# Patient Record
Sex: FEMALE | Race: WHITE | HISPANIC OR LATINO | Employment: OTHER | URBAN - METROPOLITAN AREA
[De-identification: names, ages, dates, MRNs, and addresses within clinical notes are randomized per-mention and may not be internally consistent; named-entity substitution may affect disease eponyms.]

---

## 2017-02-10 ENCOUNTER — GENERIC CONVERSION - ENCOUNTER (OUTPATIENT)
Dept: OTHER | Facility: OTHER | Age: 72
End: 2017-02-10

## 2017-02-14 ENCOUNTER — GENERIC CONVERSION - ENCOUNTER (OUTPATIENT)
Dept: OTHER | Facility: OTHER | Age: 72
End: 2017-02-14

## 2017-02-16 ENCOUNTER — ALLSCRIPTS OFFICE VISIT (OUTPATIENT)
Dept: OTHER | Facility: OTHER | Age: 72
End: 2017-02-16

## 2017-04-11 ENCOUNTER — ALLSCRIPTS OFFICE VISIT (OUTPATIENT)
Dept: OTHER | Facility: OTHER | Age: 72
End: 2017-04-11

## 2017-04-11 ENCOUNTER — GENERIC CONVERSION - ENCOUNTER (OUTPATIENT)
Dept: OTHER | Facility: OTHER | Age: 72
End: 2017-04-11

## 2017-04-12 ENCOUNTER — ALLSCRIPTS OFFICE VISIT (OUTPATIENT)
Dept: OTHER | Facility: OTHER | Age: 72
End: 2017-04-12

## 2017-06-10 ENCOUNTER — GENERIC CONVERSION - ENCOUNTER (OUTPATIENT)
Dept: OTHER | Facility: OTHER | Age: 72
End: 2017-06-10

## 2017-06-16 DIAGNOSIS — E78.5 HYPERLIPIDEMIA: ICD-10-CM

## 2017-06-16 DIAGNOSIS — E11.9 TYPE 2 DIABETES MELLITUS WITHOUT COMPLICATIONS (HCC): ICD-10-CM

## 2017-06-20 ENCOUNTER — ALLSCRIPTS OFFICE VISIT (OUTPATIENT)
Dept: OTHER | Facility: OTHER | Age: 72
End: 2017-06-20

## 2017-06-30 ENCOUNTER — ALLSCRIPTS OFFICE VISIT (OUTPATIENT)
Dept: OTHER | Facility: OTHER | Age: 72
End: 2017-06-30

## 2017-07-05 ENCOUNTER — ALLSCRIPTS OFFICE VISIT (OUTPATIENT)
Dept: OTHER | Facility: OTHER | Age: 72
End: 2017-07-05

## 2017-07-11 ENCOUNTER — GENERIC CONVERSION - ENCOUNTER (OUTPATIENT)
Dept: OTHER | Facility: OTHER | Age: 72
End: 2017-07-11

## 2017-09-25 ENCOUNTER — GENERIC CONVERSION - ENCOUNTER (OUTPATIENT)
Dept: OTHER | Facility: OTHER | Age: 72
End: 2017-09-25

## 2017-10-30 DIAGNOSIS — I10 ESSENTIAL (PRIMARY) HYPERTENSION: ICD-10-CM

## 2017-10-30 DIAGNOSIS — E11.9 TYPE 2 DIABETES MELLITUS WITHOUT COMPLICATIONS (HCC): ICD-10-CM

## 2017-10-30 DIAGNOSIS — E78.5 HYPERLIPIDEMIA: ICD-10-CM

## 2017-11-10 ENCOUNTER — ALLSCRIPTS OFFICE VISIT (OUTPATIENT)
Dept: OTHER | Facility: OTHER | Age: 72
End: 2017-11-10

## 2017-11-11 NOTE — PROGRESS NOTES
Assessment    1  Diabetes mellitus, type 2 (250 00) (E11 9)   2  Hypertension (401 9) (I10)   3  Hyperlipidemia (272 4) (E78 5)   4  Urinary incontinence (788 30) (R32)   5  Moderate persistent asthma (493 90) (J45 40)   6  Rheumatoid arthritis (714 0) (M06 9)   7  Vitamin D deficiency (268 9) (E55 9)   8  Gastroesophageal reflux disease (530 81) (K21 9)   9  Depression (311) (F32 9)   10  Obesity (278 00) (E66 9)    Plan  Diabetes mellitus, type 2    · Basaglar KwikPen 100 UNIT/ML Subcutaneous Solution Pen-injector; INJECT 30 UNIT Daily   · (1) CBC/PLT/DIFF; Status:Active; Requested for:10Nov2017;    · (1) COMPREHENSIVE METABOLIC PANEL; Status:Active; Requested for:10Nov2017;    · (1) HEMOGLOBIN A1C; Status:Active; Requested for:10Nov2017;   Flu vaccine need    · Fluzone High-Dose 0 5 ML Intramuscular Suspension Prefilled Syringe  Hyperlipidemia    · (1) LIPID PANEL, FASTING; Status:Active; Requested for:10Nov2017;    · (1) TSH; Status:Active; Requested for:10Nov2017;   Urinary incontinence    · Oxybutynin Chloride ER 10 MG Oral Tablet Extended Release 24 Hour; Take 1 tablet daily    Discussion/Summary  Discussion Summary:   Type 2 diabetesâ   She will continue to take metformin and Lantus or Basaglar 30 units daily, whichever the insurance will cover  I will recheck the labs in 4 months  arthritis- she follows up with rheumatology   She still complains of a lot of pains in all her joints and severe ambulatory dysfunction  blood pressure is stable, continue same medication  she is stable on pantoprazole  she is on Lexapro  She is still very depressed as her  is sick and does not help himself and she has to do all the work for him  I reassured her and told her to take her medication regularly  incontinenceâ she was started on oxybutynin, better  I told her to cut down on the portion size and try to increase her activity as much as she can  dysfunctionâ due to the severe rheumatoid arthritis she is not able to walk properly  Counseling Documentation With Imm: The patient was counseled regarding diagnostic results,-- instructions for management,-- risk factor reductions,-- risks and benefits of treatment options,-- importance of compliance with treatment  Medication SE Review and Pt Understands Tx: Possible side effects of new medications were reviewed with the patient/guardian today  The treatment plan was reviewed with the patient/guardian  The patient/guardian understands and agrees with the treatment plan      Chief Complaint  Chief Complaint Chronic Condition [facility]: Patient is here today for follow up of chronic conditions described in HPI  History of Present Illness  Obesity (Follow-Up): The patient is being seen for follow-up of obesity  The patient reports no change in the condition  She has had no significant interval events  The patient is not currently on medication for this problem  Depression (Follow-Up): The patient states her depression has worsened since the last visit  She describes this as moderate in severity  She has had no significant interval events  Interval Symptoms: worsened depression-- and-- worsened depressed mood  Medications: The patient is not currently on any medications for her depression  Hyperlipidemia (Follow-Up): The patient states her hyperlipidemia has been stable since the last visit  Comorbid Illnesses: diabetes mellitus-- and-- hypertension  She has no significant interval events  Symptoms: The patient is currently asymptomatic  Medications: the patient is adherent with her medication regimen  -- She denies medication side effects  Hypertension (Follow-Up): The patient presents for follow-up of essential hypertension  The patient states she has been stable with her blood pressure control since the last visit  She has no comorbid illnesses  She has no significant interval events  Symptoms: The patient is currently asymptomatic    Medications: the patient is adherent with her medication regimen  -- She denies medication side effects  Diabetes Type II (Follow-Up): The patient states she has been stable with her Type II Diabetes control since the last visit  Comorbid Illnesses: hypertension,-- hyperlipidemia-- and-- obesity  Complications: peripheral neuropathy-- and-- coronary artery disease  She has no significant interval events  Symptoms: The patient is currently asymptomatic  Medications: the patient is adherent with her medication regimen  -- She denies medication side effects  Review of Systems  Complete-Female:  Constitutional: No fever, no chills, feels well, no tiredness, no recent weight gain or weight loss  Eyes: No complaints of eye pain, no red eyes, no eyesight problems, no discharge, no dry eyes, no itching of eyes  ENT: no complaints of earache, no loss of hearing, no nose bleeds, no nasal discharge, no sore throat, no hoarseness  Cardiovascular: No complaints of slow heart rate, no fast heart rate, no chest pain, no palpitations, no leg claudication, no lower extremity edema  Respiratory: No complaints of shortness of breath, no wheezing, no cough, no SOB on exertion, no orthopnea, no PND  Gastrointestinal: No complaints of abdominal pain, no constipation, no nausea or vomiting, no diarrhea, no bloody stools  Genitourinary: No complaints of dysuria, no incontinence, no pelvic pain, no dysmenorrhea, no vaginal discharge or bleeding  Musculoskeletal: No complaints of arthralgias, no myalgias, no joint swelling or stiffness, no limb pain or swelling  Integumentary: No complaints of skin rash or lesions, no itching, no skin wounds, no breast pain or lump  Neurological: No complaints of headache, no confusion, no convulsions, no numbness, no dizziness or fainting, no tingling, no limb weakness, no difficulty walking  Psychiatric: as noted in HPI    Endocrine: No complaints of proptosis, no hot flashes, no muscle weakness, no deepening of the voice, no feelings of weakness  Hematologic/Lymphatic: No complaints of swollen glands, no swollen glands in the neck, does not bleed easily, does not bruise easily  Active Problems  1  Ambulatory dysfunction (719 7) (R26 2)   2  Asthma (493 90) (J45 909)   3  Bilateral edema of lower extremity (782 3) (R60 0)   4  Chronic diastolic congestive heart failure (428 32,428 0) (I50 32)   5  Depression (311) (F32 9)   6  Diabetes mellitus type II, controlled (250 00) (E11 9)   7  Diabetes mellitus, type 2 (250 00) (E11 9)   8  Gastroesophageal reflux disease (530 81) (K21 9)   9  Hyperlipidemia (272 4) (E78 5)   10  Hypertension (401 9) (I10)   11  Insomnia (780 52) (G47 00)   12  No advance directive on file (V49 89) (Z78 9)   13  Obesity (278 00) (E66 9)   14  Psoriasis vulgaris (696 1) (L40 0)   15  Rheumatoid arthritis (714 0) (M06 9)   16  Screening for skin condition (V82 0) (Z13 89)   17  Urinary incontinence (788 30) (R32)   18  Vitamin D deficiency (268 9) (E55 9)    Past Medical History    1  History of Acute asthmatic bronchitis (493 90) (J45 909)   2  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,E947 9) (L27 0,T50 905A)   3  History of Carpal tunnel syndrome, left (354 0) (G56 02)   4  History of Cellulitis (682 9) (L03 90)   5  History of Cellulitis of leg, left (682 6) (L03 116)   6  History of Chest pain on breathing (786 52) (R07 1)   7  History of Encounter for screening for other nervous system disorder (V80 09) (Z13 858)   8  History of Encounter for special screening examination for genitourinary disorder (V81 6) (Z13 89)   9  History of Flu vaccine need (V04 81) (Z23)   10  History of Fungal infection (117 9) (B49)   11  History of dizziness (V13 89) (Z87 898)   12  History of shortness of breath (V13 89) (Z87 898)   13  History of Joint pain (719 40) (M25 50)   14  History of Need for pneumococcal vaccination (V03 82) (Z23)   15   History of Preoperative clearance (V72 84) (Z01 818)   16  History of Skin rash (782 1) (R21)  Active Problems And Past Medical History Reviewed: The active problems and past medical history were reviewed and updated today  Surgical History  1  History of Hand Surgery  Surgical History Reviewed: The surgical history was reviewed and updated today  Family History  Mother    1  Family history of rheumatoid arthritis (V17 7) (Z82 61)  Family History Reviewed: The family history was reviewed and updated today  Social History     · Former cigarette smoker (V15 82) (B93 283)   · Never A Smoker   · No advance directive on file (M64 05) (Z78 9)  Social History Reviewed: The social history was reviewed and updated today  The social history was reviewed and is unchanged  Current Meds   1  Actemra 162 MG/0 9ML Subcutaneous Solution Prefilled Syringe; Therapy: 77HEI2556 to Recorded   2  Albuterol Sulfate (2 5 MG/3ML) 0 083% Inhalation Nebulization Solution; USE 1 UNIT DOSE IN NEBULIZER 4 TIMES DAILY; Therapy: 38RIB1172 to (Renew: 93AUA5785)  Requested for: 17Ycg9998; Last Rx:99Vec2891 Ordered   3  BD Pen Needle Mini U/F 31G X 5 MM Miscellaneous; USE AS DIRECTED; Therapy: 04OTW1611 to (Evaluate:12Mar2016)  Requested for: 91CDD9516; Last Rx:18Mar2015 Ordered   4  Breo Ellipta 100-25 MCG/INH Inhalation Aerosol Powder Breath Activated; 1 puff once daily; Therapy: 61Gwh3757 to (Last Rx:55Yuk9596)  Requested for: 38Sqc4109 Ordered   5  Escitalopram Oxalate 20 MG Oral Tablet; TAKE 1 TABLET DAILY; Therapy: 04OSG2028 to (Renew:92Gmf8901)  Requested for: 77JUI3357; Last Rx:94Pgi2197 Ordered   6  Folic Acid 1 MG Oral Tablet; TAKE 1 TABLET DAILY AS DIRECTED; Therapy: 07IZD3255 to (Evaluate:10Mar2016)  Requested for: 76FSH8779; Last Rx:16Mar2015 Ordered   7  Fosinopril Sodium 10 MG Oral Tablet; take 1 tablet by mouth every day; Therapy: 57Slu6070 to (Emily Bang)  Requested for: 96Eid7591; Last Rx:49Oyj9086 Ordered   8   Klor-Con 10 10 MEQ Oral Tablet Extended Release; TAKE 1 TABLET TWICE DAILY; Therapy: 90QLO7981 to (Evaluate:58Sdl3496)  Requested for: 28COO7902; Last BH:05OYY4070 Ordered   9  MetFORMIN HCl - 1000 MG Oral Tablet; TAKE 1 TABLET TWICE DAILY  Requested for: 19CUM2745; Last Rx:88Tsw7633 Ordered   10  Metoprolol Succinate ER 25 MG Oral Tablet Extended Release 24 Hour; TAKE 1 TABLET DAILY; Therapy: 93Uhs6040 to (Evaluate:97Qqj5444)  Requested for: 56OZN5793; Last Rx:05Yuh9117  Ordered   11  Nystatin 363760 UNIT/GM External Powder; APPLY 2-3 TIMES DAILY TO AFFECTED AREA(S); Therapy: 76ECM1305 to (Last Rx:30Jun2017)  Requested for: 30Jun2017 Ordered   12  OneTouch Ultra Blue In [employer]; TEST 3 TIMES A DAY  Requested for: 10CWP1789; Last  Rx:39Csf2859 Ordered   13  OneTouch Verio w/Device Kit; TEST 3 TIMES A DAY; Therapy: 31BDK1495 to (Last Rx:92Gwh7882)  Requested for: 82Yll9043 Ordered   14  Oxybutynin Chloride ER 10 MG Oral Tablet Extended Release 24 Hour; Take 1 tablet daily; Therapy: 25MKJ9309 to (Last Rx:79Dck3630)  Requested for: 38Nex9418 Ordered   15  Oxycodone-Acetaminophen  MG Oral Tablet; Therapy: 40Mtj7344 to (Evaluate:13Jan2015) Recorded   16  Pantoprazole Sodium 40 MG Oral Tablet Delayed Release; Take one tablet once daily; Therapy: 17NIC8651 to (Renew:52Lan3081)  Requested for: 97ZEM9730; Last Rx:66Zhz8715  Ordered   17  Potassium Chloride ER 10 MEQ Oral Tablet Extended Release; TAKE 1 TABLET TWICE DAILY; Therapy: 97WEJ0337 to (Evaluate:42Zpk5599)  Requested for: 25Wxp3518; Last Rx:05Lzi5421  Ordered   18  ProAir  (90 Base) MCG/ACT Inhalation Aerosol Solution; INHALE 1 TO 2 PUFFS EVERY 4 TO  6 HOURS AS NEEDED; Therapy: 57DRK3726 to (Last Rx:21Azs2248)  Requested for: 96ZDD5565 Ordered   19  Simvastatin 40 MG Oral Tablet; TAKE 1 TABLET DAILY; Therapy: 40GFR9803 to ([phone])  Requested for: 61SUP6659; Last Rx:30Oct2017  Ordered   20   Torsemide 20 MG Oral Tablet; take 2 tablet twice daily; Therapy: 57OHH7786 to [phone])  Requested for: 88ANZ7435; Last Rx:03Nov2017  Ordered   21  Vitamin D3 76275 UNIT Oral Capsule; TAKE 1 CAPSULE Weekly; Therapy: 39KRX5569 to Recorded  Medication List Reviewed: The medication list was reviewed and updated today  Allergies  1  Gabapentin TABS   2  Vancomycin HCl SOLR    Vitals  Vital Signs    Recorded: 93VDP4912 02:56PM   Heart Rate 82   Systolic 612   Diastolic 74   Height 5 ft    Weight 188 lb 4 oz   BMI Calculated 36 77   BSA Calculated 1 82   O2 Saturation 93       Physical Exam   Constitutional  General appearance: Abnormal   appears tired  Head and Face  Head and face: Normal    Palpation of the face and sinuses: No sinus tenderness  Eyes  Conjunctiva and lids: No swelling, erythema or discharge  Pupils and irises: Equal, round, reactive to light  Ears, Nose, Mouth, and Throat  External inspection of ears and nose: Normal    Otoscopic examination: Tympanic membranes translucent with normal light reflex  Canals patent without erythema  Oropharynx: Normal with no erythema, edema, exudate or lesions  Neck  Neck: Supple, symmetric, trachea midline, no masses  Thyroid: Normal, no thyromegaly  Pulmonary  Respiratory effort: No increased work of breathing or signs of respiratory distress  Auscultation of lungs: Clear to auscultation  Cardiovascular  Palpation of heart: Normal PMI, no thrills  Auscultation of heart: Normal rate and rhythm, normal S1 and S2, no murmurs  Examination of extremities for edema and/or varicosities: Abnormal   bilateral ankle 2+ pitting edema  Abdomen  Abdomen: Non-tender, no masses  Lymphatic  Palpation of lymph nodes in neck: No lymphadenopathy  Musculoskeletal  Gait and station: Abnormal    Skin  Skin and subcutaneous tissue: Normal without rashes or lesions  Neurologic  Cranial nerves: Cranial nerves II-XII intact  Cortical function: Normal mental status  Reflexes: 2+ and symmetric  Sensation: No sensory loss  Psychiatric  Judgment and insight: Normal    Mood and affect: Abnormal   Mood and Affect: depressed,-- sad-- and-- tearful  Results/Data  PHQ-9 Adult Depression Screening 71GDT8072 02:55PM User, Susy     Test Name Result Flag Reference   PHQ-9 Adult Depression Score 16       Over the last two weeks, how often have you been bothered by any of the following problems? Little interest or pleasure in doing things: More than half the days - 2 Feeling down, depressed, or hopeless: More than half the days - 2 Trouble falling or staying asleep, or sleeping too much: Nearly every day - 3 Feeling tired or having little energy: Nearly every day - 3 Poor appetite or over eating: More than half the days - 2 Feeling bad about yourself - or that you are a failure or have let yourself or your family down: Nearly every day - 3 Trouble concentrating on things, such as reading the newspaper or watching television: Not at all - 0 Moving or speaking so slowly that other people could have noticed  Or the opposite -  being so fidgety or restless that you have been moving around a lot more than usual: Not at all - 0 Thoughts that you would be better off dead, or of hurting yourself in some way: Several days - 1   PHQ-9 Adult Depression Screening Positive     PHQ-9 Difficulty Level Not difficult at all     PHQ-9 Severity      Moderately Severe Depression     Health Maintenance Flow Sheet 46AUW2288 02:53PM      Test Name Result Flag Reference   Mammography Many years ago         Health Management  Diabetes mellitus type II, controlled   *VB - Foot Exam; every 1 year; Last 68IRX3845; Next Due: 10LHD5324; Active    Future Appointments    Date/Time Provider Specialty Site   11/14/2017 12:40 PM MACK Avalos  Cardiology [facility]   02/12/2018 03:00 PM MACK Camacho   Internal Medicine [facility]       Signatures   Electronically signed by : Bari Pereira M D ; Nov 10 2017  4:48PM EST                       (Author)

## 2017-11-14 ENCOUNTER — ALLSCRIPTS OFFICE VISIT (OUTPATIENT)
Dept: OTHER | Facility: OTHER | Age: 72
End: 2017-11-14

## 2017-11-15 NOTE — PROGRESS NOTES
Assessment  Assessed    1  Chronic diastolic congestive heart failure (428 32,428 0) (I50 32)   2  Hypertension (401 9) (I10)   3  Hyperlipidemia (272 4) (E78 5)   4  Bilateral edema of lower extremity (782 3) (R60 0)   5  Obesity (278 00) (E66 9)    Plan  Bilateral edema of lower extremity    · From  Potassium Chloride ER 10 MEQ Oral Tablet Extended Release TAKE 1TABLET TWICE DAILY To Potassium Chloride ER 10 MEQ Oral Tablet Extended ReleaseTAKE ONE TABLET BY MOUTH ONCE DAILY WITH FOOD   Rx By: Santiago Ruiz; Dispense: 30 Days ; #:30 Tablet Extended Release; Refill: 0;Bilateral edema of lower extremity; ALLY = N; Record  Hypertension    · From  Klor-Con 10 10 MEQ Oral Tablet Extended Release TAKE 1 TABLETTWICE DAILY To Klor-Con 10 10 MEQ Oral Tablet Extended Release TAKE 1 TABLETDAILY WITH FOOD   Rx By: Santiago Ruiz; Dispense: 30 Days ; #:30 Tablet Extended Release; Refill: 3;Hypertension; ALLY = N; Record    Discussion/Summary  Goals and Barriers: The patient has the current Goals: Symptom free  weight loss  The patent has the current Barriers: None  Patient's Capacity to Self-Care: Patient is able to Self-Care  Medication SE Review and Pt Understands Tx: Possible side effects of new medications were reviewed with the patient/guardian today  The treatment plan was reviewed with the patient/guardian  The patient/guardian understands and agrees with the treatment plan    Counseling Documentation With Imm: The patient was counseled regarding instructions for management,-- risk factor reductions,-- patient and family education,-- importance of compliance with treatment  Discussion Summary:   Pharmacological nuclear stress test July 2016 [facility] - negative for evidence of ischemiaechocardiogram July 2016 [facility] - normal LVEF with no significant valvular pathology  diastolic heart failure - almost euvolemic -> decrease torsemide to 20 mg OD  Cont potassium, fosinopril, BB  Low salt diet  Daily weights   Take an extra dose of diuretic if weight increases by 3 pounds in one day or 5 pounds in a week  Keep legs elevated  - BP stable  Continue current medications  - continue statin  Her PCP closely monitors her blood work  - counseled to lose weight  aggressive risk factor modification and therapeutic lifestyle changes  Low salt, low calorie, low fat, low cholesterol diet with regular exercise and to optimize weight  I will defer the ordering and monitoring of necessary lab studies to you, but I am available and happy to review and manage any of that data at your request in the future  concepts of atherosclerosis, including signs and symptoms of cardiac disease  studies were reviewed  measures were reviewed  were entertained and answered  was advised to report any problem requiring medical attention  up with appropriate specialists and lab work as discussed  for follow up visit as scheduled or earlier, if needed  you for allowing me to participate in the care and evaluation of your patient  Should you have any questions, please feel free to contact me  Chief Complaint  Chief Complaint Chronic Condition [facility]: Patient is here today for follow up of chronic conditions described in HPI  History of Present Illness  HPI: Patient here for f/u  States her leg swelling is much less  [facility]  Patient had gone to the hospital with c/o shortness of breath and bilateral lower extremity swelling  Chest x-ray showed pulmonary congestion  BNP was 265  Patient was admitted, diuresed and she felt better  lower extremity swelling is better  Denies chest pain/pressure/tightness, palpitations, lightheadedness, syncope, orthopnea, PND, claudication   Currently on torsemide, potassium, fosinopril, metoprolol succinate  nuclear stress test July 2016 [facility] - negative for evidence of ischemiaechocardiogram July 2016 [facility] - normal LVEF with no significant valvular pathology  - taking her fosinopril, BB regularly  Denies lightheadedness, headache, medication side effects   - on diet control  Her PCP closely monitor the blood work  - trying to lose weight      Review of Systems  Cardiology Female ROS:    Cardiac: as noted in HPI  Skin: as noted in HPI  Genitourinary: No complaints of recurrent urinary tract infections, frequent urination at night, difficult urination, blood in urine, kidney stones, loss of bladder control, kidney problems, denies any birth control or hormone replacement, is not post menopausal, not currently pregnant  Psychological: No complaints of feeling depressed, anxiety, panic attacks, or difficulty concentrating  General: No complaints of trouble sleeping, lack of energy, fatigue, appetite changes, weight changes, fever, frequent infections, or night sweats  Respiratory: as noted in HPI  HEENT: No complaints of serious problems, hearing problems, nose problems, throat problems, or snoring  Gastrointestinal: No complaints of liver problems, nausea, vomiting, heartburn, constipation, bloody stools, diarrhea, problems swallowing, adbominal pain, or rectal bleeding  Hematologic: No complaints of bleeding disorders, anemia, blood clots, or excessive brusing  Neurological: No complaints of numbness, tingling, dizziness, weakness, seizures, headaches, syncope or fainting, AM fatigue, daytime sleepiness, no witnessed apnea episodes  Musculoskeletal: No complaints of arthritis, back pain, or painfull swelling  ROS Reviewed:   ROS reviewed  Active Problems  Problems    1  Ambulatory dysfunction (719 7) (R26 2)   2  Asthma (493 90) (J45 909)   3  Bilateral edema of lower extremity (782 3) (R60 0)   4  Chronic diastolic congestive heart failure (428 32,428 0) (I50 32)   5  Depression (311) (F32 9)   6  Diabetes mellitus type II, controlled (250 00) (E11 9)   7  Diabetes mellitus, type 2 (250 00) (E11 9)   8  Flu vaccine need (V04 81) (Z23)   9   Gastroesophageal reflux disease (530 81) (K21 9)   10  Hyperlipidemia (272 4) (E78 5)   11  Hypertension (401 9) (I10)   12  Insomnia (780 52) (G47 00)   13  Moderate persistent asthma (493 90) (J45 40)   14  No advance directive on file (V49 89) (Z78 9)   15  Obesity (278 00) (E66 9)   16  Psoriasis vulgaris (696 1) (L40 0)   17  Rheumatoid arthritis (714 0) (M06 9)   18  Screening for skin condition (V82 0) (Z13 89)   19  Urinary incontinence (788 30) (R32)   20  Vitamin D deficiency (268 9) (E55 9)    Past Medical History  Problems    1  History of Acute asthmatic bronchitis (493 90) (J45 909)   2  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,E947 9) (L27 0,T50 905A)   3  History of Carpal tunnel syndrome, left (354 0) (G56 02)   4  History of Cellulitis (682 9) (L03 90)   5  History of Cellulitis of leg, left (682 6) (L03 116)   6  History of Chest pain on breathing (786 52) (R07 1)   7  History of Encounter for screening for other nervous system disorder (V80 09) (Z13 858)   8  History of Encounter for special screening examination for genitourinary disorder (V81 6) (Z13 89)   9  History of Flu vaccine need (V04 81) (Z23)   10  History of Fungal infection (117 9) (B49)   11  History of dizziness (V13 89) (Z87 898)   12  History of shortness of breath (V13 89) (Z87 898)   13  History of Joint pain (719 40) (M25 50)   14  History of Need for pneumococcal vaccination (V03 82) (Z23)   15  History of Preoperative clearance (V72 84) (Z01 818)   16  History of Skin rash (782 1) (R21)  Active Problems And Past Medical History Reviewed: The active problems and past medical history were reviewed and updated today  Surgical History  Problems    1  History of Hand Surgery  Surgical History Reviewed: The surgical history was reviewed and updated today  Family History  Mother    1  Family history of rheumatoid arthritis (V17 7) (Z82 61)  Family History Reviewed: The family history was reviewed and updated today  Social History  Problems    · Former cigarette smoker (V15 82) (P11 567)   · Never A Smoker   · No advance directive on file (C01 91) (Z78 9)  Social History Reviewed: The social history was reviewed and updated today  The social history was reviewed and is unchanged  Current Meds   1  Actemra 162 MG/0 9ML Subcutaneous Solution Prefilled Syringe; Therapy: 61TPZ6951 to Recorded   2  Albuterol Sulfate (2 5 MG/3ML) 0 083% Inhalation Nebulization Solution; USE 1 UNIT DOSE IN NEBULIZER 4 TIMES DAILY; Therapy: 28FPY4094 to (Renew: 25CJV0194)  Requested for: 96Uoa4338; Last Rx:57Ycx1670 Ordered   3  Basaglar KwikPen 100 UNIT/ML Subcutaneous Solution Pen-injector; INJECT 30 UNIT Daily; Therapy: 51VMD6491 to (Last Rx:10Nov2017)  Requested for: 13BVU4280 Ordered   4  BD Pen Needle Mini U/F 31G X 5 MM Miscellaneous; USE AS DIRECTED; Therapy: 84MVL7090 to (Evaluate:12Mar2016)  Requested for: 83QQY6575; Last Rx:18Mar2015 Ordered   5  Breo Ellipta 100-25 MCG/INH Inhalation Aerosol Powder Breath Activated; 1 puff once daily; Therapy: 78Zlm7433 to (Last Rx:18Apr2017)  Requested for: 94Aze6409 Ordered   6  Escitalopram Oxalate 20 MG Oral Tablet; TAKE 1 TABLET DAILY; Therapy: 08OFK4037 to (Renew:80Rjn8910)  Requested for: 12QIY8176; Last Rx:33Jal5476 Ordered   7  Folic Acid 1 MG Oral Tablet; TAKE 1 TABLET DAILY AS DIRECTED; Therapy: 77JYM0134 to (Evaluate:10Mar2016)  Requested for: 66HEI8220; Last Rx:16Mar2015 Ordered   8  Fosinopril Sodium 10 MG Oral Tablet; take 1 tablet by mouth every day; Therapy: 70Htf2957 to ([facility])  Requested for: 65Cxi9741; Last Rx:37Yad5754 Ordered   9  Klor-Con 10 10 MEQ Oral Tablet Extended Release; TAKE 1 TABLET TWICE DAILY; Therapy: 88LTT8701 to (Evaluate:08Tio5733)  Requested for: 46JOE1116; Last WZ:20RVE8469 Ordered   10  MetFORMIN HCl - 1000 MG Oral Tablet; TAKE 1 TABLET TWICE DAILY  Requested for:  35MCR9474; Last Rx:66Jhe7344 Ordered   11   Metoprolol Succinate ER 25 MG Oral Tablet Extended Release 24 Hour; TAKE 1 TABLET  DAILY; Therapy: 67Xyt3355 to (Evaluate:19Hfn0680)  Requested for: 14XZH5676; Last  Rx:93Wgi7382 Ordered   12  Nystatin 563419 UNIT/GM External Powder; APPLY 2-3 TIMES DAILY TO AFFECTED  AREA(S); Therapy: 49VEU0075 to (Last Rx:30Jun2017)  Requested for: 30Jun2017 Ordered   13  OneTouch Ultra Blue In [employer]; TEST 3 TIMES A DAY  Requested for: 66KEQ3192; Last  Rx:14Stv0002 Ordered   14  OneTouch Verio w/Device Kit; TEST 3 TIMES A DAY; Therapy: 01OBR5272 to (Last Rx:27Hsu1091)  Requested for: 03Whq6393 Ordered   15  Oxybutynin Chloride ER 10 MG Oral Tablet Extended Release 24 Hour; Take 1 tablet  daily; Therapy: 52ZZI1128 to (Last Rx:10Nov2017)  Requested for: 37LWI8115 Ordered   16  Oxycodone-Acetaminophen  MG Oral Tablet; Therapy: 95Krq2130 to (Evaluate:13Jan2015) Recorded   17  Pantoprazole Sodium 40 MG Oral Tablet Delayed Release; Take one tablet once daily; Therapy: 00QUO7427 to (Renew:31Qit1229)  Requested for: 48TQJ3593; Last  Rx:73Xdr1604 Ordered   18  Potassium Chloride ER 10 MEQ Oral Tablet Extended Release; TAKE 1 TABLET TWICE  DAILY; Therapy: 79UKY6669 to (Evaluate:56Bnu9779)  Requested for: 88Pke2652; Last  Rx:32Hxx6164 Ordered   19  ProAir  (90 Base) MCG/ACT Inhalation Aerosol Solution; INHALE 1 TO 2 PUFFS  EVERY 4 TO 6 HOURS AS NEEDED; Therapy: 58BTI8351 to (Last Rx:94Qku4159)  Requested for: 90OMF1039 Ordered   20  Simvastatin 40 MG Oral Tablet; TAKE 1 TABLET DAILY; Therapy: 44QWX5420 to ([facility])  Requested for: 68ROZ2006; Last  Rx:23Fjf1690 Ordered   21  Torsemide 20 MG Oral Tablet; take 2 tablet twice daily; Therapy: 14IAB4396 to [phone])  Requested for: 37GAY2273; Last  Rx:52Ikv6804 Ordered   22  Vitamin D3 67372 UNIT Oral Capsule; TAKE 1 CAPSULE Weekly; Therapy: 20SUD5853 to Recorded  Medication List Reviewed: The medication list was reviewed and updated today  Medication List Reviewed:    The medication list was reviewed and updated today  Allergies  Medication    1  Gabapentin TABS   2  Vancomycin HCl SOLR    Vitals  Vital Signs    Recorded: 72VYC9931 12:34PM   Heart Rate 75   Systolic 231   Diastolic 80   Height 5 ft    Weight 183 lb    BMI Calculated 35 74   BSA Calculated 1 8   O2 Saturation 87       Physical Exam   Constitutional  General appearance: Abnormal  -- Obese  Eyes  Conjunctiva and Sclera examination: Conjunctiva pink, sclera anicteric  Ears, Nose, Mouth, and Throat - External inspection of ears and nose: Normal without deformities or discharge  -- Oropharynx: Clear, nares are clear, mucous membranes are moist   Neck  Neck and thyroid: Normal, supple, trachea midline, no thyromegaly  Pulmonary  Respiratory effort: No increased work of breathing or signs of respiratory distress  Auscultation of lungs: Clear to auscultation, no rales, no rhonchi, no wheezing, good air movement  Cardiovascular  Palpation of heart: Normal PMI, no thrills  Auscultation of heart: Normal rate and rhythm, normal S1 and S2, no murmurs  Carotid pulses: Normal, 2+ bilaterally  Examination of extremities for edema and/or varicosities: Normal    Chest - Chest: Normal   Abdomen  Abdomen: Non-tender and no distention  Liver and spleen: No hepatomegaly or splenomegaly  Musculoskeletal Gait and station: Normal gait  -- Digits and nails: Normal without clubbing or cyanosis  Skin - Skin and subcutaneous tissue: Normal without rashes or lesions  Skin is warm and well perfused, normal turgor  Neurologic - Speech: Normal    Psychiatric - Orientation to person, place, and time: Normal -- Mood and affect: Normal       Health Management  Diabetes mellitus type II, controlled   *VB - Foot Exam; every 1 year; Last 08SHG9858; Next Due: 94VRT0560; Active    Future Appointments    Date/Time Provider Specialty Site   02/12/2018 03:00 PM MACK Parkinson   Internal Medicine [facility] Signatures   Electronically signed by : MACK Felix ; Nov 14 2017  1:00PM EST                       (Author)

## 2018-01-08 ENCOUNTER — GENERIC CONVERSION - ENCOUNTER (OUTPATIENT)
Dept: INTERNAL MEDICINE CLINIC | Facility: CLINIC | Age: 73
End: 2018-01-08

## 2018-01-08 ENCOUNTER — GENERIC CONVERSION - ENCOUNTER (OUTPATIENT)
Dept: OTHER | Facility: OTHER | Age: 73
End: 2018-01-08

## 2018-01-12 VITALS
BODY MASS INDEX: 35.73 KG/M2 | DIASTOLIC BLOOD PRESSURE: 60 MMHG | WEIGHT: 182 LBS | HEIGHT: 60 IN | SYSTOLIC BLOOD PRESSURE: 130 MMHG | HEART RATE: 104 BPM

## 2018-01-12 NOTE — MISCELLANEOUS
Assessment   1  Skin rash (782 1) (R21)  2  Bilateral edema of lower extremity (782 3) (R60 0)  3  Diabetes mellitus, type 2 (250 00) (E11 9)  4  Hypertension (401 9) (I10)  5  Rheumatoid arthritis (714 0) (M06 9)    Plan  PMH: History of shortness of breath    · Renew: ProAir  (90 Base) MCG/ACT Inhalation Aerosol Solution; INHALE 1 TO 2  PUFFS EVERY 4 TO 6 HOURS AS NEEDED  Rx By: Jody Nick; Dispense: 0 Days ; #:1 X 8 5 GM Inhaler; Refill: 3;For: PMH: History   of shortness of breath; ALLY = N; Verified Transmission to "[facility]Y #[id number] Last   Updated By: System, [facility]; 3/3/2016 12:36:08 PM  Skin rash    · Start: DiphenhydrAMINE HCl - 25 MG Oral Capsule; TAKE 1 CAPSULE EVERY 6 HOURS  DAILY  Rx By: Jody Nick; Dispense: 15 Days ; #:60 Capsule; Refill: 0;For: Skin rash; ALLY =   N; Verified Transmission to "[facility] #311 Last Updated By: System,   [employer]; 3/3/2016 12:36:09 PM   · Start: PredniSONE (Flavio) 10 MG Oral Tablet; 4 tablets daily for 3 days, 3 tabls daily for 3  days, 2 tabslets daily for 3 days, 1 tab daily for 3 days  Rx By: Jody Nick; Dispense: 0 Days ; #:30 Tablet; Refill: 0;For: Skin rash; ALLY = N;   Verified Transmission to "[facility] #3274 Last Updated By: System[employer];   3/3/2016 12:36:08 PM   · Start: Triamcinolone Acetonide 0 1 % External Cream; APPLY  AND RUB  IN A THIN FILM  TO AFFECTED AREAS TWICE DAILY  (AM AND PM)  Rx By: Jody Nick; Dispense: 0 Days ; #:1 X 30 GM Tube; Refill: 1;For: Skin rash; ALLY   = N; Verified Transmission to "[facility] #2971 Last Updated By: System,   [employer]; 3/3/2016 12:36:08 PM    Discussion/Summary  Discussion Summary:   Patient was admitted to the hospital initially for bilateral edema of lower extremities about 2 weeks ago  She was started on IV vancomycin and she developed an extensive rash all over her body  She was discharged to home on February ,27   As the rash is not getting better she went back to the hospital on February 28 and came back on March 2  This time she was given triamcinolone cream to be applied locally and diphenhydramine 25 mg every 6 hours    She still has a lot of itching and redness all over the body  She takes 10 mg of prednisone daily for her rheumatoid arthritis treatment  I increased the dose to 40 mg to be tapered over 2 weeks  She will continue the triamcinolone and diphenhydramine  Counseling Documentation With Imm: The patient was counseled regarding diagnostic results, instructions for management, risk factor reductions, risks and benefits of treatment options, importance of compliance with treatment  Medication SE Review and Pt Understands Tx: Possible side effects of new medications were reviewed with the patient/guardian today  The treatment plan was reviewed with the patient/guardian  The patient/guardian understands and agrees with the treatment plan      Chief Complaint  Chief Complaint Free Text Note Form: hospital follow up      History of Present Illness  TCM Communication [facility]: The patient is being contacted for follow-up after hospitalization and First try message 1   She was hospitalized at [facility]  The date of discharge:  3/2/161   She was discharged to home  The patient presents with complaints of constant episodes of bilateral face, bilateral scalp, bilateral neck, bilateral truncal area, bilateral arm, bilateral buttock, bilateral leg and bilateral foot rash:   Episodes started about 1 week ago  She is currently experiencing rash: Bonilla Orestes Her symptoms are caused by new medication use  Symptoms are improved by antihistamines  Symptoms are unchanged  Risk Factors: new medication  Topics counseled included diagnostic results, instructions for management, risk factor reductions, prognosis, patient and family education, activities of daily living and importance of compliance with treatment     Communication performed and completed by   HPI: Patient was admitted to the hospital initially for bilateral edema of lower extremities about 2 weeks ago  She was started on IV vancomycin and she developed an extensive rash all over her body  She was discharged to home on February ,27  As the rash is not getting better she went back to the hospital on February 28 and came back on March 2  This time she was given triamcinolone cream to be applied locally and diphenhydramine 25 mg every 6 hours    She still has a lot of itching and redness all over the body  1 Amended By: Anna Cruz; Mar 04 2016 1:34 PM EST    Review of Systems  Complete-Female:   Constitutional: No fever, no chills, feels well, no tiredness, no recent weight gain or weight loss  Eyes: No complaints of eye pain, no red eyes, no eyesight problems, no discharge, no dry eyes, no itching of eyes  ENT: no complaints of earache, no loss of hearing, no nose bleeds, no nasal discharge, no sore throat, no hoarseness  Cardiovascular: No complaints of slow heart rate, no fast heart rate, no chest pain, no palpitations, no leg claudication, no lower extremity edema  Respiratory: No complaints of shortness of breath, no wheezing, no cough, no SOB on exertion, no orthopnea, no PND  Gastrointestinal: No complaints of abdominal pain, no constipation, no nausea or vomiting, no diarrhea, no bloody stools  Genitourinary: No complaints of dysuria, no incontinence, no pelvic pain, no dysmenorrhea, no vaginal discharge or bleeding  Musculoskeletal: No complaints of arthralgias, no myalgias, no joint swelling or stiffness, no limb pain or swelling  Integumentary: as noted in HPI  Neurological: No complaints of headache, no confusion, no convulsions, no numbness, no dizziness or fainting, no tingling, no limb weakness, no difficulty walking  Psychiatric: Not suicidal, no sleep disturbance, no anxiety or depression, no change in personality, no emotional problems     Endocrine: No complaints of proptosis, no hot flashes, no muscle weakness, no deepening of the voice, no feelings of weakness  Hematologic/Lymphatic: No complaints of swollen glands, no swollen glands in the neck, does not bleed easily, does not bruise easily  Active Problems   1  Ambulatory dysfunction (719 7) (R26 2)  2  Depression (311) (F32 9)  3  Diabetes mellitus type II, controlled (250 00) (E11 9)  4  Diabetes mellitus, type 2 (250 00) (E11 9)  5  GERD (gastroesophageal reflux disease) (530 81) (K21 9)  6  Hypertension (401 9) (I10)  7  Insomnia (780 52) (G47 00)  8  Obesity (278 00) (E66 9)  9  Psoriasis vulgaris (696 1) (L40 0)  10  Rheumatoid arthritis (714 0) (M06 9)  11  Screening for skin condition (V82 0) (Z13 89)  12  Urinary incontinence (788 30) (R32)    History of Carpal tunnel syndrome, left (354 0) (G56 02)     Past Medical History   1  History of Carpal tunnel syndrome, left (354 0) (G56 02)  2  History of Cellulitis of leg, left (682 6) (L03 116)  3  History of Chest pain on breathing (786 52) (R07 1)  4  History of Encounter for screening for other nervous system disorder (V80 09) (Z13 858)  5  History of Encounter for special screening examination for genitourinary disorder (V81 6)   (Z13 89)  6  History of Fungal infection (117 9) (B49)  7  History of dizziness (V13 89) (Z87 898)  8  History of shortness of breath (V13 89) (Z87 898)  9  History of Joint pain (719 40) (M25 50)  10  History of Preoperative clearance (V72 84) (Z01 818)    Surgical History   1  History of Hand Surgery  Surgical History Reviewed: The surgical history was reviewed and updated today  Family History   1  Family history of rheumatoid arthritis (V17 7) (Z82 61)  Family History Reviewed: The family history was reviewed and updated today  Social History    · Never A Smoker  Social History Reviewed: The social history was reviewed and updated today  The social history was reviewed and is unchanged  Current Meds  1   Actemra 162 MG/0 9ML Subcutaneous Solution Prefilled Syringe; Therapy: 34ONJ3766 to Recorded  2  BD Pen Needle Mini U/F 31G X 5 MM Miscellaneous; USE AS DIRECTED; Therapy: 14MNM3906 to (Evaluate:12Mar2016)  Requested for: 28OPK6576; Last   Rx:18Mar2015 Ordered  3  Calcipotriene 0 005 % External Ointment; APPLY AND GENTLY MASSAGE INTO   AFFECTED AREA(S) TWICE DAILY; Therapy: 64MBM8241 to (Last Rx:31Dec2014)  Requested for: 81Bfd9876 Ordered  4  Escitalopram Oxalate 20 MG Oral Tablet; TAKE 1 TABLET DAILY; Therapy: 31AQI9633 to (Corrie Primrose)  Requested for: 97SIA6785; Last   Rx:02Feb2016 Ordered  5  Folic Acid 1 MG Oral Tablet; TAKE 1 TABLET DAILY AS DIRECTED; Therapy: 31SXK6919 to (Evaluate:10Mar2016)  Requested for: 36RTW0181; Last   Rx:16Mar2015 Ordered  6  Fosinopril Sodium 10 MG Oral Tablet; TAKE 1 TABLET DAILY  Requested for: 09Cnz1841; Last Rx:20Apr2015 Ordered  7  Lantus 100 UNIT/ML Subcutaneous Solution; inject 40 unit daily; Therapy: 41LZN2305 to  Requested for: 02Feb2016 Recorded  8  MetFORMIN HCl - 1000 MG Oral Tablet; TAKE 1 TABLET TWICE DAILY  Requested for:   42ITF9734; Last Rx:16Mar2015 Ordered  9  Methotrexate 2 5 MG Oral Tablet; TAKE 6 TABLETS WEEKLY  Requested for: 46HAW6351; Last Rx:82Xso3448 Ordered  10  Nystatin 268778 UNIT/GM External Cream;    Therapy: 97EHC1531 to (Evaluate:08Jan2015) Recorded  11  Nystatin 517073 UNIT/GM External Powder; APPLY 2-3 TIMES DAILY TO AFFECTED    AREA(S); Therapy: 83QAG1092 to (Last Rx:24Dec2014)  Requested for: 25Buh0226 Ordered  12  OneTouch Delica Lancets 58F Miscellaneous; test tid  Requested for: 33ZPS0538; Last    Rx:16Mar2015 Ordered  13  OneTouch Ultra Blue In [employer]; TEST 3 TIMES A DAY; Last Rx:30Apr2014 Ordered  14  Oxybutynin Chloride ER 10 MG Oral Tablet Extended Release 24 Hour; Take 1 tablet    daily; Therapy: 27JDY7665 to (Last Rx:02Feb2016)  Requested for: 02Feb2016 Ordered  15  Oxycodone-Acetaminophen  MG Oral Tablet;     Therapy: 45Pki4229 to ([facility]:51IEE4292) Recorded  16  Pantoprazole Sodium 40 MG Oral Tablet Delayed Release; Take one tablet once daily; Therapy: 80BGD9132 to (Renew:69Oia4445)  Requested for: 39HYK8977; Last    Rx:43Uql8577 Ordered  17  Pioglitazone HCl - 45 MG Oral Tablet; TAKE 1 TABLET ONCE DAILY  Requested for:    06Xnd7557; Last Rx:86Jzp7405 Ordered  18  PredniSONE 5 MG Oral Tablet; take 2 tablet daily; Therapy: 02XIG0281 to (Complete:13Mar2016) Recorded  19  ProAir  (90 Base) MCG/ACT Inhalation Aerosol Solution; INHALE 1 TO 2 PUFFS    EVERY 4 TO 6 HOURS AS NEEDED; Therapy: 64ZFV2989 to (Last Rx:59Wzl2459)  Requested for: 06DBW5810 Ordered  20  Simvastatin 40 MG Oral Tablet; TAKE 1 TABLET DAILY  Requested for: 26TNE3215; Last    CU:71SPO4768 Ordered  21  Symbicort 160-4 5 MCG/ACT Inhalation Aerosol; inhale 1 puff bid; Therapy: 85KWT2346 to (Last Rx:96Eat7826)  Requested for: 57QVP9064 Ordered  Medication List Reviewed: The medication list was reviewed and updated today  Allergies   1  Vancomycin HCl SOLR    Vitals  Signs [Data Includes: Current Encounter]   Recorded: [id number] 11:55AM   Heart Rate: 80  Systolic: 633  Diastolic: 60  Height: 5 ft   Weight: 200 lb 6 08 oz  BMI Calculated: 39 13  BSA Calculated: 1 86    Physical Exam    Constitutional   General appearance: Abnormal   uncomfortable  Eyes   Conjunctiva and lids: No swelling, erythema or discharge  Ears, Nose, Mouth, and Throat   External inspection of ears and nose: Normal     Oropharynx: Normal with no erythema, edema, exudate or lesions  Pulmonary   Respiratory effort: No increased work of breathing or signs of respiratory distress  Auscultation of lungs: Clear to auscultation  Cardiovascular   Palpation of heart: Normal PMI, no thrills  Auscultation of heart: Normal rate and rhythm, normal S1 and S2, without murmurs      Examination of extremities for edema and/or varicosities: Normal     Abdomen   Abdomen: Non-tender, no masses  Liver and spleen: No hepatomegaly or splenomegaly  Lymphatic   Palpation of lymph nodes in neck: No lymphadenopathy  Musculoskeletal   Gait and station: Normal     Skin   Skin and subcutaneous tissue: Abnormal   erythema  Clinical impression: non-specific rash (diffusely in mutiple areas )  Neurologic   Reflexes: 2+ and symmetric  Sensation: No sensory loss  Psychiatric   Orientation to person, place, and time: Normal     Mood and affect: Normal          Future Appointments    Date/Time Provider Specialty Site   03/05/2016 12:00 PM MACK Crystal  [address] [facility]   06/09/2016 01:00 PM MACK Crystal   Internal [address]     Signatures   Electronically signed by : MACK Dugan ; Mar  3 2016  3:53PM EST                       (Author)    Electronically signed by : Cyndi Blankenship, [facility]; Mar  4 2016  1:35PM EST                       (Author)    Electronically signed by : MACK Dugan ; Mar  4 2016  4:33PM EST

## 2018-01-13 VITALS
BODY MASS INDEX: 38.87 KG/M2 | SYSTOLIC BLOOD PRESSURE: 142 MMHG | HEART RATE: 100 BPM | WEIGHT: 198 LBS | DIASTOLIC BLOOD PRESSURE: 70 MMHG | HEIGHT: 60 IN | OXYGEN SATURATION: 84 %

## 2018-01-13 VITALS
SYSTOLIC BLOOD PRESSURE: 126 MMHG | OXYGEN SATURATION: 94 % | HEIGHT: 60 IN | WEIGHT: 206.38 LBS | HEART RATE: 95 BPM | DIASTOLIC BLOOD PRESSURE: 52 MMHG | BODY MASS INDEX: 40.52 KG/M2

## 2018-01-13 VITALS
DIASTOLIC BLOOD PRESSURE: 60 MMHG | SYSTOLIC BLOOD PRESSURE: 120 MMHG | OXYGEN SATURATION: 91 % | BODY MASS INDEX: 39.73 KG/M2 | HEART RATE: 81 BPM | WEIGHT: 202.38 LBS | HEIGHT: 60 IN

## 2018-01-13 VITALS
BODY MASS INDEX: 38.09 KG/M2 | OXYGEN SATURATION: 89 % | HEIGHT: 60 IN | WEIGHT: 194 LBS | SYSTOLIC BLOOD PRESSURE: 124 MMHG | DIASTOLIC BLOOD PRESSURE: 80 MMHG | HEART RATE: 86 BPM

## 2018-01-13 VITALS
SYSTOLIC BLOOD PRESSURE: 142 MMHG | DIASTOLIC BLOOD PRESSURE: 64 MMHG | BODY MASS INDEX: 38.47 KG/M2 | HEART RATE: 92 BPM | WEIGHT: 197 LBS

## 2018-01-14 VITALS
BODY MASS INDEX: 36.96 KG/M2 | HEART RATE: 82 BPM | HEIGHT: 60 IN | SYSTOLIC BLOOD PRESSURE: 134 MMHG | WEIGHT: 188.25 LBS | DIASTOLIC BLOOD PRESSURE: 74 MMHG | OXYGEN SATURATION: 93 %

## 2018-01-14 VITALS
HEIGHT: 60 IN | SYSTOLIC BLOOD PRESSURE: 128 MMHG | BODY MASS INDEX: 35.93 KG/M2 | DIASTOLIC BLOOD PRESSURE: 80 MMHG | WEIGHT: 183 LBS | OXYGEN SATURATION: 87 % | HEART RATE: 75 BPM

## 2018-01-16 NOTE — MISCELLANEOUS
Assessment    1  Rheumatoid arthritis (714 0) (M06 9)   2  Ambulatory dysfunction (719 7) (R26 2)   3  Diabetes mellitus type II, controlled (250 00) (E11 9)   4  Hyperlipidemia (272 4) (E78 5)   5  Hypertension (401 9) (I10)   6  Obesity (278 00) (E66 9)    Plan   Acute asthmatic bronchitis    · Advair Diskus 250-50 MCG/DOSE Inhalation Aerosol Powder Breath Activated;  INHALE 1 PUFF TWICE DAILY   Rx By: Laverne Zaragoza; Dispense: 0 Days ; #:1 X 60 Aerosol Powder Breath Activated Disp Pack; Refill: 3; For: Acute asthmatic bronchitis; ALLY = N; Verified Transmission to [facility]/PHARMACY #5985 Last Updated By: System, SureScripts; 12/20/2016 2:30:35 PM   · Albuterol Sulfate (2 5 MG/3ML) 0 083% Inhalation Nebulization Solution; USE 1  UNIT DOSE IN NEBULIZER 4 TIMES DAILY   Rx By: Winter Emperor; Dispense: 30 Days ; #:3 X 3 ML Plas Cont (30 Plas Conts); Refill: 3; For: Acute asthmatic bronchitis; ALLY = N; Verified Transmission to [facility]/PHARMACY #3074 Bilateral edema of lower extremity    · Torsemide 10 MG Oral Tablet; TAKE 1 TABLET DAILY   Rx By: Laverne Zaragoza; Dispense: 90 Days ; #:90 Tablet; Refill: 1; For: Bilateral edema of lower extremity; ALLY = N; Verified Transmission to [facility]/PHARMACY #2214 Last Updated By: System, SureScripts; 12/20/2016 2:34:48 PM  Diabetes mellitus type II, controlled    · From  Lantus 100 UNIT/ML Subcutaneous Solution INJECT 25 UNIT Bedtime  To Lantus 100 UNIT/ML Subcutaneous Solution INJECT 27 UNIT Bedtime   Rx By: Winter Emperor; Dispense: 0 Days ; #:3 X 10 ML Vial; Refill: 1; For: Diabetes mellitus type II, controlled; ALLY = N; Record   · OneTouch Delica Lancets 38T Miscellaneous; test tid   Rx By: Winter Emperor; Dispense: 0 Days ; #:100 Miscellaneous;  Refill: 5; For: Diabetes mellitus type II, controlled; ALLY = N; Sent To: [facility]/PHARMACY #3767 Msg to Pharmacy: dx:E11 9; Last Updated By: Carolina Cheek; 12/20/2016 2:34:21 PM   · OneTouch Verio w/Device Kit; TEST 3 TIMES A DAY   Rx By: Winter Mueller; Dispense: 0 Days ; #:1 Kit; Refill: 0; For: Diabetes mellitus type II, controlled; ALLY = N; Verified Transmission to [facility]/PHARMACY #5113 Msg to Pharmacy: e11 9  Obesity    · Follow-up visit in 2 months Evaluation and Treatment  Follow-up  Status: Hold For -  Scheduling  Requested for: 60Apr7667   Ordered; For: Obesity; Ordered By: Asha Patel Performed:  Due: 97TCQ1031  PMH: Fungal infection    · Nystatin 969169 UNIT/GM External Powder; APPLY 2-3 TIMES DAILY TO  AFFECTED AREA(S)   Rx By: Asha Patel; Dispense: 0 Days ; #:1 X 60 GM Bottle; Refill: 5; For: PMH: Fungal infection; ALLY = N; Verified Transmission to [facility]/PHARMACY #1832   Potassium Chloride ER 10 MEQ Oral Tablet Extended Release; TAKE 1 TABLET DAILY WITH FOOD; Therapy: 12FPY5159 to (Evaluate:20Mar2017)  Requested for: 41Bzn2298; Last Rx:[id number]; Status: ACTIVE - Retrospective By Protocol Authorization Ordered  Rx By: Kristie Stover; Dispense: 90 Days ; #:90 Tablet Extended Release; Refill: 0;   For: Bilateral edema of lower extremity; ALLY = N; Verified Transmission to [facility]/PHARMACY #1483 Last Updated By: System, SureScripts; 12/20/2016 3:51:58 PM  Obesity (278 00) (E66 9)          Discussion/Summary  Discussion Summary:   Reviewed all of her hospital records she is doing well following closely with cardiology I increased her Lantus by 2 units she will follow-up in 2 months  Chief Complaint  Chief Complaint Free Text Note Form: Hospital follow-up      History of Present Illness  TCM Communication [facility]: The patient is being contacted for follow-up after hospitalization  She was hospitalized at [facility] [place]  The date of discharge: 12/12/16  She was discharged to home  Medications reviewed and updated today  Communication performed and completed by   HPI: she was hospitalized from December 10 until December 12 because of shortness of breath and trouble walking   Trouble walking was due to inflammatory joint disease related to rheumatoid arthritis of her legs she was treated with steroidsand diuretics and her breathing and joint troubles improved blood sugars were elevated in the hospital but otherwise she was stable she is finished with the steroids her blood sugars are in the mid 100s  She denies chest pain palpitations shortness of breath nausea or vomiting   Rheumatoid Arthritis (Follow-Up): The patient states her rheumatoid arthritis has improved since the last visit  She has no comorbid illnesses  She has no significant interval events  Symptoms: The patient is currently asymptomatic  Associated Symptoms: no fever, no dry cough, no fatigue, no problems with sleep and no sexual dysfunction  Activities: no limitations  Medications: the patient is adherent with her medication regimen  She denies medication side effects  Diabetes Type II (Follow-Up): The patient states she has been doing well with her Type II Diabetes control since the last visit  She has no comorbid illnesses  She has no known diabetic complications  She has no significant interval events  Symptoms: The patient is currently asymptomatic  Associated symptoms include no polyuria, no polydipsia and no recent weight loss  Medications: the patient is adherent with her medication regimen  She denies medication side effects  The patient is doing well with her diabetes goals  Review of Systems  Complete-Female:   Constitutional: No fever, no chills, feels well, no tiredness, no recent weight gain or weight loss  Eyes: No complaints of eye pain, no red eyes, no eyesight problems, no discharge, no dry eyes, no itching of eyes  ENT: no complaints of earache, no loss of hearing, no nose bleeds, no nasal discharge, no sore throat, no hoarseness  Cardiovascular: as noted in HPI  Gastrointestinal: as noted in HPI  Genitourinary: No complaints of dysuria, no incontinence, no pelvic pain, no dysmenorrhea, no vaginal discharge or bleeding  Musculoskeletal: as noted in HPI  Integumentary: No complaints of skin rash or lesions, no itching, no skin wounds, no breast pain or lump  Neurological: No complaints of headache, no confusion, no convulsions, no numbness, no dizziness or fainting, no tingling, no limb weakness, no difficulty walking  Psychiatric: Not suicidal, no sleep disturbance, no anxiety or depression, no change in personality, no emotional problems  Endocrine: No complaints of proptosis, no hot flashes, no muscle weakness, no deepening of the voice, no feelings of weakness  Hematologic/Lymphatic: No complaints of swollen glands, no swollen glands in the neck, does not bleed easily, does not bruise easily  ROS Reviewed:   ROS reviewed  Active Problems     1  Acute asthmatic bronchitis (493 90) (J45 909)   2  Ambulatory dysfunction (719 7) (R26 2)   3  Bilateral edema of lower extremity (782 3) (R60 0)   4  Depression (311) (F32 9)   5  Diabetes mellitus type II, controlled (250 00) (E11 9)   6  Diabetes mellitus, type 2 (250 00) (E11 9)   7  Gastroesophageal reflux disease (530 81) (K21 9)   8  Insomnia (780 52) (G47 00)   9  Preoperative clearance (V72 84) (Z01 818)   10  Psoriasis vulgaris (696 1) (L40 0)   11  Rheumatoid arthritis (714 0) (M06 9)   12  Screening for skin condition (V82 0) (Z13 89)   13  Urinary incontinence (788 30) (R32)    Hypertension (401 9) (I10)       Obesity (278 00) (E66 9)       Chronic diastolic congestive heart failure (428 32) (I50 32)       Hyperlipidemia (272 4) (E78 5)          Past Medical History    1  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,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)   (L27 0,T50 905A)   2  History of Carpal tunnel syndrome, left (354 0) (G56 02)   3  History of Cellulitis (682 9) (L03 90)   4  History of Cellulitis of leg, left (682 6) (L03 116)   5  History of Chest pain on breathing (786 52) (R07 1)   6  History of Encounter for screening for other nervous system disorder (V80 09) (Z13 858)   7   History of Encounter for special screening examination for genitourinary disorder (V81 6)   (Z13 89)   8  History of Fungal infection (117 9) (B49)   9  History of dizziness (V13 89) (Z87 898)   10  History of shortness of breath (V13 89) (Z87 898)   11  History of Joint pain (719 40) (M25 50)   12  History of Skin rash (782 1) (R21)    Surgical History    1  History of Hand Surgery  Surgical History Reviewed: The surgical history was reviewed and updated today  Family History  Mother    1  Family history of rheumatoid arthritis (V17 7) (Z82 61)  Family History Reviewed: The family history was reviewed and updated today  Social History    · Never A Smoker  Social History Reviewed: The social history was reviewed and updated today  Current Meds   1  Actemra 162 MG/0 9ML Subcutaneous Solution Prefilled Syringe; Therapy: 31FYB4033 to Recorded   2  Advair Diskus 250-50 MCG/DOSE Inhalation Aerosol Powder Breath Activated; INHALE 1   PUFF TWICE DAILY; Therapy: 11SPJ8680 to (Last Rx:94Pdp7894)  Requested for: 69YWB3540 Ordered   3  Albuterol Sulfate (2 5 MG/3ML) 0 083% Inhalation Nebulization Solution; USE 1 UNIT   DOSE IN NEBULIZER 4 TIMES DAILY; Therapy: 30ZUU3355 to (Renew:11Nov2016)  Requested for: 83XET2453; Last   Rx:06Tpx9153 Ordered   4  BD Pen Needle Mini U/F 31G X 5 MM Miscellaneous; USE AS DIRECTED; Therapy: 56CJA8865 to (Evaluate:12Mar2016)  Requested for: 32HBR6267; Last   Rx:18Mar2015 Ordered   5  Escitalopram Oxalate 20 MG Oral Tablet; TAKE 1 TABLET DAILY; Therapy: 10VMW6476 to ([place])  Requested for: 60TOU8657; Last   Rx:28Wju7160 Ordered   6  Folic Acid 1 MG Oral Tablet; TAKE 1 TABLET DAILY AS DIRECTED; Therapy: 88OKG9248 to (Evaluate:10Mar2016)  Requested for: 78SZG6962; Last   Rx:16Mar2015 Ordered   7  Fosinopril Sodium 10 MG Oral Tablet; TAKE 1 TABLET DAILY  Requested for:   07Zog7324; Last Rx:69Fvt9120 Ordered   8   Klor-Con 10 10 MEQ Oral Tablet Extended Release; TAKE 1 TABLET DAILY WITH FOOD; Therapy: 59AEI1957 to (Evaluate:26Feb2017)  Requested for: 43HKG6510; Last   Rx:28Nov2016 Ordered   9  Lantus 100 UNIT/ML Subcutaneous Solution; INJECT 25 UNIT Bedtime; Therapy: 67BQA8890 to  Requested for: 05DEO2822 Recorded   10  MetFORMIN HCl - 1000 MG Oral Tablet; TAKE 1 TABLET TWICE DAILY; Therapy: (Recorded:09Jun2016) to  Requested for: 96QUA5072 Recorded   11  Metoprolol Succinate ER 25 MG Oral Tablet Extended Release 24 Hour; TAKE 1 TABLET    DAILY; Therapy: 28New3674 to (Evaluate:01Mar2017)  Requested for: 19Qlo7996; Last    Rx:21Mvm6047 Ordered   12  Nystatin 681006 UNIT/GM External Cream;    Therapy: 09KEQ2065 to (Evaluate:08Jan2015) Recorded   13  Nystatin 960958 UNIT/GM External Powder; APPLY 2-3 TIMES DAILY TO AFFECTED    AREA(S); Therapy: 91ROB5135 to (Last Rx:62Hgu6897)  Requested for: 47Tvh2111 Ordered   14  OneTouch Delica Lancets 82K Miscellaneous; test tid  Requested for: 13Jun2016; Last    Rx:13Jun2016 Ordered   15  OneTouch Ultra Blue In [employer]; TEST 3 TIMES A DAY  Requested for: 29BNW0488; Last    Rx:13Jun2016 Ordered   16  OneTouch Verio w/Device Kit; TEST 3 TIMES A DAY; Therapy: 60YMH3217 to (Last ID:[id number])  Requested for: 15HFE2620 Ordered   17  Oxybutynin Chloride ER 10 MG Oral Tablet Extended Release 24 Hour; Take 1 tablet    daily; Therapy: 84GMT9705 to (Last Rx:22Ytx2925)  Requested for: 05Qcl7617 Ordered   18  Oxycodone-Acetaminophen  MG Oral Tablet; Therapy: 54Nam8063 to (Evaluate:13Jan2015) Recorded   19  Pantoprazole Sodium 40 MG Oral Tablet Delayed Release; Take one tablet once daily; Therapy: 85HMT3383 to (Renew:44Ouk9153)  Requested for: 62YTH3377; Last    Rx:82Kud2507 Ordered   20  Pioglitazone HCl - 45 MG Oral Tablet; TAKE 1 TABLET ONCE DAILY  Requested for:    67Eub6328; Last Rx:36Sje7744 Ordered   21  Potassium Chloride ER 10 MEQ Oral Tablet Extended Release; TAKE 1 TABLET DAILY    WITH FOOD;     Therapy: 59UGJ0560 to (Evaluate:01Dec2016)  Requested for: 77Rfc1296; Last    Rx:54Ueo7590 Ordered   22  ProAir  (90 Base) MCG/ACT Inhalation Aerosol Solution; INHALE 1 TO 2 PUFFS    EVERY 4 TO 6 HOURS AS NEEDED; Therapy: 35BTN8376 to (Last Rx:79Vsr3690)  Requested for: 24GQN8569 Ordered   23  Simvastatin 40 MG Oral Tablet; TAKE 1 TABLET DAILY  Requested for: 83Qig1089; Last    Rx:02Sep2016 Ordered   24  Symbicort 160-4 5 MCG/ACT Inhalation Aerosol; inhale 1 puff bid; Therapy: 57INR7120 to (Last Rx:13Ren4238)  Requested for: 56RGS7632 Ordered   25  Torsemide 10 MG Oral Tablet; TAKE 1 TABLET DAILY; Therapy: 78XHP5307 to (Evaluate:01Mar2017)  Requested for: 97Avn0030; Last    Rx:05Ays8230 Ordered   26  Triamcinolone Acetonide 0 1 % External Ointment; APPLY SPARINGLY TO AFFECTED    AREA(S) TWICE DAILY; Therapy: 94JKK2738 to (Last Rx:45Kqe5593)  Requested for: 79Vmb4397 Ordered  Medication List Reviewed: The medication list was reviewed and updated today  Allergies    1  Gabapentin TABS   2  Vancomycin HCl SOLR    Vitals  Signs   Recorded: 20Dec2016 02:17PM   Temperature: 98 7 F  Heart Rate: 80  Systolic: 578  Diastolic: 70  Height: 5 ft   Weight: 166 lb 4 00 oz  BMI Calculated: 32 47  BSA Calculated: 1 72    Physical Exam    Constitutional   General appearance: Abnormal   morbidly obese  Eyes   Conjunctiva and lids: No swelling, erythema or discharge  Pupils and irises: Equal, round and reactive to light  Ears, Nose, Mouth, and Throat   External inspection of ears and nose: Normal     Otoscopic examination: Tympanic membranes translucent with normal light reflex  Canals patent without erythema  Nasal mucosa, septum, and turbinates: Normal without edema or erythema  Oropharynx: Normal with no erythema, edema, exudate or lesions  Pulmonary   Respiratory effort: No increased work of breathing or signs of respiratory distress  Auscultation of lungs: Clear to auscultation  Cardiovascular   Palpation of heart: Normal PMI, no thrills  Auscultation of heart: Normal rate and rhythm, normal S1 and S2, without murmurs  Examination of extremities for edema and/or varicosities: Normal     Carotid pulses: Normal     Abdomen   Abdomen: Non-tender, no masses  Liver and spleen: No hepatomegaly or splenomegaly  Lymphatic   Palpation of lymph nodes in neck: No lymphadenopathy  Musculoskeletal   Gait and station: Abnormal   Gait evaluation demonstrated an apractic gait and walking with a cane  Digits and nails: Normal without clubbing or cyanosis  Inspection/palpation of joints, bones, and muscles: Normal     Skin   Skin and subcutaneous tissue: Normal without rashes or lesions  Neurologic   Cranial nerves: Cranial nerves 2-12 intact  Reflexes: 2+ and symmetric  Sensation: No sensory loss  Psychiatric   Orientation to person, place, and time: Normal     Mood and affect: Normal          Health Management  Diabetes mellitus type II, controlled   *VB - Foot Exam; every 1 year; Last 20Apr2015; Next Due: 20Apr2016; Overdue    Future Appointments    Date/Time Provider Specialty Site   03/21/2017 03:00 PM MACK Gutierrez  Cardiology [facility]   02/15/2017 02:00 PM MACK Lindsay   Internal Medicine [facility] MED [facility]  [address]     Signatures   Electronically signed by : Palomo Modi, [facility]; Dec 20 2016  5:53PM EST                       (Author)    Electronically signed by : MACK Wilson ; Dec 20 2016  7:00PM EST

## 2018-01-16 NOTE — MISCELLANEOUS
Assessment    1  Chronic diastolic congestive heart failure (428 32,428 0) (I50 32)   2  Diabetes mellitus, type 2 (250 00) (E11 9)   3  Hypertension (401 9) (I10)   4  Hyperlipidemia (272 4) (E78 5)   5  Gastroesophageal reflux disease (530 81) (K21 9)   6  Obesity (278 00) (E66 9)    Plan  Diabetes mellitus, type 2    · (1) HEMOGLOBIN A1C; Status:Active; Requested KSP:[id number];    Perform:[facility]; JRV:[id number];UROTTBO; For:Diabetes mellitus, type 2; Ordered By:Toshia Wood; Hyperlipidemia    · (1) LIPID PANEL, FASTING; Status:Active; Requested [id number];    Perform:[facility]; HNE:[id number];ZJYYUWU;  [facility]; Ordered By:Toshia Wood;   · (1) TSH; Status:Active; Requested ZTI:[id number];    Perform:[facility]; FBT:[id number];XNOHBZO;  [facility]; Ordered By:Toshia Wood; Hypertension    · (1) CBC/PLT/DIFF; Status:Active; Requested JACKY:[id number];    Perform:[facility]; YDP:[id number];TZYGCQZ;  For:Hypertension; Ordered By:Toshia Wood;   · (1) COMPREHENSIVE METABOLIC PANEL; Status:Active; Requested VQP:[id number];    Perform:[facility]; QPT:38RBI1712;FYQKZIV;  For:Hypertension; Ordered By:Toshia Wood; Discussion/Summary  Discussion Summary:   She was told to continue the same dose of furosemide along with potassium  We'll repeat labs in 3 months  Counseling Documentation With Imm: The patient was counseled regarding instructions for management, risk factor reductions, risks and benefits of treatment options, importance of compliance with treatment  Medication SE Review and Pt Understands Tx: Possible side effects of new medications were reviewed with the patient/guardian today  The treatment plan was reviewed with the patient/guardian   The patient/guardian understands and agrees with the treatment plan      Chief Complaint  Chief Complaint Free Text Note Form: hospital follow up      History of Present Illness  TCM Communication [facility]: The patient is being contacted for follow-up after hospitalization  Hospital records were reviewed  She was hospitalized at [facility]  The date of discharge: 7/24/16  She was discharged to home  Medications reviewed and updated today  The patient is currently asymptomatic  Counseling was provided to the patient  Topics counseled included diagnostic results, instructions for management, risk factor reductions, activities of daily living and importance of compliance with treatment  Communication performed and completed by   HPI: Patient was admitted to the hospital last week with complaints of acute on chronic congestive heart failure  She was given IV Lasix and the swelling in her feet is a lot better  Review of Systems  Complete-Female:   Constitutional: No fever, no chills, feels well, no tiredness, no recent weight gain or weight loss  Eyes: No complaints of eye pain, no red eyes, no eyesight problems, no discharge, no dry eyes, no itching of eyes  ENT: no complaints of earache, no loss of hearing, no nose bleeds, no nasal discharge, no sore throat, no hoarseness  Cardiovascular: No complaints of slow heart rate, no fast heart rate, no chest pain, no palpitations, no leg claudication, no lower extremity edema  Respiratory: No complaints of shortness of breath, no wheezing, no cough, no SOB on exertion, no orthopnea, no PND  Gastrointestinal: No complaints of abdominal pain, no constipation, no nausea or vomiting, no diarrhea, no bloody stools  Genitourinary: No complaints of dysuria, no incontinence, no pelvic pain, no dysmenorrhea, no vaginal discharge or bleeding  Musculoskeletal: No complaints of arthralgias, no myalgias, no joint swelling or stiffness, no limb pain or swelling  Integumentary: No complaints of skin rash or lesions, no itching, no skin wounds, no breast pain or lump     Neurological: No complaints of headache, no confusion, no convulsions, no numbness, no dizziness or fainting, no tingling, no limb weakness, no difficulty walking  Psychiatric: Not suicidal, no sleep disturbance, no anxiety or depression, no change in personality, no emotional problems  Endocrine: No complaints of proptosis, no hot flashes, no muscle weakness, no deepening of the voice, no feelings of weakness  Hematologic/Lymphatic: No complaints of swollen glands, no swollen glands in the neck, does not bleed easily, does not bruise easily  Active Problems     1  Acute asthmatic bronchitis (493 90) (J45 909)   2  Ambulatory dysfunction (719 7) (R26 2)   3  Depression (311) (F32 9)   4  Diabetes mellitus type II, controlled (250 00) (E11 9)   5  Diabetes mellitus, type 2 (250 00) (E11 9)   6  Gastroesophageal reflux disease (530 81) (K21 9)   7  Insomnia (780 52) (G47 00)   8  Psoriasis vulgaris (696 1) (L40 0)   9  Rheumatoid arthritis (714 0) (M06 9)   10  Screening for skin condition (V82 0) (Z13 89)   11  Urinary incontinence (788 30) (R32)    Hypertension (401 9) (I10)       Obesity (278 00) (E66 9)          Past Medical History     1  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,E947 9)   (L27 0,T50 905A)   2  History of Carpal tunnel syndrome, left (354 0) (G56 02)   3  History of Cellulitis (682 9) (L03 90)   4  History of Cellulitis of leg, left (682 6) (L03 116)   5  History of Chest pain on breathing (786 52) (R07 1)   6  History of Encounter for screening for other nervous system disorder (V80 09) (Z13 858)   7  History of Encounter for special screening examination for genitourinary disorder (V81 6)   (Z13 89)   8  History of Fungal infection (117 9) (B49)   9  History of dizziness (V13 89) (Z87 898)   10  History of shortness of breath (V13 89) (Z87 898)   11  History of Joint pain (719 40) (M25 50)   12  History of Preoperative clearance (V72 84) (Z01 818)   13   History of Skin rash (782 1) (R21)    History of Bilateral edema of lower extremity (782 3) (R60 0)          Surgical History    1  History of Hand Surgery  Surgical History Reviewed: The surgical history was reviewed and updated today  Family History  Mother    1  Family history of rheumatoid arthritis (V17 7) (Z82 61)  Family History Reviewed: The family history was reviewed and updated today  Social History    · Never A Smoker  Social History Reviewed: The social history was reviewed and updated today  The social history was reviewed and is unchanged  Current Meds   1  Actemra 162 MG/0 9ML Subcutaneous Solution Prefilled Syringe; Therapy: 59LEK7214 to Recorded   2  Advair Diskus 250-50 MCG/DOSE Inhalation Aerosol Powder Breath Activated; INHALE 1   PUFF TWICE DAILY; Therapy: 91LQM7869 to (Last Rx:52Coc3180)  Requested for: 68FSM8315 Ordered   3  Albuterol Sulfate (2 5 MG/3ML) 0 083% Inhalation Nebulization Solution; USE 1 UNIT   DOSE IN NEBULIZER 4 TIMES DAILY; Therapy: 15HHR6458 to (Renew:11Nov2016)  Requested for: 52YRN8852; Last   Rx:18Vyc2268 Ordered   4  Azithromycin 250 MG Oral Tablet; TAKE 2 TABLETS ON DAY 1 THEN TAKE 1 TABLET A   DAY FOR 4 DAYS; Therapy: 13PUQ9245 to (Last Rx:95Hww0509)  Requested for: 00NPM2810 Ordered   5  BD Pen Needle Mini U/F 31G X 5 MM Miscellaneous; USE AS DIRECTED; Therapy: 60LAR4989 to (Evaluate:12Mar2016)  Requested for: 25VAP2984; Last   Rx:18Mar2015 Ordered   6  DiphenhydrAMINE HCl - 25 MG Oral Capsule; TAKE 1 CAPSULE BY MOUTH EVERY 6   HOURS DAILY; Therapy: 41BAJ6555 to (Rodrigue Griggs)  Requested for: 12WVN9361; Last   JZ:78HDQ5454 Ordered   7  Escitalopram Oxalate 20 MG Oral Tablet; TAKE 1 TABLET DAILY; Therapy: 25UDA6060 to (Diana Swenson)  Requested for: 89TUV8771; Last   Rx:72Glb2479 Ordered   8  Folic Acid 1 MG Oral Tablet; TAKE 1 TABLET DAILY AS DIRECTED; Therapy: 82TJR2263 to (Evaluate:10Mar2016)  Requested for: 28QQI3790; Last   Rx:16Mar2015 Ordered   9   Fosinopril Sodium 10 MG Oral Tablet; TAKE 1 TABLET DAILY  Requested for: 63LNF6236;   Last Rx:28Mar2016 Ordered   10  Lantus 100 UNIT/ML Subcutaneous Solution; INJECT 25 UNIT Bedtime; Therapy: 95LVS8977 to  Requested for: 31VDL4713 Recorded   11  MetFORMIN HCl - 1000 MG Oral Tablet; TAKE 1 TABLET TWICE DAILY; Therapy: (Recorded:09Jun2016) to  Requested for: 78TVH0964 Recorded   12  Nystatin 110303 UNIT/GM External Cream;    Therapy: 05LWQ7420 to (Evaluate:08Jan2015) Recorded   13  Nystatin 924032 UNIT/GM External Powder; APPLY 2-3 TIMES DAILY TO AFFECTED    AREA(S); Therapy: 68CCS6444 to (Last Rx:24Dec2014)  Requested for: 87Adj5623 Ordered   14  OneTouch Delica Lancets 53C Miscellaneous; test tid  Requested for: 13Jun2016; Last    Rx:13Jun2016 Ordered   15  OneTouch Ultra Blue In [employer]; TEST 3 TIMES A DAY  Requested for: 38BME0286; Last    Rx:13Jun2016 Ordered   16  OneTouch Verio w/Device Kit; TEST 3 TIMES A DAY; Therapy: 33SLK6887 to (Last XY:47ZRE7855)  Requested for: 74TYP8666 Ordered   17  Oxybutynin Chloride ER 10 MG Oral Tablet Extended Release 24 Hour; Take 1 tablet    daily; Therapy: 21KWB6664 to (Last Rx:47Vzf7788)  Requested for: 83Mrq5806 Ordered   18  Oxycodone-Acetaminophen  MG Oral Tablet; Therapy: 78Fvh7626 to (Evaluate:13Jan2015) Recorded   19  Pantoprazole Sodium 40 MG Oral Tablet Delayed Release; Take one tablet once daily; Therapy: 24LTQ1839 to (Renew:90Ixa7708)  Requested for: 59YLM2713; Last    Rx:55Vyb4040 Ordered   20  Pioglitazone HCl - 45 MG Oral Tablet; TAKE 1 TABLET ONCE DAILY  Requested for:    18Snv0423; Last Rx:29Str1726 Ordered   21  PredniSONE 5 MG Oral Tablet; take 2 tablet daily; Therapy: 25VPD0353 to (Complete:06Yup2872)  Requested for: 34TMS8969 Recorded   22  ProAir  (90 Base) MCG/ACT Inhalation Aerosol Solution; INHALE 1 TO 2 PUFFS    EVERY 4 TO 6 HOURS AS NEEDED; Therapy: 56ULL1824 to (Last Rx:80Gxx2375)  Requested for: 83WJR2261 Ordered   23   Simvastatin 40 MG Oral Tablet; TAKE 1 TABLET DAILY  Requested for: 00DUO3506; Last    SS:85NFS1501 Ordered   24  Symbicort 160-4 5 MCG/ACT Inhalation Aerosol; inhale 1 puff bid; Therapy: 78IFF3186 to (Last Rx:60Nvj9168)  Requested for: 02QIY3655 Ordered   25  Triamcinolone Acetonide 0 1 % External Ointment; APPLY SPARINGLY TO AFFECTED    AREA(S) TWICE DAILY; Therapy: 36VWB2965 to (Last Rx:48Fui6315)  Requested for: 69Ndd5409 Ordered  Medication List Reviewed: The medication list was reviewed and updated today  Allergies    1  Vancomycin HCl SOLR    Vitals  Signs   Recorded: 87PUE4621 93:26VT   Systolic: 168  Diastolic: 58  Heart Rate: 78  Height: 5 ft   Weight: 166 lb 6 08 oz  BMI Calculated: 32 49  BSA Calculated: 1 72    Physical Exam    Constitutional   General appearance: Abnormal   acutely ill  Eyes   Conjunctiva and lids: No swelling, erythema or discharge  Ears, Nose, Mouth, and Throat   External inspection of ears and nose: Normal     Otoscopic examination: Tympanic membranes translucent with normal light reflex  Canals patent without erythema  Oropharynx: Abnormal   The posterior pharynx was erythematous  Pulmonary   Respiratory effort: No increased work of breathing or signs of respiratory distress  Auscultation of lungs: Abnormal   Auscultation of the lungs revealed expiratory wheezing  rhonchi over both bases  Cardiovascular   Palpation of heart: Normal PMI, no thrills  Auscultation of heart: Normal rate and rhythm, normal S1 and S2, without murmurs  Examination of extremities for edema and/or varicosities: Normal     Abdomen   Abdomen: Non-tender, no masses  Lymphatic   Palpation of lymph nodes in neck: No lymphadenopathy  Musculoskeletal   Gait and station: Normal     Skin   Skin and subcutaneous tissue: Normal without rashes or lesions      Psychiatric   Orientation to person, place, and time: Normal     Mood and affect: Normal          Health Management  Diabetes mellitus type II, controlled *VB-Foot Exam; every 1 year; Last 20Apr2015; Next Due: 20Apr2016; Overdue    Future Appointments    Date/Time Provider Specialty Site   09/02/2016 03:45 PM MACK Cullen  Cardiology [facility]   10/10/2016 02:15 PM MACK Hicks   Internal Medicine [facility]     Signatures   Electronically signed by : MACK Cano ; Jul 27 2016  6:51PM EST                       (Author)

## 2018-02-01 RX ORDER — FOSINOPRIL SODIUM 10 MG/1
10 TABLET ORAL DAILY
Refills: 1 | COMMUNITY
Start: 2017-11-20 | End: 2018-02-14 | Stop reason: SDUPTHER

## 2018-02-01 RX ORDER — ESCITALOPRAM OXALATE 20 MG/1
20 TABLET ORAL DAILY
Refills: 2 | COMMUNITY
Start: 2017-12-22 | End: 2018-09-16 | Stop reason: SDUPTHER

## 2018-02-01 RX ORDER — METOPROLOL SUCCINATE 25 MG/1
1 TABLET, EXTENDED RELEASE ORAL DAILY
COMMUNITY
Start: 2016-09-02 | End: 2018-08-26 | Stop reason: SDUPTHER

## 2018-02-01 RX ORDER — PANTOPRAZOLE SODIUM 40 MG/1
1 TABLET, DELAYED RELEASE ORAL DAILY
COMMUNITY
Start: 2016-02-02 | End: 2019-07-03 | Stop reason: SDUPTHER

## 2018-02-01 RX ORDER — POTASSIUM CHLORIDE 750 MG/1
1 TABLET, FILM COATED, EXTENDED RELEASE ORAL
COMMUNITY
Start: 2016-07-29 | End: 2018-02-16 | Stop reason: ALTCHOICE

## 2018-02-01 RX ORDER — OXYBUTYNIN CHLORIDE 10 MG/1
1 TABLET, EXTENDED RELEASE ORAL DAILY
COMMUNITY
Start: 2016-02-02 | End: 2018-02-16 | Stop reason: ALTCHOICE

## 2018-02-01 RX ORDER — TORSEMIDE 20 MG/1
20 TABLET ORAL 2 TIMES DAILY
COMMUNITY
Start: 2016-07-29 | End: 2018-11-01 | Stop reason: SDUPTHER

## 2018-02-01 RX ORDER — ALBUTEROL SULFATE 2.5 MG/3ML
1 SOLUTION RESPIRATORY (INHALATION) 4 TIMES DAILY
COMMUNITY
Start: 2016-07-14 | End: 2019-06-27 | Stop reason: SDUPTHER

## 2018-02-01 RX ORDER — OXYCODONE AND ACETAMINOPHEN 10; 325 MG/1; MG/1
TABLET ORAL
COMMUNITY
Start: 2014-08-12 | End: 2018-10-17

## 2018-02-01 RX ORDER — NYSTATIN 100000 [USP'U]/G
POWDER TOPICAL
Refills: 5 | COMMUNITY
Start: 2017-12-26 | End: 2020-11-17

## 2018-02-01 RX ORDER — CHOLECALCIFEROL (VITAMIN D3) 1250 MCG
1 CAPSULE ORAL WEEKLY
COMMUNITY
Start: 2017-06-30 | End: 2018-02-16 | Stop reason: ALTCHOICE

## 2018-02-01 RX ORDER — POTASSIUM CHLORIDE 750 MG/1
1 TABLET, FILM COATED, EXTENDED RELEASE ORAL DAILY
COMMUNITY
Start: 2016-11-28 | End: 2018-02-16 | Stop reason: ALTCHOICE

## 2018-02-01 RX ORDER — FLUTICASONE FUROATE AND VILANTEROL 100; 25 UG/1; UG/1
1 POWDER RESPIRATORY (INHALATION) DAILY
COMMUNITY
Start: 2017-04-18 | End: 2019-01-11 | Stop reason: SDUPTHER

## 2018-02-01 RX ORDER — SIMVASTATIN 40 MG
40 TABLET ORAL DAILY
Refills: 2 | COMMUNITY
Start: 2017-10-30 | End: 2018-07-28 | Stop reason: SDUPTHER

## 2018-02-01 RX ORDER — ALBUTEROL SULFATE 90 UG/1
2 AEROSOL, METERED RESPIRATORY (INHALATION)
COMMUNITY
Start: 2015-02-05 | End: 2019-06-27 | Stop reason: SDUPTHER

## 2018-02-01 RX ORDER — FOLIC ACID 1 MG/1
1 TABLET ORAL DAILY
COMMUNITY
Start: 2014-07-23 | End: 2019-03-26

## 2018-02-02 ENCOUNTER — OFFICE VISIT (OUTPATIENT)
Dept: CARDIOLOGY CLINIC | Facility: CLINIC | Age: 73
End: 2018-02-02
Payer: MEDICARE

## 2018-02-02 VITALS
BODY MASS INDEX: 31.8 KG/M2 | WEIGHT: 162 LBS | DIASTOLIC BLOOD PRESSURE: 74 MMHG | SYSTOLIC BLOOD PRESSURE: 136 MMHG | HEIGHT: 60 IN | HEART RATE: 83 BPM | OXYGEN SATURATION: 95 %

## 2018-02-02 DIAGNOSIS — Z01.810 PREOP CARDIOVASCULAR EXAM: Primary | ICD-10-CM

## 2018-02-02 DIAGNOSIS — I10 ESSENTIAL HYPERTENSION: ICD-10-CM

## 2018-02-02 DIAGNOSIS — E66.09 CLASS 1 OBESITY DUE TO EXCESS CALORIES WITH SERIOUS COMORBIDITY AND BODY MASS INDEX (BMI) OF 31.0 TO 31.9 IN ADULT: ICD-10-CM

## 2018-02-02 DIAGNOSIS — E78.2 MIXED HYPERLIPIDEMIA: ICD-10-CM

## 2018-02-02 DIAGNOSIS — R60.0 BILATERAL EDEMA OF LOWER EXTREMITY: ICD-10-CM

## 2018-02-02 DIAGNOSIS — I50.32 CHRONIC DIASTOLIC CONGESTIVE HEART FAILURE (HCC): ICD-10-CM

## 2018-02-02 PROCEDURE — 99214 OFFICE O/P EST MOD 30 MIN: CPT | Performed by: INTERNAL MEDICINE

## 2018-02-02 PROCEDURE — 93000 ELECTROCARDIOGRAM COMPLETE: CPT | Performed by: INTERNAL MEDICINE

## 2018-02-02 NOTE — PROGRESS NOTES
CARDIOLOGY OFFICE VISIT  [facility]  [address], [facility], [address] [facility], [place], [facility] [place]  Tel: [phone]      NAME: Lincoln Moreno  AGE: 67 y o  SEX: female  : 1945   MRN: [id number]      Chief Complaint:  Chief Complaint   Patient presents with    Pre-op Exam     cardiac clearance having [facility] @ [facility] ret sheduled for 18         History of Present Illness:   Patient comes for follow up  States she needs preop cardiac evaluation for a right eye surgery  States she is doing well from cardiac stand point and denies chest pain / pressure, palpitations, lightheadedness, syncope, orthopnea, PND, claudication  Ankle swelling is minimal   Shortness of breath is at baseline    Chronic diastolic CHF -  On  Torsemide 20 mg once daily, potassium, beta-blocker, ACE-inhibitor    HTN -  Has been hypertensive for many years  Taking medications regularly  Denies lightheadedness, headache, medication side effects  HLP -  Has had hyperlipidemia for many years  Taking statin regularly along with diet control  Denies myalgia  PCP closely monitoring the blood work  Obesity -  Trying to lose weight  Pharmacological nuclear stress test 2016 [facility] - negative for evidence of ischemia  2-D echocardiogram 2016 [facility] - normal LVEF with no significant valvular pathology      Past Medical History:  Past Medical History:   Diagnosis Date    Asthma     Chronic diastolic CHF (congestive heart failure) (HCC)     Diabetes mellitus ([address] )     HTN (hypertension)     Hyperlipidemia     Obesity          Past Surgical History:  Past Surgical History:   Procedure Laterality Date    HAND SURGERY           Family History:  No family history on file        Social History:  Social History     Social History    Marital status: /Civil Union     Spouse name: N/A    Number of children: N/A    Years of education: N/A     Social History Main Topics    Smoking status: Former Smoker    Smokeless tobacco: Not on file    Alcohol use Not on file    Drug use: Unknown    Sexual activity: Not on file     Other Topics Concern    Not on file     Social History Narrative    No narrative on file         Active Problems:  Patient Active Problem List   Diagnosis    Bilateral edema of lower extremity    Chronic diastolic congestive heart failure ([address] )    Hyperlipidemia    Hypertension    Obesity    Preop cardiovascular exam       The following portions of the patient's history were reviewed and updated as appropriate: past medical history, past surgical history, past family history,  past social history, current medications, allergies and problem list       Review of Systems:  Constitutional: Denies fever, chills, fatigue  Eyes: Denies eye redness, eye discharge, double vision  ENT: Denies hearing loss, tinnitus, sneezing, nasal discharge, sore throat   Respiratory: Denies cough, expectoration, hemoptysis  + shortness of breath  Cardiovascular: Denies chest pain, palpitations, orthopnea, PND  + lower extremity swelling  Gastrointestinal: Denies abdominal pain, nausea, vomiting, hematemesis, diarrhea, bloody stools  Genito-Urinary: Denies dysuria, incontinence  Musculoskeletal: Denies back pain, joint pain, muscle pain  Neurologic: Denies confusion, lightheadedness, syncope, headache, focal weakness, sensory changes, seizures  Endocrine: Denies polyuria, polydipsia, temperature intolerance  Allergy and Immunology: Denies hives, insect bite sensitivity  Hematological and Lymphatic: Denies bleeding problems, swollen glands   Psychological: Denies depression, suicidal ideation, anxiety, panic  Dermatological: Denies pruritus, rash, skin lesion changes      Vitals:  Vitals:    02/02/18 1538   BP: 136/74   Pulse: 83   SpO2: 95%       Body mass index is 31 64 kg/m²      Weight (last 2 days)     Date/Time   Weight    02/02/18 1538  73 5 (162) Physical Examination:  General:  Obese  Patient is not in acute distress  Awake, alert, oriented in time, place and person  Responding to commands  Head: Normocephalic  Atraumatic  Eyes: Both pupils normal sized, round and reactive to light  Nonicteric  ENT: Normal external ear canals  Nares normal, no drainage  Lips and oral mucosa normal  Neck: Supple  JVP not raised  Trachea central  No thyromegaly  Lungs: Bilateral bronchovascular breath sounds with no crackles or rhonchi  Chest wall:  No tenderness  Cardiovascular: RRR  S1 and S2 normal  No murmur, rub or gallop  Gastrointestinal: Abdomen soft, nontender  No guarding or rigidity  Liver and spleen not palpable  Bowel sounds present  Neurologic: Patient is awake, alert, oriented in time, place and person  Responding to command  Moving all extremities  Integumentary:  No skin rash  Lymphatic: No cervical lymphadenopathy  Back: Symmetric  No CVA tenderness  Extremities: No clubbing, cyanosis   Mild BL ankle edema      Laboratory Results:  CBC with diff:   Lab Results   Component Value Date    WBC 8 88 06/09/2016    WBC 7 94 01/05/2016    RBC 4 29 06/09/2016    RBC 4 39 01/05/2016    HGB 12 7 06/09/2016    HGB 12 9 01/05/2016    HCT 40 2 06/09/2016    HCT 40 9 01/05/2016    MCV 94 06/09/2016    MCV 93 01/05/2016    MCH 29 6 06/09/2016    MCH 29 4 01/05/2016    RDW 16 5 (H) 06/09/2016    RDW 15 4 (H) 01/05/2016     06/09/2016     01/05/2016       CMP:  Lab Results   Component Value Date    CREATININE 0 77 06/09/2016    CREATININE 0 74 01/05/2016    BUN 14 06/09/2016    BUN 18 01/05/2016     (L) 06/09/2016     (L) 01/05/2016    K 4 8 06/09/2016    K 4 5 01/05/2016     06/09/2016     01/05/2016    CO2 27 06/09/2016    CO2 26 01/05/2016    GLUCOSE 260 (H) 06/09/2016    GLUCOSE 159 (H) 01/05/2016    PROT 8 1 06/09/2016    PROT 7 3 01/05/2016    ALKPHOS 81 06/09/2016    ALKPHOS 151 (H) 01/05/2016    ALT 19 06/09/2016    ALT 25 01/05/2016    AST 22 06/09/2016    AST 14 01/05/2016       Lab Results   Component Value Date    HGBA1C 9 0 (H) 06/09/2016    HGBA1C 9 8 (H) 01/05/2016       No results found for: TROPONINI, CKMB, CKTOTAL    Lipid Profile:   Lab Results   Component Value Date    CHOL 197 06/09/2016    CHOL 230 01/05/2016    CHOL 149 09/15/2015     Lab Results   Component Value Date    HDL 48 06/09/2016    HDL 59 01/05/2016    HDL 49 09/15/2015     Lab Results   Component Value Date    LDLCALC 123 (H) 06/09/2016    LDLCALC 116 (H) 01/05/2016    LDLCALC 66 09/15/2015     Lab Results   Component Value Date    TRIG 130 06/09/2016    TRIG 273 01/05/2016    TRIG 170 09/15/2015       EKG:   Sinus rhythm  LAD  Poor R-wave progression in precordial leads        Medications:    Current Outpatient Prescriptions:     albuterol (2 5 mg/3 mL) 0 083 % nebulizer solution, Inhale 1 each 4 (four) times a day, Disp: , Rfl:     albuterol (PROAIR HFA) 90 mcg/act inhaler, Inhale 2 puffs, Disp: , Rfl:     escitalopram (LEXAPRO) 20 mg tablet, Take 20 mg by mouth daily, Disp: , Rfl: 2    fluticasone furoate-vilanterol (BREO ELLIPTA), Inhale 1 puff daily, Disp: , Rfl:     folic acid (FOLVITE) 1 mg tablet, Take 1 tablet by mouth daily, Disp: , Rfl:     fosinopril (MONOPRIL) 10 mg tablet, Take 10 mg by mouth daily, Disp: , Rfl: 1    metFORMIN (GLUCOPHAGE) 1000 MG tablet, Take 1,000 mg by mouth 2 (two) times a day, Disp: , Rfl: 3    metoprolol succinate (TOPROL-XL) 25 mg 24 hr tablet, Take 1 tablet by mouth daily, Disp: , Rfl:     NYSTATIN powder, APPLY 2-3 TIMES DAILY TO AFFECTED AREA(S) , Disp: , Rfl: 5    oxybutynin (DITROPAN-XL) 10 MG 24 hr tablet, Take 1 tablet by mouth daily, Disp: , Rfl:     oxyCODONE-acetaminophen (PERCOCET)  mg per tablet, Take by mouth, Disp: , Rfl:     pantoprazole (PROTONIX) 40 mg tablet, Take 1 tablet by mouth daily, Disp: , Rfl:     potassium chloride (K-DUR) 10 mEq tablet, Take 1 tablet by mouth, Disp: , Rfl:   simvastatin (ZOCOR) 40 mg tablet, Take 40 mg by mouth daily, Disp: , Rfl: 2    Tocilizumab (ACTEMRA) 162 MG/0 9ML SOSY, Inject under the skin, Disp: , Rfl:     torsemide (DEMADEX) 20 mg tablet, Take 2 tablets by mouth 2 (two) times a day, Disp: , Rfl:     Cholecalciferol (VITAMIN D3) 84515 units CAPS, Take 1 capsule by mouth once a week, Disp: , Rfl:     insulin glargine (BASAGLAR KWIKPEN) injection pen 100 units/mL, Inject 30 Units under the skin daily, Disp: , Rfl:     potassium chloride (KLOR-CON 10) 10 mEq tablet, Take 1 tablet by mouth Daily, Disp: , Rfl:       Allergies: Allergies   Allergen Reactions    Gabapentin     Vancomycin          Assessment and Plan:  1  Preop cardiovascular exam    EKG done in the clinic reviewed with the patient  Prior stress test and echocardiograms reviewed  2  Chronic diastolic congestive heart failure (HCC)  Currently euvolemic  Continue same meds  Low salt diet  Daily weights  Take an extra dose of diuretic if weight increases by 3 pounds in one day or 5 pounds in a week  Keep legs elevated    3  Essential hypertension    BP stable  Continue current medications    4  Mixed hyperlipidemia   continue statin  Her PCP closely monitor the blood work    5  Bilateral edema of lower extremity   torsemide 20 mg daily along with potassium is  Working well for her at this time  Low-salt diet  Keep legs elevated at night    6  Class 1 obesity due to excess calories with serious comorbidity and body mass index (BMI) of 31 0 to 31 9 in adult   counseled to try to lose weight    Recommend aggressive risk factor modification and therapeutic lifestyle changes  Low-salt, low-calorie, low-fat, low-cholesterol diet with regular exercise and to optimize weight  Discussed concepts of atherosclerosis, including signs and symptoms of cardiac disease  Previous studies were reviewed  Safety measures were reviewed  Questions were entertained and answered    Patient was advised to report any problems requiring medical attention  Follow-up with PCP and appropriate specialist and lab work as discussed  Return for follow up visit as scheduled or earlier, if needed  Patient has no active cardiac conditions (such as unstable cardiac syndrome, decompensated heart failure, significant arrhythmias, severe valvular disease) and has DM and a h/o CHF but no other clinical risk factors (such as CAD, diabetes mellitus, renal insufficiency, CVA)  Patient can comfortably go up and down one flight of steps which is 4 METS  Patient is a moderate cardiac risk patient going in for an eye risk surgery  No further cardiac workup is needed at this time  No cardiac contraindications for surgery  Thank you for allowing me to participate in the care and evaluation of your patient  Should you have any questions, please feel free to contact me        Nabeel Morton MD  4/4/8291,0:12 PM

## 2018-02-05 ENCOUNTER — TELEPHONE (OUTPATIENT)
Dept: CARDIOLOGY CLINIC | Facility: CLINIC | Age: 73
End: 2018-02-05

## 2018-02-14 DIAGNOSIS — I10 ESSENTIAL HYPERTENSION: ICD-10-CM

## 2018-02-14 DIAGNOSIS — E08.22 DIABETES MELLITUS DUE TO UNDERLYING CONDITION WITH DIABETIC CHRONIC KIDNEY DISEASE, UNSPECIFIED CKD STAGE, UNSPECIFIED LONG TERM INSULIN USE STATUS: Primary | ICD-10-CM

## 2018-02-14 RX ORDER — FOSINOPRIL SODIUM 10 MG/1
TABLET ORAL
Qty: 90 TABLET | Refills: 1 | Status: SHIPPED | OUTPATIENT
Start: 2018-02-14 | End: 2018-08-14 | Stop reason: SDUPTHER

## 2018-02-16 ENCOUNTER — APPOINTMENT (OUTPATIENT)
Dept: LAB | Facility: CLINIC | Age: 73
End: 2018-02-16
Payer: MEDICARE

## 2018-02-16 ENCOUNTER — OFFICE VISIT (OUTPATIENT)
Dept: INTERNAL MEDICINE CLINIC | Facility: CLINIC | Age: 73
End: 2018-02-16
Payer: MEDICARE

## 2018-02-16 VITALS
WEIGHT: 189.4 LBS | RESPIRATION RATE: 16 BRPM | HEIGHT: 60 IN | DIASTOLIC BLOOD PRESSURE: 80 MMHG | OXYGEN SATURATION: 90 % | SYSTOLIC BLOOD PRESSURE: 110 MMHG | HEART RATE: 83 BPM | BODY MASS INDEX: 37.18 KG/M2

## 2018-02-16 DIAGNOSIS — IMO0001 CLASS 2 OBESITY DUE TO EXCESS CALORIES WITH SERIOUS COMORBIDITY AND BODY MASS INDEX (BMI) OF 36.0 TO 36.9 IN ADULT: ICD-10-CM

## 2018-02-16 DIAGNOSIS — N18.30 CONTROLLED TYPE 2 DIABETES MELLITUS WITH STAGE 3 CHRONIC KIDNEY DISEASE, WITHOUT LONG-TERM CURRENT USE OF INSULIN (HCC): ICD-10-CM

## 2018-02-16 DIAGNOSIS — Z00.00 MEDICARE ANNUAL WELLNESS VISIT, SUBSEQUENT: Primary | ICD-10-CM

## 2018-02-16 DIAGNOSIS — F32.A DEPRESSION, UNSPECIFIED DEPRESSION TYPE: ICD-10-CM

## 2018-02-16 DIAGNOSIS — R60.0 BILATERAL EDEMA OF LOWER EXTREMITY: ICD-10-CM

## 2018-02-16 DIAGNOSIS — M05.9 RHEUMATOID ARTHRITIS WITH POSITIVE RHEUMATOID FACTOR, INVOLVING UNSPECIFIED SITE (HCC): ICD-10-CM

## 2018-02-16 DIAGNOSIS — I10 ESSENTIAL HYPERTENSION: ICD-10-CM

## 2018-02-16 DIAGNOSIS — K21.9 GASTROESOPHAGEAL REFLUX DISEASE WITHOUT ESOPHAGITIS: ICD-10-CM

## 2018-02-16 DIAGNOSIS — E11.22 CONTROLLED TYPE 2 DIABETES MELLITUS WITH STAGE 3 CHRONIC KIDNEY DISEASE, WITHOUT LONG-TERM CURRENT USE OF INSULIN (HCC): ICD-10-CM

## 2018-02-16 DIAGNOSIS — E78.2 MIXED HYPERLIPIDEMIA: ICD-10-CM

## 2018-02-16 DIAGNOSIS — I50.32 CHRONIC DIASTOLIC CONGESTIVE HEART FAILURE (HCC): ICD-10-CM

## 2018-02-16 DIAGNOSIS — J45.40 MODERATE PERSISTENT ASTHMA WITHOUT COMPLICATION: ICD-10-CM

## 2018-02-16 PROBLEM — J45.909 ASTHMA: Status: ACTIVE | Noted: 2017-04-18

## 2018-02-16 PROBLEM — E55.9 VITAMIN D DEFICIENCY: Status: ACTIVE | Noted: 2017-06-30

## 2018-02-16 LAB
ALBUMIN SERPL BCP-MCNC: 3.9 G/DL (ref 3.5–5)
ALP SERPL-CCNC: 106 U/L (ref 46–116)
ALT SERPL W P-5'-P-CCNC: 21 U/L (ref 12–78)
ANION GAP SERPL CALCULATED.3IONS-SCNC: 6 MMOL/L (ref 4–13)
AST SERPL W P-5'-P-CCNC: 16 U/L (ref 5–45)
BASOPHILS # BLD AUTO: 0.05 THOUSANDS/ΜL (ref 0–0.1)
BASOPHILS NFR BLD AUTO: 0 % (ref 0–1)
BILIRUB SERPL-MCNC: 0.33 MG/DL (ref 0.2–1)
BUN SERPL-MCNC: 22 MG/DL (ref 5–25)
CALCIUM SERPL-MCNC: 8.6 MG/DL (ref 8.3–10.1)
CHLORIDE SERPL-SCNC: 103 MMOL/L (ref 100–108)
CHOLEST SERPL-MCNC: 210 MG/DL (ref 50–200)
CO2 SERPL-SCNC: 25 MMOL/L (ref 21–32)
CREAT SERPL-MCNC: 0.92 MG/DL (ref 0.6–1.3)
CREAT UR-MCNC: 24.5 MG/DL
EOSINOPHIL # BLD AUTO: 0.18 THOUSAND/ΜL (ref 0–0.61)
EOSINOPHIL NFR BLD AUTO: 1 % (ref 0–6)
ERYTHROCYTE [DISTWIDTH] IN BLOOD BY AUTOMATED COUNT: 14 % (ref 11.6–15.1)
EST. AVERAGE GLUCOSE BLD GHB EST-MCNC: 177 MG/DL
GFR SERPL CREATININE-BSD FRML MDRD: 62 ML/MIN/1.73SQ M
GLUCOSE SERPL-MCNC: 98 MG/DL (ref 65–140)
HBA1C MFR BLD: 7.8 % (ref 4.2–6.3)
HCT VFR BLD AUTO: 40.7 % (ref 34.8–46.1)
HDLC SERPL-MCNC: 72 MG/DL (ref 40–60)
HGB BLD-MCNC: 13.2 G/DL (ref 11.5–15.4)
LDLC SERPL CALC-MCNC: 108 MG/DL (ref 0–100)
LYMPHOCYTES # BLD AUTO: 3.34 THOUSANDS/ΜL (ref 0.6–4.47)
LYMPHOCYTES NFR BLD AUTO: 24 % (ref 14–44)
MCH RBC QN AUTO: 32 PG (ref 26.8–34.3)
MCHC RBC AUTO-ENTMCNC: 32.4 G/DL (ref 31.4–37.4)
MCV RBC AUTO: 99 FL (ref 82–98)
MICROALBUMIN UR-MCNC: 11.7 MG/L (ref 0–20)
MICROALBUMIN/CREAT 24H UR: 48 MG/G CREATININE (ref 0–30)
MONOCYTES # BLD AUTO: 1.31 THOUSAND/ΜL (ref 0.17–1.22)
MONOCYTES NFR BLD AUTO: 10 % (ref 4–12)
NEUTROPHILS # BLD AUTO: 8.87 THOUSANDS/ΜL (ref 1.85–7.62)
NEUTS SEG NFR BLD AUTO: 65 % (ref 43–75)
NRBC BLD AUTO-RTO: 0 /100 WBCS
PLATELET # BLD AUTO: 243 THOUSANDS/UL (ref 149–390)
PMV BLD AUTO: 11.7 FL (ref 8.9–12.7)
POTASSIUM SERPL-SCNC: 4.3 MMOL/L (ref 3.5–5.3)
PROT SERPL-MCNC: 7.9 G/DL (ref 6.4–8.2)
RBC # BLD AUTO: 4.12 MILLION/UL (ref 3.81–5.12)
SODIUM SERPL-SCNC: 134 MMOL/L (ref 136–145)
TRIGL SERPL-MCNC: 148 MG/DL
TSH SERPL DL<=0.05 MIU/L-ACNC: 1.61 UIU/ML (ref 0.36–3.74)
WBC # BLD AUTO: 13.78 THOUSAND/UL (ref 4.31–10.16)

## 2018-02-16 PROCEDURE — 82043 UR ALBUMIN QUANTITATIVE: CPT | Performed by: INTERNAL MEDICINE

## 2018-02-16 PROCEDURE — 36415 COLL VENOUS BLD VENIPUNCTURE: CPT

## 2018-02-16 PROCEDURE — 99215 OFFICE O/P EST HI 40 MIN: CPT | Performed by: INTERNAL MEDICINE

## 2018-02-16 PROCEDURE — 83036 HEMOGLOBIN GLYCOSYLATED A1C: CPT

## 2018-02-16 PROCEDURE — 82570 ASSAY OF URINE CREATININE: CPT | Performed by: INTERNAL MEDICINE

## 2018-02-16 PROCEDURE — G0439 PPPS, SUBSEQ VISIT: HCPCS | Performed by: INTERNAL MEDICINE

## 2018-02-16 PROCEDURE — 85025 COMPLETE CBC W/AUTO DIFF WBC: CPT

## 2018-02-16 PROCEDURE — 84443 ASSAY THYROID STIM HORMONE: CPT

## 2018-02-16 PROCEDURE — 80053 COMPREHEN METABOLIC PANEL: CPT

## 2018-02-16 PROCEDURE — 80061 LIPID PANEL: CPT

## 2018-02-16 NOTE — PROGRESS NOTES
HPI:  Efraín Hanks is a 67 y o  female here for her Subsequent Wellness Visit      Patient Active Problem List   Diagnosis    Bilateral edema of lower extremity    Chronic diastolic congestive heart failure (HCC)    Hyperlipidemia    Hypertension    Obesity    Preop cardiovascular exam    Ambulatory dysfunction    Asthma    Depression    Diabetes mellitus type II, controlled ([facility] )    Gastroesophageal reflux disease    Insomnia    Moderate persistent asthma    Psoriasis vulgaris    Rheumatoid arthritis (HCC)    Urinary incontinence    Vitamin D deficiency     Past Medical History:   Diagnosis Date    Asthma     Chest pain on breathing     last assessed 2/5/15    Chronic diastolic CHF (congestive heart failure) [facility] )     Diabetes mellitus ([facility] )     HTN (hypertension)     Hyperlipidemia     Obesity      Past Surgical History:   Procedure Laterality Date    HAND SURGERY       Family History   Problem Relation Age of Onset    Rheum arthritis Mother      History   Smoking Status    Former Smoker    Types: Cigarettes   Smokeless Tobacco    Never Used     Comment: Quit 20 years ago     History   Alcohol Use No      History   Drug Use No     /80 (BP Location: Left arm, Patient Position: Sitting, Cuff Size: Adult)   Pulse 83   Resp 16   Ht 5' (1 524 m)   Wt 85 9 kg (189 lb 6 4 oz)   SpO2 90%   BMI 36 99 kg/m²       Current Outpatient Prescriptions   Medication Sig Dispense Refill    albuterol (2 5 mg/3 mL) 0 083 % nebulizer solution Inhale 1 each 4 (four) times a day      albuterol (PROAIR HFA) 90 mcg/act inhaler Inhale 2 puffs      escitalopram (LEXAPRO) 20 mg tablet Take 20 mg by mouth daily  2    fluticasone furoate-vilanterol (BREO ELLIPTA) Inhale 1 puff daily      folic acid (FOLVITE) 1 mg tablet Take 1 tablet by mouth daily      fosinopril (MONOPRIL) 10 mg tablet TAKE 1 TABLET BY MOUTH EVERY DAY 90 tablet 1    insulin glargine (LANTUS SOLOSTAR) injection pen 100 units/mL Inject 30 Units under the skin daily      metFORMIN (GLUCOPHAGE) 1000 MG tablet TAKE 1 TABLET TWICE DAILY  180 tablet 3    metoprolol succinate (TOPROL-XL) 25 mg 24 hr tablet Take 1 tablet by mouth daily      NYSTATIN powder APPLY 2-3 TIMES DAILY TO AFFECTED AREA(S)   5    oxyCODONE-acetaminophen (PERCOCET)  mg per tablet Take by mouth      pantoprazole (PROTONIX) 40 mg tablet Take 1 tablet by mouth daily      simvastatin (ZOCOR) 40 mg tablet Take 40 mg by mouth daily  2    Tocilizumab (ACTEMRA) 162 MG/0 9ML SOSY Inject under the skin      torsemide (DEMADEX) 20 mg tablet Take 20 mg by mouth daily         No current facility-administered medications for this visit        Allergies   Allergen Reactions    Gabapentin     Vancomycin      Immunization History   Administered Date(s) Administered    Influenza Split High Dose Preservative Free IM 12/20/2016, 11/10/2017    Influenza TIV (IM) 10/01/2014, 09/01/2015    Pneumococcal Polysaccharide PPV23 04/21/2008, 12/20/2016    Tdap 1945       Patient Care Team:  Amairani Nevarez MD as PCP - General  Cornelio Brown MD      Medicare Screening Tests and Risk Assessments:  AWV Clinical     ISAR:   Previous hospitalizations?:  No       Once in a Lifetime Medicare Screening:   AAA screening performed? (if performed, please add date to Health Maintenance):  No       Medicare Screening Tests and Risk Assessment:   AAA Risk Assessment     Tobacco use (males only):  No   Osteoporosis Risk Assessment     PMHX of fractures:  Yes   HIV Risk Assessment        Drug and Alcohol Use:   Tobacco use    Cigarettes:  never smoker    Smokeless:  former smokeless tobacco user Smokeless quit date:  1/1/1998   Tobacco use duration    Tobacco Cessation Readiness    Alcohol use    Alcohol use:  never    Alcohol Treatment Readiness   Illicit Drug Use    Drug use:  never        Diet & Exercise:   Diet   What is your diet?:  Limited junk food   How many servings a day of the following:   Fruits and Vegetables:  0 Meat:  1-2   Whole Grains:  0 Simple Carbs:  1   Dairy:  0 Soda:  1   Coffee:  1 Tea:  0   Exercise    Do you currently exercise?:  yes    Frequency:  rarely    Minutes per day:  10    Minutes per week:  70    Type of exercise:  walking       Cognitive Impairment Screening:   Cognitive Impairment Screening        Functional Ability/Level of Safety:   Hearing    Hearing difficulties:  Yes Bilateral:  slightly decreased   Left:  slightly decreased Right:  slightly decreased   Hearing Impairment Assessment    Current Activities    Help needed with the folllowing:     Preparing Meals:  Yes   Doing Housework:  Yes Doing Laundry:  Yes   ADL    Fall Risk   Have you fallen in the last 12 months?:  No    Injury History       Home Safety:   Home Safety Risk Factors       Advanced Directives:   Advanced Directives    Living Will:  No Durable POA for healthcare: Yes   Advanced directive:  No    Patient's End of Life Decisions        Urinary Incontinence:       Glaucoma:            Provider Screening     Preventative Screening/Counseling:   Cardiovascular Screening/Counseling:   (Labs Q5 years, EKG optional one-time)   General:  Risks and Benefits Discussed           Diabetes Screening/Counseling:   (2 tests/year if Pre-Diabetes or 1 test/year if no Diabetes)   General:  Risks and Benefits Discussed           Colorectal Cancer Screening/Counseling:   (FOBT Q1 yr; Flex Sig Q4 yrs or Q10 yrs after Screening Colonoscopy; Screening Colonoscpy Q2 yrs High Risk or Q10 yrs Low Risk; Barium Enema Q2 yrs High Risk or Q4 yrs Low Risk)   General:  Risks and Benefits Discussed           Prostate Cancer Screening/Counseling:   (Annual)          Breast Cancer Screening/Counseling:   (Baseline Age 28 - 43; Annual Age 36+)   General:  Risks and Benefits Discussed          Cervical Cancer Screening/Counseling:   (Annual for High Risk or Childbearing Age with Abnormal Pap in Last 3 yrs;  Every 2 all others)         Osteoporosis Screening/Counseling:   (Every 2 Yrs if at risk or more if medically necessary)         AAA Screening/Counseling:   (Once per Lifetime with risk factors)      Tobacco use (males only):  No         Glaucoma Screening/Counseling:   (Annual)         HIV Screening/Counseling:   (Voluntary; Once annually for high risk OR 3 times for Pregnancy at diagnosis of IUP; 3rd trimester; and at Labor         Hepatitis C Screening:             Immunizations: Other Preventative Couseling (Non-Medicare Wellness Visit Required):       Referrals (Non-Medicare Wellness Visit Required):       Medical Equipment/Suppliers:           No exam data present  Reviewed Updated [facility] Prior Wellness Visits:   Last Medicare wellness visit information was reviewed, patient interviewed , no change since last AWVyes  Last Medicare wellness visit information was reviewed, patient interviewed and updates made to the record today yes    Assessment and Plan:  1  Medicare annual wellness visit, subsequent     2  Controlled type 2 diabetes mellitus with stage 3 chronic kidney disease, without long-term current use of insulin ([facility])  CBC and differential    Comprehensive metabolic panel    Hemoglobin A1c    Microalbumin / creatinine urine ratio    TSH, 3rd generation   3  Essential hypertension     4  Mixed hyperlipidemia  Lipid panel   5  Gastroesophageal reflux disease without esophagitis     6  Moderate persistent asthma without complication     7  Bilateral edema of lower extremity     8  Chronic diastolic congestive heart failure ([facility] )     9  Rheumatoid arthritis with positive rheumatoid factor, involving unspecified site ([facility] )     10  Depression, unspecified depression type     11   Class 2 obesity due to excess calories with serious comorbidity and body mass index (BMI) of 36 0 to 36 9 in adult         Health Maintenance Due   Topic Date Due    Hepatitis C Screening  1945    DTaP,Tdap,and Td Vaccines (1 - Tdap) 04/16/1952    Depression Screening PHQ-9  04/16/1957    Fall Risk  04/16/2010    Urinary Incontinence Screening  04/16/2010    GLAUCOMA SCREENING 67+ YR  04/16/2012    URINE MICROALBUMIN  01/05/2017

## 2018-02-16 NOTE — PROGRESS NOTES
INTERNAL MEDICINE OFFICE VISIT  [facility] [address], [facility], [address] [place] [address]  Tel: [phone]      NAME: Adrianna Rawls  AGE: 67 y o  SEX: female  : 1945   MRN: [id number]    DATE: 2018  TIME: 4:08 PM      Assessment and Plan:  1  Controlled type 2 diabetes mellitus with stage 3 chronic kidney disease, without long-term current use of insulin ([facility] )  Patient is extremely noncompliant and does not take the Lantus regularly at all  She eats a carbohydrate rich diet and does not want to listen to the fact that she has to watch her diet  She has not had blood work for almost 2 years  Today her son was accompanying her and I told him to make her do the blood work so that I have an idea what her hemoglobin A1c level is  I will call her back with the results and with the adjustment of the dosage of insulin  - CBC and differential; Future  - Comprehensive metabolic panel; Future  - Hemoglobin A1c; Future  - Microalbumin / creatinine urine ratio  - TSH, 3rd generation; Future    2  Essential hypertension  Blood pressure is stable on present medications, continue the same  3  Mixed hyperlipidemia  Follow-up lipid profile  - Lipid panel; Future    4  Gastroesophageal reflux disease without esophagitis  She is stable on pantoprazole, continue the same  5  Moderate persistent asthma without complication  She has symptoms of wheezing and shortness of breath at all times but does not want to take the inhaler  6  Chronic diastolic congestive heart failure (HCC)  Has shortness of breath on exertion but takes torsemide inconsistently  7  Rheumatoid arthritis with positive rheumatoid factor, involving unspecified site ([facility] )  Stable, follows up with Rheumatology    8  Bilateral edema of lower extremity  Was told to cut down on the salt intake    9  Depression, unspecified depression type  Stable on medication, continue the same    10   Class 2 obesity due to excess calories with serious comorbidity and body mass index (BMI) of 36 0 to 36 9 in adult  She was told to cut down on the calories and increase her activity  - Counseling Documentation: patient was counseled regarding: instructions for management, risk factor reductions, prognosis, patient and family education, impressions, risks and benefits of treatment options and importance of compliance with treatment  - Medication Side Effects: Adverse side effects of medications were reviewed with the patient/guardian today  Return for follow up visit in 4 months or earlier, if needed  Chief Complaint:  Chief Complaint   Patient presents with    Medicare Wellness Visit         History of Present Illness:   Type 2 diabetes-patient has been told to take Lantus 30 units daily but she says that she only takes it when she eats dinner  Most of the times she skips Lantus   Hypertension-she takes fosinopril regularly  Hyperlipidemia-she takes statin regularly  Does not complain of any symptoms  GERD-stable on medication  Asthma-she refuses to take the inhaler as the insurance does not cover it and she has to pay out of pocket  Edema of lower extremities-she continues to eat a high salt diet  Congestive heart failure-she follows up with Cardiology  Rheumatoid arthritis-she follows up with Rheumatology  Depression-stable on medication  Obesity-she does not want to lose weight          Active Problem List:  Patient Active Problem List   Diagnosis    Bilateral edema of lower extremity    Chronic diastolic congestive heart failure (HCC)    Hyperlipidemia    Hypertension    Obesity    Preop cardiovascular exam    Ambulatory dysfunction    Asthma    Depression    Diabetes mellitus type II, controlled ([facility] )    Gastroesophageal reflux disease    Insomnia    Moderate persistent asthma    Psoriasis vulgaris    Rheumatoid arthritis (HCC)    Urinary incontinence    Vitamin D deficiency Past Medical History:  Past Medical History:   Diagnosis Date    Asthma     Chest pain on breathing     last assessed 2/5/15    Chronic diastolic CHF (congestive heart failure) (HCC)     Diabetes mellitus ([address] )     HTN (hypertension)     Hyperlipidemia     Obesity          Past Surgical History:  Past Surgical History:   Procedure Laterality Date    HAND SURGERY           Family History:  Family History   Problem Relation Age of Onset    Rheum arthritis Mother          Social History:  Social History     Social History    Marital status: /Civil Union     Spouse name: N/A    Number of children: N/A    Years of education: N/A     Social History Main Topics    Smoking status: Former Smoker     Types: Cigarettes    Smokeless tobacco: Never Used      Comment: Quit 20 years ago    Alcohol use No    Drug use: No    Sexual activity: No     Other Topics Concern    None     Social History Narrative    No advance directives on file         Allergies: Allergies   Allergen Reactions    Gabapentin     Vancomycin          Medications:    Current Outpatient Prescriptions:     albuterol (2 5 mg/3 mL) 0 083 % nebulizer solution, Inhale 1 each 4 (four) times a day, Disp: , Rfl:     albuterol (PROAIR HFA) 90 mcg/act inhaler, Inhale 2 puffs, Disp: , Rfl:     escitalopram (LEXAPRO) 20 mg tablet, Take 20 mg by mouth daily, Disp: , Rfl: 2    fluticasone furoate-vilanterol (BREO ELLIPTA), Inhale 1 puff daily, Disp: , Rfl:     folic acid (FOLVITE) 1 mg tablet, Take 1 tablet by mouth daily, Disp: , Rfl:     fosinopril (MONOPRIL) 10 mg tablet, TAKE 1 TABLET BY MOUTH EVERY DAY, Disp: 90 tablet, Rfl: 1    insulin glargine (LANTUS SOLOSTAR) injection pen 100 units/mL, Inject 30 Units under the skin daily, Disp: , Rfl:     metFORMIN (GLUCOPHAGE) 1000 MG tablet, TAKE 1 TABLET TWICE DAILY  , Disp: 180 tablet, Rfl: 3    metoprolol succinate (TOPROL-XL) 25 mg 24 hr tablet, Take 1 tablet by mouth daily, Disp: , Rfl:     NYSTATIN powder, APPLY 2-3 TIMES DAILY TO AFFECTED AREA(S) , Disp: , Rfl: 5    oxyCODONE-acetaminophen (PERCOCET)  mg per tablet, Take by mouth, Disp: , Rfl:     pantoprazole (PROTONIX) 40 mg tablet, Take 1 tablet by mouth daily, Disp: , Rfl:     simvastatin (ZOCOR) 40 mg tablet, Take 40 mg by mouth daily, Disp: , Rfl: 2    Tocilizumab (ACTEMRA) 162 MG/0 9ML SOSY, Inject under the skin, Disp: , Rfl:     torsemide (DEMADEX) 20 mg tablet, Take 20 mg by mouth daily  , Disp: , Rfl:       The following portions of the patient's history were reviewed and updated as appropriate: past medical history, past surgical history, family history, social history, allergies, current medications and active problem list       Review of Systems:  Constitutional: Denies fever, chills, weight gain, weight loss, fatigue  Eyes: Denies eye redness, eye discharge, double vision, change in visual acuity  ENT: Denies hearing loss, tinnitus, sneezing, nasal congestion, nasal discharge, sore throat   Respiratory: Denies cough, expectoration, hemoptysis, shortness of breath, wheezing  Cardiovascular: Denies chest pain, palpitations,has lower extremity swelling, orthopnea,   Gastrointestinal: Denies abdominal pain, heartburn, nausea, vomiting, hematemesis, diarrhea, bloody stools  Genito-Urinary: Denies dysuria, frequency, difficulty in micturition, nocturia, incontinence  Musculoskeletal: Denies back pain, joint pain, muscle pain  Neurologic: Denies confusion, lightheadedness, syncope, headache, focal weakness, sensory changes, seizures  Endocrine: Denies polyuria, polydipsia, temperature intolerance  Allergy and Immunology: Denies hives, insect bite sensitivity  Hematological and Lymphatic: Denies bleeding problems, swollen glands   Psychological: Denies depression, suicidal ideation, anxiety, panic, mood swings  Dermatological: Denies pruritus, rash, skin lesion changes      Vitals:  Vitals:    02/16/18 1530   BP: 110/80 Pulse: 83   Resp: 16   SpO2: 90%       Body mass index is 36 99 kg/m²  Weight (last 2 days)     Date/Time   Weight    02/16/18 1530  85 9 (189 4)                Physical Examination:  General: Patient is not in acute distress  Awake, alert, responding to commands  No weight gain or loss  Head: Normocephalic  Atraumatic  Eyes: Conjunctiva and lids with no swelling, erythema or discharge  Both pupils normal sized, round and reactive to light  Sclera nonicteric  ENT: External examination of nose and ear normal  Otoscopic examination shows translucent tympanic membranes with patent canals without erythema  Oropharynx moist with no erythema, edema, exudate or lesions  Neck: Supple  JVP not raised  Trachea midline  No masses  No thyromegaly  Lungs: No signs of increased work of breathing or respiratory distress  Bilateral bronchovascular breath sounds with no crackles or rhonchi  Chest wall: No tenderness  Cardiovascular: Normal PMI  No thrills  Regular rate and rhythm  S1 and S2 normal  No murmur, rub or gallop  Gastrointestinal: Abdomen soft, nontender  No guarding or rigidity  Liver and spleen not palpable  Bowel sounds present  Neurologic: Cranial nerves II-XII intact  Cortical functions normal  Motor system - Reflexes 2+ and symmetrical  Sensations normal  Musculoskeletal: Gait normal  No joint tenderness  Integumentary: Skin normal with no rash or lesions  Lymphatic: No palpable lymph nodes in neck, axilla or groin  Extremities:  She has 3+ swelling of the lower extremities up to the knees    No clubbing, cyanosis,  or varicosities  Psychological: Judgement and insight normal  Mood and affect normal      Laboratory Results:  CBC with diff:   Lab Results   Component Value Date    WBC 8 88 06/09/2016    WBC 7 94 01/05/2016    RBC 4 29 06/09/2016    RBC 4 39 01/05/2016    HGB 12 7 06/09/2016    HGB 12 9 01/05/2016    HCT 40 2 06/09/2016    HCT 40 9 01/05/2016    MCV 94 06/09/2016    MCV 93 01/05/2016    MCH 29 6 06/09/2016    MCH 29 4 01/05/2016    RDW 16 5 (H) 06/09/2016    RDW 15 4 (H) 01/05/2016     06/09/2016     01/05/2016       CMP:  Lab Results   Component Value Date    CREATININE 0 77 06/09/2016    CREATININE 0 74 01/05/2016    BUN 14 06/09/2016    BUN 18 01/05/2016     (L) 06/09/2016     (L) 01/05/2016    K 4 8 06/09/2016    K 4 5 01/05/2016     06/09/2016     01/05/2016    CO2 27 06/09/2016    CO2 26 01/05/2016    GLUCOSE 260 (H) 06/09/2016    GLUCOSE 159 (H) 01/05/2016    PROT 8 1 06/09/2016    PROT 7 3 01/05/2016    ALKPHOS 81 06/09/2016    ALKPHOS 151 (H) 01/05/2016    ALT 19 06/09/2016    ALT 25 01/05/2016    AST 22 06/09/2016    AST 14 01/05/2016       Lab Results   Component Value Date    HGBA1C 9 0 (H) 06/09/2016    HGBA1C 9 8 (H) 01/05/2016       No results found for: TROPONINI, CKMB, CKTOTAL    Lipid Profile:   Lab Results   Component Value Date    CHOL 197 06/09/2016    CHOL 230 01/05/2016     Lab Results   Component Value Date    HDL 48 06/09/2016    HDL 59 01/05/2016     Lab Results   Component Value Date    LDLCALC 123 (H) 06/09/2016    LDLCALC 116 (H) 01/05/2016     Lab Results   Component Value Date    TRIG 130 06/09/2016    TRIG 273 01/05/2016         Health Maintenance:  Health Maintenance   Topic Date Due    Hepatitis C Screening  1945    DTaP,Tdap,and Td Vaccines (1 - Tdap) 04/16/1952    Depression Screening PHQ-9  04/16/1957    Fall Risk  04/16/2010    Urinary Incontinence Screening  04/16/2010    GLAUCOMA SCREENING 67+ YR  04/16/2012    URINE MICROALBUMIN  01/05/2017    Diabetic Foot Exam  06/30/2018    OPHTHALMOLOGY EXAM  01/08/2019    INFLUENZA VACCINE  Completed    PNEUMOCOCCAL POLYSACCHARIDE VACCINE AGE 72 AND OVER  Completed     Immunization History   Administered Date(s) Administered    Influenza Split High Dose Preservative Free IM 12/20/2016, 11/10/2017    Influenza TIV (IM) 10/01/2014, 09/01/2015    Pneumococcal Polysaccharide PPV23 04/21/2008, 12/20/2016    Tdap 1945         Justin Coyle MD  7/85/3459,1:31 PM

## 2018-05-04 LAB
LEFT EYE DIABETIC RETINOPATHY: NORMAL
RIGHT EYE DIABETIC RETINOPATHY: NORMAL

## 2018-05-09 ENCOUNTER — OFFICE VISIT (OUTPATIENT)
Dept: CARDIOLOGY CLINIC | Facility: CLINIC | Age: 73
End: 2018-05-09
Payer: MEDICARE

## 2018-05-09 VITALS
BODY MASS INDEX: 36.5 KG/M2 | HEART RATE: 83 BPM | SYSTOLIC BLOOD PRESSURE: 132 MMHG | HEIGHT: 60 IN | DIASTOLIC BLOOD PRESSURE: 64 MMHG | OXYGEN SATURATION: 94 % | WEIGHT: 185.9 LBS

## 2018-05-09 DIAGNOSIS — R60.0 BILATERAL EDEMA OF LOWER EXTREMITY: Primary | ICD-10-CM

## 2018-05-09 DIAGNOSIS — E66.09 CLASS 1 OBESITY DUE TO EXCESS CALORIES WITH SERIOUS COMORBIDITY AND BODY MASS INDEX (BMI) OF 31.0 TO 31.9 IN ADULT: ICD-10-CM

## 2018-05-09 DIAGNOSIS — I83.93 VARICOSE VEINS OF BOTH LOWER EXTREMITIES: ICD-10-CM

## 2018-05-09 DIAGNOSIS — I10 ESSENTIAL HYPERTENSION: ICD-10-CM

## 2018-05-09 DIAGNOSIS — I50.32 CHRONIC DIASTOLIC CONGESTIVE HEART FAILURE (HCC): ICD-10-CM

## 2018-05-09 DIAGNOSIS — E78.2 MIXED HYPERLIPIDEMIA: ICD-10-CM

## 2018-05-09 PROCEDURE — 99214 OFFICE O/P EST MOD 30 MIN: CPT | Performed by: INTERNAL MEDICINE

## 2018-05-09 NOTE — PROGRESS NOTES
CARDIOLOGY OFFICE VISIT  [facility]  [address], [address] [place] [address], [facility] Yessi [place]  Tel: [phone]      NAME: Hilario Georgetown  AGE: 68 y o  SEX: female  : 1945   MRN: [id number]      Chief Complaint:  Chief Complaint   Patient presents with    Follow-up         History of Present Illness:   Patient comes for an urgent visit stating that her legs are getting swollen for the last few days  Denies calf pain  Denies missing her furosemide dose  But does mention that she has not been compliant with her salt control  Denies chest pain / pressure, palpitations, lightheadedness, syncope, orthopnea, PND, claudication  SOB is at baseline  Chronic diastolic CHF -  On  Torsemide 20 mg once daily, potassium, beta-blocker, ACE-inhibitor    HTN -  Has been hypertensive for many years  Taking medications regularly  Denies lightheadedness, headache, medication side effects  HLP -  Has had hyperlipidemia for many years  Taking statin regularly along with diet control  Denies myalgia  PCP closely monitoring the blood work  Obesity -  Trying to lose weight      Pharmacological nuclear stress test 2016 [facility] - negative for evidence of ischemia  2-D echocardiogram 2016 [facility] - normal LVEF with no significant valvular pathology    Past Medical History:  Past Medical History:   Diagnosis Date    Asthma     Chest pain on breathing     last assessed 2/5/15    Chronic diastolic CHF (congestive heart failure) ([facility] [address] )     [facility] mellitus ([email or web address]     HTN (hypertension)     Hyperlipidemia     Obesity          Past Surgical History:  Past Surgical History:   Procedure Laterality Date    HAND SURGERY           Family History:  Family History   Problem Relation Age of Onset    Rheum arthritis Mother          Social History:  Social History     Social History    Marital status: /Civil Union     Spouse name: N/A    Number of children: N/A    Years of education: N/A     Social History Main Topics    Smoking status: Former Smoker     Types: Cigarettes    Smokeless tobacco: Never Used      Comment: Quit 20 years ago    Alcohol use No    Drug use: No    Sexual activity: No     Other Topics Concern    None     Social History Narrative    No advance directives on file         Active Problems:  Patient Active Problem List   Diagnosis    Bilateral edema of lower extremity    Chronic diastolic congestive heart failure (HCC)    Hyperlipidemia    Hypertension    Obesity    Preop cardiovascular exam    Ambulatory dysfunction    Asthma    Depression    Diabetes mellitus type II, controlled ([facility] [address] )    Gastroesophageal reflux disease    Insomnia    Moderate persistent asthma    Psoriasis vulgaris    Rheumatoid arthritis (HCC)    Urinary incontinence    Vitamin D deficiency         The following portions of the patient's history were reviewed and updated as appropriate: past medical history, past surgical history, past family history,  past social history, current medications, allergies and problem list       Review of Systems:  Constitutional: Denies fever, chills, fatigue  Eyes: Denies eye redness, eye discharge, double vision  ENT: Denies hearing loss, tinnitus, sneezing, nasal discharge, sore throat   Respiratory: Denies cough, expectoration, hemoptysis   +shortness of breath  Cardiovascular: Denies chest pain, palpitations, orthopnea, PND  +lower extremity swelling  Gastrointestinal: Denies abdominal pain, nausea, vomiting, hematemesis, diarrhea, bloody stools  Genito-Urinary: Denies dysuria, incontinence  Musculoskeletal: Denies back pain, joint pain, muscle pain  Neurologic: Denies confusion, lightheadedness, syncope, headache, focal weakness, sensory changes, seizures  Endocrine: Denies polyuria, polydipsia, temperature intolerance  Allergy and Immunology: Denies hives, insect bite sensitivity  Hematological and Lymphatic: Denies bleeding problems, swollen glands   Psychological: Denies depression, suicidal ideation, anxiety, panic  Dermatological: Denies pruritus, rash, skin lesion changes      Vitals:  Vitals:    05/09/18 1325   BP: 132/64   Pulse: 83   SpO2: 94%       Body mass index is 36 31 kg/m²  Weight (last 2 days)     Date/Time   Weight    05/09/18 1325  84 3 (185 9)                Physical Examination:  General: Obese  Patient is not in acute distress  Awake, alert, oriented in time, place and person  Responding to commands  Head: Normocephalic  Atraumatic  Eyes: Both pupils normal sized, round and reactive to light  Nonicteric  ENT: Normal external ear canals  Nares normal, no drainage  Lips and oral mucosa normal  Neck: Supple  JVP not raised  Trachea central  No thyromegaly  Lungs: Bilateral bronchovascular breath sounds with no crackles or rhonchi  Chest wall: No tenderness  Cardiovascular: RRR  S1 and S2 normal  No murmur, rub or gallop  Gastrointestinal: Abdomen soft, nontender  No guarding or rigidity  Liver and spleen not palpable  Bowel sounds present  Neurologic: Patient is awake, alert, oriented in time, place and person  Responding to command  Moving all extremities  Integumentary:  No skin rash  Lymphatic: No cervical lymphadenopathy  Back: Symmetric  No CVA tenderness  Extremities: No clubbing, cyanosis   B/L LE varicose veins and edema+      Laboratory Results:  CBC with diff:   Lab Results   Component Value Date    WBC 13 78 (H) 02/16/2018    WBC 7 94 01/05/2016    RBC 4 12 02/16/2018    RBC 4 39 01/05/2016    HGB 13 2 02/16/2018    HGB 12 9 01/05/2016    HCT 40 7 02/16/2018    HCT 40 9 01/05/2016    MCV 99 (H) 02/16/2018    MCV 93 01/05/2016    MCH 32 0 02/16/2018    MCH 29 4 01/05/2016    RDW 14 0 02/16/2018    RDW 15 4 (H) 01/05/2016     02/16/2018     01/05/2016       CMP:  Lab Results   Component Value Date    CREATININE 0 92 02/16/2018    CREATININE 0 74 01/05/2016    BUN 22 02/16/2018 BUN 18 01/05/2016     (L) 02/16/2018     (L) 01/05/2016    K 4 3 02/16/2018    K 4 5 01/05/2016     02/16/2018     01/05/2016    CO2 25 02/16/2018    CO2 26 01/05/2016    GLUCOSE 98 02/16/2018    GLUCOSE 159 (H) 01/05/2016    PROT 7 9 02/16/2018    PROT 7 3 01/05/2016    ALKPHOS 106 02/16/2018    ALKPHOS 151 (H) 01/05/2016    ALT 21 02/16/2018    ALT 25 01/05/2016    AST 16 02/16/2018    AST 14 01/05/2016       Lab Results   Component Value Date    HGBA1C 7 8 (H) 02/16/2018    HGBA1C 9 8 (H) 01/05/2016       No results found for: TROPONINI, CKMB, CKTOTAL    Lipid Profile:   Lab Results   Component Value Date    CHOL 210 (H) 02/16/2018    CHOL 197 06/09/2016    CHOL 230 01/05/2016     Lab Results   Component Value Date    HDL 72 (H) 02/16/2018    HDL 48 06/09/2016    HDL 59 01/05/2016     Lab Results   Component Value Date    LDLCALC 108 (H) 02/16/2018    LDLCALC 123 (H) 06/09/2016    LDLCALC 116 (H) 01/05/2016     Lab Results   Component Value Date    TRIG 148 02/16/2018    TRIG 130 06/09/2016    TRIG 273 01/05/2016       Medications:    Current Outpatient Prescriptions:     albuterol (2 5 mg/3 mL) 0 083 % nebulizer solution, Inhale 1 each 4 (four) times a day, Disp: , Rfl:     albuterol (PROAIR HFA) 90 mcg/act inhaler, Inhale 2 puffs, Disp: , Rfl:     escitalopram (LEXAPRO) 20 mg tablet, Take 20 mg by mouth daily, Disp: , Rfl: 2    fluticasone furoate-vilanterol (BREO ELLIPTA), Inhale 1 puff daily, Disp: , Rfl:     folic acid (FOLVITE) 1 mg tablet, Take 1 tablet by mouth daily, Disp: , Rfl:     fosinopril (MONOPRIL) 10 mg tablet, TAKE 1 TABLET BY MOUTH EVERY DAY, Disp: 90 tablet, Rfl: 1    insulin glargine (LANTUS SOLOSTAR) injection pen 100 units/mL, Inject 30 Units under the skin daily, Disp: , Rfl:     metFORMIN (GLUCOPHAGE) 1000 MG tablet, TAKE 1 TABLET TWICE DAILY  , Disp: 180 tablet, Rfl: 3    metoprolol succinate (TOPROL-XL) 25 mg 24 hr tablet, Take 1 tablet by mouth daily, Disp: , Rfl:     NYSTATIN powder, APPLY 2-3 TIMES DAILY TO AFFECTED AREA(S) , Disp: , Rfl: 5    oxyCODONE-acetaminophen (PERCOCET)  mg per tablet, Take by mouth, Disp: , Rfl:     pantoprazole (PROTONIX) 40 mg tablet, Take 1 tablet by mouth daily, Disp: , Rfl:     simvastatin (ZOCOR) 40 mg tablet, Take 40 mg by mouth daily, Disp: , Rfl: 2    Tocilizumab (ACTEMRA) 162 MG/0 9ML SOSY, Inject under the skin, Disp: , Rfl:     torsemide (DEMADEX) 20 mg tablet, Take 20 mg by mouth daily  , Disp: , Rfl:       Allergies: Allergies   Allergen Reactions    Gabapentin     Vancomycin          Assessment and Plan:  1  Bilateral edema of lower extremity  Recommend tight salt control  Keep legs elevated while in bed  Fabrice stockings during daytime  Increase dose of torsemide to twice daily for few days and then go back to her maintenance dose    2  Chronic diastolic congestive heart failure ([facility] [address] )   patient has dependent edema from dietary indiscretion and varicose veins  Not in acute heart failure  Continue Ace inhibitor, beta-blocker  Diuretic dose increased  Daily weights  Low-salt diet    3  Essential hypertension   BP stable  Continue current medications    4  Mixed hyperlipidemia   continue statin and diet control  Her PCP closely monitor the blood work    5  Class 1 obesity due to excess calories with serious comorbidity and body mass index (BMI) of 31 0 to 31 9 in adult   counseled to try to lose weight    Recommend aggressive risk factor modification and therapeutic lifestyle changes  Low-salt, low-calorie, low-fat, low-cholesterol diet with regular exercise and to optimize weight  Discussed concepts of atherosclerosis, including signs and symptoms of cardiac disease  Previous studies were reviewed  Safety measures were reviewed  Questions were entertained and answered  Patient was advised to report any problems requiring medical attention      Follow-up with PCP and appropriate specialist and lab work as discussed  Return for follow up visit as scheduled or earlier, if needed  Thank you for allowing me to participate in the care and evaluation of your patient  Should you have any questions, please feel free to contact me        Edelmira Elizalde MD  5/9/2018,2:01 PM

## 2018-06-07 RX ORDER — CYCLOBENZAPRINE HCL 5 MG
TABLET ORAL
Refills: 5 | COMMUNITY
Start: 2018-03-23 | End: 2019-11-14

## 2018-06-07 RX ORDER — DULOXETIN HYDROCHLORIDE 20 MG/1
20 CAPSULE, DELAYED RELEASE ORAL
COMMUNITY
Start: 2017-11-03 | End: 2019-07-03 | Stop reason: SDUPTHER

## 2018-06-07 RX ORDER — BLOOD-GLUCOSE METER
EACH MISCELLANEOUS 3 TIMES DAILY
COMMUNITY
Start: 2016-06-23 | End: 2020-09-28 | Stop reason: SDUPTHER

## 2018-06-11 ENCOUNTER — OFFICE VISIT (OUTPATIENT)
Dept: INTERNAL MEDICINE CLINIC | Facility: CLINIC | Age: 73
End: 2018-06-11
Payer: MEDICARE

## 2018-06-11 VITALS
HEIGHT: 57 IN | SYSTOLIC BLOOD PRESSURE: 130 MMHG | OXYGEN SATURATION: 98 % | WEIGHT: 186.2 LBS | BODY MASS INDEX: 40.17 KG/M2 | DIASTOLIC BLOOD PRESSURE: 74 MMHG | HEART RATE: 81 BPM

## 2018-06-11 DIAGNOSIS — E11.8 TYPE 2 DIABETES MELLITUS WITH COMPLICATION, UNSPECIFIED WHETHER LONG TERM INSULIN USE: Primary | ICD-10-CM

## 2018-06-11 DIAGNOSIS — J45.40 MODERATE PERSISTENT ASTHMA WITHOUT COMPLICATION: ICD-10-CM

## 2018-06-11 DIAGNOSIS — R60.0 BILATERAL EDEMA OF LOWER EXTREMITY: ICD-10-CM

## 2018-06-11 DIAGNOSIS — I10 ESSENTIAL HYPERTENSION: ICD-10-CM

## 2018-06-11 DIAGNOSIS — E78.2 MIXED HYPERLIPIDEMIA: ICD-10-CM

## 2018-06-11 DIAGNOSIS — F51.01 PRIMARY INSOMNIA: ICD-10-CM

## 2018-06-11 DIAGNOSIS — L03.119 CELLULITIS OF LOWER EXTREMITY, UNSPECIFIED LATERALITY: ICD-10-CM

## 2018-06-11 PROBLEM — Z01.810 PREOP CARDIOVASCULAR EXAM: Status: RESOLVED | Noted: 2018-02-02 | Resolved: 2018-06-11

## 2018-06-11 PROCEDURE — 99214 OFFICE O/P EST MOD 30 MIN: CPT | Performed by: INTERNAL MEDICINE

## 2018-06-11 RX ORDER — MIRTAZAPINE 15 MG/1
15 TABLET, FILM COATED ORAL
Qty: 90 TABLET | Refills: 1 | Status: SHIPPED | OUTPATIENT
Start: 2018-06-11 | End: 2019-01-18 | Stop reason: SDUPTHER

## 2018-06-11 RX ORDER — CEPHALEXIN 500 MG/1
500 CAPSULE ORAL EVERY 8 HOURS SCHEDULED
Qty: 21 CAPSULE | Refills: 0 | Status: SHIPPED | OUTPATIENT
Start: 2018-06-11 | End: 2018-06-18

## 2018-06-11 NOTE — PROGRESS NOTES
INTERNAL MEDICINE OFFICE VISIT  [facility] [address], [facility], [address] [facility]  Tel: [phone]      NAME: Nusrat Arango  AGE: 68 y o  SEX: female  : 1945   MRN: [id number]    DATE: 2018  TIME: 3:57 PM      Assessment and Plan:  1  Type 2 diabetes mellitus with complication, unspecified whether long term insulin use ([facility] )  Patient was told to increase the dose of Lantus to 40 units daily as her blood glucose levels are high and the last hemoglobin A1c level was high as well     - glucose blood (ONE TOUCH ULTRA TEST) test strip; 1 each by Other route 3 (three) times a day  Dispense: 100 each; Refill: 1  - insulin glargine (LANTUS SOLOSTAR) 100 units/mL injection pen; Inject 40 Units under the skin daily  Dispense: 5 pen; Refill: 3  - Insulin Pen Needle (B-D UF III MINI PEN NEEDLES) 31G X 5 MM MISC; by Does not apply route daily  Dispense: 100 each; Refill: 1  - CBC and differential; Future  - Comprehensive metabolic panel; Future  - Hemoglobin A1C; Future  - Microalbumin / creatinine urine ratio  - TSH, 3rd generation; Future    2  Bilateral edema of lower extremity  She has been having increasing swelling of lower lower extremities and was told to take torsemide 20 milligrams twice a day by her cardiologist   She has been taking it inconsistently  She was told to take it regularly and to cut down on the salt intake as much as possible  3  Essential hypertension  Continue medications    4  Mixed hyperlipidemia  Continue simvastatin  - Lipid panel; Future    5  Moderate persistent asthma without complication  Continue inhalers as advised    6  Primary insomnia  She was started on mirtazapine for insomnia  - mirtazapine (REMERON) 15 mg tablet; Take 1 tablet (15 mg total) by mouth daily at bedtime  Dispense: 90 tablet; Refill: 1    7   Cellulitis of lower extremity, unspecified laterality  She was told to take cephalexin for 7 days  - cephalexin (KEFLEX) 500 mg capsule; Take 1 capsule (500 mg total) by mouth every 8 (eight) hours for 7 days  Dispense: 21 capsule; Refill: 0      - Counseling Documentation: patient was counseled regarding: diagnostic results, instructions for management, risk factor reductions, prognosis, patient and family education, impressions, risks and benefits of treatment options and importance of compliance with treatment  - Medication Side Effects: Adverse side effects of medications were reviewed with the patient/guardian today  Return for follow up visit in 4 months or earlier, if needed  Chief Complaint:  Chief Complaint   Patient presents with    Well Check     4 month         History of Present Illness:   Type 2 diabetes-the last hemoglobin A1c level was high  She has been taking the Lantus insulin but needs to increase the dose  Edema of lower extremities-almost for 4-6 weeks, she has been complaining of increasing swelling of the ankles up to the knees  She was recently told to take double the dose of the torsemide to help her with the swelling  She is not taking it consistently  Hypertension-blood pressure stable on present medications  Hyperlipidemia-she takes simvastatin regularly  Asthma-she takes inhalers as advised  Insomnia-she is not able to sleep during the night  Cellulitis of lower extremities-I will give her antibiotics for 7 days    The      Active Problem List:  Patient Active Problem List   Diagnosis    Bilateral edema of lower extremity    Chronic diastolic congestive heart failure (HCC)    Mixed hyperlipidemia    Essential hypertension    Ambulatory dysfunction    Asthma    Recurrent major depressive disorder, in partial remission ([address] )    Diabetes mellitus type II, controlled ([address] )    Gastroesophageal reflux disease without esophagitis    Primary insomnia    Moderate persistent asthma    Psoriasis vulgaris    Rheumatoid arthritis (HCC)    Urinary incontinence    Vitamin D deficiency    Varicose veins of both lower extremities         Past Medical History:  Past Medical History:   Diagnosis Date    Asthma     Chest pain on breathing     last assessed 2/5/15    Chronic diastolic CHF (congestive heart failure) ([facility] [address] )     Diabetes mellitus ([facility] )     HTN (hypertension)     Hyperlipidemia     Obesity     Preop cardiovascular exam 2/2/2018         Past Surgical History:  Past Surgical History:   Procedure Laterality Date    HAND SURGERY           Family History:  Family History   Problem Relation Age of Onset    Rheum arthritis Mother          Social History:  Social History     Social History    Marital status: /Civil Union     Spouse name: N/A    Number of children: N/A    Years of education: N/A     Social History Main Topics    Smoking status: Former Smoker     Types: Cigarettes    Smokeless tobacco: Never Used      Comment: Quit 20 years ago    Alcohol use No    Drug use: No    Sexual activity: No     Other Topics Concern    None     Social History Narrative    No advance directives on file         Allergies:   Allergies   Allergen Reactions    Gabapentin     Vancomycin          Medications:    Current Outpatient Prescriptions:     albuterol (2 5 mg/3 mL) 0 083 % nebulizer solution, Inhale 1 each 4 (four) times a day, Disp: , Rfl:     albuterol (PROAIR HFA) 90 mcg/act inhaler, Inhale 2 puffs, Disp: , Rfl:     Blood Glucose Monitoring Suppl (ONETOUCH VERIO) w/Device KIT, by Does not apply route 3 (three) times a day, Disp: , Rfl:     cyclobenzaprine (FLEXERIL) 5 mg tablet, TAKE 1 TABLETT BY MOUTH EVERY NIGHT AS NEEDED, Disp: , Rfl: 5    DULoxetine (CYMBALTA) 20 mg capsule, Take 20 mg by mouth, Disp: , Rfl:     escitalopram (LEXAPRO) 20 mg tablet, Take 20 mg by mouth daily, Disp: , Rfl: 2    fluticasone furoate-vilanterol (BREO ELLIPTA), Inhale 1 puff daily, Disp: , Rfl:     folic acid (FOLVITE) 1 mg tablet, Take 1 tablet by mouth daily, Disp: , Rfl:   fosinopril (MONOPRIL) 10 mg tablet, TAKE 1 TABLET BY MOUTH EVERY DAY, Disp: 90 tablet, Rfl: 1    glucose blood (ONE TOUCH ULTRA TEST) test strip, 1 each by Other route 3 (three) times a day, Disp: 100 each, Rfl: 1    insulin glargine (LANTUS SOLOSTAR) 100 units/mL injection pen, Inject 40 Units under the skin daily, Disp: 5 pen, Rfl: 3    Insulin Pen Needle (B-D UF III MINI PEN NEEDLES) 31G X 5 MM MISC, by Does not apply route daily, Disp: 100 each, Rfl: 1    metFORMIN (GLUCOPHAGE) 1000 MG tablet, TAKE 1 TABLET TWICE DAILY  , Disp: 180 tablet, Rfl: 3    methotrexate 2 5 mg tablet, Take 10 tablets by mouth once per week, Disp: , Rfl:     metoprolol succinate (TOPROL-XL) 25 mg 24 hr tablet, Take 1 tablet by mouth daily, Disp: , Rfl:     NYSTATIN powder, APPLY 2-3 TIMES DAILY TO AFFECTED AREA(S) , Disp: , Rfl: 5    oxyCODONE-acetaminophen (PERCOCET)  mg per tablet, Take by mouth, Disp: , Rfl:     pantoprazole (PROTONIX) 40 mg tablet, Take 1 tablet by mouth daily, Disp: , Rfl:     simvastatin (ZOCOR) 40 mg tablet, Take 40 mg by mouth daily, Disp: , Rfl: 2    Tocilizumab (ACTEMRA) 162 MG/0 9ML SOSY, Inject under the skin, Disp: , Rfl:     torsemide (DEMADEX) 20 mg tablet, Take 20 mg by mouth 2 (two) times a day  , Disp: , Rfl:     cephalexin (KEFLEX) 500 mg capsule, Take 1 capsule (500 mg total) by mouth every 8 (eight) hours for 7 days, Disp: 21 capsule, Rfl: 0    mirtazapine (REMERON) 15 mg tablet, Take 1 tablet (15 mg total) by mouth daily at bedtime, Disp: 90 tablet, Rfl: 1      The following portions of the patient's history were reviewed and updated as appropriate: past medical history, past surgical history, family history, social history, allergies, current medications and active problem list       Review of Systems:  Constitutional: Denies fever, chills, weight gain, weight loss, fatigue  Eyes: Denies eye redness, eye discharge, double vision, change in visual acuity  ENT: Denies hearing loss, tinnitus, sneezing, nasal congestion, nasal discharge, sore throat   Respiratory: Denies cough, expectoration, hemoptysis, shortness of breath, wheezing  Cardiovascular: Denies chest pain, palpitations, lower extremity swelling, orthopnea, PND  Gastrointestinal: Denies abdominal pain, heartburn, nausea, vomiting, hematemesis, diarrhea, bloody stools  Genito-Urinary: Denies dysuria, frequency, difficulty in micturition, nocturia, incontinence  Musculoskeletal: Denies back pain, joint pain, muscle pain  Neurologic: Denies confusion, lightheadedness, syncope, headache, focal weakness, sensory changes, seizures  Endocrine: Denies polyuria, polydipsia, temperature intolerance  Allergy and Immunology: Denies hives, insect bite sensitivity  Hematological and Lymphatic: Denies bleeding problems, swollen glands   Psychological: Denies depression, suicidal ideation, anxiety, panic, mood swings  Dermatological: Denies pruritus, rash, skin lesion changes      Vitals:  Vitals:    06/11/18 1524   BP: 130/74   Pulse: 81   SpO2: 98%       Body mass index is 41 01 kg/m²  Weight (last 2 days)     Date/Time   Weight    06/11/18 1524  84 5 (186 2)                Physical Examination:  General: Patient is not in acute distress  Awake, alert, responding to commands  No weight gain or loss  Head: Normocephalic  Atraumatic  Eyes: Conjunctiva and lids with no swelling, erythema or discharge  Both pupils normal sized, round and reactive to light  Sclera nonicteric  ENT: External examination of nose and ear normal  Otoscopic examination shows translucent tympanic membranes with patent canals without erythema  Oropharynx moist with no erythema, edema, exudate or lesions  Neck: Supple  JVP not raised  Trachea midline  No masses  No thyromegaly  Lungs: No signs of increased work of breathing or respiratory distress   Bilateral bronchovascular breath sounds with no crackles or rhonchi  Chest wall: No tenderness  Cardiovascular: Normal PMI  No thrills  Regular rate and rhythm  S1 and S2 normal  No murmur, rub or gallop  Gastrointestinal: Abdomen soft, nontender  No guarding or rigidity  Liver and spleen not palpable  Bowel sounds present  Neurologic: Cranial nerves II-XII intact   Cortical functions normal  Motor system - Reflexes 2+ and symmetrical  Sensations normal  Musculoskeletal: Gait normal  No joint tenderness  Integumentary: Skin normal with no rash or lesions  Lymphatic: No palpable lymph nodes in neck, axilla or groin  Extremities:  Has edema and erythema of the lower extremities up to the knees  Psychological: Judgement and insight normal   Is depressed      Laboratory Results:  CBC with diff:   Lab Results   Component Value Date    WBC 13 78 (H) 02/16/2018    WBC 7 94 01/05/2016    RBC 4 12 02/16/2018    RBC 4 39 01/05/2016    HGB 13 2 02/16/2018    HGB 12 9 01/05/2016    HCT 40 7 02/16/2018    HCT 40 9 01/05/2016    MCV 99 (H) 02/16/2018    MCV 93 01/05/2016    MCH 32 0 02/16/2018    MCH 29 4 01/05/2016    RDW 14 0 02/16/2018    RDW 15 4 (H) 01/05/2016     02/16/2018     01/05/2016       CMP:  Lab Results   Component Value Date    CREATININE 0 92 02/16/2018    CREATININE 0 74 01/05/2016    BUN 22 02/16/2018    BUN 18 01/05/2016     (L) 02/16/2018     (L) 01/05/2016    K 4 3 02/16/2018    K 4 5 01/05/2016     02/16/2018     01/05/2016    CO2 25 02/16/2018    CO2 26 01/05/2016    GLUCOSE 98 02/16/2018    GLUCOSE 159 (H) 01/05/2016    PROT 7 9 02/16/2018    PROT 7 3 01/05/2016    ALKPHOS 106 02/16/2018    ALKPHOS 151 (H) 01/05/2016    ALT 21 02/16/2018    ALT 25 01/05/2016    AST 16 02/16/2018    AST 14 01/05/2016       Lab Results   Component Value Date    HGBA1C 7 8 (H) 02/16/2018    HGBA1C 9 8 (H) 01/05/2016       No results found for: TROPONINI, CKMB, CKTOTAL    Lipid Profile:   Lab Results   Component Value Date    CHOL 210 (H) 02/16/2018    CHOL 197 06/09/2016     Lab Results   Component Value Date    HDL 72 (H) 02/16/2018    HDL 48 06/09/2016     Lab Results   Component Value Date    LDLCALC 108 (H) 02/16/2018    LDLCALC 123 (H) 06/09/2016     Lab Results   Component Value Date    TRIG 148 02/16/2018    TRIG 130 06/09/2016         Health Maintenance:  Health Maintenance   Topic Date Due    Hepatitis C Screening  1945    DTaP,Tdap,and Td Vaccines (1 - Tdap) 04/16/1966    GLAUCOMA SCREENING 67+ YR  04/16/2012    Diabetic Foot Exam  06/30/2018    HEMOGLOBIN A1C  08/16/2018    INFLUENZA VACCINE  09/01/2018    Depression Screening PHQ-9  02/16/2019    Annual Wellnes Visit (AWV)  02/16/2019    URINE MICROALBUMIN  02/16/2019    OPHTHALMOLOGY EXAM  05/04/2019    Fall Risk  06/11/2019    Urinary Incontinence Screening  06/11/2019    CRC Screening: Colonoscopy  11/05/2020    PNEUMOCOCCAL POLYSACCHARIDE VACCINE AGE 65 AND OVER  Completed     Immunization History   Administered Date(s) Administered    Influenza Split High Dose Preservative Free IM 12/20/2016, 11/10/2017    Influenza TIV (IM) 10/01/2014, 09/01/2015    Pneumococcal Polysaccharide PPV23 04/21/2008, 12/20/2016    Tdap 1945         Kassi Chase MD  6/11/2018,3:57 PM

## 2018-07-28 DIAGNOSIS — E78.2 MIXED HYPERLIPIDEMIA: Primary | ICD-10-CM

## 2018-07-28 RX ORDER — SIMVASTATIN 40 MG
TABLET ORAL
Qty: 90 TABLET | Refills: 2 | Status: SHIPPED | OUTPATIENT
Start: 2018-07-28 | End: 2019-04-20 | Stop reason: SDUPTHER

## 2018-08-14 DIAGNOSIS — I10 ESSENTIAL HYPERTENSION: ICD-10-CM

## 2018-08-14 RX ORDER — FOSINOPRIL SODIUM 10 MG/1
TABLET ORAL
Qty: 90 TABLET | Refills: 1 | Status: SHIPPED | OUTPATIENT
Start: 2018-08-14 | End: 2019-02-06 | Stop reason: SDUPTHER

## 2018-08-17 DIAGNOSIS — E11.8 TYPE 2 DIABETES MELLITUS WITH COMPLICATION, UNSPECIFIED WHETHER LONG TERM INSULIN USE: ICD-10-CM

## 2018-08-17 RX ORDER — PEN NEEDLE, DIABETIC 31 GX5/16"
NEEDLE, DISPOSABLE MISCELLANEOUS
Qty: 100 EACH | Refills: 3 | Status: SHIPPED | OUTPATIENT
Start: 2018-08-17 | End: 2019-07-03 | Stop reason: SDUPTHER

## 2018-08-26 DIAGNOSIS — I50.9 CHF (CONGESTIVE HEART FAILURE) (HCC): Primary | ICD-10-CM

## 2018-08-27 RX ORDER — METOPROLOL SUCCINATE 25 MG/1
TABLET, EXTENDED RELEASE ORAL
Qty: 90 TABLET | Refills: 1 | Status: SHIPPED | OUTPATIENT
Start: 2018-08-27 | End: 2019-07-03 | Stop reason: SDUPTHER

## 2018-09-13 ENCOUNTER — OFFICE VISIT (OUTPATIENT)
Dept: CARDIOLOGY CLINIC | Facility: CLINIC | Age: 73
End: 2018-09-13
Payer: MEDICARE

## 2018-09-13 VITALS
OXYGEN SATURATION: 95 % | BODY MASS INDEX: 38.9 KG/M2 | DIASTOLIC BLOOD PRESSURE: 78 MMHG | WEIGHT: 180.3 LBS | HEART RATE: 90 BPM | HEIGHT: 57 IN | SYSTOLIC BLOOD PRESSURE: 142 MMHG

## 2018-09-13 DIAGNOSIS — I50.32 CHRONIC DIASTOLIC CONGESTIVE HEART FAILURE (HCC): ICD-10-CM

## 2018-09-13 DIAGNOSIS — I83.93 VARICOSE VEINS OF BOTH LOWER EXTREMITIES: ICD-10-CM

## 2018-09-13 DIAGNOSIS — I10 ESSENTIAL HYPERTENSION: ICD-10-CM

## 2018-09-13 DIAGNOSIS — R60.0 BILATERAL EDEMA OF LOWER EXTREMITY: Primary | ICD-10-CM

## 2018-09-13 DIAGNOSIS — E66.9 OBESITY (BMI 30-39.9): ICD-10-CM

## 2018-09-13 DIAGNOSIS — E78.2 MIXED HYPERLIPIDEMIA: ICD-10-CM

## 2018-09-13 PROCEDURE — 99214 OFFICE O/P EST MOD 30 MIN: CPT | Performed by: INTERNAL MEDICINE

## 2018-09-13 NOTE — PROGRESS NOTES
CARDIOLOGY OFFICE VISIT  [facility]  [address], [place], [address] [place]  Tel: [phone]      NAME: Jimenez Stokes  AGE: 68 y o  SEX: female  : 1945   MRN: [id number]      Chief Complaint:  Chief Complaint   Patient presents with    Follow-up     4 month f/u         History of Present Illness:   Patient comes for follow up  States she is doing well from cardiac stand point and denies chest pain / pressure, SOB, palpitations, lightheadedness, syncope, swelling feet, orthopnea, PND, claudication  Chronic diastolic CHF -  On  Torsemide 20 mg once daily, potassium, beta-blocker, ACE-inhibitor  SOB at baseline    HTN -  Has been hypertensive for many years  Taking medications regularly  Denies lightheadedness, headache, medication side effects  HLP -  Has had hyperlipidemia for many years  Taking statin regularly along with diet control  Denies myalgia  PCP closely monitoring the blood work  Obesity -  Trying to lose weight      Pharmacological nuclear stress test 2016 [facility] - negative for evidence of ischemia  2-D echocardiogram 2016 [facility] - normal LVEF with no significant valvular pathology    Past Medical History:  Past Medical History:   Diagnosis Date    Asthma     Chest pain on breathing     last assessed 2/5/15    Chronic diastolic [facility]congestive [facility]) [facility] [facility]     [facility]     HTN (hypertension)     Hyperlipidemia     Obesity     Preop cardiovascular exam 2018         Past Surgical History:  Past Surgical History:   Procedure Laterality Date    HAND SURGERY           Family History:  Family History   Problem Relation Age of Onset    Rheum arthritis Mother          Social History:  Social History     Social History    Marital status: /Civil Union     Spouse name: N/A    Number of children: N/A    Years of education: N/A     Social History Main Topics    Smoking status: Former Smoker     Types: Cigarettes    Smokeless tobacco: Never Used      Comment: Quit 20 years ago    Alcohol use No    Drug use: No    Sexual activity: No     Other Topics Concern    None     Social History Narrative    No advance directives on file         Active Problems:  Patient Active Problem List   Diagnosis    Bilateral edema of lower extremity    Chronic diastolic congestive heart failure (HCC)    Mixed hyperlipidemia    Essential hypertension    Ambulatory dysfunction    Asthma    Recurrent major depressive disorder, in partial remission ([facility] [facility] )    Diabetes [facility] II, [facility]    Gastroesophageal reflux disease without esophagitis    Primary insomnia    Moderate persistent asthma    Psoriasis vulgaris    Rheumatoid arthritis (HCC)    Urinary incontinence    Vitamin D deficiency    Varicose veins of both lower extremities    Obesity (BMI 30-39  9)         The following portions of the patient's history were reviewed and updated as appropriate: past medical history, past surgical history, past family history,  past social history, current medications, allergies and problem list       Review of Systems:  Constitutional: Denies fever, chills, fatigue  Eyes: Denies eye redness, eye discharge, double vision  ENT: Denies hearing loss, tinnitus, sneezing, nasal discharge, sore throat   Respiratory: Denies cough, expectoration, hemoptysis, shortness of breath  Cardiovascular: Denies chest pain, palpitations, orthopnea, PND, lower extremity swelling  Gastrointestinal: Denies abdominal pain, nausea, vomiting, hematemesis, diarrhea, bloody stools  Genito-Urinary: Denies dysuria, incontinence  Neurologic: Denies confusion, lightheadedness, syncope, seizures  Endocrine: Denies polyuria, polydipsia, temperature intolerance  Allergy and Immunology: Denies hives, insect bite sensitivity  Hematological and Lymphatic: Denies bleeding problems, swollen glands Psychological: Denies depression, suicidal ideation, anxiety, panic  Dermatological: Denies pruritus, rash, skin lesion changes      Vitals:  Vitals:    09/13/18 1503   BP: 142/78   Pulse: 90   SpO2: 95%       Body mass index is 39 71 kg/m²  Weight (last 2 days)     Date/Time   Weight    09/13/18 1503  81 8 (180 3)                Physical Examination:  General: Patient is not in acute distress  Awake, alert, oriented in time, place and person  Responding to commands  Head: Normocephalic  Atraumatic  Eyes: Both pupils normal sized, round and reactive to light  Nonicteric  ENT: Normal external ear canals  Nares normal, no drainage  Lips and oral mucosa normal  Neck: Supple  JVP not raised  Trachea central  No thyromegaly  Lungs: Bilateral bronchovascular breath sounds with no crackles or rhonchi  Chest wall: No tenderness  Cardiovascular: RRR  S1 and S2 normal  No murmur, rub or gallop  Gastrointestinal: Abdomen soft, nontender  No guarding or rigidity  Liver and spleen not palpable  Bowel sounds present  Neurologic: Patient is awake, alert, oriented in time, place and person  Responding to command  Moving all extremities  Integumentary:  No skin rash  Lymphatic: No cervical lymphadenopathy  Back: Symmetric  No CVA tenderness  Extremities: No clubbing, cyanosis or edema   B/L LE varicose veins+      Laboratory Results:  CBC with diff:   Lab Results   Component Value Date    WBC 13 78 (H) 02/16/2018    WBC 7 94 01/05/2016    RBC 4 12 02/16/2018    RBC 4 39 01/05/2016    HGB 13 2 02/16/2018    HGB 12 9 01/05/2016    HCT 40 7 02/16/2018    HCT 40 9 01/05/2016    MCV 99 (H) 02/16/2018    MCV 93 01/05/2016    MCH 32 0 02/16/2018    MCH 29 4 01/05/2016    RDW 14 0 02/16/2018    RDW 15 4 (H) 01/05/2016     02/16/2018     01/05/2016       CMP:  Lab Results   Component Value Date    CREATININE 0 92 02/16/2018    CREATININE 0 74 01/05/2016    BUN 22 02/16/2018    BUN 18 01/05/2016     (L) 02/16/2018  (L) 01/05/2016    K 4 3 02/16/2018    K 4 5 01/05/2016     02/16/2018     01/05/2016    CO2 25 02/16/2018    CO2 26 01/05/2016    GLUCOSE 159 (H) 01/05/2016    PROT 7 3 01/05/2016    ALKPHOS 106 02/16/2018    ALKPHOS 151 (H) 01/05/2016    ALT 21 02/16/2018    ALT 25 01/05/2016    AST 16 02/16/2018    AST 14 01/05/2016       Lab Results   Component Value Date    HGBA1C 7 8 (H) 02/16/2018    HGBA1C 9 8 (H) 01/05/2016       Lipid Profile:   Lab Results   Component Value Date    CHOL 230 01/05/2016    CHOL 149 09/15/2015     Lab Results   Component Value Date    HDL 72 (H) 02/16/2018    HDL 48 06/09/2016    HDL 59 01/05/2016     Lab Results   Component Value Date    LDLCALC 108 (H) 02/16/2018    LDLCALC 123 (H) 06/09/2016    LDLCALC 116 (H) 01/05/2016     Lab Results   Component Value Date    TRIG 148 02/16/2018    TRIG 130 06/09/2016    TRIG 273 01/05/2016         Medications:    Current Outpatient Prescriptions:     albuterol (2 5 mg/3 mL) 0 083 % nebulizer solution, Inhale 1 each 4 (four) times a day, Disp: , Rfl:     B-D UF III MINI PEN NEEDLES 31G X 5 MM MISC, USE AS DIRECTED, Disp: 100 each, Rfl: 3    Blood Glucose Monitoring Suppl (ONETOUCH VERIO) w/Device KIT, by Does not apply route 3 (three) times a day, Disp: , Rfl:     cyclobenzaprine (FLEXERIL) 5 mg tablet, TAKE 1 TABLETT BY MOUTH EVERY NIGHT AS NEEDED, Disp: , Rfl: 5    DULoxetine (CYMBALTA) 20 mg capsule, Take 20 mg by mouth, Disp: , Rfl:     escitalopram (LEXAPRO) 20 mg tablet, Take 20 mg by mouth daily, Disp: , Rfl: 2    fluticasone furoate-vilanterol (BREO ELLIPTA), Inhale 1 puff daily, Disp: , Rfl:     folic acid (FOLVITE) 1 mg tablet, Take 1 tablet by mouth daily, Disp: , Rfl:     fosinopril (MONOPRIL) 10 mg tablet, TAKE 1 TABLET BY MOUTH EVERY DAY, Disp: 90 tablet, Rfl: 1    insulin glargine (LANTUS SOLOSTAR) 100 units/mL injection pen, Inject 40 Units under the skin daily, Disp: 5 pen, Rfl: 3    metFORMIN (GLUCOPHAGE) 1000 MG tablet, TAKE 1 TABLET TWICE DAILY  , Disp: 180 tablet, Rfl: 3    methotrexate 2 5 mg tablet, Take 10 tablets by mouth once per week, Disp: , Rfl:     metoprolol succinate (TOPROL-XL) 25 mg 24 hr tablet, TAKE 1 TABLET DAILY  , Disp: 90 tablet, Rfl: 1    NYSTATIN powder, APPLY 2-3 TIMES DAILY TO AFFECTED AREA(S) , Disp: , Rfl: 5    ONE TOUCH ULTRA TEST test strip, TEST 3 TIMES A DAY, Disp: 100 each, Rfl: 3    oxyCODONE-acetaminophen (PERCOCET)  mg per tablet, Take by mouth, Disp: , Rfl:     pantoprazole (PROTONIX) 40 mg tablet, Take 1 tablet by mouth daily, Disp: , Rfl:     simvastatin (ZOCOR) 40 mg tablet, TAKE 1 TABLET DAILY  , Disp: 90 tablet, Rfl: 2    Tocilizumab (ACTEMRA) 162 MG/0 9ML SOSY, Inject under the skin, Disp: , Rfl:     torsemide (DEMADEX) 20 mg tablet, Take 20 mg by mouth 2 (two) times a day  , Disp: , Rfl:     albuterol (PROAIR HFA) 90 mcg/act inhaler, Inhale 2 puffs, Disp: , Rfl:     mirtazapine (REMERON) 15 mg tablet, Take 1 tablet (15 mg total) by mouth daily at bedtime (Patient not taking: Reported on 9/13/2018 ), Disp: 90 tablet, Rfl: 1      Allergies: Allergies   Allergen Reactions    Gabapentin     Vancomycin          Assessment and Plan:  1  Bilateral edema of lower extremity  Improved  Recommend tight salt control  Keep legs elevated while in bed  Fabrice stockings during daytime  Cont torsemide OD and when needed increase dose of torsemide to twice daily for few days and then go back to her maintenance dose     2  Chronic diastolic congestive heart failure ([facility] [address] )   patient has dependent edema from dietary indiscretion and varicose veins  Not in acute heart failure  Continue Ace inhibitor, beta-blocker, torsemide  Daily weights  Low-salt diet     3  Essential hypertension   BP stable  Continue current medications  Mild increase from back pains     4  Mixed hyperlipidemia   continue statin and diet control  Her PCP closely monitor the blood work     5   Obesity counseled to try to lose weight    Recommend aggressive risk factor modification and therapeutic lifestyle changes  Low-salt, low-calorie, low-fat, low-cholesterol diet with regular exercise and to optimize weight  I will defer the ordering and monitoring of necessity lab studies to you, but I am available and happy to review and manage any of the data at your request in the future  Discussed concepts of atherosclerosis, including signs and symptoms of cardiac disease  Previous studies were reviewed  Safety measures were reviewed  Questions were entertained and answered  Patient was advised to report any problems requiring medical attention  Follow-up with PCP and appropriate specialist and lab work as discussed  Return for follow up visit as scheduled or earlier, if needed  Thank you for allowing me to participate in the care and evaluation of your patient  Should you have any questions, please feel free to contact me        Edelmira Elizalde MD  7/52/5193,2:80 PM

## 2018-09-16 DIAGNOSIS — F33.41 RECURRENT MAJOR DEPRESSIVE DISORDER, IN PARTIAL REMISSION (HCC): Primary | ICD-10-CM

## 2018-09-17 RX ORDER — ESCITALOPRAM OXALATE 20 MG/1
TABLET ORAL
Qty: 90 TABLET | Refills: 2 | Status: SHIPPED | OUTPATIENT
Start: 2018-09-17 | End: 2019-06-03 | Stop reason: SDUPTHER

## 2018-10-12 RX ORDER — OMEPRAZOLE AND SODIUM BICARBONATE 40; 1100 MG/1; MG/1
CAPSULE ORAL
COMMUNITY
Start: 2018-09-09 | End: 2019-07-03

## 2018-10-12 RX ORDER — AMITRIPTYLINE HYDROCHLORIDE 25 MG/1
25 TABLET, FILM COATED ORAL
COMMUNITY
Start: 2018-09-05 | End: 2019-07-03 | Stop reason: SDUPTHER

## 2018-10-12 RX ORDER — METFORMIN HYDROCHLORIDE 1000 MG/1
TABLET, FILM COATED, EXTENDED RELEASE ORAL
COMMUNITY
Start: 2018-08-24 | End: 2018-10-17

## 2018-10-17 ENCOUNTER — OFFICE VISIT (OUTPATIENT)
Dept: INTERNAL MEDICINE CLINIC | Facility: CLINIC | Age: 73
End: 2018-10-17
Payer: MEDICARE

## 2018-10-17 VITALS
SYSTOLIC BLOOD PRESSURE: 126 MMHG | WEIGHT: 179.8 LBS | OXYGEN SATURATION: 94 % | DIASTOLIC BLOOD PRESSURE: 60 MMHG | HEIGHT: 57 IN | BODY MASS INDEX: 38.79 KG/M2 | HEART RATE: 78 BPM

## 2018-10-17 DIAGNOSIS — J45.40 MODERATE PERSISTENT ASTHMA WITHOUT COMPLICATION: ICD-10-CM

## 2018-10-17 DIAGNOSIS — F33.41 RECURRENT MAJOR DEPRESSIVE DISORDER, IN PARTIAL REMISSION (HCC): ICD-10-CM

## 2018-10-17 DIAGNOSIS — I10 ESSENTIAL HYPERTENSION: ICD-10-CM

## 2018-10-17 DIAGNOSIS — I50.32 CHRONIC DIASTOLIC CONGESTIVE HEART FAILURE (HCC): ICD-10-CM

## 2018-10-17 DIAGNOSIS — E66.9 OBESITY (BMI 30-39.9): ICD-10-CM

## 2018-10-17 DIAGNOSIS — E55.9 VITAMIN D DEFICIENCY: ICD-10-CM

## 2018-10-17 DIAGNOSIS — Z79.4 CONTROLLED TYPE 2 DIABETES MELLITUS WITH DIABETIC POLYNEUROPATHY, WITH LONG-TERM CURRENT USE OF INSULIN (HCC): Primary | ICD-10-CM

## 2018-10-17 DIAGNOSIS — E11.8 TYPE 2 DIABETES MELLITUS WITH COMPLICATION, WITH LONG-TERM CURRENT USE OF INSULIN (HCC): ICD-10-CM

## 2018-10-17 DIAGNOSIS — E78.2 MIXED HYPERLIPIDEMIA: ICD-10-CM

## 2018-10-17 DIAGNOSIS — Z23 NEED FOR INFLUENZA VACCINATION: ICD-10-CM

## 2018-10-17 DIAGNOSIS — Z79.4 TYPE 2 DIABETES MELLITUS WITH COMPLICATION, WITH LONG-TERM CURRENT USE OF INSULIN (HCC): ICD-10-CM

## 2018-10-17 DIAGNOSIS — E11.42 CONTROLLED TYPE 2 DIABETES MELLITUS WITH DIABETIC POLYNEUROPATHY, WITH LONG-TERM CURRENT USE OF INSULIN (HCC): Primary | ICD-10-CM

## 2018-10-17 DIAGNOSIS — K21.9 GASTROESOPHAGEAL REFLUX DISEASE WITHOUT ESOPHAGITIS: ICD-10-CM

## 2018-10-17 DIAGNOSIS — M05.9 RHEUMATOID ARTHRITIS WITH POSITIVE RHEUMATOID FACTOR, INVOLVING UNSPECIFIED SITE (HCC): ICD-10-CM

## 2018-10-17 PROBLEM — M25.529 PAIN IN ELBOW: Status: ACTIVE | Noted: 2017-06-01

## 2018-10-17 PROCEDURE — 99215 OFFICE O/P EST HI 40 MIN: CPT | Performed by: INTERNAL MEDICINE

## 2018-10-17 PROCEDURE — 90662 IIV NO PRSV INCREASED AG IM: CPT | Performed by: INTERNAL MEDICINE

## 2018-10-17 PROCEDURE — G0008 ADMIN INFLUENZA VIRUS VAC: HCPCS | Performed by: INTERNAL MEDICINE

## 2018-10-17 RX ORDER — OXYCODONE HYDROCHLORIDE 15 MG/1
15 TABLET ORAL EVERY 4 HOURS PRN
COMMUNITY
End: 2019-12-10 | Stop reason: HOSPADM

## 2018-10-17 RX ORDER — NYSTATIN 100000 U/G
1 CREAM TOPICAL 2 TIMES DAILY
Refills: 2 | COMMUNITY
Start: 2018-10-04 | End: 2019-07-03 | Stop reason: SDUPTHER

## 2018-11-01 DIAGNOSIS — I10 ESSENTIAL HYPERTENSION: Primary | ICD-10-CM

## 2018-11-01 RX ORDER — TORSEMIDE 20 MG/1
TABLET ORAL
Qty: 360 TABLET | Refills: 3 | Status: SHIPPED | OUTPATIENT
Start: 2018-11-01 | End: 2019-07-03 | Stop reason: SDUPTHER

## 2018-12-17 RX ORDER — NEEDLES, SAFETY 22GX1 1/2"
NEEDLE, DISPOSABLE MISCELLANEOUS
Refills: 3 | COMMUNITY
Start: 2018-12-06 | End: 2019-11-14 | Stop reason: SDUPTHER

## 2018-12-18 ENCOUNTER — OFFICE VISIT (OUTPATIENT)
Dept: INTERNAL MEDICINE CLINIC | Facility: CLINIC | Age: 73
End: 2018-12-18
Payer: MEDICARE

## 2018-12-18 VITALS
OXYGEN SATURATION: 95 % | TEMPERATURE: 98.1 F | BODY MASS INDEX: 40.09 KG/M2 | DIASTOLIC BLOOD PRESSURE: 74 MMHG | WEIGHT: 185.8 LBS | HEIGHT: 57 IN | SYSTOLIC BLOOD PRESSURE: 126 MMHG | HEART RATE: 96 BPM

## 2018-12-18 DIAGNOSIS — J01.21 ACUTE RECURRENT ETHMOIDAL SINUSITIS: Primary | ICD-10-CM

## 2018-12-18 PROCEDURE — 99213 OFFICE O/P EST LOW 20 MIN: CPT | Performed by: INTERNAL MEDICINE

## 2018-12-18 RX ORDER — LORATADINE 10 MG/1
10 TABLET ORAL DAILY
Qty: 15 TABLET | Refills: 0 | Status: SHIPPED | OUTPATIENT
Start: 2018-12-18 | End: 2019-01-03 | Stop reason: SDUPTHER

## 2018-12-18 RX ORDER — FLUTICASONE PROPIONATE 50 MCG
2 SPRAY, SUSPENSION (ML) NASAL DAILY
Qty: 16 G | Refills: 0 | Status: SHIPPED | OUTPATIENT
Start: 2018-12-18 | End: 2019-01-14 | Stop reason: SDUPTHER

## 2018-12-18 RX ORDER — AMOXICILLIN AND CLAVULANATE POTASSIUM 875; 125 MG/1; MG/1
1 TABLET, FILM COATED ORAL EVERY 12 HOURS SCHEDULED
Qty: 20 TABLET | Refills: 0 | Status: SHIPPED | OUTPATIENT
Start: 2018-12-18 | End: 2018-12-28

## 2018-12-18 NOTE — PROGRESS NOTES
INTERNAL MEDICINE FOLLOW-UP OFFICE VISIT  [facility]    NAME: Jammie Lambert  AGE: 68 y o  SEX: female  : 1945   MRN: [id number]    DATE: 2018  TIME: 3:12 PM    Assessment and Plan     Diagnoses and all orders for this visit:    Acute recurrent ethmoidal sinusitis  -     amoxicillin-clavulanate (AUGMENTIN) 875-125 mg per tablet; Take 1 tablet by mouth every 12 (twelve) hours for 10 days  -     fluticasone (FLONASE) 50 mcg/act nasal spray; 2 sprays into each nostril daily  -     loratadine (CLARITIN) 10 mg tablet; Take 1 tablet (10 mg total) by mouth daily    Other orders  -     B-D TB SYRINGE 1CC/27GX1/2" 27G X 1/2" 1 ML MISC; INJECT METHOTREXATE 20 MG (0 8ML) SUBCUTANEOUS ONCE WEEKLY        - Counseling Documentation: patient was counseled regarding: instructions for management, risk factor reductions, prognosis, patient and family education, impressions, risks and benefits of treatment options and importance of compliance with treatment  - Medication Side Effects: Adverse side effects of medications were reviewed with the patient/guardian today  Return to office in: as needed    Chief Complaint     Chief Complaint   Patient presents with    Sinus Problem       History of Present Illness     Sinusitis   This is a new problem  The current episode started in the past 7 days  The problem has been gradually worsening since onset  There has been no fever  Her pain is at a severity of 7/10  The pain is moderate  Associated symptoms include congestion, headaches and sinus pressure  Pertinent negatives include no chills, coughing, diaphoresis, ear pain, neck pain, shortness of breath, sneezing or sore throat  Past treatments include nothing         The following portions of the patient's history were reviewed and updated as appropriate: allergies, current medications, past family history, past medical history, past social history, past surgical history and problem list     Review of Systems     Review of Systems   Constitutional: Positive for fatigue  Negative for chills, diaphoresis and fever  HENT: Positive for congestion and sinus pressure  Negative for ear discharge, ear pain, hearing loss, postnasal drip, rhinorrhea, sinus pain, sneezing, sore throat and voice change  Eyes: Negative for pain, discharge, redness and visual disturbance  Respiratory: Negative for cough, chest tightness, shortness of breath and wheezing  Cardiovascular: Negative for chest pain, palpitations and leg swelling  Gastrointestinal: Negative for abdominal distention, abdominal pain, blood in stool, constipation, diarrhea, nausea and vomiting  Endocrine: Negative for cold intolerance, heat intolerance, polydipsia, polyphagia and polyuria  Genitourinary: Negative for dysuria, flank pain, frequency, hematuria and urgency  Musculoskeletal: Negative for arthralgias, back pain, gait problem, joint swelling, myalgias, neck pain and neck stiffness  Skin: Negative for rash  Neurological: Positive for headaches  Negative for dizziness, tremors, syncope, facial asymmetry, speech difficulty, weakness, light-headedness and numbness  Hematological: Does not bruise/bleed easily  Psychiatric/Behavioral: Negative for behavioral problems, confusion and sleep disturbance  The patient is not nervous/anxious          Active Problem List     Patient Active Problem List   Diagnosis    Bilateral edema of lower extremity    Chronic diastolic congestive heart failure (HCC)    Mixed hyperlipidemia    Essential hypertension    Ambulatory dysfunction    Asthma    [facility]    Type 2 diabetes mellitus with complication, with long-term current use of insulin (HCC)    Gastroesophageal reflux disease without esophagitis    Primary insomnia    Moderate persistent asthma    Psoriasis vulgaris    Rheumatoid arthritis (HCC)    Urinary incontinence    Vitamin D deficiency    Varicose veins of both lower extremities    Obesity (BMI 30-39  9)    Pain in elbow       Objective     /74   Pulse 96   Temp 98 1 °F (36 7 °C)   Ht 4' 8 5" (1 435 m)   Wt 84 3 kg (185 lb 12 8 oz)   SpO2 95%   BMI 40 92 kg/m²     Physical Exam   Constitutional: She is oriented to person, place, and time  She appears well-developed and well-nourished  No distress  HENT:   Head: Normocephalic and atraumatic  Right Ear: External ear normal    Left Ear: External ear normal    Nose: Nose normal    Mouth/Throat: Oropharynx is clear and moist    Sinus pressure and pain in the ethmoidal area bilaterally   Eyes: Conjunctivae and EOM are normal  Right eye exhibits no discharge  Left eye exhibits no discharge  No scleral icterus  Neck: Normal range of motion  Neck supple  No JVD present  No tracheal deviation present  No thyromegaly present  Cardiovascular: Normal rate, regular rhythm, normal heart sounds and intact distal pulses  Exam reveals no gallop and no friction rub  No murmur heard  Pulmonary/Chest: Effort normal and breath sounds normal  No respiratory distress  She has no wheezes  She has no rales  She exhibits no tenderness  Abdominal: Soft  Bowel sounds are normal  She exhibits no distension  There is no tenderness  There is no rebound and no guarding  Musculoskeletal: Normal range of motion  She exhibits no edema or tenderness  Lymphadenopathy:     She has no cervical adenopathy  Neurological: She is alert and oriented to person, place, and time  No cranial nerve deficit  She exhibits normal muscle tone  Coordination normal    Skin: Skin is warm and dry  No rash noted  She is not diaphoretic  No erythema  Psychiatric: She has a normal mood and affect   Judgment normal            Current Medications       Current Outpatient Prescriptions:     albuterol (2 5 mg/3 mL) 0 083 % nebulizer solution, Inhale 1 each 4 (four) times a day, Disp: , Rfl:     albuterol (PROAIR HFA) 90 mcg/act inhaler, Inhale 2 puffs, Disp: , Rfl:     amitriptyline (ELAVIL) 25 mg tablet, Take 25 mg by mouth, Disp: , Rfl:     B-D TB SYRINGE 1CC/27GX1/2" 27G X 1/2" 1 ML MISC, INJECT METHOTREXATE 20 MG (0 8ML) SUBCUTANEOUS ONCE WEEKLY, Disp: , Rfl: 3    B-D UF III MINI PEN NEEDLES 31G X 5 MM MISC, USE AS DIRECTED, Disp: 100 each, Rfl: 3    Blood Glucose Monitoring Suppl (ONETOUCH VERIO) w/Device KIT, by Does not apply route 3 (three) times a day, Disp: , Rfl:     cyclobenzaprine (FLEXERIL) 5 mg tablet, TAKE 1 TABLETT BY MOUTH EVERY NIGHT AS NEEDED, Disp: , Rfl: 5    DULoxetine (CYMBALTA) 20 mg capsule, Take 20 mg by mouth, Disp: , Rfl:     escitalopram (LEXAPRO) 20 mg tablet, TAKE ONE TABLET BY MOUTH ONCE DAILY, Disp: 90 tablet, Rfl: 2    fluticasone furoate-vilanterol (BREO ELLIPTA), Inhale 1 puff daily, Disp: , Rfl:     folic acid (FOLVITE) 1 mg tablet, Take 1 tablet by mouth daily, Disp: , Rfl:     fosinopril (MONOPRIL) 10 mg tablet, TAKE 1 TABLET BY MOUTH EVERY DAY, Disp: 90 tablet, Rfl: 1    insulin glargine (LANTUS SOLOSTAR) 100 units/mL injection pen, Inject 50 Units under the skin daily, Disp: 5 pen, Rfl: 0    metFORMIN (GLUCOPHAGE) 1000 MG tablet, TAKE 1 TABLET TWICE DAILY  , Disp: 180 tablet, Rfl: 3    methotrexate 2 5 mg tablet, Take 10 tablets by mouth once per week, Disp: , Rfl:     metoprolol succinate (TOPROL-XL) 25 mg 24 hr tablet, TAKE 1 TABLET DAILY  , Disp: 90 tablet, Rfl: 1    mirtazapine (REMERON) 15 mg tablet, Take 1 tablet (15 mg total) by mouth daily at bedtime, Disp: 90 tablet, Rfl: 1    nystatin (MYCOSTATIN) cream, Apply 1 application topically 2 (two) times a day To affected area, Disp: , Rfl: 2    NYSTATIN powder, APPLY 2-3 TIMES DAILY TO AFFECTED AREA(S) , Disp: , Rfl: 5    omeprazole-sodium bicarbonate (ZEGERID)  MG per capsule, , Disp: , Rfl:     ONE TOUCH ULTRA TEST test strip, TEST 3 TIMES A DAY, Disp: 100 each, Rfl: 3   oxyCODONE (ROXICODONE) 15 mg immediate release tablet, Take 15 mg by mouth every 4 (four) hours as needed for moderate pain, Disp: , Rfl:     pantoprazole (PROTONIX) 40 mg tablet, Take 1 tablet by mouth daily, Disp: , Rfl:     simvastatin (ZOCOR) 40 mg tablet, TAKE 1 TABLET DAILY  , Disp: 90 tablet, Rfl: 2    Tocilizumab (ACTEMRA) 162 MG/0 9ML SOSY, Inject under the skin, Disp: , Rfl:     torsemide (DEMADEX) 20 mg tablet, TAKE 2 TABLET TWICE DAILY, Disp: 360 tablet, Rfl: 3    amoxicillin-clavulanate (AUGMENTIN) 875-125 mg per tablet, Take 1 tablet by mouth every 12 (twelve) hours for 10 days, Disp: 20 tablet, Rfl: 0    fluticasone (FLONASE) 50 mcg/act nasal spray, 2 sprays into each nostril daily, Disp: 16 g, Rfl: 0    loratadine (CLARITIN) 10 mg tablet, Take 1 tablet (10 mg total) by mouth daily, Disp: 15 tablet, Rfl: 0    Health Maintenance     Health Maintenance   Topic Date Due    Hepatitis C Screening  1945    MAMMOGRAM  1945    DTaP,Tdap,and Td Vaccines (1 - Tdap) 04/16/1966    Pneumococcal PPSV23/PCV13 65+ Years / Low and Medium Risk (2 of 2 - PCV13) 12/20/2017    Diabetic Foot Exam  06/30/2018    HEMOGLOBIN A1C  08/16/2018    Medicare Annual Wellness Visit (AWV)  02/16/2019    URINE MICROALBUMIN  02/16/2019    DM Eye Exam  05/04/2019    Fall Risk  06/11/2019    Urinary Incontinence Screening  06/11/2019    CRC Screening: Colonoscopy  11/05/2020    INFLUENZA VACCINE  Completed     Immunization History   Administered Date(s) Administered    Influenza Split High Dose Preservative Free IM 12/20/2016, 11/10/2017    Influenza TIV (IM) 10/01/2014, 09/01/2015    Influenza, high dose seasonal 0 5 mL 10/17/2018    Pneumococcal Polysaccharide PPV23 04/21/2008, 12/20/2016    Tdap 1945         Tia Gunter MD  [facility]

## 2018-12-26 LAB
LEFT EYE DIABETIC RETINOPATHY: NORMAL
RIGHT EYE DIABETIC RETINOPATHY: NORMAL

## 2019-01-03 DIAGNOSIS — J01.21 ACUTE RECURRENT ETHMOIDAL SINUSITIS: ICD-10-CM

## 2019-01-03 RX ORDER — LORATADINE 10 MG/1
TABLET ORAL
Qty: 15 TABLET | Refills: 0 | Status: SHIPPED | OUTPATIENT
Start: 2019-01-03 | End: 2019-01-21 | Stop reason: SDUPTHER

## 2019-01-11 ENCOUNTER — OFFICE VISIT (OUTPATIENT)
Dept: CARDIOLOGY CLINIC | Facility: CLINIC | Age: 74
End: 2019-01-11
Payer: MEDICARE

## 2019-01-11 VITALS
BODY MASS INDEX: 40.56 KG/M2 | WEIGHT: 188 LBS | DIASTOLIC BLOOD PRESSURE: 84 MMHG | HEIGHT: 57 IN | HEART RATE: 91 BPM | SYSTOLIC BLOOD PRESSURE: 156 MMHG | OXYGEN SATURATION: 96 %

## 2019-01-11 DIAGNOSIS — J06.9 UPPER RESPIRATORY TRACT INFECTION, UNSPECIFIED TYPE: Primary | ICD-10-CM

## 2019-01-11 DIAGNOSIS — E78.2 MIXED HYPERLIPIDEMIA: ICD-10-CM

## 2019-01-11 DIAGNOSIS — I10 ESSENTIAL HYPERTENSION: ICD-10-CM

## 2019-01-11 DIAGNOSIS — I50.32 CHRONIC DIASTOLIC CONGESTIVE HEART FAILURE (HCC): ICD-10-CM

## 2019-01-11 DIAGNOSIS — I83.93 VARICOSE VEINS OF BOTH LOWER EXTREMITIES, UNSPECIFIED WHETHER COMPLICATED: ICD-10-CM

## 2019-01-11 DIAGNOSIS — E66.01 MORBID OBESITY DUE TO EXCESS CALORIES (HCC): ICD-10-CM

## 2019-01-11 DIAGNOSIS — R05.9 COUGH: Primary | ICD-10-CM

## 2019-01-11 DIAGNOSIS — E11.8 TYPE 2 DIABETES MELLITUS WITH COMPLICATION, UNSPECIFIED WHETHER LONG TERM INSULIN USE: ICD-10-CM

## 2019-01-11 PROCEDURE — 99214 OFFICE O/P EST MOD 30 MIN: CPT | Performed by: INTERNAL MEDICINE

## 2019-01-11 RX ORDER — AZITHROMYCIN 250 MG/1
TABLET, FILM COATED ORAL
Qty: 6 TABLET | Refills: 0 | Status: SHIPPED | OUTPATIENT
Start: 2019-01-11 | End: 2019-01-16

## 2019-01-11 RX ORDER — FLUTICASONE FUROATE AND VILANTEROL 100; 25 UG/1; UG/1
1 POWDER RESPIRATORY (INHALATION) DAILY
Qty: 1 INHALER | Refills: 3 | Status: SHIPPED | OUTPATIENT
Start: 2019-01-11 | End: 2019-06-27 | Stop reason: CLARIF

## 2019-01-11 NOTE — PROGRESS NOTES
CARDIOLOGY OFFICE VISIT  [facility]  [facility], [facility], [facility], [facility] [place]  Tel: [phone]      NAME: Rufina Dowell  AGE: 68 y o  SEX: female  : 1945   MRN: [id number]      Chief Complaint:  Chief Complaint   Patient presents with    Follow-up     3 month CHF/HTN         History of Present Illness:   Patient comes for follow up  States she has been coughing a lot for the past couple of week  Yellowish expectoration present  Denies fever or chest pain  Also denies chest pain / pressure, SOB, palpitations, lightheadedness, syncope, swelling feet, orthopnea, PND, claudication  Chronic diastolic CHF -  On  Torsemide 20 mg once daily, potassium, beta-blocker, ACE-inhibitor  SOB at baseline    HTN -  Has been hypertensive for many years  Taking medications regularly  Denies lightheadedness, headache, medication side effects  HLP -  Has had hyperlipidemia for many years  Taking statin regularly along with diet control  Denies myalgia  PCP closely monitoring the blood work  Morbid Obesity - states she is unable to lose weight      Pharmacological nuclear stress test 2016 [facility] - negative for evidence of ischemia  2-D echocardiogram 2016 [facility] - normal LVEF with no significant valvular pathology    Past Medical History:  Past Medical History:   Diagnosis Date    Asthma     Chest pain on breathing     last assessed 2/5/15    [facility] diastolic [facility]congestive [facility]) [facility]     Diabetes mellitus [facility]     HTN (hypertension)     Hyperlipidemia     Obesity     Preop cardiovascular exam 2018         Past Surgical History:  Past Surgical History:   Procedure Laterality Date    HAND SURGERY           Family History:  Family History   Problem Relation Age of Onset    Rheum arthritis Mother          Social History:  Social History     Social History    Marital status: /Civil Union Spouse name: N/A    Number of children: N/A    Years of education: N/A     Social History Main Topics    Smoking status: Former Smoker     Types: Cigarettes    Smokeless tobacco: Never Used      Comment: Quit 20 years ago    Alcohol use No    Drug use: No    Sexual activity: No     Other Topics Concern    Not on file     Social History Narrative    No advance directives on file         Active Problems:  Patient Active Problem List   Diagnosis    Bilateral edema of lower extremity    Chronic diastolic congestive heart failure ([facility]    Mixed hyperlipidemia    Essential hypertension    Ambulatory dysfunction    Asthma    Recurrent major depressive disorder, in partial remission ([facility] )    Type 2 diabetes mellitus with complication, with long-term current use of insulin ([facility] )    Gastroesophageal reflux disease without esophagitis    Primary insomnia    Moderate persistent asthma    Psoriasis vulgaris    Rheumatoid arthritis (HCC)    Urinary incontinence    Vitamin D deficiency    Varicose veins of both lower extremities    Obesity (BMI 30-39  9)    Pain in elbow         The following portions of the patient's history were reviewed and updated as appropriate: past medical history, past surgical history, past family history,  past social history, current medications, allergies and problem list       Review of Systems:  Constitutional: Denies fever, chills, fatigue  Eyes: Denies eye redness, eye discharge, double vision  ENT: Denies hearing loss, tinnitus, sneezing, nasal discharge, sore throat   Respiratory: Denies cough, expectoration, hemoptysis, shortness of breath  Cardiovascular: Denies chest pain, palpitations, orthopnea, PND, lower extremity swelling  Gastrointestinal: Denies abdominal pain, nausea, vomiting, hematemesis, diarrhea, bloody stools  Genito-Urinary: Denies dysuria, incontinence  Neurologic: Denies confusion, lightheadedness, syncope, seizures  Endocrine: Denies polyuria, polydipsia, temperature intolerance  Allergy and Immunology: Denies hives, insect bite sensitivity  Hematological and Lymphatic: Denies bleeding problems, swollen glands   Psychological: Denies depression, suicidal ideation, anxiety, panic  Dermatological: Denies pruritus, rash, skin lesion changes      Vitals:  Vitals:    01/11/19 1342   BP: 156/84   Pulse: 91   SpO2: 96%       Body mass index is 41 41 kg/m²  Weight (last 2 days)     Date/Time   Weight    01/11/19 1342  85 3 (188)                Physical Examination:  General: Patient is not in acute distress  Awake, alert, oriented in time, place and person  Responding to commands  Head: Normocephalic  Atraumatic  Eyes: Both pupils normal sized, round and reactive to light  Nonicteric  ENT: Normal external ear canals  Nares normal, no drainage  Lips and oral mucosa normal  Neck: Supple  JVP not raised  Trachea central  No thyromegaly  Lungs: Bilateral bronchovascular breath sounds with no crackles or rhonchi  Chest wall: No tenderness  Cardiovascular: RRR  S1 and S2 normal  No murmur, rub or gallop  Gastrointestinal: Abdomen soft, nontender  No guarding or rigidity  Liver and spleen not palpable  Bowel sounds present  Neurologic: Patient is awake, alert, oriented in time, place and person  Responding to command  Moving all extremities  Integumentary:  No skin rash  Lymphatic: No cervical lymphadenopathy  Back: Symmetric  No CVA tenderness  Extremities: No clubbing, cyanosis or edema   B/L LE varicose veins+      Laboratory Results:  CBC with diff:   Lab Results   Component Value Date    WBC 13 78 (H) 02/16/2018    WBC 7 94 01/05/2016    RBC 4 12 02/16/2018    RBC 4 39 01/05/2016    HGB 13 2 02/16/2018    HGB 12 9 01/05/2016    HCT 40 7 02/16/2018    HCT 40 9 01/05/2016    MCV 99 (H) 02/16/2018    MCV 93 01/05/2016    MCH 32 0 02/16/2018    MCH 29 4 01/05/2016    RDW 14 0 02/16/2018    RDW 15 4 (H) 01/05/2016     02/16/2018     01/05/2016 CMP:  Lab Results   Component Value Date    CREATININE 0 92 02/16/2018    CREATININE 0 74 01/05/2016    BUN 22 02/16/2018    BUN 18 01/05/2016     (L) 01/05/2016    K 4 3 02/16/2018    K 4 5 01/05/2016     02/16/2018     01/05/2016    CO2 25 02/16/2018    CO2 26 01/05/2016    GLUCOSE 159 (H) 01/05/2016    PROT 7 3 01/05/2016    ALKPHOS 106 02/16/2018    ALKPHOS 151 (H) 01/05/2016    ALT 21 02/16/2018    ALT 25 01/05/2016    AST 16 02/16/2018    AST 14 01/05/2016       Lab Results   Component Value Date    HGBA1C 7 8 (H) 02/16/2018    HGBA1C 9 8 (H) 01/05/2016       Lipid Profile:   Lab Results   Component Value Date    CHOL 230 01/05/2016    CHOL 149 09/15/2015     Lab Results   Component Value Date    HDL 72 (H) 02/16/2018    HDL 48 06/09/2016    HDL 59 01/05/2016     Lab Results   Component Value Date    LDLCALC 108 (H) 02/16/2018    LDLCALC 123 (H) 06/09/2016    LDLCALC 116 (H) 01/05/2016     Lab Results   Component Value Date    TRIG 148 02/16/2018    TRIG 130 06/09/2016    TRIG 273 01/05/2016         Medications:    Current Outpatient Prescriptions:     albuterol (2 5 mg/3 mL) 0 083 % nebulizer solution, Inhale 1 each 4 (four) times a day, Disp: , Rfl:     albuterol (PROAIR HFA) 90 mcg/act inhaler, Inhale 2 puffs, Disp: , Rfl:     amitriptyline (ELAVIL) 25 mg tablet, Take 25 mg by mouth, Disp: , Rfl:     B-D TB SYRINGE 1CC/27GX1/2" 27G X 1/2" 1 ML MISC, INJECT METHOTREXATE 20 MG (0 8ML) SUBCUTANEOUS ONCE WEEKLY, Disp: , Rfl: 3    B-D UF III MINI PEN NEEDLES 31G X 5 MM MISC, USE AS DIRECTED, Disp: 100 each, Rfl: 3    Blood Glucose Monitoring Suppl (ONETOUCH VERIO) w/Device KIT, by Does not apply route 3 (three) times a day, Disp: , Rfl:     cyclobenzaprine (FLEXERIL) 5 mg tablet, TAKE 1 TABLETT BY MOUTH EVERY NIGHT AS NEEDED, Disp: , Rfl: 5    escitalopram (LEXAPRO) 20 mg tablet, TAKE ONE TABLET BY MOUTH ONCE DAILY, Disp: 90 tablet, Rfl: 2    fluticasone (FLONASE) 50 mcg/act nasal spray, 2 sprays into each nostril daily, Disp: 16 g, Rfl: 0    fluticasone furoate-vilanterol (BREO ELLIPTA), Inhale 1 puff daily, Disp: , Rfl:     folic acid (FOLVITE) 1 mg tablet, Take 1 tablet by mouth daily, Disp: , Rfl:     fosinopril (MONOPRIL) 10 mg tablet, TAKE 1 TABLET BY MOUTH EVERY DAY, Disp: 90 tablet, Rfl: 1    insulin glargine (LANTUS SOLOSTAR) 100 units/mL injection pen, Inject 50 Units under the skin daily, Disp: 5 pen, Rfl: 0    loratadine (CLARITIN) 10 mg tablet, TAKE 1 TABLET BY MOUTH EVERY DAY, Disp: 15 tablet, Rfl: 0    metFORMIN (GLUCOPHAGE) 1000 MG tablet, TAKE 1 TABLET TWICE DAILY  , Disp: 180 tablet, Rfl: 3    methotrexate 2 5 mg tablet, Take 10 tablets by mouth once per week, Disp: , Rfl:     metoprolol succinate (TOPROL-XL) 25 mg 24 hr tablet, TAKE 1 TABLET DAILY  , Disp: 90 tablet, Rfl: 1    mirtazapine (REMERON) 15 mg tablet, Take 1 tablet (15 mg total) by mouth daily at bedtime, Disp: 90 tablet, Rfl: 1    nystatin (MYCOSTATIN) cream, Apply 1 application topically 2 (two) times a day To affected area, Disp: , Rfl: 2    NYSTATIN powder, APPLY 2-3 TIMES DAILY TO AFFECTED AREA(S) , Disp: , Rfl: 5    omeprazole-sodium bicarbonate (ZEGERID)  MG per capsule, , Disp: , Rfl:     ONE TOUCH ULTRA TEST test strip, TEST 3 TIMES A DAY, Disp: 100 each, Rfl: 3    oxyCODONE (ROXICODONE) 15 mg immediate release tablet, Take 15 mg by mouth every 4 (four) hours as needed for moderate pain, Disp: , Rfl:     pantoprazole (PROTONIX) 40 mg tablet, Take 1 tablet by mouth daily, Disp: , Rfl:     simvastatin (ZOCOR) 40 mg tablet, TAKE 1 TABLET DAILY  , Disp: 90 tablet, Rfl: 2    Tocilizumab (ACTEMRA) 162 MG/0 9ML SOSY, Inject under the skin, Disp: , Rfl:     torsemide (DEMADEX) 20 mg tablet, TAKE 2 TABLET TWICE DAILY, Disp: 360 tablet, Rfl: 3    DULoxetine (CYMBALTA) 20 mg capsule, Take 20 mg by mouth, Disp: , Rfl:       Allergies:   Allergies   Allergen Reactions    Gabapentin     Vancomycin          Assessment and Plan:  1  Bilateral edema of lower extremity  Improved  Recommend tight salt control  Keep legs elevated while in bed  Fabrice stockings during daytime  Cont torsemide OD and when needed increase dose of torsemide to twice daily for few days and then go back to her maintenance dose     2  Chronic diastolic congestive heart failure ([facility] )   patient has dependent edema from dietary indiscretion and varicose veins  Not in acute heart failure  Continue Ace inhibitor, beta-blocker, torsemide  Daily weights  Low-salt diet     3  Essential hypertension   BP stable  Continue current medications  Mild increase from back pains     4  Mixed hyperlipidemia   continue statin and diet control  Her PCP closely monitor the blood work     5  Obesity   counseled to try to lose weight    6  Cough  ABx and inhalers    Recommend aggressive risk factor modification and therapeutic lifestyle changes  Low-salt, low-calorie, low-fat, low-cholesterol diet with regular exercise and to optimize weight  I will defer the ordering and monitoring of necessity lab studies to you, but I am available and happy to review and manage any of the data at your request in the future  Discussed concepts of atherosclerosis, including signs and symptoms of cardiac disease  Previous studies were reviewed  Safety measures were reviewed  Questions were entertained and answered  Patient was advised to report any problems requiring medical attention  Follow-up with PCP and appropriate specialist and lab work as discussed  Return for follow up visit as scheduled or earlier, if needed  Thank you for allowing me to participate in the care and evaluation of your patient  Should you have any questions, please feel free to contact me        Jess Lea MD  1/11/2019,2:45 PM

## 2019-01-14 DIAGNOSIS — J01.21 ACUTE RECURRENT ETHMOIDAL SINUSITIS: ICD-10-CM

## 2019-01-14 RX ORDER — FLUTICASONE PROPIONATE 50 MCG
SPRAY, SUSPENSION (ML) NASAL
Qty: 16 G | Refills: 3 | Status: SHIPPED | OUTPATIENT
Start: 2019-01-14 | End: 2019-05-15 | Stop reason: SDUPTHER

## 2019-01-18 DIAGNOSIS — F51.01 PRIMARY INSOMNIA: ICD-10-CM

## 2019-01-18 RX ORDER — MIRTAZAPINE 15 MG/1
TABLET, FILM COATED ORAL
Qty: 90 TABLET | Refills: 1 | Status: SHIPPED | OUTPATIENT
Start: 2019-01-18 | End: 2019-07-03 | Stop reason: SDUPTHER

## 2019-01-21 DIAGNOSIS — J01.21 ACUTE RECURRENT ETHMOIDAL SINUSITIS: ICD-10-CM

## 2019-01-22 RX ORDER — LORATADINE 10 MG/1
TABLET ORAL
Qty: 15 TABLET | Refills: 0 | Status: SHIPPED | OUTPATIENT
Start: 2019-01-22 | End: 2019-02-09 | Stop reason: SDUPTHER

## 2019-02-06 DIAGNOSIS — I10 ESSENTIAL HYPERTENSION: ICD-10-CM

## 2019-02-06 RX ORDER — FOSINOPRIL SODIUM 10 MG/1
TABLET ORAL
Qty: 90 TABLET | Refills: 1 | Status: SHIPPED | OUTPATIENT
Start: 2019-02-06 | End: 2019-07-03 | Stop reason: SDUPTHER

## 2019-02-09 DIAGNOSIS — J01.21 ACUTE RECURRENT ETHMOIDAL SINUSITIS: ICD-10-CM

## 2019-02-11 RX ORDER — LORATADINE 10 MG/1
TABLET ORAL
Qty: 15 TABLET | Refills: 0 | Status: SHIPPED | OUTPATIENT
Start: 2019-02-11 | End: 2019-03-03 | Stop reason: SDUPTHER

## 2019-03-03 DIAGNOSIS — J01.21 ACUTE RECURRENT ETHMOIDAL SINUSITIS: ICD-10-CM

## 2019-03-04 RX ORDER — LORATADINE 10 MG/1
TABLET ORAL
Qty: 15 TABLET | Refills: 0 | Status: SHIPPED | OUTPATIENT
Start: 2019-03-04 | End: 2019-03-18 | Stop reason: SDUPTHER

## 2019-03-18 DIAGNOSIS — J01.21 ACUTE RECURRENT ETHMOIDAL SINUSITIS: ICD-10-CM

## 2019-03-19 RX ORDER — LORATADINE 10 MG/1
TABLET ORAL
Qty: 15 TABLET | Refills: 0 | Status: SHIPPED | OUTPATIENT
Start: 2019-03-19 | End: 2019-04-06 | Stop reason: SDUPTHER

## 2019-03-21 LAB — HBA1C MFR BLD HPLC: 13 %

## 2019-03-25 ENCOUNTER — TELEPHONE (OUTPATIENT)
Dept: INTERNAL MEDICINE CLINIC | Facility: CLINIC | Age: 74
End: 2019-03-25

## 2019-03-25 NOTE — TELEPHONE ENCOUNTER
----- Message from Ayan Lewis MD sent at 3/25/2019  8:30 AM EDT -----  Blood sugar is extremely high    Please make an appointment right away

## 2019-03-26 ENCOUNTER — OFFICE VISIT (OUTPATIENT)
Dept: INTERNAL MEDICINE CLINIC | Facility: CLINIC | Age: 74
End: 2019-03-26
Payer: MEDICARE

## 2019-03-26 VITALS
WEIGHT: 186.8 LBS | BODY MASS INDEX: 40.3 KG/M2 | OXYGEN SATURATION: 95 % | DIASTOLIC BLOOD PRESSURE: 76 MMHG | SYSTOLIC BLOOD PRESSURE: 110 MMHG | HEIGHT: 57 IN | HEART RATE: 90 BPM

## 2019-03-26 DIAGNOSIS — Z12.39 SCREENING FOR BREAST CANCER: ICD-10-CM

## 2019-03-26 DIAGNOSIS — M05.9 RHEUMATOID ARTHRITIS WITH POSITIVE RHEUMATOID FACTOR, INVOLVING UNSPECIFIED SITE (HCC): ICD-10-CM

## 2019-03-26 DIAGNOSIS — J84.9 INTERSTITIAL LUNG DISEASE (HCC): ICD-10-CM

## 2019-03-26 DIAGNOSIS — E11.8 TYPE 2 DIABETES MELLITUS WITH COMPLICATION, WITH LONG-TERM CURRENT USE OF INSULIN (HCC): Primary | ICD-10-CM

## 2019-03-26 DIAGNOSIS — Z79.4 TYPE 2 DIABETES MELLITUS WITH COMPLICATION, WITH LONG-TERM CURRENT USE OF INSULIN (HCC): Primary | ICD-10-CM

## 2019-03-26 DIAGNOSIS — I10 ESSENTIAL HYPERTENSION: ICD-10-CM

## 2019-03-26 PROBLEM — D84.9 IMMUNOSUPPRESSED STATUS (HCC): Status: ACTIVE | Noted: 2019-03-26

## 2019-03-26 PROCEDURE — 99214 OFFICE O/P EST MOD 30 MIN: CPT | Performed by: INTERNAL MEDICINE

## 2019-03-26 RX ORDER — PREDNISONE 1 MG/1
5 TABLET ORAL DAILY
Status: ON HOLD | COMMUNITY
End: 2019-09-30 | Stop reason: SDUPTHER

## 2019-03-26 NOTE — PROGRESS NOTES
INTERNAL MEDICINE OFFICE VISIT  [facility], [address] [place] [address]  Tel: [phone]      NAME: Jazmín Sanders  AGE: 68 y o  SEX: female  : 1945   MRN: [id number]    DATE: 3/26/2019  TIME: 12:46 PM      Assessment and Plan:  1  Type 2 diabetes mellitus with complication, with long-term current use of insulin (HCC)  Extremely poorly controlled as she does not watch her diet at all  She is also taking prednisone 5 mg daily for the rheumatoid arthritis flare up as per her rheumatologist   She was told to cut down on the carbohydrates in her diet but she is very resistant and only eats egg sandwich with cheese and quezada twice a day  She was told to increase the dose of the Lantus to 60 units daily  I will recheck labs in 3 months     - CBC and differential; Future  - Comprehensive metabolic panel; Future  - Lipid panel; Future  - TSH, 3rd generation; Future  - Hemoglobin A1C; Future  - Microalbumin / creatinine urine ratio; Future    2  Screening for breast cancer    - Mammo screening bilateral w 3d & cad; Future    3  Interstitial lung disease ([facility] [address] )  She was recently diagnosed with interstitial lung disease and was told to follow up with pulmonology  - Ambulatory referral to Pulmonology; Future    4  Rheumatoid arthritis  She has been following up with Rheumatology and was recently given prednisone 5 mg daily for the flareup  5  Hypertension  Blood pressure stable on present medication       - Counseling Documentation: patient was counseled regarding: diagnostic results, instructions for management, risk factor reductions, prognosis, patient and family education, impressions, risks and benefits of treatment options and importance of compliance with treatment  - Medication Side Effects: Adverse side effects of medications were reviewed with the patient/guardian today        Return for follow up visit in 3 months or earlier, if needed  Chief Complaint:  Chief Complaint   Patient presents with    Follow-up     Review labs         History of Present Illness:   Type 2 diabetes-very poorly controlled with hemoglobin A1c of 13  She is very noncompliant with diet and medications  Interstitial lung disease-she was recently told that she has interstitial lung disease and needs to see the pulmonologist   Rheumatoid arthritis-she has been taking the Actemra and was recently told to take prednisone 5 mg daily for a flare up  She follows up with Rheumatology regularly  Hypertension-blood pressure stable on present medications  Active Problem List:  Patient Active Problem List   Diagnosis    Bilateral edema of lower extremity    Chronic diastolic congestive heart failure (HCC)    Mixed hyperlipidemia    Essential hypertension    Ambulatory dysfunction    Asthma    Recurrent major depressive disorder, in partial remission ([address] )    Type 2 diabetes mellitus with complication, with long-term current use of insulin (HCC)    Gastroesophageal reflux disease without esophagitis    Primary insomnia    Moderate persistent asthma    Psoriasis vulgaris    Rheumatoid arthritis (HCC)    Urinary incontinence    Vitamin D deficiency    Varicose veins of both lower extremities    Obesity (BMI 30-39  9)    Pain in elbow    Immunosuppressed status ([address] )    Interstitial lung disease ([address] )         Past Medical History:  Past Medical History:   Diagnosis Date    Asthma     Chest pain on breathing     last assessed 2/5/15    Chronic diastolic CHF (congestive heart failure) (HCC)     Diabetes mellitus ([address] )     HTN (hypertension)     Hyperlipidemia     Obesity     Preop cardiovascular exam 2/2/2018         Past Surgical History:  Past Surgical History:   Procedure Laterality Date    HAND SURGERY           Family History:  Family History   Problem Relation Age of Onset    Rheum arthritis Mother          Social History:  Social History     Socioeconomic History    Marital status: /Civil Union     Spouse name: None    Number of children: None    Years of education: None    Highest education level: None   Occupational History    None   Social Needs    Financial resource strain: None    Food insecurity:     Worry: None     Inability: None    Transportation needs:     Medical: None     Non-medical: None   Tobacco Use    Smoking status: Former Smoker     Types: Cigarettes    Smokeless tobacco: Never Used    Tobacco comment: Quit 20 years ago   Substance and Sexual Activity    Alcohol use: No    Drug use: No    Sexual activity: Never   Lifestyle    Physical activity:     Days per week: None     Minutes per session: None    Stress: None   Relationships    Social connections:     Talks on phone: None     Gets together: None     Attends Islam service: None     Active member of club or organization: None     Attends meetings of clubs or organizations: None     Relationship status: None    Intimate partner violence:     Fear of current or ex partner: None     Emotionally abused: None     Physically abused: None     Forced sexual activity: None   Other Topics Concern    None   Social History Narrative    No advance directives on file         Allergies:   Allergies   Allergen Reactions    Gabapentin     Vancomycin          Medications:    Current Outpatient Medications:     albuterol (2 5 mg/3 mL) 0 083 % nebulizer solution, Inhale 1 each 4 (four) times a day, Disp: , Rfl:     albuterol (PROAIR HFA) 90 mcg/act inhaler, Inhale 2 puffs, Disp: , Rfl:     amitriptyline (ELAVIL) 25 mg tablet, Take 25 mg by mouth, Disp: , Rfl:     B-D TB SYRINGE 1CC/27GX1/2" 27G X 1/2" 1 ML MISC, INJECT METHOTREXATE 20 MG (0 8ML) SUBCUTANEOUS ONCE WEEKLY, Disp: , Rfl: 3    B-D UF III MINI PEN NEEDLES 31G X 5 MM MISC, USE AS DIRECTED, Disp: 100 each, Rfl: 3    Blood Glucose Monitoring Suppl (ONETOUCH VERIO) w/Device KIT, by Does not apply route 3 (three) times a day, Disp: , Rfl:     cyclobenzaprine (FLEXERIL) 5 mg tablet, TAKE 1 TABLETT BY MOUTH EVERY NIGHT AS NEEDED, Disp: , Rfl: 5    DULoxetine (CYMBALTA) 20 mg capsule, Take 20 mg by mouth, Disp: , Rfl:     escitalopram (LEXAPRO) 20 mg tablet, TAKE ONE TABLET BY MOUTH ONCE DAILY, Disp: 90 tablet, Rfl: 2    fluticasone (FLONASE) 50 mcg/act nasal spray, SPRAY 2 SPRAYS INTO EACH NOSTRIL EVERY DAY, Disp: 16 g, Rfl: 3    fluticasone-vilanterol (BREO ELLIPTA) 100-25 mcg/inh inhaler, Inhale 1 puff daily, Disp: 1 Inhaler, Rfl: 3    fosinopril (MONOPRIL) 10 mg tablet, TAKE 1 TABLET BY MOUTH EVERY DAY, Disp: 90 tablet, Rfl: 1    insulin glargine (LANTUS SOLOSTAR) 100 units/mL injection pen, Inject 50 Units under the skin daily (Patient taking differently: Inject 60 Units under the skin daily ), Disp: 5 pen, Rfl: 0    loratadine (CLARITIN) 10 mg tablet, TAKE 1 TABLET BY MOUTH EVERY DAY, Disp: 15 tablet, Rfl: 0    metFORMIN (GLUCOPHAGE) 1000 MG tablet, TAKE 1 TABLET TWICE DAILY  , Disp: 180 tablet, Rfl: 3    metoprolol succinate (TOPROL-XL) 25 mg 24 hr tablet, TAKE 1 TABLET DAILY  , Disp: 90 tablet, Rfl: 1    mirtazapine (REMERON) 15 mg tablet, TAKE 1 TABLET BY MOUTH EVERYDAY AT BEDTIME, Disp: 90 tablet, Rfl: 1    nystatin (MYCOSTATIN) cream, Apply 1 application topically 2 (two) times a day To affected area, Disp: , Rfl: 2    NYSTATIN powder, APPLY 2-3 TIMES DAILY TO AFFECTED AREA(S) , Disp: , Rfl: 5    omeprazole-sodium bicarbonate (ZEGERID)  MG per capsule, , Disp: , Rfl:     ONE TOUCH ULTRA TEST test strip, TEST 3 TIMES A DAY, Disp: 100 each, Rfl: 3    oxyCODONE (ROXICODONE) 15 mg immediate release tablet, Take 15 mg by mouth every 4 (four) hours as needed for moderate pain, Disp: , Rfl:     pantoprazole (PROTONIX) 40 mg tablet, Take 1 tablet by mouth daily, Disp: , Rfl:     predniSONE 5 mg tablet, Take 5 mg by mouth daily, Disp: , Rfl:     simvastatin (ZOCOR) 40 mg tablet, TAKE 1 TABLET DAILY  , Disp: 90 tablet, Rfl: 2    Tocilizumab (ACTEMRA) 162 MG/0 9ML SOSY, Inject under the skin, Disp: , Rfl:     torsemide (DEMADEX) 20 mg tablet, TAKE 2 TABLET TWICE DAILY, Disp: 360 tablet, Rfl: 3      The following portions of the patient's history were reviewed and updated as appropriate: past medical history, past surgical history, family history, social history, allergies, current medications and active problem list       Review of Systems:  Constitutional: Denies fever, chills, weight gain, weight loss, fatigue  Eyes: Denies eye redness, eye discharge, double vision, change in visual acuity  ENT: Denies hearing loss, tinnitus, sneezing, nasal congestion, nasal discharge, sore throat   Respiratory: Denies cough, expectoration, hemoptysis, shortness of breath, wheezing  Cardiovascular: Denies chest pain, palpitations, lower extremity swelling, orthopnea, PND  Gastrointestinal: Denies abdominal pain, heartburn, nausea, vomiting, hematemesis, diarrhea, bloody stools  Genito-Urinary: Denies dysuria, frequency, difficulty in micturition, nocturia, incontinence  Musculoskeletal: Denies back pain, joint pain, muscle pain  Neurologic: Denies confusion, lightheadedness, syncope, headache, focal weakness, sensory changes, seizures  Endocrine: Denies polyuria, polydipsia, temperature intolerance  Allergy and Immunology: Denies hives, insect bite sensitivity  Hematological and Lymphatic: Denies bleeding problems, swollen glands   Psychological: Denies depression, suicidal ideation, anxiety, panic, mood swings  Dermatological: Denies pruritus, rash, skin lesion changes      Vitals:  Vitals:    03/26/19 1052   BP: 110/76   Pulse: 90   SpO2: 95%       Body mass index is 41 14 kg/m²  Weight (last 2 days)     Date/Time   Weight    03/26/19 1052   84 7 (186 8)                Physical Examination:  General: Patient is not in acute distress  Awake, alert, responding to commands   No weight gain or loss  Head: Normocephalic  Atraumatic  Eyes: Conjunctiva and lids with no swelling, erythema or discharge  Both pupils normal sized, round and reactive to light  Sclera nonicteric  ENT: External examination of nose and ear normal  Otoscopic examination shows translucent tympanic membranes with patent canals without erythema  Oropharynx moist with no erythema, edema, exudate or lesions  Neck: Supple  JVP not raised  Trachea midline  No masses  No thyromegaly  Lungs: No signs of increased work of breathing or respiratory distress  Bilateral bronchovascular breath sounds with no crackles or rhonchi  Chest wall: No tenderness  Cardiovascular: Normal PMI  No thrills  Regular rate and rhythm  S1 and S2 normal  No murmur, rub or gallop  Gastrointestinal: Abdomen soft, nontender  No guarding or rigidity  Liver and spleen not palpable  Bowel sounds present  Neurologic: Cranial nerves II-XII intact  Cortical functions normal  Motor system - Reflexes 2+ and symmetrical  Sensations normal  Musculoskeletal: Gait normal  No joint tenderness  Integumentary: Skin normal with no rash or lesions  Lymphatic: No palpable lymph nodes in neck, axilla or groin  Extremities: No clubbing, cyanosis, edema or varicosities  Psychological: Judgement and insight normal  Mood and affect normal  Patient's shoes and socks removed  Right Foot/Ankle   Right Foot Inspection  Skin Exam: skin normal and skin intact no dry skin, no warmth, no callus, no erythema, no maceration, no abnormal color, no pre-ulcer, no ulcer and no callus                          Toe Exam: ROM and strength within normal limits  Sensory     Proprioception: diminished and intact   Monofilament testing: intact  Vascular  Capillary refills: < 3 seconds  The right DP pulse is 2+  The right PT pulse is 2+       Left Foot/Ankle  Left Foot Inspection  Skin Exam: skin normal and skin intactno dry skin, no warmth, no erythema, no maceration, normal color, no pre-ulcer, no ulcer and no callus Toe Exam: ROM and strength within normal limits                   Sensory     Proprioception: intact  Monofilament: intact  Vascular  Capillary refills: < 3 seconds  The left DP pulse is 2+  The left PT pulse is 2+  Assign Risk Category:  No deformity present; No loss of protective sensation; No weak pulses       Risk: 0        Laboratory Results:  CBC with diff:   Lab Results   Component Value Date    WBC 13 78 (H) 02/16/2018    WBC 7 94 01/05/2016    RBC 4 12 02/16/2018    RBC 4 39 01/05/2016    HGB 13 2 02/16/2018    HGB 12 9 01/05/2016    HCT 40 7 02/16/2018    HCT 40 9 01/05/2016    MCV 99 (H) 02/16/2018    MCV 93 01/05/2016    MCH 32 0 02/16/2018    MCH 29 4 01/05/2016    RDW 14 0 02/16/2018    RDW 15 4 (H) 01/05/2016     02/16/2018     01/05/2016       CMP:  Lab Results   Component Value Date    CREATININE 0 92 02/16/2018    CREATININE 0 74 01/05/2016    BUN 22 02/16/2018    BUN 18 01/05/2016     (L) 01/05/2016    K 4 3 02/16/2018    K 4 5 01/05/2016     02/16/2018     01/05/2016    CO2 25 02/16/2018    CO2 26 01/05/2016    GLUCOSE 159 (H) 01/05/2016    PROT 7 3 01/05/2016    ALKPHOS 106 02/16/2018    ALKPHOS 151 (H) 01/05/2016    ALT 21 02/16/2018    ALT 25 01/05/2016    AST 16 02/16/2018    AST 14 01/05/2016       Lab Results   Component Value Date    HGBA1C 13 0 03/21/2019    HGBA1C 7 8 (H) 02/16/2018    HGBA1C 9 8 (H) 01/05/2016       No results found for: TROPONINI, CKMB, CKTOTAL    Lipid Profile:   Lab Results   Component Value Date    CHOL 230 01/05/2016    CHOL 149 09/15/2015     Lab Results   Component Value Date    HDL 72 (H) 02/16/2018    HDL 48 06/09/2016     Lab Results   Component Value Date    LDLCALC 108 (H) 02/16/2018    LDLCALC 123 (H) 06/09/2016     Lab Results   Component Value Date    TRIG 148 02/16/2018    TRIG 130 06/09/2016       Imaging Results:  No image results found         Health Maintenance:  Health Maintenance   Topic Date Due    Hepatitis C Screening  1945    MAMMOGRAM  1945    BMI: Followup Plan  04/16/1963    HEPATITIS B VACCINES (1 of 3 - Risk 3-dose series) 04/16/1964    DTaP,Tdap,and Td Vaccines (1 - Tdap) 04/16/1966    Pneumococcal PPSV23/PCV13 65+ Years / Low and Medium Risk (2 of 2 - PCV13) 12/20/2017    Diabetic Foot Exam  06/30/2018    Medicare Annual Wellness Visit (AWV)  02/16/2019    URINE MICROALBUMIN  02/16/2019    Fall Risk  06/11/2019    Urinary Incontinence Screening  06/11/2019    HEMOGLOBIN A1C  09/21/2019    DM Eye Exam  12/26/2019    BMI: Adult  01/11/2020    CRC Screening: Colonoscopy  11/05/2020    INFLUENZA VACCINE  Completed     Immunization History   Administered Date(s) Administered    INFLUENZA 09/01/2015, 12/20/2016, 11/10/2017    Influenza Split High Dose Preservative Free IM 12/20/2016, 11/10/2017    Influenza TIV (IM) 10/01/2014, 09/01/2015    Influenza, high dose seasonal 0 5 mL 10/17/2018    Pneumococcal Polysaccharide PPV23 04/21/2008, 12/20/2016    Tdap 1945         Arabella Llanos MD  3/26/2019,12:46 PM

## 2019-04-06 DIAGNOSIS — J01.21 ACUTE RECURRENT ETHMOIDAL SINUSITIS: ICD-10-CM

## 2019-04-09 RX ORDER — LORATADINE 10 MG/1
TABLET ORAL
Qty: 15 TABLET | Refills: 0 | Status: SHIPPED | OUTPATIENT
Start: 2019-04-09 | End: 2019-05-05 | Stop reason: SDUPTHER

## 2019-04-17 ENCOUNTER — CONSULT (OUTPATIENT)
Dept: PULMONOLOGY | Facility: CLINIC | Age: 74
End: 2019-04-17
Payer: MEDICARE

## 2019-04-17 VITALS
SYSTOLIC BLOOD PRESSURE: 116 MMHG | OXYGEN SATURATION: 95 % | DIASTOLIC BLOOD PRESSURE: 60 MMHG | HEIGHT: 56 IN | HEART RATE: 96 BPM | BODY MASS INDEX: 43.41 KG/M2 | WEIGHT: 193 LBS

## 2019-04-17 DIAGNOSIS — J84.10 PULMONARY FIBROSIS (HCC): Primary | ICD-10-CM

## 2019-04-17 DIAGNOSIS — J45.40 MODERATE PERSISTENT ASTHMA, UNSPECIFIED WHETHER COMPLICATED: ICD-10-CM

## 2019-04-17 DIAGNOSIS — J84.9 INTERSTITIAL LUNG DISEASE (HCC): ICD-10-CM

## 2019-04-17 DIAGNOSIS — M05.9 RHEUMATOID ARTHRITIS WITH POSITIVE RHEUMATOID FACTOR, INVOLVING UNSPECIFIED SITE (HCC): ICD-10-CM

## 2019-04-17 PROCEDURE — 99203 OFFICE O/P NEW LOW 30 MIN: CPT | Performed by: PHYSICIAN ASSISTANT

## 2019-04-17 PROCEDURE — 94010 BREATHING CAPACITY TEST: CPT | Performed by: PHYSICIAN ASSISTANT

## 2019-04-20 DIAGNOSIS — E78.2 MIXED HYPERLIPIDEMIA: ICD-10-CM

## 2019-04-22 RX ORDER — SIMVASTATIN 40 MG
TABLET ORAL
Qty: 90 TABLET | Refills: 2 | Status: SHIPPED | OUTPATIENT
Start: 2019-04-22 | End: 2019-07-03 | Stop reason: SDUPTHER

## 2019-04-26 PROBLEM — E66.01 SEVERE OBESITY (HCC): Status: ACTIVE | Noted: 2019-04-26

## 2019-04-27 ENCOUNTER — HOSPITAL ENCOUNTER (OUTPATIENT)
Dept: CT IMAGING | Facility: HOSPITAL | Age: 74
Discharge: HOME | End: 2019-04-27
Payer: MEDICARE

## 2019-04-27 DIAGNOSIS — J84.10 PULMONARY FIBROSIS (HCC): ICD-10-CM

## 2019-04-27 PROCEDURE — 71250 CT THORAX DX C-: CPT

## 2019-05-01 ENCOUNTER — TELEPHONE (OUTPATIENT)
Dept: PULMONOLOGY | Facility: CLINIC | Age: 74
End: 2019-05-01

## 2019-05-05 DIAGNOSIS — J01.21 ACUTE RECURRENT ETHMOIDAL SINUSITIS: ICD-10-CM

## 2019-05-06 RX ORDER — LORATADINE 10 MG/1
TABLET ORAL
Qty: 15 TABLET | Refills: 0 | Status: SHIPPED | OUTPATIENT
Start: 2019-05-06 | End: 2019-05-25 | Stop reason: SDUPTHER

## 2019-05-10 DIAGNOSIS — E11.8 TYPE 2 DIABETES MELLITUS WITH COMPLICATION, UNSPECIFIED WHETHER LONG TERM INSULIN USE: ICD-10-CM

## 2019-05-13 ENCOUNTER — OFFICE VISIT (OUTPATIENT)
Dept: CARDIOLOGY CLINIC | Facility: CLINIC | Age: 74
End: 2019-05-13
Payer: MEDICARE

## 2019-05-13 VITALS
HEART RATE: 86 BPM | WEIGHT: 202 LBS | BODY MASS INDEX: 45.44 KG/M2 | DIASTOLIC BLOOD PRESSURE: 74 MMHG | HEIGHT: 56 IN | SYSTOLIC BLOOD PRESSURE: 132 MMHG | OXYGEN SATURATION: 95 %

## 2019-05-13 DIAGNOSIS — R60.0 BILATERAL EDEMA OF LOWER EXTREMITY: Primary | ICD-10-CM

## 2019-05-13 DIAGNOSIS — I10 ESSENTIAL HYPERTENSION: ICD-10-CM

## 2019-05-13 DIAGNOSIS — E66.01 MORBID OBESITY DUE TO EXCESS CALORIES (HCC): ICD-10-CM

## 2019-05-13 DIAGNOSIS — I83.93 VARICOSE VEINS OF BOTH LOWER EXTREMITIES, UNSPECIFIED WHETHER COMPLICATED: ICD-10-CM

## 2019-05-13 DIAGNOSIS — E78.2 MIXED HYPERLIPIDEMIA: ICD-10-CM

## 2019-05-13 DIAGNOSIS — I50.32 CHRONIC DIASTOLIC CONGESTIVE HEART FAILURE (HCC): ICD-10-CM

## 2019-05-13 PROCEDURE — 99214 OFFICE O/P EST MOD 30 MIN: CPT | Performed by: INTERNAL MEDICINE

## 2019-05-15 DIAGNOSIS — J01.21 ACUTE RECURRENT ETHMOIDAL SINUSITIS: ICD-10-CM

## 2019-05-15 RX ORDER — FLUTICASONE PROPIONATE 50 MCG
SPRAY, SUSPENSION (ML) NASAL
Qty: 16 G | Refills: 3 | Status: SHIPPED | OUTPATIENT
Start: 2019-05-15 | End: 2019-06-27 | Stop reason: CLARIF

## 2019-05-25 DIAGNOSIS — J01.21 ACUTE RECURRENT ETHMOIDAL SINUSITIS: ICD-10-CM

## 2019-05-28 RX ORDER — LORATADINE 10 MG/1
TABLET ORAL
Qty: 15 TABLET | Refills: 0 | Status: SHIPPED | OUTPATIENT
Start: 2019-05-28 | End: 2019-06-17 | Stop reason: SDUPTHER

## 2019-06-03 DIAGNOSIS — F33.41 RECURRENT MAJOR DEPRESSIVE DISORDER, IN PARTIAL REMISSION (HCC): ICD-10-CM

## 2019-06-03 RX ORDER — ESCITALOPRAM OXALATE 20 MG/1
TABLET ORAL
Qty: 90 TABLET | Refills: 2 | Status: SHIPPED | OUTPATIENT
Start: 2019-06-03 | End: 2019-07-03 | Stop reason: SDUPTHER

## 2019-06-04 DIAGNOSIS — Z71.89 COMPLEX CARE COORDINATION: Primary | ICD-10-CM

## 2019-06-05 ENCOUNTER — PATIENT OUTREACH (OUTPATIENT)
Dept: INTERNAL MEDICINE CLINIC | Facility: CLINIC | Age: 74
End: 2019-06-05

## 2019-06-17 DIAGNOSIS — J01.21 ACUTE RECURRENT ETHMOIDAL SINUSITIS: ICD-10-CM

## 2019-06-17 RX ORDER — LORATADINE 10 MG/1
TABLET ORAL
Qty: 15 TABLET | Refills: 0 | Status: SHIPPED | OUTPATIENT
Start: 2019-06-17 | End: 2019-07-04 | Stop reason: SDUPTHER

## 2019-06-26 LAB
CREAT ?TM UR-SCNC: 36.6 UMOL/L
EXT MICROALBUMIN URINE RANDOM: 2
HBA1C MFR BLD HPLC: 9.6 %
LEFT EYE DIABETIC RETINOPATHY: NORMAL
MICROALBUMIN/CREAT UR: 54.6 MG/G{CREAT}
RIGHT EYE DIABETIC RETINOPATHY: NORMAL

## 2019-06-27 ENCOUNTER — TELEPHONE (OUTPATIENT)
Dept: PULMONOLOGY | Facility: CLINIC | Age: 74
End: 2019-06-27

## 2019-06-27 ENCOUNTER — OFFICE VISIT (OUTPATIENT)
Dept: PULMONOLOGY | Facility: CLINIC | Age: 74
End: 2019-06-27
Payer: MEDICARE

## 2019-06-27 VITALS
SYSTOLIC BLOOD PRESSURE: 130 MMHG | OXYGEN SATURATION: 94 % | BODY MASS INDEX: 41.14 KG/M2 | RESPIRATION RATE: 16 BRPM | HEIGHT: 58 IN | WEIGHT: 196 LBS | DIASTOLIC BLOOD PRESSURE: 60 MMHG | HEART RATE: 81 BPM

## 2019-06-27 DIAGNOSIS — Z87.891 HISTORY OF TOBACCO ABUSE: ICD-10-CM

## 2019-06-27 DIAGNOSIS — R06.02 SHORTNESS OF BREATH: ICD-10-CM

## 2019-06-27 DIAGNOSIS — J84.9 INTERSTITIAL LUNG DISEASE (HCC): ICD-10-CM

## 2019-06-27 DIAGNOSIS — R93.89 ABNORMAL CHEST CT: ICD-10-CM

## 2019-06-27 DIAGNOSIS — M05.9 RHEUMATOID ARTHRITIS WITH POSITIVE RHEUMATOID FACTOR, INVOLVING UNSPECIFIED SITE (HCC): ICD-10-CM

## 2019-06-27 DIAGNOSIS — J30.9 ALLERGIC RHINITIS, UNSPECIFIED SEASONALITY, UNSPECIFIED TRIGGER: ICD-10-CM

## 2019-06-27 DIAGNOSIS — J96.01 ACUTE RESPIRATORY FAILURE WITH HYPOXIA (HCC): Primary | ICD-10-CM

## 2019-06-27 DIAGNOSIS — J45.40 MODERATE PERSISTENT ASTHMA, UNSPECIFIED WHETHER COMPLICATED: ICD-10-CM

## 2019-06-27 PROCEDURE — 99215 OFFICE O/P EST HI 40 MIN: CPT | Performed by: PHYSICIAN ASSISTANT

## 2019-06-27 RX ORDER — FLUTICASONE FUROATE AND VILANTEROL 100; 25 UG/1; UG/1
1 POWDER RESPIRATORY (INHALATION) DAILY
Qty: 1 INHALER | Refills: 0 | Status: SHIPPED | COMMUNITY
Start: 2019-06-27 | End: 2021-02-26 | Stop reason: SDUPTHER

## 2019-06-27 RX ORDER — ALBUTEROL SULFATE 2.5 MG/3ML
2.5 SOLUTION RESPIRATORY (INHALATION) EVERY 6 HOURS PRN
Qty: 120 VIAL | Refills: 3 | Status: SHIPPED | OUTPATIENT
Start: 2019-06-27 | End: 2020-11-17 | Stop reason: SDUPTHER

## 2019-06-27 RX ORDER — FLUTICASONE PROPIONATE 50 MCG
1 SPRAY, SUSPENSION (ML) NASAL DAILY
Qty: 1 BOTTLE | Refills: 3 | Status: SHIPPED | OUTPATIENT
Start: 2019-06-27 | End: 2019-07-26 | Stop reason: SDUPTHER

## 2019-06-27 RX ORDER — ALBUTEROL SULFATE 90 UG/1
2 AEROSOL, METERED RESPIRATORY (INHALATION) EVERY 6 HOURS PRN
Qty: 1 INHALER | Refills: 3 | Status: SHIPPED | OUTPATIENT
Start: 2019-06-27 | End: 2020-11-17 | Stop reason: SDUPTHER

## 2019-06-27 RX ORDER — BACLOFEN 10 MG/1
10 TABLET ORAL
Refills: 5 | COMMUNITY
Start: 2019-06-06 | End: 2020-11-17

## 2019-06-27 RX ORDER — PREDNISONE 20 MG/1
TABLET ORAL
Status: CANCELLED | OUTPATIENT
Start: 2019-06-27

## 2019-06-27 RX ORDER — MONTELUKAST SODIUM 10 MG/1
10 TABLET ORAL
Qty: 30 TABLET | Refills: 3 | Status: SHIPPED | OUTPATIENT
Start: 2019-06-27 | End: 2020-06-22

## 2019-06-27 RX ORDER — OXYCODONE AND ACETAMINOPHEN 10; 325 MG/1; MG/1
1 TABLET ORAL 3 TIMES DAILY PRN
Refills: 0 | COMMUNITY
Start: 2019-06-06 | End: 2019-12-10 | Stop reason: HOSPADM

## 2019-07-03 ENCOUNTER — OFFICE VISIT (OUTPATIENT)
Dept: INTERNAL MEDICINE CLINIC | Facility: CLINIC | Age: 74
End: 2019-07-03
Payer: MEDICARE

## 2019-07-03 VITALS
SYSTOLIC BLOOD PRESSURE: 116 MMHG | HEIGHT: 58 IN | BODY MASS INDEX: 41.82 KG/M2 | HEART RATE: 82 BPM | WEIGHT: 199.2 LBS | DIASTOLIC BLOOD PRESSURE: 60 MMHG | OXYGEN SATURATION: 93 %

## 2019-07-03 DIAGNOSIS — J45.40 MODERATE PERSISTENT ASTHMA WITHOUT COMPLICATION: ICD-10-CM

## 2019-07-03 DIAGNOSIS — Z11.59 NEED FOR HEPATITIS C SCREENING TEST: ICD-10-CM

## 2019-07-03 DIAGNOSIS — L03.119 CELLULITIS OF LOWER EXTREMITY, UNSPECIFIED LATERALITY: ICD-10-CM

## 2019-07-03 DIAGNOSIS — E66.01 MORBID OBESITY (HCC): ICD-10-CM

## 2019-07-03 DIAGNOSIS — F33.41 RECURRENT MAJOR DEPRESSIVE DISORDER, IN PARTIAL REMISSION (HCC): ICD-10-CM

## 2019-07-03 DIAGNOSIS — E11.8 TYPE 2 DIABETES MELLITUS WITH COMPLICATION, WITH LONG-TERM CURRENT USE OF INSULIN (HCC): ICD-10-CM

## 2019-07-03 DIAGNOSIS — Z23 NEED FOR VACCINATION: ICD-10-CM

## 2019-07-03 DIAGNOSIS — I10 ESSENTIAL HYPERTENSION: ICD-10-CM

## 2019-07-03 DIAGNOSIS — E78.2 MIXED HYPERLIPIDEMIA: ICD-10-CM

## 2019-07-03 DIAGNOSIS — Z79.4 TYPE 2 DIABETES MELLITUS WITH STAGE 3 CHRONIC KIDNEY DISEASE, WITH LONG-TERM CURRENT USE OF INSULIN (HCC): Primary | ICD-10-CM

## 2019-07-03 DIAGNOSIS — L40.0 PSORIASIS VULGARIS: ICD-10-CM

## 2019-07-03 DIAGNOSIS — E11.22 TYPE 2 DIABETES MELLITUS WITH STAGE 3 CHRONIC KIDNEY DISEASE, WITH LONG-TERM CURRENT USE OF INSULIN (HCC): Primary | ICD-10-CM

## 2019-07-03 DIAGNOSIS — I50.22 CHRONIC SYSTOLIC CONGESTIVE HEART FAILURE (HCC): ICD-10-CM

## 2019-07-03 DIAGNOSIS — N18.30 TYPE 2 DIABETES MELLITUS WITH STAGE 3 CHRONIC KIDNEY DISEASE, WITH LONG-TERM CURRENT USE OF INSULIN (HCC): Primary | ICD-10-CM

## 2019-07-03 DIAGNOSIS — Z00.00 MEDICARE ANNUAL WELLNESS VISIT, SUBSEQUENT: ICD-10-CM

## 2019-07-03 DIAGNOSIS — F51.01 PRIMARY INSOMNIA: ICD-10-CM

## 2019-07-03 DIAGNOSIS — M05.9 RHEUMATOID ARTHRITIS WITH POSITIVE RHEUMATOID FACTOR, INVOLVING UNSPECIFIED SITE (HCC): Primary | ICD-10-CM

## 2019-07-03 DIAGNOSIS — K21.9 GASTROESOPHAGEAL REFLUX DISEASE WITHOUT ESOPHAGITIS: ICD-10-CM

## 2019-07-03 DIAGNOSIS — Z79.4 TYPE 2 DIABETES MELLITUS WITH COMPLICATION, WITH LONG-TERM CURRENT USE OF INSULIN (HCC): ICD-10-CM

## 2019-07-03 PROBLEM — E66.9 OBESITY (BMI 30-39.9): Status: RESOLVED | Noted: 2018-09-13 | Resolved: 2019-07-03

## 2019-07-03 PROBLEM — J45.909 ASTHMA: Status: RESOLVED | Noted: 2017-04-18 | Resolved: 2019-07-03

## 2019-07-03 PROCEDURE — G0009 ADMIN PNEUMOCOCCAL VACCINE: HCPCS | Performed by: INTERNAL MEDICINE

## 2019-07-03 PROCEDURE — G0439 PPPS, SUBSEQ VISIT: HCPCS | Performed by: INTERNAL MEDICINE

## 2019-07-03 PROCEDURE — 90670 PCV13 VACCINE IM: CPT | Performed by: INTERNAL MEDICINE

## 2019-07-03 PROCEDURE — 99214 OFFICE O/P EST MOD 30 MIN: CPT | Performed by: INTERNAL MEDICINE

## 2019-07-03 RX ORDER — FOSINOPRIL SODIUM 10 MG/1
10 TABLET ORAL DAILY
Qty: 90 TABLET | Refills: 2 | Status: SHIPPED | OUTPATIENT
Start: 2019-07-03 | End: 2020-04-13

## 2019-07-03 RX ORDER — TORSEMIDE 20 MG/1
40 TABLET ORAL 2 TIMES DAILY
Qty: 360 TABLET | Refills: 3 | Status: SHIPPED | OUTPATIENT
Start: 2019-07-03 | End: 2020-07-06

## 2019-07-03 RX ORDER — DULOXETIN HYDROCHLORIDE 20 MG/1
20 CAPSULE, DELAYED RELEASE ORAL DAILY
Qty: 90 CAPSULE | Refills: 3 | Status: SHIPPED | OUTPATIENT
Start: 2019-07-03 | End: 2019-11-14 | Stop reason: SDUPTHER

## 2019-07-03 RX ORDER — SIMVASTATIN 40 MG
40 TABLET ORAL DAILY
Qty: 90 TABLET | Refills: 2 | Status: SHIPPED | OUTPATIENT
Start: 2019-07-03 | End: 2020-06-22

## 2019-07-03 RX ORDER — NYSTATIN 100000 U/G
1 CREAM TOPICAL 2 TIMES DAILY
Qty: 30 G | Refills: 2 | Status: SHIPPED | OUTPATIENT
Start: 2019-07-03 | End: 2020-05-13 | Stop reason: ALTCHOICE

## 2019-07-03 RX ORDER — PANTOPRAZOLE SODIUM 40 MG/1
40 TABLET, DELAYED RELEASE ORAL DAILY
Qty: 90 TABLET | Refills: 2 | Status: SHIPPED | OUTPATIENT
Start: 2019-07-03 | End: 2019-11-14

## 2019-07-03 RX ORDER — MIRTAZAPINE 15 MG/1
15 TABLET, FILM COATED ORAL
Qty: 90 TABLET | Refills: 2 | Status: SHIPPED | OUTPATIENT
Start: 2019-07-03 | End: 2019-11-14

## 2019-07-03 RX ORDER — METOPROLOL SUCCINATE 25 MG/1
25 TABLET, EXTENDED RELEASE ORAL DAILY
Qty: 90 TABLET | Refills: 2 | Status: SHIPPED | OUTPATIENT
Start: 2019-07-03 | End: 2020-01-10 | Stop reason: SDUPTHER

## 2019-07-03 RX ORDER — ESCITALOPRAM OXALATE 20 MG/1
20 TABLET ORAL DAILY
Qty: 90 TABLET | Refills: 2 | Status: SHIPPED | OUTPATIENT
Start: 2019-07-03 | End: 2019-11-14

## 2019-07-03 RX ORDER — CEPHALEXIN 500 MG/1
500 CAPSULE ORAL EVERY 8 HOURS SCHEDULED
Qty: 21 CAPSULE | Refills: 0 | Status: SHIPPED | OUTPATIENT
Start: 2019-07-03 | End: 2019-07-10

## 2019-07-03 RX ORDER — AMITRIPTYLINE HYDROCHLORIDE 25 MG/1
25 TABLET, FILM COATED ORAL
Qty: 90 TABLET | Refills: 3 | Status: SHIPPED | OUTPATIENT
Start: 2019-07-03 | End: 2019-07-11

## 2019-07-03 NOTE — PROGRESS NOTES
Assessment and Plan:     Problem List Items Addressed This Visit        Endocrine    Type 2 diabetes mellitus with complication, with long-term current use of insulin ([facility] [address] ) - Primary       Respiratory    Moderate persistent asthma       Cardiovascular and Mediastinum    Chronic diastolic congestive heart failure (HCC)    Essential hypertension       Other    Mixed hyperlipidemia    Morbid obesity (HCC)    Recurrent major depressive disorder, in partial remission ([facility] [address] )      Other Visit Diagnoses     Medicare annual wellness visit, subsequent        Cellulitis of lower extremity, unspecified laterality        Relevant Medications    cephalexin (KEFLEX) 500 mg capsule    Need for vaccination        Relevant Orders    PNEUMOCOCCAL CONJUGATE VACCINE 13-VALENT GREATER THAN 6 MONTHS         History of Present Illness:     Patient presents for Medicare Annual Wellness visit    Patient Care Team:  Sonma Cuevas MD as PCP - General  Liana Gitelman, MD     Problem List:     Patient Active Problem List   Diagnosis    Bilateral edema of lower extremity    Chronic diastolic congestive heart failure ([address] )    Mixed hyperlipidemia    Essential hypertension    Morbid obesity ([address] )    Ambulatory dysfunction    Recurrent major depressive disorder, in partial remission ([facility] [address] )    Type 2 diabetes mellitus with complication, with long-term current use of insulin ([address] )    Gastroesophageal reflux disease without esophagitis    Primary insomnia    Moderate persistent asthma    Psoriasis vulgaris    Rheumatoid arthritis ([address] )    Urinary incontinence    Vitamin D deficiency    Varicose veins of both lower extremities    Pain in elbow    Immunosuppressed status ([address] )    Interstitial lung disease ([address] )      Past Medical and Surgical History:     Past Medical History:   Diagnosis Date    Asthma     Chest pain on breathing     last assessed 2/5/15    Chronic diastolic CHF (congestive heart failure) ([address] )     Diabetes mellitus [facility]     HTN (hypertension)     Hyperlipidemia     Insomnia     Obesity     Preop cardiovascular exam 2018    Pulmonary fibrosis (HCC)      Past Surgical History:   Procedure Laterality Date    HAND SURGERY        Family History:     Family History   Problem Relation Age of Onset    Rheum arthritis Mother       Social History:     Social History     Tobacco Use   Smoking Status Former Smoker    Packs/day: 1 50    Years: 18 00    Pack years: 27 00    Types: Cigarettes    Last attempt to quit:     Years since quittin 5   Smokeless Tobacco Never Used   Tobacco Comment    Quit 20 years ago     Social History     Substance and Sexual Activity   Alcohol Use No     Social History     Substance and Sexual Activity   Drug Use No      Medications and Allergies:     Current Outpatient Medications   Medication Sig Dispense Refill    insulin glargine (LANTUS SOLOSTAR) 100 units/mL injection pen Inject 50 Units under the skin daily (Patient taking differently: Inject under the skin daily ) 5 pen 0    adalimumab (HUMIRA) 40 mg/0 8 mL PSKT Inject 40 mg under the skin every 14 (fourteen) days      albuterol (2 5 mg/3 mL) 0 083 % nebulizer solution Take 1 vial (2 5 mg total) by nebulization every 6 (six) hours as needed for wheezing or shortness of breath 120 vial 3    albuterol (PROAIR HFA) 90 mcg/act inhaler Inhale 2 puffs every 6 (six) hours as needed for wheezing or shortness of breath 1 Inhaler 3    amitriptyline (ELAVIL) 25 mg tablet Take 25 mg by mouth      B-D TB SYRINGE 1CC/27GX1/2" 27G X 1/2" 1 ML MISC INJECT METHOTREXATE 20 MG (0 8ML) SUBCUTANEOUS ONCE WEEKLY  3    B-D UF III MINI PEN NEEDLES 31G X 5 MM MISC USE AS DIRECTED 100 each 3    baclofen 10 mg tablet Take 10 mg by mouth daily at bedtime as needed  5    Blood Glucose Monitoring Suppl (ONETOUCH VERIO) w/Device KIT by Does not apply route 3 (three) times a day      cephalexin (KEFLEX) 500 mg capsule Take 1 capsule (500 mg total) by mouth every 8 (eight) hours for 7 days 21 capsule 0    cyclobenzaprine (FLEXERIL) 5 mg tablet TAKE 1 TABLETT BY MOUTH EVERY NIGHT AS NEEDED  5    DULoxetine (CYMBALTA) 20 mg capsule Take 20 mg by mouth      escitalopram (LEXAPRO) 20 mg tablet TAKE ONE TABLET BY MOUTH ONCE DAILY 90 tablet 2    fluticasone (FLONASE) 50 mcg/act nasal spray 1 spray into each nostril daily 1 Bottle 3    fluticasone-vilanterol (BREO ELLIPTA) 100-25 mcg/inh inhaler Inhale 1 puff daily Rinse mouth after use  1 Inhaler 0    fosinopril (MONOPRIL) 10 mg tablet TAKE 1 TABLET BY MOUTH EVERY DAY 90 tablet 1    loratadine (CLARITIN) 10 mg tablet TAKE 1 TABLET BY MOUTH EVERY DAY 15 tablet 0    metFORMIN (GLUCOPHAGE) 1000 MG tablet TAKE 1 TABLET TWICE DAILY  180 tablet 3    metoprolol succinate (TOPROL-XL) 25 mg 24 hr tablet TAKE 1 TABLET DAILY  90 tablet 1    mirtazapine (REMERON) 15 mg tablet TAKE 1 TABLET BY MOUTH EVERYDAY AT BEDTIME (Patient not taking: Reported on 6/27/2019) 90 tablet 1    montelukast (SINGULAIR) 10 mg tablet Take 1 tablet (10 mg total) by mouth daily at bedtime 30 tablet 3    nystatin (MYCOSTATIN) cream Apply 1 application topically 2 (two) times a day To affected area  2    NYSTATIN powder APPLY 2-3 TIMES DAILY TO AFFECTED AREA(S)   5    ONE TOUCH ULTRA TEST test strip TEST 3 TIMES A  each 3    oxyCODONE (ROXICODONE) 15 mg immediate release tablet Take 15 mg by mouth every 4 (four) hours as needed for moderate pain      oxyCODONE-acetaminophen (PERCOCET)  mg per tablet Take 1 tablet by mouth 3 (three) times a day as needed  0    pantoprazole (PROTONIX) 40 mg tablet Take 1 tablet by mouth daily      predniSONE 5 mg tablet Take 5 mg by mouth daily      simvastatin (ZOCOR) 40 mg tablet TAKE 1 TABLET DAILY  90 tablet 2    torsemide (DEMADEX) 20 mg tablet TAKE 2 TABLET TWICE DAILY 360 tablet 3     No current facility-administered medications for this visit        Allergies   Allergen Reactions  Gabapentin     Vancomycin       Immunizations:     Immunization History   Administered Date(s) Administered    INFLUENZA 09/01/2015, 12/20/2016, 11/10/2017    Influenza Split High Dose Preservative Free IM 12/20/2016, 11/10/2017    Influenza TIV (IM) 10/01/2014, 09/01/2015    Influenza, high dose seasonal 0 5 mL 10/17/2018    Pneumococcal Polysaccharide PPV23 04/21/2008, 12/20/2016    Tdap 1945      Medicare Screening Tests and Risk Assessments:     Chela is here for her Subsequent Wellness visit  Health Risk Assessment:  Patient rates overall health as fair  Patient feels that their physical health rating is Slightly worse  Eyesight was rated as Same  Hearing was rated as Slightly worse  Patient feels that their emotional and mental health rating is Same  Pain experienced by patient in the last 7 days has been A lot  Patient's pain rating has been 10/10  Emotional/Mental Health:  Patient has been feeling nervous/anxious  PHQ-9 Depression Screening:    Frequency of the following problems over the past two weeks:      1  Little interest or pleasure in doing things: 0 - not at all      2  Feeling down, depressed, or hopeless: 0 - not at all  PHQ-2 Score: 0          Broken Bones/Falls: Fall Risk Assessment:    In the past year, patient has experienced: History of falling in past year    Number of falls: 1    Injured during fall: No      Patient feels unsteady when standing or walking     Patient is worried about falling     Bladder/Bowel:  Patient has leaked urine accidently in the last six months  Patient reports no loss of bowel control  Immunizations:  Patient has had a flu vaccination within the last year  Patient has received a pneumonia shot  Patient has not received a shingles shot  Patient has not received tetanus/diphtheria shot  Home Safety:  Patient has trouble with stairs inside or outside of their home     Patient currently reports that there are no safety hazards present in home, working smoke alarms, no working carbon monoxide detectors  Preventative Screenings:   Breast cancer screening performed, colon cancer screen completed, cholesterol screen completed, no glaucoma eye exam completed    Nutrition:  Current diet: Diabetic with servings of the following:    Medications:  Patient is not currently taking any over-the-counter supplements  Patient is able to manage medications  Lifestyle Choices:  Patient reports no tobacco use  Patient has smoked or used tobacco in the past   Patient has stopped her tobacco use  Patient reports no alcohol use  Patient drives a vehicle  Patient wears seat belt  Activities of Daily Living:  Can get out of bed by his or her self, able to dress self, able to make own meals, able to do own shopping, able to bathe self, can do own laundry/housekeeping, can manage own money, pay bills and track expenses    Previous Hospitalizations:  No hospitalization or ED visit in past 12 months        Advanced Directives:  Patient has not decided on power of   Patient has not completed advanced directive  Preventative Screening/Counseling:      Cardiovascular:      General: Screening Current          Diabetes:      General: Screening Current          Colorectal Cancer:      General: Risks and Benefits Discussed          Breast Cancer:      General: Risks and Benefits Discussed          Cervical Cancer:      General: Risks and Benefits Discussed          Osteoporosis:      General: Risks and Benefits Discussed          AAA:      General: Risks and Benefits Discussed          Glaucoma:      General: Screening Current          HIV:      General: Risks and Benefits Discussed          Hepatitis C:      General: Risks and Benefits Discussed        Advanced Directives:   Patient has no living will for healthcare, End of life assessment reviewed with patient  Provider agrees with end of life decisions        Immunizations: Influenza: Influenza UTD This Year      Pneumococcal: Lifetime Vaccine Completed      Shingrix: Risks & Benefits Discussed      TDAP: Risks & Benefits Discussed      Other Preventative Counseling (Non-Medicare):   Fall Prevention, Helmet Safety, Increase physical activity, Nutrition Counseling, Car/seat belt/driving safety reviewed, Skin self-exam, Sunscreen use, Dietary education for weight gain and Weight reduction discussed

## 2019-07-03 NOTE — PROGRESS NOTES
INTERNAL MEDICINE OFFICE VISIT  [facility]  [facility], [address]  Tel: [phone]      NAME: Stephen Romero  AGE: 76 y o  SEX: female  : 1945   MRN: [id number]    DATE: 7/3/2019  TIME: 2:16 PM      Assessment and Plan:  1  Type 2 diabetes mellitus with stage 3 chronic kidney disease, with long-term current use of insulin ([facility] )   She was told to increase the dose of Lantus to 35 units in the a m  and 30 units in the p m  Sherre Soulier She was also told to cut down on the carbohydrate intake in her diet  - Insulin Pen Needle (B-D UF III MINI PEN NEEDLES) 31G X 5 MM MISC; Inject under the skin 2 (two) times a day Use as directed  Dispense: 200 each; Refill: 3  - metFORMIN (GLUCOPHAGE) 1000 MG tablet; Take 1 tablet (1,000 mg total) by mouth 2 (two) times a day  Dispense: 180 tablet; Refill: 3    2  Essential hypertension    Continue present medication  - fosinopril (MONOPRIL) 10 mg tablet; Take 1 tablet (10 mg total) by mouth daily  Dispense: 90 tablet; Refill: 2  - torsemide (DEMADEX) 20 mg tablet; Take 2 tablets (40 mg total) by mouth 2 (two) times a day  Dispense: 360 tablet; Refill: 3    3  Mixed hyperlipidemia   continue simvastatin  - simvastatin (ZOCOR) 40 mg tablet; Take 1 tablet (40 mg total) by mouth daily  Dispense: 90 tablet; Refill: 2    4  Chronic systolic congestive heart failure (HCC)   continue torsemide  - metoprolol succinate (TOPROL-XL) 25 mg 24 hr tablet; Take 1 tablet (25 mg total) by mouth daily  Dispense: 90 tablet; Refill: 2    5  Moderate persistent asthma without complication   continue inhalers and follow up with pulmonology    6  Primary insomnia   continue amitriptyline and mirtazapine  - mirtazapine (REMERON) 15 mg tablet; Take 1 tablet (15 mg total) by mouth daily at bedtime  Dispense: 90 tablet; Refill: 2    7   Recurrent major depressive disorder, in partial remission ([facility] )   continue Lexapro  - escitalopram (LEXAPRO) 20 mg tablet; Take 1 tablet (20 mg total) by mouth daily  Dispense: 90 tablet; Refill: 2    8  Morbid obesity ([address] )  BMI Counseling: Body mass index is 41 63 kg/m²  Discussed the patient's BMI with her  The BMI is above average  BMI counseling and education was provided to the patient  Nutrition recommendations include reducing portion sizes, decreasing overall calorie intake and moderation in carbohydrate intake  9  Medicare annual wellness visit, subsequent      10  Cellulitis of lower extremity, unspecified laterality    - cephalexin (KEFLEX) 500 mg capsule; Take 1 capsule (500 mg total) by mouth every 8 (eight) hours for 7 days  Dispense: 21 capsule; Refill: 0    11  Need for vaccination    - PNEUMOCOCCAL CONJUGATE VACCINE 13-VALENT GREATER THAN 6 MONTHS      - Counseling Documentation: patient was counseled regarding: diagnostic results, instructions for management, risk factor reductions, prognosis, patient and family education, impressions, risks and benefits of treatment options and importance of compliance with treatment  - Medication Side Effects: Adverse side effects of medications were reviewed with the patient/guardian today  Return for follow up visit in  4 months or earlier, if needed  Chief Complaint:  Chief Complaint   Patient presents with    Medicare Wellness Visit         History of Present Illness:    type 2 diabetes - patient is very poorly controlled even though the hemoglobin A1c has decreased a lot from 13 to 9 6  She is very noncompliant with her diet  Hypertension- blood pressure stable on present medication  Hyperlipidemia -takes simvastatin regularly  Congestive heart failure -she has been taking the the torsemide 40 mg twice a day but still has a lot of swelling in her legs  Asthma- follows up with pulmonology and takes inhalers as prescribed  Insomnia -stable on medications  Depression -takes Lexapro with some relief of symptoms      Morbid obesity - she is not able to lose weight  Cellulitis -she has cellulitis of both her lower extremities      Active Problem List:  Patient Active Problem List   Diagnosis    Bilateral edema of lower extremity    Chronic diastolic congestive heart failure (HCC)    Mixed hyperlipidemia    Essential hypertension    Morbid obesity ([facility] )    Ambulatory dysfunction    Recurrent major depressive disorder, in partial remission ([place] [address] )    Type 2 diabetes mellitus with complication, with long-term current use of insulin ([facility])    Gastroesophageal reflux disease without esophagitis    Primary insomnia    Moderate persistent asthma    Psoriasis vulgaris    Rheumatoid arthritis ([facility])    Urinary incontinence    Vitamin D deficiency    Varicose veins of both lower extremities    Pain in elbow    Immunosuppressed status ([facility] )    Interstitial lung disease ([place] [address] )         Past Medical History:  Past Medical History:   Diagnosis Date    Asthma     Chest pain on breathing     last assessed 2/5/15    Chronic diastolic CHF (congestive heart failure) ([place] [address] )     Diabetes mellitus ([place] [address] )     HTN (hypertension)     Hyperlipidemia     Insomnia     Obesity     Preop cardiovascular exam 2/2/2018    Pulmonary fibrosis (HCC)          Past Surgical History:  Past Surgical History:   Procedure Laterality Date    HAND SURGERY           Family History:  Family History   Problem Relation Age of Onset    Rheum arthritis Mother          Social History:  Social History     Socioeconomic History    Marital status: /Civil Union     Spouse name: None    Number of children: None    Years of education: None    Highest education level: None   Occupational History    None   Social Needs    Financial resource strain: None    Food insecurity:     Worry: None     Inability: None    Transportation needs:     Medical: None     Non-medical: None   Tobacco Use    Smoking status: Former Smoker     Packs/day: 1 50     Years: 18 00 Pack years: 27 00     Types: Cigarettes     Last attempt to quit:      Years since quittin 5    Smokeless tobacco: Never Used    Tobacco comment: Quit 20 years ago   Substance and Sexual Activity    Alcohol use: No    Drug use: No    Sexual activity: Never   Lifestyle    Physical activity:     Days per week: None     Minutes per session: None    Stress: None   Relationships    Social connections:     Talks on phone: None     Gets together: None     Attends Voodoo service: None     Active member of club or organization: None     Attends meetings of clubs or organizations: None     Relationship status: None    Intimate partner violence:     Fear of current or ex partner: None     Emotionally abused: None     Physically abused: None     Forced sexual activity: None   Other Topics Concern    None   Social History Narrative    No advance directives on file         Allergies:   Allergies   Allergen Reactions    Gabapentin     Vancomycin          Medications:    Current Outpatient Medications:     insulin glargine (LANTUS SOLOSTAR) 100 units/mL injection pen, Inject 50 Units under the skin daily (Patient taking differently: Inject under the skin daily ), Disp: 5 pen, Rfl: 0    adalimumab (HUMIRA) 40 mg/0 8 mL PSKT, Inject 40 mg under the skin every 14 (fourteen) days, Disp: , Rfl:     albuterol (2 5 mg/3 mL) 0 083 % nebulizer solution, Take 1 vial (2 5 mg total) by nebulization every 6 (six) hours as needed for wheezing or shortness of breath, Disp: 120 vial, Rfl: 3    albuterol (PROAIR HFA) 90 mcg/act inhaler, Inhale 2 puffs every 6 (six) hours as needed for wheezing or shortness of breath, Disp: 1 Inhaler, Rfl: 3    amitriptyline (ELAVIL) 25 mg tablet, Take 25 mg by mouth, Disp: , Rfl:     B-D TB SYRINGE 1CC/27GX1/2" 27G X 1/2" 1 ML MISC, INJECT METHOTREXATE 20 MG (0 8ML) SUBCUTANEOUS ONCE WEEKLY, Disp: , Rfl: 3    baclofen 10 mg tablet, Take 10 mg by mouth daily at bedtime as needed, Disp: , Rfl: 5    Blood Glucose Monitoring Suppl Mike Mays) w/Device KIT, by Does not apply route 3 (three) times a day, Disp: , Rfl:     cephalexin (KEFLEX) 500 mg capsule, Take 1 capsule (500 mg total) by mouth every 8 (eight) hours for 7 days, Disp: 21 capsule, Rfl: 0    cyclobenzaprine (FLEXERIL) 5 mg tablet, TAKE 1 TABLETT BY MOUTH EVERY NIGHT AS NEEDED, Disp: , Rfl: 5    DULoxetine (CYMBALTA) 20 mg capsule, Take 20 mg by mouth, Disp: , Rfl:     escitalopram (LEXAPRO) 20 mg tablet, Take 1 tablet (20 mg total) by mouth daily, Disp: 90 tablet, Rfl: 2    fluticasone (FLONASE) 50 mcg/act nasal spray, 1 spray into each nostril daily, Disp: 1 Bottle, Rfl: 3    fluticasone-vilanterol (BREO ELLIPTA) 100-25 mcg/inh inhaler, Inhale 1 puff daily Rinse mouth after use , Disp: 1 Inhaler, Rfl: 0    fosinopril (MONOPRIL) 10 mg tablet, Take 1 tablet (10 mg total) by mouth daily, Disp: 90 tablet, Rfl: 2    Insulin Pen Needle (B-D UF III MINI PEN NEEDLES) 31G X 5 MM MISC, Inject under the skin 2 (two) times a day Use as directed, Disp: 200 each, Rfl: 3    loratadine (CLARITIN) 10 mg tablet, TAKE 1 TABLET BY MOUTH EVERY DAY, Disp: 15 tablet, Rfl: 0    metFORMIN (GLUCOPHAGE) 1000 MG tablet, Take 1 tablet (1,000 mg total) by mouth 2 (two) times a day, Disp: 180 tablet, Rfl: 3    metoprolol succinate (TOPROL-XL) 25 mg 24 hr tablet, Take 1 tablet (25 mg total) by mouth daily, Disp: 90 tablet, Rfl: 2    mirtazapine (REMERON) 15 mg tablet, Take 1 tablet (15 mg total) by mouth daily at bedtime, Disp: 90 tablet, Rfl: 2    montelukast (SINGULAIR) 10 mg tablet, Take 1 tablet (10 mg total) by mouth daily at bedtime, Disp: 30 tablet, Rfl: 3    nystatin (MYCOSTATIN) cream, Apply 1 application topically 2 (two) times a day To affected area, Disp: , Rfl: 2    NYSTATIN powder, APPLY 2-3 TIMES DAILY TO AFFECTED AREA(S) , Disp: , Rfl: 5    ONE TOUCH ULTRA TEST test strip, TEST 3 TIMES A DAY, Disp: 100 each, Rfl: 3   oxyCODONE (ROXICODONE) 15 mg immediate release tablet, Take 15 mg by mouth every 4 (four) hours as needed for moderate pain, Disp: , Rfl:     oxyCODONE-acetaminophen (PERCOCET)  mg per tablet, Take 1 tablet by mouth 3 (three) times a day as needed, Disp: , Rfl: 0    pantoprazole (PROTONIX) 40 mg tablet, Take 1 tablet by mouth daily, Disp: , Rfl:     predniSONE 5 mg tablet, Take 5 mg by mouth daily, Disp: , Rfl:     simvastatin (ZOCOR) 40 mg tablet, Take 1 tablet (40 mg total) by mouth daily, Disp: 90 tablet, Rfl: 2    torsemide (DEMADEX) 20 mg tablet, Take 2 tablets (40 mg total) by mouth 2 (two) times a day, Disp: 360 tablet, Rfl: 3      The following portions of the patient's history were reviewed and updated as appropriate: past medical history, past surgical history, family history, social history, allergies, current medications and active problem list       Review of Systems:  Constitutional: Denies fever, chills, weight gain, weight loss, fatigue  Eyes: Denies eye redness, eye discharge, double vision, change in visual acuity  ENT: Denies hearing loss, tinnitus, sneezing, nasal congestion, nasal discharge, sore throat   Respiratory: Denies cough, expectoration, hemoptysis, shortness of breath, wheezing  Cardiovascular: Denies chest pain, palpitations, lower extremity swelling, orthopnea, PND  Gastrointestinal: Denies abdominal pain, heartburn, nausea, vomiting, hematemesis, diarrhea, bloody stools  Genito-Urinary: Denies dysuria, frequency, difficulty in micturition, nocturia, incontinence  Musculoskeletal: Denies back pain, joint pain, muscle pain  Neurologic: Denies confusion, lightheadedness, syncope, headache, focal weakness, sensory changes, seizures  Endocrine: Denies polyuria, polydipsia, temperature intolerance  Allergy and Immunology: Denies hives, insect bite sensitivity  Hematological and Lymphatic: Denies bleeding problems, swollen glands   Psychological: Denies depression, suicidal ideation, anxiety, panic, mood swings  Dermatological: Denies pruritus, rash, skin lesion changes      Vitals:  Vitals:    07/03/19 1307   BP: 116/60   Pulse: 82   SpO2: 93%       Body mass index is 41 63 kg/m²  Weight (last 2 days)     Date/Time   Weight    07/03/19 1307   90 4 (199 2)                Physical Examination:  General: Patient is not in acute distress  Awake, alert, responding to commands  No weight gain or loss  Head: Normocephalic  Atraumatic  Eyes: Conjunctiva and lids with no swelling, erythema or discharge  Both pupils normal sized, round and reactive to light  Sclera nonicteric  ENT: External examination of nose and ear normal  Otoscopic examination shows translucent tympanic membranes with patent canals without erythema  Oropharynx moist with no erythema, edema, exudate or lesions  Neck: Supple  JVP not raised  Trachea midline  No masses  No thyromegaly  Lungs: No signs of increased work of breathing or respiratory distress  Bilateral bronchovascular breath sounds with no crackles or rhonchi  Chest wall: No tenderness  Cardiovascular: Normal PMI  No thrills  Regular rate and rhythm  S1 and S2 normal  No murmur, rub or gallop  Gastrointestinal: Abdomen soft, nontender  No guarding or rigidity  Liver and spleen not palpable  Bowel sounds present  Neurologic: Cranial nerves II-XII intact   Cortical functions normal  Motor system - Reflexes 2+ and symmetrical  Sensations normal  Musculoskeletal: Gait normal  No joint tenderness  Integumentary: Skin normal with no rash or lesions  Lymphatic: No palpable lymph nodes in neck, axilla or groin  Extremities: lower extremity redness and edema   Psychological: Judgement and insight normal  Mood and affect normal      Laboratory Results:  CBC with diff:   Lab Results   Component Value Date    WBC 13 78 (H) 02/16/2018    WBC 7 94 01/05/2016    RBC 4 12 02/16/2018    RBC 4 39 01/05/2016    HGB 13 2 02/16/2018    HGB 12 9 01/05/2016    HCT 40 7 02/16/2018 HCT 40 9 01/05/2016    MCV 99 (H) 02/16/2018    MCV 93 01/05/2016    MCH 32 0 02/16/2018    MCH 29 4 01/05/2016    RDW 14 0 02/16/2018    RDW 15 4 (H) 01/05/2016     02/16/2018     01/05/2016       CMP:  Lab Results   Component Value Date    CREATININE 0 92 02/16/2018    CREATININE 0 74 01/05/2016    BUN 22 02/16/2018    BUN 18 01/05/2016     (L) 01/05/2016    K 4 3 02/16/2018    K 4 5 01/05/2016     02/16/2018     01/05/2016    CO2 25 02/16/2018    CO2 26 01/05/2016    GLUCOSE 159 (H) 01/05/2016    PROT 7 3 01/05/2016    ALKPHOS 106 02/16/2018    ALKPHOS 151 (H) 01/05/2016    ALT 21 02/16/2018    ALT 25 01/05/2016    AST 16 02/16/2018    AST 14 01/05/2016       Lab Results   Component Value Date    HGBA1C 9 6 06/26/2019    HGBA1C 7 8 (H) 02/16/2018    HGBA1C 9 8 (H) 01/05/2016       No results found for: TROPONINI, CKMB, CKTOTAL    Lipid Profile:   Lab Results   Component Value Date    CHOL 230 01/05/2016    CHOL 149 09/15/2015     Lab Results   Component Value Date    HDL 72 (H) 02/16/2018    HDL 48 06/09/2016     Lab Results   Component Value Date    LDLCALC 108 (H) 02/16/2018    LDLCALC 123 (H) 06/09/2016     Lab Results   Component Value Date    TRIG 148 02/16/2018    TRIG 130 06/09/2016       Imaging Results:  CT chest high resolution  Narrative: CT CHEST INCLUDING HIGH RESOLUTION SLICES - WITHOUT CONTRAST    INDICATION:   J84 10: Pulmonary fibrosis, unspecified  History of rheumatoid arthritis  Former smoker  COMPARISON:  None available  TECHNIQUE: CT examination of the chest was performed without intravenous contrast   Contiguous 1 25 mm transverse images were obtained along with 1 x 10 mm transverse images with the patient prone  Additional thin section images were performed at 10 mm   intervals in supine and prone positioning in inspiration as well as supine in expiration  Reformatted images were created in sagittal, and coronal planes    MIP reconstructions were created in the transverse and coronal planes along with minIP   reconstructions in the coronal plane  Sagittal reformatted images were also created  Radiation dose length product (DLP) for this visit:  500 mGy-cm   This examination, like all CT scans performed in the [facility], was performed utilizing techniques to minimize radiation dose exposure, including the use of iterative   reconstruction and automated exposure control  FINDINGS:    LUNGS:  Severe background emphysema with an upper lobe predominance  Areas of early honeycombing in the mid to lower lung zones bilaterally with associated scattered areas of groundglass opacity throughout both lungs  No diffuse nodularity  Minimal   bronchiectasis in the posterior aspect of the right upper lobe  No dominant focal pulmonary nodules  No endotracheal or endobronchial lesion  PLEURA:  Unremarkable  VESSELS:  Coronary artery calcifications  No aortic aneurysm  HEART:  Mild cardiomegaly  MEDIASTINUM AND [place]:  Calcified mediastinal and right hilar lymph nodes  CHEST WALL AND LOWER NECK:   Unremarkable  VISUALIZED STRUCTURES IN THE UPPER ABDOMEN:  Unremarkable  OSSEOUS STRUCTURES:  No acute fracture or destructive osseous lesion  Impression: The patient demonstrates a combination of emphysema and interstitial lung disease  The distribution of early honeycombing suggesting possible UIP pattern which can be seen in IPF or collagen vascular disorders  Scattered groundglass opacities   suggesting an acute inflammatory component  Mild cardiomegaly  The study was marked in [facility] for immediate notification      Workstation performed: [id number]       Health Maintenance:  Health Maintenance   Topic Date Due    Hepatitis C Screening  1945    MAMMOGRAM  1945    BMI: Followup Plan  04/16/1963    HEPATITIS B VACCINES (1 of 3 - Risk 3-dose series) 04/16/1964    DTaP,Tdap,and Td Vaccines (1 - Tdap) 04/16/1966    Pneumococcal Vaccine: 65+ Years (2 of 2 - PCV13) 12/20/2017    Medicare Annual Wellness Visit (AWV)  02/16/2019    INFLUENZA VACCINE  07/01/2019    HEMOGLOBIN A1C  12/26/2019    DXA SCAN  03/19/2020    Diabetic Foot Exam  03/26/2020    DM Eye Exam  06/26/2020    URINE MICROALBUMIN  06/26/2020    Fall Risk  07/03/2020    Urinary Incontinence Screening  07/03/2020    BMI: Adult  07/03/2020    CRC Screening: Colonoscopy  11/05/2020    Pneumococcal Vaccine: Pediatrics (0 to 5 Years) and At-Risk Patients (6 to 59 Years)  Aged [employer] History   Administered Date(s) Administered    INFLUENZA 09/01/2015, 12/20/2016, 11/10/2017    Influenza Split High Dose Preservative Free IM 12/20/2016, 11/10/2017    Influenza TIV (IM) 10/01/2014, 09/01/2015    Influenza, high dose seasonal 0 5 mL 10/17/2018    Pneumococcal Conjugate 13-Valent 07/03/2019    Pneumococcal Polysaccharide PPV23 04/21/2008, 12/20/2016    Tdap 1945         Meredith Mccallum MD  7/3/2019,2:16 PM

## 2019-07-04 DIAGNOSIS — J01.21 ACUTE RECURRENT ETHMOIDAL SINUSITIS: ICD-10-CM

## 2019-07-05 RX ORDER — LORATADINE 10 MG/1
TABLET ORAL
Qty: 15 TABLET | Refills: 0 | Status: SHIPPED | OUTPATIENT
Start: 2019-07-05 | End: 2020-06-22

## 2019-07-08 ENCOUNTER — TELEPHONE (OUTPATIENT)
Dept: INTERNAL MEDICINE CLINIC | Facility: CLINIC | Age: 74
End: 2019-07-08

## 2019-07-11 ENCOUNTER — TELEPHONE (OUTPATIENT)
Dept: INTERNAL MEDICINE CLINIC | Facility: CLINIC | Age: 74
End: 2019-07-11

## 2019-07-11 DIAGNOSIS — F51.01 PRIMARY INSOMNIA: Primary | ICD-10-CM

## 2019-07-11 RX ORDER — NORTRIPTYLINE HYDROCHLORIDE 25 MG/1
25 CAPSULE ORAL
Qty: 90 CAPSULE | Refills: 1 | Status: SHIPPED | OUTPATIENT
Start: 2019-07-11 | End: 2019-11-14

## 2019-07-11 NOTE — TELEPHONE ENCOUNTER
Please inform the patient that amitriptyline is no longer covered by insurance  Nortriptyline was sent to the pharmacy instead of that  Please tell her to pick it up

## 2019-07-23 ENCOUNTER — HOSPITAL ENCOUNTER (OUTPATIENT)
Dept: CT IMAGING | Facility: HOSPITAL | Age: 74
Discharge: HOME | End: 2019-07-23
Payer: MEDICARE

## 2019-07-23 DIAGNOSIS — J96.01 ACUTE RESPIRATORY FAILURE WITH HYPOXIA (HCC): ICD-10-CM

## 2019-07-23 PROCEDURE — 71250 CT THORAX DX C-: CPT

## 2019-07-26 ENCOUNTER — OFFICE VISIT (OUTPATIENT)
Dept: PULMONOLOGY | Facility: CLINIC | Age: 74
End: 2019-07-26
Payer: MEDICARE

## 2019-07-26 VITALS
SYSTOLIC BLOOD PRESSURE: 114 MMHG | BODY MASS INDEX: 40.51 KG/M2 | DIASTOLIC BLOOD PRESSURE: 84 MMHG | OXYGEN SATURATION: 94 % | WEIGHT: 193 LBS | HEIGHT: 58 IN | HEART RATE: 84 BPM

## 2019-07-26 DIAGNOSIS — E66.01 MORBID OBESITY (HCC): ICD-10-CM

## 2019-07-26 DIAGNOSIS — J30.9 ALLERGIC RHINITIS, UNSPECIFIED SEASONALITY, UNSPECIFIED TRIGGER: ICD-10-CM

## 2019-07-26 DIAGNOSIS — M05.9 RHEUMATOID ARTHRITIS WITH POSITIVE RHEUMATOID FACTOR, INVOLVING UNSPECIFIED SITE (HCC): ICD-10-CM

## 2019-07-26 DIAGNOSIS — J84.9 INTERSTITIAL LUNG DISEASE (HCC): ICD-10-CM

## 2019-07-26 DIAGNOSIS — B37.0 THRUSH: ICD-10-CM

## 2019-07-26 DIAGNOSIS — R06.02 SHORTNESS OF BREATH: Primary | ICD-10-CM

## 2019-07-26 DIAGNOSIS — J45.40 MODERATE PERSISTENT ASTHMA WITHOUT COMPLICATION: ICD-10-CM

## 2019-07-26 DIAGNOSIS — J96.11 CHRONIC RESPIRATORY FAILURE WITH HYPOXIA (HCC): ICD-10-CM

## 2019-07-26 PROCEDURE — 99214 OFFICE O/P EST MOD 30 MIN: CPT | Performed by: PHYSICIAN ASSISTANT

## 2019-07-26 RX ORDER — PREDNISONE 10 MG/1
TABLET ORAL
Qty: 80 TABLET | Refills: 0 | Status: SHIPPED | OUTPATIENT
Start: 2019-07-26 | End: 2019-09-30 | Stop reason: HOSPADM

## 2019-07-26 RX ORDER — FLUTICASONE PROPIONATE 50 MCG
1 SPRAY, SUSPENSION (ML) NASAL DAILY
Qty: 1 BOTTLE | Refills: 3 | Status: SHIPPED | OUTPATIENT
Start: 2019-07-26 | End: 2020-11-17

## 2019-07-26 NOTE — PATIENT INSTRUCTIONS
Follow-up for your next appointment in approximately 3 months  Please do not hesitate to call the office prior to your next appointment should you have any questions or concerns  Worrisome symptoms that should prompt a call to 911 or visit to the ER include chest pain, severe shortness of breath, cyanosis (blue discoloration of the skin), dizziness/light-headedness, passing out  Continue to follow up with your Primary Care Provider and any other specialists you see

## 2019-07-26 NOTE — PROGRESS NOTES
Assessment:    1  Shortness of breath  predniSONE 10 mg tablet    Complete PFT with post Bronchodilator and Six Minute walk   2  Allergic rhinitis, unspecified seasonality, unspecified trigger  fluticasone (FLONASE) 50 mcg/act nasal spray    sodium chloride (OCEAN) 0 65 % nasal spray   3  Thrush  nystatin (MYCOSTATIN) 500,000 units/5 mL suspension   4  Interstitial lung disease ([facility] [address] )     5  Rheumatoid arthritis with positive rheumatoid factor, involving unspecified site ([facility] )     6  Morbid obesity ([facility] )     7  Chronic respiratory failure with hypoxia (HCC)     8  Moderate persistent asthma without complication         Plan:   Patient presents today for follow-up, has been doing much better since her last visit  Using the 2 liters via nasal cannula as needed and nocturnally, saturating well on room air here in the office today  Chest CT with stable interstitial changes  Resolution of previously seen ground glass opacities which may represent resolved pulmonary edema  Has received an additional dose of the Humira since her last visit without worsening respiratory symptoms  Now that her respiratory status is stable, will have her go for complete PFTs with 6 minutes walk in the future  Feels that the Breo and nebulizer treatments have been helping her  Does have some thrush on exam, prescribed nystatin and educated her on proper inhaler use with rinsing mouth out afterwards  Flonase and Ocean nasal spray for allergic rhinitis  Also provided her a prescription for prednisone to have on hand should she need it  She will give us a call if she does  All of the patient's and her son's questions were answered to their satisfaction and understanding, which was verbalized  Patient was advised to call the office prior to next appointment should they have any questions or concerns prior    Patient made aware of worrisome symptoms that should prompt a visit to the ER or call to 911 such as chest pain, severe shortness of breath, cyanosis, throat closing, syncope, etc     Subjective:     Patient ID: Clemencia Lay is a 76 y o  female  Chief Complaint:   Ebony Pitt is a pleasant 51-year-old female former smoker with past medical history including interstitial lung disease, asthma, chronic respiratory failure on 2 liters as needed, rheumatoid arthritis on Humira, diabetes, GERD, hypertension, CHF  She presents today for follow-up with her son present  Her breathing has greatly improved since her last visit  Using the 2 liters as needed with exertion and nocturnally  Is not on any prednisone, has received another dose of Humira without worsening of her respiratory symptoms  Since her last visit, has been using the [facility] and benefitting from it  She also completed a CT chest which showed stable interstitial changes and resolution of previously seen ground-glass opacities which likely represented pulmonary edema  She does continue to have nasal congestion, will treat with Ocean and Flonase nasal sprays  Now that her respiratory status has become more stable, she will go for complete PFTs to better assess her pulmonary functioning  She quit smoking about 20 years ago  The following portions of the patient's history were reviewed in this encounter and updated as appropriate: Past medical, social, surgical, family, allergies    Review of Systems   Constitutional: Negative for activity change, chills, fatigue and fever  HENT: Positive for congestion and postnasal drip  Respiratory: Positive for shortness of breath and wheezing  Negative for cough  Cardiovascular: Negative for chest pain and leg swelling  Gastrointestinal: Negative  Musculoskeletal: Negative for gait problem  Neurological: Negative for dizziness, syncope, facial asymmetry and light-headedness  All other systems reviewed and are negative  Objective:    Physical Exam   Constitutional: She appears well-developed and well-nourished  No distress  Obese   HENT:   Head: Normocephalic and atraumatic  Right Ear: External ear normal    Left Ear: External ear normal    Nose: Nose normal    + thrush   Eyes: Conjunctivae and EOM are normal  Right eye exhibits no discharge  Left eye exhibits no discharge  No scleral icterus  Neck: Normal range of motion  Neck supple  No tracheal deviation present  Short and wide neck   Cardiovascular: Normal rate, regular rhythm, normal heart sounds and intact distal pulses  Exam reveals no gallop and no friction rub  No murmur heard  Pulmonary/Chest: Effort normal and breath sounds normal  No stridor  No respiratory distress  She has no wheezes  She has no rales  Saturating well on room air without any increased work of breathing  Lungs clear  Abdominal: Soft  She exhibits no distension  There is no tenderness  Musculoskeletal: She exhibits edema (Bilateral lower extremity edema)  She exhibits no tenderness or deformity  + deconditioned, uses a walker   Neurological: She is alert  No cranial nerve deficit  Oriented   Skin: Skin is warm and dry  She is not diaphoretic  No pallor  Psychiatric: She has a normal mood and affect  Her behavior is normal  Judgment and thought content normal    Nursing note and vitals reviewed  Lab Review:   Orders Only on 06/26/2019   Component Date Value    Hemoglobin A1C 06/26/2019 9 6     EXT Creatinine Urine 06/26/2019 36 6     Microalbum  ,U,Random 06/26/2019 2 0     EXTERNAL Microalb/Creat * 06/26/2019 54 6    Orders Only on 06/26/2019   Component Date Value    Right Eye Diabetic Retin* 06/26/2019 None     Left Eye Diabetic Retino* 06/26/2019 None

## 2019-07-28 PROBLEM — J96.11 CHRONIC RESPIRATORY FAILURE WITH HYPOXIA (HCC): Status: ACTIVE | Noted: 2019-07-28

## 2019-09-02 DIAGNOSIS — Z79.4 TYPE 2 DIABETES MELLITUS WITH COMPLICATION, WITH LONG-TERM CURRENT USE OF INSULIN (HCC): ICD-10-CM

## 2019-09-02 DIAGNOSIS — E11.8 TYPE 2 DIABETES MELLITUS WITH COMPLICATION, WITH LONG-TERM CURRENT USE OF INSULIN (HCC): ICD-10-CM

## 2019-09-17 ENCOUNTER — TELEPHONE (OUTPATIENT)
Dept: CARDIOLOGY CLINIC | Facility: CLINIC | Age: 74
End: 2019-09-17

## 2019-09-17 NOTE — TELEPHONE ENCOUNTER
Please place on cancellation list   If 1 spot opens up, the spouse can be over booked but not both of them   Thanks

## 2019-09-27 ENCOUNTER — TELEPHONE (OUTPATIENT)
Dept: CARDIOLOGY CLINIC | Facility: CLINIC | Age: 74
End: 2019-09-27

## 2019-09-27 NOTE — TELEPHONE ENCOUNTER
I spoke to pt and had to cancel her appt with Dr Ness Villa on 09/30/19 since the doctor has to be in the hospital that day  I offered pt an appt on Friday 10/04/19 at 9:20am and she said it was too early  I also offered pt appts in November and she said they were too early also  Adding pt to recall list for when Dr Ness Villa has an afternoon in [place]

## 2019-09-28 ENCOUNTER — HOSPITAL ENCOUNTER (INPATIENT)
Facility: HOSPITAL | Age: 74
LOS: 1 days | Discharge: HOME HEALTH | DRG: 603 | End: 2019-09-30
Attending: EMERGENCY MEDICINE | Admitting: INTERNAL MEDICINE
Payer: MEDICARE

## 2019-09-28 DIAGNOSIS — E87.1 HYPONATREMIA: ICD-10-CM

## 2019-09-28 DIAGNOSIS — L03.119 CELLULITIS OF LOWER EXTREMITY, UNSPECIFIED LATERALITY: Primary | ICD-10-CM

## 2019-09-28 DIAGNOSIS — M05.9 RHEUMATOID ARTHRITIS WITH POSITIVE RHEUMATOID FACTOR, INVOLVING UNSPECIFIED SITE (HCC): ICD-10-CM

## 2019-09-28 PROBLEM — L03.90 CELLULITIS: Status: ACTIVE | Noted: 2019-09-28

## 2019-09-28 PROBLEM — I87.2 VENOUS STASIS DERMATITIS OF BOTH LOWER EXTREMITIES: Status: ACTIVE | Noted: 2019-09-28

## 2019-09-28 LAB
ALBUMIN SERPL BCP-MCNC: 3 G/DL (ref 3.5–5)
ALP SERPL-CCNC: 116 U/L (ref 46–116)
ALT SERPL W P-5'-P-CCNC: 13 U/L (ref 12–78)
ANION GAP SERPL CALCULATED.3IONS-SCNC: 8 MMOL/L (ref 4–13)
AST SERPL W P-5'-P-CCNC: 22 U/L (ref 5–45)
BASOPHILS # BLD AUTO: 0.05 THOUSANDS/ΜL (ref 0–0.1)
BASOPHILS NFR BLD AUTO: 0 % (ref 0–1)
BILIRUB SERPL-MCNC: 0.3 MG/DL (ref 0.2–1)
BUN SERPL-MCNC: 20 MG/DL (ref 5–25)
CALCIUM SERPL-MCNC: 9.4 MG/DL (ref 8.3–10.1)
CHLORIDE SERPL-SCNC: 94 MMOL/L (ref 100–108)
CO2 SERPL-SCNC: 27 MMOL/L (ref 21–32)
CREAT SERPL-MCNC: 1.18 MG/DL (ref 0.6–1.3)
CRP SERPL QL: 66.3 MG/L
EOSINOPHIL # BLD AUTO: 0.15 THOUSAND/ΜL (ref 0–0.61)
EOSINOPHIL NFR BLD AUTO: 1 % (ref 0–6)
ERYTHROCYTE [DISTWIDTH] IN BLOOD BY AUTOMATED COUNT: 14.2 % (ref 11.6–15.1)
ERYTHROCYTE [SEDIMENTATION RATE] IN BLOOD: 90 MM/HOUR (ref 0–20)
GFR SERPL CREATININE-BSD FRML MDRD: 46 ML/MIN/1.73SQ M
GLUCOSE SERPL-MCNC: 146 MG/DL (ref 65–140)
GLUCOSE SERPL-MCNC: 159 MG/DL (ref 65–140)
GLUCOSE SERPL-MCNC: 53 MG/DL (ref 65–140)
GLUCOSE SERPL-MCNC: 69 MG/DL (ref 65–140)
HCT VFR BLD AUTO: 44.4 % (ref 34.8–46.1)
HGB BLD-MCNC: 13.9 G/DL (ref 11.5–15.4)
IMM GRANULOCYTES # BLD AUTO: 0.05 THOUSAND/UL (ref 0–0.2)
IMM GRANULOCYTES NFR BLD AUTO: 0 % (ref 0–2)
LYMPHOCYTES # BLD AUTO: 2.39 THOUSANDS/ΜL (ref 0.6–4.47)
LYMPHOCYTES NFR BLD AUTO: 21 % (ref 14–44)
MCH RBC QN AUTO: 28.4 PG (ref 26.8–34.3)
MCHC RBC AUTO-ENTMCNC: 31.3 G/DL (ref 31.4–37.4)
MCV RBC AUTO: 91 FL (ref 82–98)
MONOCYTES # BLD AUTO: 1.06 THOUSAND/ΜL (ref 0.17–1.22)
MONOCYTES NFR BLD AUTO: 9 % (ref 4–12)
NEUTROPHILS # BLD AUTO: 7.86 THOUSANDS/ΜL (ref 1.85–7.62)
NEUTS SEG NFR BLD AUTO: 69 % (ref 43–75)
NRBC BLD AUTO-RTO: 0 /100 WBCS
NT-PROBNP SERPL-MCNC: 122 PG/ML
PLATELET # BLD AUTO: 308 THOUSANDS/UL (ref 149–390)
PMV BLD AUTO: 10.3 FL (ref 8.9–12.7)
POTASSIUM SERPL-SCNC: 3.9 MMOL/L (ref 3.5–5.3)
PROT SERPL-MCNC: 8.7 G/DL (ref 6.4–8.2)
RBC # BLD AUTO: 4.9 MILLION/UL (ref 3.81–5.12)
SODIUM SERPL-SCNC: 129 MMOL/L (ref 136–145)
WBC # BLD AUTO: 11.56 THOUSAND/UL (ref 4.31–10.16)

## 2019-09-28 PROCEDURE — 85025 COMPLETE CBC W/AUTO DIFF WBC: CPT | Performed by: EMERGENCY MEDICINE

## 2019-09-28 PROCEDURE — 83880 ASSAY OF NATRIURETIC PEPTIDE: CPT | Performed by: EMERGENCY MEDICINE

## 2019-09-28 PROCEDURE — 80053 COMPREHEN METABOLIC PANEL: CPT | Performed by: EMERGENCY MEDICINE

## 2019-09-28 PROCEDURE — 99284 EMERGENCY DEPT VISIT MOD MDM: CPT

## 2019-09-28 PROCEDURE — 85652 RBC SED RATE AUTOMATED: CPT | Performed by: PHYSICIAN ASSISTANT

## 2019-09-28 PROCEDURE — 99285 EMERGENCY DEPT VISIT HI MDM: CPT | Performed by: EMERGENCY MEDICINE

## 2019-09-28 PROCEDURE — 82948 REAGENT STRIP/BLOOD GLUCOSE: CPT

## 2019-09-28 PROCEDURE — 99220 PR INITIAL OBSERVATION CARE/DAY 70 MINUTES: CPT | Performed by: INTERNAL MEDICINE

## 2019-09-28 PROCEDURE — 86140 C-REACTIVE PROTEIN: CPT | Performed by: PHYSICIAN ASSISTANT

## 2019-09-28 PROCEDURE — 36415 COLL VENOUS BLD VENIPUNCTURE: CPT | Performed by: EMERGENCY MEDICINE

## 2019-09-28 PROCEDURE — 1124F ACP DISCUSS-NO DSCNMKR DOCD: CPT | Performed by: INTERNAL MEDICINE

## 2019-09-28 PROCEDURE — 96374 THER/PROPH/DIAG INJ IV PUSH: CPT

## 2019-09-28 RX ORDER — LORATADINE 10 MG/1
10 TABLET ORAL DAILY
Status: DISCONTINUED | OUTPATIENT
Start: 2019-09-29 | End: 2019-09-30 | Stop reason: HOSPADM

## 2019-09-28 RX ORDER — INSULIN GLARGINE 100 [IU]/ML
50 INJECTION, SOLUTION SUBCUTANEOUS
Status: DISCONTINUED | OUTPATIENT
Start: 2019-09-28 | End: 2019-09-28

## 2019-09-28 RX ORDER — SODIUM CHLORIDE 9 MG/ML
75 INJECTION, SOLUTION INTRAVENOUS ONCE
Status: COMPLETED | OUTPATIENT
Start: 2019-09-28 | End: 2019-09-28

## 2019-09-28 RX ORDER — FLUTICASONE FUROATE AND VILANTEROL 100; 25 UG/1; UG/1
1 POWDER RESPIRATORY (INHALATION) DAILY
Status: DISCONTINUED | OUTPATIENT
Start: 2019-09-29 | End: 2019-09-30 | Stop reason: HOSPADM

## 2019-09-28 RX ORDER — HYDROCODONE BITARTRATE AND ACETAMINOPHEN 5; 325 MG/1; MG/1
2 TABLET ORAL EVERY 6 HOURS PRN
Status: DISCONTINUED | OUTPATIENT
Start: 2019-09-28 | End: 2019-09-30 | Stop reason: HOSPADM

## 2019-09-28 RX ORDER — INSULIN GLARGINE 100 [IU]/ML
50 INJECTION, SOLUTION SUBCUTANEOUS EVERY MORNING
Status: DISCONTINUED | OUTPATIENT
Start: 2019-09-29 | End: 2019-09-29

## 2019-09-28 RX ORDER — CEPHALEXIN 500 MG/1
500 CAPSULE ORAL EVERY 6 HOURS SCHEDULED
Status: DISCONTINUED | OUTPATIENT
Start: 2019-09-29 | End: 2019-09-29

## 2019-09-28 RX ORDER — OXYCODONE AND ACETAMINOPHEN 10; 325 MG/1; MG/1
1 TABLET ORAL EVERY 8 HOURS PRN
Status: DISCONTINUED | OUTPATIENT
Start: 2019-09-28 | End: 2019-09-28

## 2019-09-28 RX ORDER — MIRTAZAPINE 15 MG/1
15 TABLET, FILM COATED ORAL
Status: DISCONTINUED | OUTPATIENT
Start: 2019-09-28 | End: 2019-09-30 | Stop reason: HOSPADM

## 2019-09-28 RX ORDER — NORTRIPTYLINE HYDROCHLORIDE 25 MG/1
25 CAPSULE ORAL
Status: DISCONTINUED | OUTPATIENT
Start: 2019-09-28 | End: 2019-09-30 | Stop reason: HOSPADM

## 2019-09-28 RX ORDER — ACETAMINOPHEN 325 MG/1
975 TABLET ORAL EVERY 8 HOURS SCHEDULED
Status: DISCONTINUED | OUTPATIENT
Start: 2019-09-28 | End: 2019-09-30 | Stop reason: HOSPADM

## 2019-09-28 RX ORDER — ALBUTEROL SULFATE 90 UG/1
2 AEROSOL, METERED RESPIRATORY (INHALATION) EVERY 6 HOURS PRN
Status: DISCONTINUED | OUTPATIENT
Start: 2019-09-28 | End: 2019-09-30 | Stop reason: HOSPADM

## 2019-09-28 RX ORDER — AMITRIPTYLINE HYDROCHLORIDE 25 MG/1
25 TABLET, FILM COATED ORAL
Status: DISCONTINUED | OUTPATIENT
Start: 2019-09-28 | End: 2019-09-28 | Stop reason: ALTCHOICE

## 2019-09-28 RX ORDER — DULOXETIN HYDROCHLORIDE 20 MG/1
20 CAPSULE, DELAYED RELEASE ORAL DAILY
Status: DISCONTINUED | OUTPATIENT
Start: 2019-09-29 | End: 2019-09-30 | Stop reason: HOSPADM

## 2019-09-28 RX ORDER — PANTOPRAZOLE SODIUM 40 MG/1
40 TABLET, DELAYED RELEASE ORAL DAILY
Status: DISCONTINUED | OUTPATIENT
Start: 2019-09-29 | End: 2019-09-30 | Stop reason: HOSPADM

## 2019-09-28 RX ORDER — MORPHINE SULFATE 4 MG/ML
4 INJECTION, SOLUTION INTRAMUSCULAR; INTRAVENOUS ONCE
Status: COMPLETED | OUTPATIENT
Start: 2019-09-28 | End: 2019-09-28

## 2019-09-28 RX ORDER — CYCLOBENZAPRINE HCL 10 MG
5 TABLET ORAL
Status: DISCONTINUED | OUTPATIENT
Start: 2019-09-28 | End: 2019-09-30 | Stop reason: HOSPADM

## 2019-09-28 RX ORDER — LISINOPRIL 10 MG/1
10 TABLET ORAL DAILY
Status: DISCONTINUED | OUTPATIENT
Start: 2019-09-29 | End: 2019-09-30 | Stop reason: HOSPADM

## 2019-09-28 RX ORDER — ALBUTEROL SULFATE 2.5 MG/3ML
2.5 SOLUTION RESPIRATORY (INHALATION) EVERY 6 HOURS PRN
Status: DISCONTINUED | OUTPATIENT
Start: 2019-09-28 | End: 2019-09-30 | Stop reason: HOSPADM

## 2019-09-28 RX ORDER — CEFAZOLIN SODIUM 1 G/50ML
1000 SOLUTION INTRAVENOUS ONCE
Status: COMPLETED | OUTPATIENT
Start: 2019-09-28 | End: 2019-09-28

## 2019-09-28 RX ORDER — AMITRIPTYLINE HYDROCHLORIDE 25 MG/1
25 TABLET, FILM COATED ORAL
COMMUNITY
End: 2020-11-17

## 2019-09-28 RX ORDER — ONDANSETRON 2 MG/ML
4 INJECTION INTRAMUSCULAR; INTRAVENOUS EVERY 6 HOURS PRN
Status: DISCONTINUED | OUTPATIENT
Start: 2019-09-28 | End: 2019-09-30 | Stop reason: HOSPADM

## 2019-09-28 RX ORDER — MONTELUKAST SODIUM 10 MG/1
10 TABLET ORAL
Status: DISCONTINUED | OUTPATIENT
Start: 2019-09-28 | End: 2019-09-30 | Stop reason: HOSPADM

## 2019-09-28 RX ORDER — OMEPRAZOLE AND SODIUM BICARBONATE 40; 1100 MG/1; MG/1
1 CAPSULE ORAL
COMMUNITY
End: 2020-11-17

## 2019-09-28 RX ORDER — HEPARIN SODIUM 5000 [USP'U]/ML
5000 INJECTION, SOLUTION INTRAVENOUS; SUBCUTANEOUS EVERY 8 HOURS SCHEDULED
Status: DISCONTINUED | OUTPATIENT
Start: 2019-09-28 | End: 2019-09-30 | Stop reason: HOSPADM

## 2019-09-28 RX ORDER — FLUTICASONE PROPIONATE 50 MCG
1 SPRAY, SUSPENSION (ML) NASAL DAILY
Status: DISCONTINUED | OUTPATIENT
Start: 2019-09-29 | End: 2019-09-30 | Stop reason: HOSPADM

## 2019-09-28 RX ORDER — METOPROLOL SUCCINATE 25 MG/1
25 TABLET, EXTENDED RELEASE ORAL DAILY
Status: DISCONTINUED | OUTPATIENT
Start: 2019-09-29 | End: 2019-09-30 | Stop reason: HOSPADM

## 2019-09-28 RX ORDER — ECHINACEA PURPUREA EXTRACT 125 MG
1 TABLET ORAL AS NEEDED
Status: DISCONTINUED | OUTPATIENT
Start: 2019-09-28 | End: 2019-09-30 | Stop reason: HOSPADM

## 2019-09-28 RX ORDER — PREDNISONE 20 MG/1
20 TABLET ORAL 2 TIMES DAILY WITH MEALS
Status: DISCONTINUED | OUTPATIENT
Start: 2019-09-29 | End: 2019-09-30 | Stop reason: HOSPADM

## 2019-09-28 RX ORDER — PRAVASTATIN SODIUM 40 MG
40 TABLET ORAL
Status: DISCONTINUED | OUTPATIENT
Start: 2019-09-29 | End: 2019-09-30 | Stop reason: HOSPADM

## 2019-09-28 RX ORDER — BACLOFEN 10 MG/1
10 TABLET ORAL
Status: DISCONTINUED | OUTPATIENT
Start: 2019-09-28 | End: 2019-09-30 | Stop reason: HOSPADM

## 2019-09-28 RX ORDER — ESCITALOPRAM OXALATE 10 MG/1
20 TABLET ORAL DAILY
Status: DISCONTINUED | OUTPATIENT
Start: 2019-09-29 | End: 2019-09-30 | Stop reason: HOSPADM

## 2019-09-28 RX ADMIN — HEPARIN SODIUM 5000 UNITS: 5000 INJECTION INTRAVENOUS; SUBCUTANEOUS at 22:40

## 2019-09-28 RX ADMIN — CYCLOBENZAPRINE HYDROCHLORIDE 5 MG: 10 TABLET, FILM COATED ORAL at 22:36

## 2019-09-28 RX ADMIN — NORTRIPTYLINE HYDROCHLORIDE 25 MG: 25 CAPSULE ORAL at 22:36

## 2019-09-28 RX ADMIN — MORPHINE SULFATE 4 MG: 4 INJECTION INTRAVENOUS at 16:57

## 2019-09-28 RX ADMIN — SODIUM CHLORIDE 75 ML/HR: 0.9 INJECTION, SOLUTION INTRAVENOUS at 23:41

## 2019-09-28 RX ADMIN — HYDROCODONE BITARTRATE AND ACETAMINOPHEN 2 TABLET: 5; 325 TABLET ORAL at 22:34

## 2019-09-28 RX ADMIN — MIRTAZAPINE 15 MG: 15 TABLET, FILM COATED ORAL at 22:35

## 2019-09-28 RX ADMIN — CEFAZOLIN SODIUM 1000 MG: 1 SOLUTION INTRAVENOUS at 19:53

## 2019-09-28 RX ADMIN — MONTELUKAST 10 MG: 10 TABLET, FILM COATED ORAL at 22:35

## 2019-09-28 RX ADMIN — MORPHINE SULFATE 4 MG: 4 INJECTION INTRAVENOUS at 19:53

## 2019-09-28 NOTE — ASSESSMENT & PLAN NOTE
Right lower extremity appears slightly red and tender  She is immunosuppressed, will treat empirically with antibiotics  Continue to monitor CBC and temps

## 2019-09-28 NOTE — ASSESSMENT & PLAN NOTE
Lab Results   Component Value Date    HGBA1C 9 6 06/26/2019       No results for input(s): POCGLU in the last 72 hours  Blood Sugar Average: Last 72 hrs:   continue Lantus 50 units in the a m    Sliding scale and monitor sugars

## 2019-09-28 NOTE — ASSESSMENT & PLAN NOTE
Wt Readings from Last 3 Encounters:   07/26/19 87 5 kg (193 lb)   07/03/19 90 4 kg (199 lb 3 2 oz)   06/27/19 88 9 kg (196 lb)       Monitor I&Os and daily weights

## 2019-09-28 NOTE — ASSESSMENT & PLAN NOTE
Chronic lower extremity edema on torsemide 20 mg  Will hold off torsemide today as she appears a little dehydrated    Gentle IV fluid hydration  Monitor lytes and monitor I&Os

## 2019-09-28 NOTE — ED NOTES
Patient family at the bedside talking to each other during medication administration and education  Patient family yells "wait what are you giving her?  You can't just give her medication without telling us  "      Candido Wang RN  09/28/19 [id number]

## 2019-09-28 NOTE — ED PROVIDER NOTES
History  Chief Complaint   Patient presents with    Leg Swelling     Patient c/o bilateral leg swelling, pain and redness x 1 week  Per son, "this has been ongoing for 2 years and she always goes to [facility] and they give her medication and send her home and I'm sick of it  "        History provided by:  Patient  Leg Pain   Location:  Knee  Time since incident:  1 week  Injury: no    Pain details:     Quality:  Aching, pressure and throbbing    Radiates to:  Does not radiate    Severity:  Moderate    Onset quality:  Gradual    Duration:  1 week    Timing:  Constant    Progression:  Worsening  Chronicity:  Chronic  Prior injury to area:  No  Relieved by:  Nothing  Worsened by:  Nothing  Ineffective treatments: oxycodone  Associated symptoms: swelling    Associated symptoms: no back pain, no decreased ROM, no fever, no muscle weakness, no neck pain, no numbness, no stiffness and no tingling        Prior to Admission Medications   Prescriptions Last Dose Informant Patient Reported? Taking? B-D TB SYRINGE 1CC/27GX1/2" 27G X 1/2" 1 ML MISC  Self Yes No   Sig: INJECT METHOTREXATE 20 MG (0 8ML) SUBCUTANEOUS ONCE WEEKLY   Blood Glucose Monitoring Suppl ([employer]) w/Device KIT  Self Yes No   Sig: by Does not apply route 3 (three) times a day   DULoxetine (CYMBALTA) 20 mg capsule   No No   Sig: Take 1 capsule (20 mg total) by mouth daily   Insulin Pen Needle (B-D UF III MINI PEN NEEDLES) 31G X 5 MM MISC   No No   Sig: Inject under the skin 2 (two) times a day Use as directed   NYSTATIN powder  Self Yes No   Sig: APPLY 2-3 TIMES DAILY TO AFFECTED AREA(S)     ONE TOUCH ULTRA TEST test strip   No No   Sig: TEST 3 TIMES A DAY   Tocilizumab (ACTEMRA) 162 MG/0 9ML SOSY   Yes Yes   Sig: Inject under the skin   adalimumab (HUMIRA) 40 mg/0 8 mL PSKT   Yes No   Sig: Inject 40 mg under the skin every 14 (fourteen) days   albuterol (2 5 mg/3 mL) 0 083 % nebulizer solution   No No   Sig: Take 1 vial (2 5 mg total) by nebulization every 6 (six) hours as needed for wheezing or shortness of breath   albuterol (PROAIR HFA) 90 mcg/act inhaler   No No   Sig: Inhale 2 puffs every 6 (six) hours as needed for wheezing or shortness of breath   amitriptyline (ELAVIL) 25 mg tablet   Yes Yes   Sig: Take 25 mg by mouth daily at bedtime   baclofen 10 mg tablet   Yes No   Sig: Take 10 mg by mouth daily at bedtime as needed   cyclobenzaprine (FLEXERIL) 5 mg tablet  Self Yes No   Sig: TAKE 1 TABLETT BY MOUTH EVERY NIGHT AS NEEDED   escitalopram (LEXAPRO) 20 mg tablet   No No   Sig: Take 1 tablet (20 mg total) by mouth daily   fluticasone (FLONASE) 50 mcg/act nasal spray   No No   Si spray into each nostril daily   fluticasone-vilanterol (BREO ELLIPTA) 100-25 mcg/inh inhaler   No No   Sig: Inhale 1 puff daily Rinse mouth after use     fosinopril (MONOPRIL) 10 mg tablet   No No   Sig: Take 1 tablet (10 mg total) by mouth daily   insulin glargine (LANTUS SOLOSTAR) 100 units/mL injection pen   No No   Sig: INJECT 35 UNITS SUBCUTANEOUSLY IN THE MORNING AND 30 UNITS IN THE EVENING   loratadine (CLARITIN) 10 mg tablet   No No   Sig: TAKE 1 TABLET BY MOUTH EVERY DAY   metFORMIN (GLUCOPHAGE) 1000 MG tablet   No No   Sig: Take 1 tablet (1,000 mg total) by mouth 2 (two) times a day   metoprolol succinate (TOPROL-XL) 25 mg 24 hr tablet   No No   Sig: Take 1 tablet (25 mg total) by mouth daily   mirtazapine (REMERON) 15 mg tablet   No No   Sig: Take 1 tablet (15 mg total) by mouth daily at bedtime   montelukast (SINGULAIR) 10 mg tablet   No No   Sig: Take 1 tablet (10 mg total) by mouth daily at bedtime   nortriptyline (PAMELOR) 25 mg capsule   No No   Sig: Take 1 capsule (25 mg total) by mouth daily at bedtime   nystatin (MYCOSTATIN) 500,000 units/5 mL suspension   No No   Sig: Apply 5 mL (500,000 Units total) to the mouth or throat 4 (four) times a day   nystatin (MYCOSTATIN) cream   No No   Sig: Apply 2 g (1 application total) topically 2 (two) times a day To affected area   omeprazole-sodium bicarbonate (ZEGERID)  MG per capsule   Yes Yes   Sig: Take 1 capsule by mouth every morning before breakfast   oxyCODONE (ROXICODONE) 15 mg immediate release tablet  Self Yes No   Sig: Take 15 mg by mouth every 4 (four) hours as needed for moderate pain   oxyCODONE-acetaminophen (PERCOCET)  mg per tablet   Yes No   Sig: Take 1 tablet by mouth 3 (three) times a day as needed   pantoprazole (PROTONIX) 40 mg tablet   No No   Sig: Take 1 tablet (40 mg total) by mouth daily   predniSONE 10 mg tablet Not Taking at Unknown time  No No   Sig: Take 4 tablets x 7 days, then 3 tablets x 7 days, then 2 tablets x 7 days, then 1 tablet x 7 days   Patient not taking: Reported on 2019   predniSONE 5 mg tablet   Yes No   Sig: Take 5 mg by mouth daily   simvastatin (ZOCOR) 40 mg tablet   No No   Sig: Take 1 tablet (40 mg total) by mouth daily   sodium chloride (OCEAN) 0 65 % nasal spray   No No   Si spray into each nostril as needed for congestion or rhinitis   torsemide (DEMADEX) 20 mg tablet   No No   Sig: Take 2 tablets (40 mg total) by mouth 2 (two) times a day      Facility-Administered Medications: None       Past Medical History:   Diagnosis Date    Asthma     Chest pain on breathing     last assessed 2/5/15    Chronic diastolic CHF ([facility]estive [facility] failure) [facility]     [facility]     HTN (hypertension)     Hyperlipidemia     Insomnia     Obesity     Preop cardiovascular exam 2018    [facility] fibrosis [facility]        Past Surgical History:   Procedure Laterality Date    HAND SURGERY         Family History   Problem Relation Age of Onset    Rheum arthritis Mother     Hypertension Mother      I have reviewed and agree with the history as documented      Social History     Tobacco Use    Smoking status: Former Smoker     Packs/day: 1 50     Years: 18 00     Pack years: 27 00     Types: Cigarettes     Last attempt to quit:      Years since quittin 7    Smokeless tobacco: Never Used    Tobacco comment: Quit 20 years ago   Substance Use Topics    Alcohol use: No    Drug use: No        Review of Systems   Constitutional: Negative for fever  Musculoskeletal: Negative for back pain, neck pain and stiffness  All other systems reviewed and are negative  Physical Exam  Physical Exam   Constitutional: She is oriented to person, place, and time  She appears well-developed and well-nourished  No distress  HENT:   Head: Normocephalic and atraumatic  Nose: Nose normal    Mouth/Throat: Oropharynx is clear and moist    Eyes: Conjunctivae and EOM are normal    Neck: Normal range of motion  Neck supple  Cardiovascular: Regular rhythm and normal heart sounds  Tachycardia present  Pulmonary/Chest: Effort normal and breath sounds normal  No respiratory distress  She has no wheezes  Abdominal: Soft  She exhibits no distension  There is no tenderness  Musculoskeletal: Normal range of motion  She exhibits edema (bilateral equal lower extremity edema and spider angiomata with chronic venous stasis changes to bilateral shins with some hyperemia and warmth)  Neurological: She is alert and oriented to person, place, and time  Skin: Skin is warm and dry  She is not diaphoretic  Psychiatric: Her mood appears anxious  Nursing note and vitals reviewed        Vital Signs  ED Triage Vitals   Temperature Pulse Respirations Blood Pressure SpO2   19 1621 19 1621 19 1621 19 1621 19 1621   97 7 °F (36 5 °C) (!) 114 18 144/67 96 %      Temp Source Heart Rate Source Patient Position - Orthostatic VS BP Location FiO2 (%)   19 1621 19 1621 19 1621 19 1621 --   Oral Monitor Lying Right arm       Pain Score       19 1748       9           Vitals:    19 1621 19 1748 19 2053   BP: 144/67 126/60 146/67   Pulse: (!) 114 91 104   Patient Position - Orthostatic VS: Lying Lying Lying Visual Acuity  Visual Acuity      Most Recent Value   L Pupil Size (mm)  3   R Pupil Size (mm)  3          ED Medications  Medications   albuterol inhalation solution 2 5 mg (has no administration in time range)   amitriptyline (ELAVIL) tablet 25 mg (has no administration in time range)   albuterol (PROVENTIL HFA,VENTOLIN HFA) inhaler 2 puff (has no administration in time range)   baclofen tablet 10 mg (has no administration in time range)   cyclobenzaprine (FLEXERIL) tablet 5 mg (has no administration in time range)   DULoxetine (CYMBALTA) delayed release capsule 20 mg (has no administration in time range)   escitalopram (LEXAPRO) tablet 20 mg (has no administration in time range)   fluticasone (FLONASE) 50 mcg/act nasal spray 1 spray (has no administration in time range)   fluticasone-vilanterol (BREO ELLIPTA) 100-25 mcg/inh inhaler 1 puff (has no administration in time range)   lisinopril (ZESTRIL) tablet 10 mg (has no administration in time range)   loratadine (CLARITIN) tablet 10 mg (has no administration in time range)   metoprolol succinate (TOPROL-XL) 24 hr tablet 25 mg (has no administration in time range)   mirtazapine (REMERON) tablet 15 mg (has no administration in time range)   montelukast (SINGULAIR) tablet 10 mg (has no administration in time range)   nortriptyline (PAMELOR) capsule 25 mg (has no administration in time range)   oxyCODONE-acetaminophen (PERCOCET)  mg per tablet 1 tablet (has no administration in time range)   pantoprazole (PROTONIX) EC tablet 40 mg (has no administration in time range)   pravastatin (PRAVACHOL) tablet 40 mg (has no administration in time range)   sodium chloride (OCEAN) 0 65 % nasal spray 1 spray (has no administration in time range)   insulin glargine (LANTUS) subcutaneous injection 50 Units 0 5 mL (has no administration in time range)   insulin lispro (HumaLOG) 100 units/mL subcutaneous injection 1-6 Units (has no administration in time range)   insulin lispro (HumaLOG) 100 units/mL subcutaneous injection 1-5 Units (has no administration in time range)   sodium chloride 0 9 % infusion (has no administration in time range)   ondansetron (ZOFRAN) injection 4 mg (has no administration in time range)   heparin (porcine) subcutaneous injection 5,000 Units (has no administration in time range)   acetaminophen (TYLENOL) tablet 975 mg (has no administration in time range)   morphine injection 2 mg (has no administration in time range)   cephalexin (KEFLEX) capsule 500 mg (has no administration in time range)   predniSONE tablet 20 mg (has no administration in time range)   morphine (PF) 4 mg/mL injection 4 mg (4 mg Intravenous Given 9/28/19 1657)   ceFAZolin (ANCEF) IVPB (premix) 1,000 mg (1,000 mg Intravenous New Bag 9/28/19 1953)   morphine (PF) 4 mg/mL injection 4 mg (4 mg Intravenous Given 9/28/19 1953)       Diagnostic Studies  Results Reviewed     Procedure Component Value Units Date/Time    C-reactive protein [[id number]] Collected:  09/28/19 1653    Lab Status: In process Specimen:  Blood from Arm, Right Updated:  09/28/19 2115    Sedimentation rate, automated [[id number]] Collected:  09/28/19 1653    Lab Status:   In process Specimen:  Blood from Arm, Right Updated:  09/28/19 2115    B-type natriuretic peptide [[id number]]  (Normal) Collected:  09/28/19 1653    Lab Status:  Final result Specimen:  Blood from Arm, Right Updated:  09/28/19 1722     NT-proBNP 122 pg/mL     Comprehensive metabolic panel [[id number]]  (Abnormal) Collected:  09/28/19 1653    Lab Status:  Final result Specimen:  Blood from Arm, Right Updated:  09/28/19 1716     Sodium 129 mmol/L      Potassium 3 9 mmol/L      Chloride 94 mmol/L      CO2 27 mmol/L      ANION GAP 8 mmol/L      BUN 20 mg/dL      Creatinine 1 18 mg/dL      Glucose 146 mg/dL      Calcium 9 4 mg/dL      AST 22 U/L      ALT 13 U/L      Alkaline Phosphatase 116 U/L      Total Protein 8 7 g/dL      Albumin 3 0 g/dL      Total Bilirubin 0 30 mg/dL      eGFR 46 ml/min/1 73sq m     Narrative:       [place] guidelines for Chronic Kidney Disease (CKD):     Stage 1 with normal or high GFR (GFR > 90 mL/min/1 73 square meters)    Stage 2 Mild CKD (GFR = 60-89 mL/min/1 73 square meters)    Stage 3A Moderate CKD (GFR = 45-59 mL/min/1 73 square meters)    Stage 3B Moderate CKD (GFR = 30-44 mL/min/1 73 square meters)    Stage 4 Severe CKD (GFR = 15-29 mL/min/1 73 square meters)    Stage 5 End Stage CKD (GFR <15 mL/min/1 73 square meters)  Note: GFR calculation is accurate only with a steady state creatinine    CBC and differential [[id number]]  (Abnormal) Collected:  09/28/19 1653    Lab Status:  Final result Specimen:  Blood from Arm, Right Updated:  09/28/19 1659     WBC 11 56 Thousand/uL      RBC 4 90 Million/uL      Hemoglobin 13 9 g/dL      Hematocrit 44 4 %      MCV 91 fL      MCH 28 4 pg      MCHC 31 3 g/dL      RDW 14 2 %      MPV 10 3 fL      Platelets 272 Thousands/uL      nRBC 0 /100 WBCs      Neutrophils Relative 69 %      Immat GRANS % 0 %      Lymphocytes Relative 21 %      Monocytes Relative 9 %      Eosinophils Relative 1 %      Basophils Relative 0 %      Neutrophils Absolute 7 86 Thousands/µL      Immature Grans Absolute 0 05 Thousand/uL      Lymphocytes Absolute 2 39 Thousands/µL      Monocytes Absolute 1 06 Thousand/µL      Eosinophils Absolute 0 15 Thousand/µL      Basophils Absolute 0 05 Thousands/µL                  No orders to display              Procedures  Procedures       ED Course           Identification of Seniors at Risk      Most Recent Value   (ISAR) Identification of Seniors at Risk   Before the illness or injury that brought you to the Emergency, did you need someone to help you on a regular basis?   0 Filed at: 09/28/2019 1622   In the last 24 hours, have you needed more help than usual?  1 Filed at: 09/28/2019 1622   Have you been hospitalized for one or more nights during the past 6 months? 0 Filed at: 09/28/2019 1622   In general, do you see well?  0 Filed at: 09/28/2019 1622   In general, do you have serious problems with your memory? 0 Filed at: 09/28/2019 1622   Do you take more than three different medications every day? 1 Filed at: 09/28/2019 1622   ISAR Score  2 Filed at: 09/28/2019 1622                          [facility]  Number of Diagnoses or Management Options  Cellulitis of lower extremity, unspecified laterality: new and requires workup  Hyponatremia: new and requires workup     Amount and/or Complexity of Data Reviewed  Clinical lab tests: ordered and reviewed    Risk of Complications, Morbidity, and/or Mortality  Presenting problems: high        Disposition  Final diagnoses:   Cellulitis of lower extremity, unspecified laterality   Hyponatremia     Time reflects when diagnosis was documented in both MDM as applicable and the Disposition within this note     Time User Action Codes Description Comment    9/28/2019  7:06 PM [place] neck M Add [L03 [id number]] Cellulitis of lower extremity, unspecified laterality     9/28/2019  7:06 PM [address], [address] [E87 1] Hyponatremia       ED Disposition     ED Disposition Condition Date/Time Comment    Admit Stable Sat Sep 28, 2019  7:05 PM Case was discussed with MARIBEL and the patient's admission status was agreed to be Admission Status: observation status to the service of Dr [facility]           Follow-up Information    None         Current Discharge Medication List      CONTINUE these medications which have NOT CHANGED    Details   amitriptyline (ELAVIL) 25 mg tablet Take 25 mg by mouth daily at bedtime      omeprazole-sodium bicarbonate (ZEGERID)  MG per capsule Take 1 capsule by mouth every morning before breakfast      Tocilizumab (ACTEMRA) 162 MG/0 9ML SOSY Inject under the skin      adalimumab (HUMIRA) 40 mg/0 8 mL PSKT Inject 40 mg under the skin every 14 (fourteen) days      albuterol (2 5 mg/3 mL) 0 083 % nebulizer solution Take 1 vial (2 5 mg total) by nebulization every 6 (six) hours as needed for wheezing or shortness of breath  Qty: 120 vial, Refills: 3    Associated Diagnoses: Acute respiratory failure with hypoxia ([address] ); Shortness of breath      albuterol (PROAIR HFA) 90 mcg/act inhaler Inhale 2 puffs every 6 (six) hours as needed for wheezing or shortness of breath  Qty: 1 Inhaler, Refills: 3    Comments: Substitution to a formulary equivalent within the same pharmaceutical class is authorized  Associated Diagnoses: Moderate persistent asthma, unspecified whether complicated      B-D TB SYRINGE 1CC/27GX1/2" 27G X 1/2" 1 ML MISC INJECT METHOTREXATE 20 MG (0 8ML) SUBCUTANEOUS ONCE WEEKLY  Refills: 3      baclofen 10 mg tablet Take 10 mg by mouth daily at bedtime as needed  Refills: 5      Blood Glucose Monitoring Suppl Jania Ness) w/Device KIT by Does not apply route 3 (three) times a day      cyclobenzaprine (FLEXERIL) 5 mg tablet TAKE 1 TABLETT BY MOUTH EVERY NIGHT AS NEEDED  Refills: 5      DULoxetine (CYMBALTA) 20 mg capsule Take 1 capsule (20 mg total) by mouth daily  Qty: 90 capsule, Refills: 3    Associated Diagnoses: Rheumatoid arthritis with positive rheumatoid factor, involving unspecified site (HCC)      escitalopram (LEXAPRO) 20 mg tablet Take 1 tablet (20 mg total) by mouth daily  Qty: 90 tablet, Refills: 2    Associated Diagnoses: Recurrent major depressive disorder, in partial remission (HCC)      fluticasone (FLONASE) 50 mcg/act nasal spray 1 spray into each nostril daily  Qty: 1 Bottle, Refills: 3    Associated Diagnoses: Allergic rhinitis, unspecified seasonality, unspecified trigger      fluticasone-vilanterol (BREO ELLIPTA) 100-25 mcg/inh inhaler Inhale 1 puff daily Rinse mouth after use  Qty: 1 Inhaler, Refills: 0    Associated Diagnoses: Acute respiratory failure with hypoxia ([address] ); Shortness of breath;  Moderate persistent asthma, unspecified whether complicated      fosinopril (MONOPRIL) 10 mg tablet Take 1 tablet (10 mg total) by mouth daily  Qty: 90 tablet, Refills: 2    Associated Diagnoses: Essential hypertension      insulin glargine (LANTUS SOLOSTAR) 100 units/mL injection pen INJECT 35 UNITS SUBCUTANEOUSLY IN THE MORNING AND 30 UNITS IN THE EVENING  Qty: 5 pen, Refills: 2    Associated Diagnoses: Type 2 diabetes mellitus with complication, with long-term current use of insulin ([facility])      Insulin Pen Needle (B-D UF III MINI PEN NEEDLES) 31G X 5 MM MISC Inject under the skin 2 (two) times a day Use as directed  Qty: 200 each, Refills: 3    Associated Diagnoses: Type 2 diabetes mellitus with stage 3 chronic kidney disease, with long-term current use of insulin ([facility])      loratadine (CLARITIN) 10 mg tablet TAKE 1 TABLET BY MOUTH EVERY DAY  Qty: 15 tablet, Refills: 0    Associated Diagnoses: Acute recurrent ethmoidal sinusitis      metFORMIN (GLUCOPHAGE) 1000 MG tablet Take 1 tablet (1,000 mg total) by mouth 2 (two) times a day  Qty: 180 tablet, Refills: 3    Associated Diagnoses: Type 2 diabetes mellitus with stage 3 chronic kidney disease, with long-term current use of insulin ([facility])      metoprolol succinate (TOPROL-XL) 25 mg 24 hr tablet Take 1 tablet (25 mg total) by mouth daily  Qty: 90 tablet, Refills: 2    Associated Diagnoses: Chronic systolic congestive heart failure ([facility])      mirtazapine (REMERON) 15 mg tablet Take 1 tablet (15 mg total) by mouth daily at bedtime  Qty: 90 tablet, Refills: 2    Associated Diagnoses: Primary insomnia      montelukast (SINGULAIR) 10 mg tablet Take 1 tablet (10 mg total) by mouth daily at bedtime  Qty: 30 tablet, Refills: 3    Associated Diagnoses: Moderate persistent asthma, unspecified whether complicated;  Allergic rhinitis, unspecified seasonality, unspecified trigger      nortriptyline (PAMELOR) 25 mg capsule Take 1 capsule (25 mg total) by mouth daily at bedtime  Qty: 90 capsule, Refills: 1    Associated Diagnoses: Primary insomnia      nystatin (MYCOSTATIN) 500,000 units/5 mL suspension Apply 5 mL (500,000 Units total) to the mouth or throat 4 (four) times a day  Qty: 473 mL, Refills: 0    Associated Diagnoses: Thrush      nystatin (MYCOSTATIN) cream Apply 2 g (1 application total) topically 2 (two) times a day To affected area  Qty: 30 g, Refills: 2    Associated Diagnoses: Psoriasis vulgaris      NYSTATIN powder APPLY 2-3 TIMES DAILY TO AFFECTED AREA(S)  Refills: 5      ONE TOUCH ULTRA TEST test strip TEST 3 TIMES A DAY  Qty: 100 each, Refills: 3    Associated Diagnoses: Type 2 diabetes mellitus with complication, unspecified whether long term insulin use      oxyCODONE (ROXICODONE) 15 mg immediate release tablet Take 15 mg by mouth every 4 (four) hours as needed for moderate pain      oxyCODONE-acetaminophen (PERCOCET)  mg per tablet Take 1 tablet by mouth 3 (three) times a day as needed  Refills: 0      pantoprazole (PROTONIX) 40 mg tablet Take 1 tablet (40 mg total) by mouth daily  Qty: 90 tablet, Refills: 2    Associated Diagnoses: Gastroesophageal reflux disease without esophagitis      ! ! predniSONE 10 mg tablet Take 4 tablets x 7 days, then 3 tablets x 7 days, then 2 tablets x 7 days, then 1 tablet x 7 days  Qty: 80 tablet, Refills: 0    Associated Diagnoses: Shortness of breath      !! predniSONE 5 mg tablet Take 5 mg by mouth daily      simvastatin (ZOCOR) 40 mg tablet Take 1 tablet (40 mg total) by mouth daily  Qty: 90 tablet, Refills: 2    Associated Diagnoses: Mixed hyperlipidemia      sodium chloride (OCEAN) 0 65 % nasal spray 1 spray into each nostril as needed for congestion or rhinitis  Qty: 30 mL, Refills: 3    Associated Diagnoses: Allergic rhinitis, unspecified seasonality, unspecified trigger      torsemide (DEMADEX) 20 mg tablet Take 2 tablets (40 mg total) by mouth 2 (two) times a day  Qty: 360 tablet, Refills: 3    Associated Diagnoses: Essential hypertension       !! - Potential duplicate medications found  Please discuss with provider  No discharge procedures on file      ED Provider  Electronically Signed by           Catrachita Hilton MD  09/28/19 3220

## 2019-09-28 NOTE — H&P
H&P- Milad Ramos 1945, 76 y o  female MRN: [id number]    Unit/Bed#: ED 09 Encounter: [id number]    Primary Care Provider: Becki Munson MD   Date and time admitted to hospital: 9/28/2019  4:10 PM        Venous stasis dermatitis of both lower extremities  Assessment & Plan  Elevate legs    Cellulitis  Assessment & Plan  Right lower extremity appears slightly red and tender  She is immunosuppressed, will treat empirically with antibiotics  Continue to monitor CBC and temps    Hyponatremia  Assessment & Plan  History of chronic hyponatremia however sodium lower than her baseline  One time gentle IV fluid hydration  Monitor sodium levels closely    Interstitial lung disease ([address] )  Assessment & Plan  Closely follows up with outpatient pulmonology  Continue inhalers and breathing treatment    Rheumatoid arthritis [facility])  Assessment & Plan  Patient follows up with Rheumatology as outpatient  Last visit 6 months ago  Continues to have worsening arthritic pain in her hand and ankle joints  She has not been taking her prednisone  Her pain has been acutely worse over the last few days, will start on prednisone    Gastroesophageal reflux disease without esophagitis  Assessment & Plan  Continue Protonix    Type 2 diabetes mellitus with complication, with long-term current use of insulin [facility])  Assessment & Plan  Lab Results   Component Value Date    HGBA1C 9 6 06/26/2019       No results for input(s): POCGLU in the last 72 hours  Blood Sugar Average: Last 72 hrs:   continue Lantus 50 units in the a m    Sliding scale and monitor sugars    Ambulatory dysfunction  Assessment & Plan  PT/OT    Morbid obesity [facility])  Assessment & Plan    Lifestyle and dietary modifications    Mixed hyperlipidemia  Assessment & Plan  Continue statin    Chronic diastolic congestive heart failure (HCC)  Assessment & Plan  Wt Readings from Last 3 Encounters:   07/26/19 87 5 kg (193 lb)   07/03/19 90 4 kg (199 lb 3 2 oz)   06/27/19 88 9 kg (196 lb)       Monitor I&Os and daily weights      Bilateral edema of lower extremity  Assessment & Plan  Chronic lower extremity edema on torsemide 20 mg  Will hold off torsemide today as she appears a little dehydrated  Gentle IV fluid hydration  Monitor lytes and monitor I&Os      VTE Prophylaxis: Heparin  / sequential compression device   Code Status:  Full code  POLST: POLST form is not discussed and not completed at this time  Discussion with family:  At the bedside    Anticipated Length of Stay:  Patient will be admitted on an Observation basis with an anticipated length of stay of  less than 2 midnights  Justification for Hospital Stay:  Lower extremity cellulitis    Total Time for Visit, including Counseling / Coordination of Care: 30 minutes  Greater than 50% of this total time spent on direct patient counseling and coordination of care  Chief Complaint:   Lower extremity pain and swelling    History of Present Illness:    Jaren Brito is a 76 y o  female with prior past medical history of rheumatoid arthritis on immunosuppressive medications, morbid obesity, type 2 diabetes, chronic diastolic heart failure, chronic venous stasis, chronic lower extremity edema who presents with bilateral leg swelling, pain and redness in her right lower extremity  Patient does have chronic rheumatoid arthritis, had worsening pain in bilateral lower extremities as well as hands over the last few weeks  She was also seen and [facility] ER patient's family reports that she was discharged after giving IV morphine  Over the last few days she has been eating and drinking  She is crying today due to worsening lower extremity pain, more in right lower extremity  No fevers or chills  Review of Systems:    Review of Systems   HENT: Negative for postnasal drip  Respiratory: Negative for cough, choking, chest tightness and shortness of breath  Cardiovascular: Negative for chest pain     Gastrointestinal: Negative for abdominal pain, diarrhea and nausea  Genitourinary: Negative for difficulty urinating and dysuria  Musculoskeletal: Positive for arthralgias and gait problem  Skin: Positive for rash  Neurological: Negative for dizziness, light-headedness and numbness  Past Medical and Surgical History:     Past Medical History:   Diagnosis Date    Asthma     Chest pain on breathing     last assessed 2/5/15    Chronic diastolic CHF (congestive heart failure) (HCC)     Diabetes mellitus ([facility] )     HTN (hypertension)     Hyperlipidemia     Insomnia     Obesity     Preop cardiovascular exam 2/2/2018    Pulmonary fibrosis ([facility] )        Past Surgical History:   Procedure Laterality Date    HAND SURGERY         Meds/Allergies:    Prior to Admission medications    Medication Sig Start Date End Date Taking?  Authorizing Provider   amitriptyline (ELAVIL) 25 mg tablet Take 25 mg by mouth daily at bedtime   Yes Historical Provider, MD   omeprazole-sodium bicarbonate (ZEGERID)  MG per capsule Take 1 capsule by mouth every morning before breakfast   Yes Historical Provider, MD   Tocilizumab (ACTEMRA) 162 MG/0 9ML SOSY Inject under the skin   Yes Historical Provider, MD   adalimumab (HUMIRA) 40 mg/0 8 mL PSKT Inject 40 mg under the skin every 14 (fourteen) days    Historical Provider, MD   albuterol (2 5 mg/3 mL) 0 083 % nebulizer solution Take 1 vial (2 5 mg total) by nebulization every 6 (six) hours as needed for wheezing or shortness of breath 6/27/19   Ashley Avilez PA-C   albuterol (PROAIR HFA) 90 mcg/act inhaler Inhale 2 puffs every 6 (six) hours as needed for wheezing or shortness of breath 6/27/19   Ashley Avilez PA-C   B-D TB SYRINGE 1CC/27GX1/2" 27G X 1/2" 1 ML MISC INJECT METHOTREXATE 20 MG (0 8ML) SUBCUTANEOUS ONCE WEEKLY 12/6/18   Historical Provider, MD   baclofen 10 mg tablet Take 10 mg by mouth daily at bedtime as needed 6/6/19   Historical Provider, MD   Blood Glucose Monitoring Suppl (Lilia Whalen) w/Device KIT by Does not apply route 3 (three) times a day 6/23/16   Historical Provider, MD   cyclobenzaprine (FLEXERIL) 5 mg tablet TAKE 1 TABLETT BY MOUTH EVERY NIGHT AS NEEDED 3/23/18   Historical Provider, MD   DULoxetine (CYMBALTA) 20 mg capsule Take 1 capsule (20 mg total) by mouth daily 7/3/19 2/23/21  Folra Ang MD   escitalopram (LEXAPRO) 20 mg tablet Take 1 tablet (20 mg total) by mouth daily 7/3/19   Flora Ang MD   fluticasone (FLONASE) 50 mcg/act nasal spray 1 spray into each nostril daily 7/26/19   Saul Ferguson PA-C   fluticasone-vilanterol (BREO ELLIPTA) 100-25 mcg/inh inhaler Inhale 1 puff daily Rinse mouth after use  6/27/19   Saul Ferguson PA-C   fosinopril (MONOPRIL) 10 mg tablet Take 1 tablet (10 mg total) by mouth daily 7/3/19   Flora Ang MD   insulin glargine (LANTUS SOLOSTAR) 100 units/mL injection pen INJECT 35 UNITS SUBCUTANEOUSLY IN THE MORNING AND 30 UNITS IN THE EVENING 9/2/19   Flora Ang MD   Insulin Pen Needle (B-D UF III MINI PEN NEEDLES) 31G X 5 MM MISC Inject under the skin 2 (two) times a day Use as directed 7/3/19   Flora Ang MD   loratadine (CLARITIN) 10 mg tablet TAKE 1 TABLET BY MOUTH EVERY DAY 7/5/19   Eneida Matta PA-C   metFORMIN (GLUCOPHAGE) 1000 MG tablet Take 1 tablet (1,000 mg total) by mouth 2 (two) times a day 7/3/19   Flora Ang MD   metoprolol succinate (TOPROL-XL) 25 mg 24 hr tablet Take 1 tablet (25 mg total) by mouth daily 7/3/19   Flora Ang MD   mirtazapine (REMERON) 15 mg tablet Take 1 tablet (15 mg total) by mouth daily at bedtime 7/3/19   Flora Ang MD   montelukast (SINGULAIR) 10 mg tablet Take 1 tablet (10 mg total) by mouth daily at bedtime 6/27/19   Saul Ferguson PA-C   nortriptyline (PAMELOR) 25 mg capsule Take 1 capsule (25 mg total) by mouth daily at bedtime 7/11/19   Flora Ang MD   nystatin (MYCOSTATIN) 500,000 units/5 mL suspension Apply 5 mL (500,000 Units total) to the mouth or throat 4 (four) times a day 7/26/19   Carmelita Frias PA-C   nystatin (MYCOSTATIN) cream Apply 2 g (1 application total) topically 2 (two) times a day To affected area 7/3/19   Janine Thorne MD   NYSTATIN powder APPLY 2-3 TIMES DAILY TO AFFECTED AREA(S)  12/26/17   Historical Provider, MD   ONE TOUCH ULTRA TEST test strip TEST 3 TIMES A DAY 5/10/19   Janine Thorne MD   oxyCODONE (ROXICODONE) 15 mg immediate release tablet Take 15 mg by mouth every 4 (four) hours as needed for moderate pain    Historical Provider, MD   oxyCODONE-acetaminophen (PERCOCET)  mg per tablet Take 1 tablet by mouth 3 (three) times a day as needed 6/6/19   Historical Provider, MD   pantoprazole (PROTONIX) 40 mg tablet Take 1 tablet (40 mg total) by mouth daily 7/3/19   Janine Thorne MD   predniSONE 10 mg tablet Take 4 tablets x 7 days, then 3 tablets x 7 days, then 2 tablets x 7 days, then 1 tablet x 7 days  Patient not taking: Reported on 9/28/2019 7/26/19   Carmelita Frias PA-C   predniSONE 5 mg tablet Take 5 mg by mouth daily    Historical Provider, MD   simvastatin (ZOCOR) 40 mg tablet Take 1 tablet (40 mg total) by mouth daily 7/3/19   Janine Thorne MD   sodium chloride (OCEAN) 0 65 % nasal spray 1 spray into each nostril as needed for congestion or rhinitis 7/26/19   Carmelita Frias PA-C   torsemide (DEMADEX) 20 mg tablet Take 2 tablets (40 mg total) by mouth 2 (two) times a day 7/3/19   Janine Thorne MD     I have reviewed home medications with patient personally  Allergies:    Allergies   Allergen Reactions    Gabapentin     Vancomycin        Social History:     Marital Status: /Civil Union   Occupation:   Patient Pre-hospital Living Situation:   Patient Pre-hospital Level of Mobility:   Patient Pre-hospital Diet Restrictions:  Substance Use History:   Social History     Substance and Sexual Activity   Alcohol Use No     Social History     Tobacco Use   Smoking Status Former Smoker    Packs/day: 1 50    Years: 18 00  Pack years: 27 00    Types: Cigarettes    Last attempt to quit:     Years since quittin 7   Smokeless Tobacco Never Used   Tobacco Comment    Quit 20 years ago     Social History     Substance and Sexual Activity   Drug Use No       Family History:    Family History   Problem Relation Age of Onset    Rheum arthritis Mother     Hypertension Mother        Physical Exam:     Vitals:   Blood Pressure: 126/60 (19)  Pulse: 91 (19)  Temperature: 97 7 °F (36 5 °C) (19 1621)  Temp Source: Oral (19)  Respirations: 18 (19)  SpO2: 98 % (19)    Physical Exam   Constitutional: She is oriented to person, place, and time  She appears well-developed and well-nourished  No distress  HENT:   Head: Normocephalic and atraumatic  Eyes: Pupils are equal, round, and reactive to light  EOM are normal    Neck: Normal range of motion  Neck supple  Cardiovascular: Normal rate and regular rhythm  Pulmonary/Chest: Effort normal and breath sounds normal    Abdominal: Soft  Bowel sounds are normal    Musculoskeletal: She exhibits edema and deformity  Neurological: She is alert and oriented to person, place, and time  Skin: Skin is warm  Chronic venous stasis changes  Also seen area of redness in right lower extremity in mid dorsal and lateral aspect of right shin         Additional Data:     Lab Results: I have personally reviewed pertinent reports        Results from last 7 days   Lab Units 19  1653   WBC Thousand/uL 11 56*   HEMOGLOBIN g/dL 13 9   HEMATOCRIT % 44 4   PLATELETS Thousands/uL 308   NEUTROS PCT % 69   LYMPHS PCT % 21   MONOS PCT % 9   EOS PCT % 1     Results from last 7 days   Lab Units 19  1653   SODIUM mmol/L 129*   POTASSIUM mmol/L 3 9   CHLORIDE mmol/L 94*   CO2 mmol/L 27   BUN mg/dL 20   CREATININE mg/dL 1 18   ANION GAP mmol/L 8   CALCIUM mg/dL 9 4   ALBUMIN g/dL 3 0*   TOTAL BILIRUBIN mg/dL 0 30   ALK PHOS U/L 116   ALT U/L 13   AST U/L 22   GLUCOSE RANDOM mg/dL 146*                       Imaging: I have personally reviewed pertinent reports  No orders to display       EKG, Pathology, and Other Studies Reviewed on Admission:   EKG:   Allscripts / Epic Records Reviewed: Yes     ** Please Note: This note has been constructed using a voice recognition system   **

## 2019-09-28 NOTE — ASSESSMENT & PLAN NOTE
Patient follows up with Rheumatology as outpatient  Last visit 6 months ago  Continues to have worsening arthritic pain in her hand and ankle joints  She has not been taking her prednisone    Her pain has been acutely worse over the last few days, will start on prednisone

## 2019-09-28 NOTE — ASSESSMENT & PLAN NOTE
History of chronic hyponatremia however sodium lower than her baseline  One time gentle IV fluid hydration  Monitor sodium levels closely

## 2019-09-29 LAB
ANION GAP SERPL CALCULATED.3IONS-SCNC: 9 MMOL/L (ref 4–13)
BUN SERPL-MCNC: 18 MG/DL (ref 5–25)
CALCIUM SERPL-MCNC: 9.4 MG/DL (ref 8.3–10.1)
CHLORIDE SERPL-SCNC: 101 MMOL/L (ref 100–108)
CO2 SERPL-SCNC: 28 MMOL/L (ref 21–32)
CREAT SERPL-MCNC: 0.92 MG/DL (ref 0.6–1.3)
ERYTHROCYTE [DISTWIDTH] IN BLOOD BY AUTOMATED COUNT: 14.3 % (ref 11.6–15.1)
GFR SERPL CREATININE-BSD FRML MDRD: 62 ML/MIN/1.73SQ M
GLUCOSE SERPL-MCNC: 163 MG/DL (ref 65–140)
GLUCOSE SERPL-MCNC: 214 MG/DL (ref 65–140)
GLUCOSE SERPL-MCNC: 249 MG/DL (ref 65–140)
GLUCOSE SERPL-MCNC: 276 MG/DL (ref 65–140)
GLUCOSE SERPL-MCNC: 42 MG/DL (ref 65–140)
GLUCOSE SERPL-MCNC: 45 MG/DL (ref 65–140)
HCT VFR BLD AUTO: 41.5 % (ref 34.8–46.1)
HGB BLD-MCNC: 12.9 G/DL (ref 11.5–15.4)
MCH RBC QN AUTO: 28.3 PG (ref 26.8–34.3)
MCHC RBC AUTO-ENTMCNC: 31.1 G/DL (ref 31.4–37.4)
MCV RBC AUTO: 91 FL (ref 82–98)
PLATELET # BLD AUTO: 298 THOUSANDS/UL (ref 149–390)
PMV BLD AUTO: 10.3 FL (ref 8.9–12.7)
POTASSIUM SERPL-SCNC: 3.8 MMOL/L (ref 3.5–5.3)
RBC # BLD AUTO: 4.56 MILLION/UL (ref 3.81–5.12)
SODIUM SERPL-SCNC: 138 MMOL/L (ref 136–145)
WBC # BLD AUTO: 10.36 THOUSAND/UL (ref 4.31–10.16)

## 2019-09-29 PROCEDURE — G8978 MOBILITY CURRENT STATUS: HCPCS

## 2019-09-29 PROCEDURE — 97163 PT EVAL HIGH COMPLEX 45 MIN: CPT

## 2019-09-29 PROCEDURE — 82948 REAGENT STRIP/BLOOD GLUCOSE: CPT

## 2019-09-29 PROCEDURE — 85027 COMPLETE CBC AUTOMATED: CPT | Performed by: INTERNAL MEDICINE

## 2019-09-29 PROCEDURE — G0008 ADMIN INFLUENZA VIRUS VAC: HCPCS | Performed by: INTERNAL MEDICINE

## 2019-09-29 PROCEDURE — 90662 IIV NO PRSV INCREASED AG IM: CPT | Performed by: INTERNAL MEDICINE

## 2019-09-29 PROCEDURE — 99232 SBSQ HOSP IP/OBS MODERATE 35: CPT | Performed by: PHYSICIAN ASSISTANT

## 2019-09-29 PROCEDURE — G8979 MOBILITY GOAL STATUS: HCPCS

## 2019-09-29 PROCEDURE — 80048 BASIC METABOLIC PNL TOTAL CA: CPT | Performed by: INTERNAL MEDICINE

## 2019-09-29 RX ORDER — DEXTROSE MONOHYDRATE 25 G/50ML
25 INJECTION, SOLUTION INTRAVENOUS ONCE
Status: COMPLETED | OUTPATIENT
Start: 2019-09-29 | End: 2019-09-29

## 2019-09-29 RX ORDER — INSULIN GLARGINE 100 [IU]/ML
25 INJECTION, SOLUTION SUBCUTANEOUS EVERY MORNING
Status: DISCONTINUED | OUTPATIENT
Start: 2019-09-30 | End: 2019-09-30 | Stop reason: HOSPADM

## 2019-09-29 RX ORDER — TORSEMIDE 20 MG/1
40 TABLET ORAL 2 TIMES DAILY
Status: DISCONTINUED | OUTPATIENT
Start: 2019-09-29 | End: 2019-09-30 | Stop reason: HOSPADM

## 2019-09-29 RX ORDER — CEFAZOLIN SODIUM 1 G/50ML
1000 SOLUTION INTRAVENOUS EVERY 8 HOURS
Status: DISCONTINUED | OUTPATIENT
Start: 2019-09-29 | End: 2019-09-30

## 2019-09-29 RX ORDER — CEFAZOLIN SODIUM 2 G/50ML
2000 SOLUTION INTRAVENOUS EVERY 8 HOURS
Status: DISCONTINUED | OUTPATIENT
Start: 2019-09-29 | End: 2019-09-29

## 2019-09-29 RX ADMIN — HEPARIN SODIUM 5000 UNITS: 5000 INJECTION INTRAVENOUS; SUBCUTANEOUS at 15:00

## 2019-09-29 RX ADMIN — CEFAZOLIN SODIUM 2000 MG: 2 SOLUTION INTRAVENOUS at 09:15

## 2019-09-29 RX ADMIN — CEPHALEXIN 500 MG: 500 CAPSULE ORAL at 05:48

## 2019-09-29 RX ADMIN — LORATADINE 10 MG: 10 TABLET ORAL at 09:06

## 2019-09-29 RX ADMIN — ESCITALOPRAM OXALATE 20 MG: 10 TABLET ORAL at 09:06

## 2019-09-29 RX ADMIN — INSULIN LISPRO 3 UNITS: 100 INJECTION, SOLUTION INTRAVENOUS; SUBCUTANEOUS at 11:54

## 2019-09-29 RX ADMIN — PRAVASTATIN SODIUM 40 MG: 40 TABLET ORAL at 16:54

## 2019-09-29 RX ADMIN — INSULIN LISPRO 1 UNITS: 100 INJECTION, SOLUTION INTRAVENOUS; SUBCUTANEOUS at 16:55

## 2019-09-29 RX ADMIN — INSULIN LISPRO 3 UNITS: 100 INJECTION, SOLUTION INTRAVENOUS; SUBCUTANEOUS at 22:29

## 2019-09-29 RX ADMIN — MIRTAZAPINE 15 MG: 15 TABLET, FILM COATED ORAL at 22:27

## 2019-09-29 RX ADMIN — METOPROLOL SUCCINATE 25 MG: 25 TABLET, EXTENDED RELEASE ORAL at 09:06

## 2019-09-29 RX ADMIN — DEXTROSE MONOHYDRATE 25 ML: 500 INJECTION PARENTERAL at 07:00

## 2019-09-29 RX ADMIN — PANTOPRAZOLE SODIUM 40 MG: 40 TABLET, DELAYED RELEASE ORAL at 09:06

## 2019-09-29 RX ADMIN — PREDNISONE 20 MG: 20 TABLET ORAL at 08:30

## 2019-09-29 RX ADMIN — ACETAMINOPHEN 975 MG: 325 TABLET ORAL at 22:28

## 2019-09-29 RX ADMIN — ACETAMINOPHEN 975 MG: 325 TABLET ORAL at 15:00

## 2019-09-29 RX ADMIN — NORTRIPTYLINE HYDROCHLORIDE 25 MG: 25 CAPSULE ORAL at 22:35

## 2019-09-29 RX ADMIN — MONTELUKAST 10 MG: 10 TABLET, FILM COATED ORAL at 22:27

## 2019-09-29 RX ADMIN — HYDROCODONE BITARTRATE AND ACETAMINOPHEN 2 TABLET: 5; 325 TABLET ORAL at 09:14

## 2019-09-29 RX ADMIN — FLUTICASONE FUROATE AND VILANTEROL TRIFENATATE 1 PUFF: 100; 25 POWDER RESPIRATORY (INHALATION) at 09:07

## 2019-09-29 RX ADMIN — HYDROCODONE BITARTRATE AND ACETAMINOPHEN 2 TABLET: 5; 325 TABLET ORAL at 19:38

## 2019-09-29 RX ADMIN — DEXTROSE MONOHYDRATE 25 ML: 500 INJECTION PARENTERAL at 07:25

## 2019-09-29 RX ADMIN — LISINOPRIL 10 MG: 10 TABLET ORAL at 09:06

## 2019-09-29 RX ADMIN — CEFAZOLIN SODIUM 1000 MG: 1 SOLUTION INTRAVENOUS at 16:54

## 2019-09-29 RX ADMIN — HEPARIN SODIUM 5000 UNITS: 5000 INJECTION INTRAVENOUS; SUBCUTANEOUS at 05:49

## 2019-09-29 RX ADMIN — PREDNISONE 20 MG: 20 TABLET ORAL at 16:54

## 2019-09-29 RX ADMIN — CYCLOBENZAPRINE HYDROCHLORIDE 5 MG: 10 TABLET, FILM COATED ORAL at 22:27

## 2019-09-29 RX ADMIN — INFLUENZA A VIRUS A/MICHIGAN/45/2015 X-275 (H1N1) ANTIGEN (FORMALDEHYDE INACTIVATED), INFLUENZA A VIRUS A/SINGAPORE/INFIMH-16-0019/2016 IVR-186 (H3N2) ANTIGEN (FORMALDEHYDE INACTIVATED), AND INFLUENZA B VIRUS B/MARYLAND/15/2016 BX-69A (A B/COLORADO/6/2017-LIKE VIRUS) ANTIGEN (FORMALDEHYDE INACTIVATED) 0.5 ML: 60; 60; 60 INJECTION, SUSPENSION INTRAMUSCULAR at 10:37

## 2019-09-29 RX ADMIN — ACETAMINOPHEN 975 MG: 325 TABLET ORAL at 05:48

## 2019-09-29 RX ADMIN — HEPARIN SODIUM 5000 UNITS: 5000 INJECTION INTRAVENOUS; SUBCUTANEOUS at 22:29

## 2019-09-29 RX ADMIN — FLUTICASONE PROPIONATE 1 SPRAY: 50 SPRAY, METERED NASAL at 09:05

## 2019-09-29 RX ADMIN — DULOXETINE HYDROCHLORIDE 20 MG: 20 CAPSULE, DELAYED RELEASE ORAL at 09:07

## 2019-09-29 RX ADMIN — TORSEMIDE 40 MG: 20 TABLET ORAL at 17:03

## 2019-09-29 RX ADMIN — CEPHALEXIN 500 MG: 500 CAPSULE ORAL at 00:50

## 2019-09-29 NOTE — UTILIZATION REVIEW
Initial Clinical Review    Admission: Date/Time/Statement: OBS  9/28 1936 converted to IP on 9/29 @ 1213      [facility]    Level of Care Med Surg    Estimated length of stay More than 2 Midnights    Certification I certify that inpatient services are medically necessary for this patient for a duration of greater than two midnights  See H&P and MD Progress Notes for additional information about the patient's course of treatment  ED Arrival Information     Expected Arrival Acuity Means of Arrival Escorted By Service Admission Type    - 9/28/2019 16:01 Urgent Walk-In Family Member General Medicine Urgent    Arrival Complaint    leg swelling        Chief Complaint   Patient presents with    Leg Swelling     Patient c/o bilateral leg swelling, pain and redness x 1 week  Per son, "this has been ongoing for 2 years and she always goes to [facility] and they give her medication and send her home and I'm sick of it  "      Assessment/Plan: 75 yo female to ED from home w/ BLE swelling , pain and redness RLE   Poor appetite over the last few days   pt admitted OBS status w  cellulitis RLE pt immunosuppressed and will treat empirically with abx monitor cbc and temps, elevate legs  Appears a little dehydrated hold off on giving torsemide , gentle IVF and monitor lytes and I&O       ED Triage Vitals   Temperature Pulse Respirations Blood Pressure SpO2   09/28/19 1621 09/28/19 1621 09/28/19 1621 09/28/19 1621 09/28/19 1621   97 7 °F (36 5 °C) (!) 114 18 144/67 96 %      Temp Source Heart Rate Source Patient Position - Orthostatic VS BP Location FiO2 (%)   09/28/19 1621 09/28/19 1621 09/28/19 1621 09/28/19 1621 --   Oral Monitor Lying Right arm       Pain Score       09/28/19 1748       9        Wt Readings from Last 1 Encounters:   09/29/19 87 kg (191 lb 12 8 oz)     Additional Vital Signs:   09/29/19 0700  98 °F (36 7 °C)  102  18  132/63  90  80 %Abnormal   None (Room air)  Lying   09/28/19 2354  98 °F (36 7 °C)  90  18  136/74    97 %  None (Room air)  Lying   09/28/19 2053  98 °F (36 7 °C)  104  19  146/67    97 %  None (Room air)  Lying   09/28/19 1748    91  18  126/60    98 %  None (Room air)         Pertinent Labs/Diagnostic Test Results:   Results from last 7 days   Lab Units 09/29/19  0503 09/28/19  1653   WBC Thousand/uL 10 36* 11 56*   HEMOGLOBIN g/dL 12 9 13 9   HEMATOCRIT % 41 5 44 4   PLATELETS Thousands/uL 298 308   NEUTROS ABS Thousands/µL  --  7 86*     Results from last 7 days   Lab Units 09/29/19  0503 09/28/19  1653   SODIUM mmol/L 138 129*   POTASSIUM mmol/L 3 8 3 9   CHLORIDE mmol/L 101 94*   CO2 mmol/L 28 27   ANION GAP mmol/L 9 8   BUN mg/dL 18 20   CREATININE mg/dL 0 92 1 18   EGFR ml/min/1 73sq m 62 46   CALCIUM mg/dL 9 4 9 4     Results from last 7 days   Lab Units 09/28/19  1653   AST U/L 22   ALT U/L 13   ALK PHOS U/L 116   TOTAL PROTEIN g/dL 8 7*   ALBUMIN g/dL 3 0*   TOTAL BILIRUBIN mg/dL 0 30     Results from last 7 days   Lab Units 09/29/19  0706 09/28/19  2340 09/28/19  2156 09/28/19  2117   POC GLUCOSE mg/dl 45* 159* 69 53*     Results from last 7 days   Lab Units 09/29/19  0503 09/28/19  1653   GLUCOSE RANDOM mg/dL 42* 146*     Results from last 7 days   Lab Units 09/28/19  1653   NT-PRO BNP pg/mL 122       Results from last 7 days   Lab Units 09/28/19  1653   CRP mg/L 66 3*   SED RATE mm/hour 90*     ED Treatment:   Medication Administration from 09/28/2019 1601 to 09/28/2019 2028       Date/Time Order Dose Route Action Action by Comments     09/28/2019 1657 morphine (PF) 4 mg/mL injection 4 mg 4 mg Intravenous Given Radha Sandhu RN      09/28/2019 1953 ceFAZolin (ANCEF) IVPB (premix) 1,000 mg 1,000 mg Intravenous New Bag Radha Sandhu RN      09/28/2019 1953 morphine (PF) 4 mg/mL injection 4 mg 4 mg Intravenous Given Radha Sandhu RN         Past Medical History:   Diagnosis Date    Asthma     Chest pain on breathing     last assessed 2/5/15    Chronic diastolic CHF (congestive heart failure) (HCC)     Diabetes mellitus ([facility] )     HTN (hypertension)     Hyperlipidemia     Insomnia     Obesity     Preop cardiovascular exam 2/2/2018    Pulmonary fibrosis ([facility] )      Present on Admission:   Ambulatory dysfunction   Bilateral edema of lower extremity   Chronic diastolic congestive heart failure (HCC)   Essential hypertension   Gastroesophageal reflux disease without esophagitis   Interstitial lung disease ([facility] )   Mixed hyperlipidemia   Morbid obesity (HCC)   Rheumatoid arthritis ([facility] )      Admitting Diagnosis: Hyponatremia [E87 1]  Leg swelling [M79 89]  Cellulitis of lower extremity, unspecified laterality [L03 119]  Age/Sex: 76 y o  female  Admission Orders:    Current Facility-Administered Medications:  acetaminophen 975 mg Oral Q8H [address]    albuterol 2 puff Inhalation Q6H PRN    albuterol 2 5 mg Nebulization Q6H PRN    baclofen 10 mg Oral HS PRN    cephalexin 500 mg Oral Q6H SHANNON    cyclobenzaprine 5 mg Oral HS    DULoxetine 20 mg Oral Daily    escitalopram 20 mg Oral Daily    fluticasone 1 spray Nasal Daily    fluticasone-vilanterol 1 puff Inhalation Daily    heparin (porcine) 5,000 Units Subcutaneous Q8H [address]    HYDROcodone-acetaminophen 2 tablet Oral Q6H PRN x1   influenza vaccine 0 5 mL Intramuscular Once    insulin lispro 1-5 Units Subcutaneous HS    insulin lispro 1-6 Units Subcutaneous TID AC    lisinopril 10 mg Oral Daily    loratadine 10 mg Oral Daily    metoprolol succinate 25 mg Oral Daily    mirtazapine 15 mg Oral HS    montelukast 10 mg Oral HS    morphine injection 2 mg Intravenous Q4H PRN    nortriptyline 25 mg Oral HS    ondansetron 4 mg Intravenous Q6H PRN    pantoprazole 40 mg Oral Daily    pravastatin 40 mg Oral Daily With Dinner    predniSONE 20 mg Oral BID With Meals    sodium chloride 1 spray Each Nare PRN      Fingerstick ac and hs  PT OT eval   Reg diet   I&O   SCD  Daily weight   Up w/ assist  Consult ID     Network Utilization Review Department  Phone: [phone]; Fax [phone]  [email or web address]  ATTENTION: Please call with any questions or concerns to [phone]  and carefully listen to the prompts so that you are directed to the right person  Send all requests for admission clinical reviews, approved or denied determinations and any other requests to fax [phone]   All voicemails are confidential

## 2019-09-29 NOTE — ASSESSMENT & PLAN NOTE
· Patient follows up with Rheumatology as outpatient  Last visit 6 months ago  · Continues to have worsening arthritic pain in her hand and ankle joints  · ESR and CRP elevated  · On Humira  · Her pain has been acutely worse over the last few days, started on a Prednisone course upon admission

## 2019-09-29 NOTE — PHYSICAL THERAPY NOTE
PT Evaluation (22min)  (8:05-8:27)    Past Medical History:   Diagnosis Date    Asthma     Chest pain on breathing     last assessed 2/5/15    Chronic diastolic CHF (congestive heart failure) (HCC)     Diabetes mellitus ([facility] )     HTN (hypertension)     Hyperlipidemia     Insomnia     Obesity     Preop cardiovascular exam 2/2/2018    Pulmonary fibrosis ([facility] [address] )         09/29/19 0805   Note Type   Note type Eval only   Pain Assessment   Pain Assessment 0-10   Pain Score Worst Possible Pain   Pain Type Chronic pain   Pain Location Leg   Pain Orientation Bilateral   Home Living   Type of [address] One level;Stairs to enter with rails  (5 MANUEL)   Bathroom Shower/Tub Walk-in shower   Bathroom Toilet Standard   Bathroom Accessibility [place]; Other (Comment)  (home O2 (2-3L))   Prior Function   Level of [place] Independent with ADLs and functional mobility  (ambulates c SPC)   Lives With Spouse;Son  (pt's son works)   Receives Help From [facility], [address] in the last 6 months 0   Vocational Retired   Comments (+) drives   Restrictions/Precautions   [facility] Bearing Precautions Per Order No   Braces or Orthoses Other (Comment)  (L soft wrist brace 2* RA)   Other Precautions Chair Alarm; Bed Alarm;Multiple lines;Telemetry; Fall Risk;O2   General   Additional Pertinent History pt presents to [facility] c B/L LE cellulitis R>L  PT consulted for mobility + d/c planning  up c (A)  Family/Caregiver Present No   Cognition   Orientation Level Oriented X4   RUE Assessment   RUE Assessment WFL  (4-/5)   LUE Assessment   LUE Assessment WFL  (4-/5)   RLE Assessment   RLE Assessment WFL  (4-/5)   LLE Assessment   LLE Assessment WFL  (4-/5)   Coordination   Sensation WFL   Bed Mobility   Supine to Sit 5  Supervision   Additional items HOB elevated; Increased time required;Verbal cues; Bedrails   Transfers   Sit to Stand 4  Minimal assistance Additional items Assist x 1;Verbal cues; Increased time required   Stand to Sit 4  Minimal assistance   Additional items Assist x 1; Armrests; Verbal cues; Increased time required   Ambulation/Elevation   Gait pattern Antalgic;Narrow SHANE; Forward Flexion;Decreased foot clearance; Short stride   Gait Assistance 4  Minimal assist   Additional items Assist x 1;Verbal cues   Assistive Device Rolling walker;SPC   Distance 3' c [facility] + 15' c RW; limited by pain   Stair Management Assistance Not tested   Balance   Static Sitting Fair +   Dynamic Sitting Fair +   Static Standing Fair -   Dynamic Standing Fair -   Ambulatory Fair -   Activity Tolerance   Activity Tolerance Patient limited by pain   Nurse Made Aware VINCENT Jackson aware pt OOB in chair at end of session + ambulates c RW  Assessment   Prognosis Good   Problem List Decreased strength; Impaired balance;Decreased endurance;Decreased mobility;Obesity; Decreased skin integrity;Pain   Assessment pt is a 73y/o f who presents to [facility] c B/L LE cellulitis R>L  PMH significant for CHF, morbid obesity, DM 2, RA, + interstitial lung disease  at baseline, pt mod (I) c functional mobility c sPC  resides c spouse + son in ranch home c 5 MANUEL  currently requires min (A)x1 to complete OOB mobility tasks 2* deficits in strength, balance, gait quality, pain, skin integrity, + activity tolerance noted in PT exam above  Barthel Index 50/100  min verbal cues required for safe transfer technique  ambulated 3' c [facility] + 15' c RW c improved stability noted c RW  able to tolerate sitting OOB in chair c chair alarm activated at end of session  would benefit from skilled PT to maximize functional mobility + return home safely  upon d/c, recommend pt return home c hhpt + family support  would also benefit from RW for home use  PT eval of high complexity 2* unstable med status c pt requiring ongoing medical management 2* B/L LE cellulitis   pt c multiple co-morbidities + mobility deficits above requiring min (A)x1 + RW to safely complete mobility tasks  pt c increased LE pain c ambulation  resides c family in ranch home c 5 MANUEL  remains c multiple lines  Barriers to Discharge Inaccessible home environment   Goals   Patient Goals "to cook and do the house cleaning"  STG Expiration Date 10/09/19   Short Term Goal #1 1  increase strength 1/2 grade to improve overall functional mobility, 2  perform bed mobility mod (I) to decrease caregiver burden, 3  perform transfers mod (I) to safely perform ADLs, 4  ambulate 150' mod (I) c least restrictive AD to safely navigate home environment, 5  negotiate 5 stairs mod (I) to safely enter home   Plan   Treatment/Interventions Functional transfer training;LE strengthening/ROM; Elevations; Therapeutic exercise; Endurance training;Patient/family training;Equipment eval/education; Bed mobility;Gait training;Spoke to nursing   PT Frequency Other (Comment)  (3-5x/wk)   Recommendation   Recommendation Home PT; Home with family support   Equipment Recommended Walker  (RW)   Modified [place] Scale   Modified [place] Scale 4   Barthel Index   Feeding 10   Bathing 0   Grooming Score 0   Dressing Score 5   Bladder Score 10   Bowels Score 10   Toilet Use Score 5   Transfers (Bed/Chair) Score 10   Mobility (Level Surface) Score 0   Stairs Score 0   Barthel Index Score 50     Francy Maldonado, PT, DPT

## 2019-09-29 NOTE — ASSESSMENT & PLAN NOTE
· Right lower extremity appeared slightly more red and tender on admission  · Question of cellulitis  She is immunosuppressed and was started on empiric antibiotics on admission - Cefazolin IV  · Check Procalcitonin  · ID consult pending  She does reports chronic LE changes  · Will check LE dopplers to rule out DVT due to increasing pain  · Monitor vitals and WBC  Mild leukocytosis noted on admission

## 2019-09-29 NOTE — PROGRESS NOTES
Progress Note - Mariana Schmidt 1945, 76 y o  female MRN: [id number]    Unit/Bed#: -01 Encounter: [id number]    Primary Care Provider: Artemio Mcdermott MD   Date and time admitted to hospital: 9/28/2019  4:10 PM        * Cellulitis  Assessment & Plan  · Right lower extremity appeared slightly more red and tender on admission  · Question of cellulitis  She is immunosuppressed and was started on empiric antibiotics on admission - Cefazolin IV  · Check Procalcitonin  · ID consult pending  She does reports chronic LE changes  · Will check LE dopplers to rule out DVT due to increasing pain  · Monitor vitals and WBC  Mild leukocytosis noted on admission  Venous stasis dermatitis of both lower extremities  Assessment & Plan  · Patient with chronic LE changes  · Elevate legs  Hyponatremia  Assessment & Plan  · History of chronic hyponatremia - sodium lower than her baseline on admission but improved today  Interstitial lung disease ([facility] )  Assessment & Plan  · Continue Breo and Nebs  · On chronic oxygen 2 L   · Follows with pulmonary as outpatient  Rheumatoid arthritis ([facility] )  Assessment & Plan  · Patient follows up with Rheumatology as outpatient  Last visit 6 months ago  · Continues to have worsening arthritic pain in her hand and ankle joints  · ESR and CRP elevated  · On Humira  · Her pain has been acutely worse over the last few days, started on a Prednisone course upon admission  Gastroesophageal reflux disease without esophagitis  Assessment & Plan  · Continue Protonix  Type 2 diabetes mellitus with complication, with long-term current use of insulin [facility])  Assessment & Plan  Lab Results   Component Value Date    HGBA1C 9 6 06/26/2019       Recent Labs     09/28/19  2340 09/29/19  0706 09/29/19  0815 09/29/19  1121   POCGLU 159* 45* 214* 249*       Blood Sugar Average: Last 72 hrs:  (P) 131 5  Was on Lantus 50 units daily    Had hypoglycemia this am of 46 and last night of 53   Will cut Lantus dose in half to 25 units for now and adjust as needed  SSI coverage  Monitor Accu-cheks  Ambulatory dysfunction  Assessment & Plan  · PT/OT evaluations  Morbid obesity ([address] )  Assessment & Plan  · Lifestyle and dietary modifications  Mixed hyperlipidemia  Assessment & Plan  · Continue statin  Chronic diastolic congestive heart failure [facility])  Assessment & Plan  Wt Readings from Last 3 Encounters:   19 87 kg (191 lb 12 8 oz)   19 87 5 kg (193 lb)   19 90 4 kg (199 lb 3 2 oz)     · Monitor I&Os and daily weights  · On Torsemide BID  On Metoprolol  VTE Pharmacologic Prophylaxis:   Pharmacologic: Heparin    Patient Centered Rounds: I have performed bedside rounds with nursing staff today  Discussions with Specialists or Other Care Team Provider: Reviewed admitting physician's H&P  Education and Discussions with Family / Patient: Patient  Time Spent for Care: 30 minutes  More than 50% of total time spent on counseling and coordination of care as described above  Current Length of Stay: 0 day(s)    Current Patient Status: Inpatient   Certification Statement: The patient will continue to require additional inpatient hospital stay due to treatment as noted above  Discharge Plan: Not medically cleared  Code Status: Level 1 - Full Code      Subjective:   Patient reports LE pain worse on the right x few weeks but worse over the past few days and noted a painful area on the right LE  No fevers  No CP  No abdominal pain  No N/V  She reports increased joint pains with her RA as well  Objective:     Vitals:   Temp (24hrs), Av 9 °F (36 6 °C), Min:97 7 °F (36 5 °C), Max:98 °F (36 7 °C)    Temp:  [97 7 °F (36 5 °C)-98 °F (36 7 °C)] 98 °F (36 7 °C)  HR:  [] 102  Resp:  [18-19] 18  BP: (126-146)/(60-74) 132/63  SpO2:  [80 %-98 %]  Body mass index is 40 09 kg/m²  Input and Output Summary (last 24 hours):        Intake/Output Summary (Last 24 hours) at 9/29/2019 1218  Last data filed at 9/29/2019 1001  Gross per 24 hour   Intake 280 ml   Output 500 ml   Net -220 ml       Physical Exam:     Physical Exam   Constitutional: No distress  HENT:   Head: Normocephalic and atraumatic  On oxygen 2L  Eyes: No scleral icterus  Cardiovascular: Normal rate and regular rhythm  Pulmonary/Chest: No respiratory distress  She has no wheezes  Decreased breath sounds  Musculoskeletal: She exhibits edema  Chronic LE erythematous changes  Area of RLE with TTP  Skin: She is not diaphoretic  Vitals reviewed  Additional Data:     Labs:    Results from last 7 days   Lab Units 09/29/19  0503 09/28/19  1653   WBC Thousand/uL 10 36* 11 56*   HEMOGLOBIN g/dL 12 9 13 9   HEMATOCRIT % 41 5 44 4   PLATELETS Thousands/uL 298 308   NEUTROS PCT %  --  69   LYMPHS PCT %  --  21   MONOS PCT %  --  9   EOS PCT %  --  1     Results from last 7 days   Lab Units 09/29/19  0503 09/28/19  1653   POTASSIUM mmol/L 3 8 3 9   CHLORIDE mmol/L 101 94*   CO2 mmol/L 28 27   BUN mg/dL 18 20   CREATININE mg/dL 0 92 1 18   CALCIUM mg/dL 9 4 9 4   ALK PHOS U/L  --  116   ALT U/L  --  13   AST U/L  --  22           * I Have Reviewed All Lab Data Listed Above  * Additional Pertinent Lab Tests Reviewed: All Labs Within Last 24 Hours Reviewed    Imaging:    Imaging Reports Reviewed Today Include: No imaging      Recent Cultures (last 7 days):           Last 24 Hours Medication List:     Current Facility-Administered Medications:  acetaminophen 975 mg Oral Q8H Chris Miles MD   albuterol 2 puff Inhalation Q6H PRN Brando Flowers MD   albuterol 2 5 mg Nebulization Q6H PRN Brando Flowers MD   baclofen 10 mg Oral HS PRN Brando Flowers MD   cefazolin 1,000 mg Intravenous Q8H Shanika Torres PA-C   cyclobenzaprine 5 mg Oral HS Brando Flowers MD   DULoxetine 20 mg Oral Daily Brando Flowers MD   escitalopram 20 mg Oral Daily Brando Flowers MD   fluticasone 1 spray Nasal Daily Lea Ponce MD   fluticasone-vilanterol 1 puff Inhalation Daily Lea Ponce MD   heparin (porcine) 5,000 Units Subcutaneous [facility] Lea Ponce MD   HYDROcodone-acetaminophen 2 tablet Oral Q6H PRN MD Margot Gomez ON 9/30/2019] insulin glargine 25 Units Subcutaneous QAM Shanika Torres PA-C   insulin lispro 1-5 Units Subcutaneous HS Lea Ponce MD   insulin lispro 1-6 Units Subcutaneous TID AC Lea Ponce MD   lisinopril 10 mg Oral Daily Lea Ponce MD   loratadine 10 mg Oral Daily Lea Ponce MD   metoprolol succinate 25 mg Oral Daily Lea Ponce MD   mirtazapine 15 mg Oral HS Lea Ponce MD   montelukast 10 mg Oral HS Lea Ponce MD   morphine injection 2 mg Intravenous Q4H PRN Lea oPnce MD   nortriptyline 25 mg Oral HS Lea Ponce MD   ondansetron 4 mg Intravenous Q6H PRN Lea Ponce MD   pantoprazole 40 mg Oral Daily Lea Ponce MD   pravastatin 40 mg Oral Daily With Joceline Wood MD   predniSONE 20 mg Oral BID With Meals Becky Davison PA-C   sodium chloride 1 spray Each Nare PRN Lea Ponce MD   torsemide 40 mg Oral BID Sahil Boone PA-C        Today, Patient Was Seen By: Sahil Boone PA-C    ** Please Note: Dictation voice to text software may have been used in the creation of this document   **

## 2019-09-29 NOTE — ED NOTES
1  CC; leg swelling  2  Trauma?; no  3  Orientation status; x4  4  Abnormal labs, vitals, assessment, [place]  5  Medications/drips, none  6  Last time narcotics given; none  7  IV/drains/etc; 20RAC  8  Isolation status; none  9  Skin; redness and swelling to bilateral legs  10  Ambulation ; x 1 with cane  11   ED RN phone number ; [address] [facility] Hari Burciaga, RN  09/28/19 2009

## 2019-09-29 NOTE — PROGRESS NOTES
Glucose level per BMP results =42  Patient is alert and awake  8 oz of orange juice and 2 packs of crackers given to the patient to consume  Staff to reassess her blood sugar

## 2019-09-29 NOTE — ASSESSMENT & PLAN NOTE
Lab Results   Component Value Date    HGBA1C 9 6 06/26/2019       Recent Labs     09/28/19  2340 09/29/19  0706 09/29/19  0815 09/29/19  1121   POCGLU 159* 45* 214* 249*       Blood Sugar Average: Last 72 hrs:  (P) 131 5  Was on Lantus 50 units daily  Had hypoglycemia this am of 46 and last night of 53  Will cut Lantus dose in half to 25 units for now and adjust as needed  SSI coverage  Monitor Accu-cheks

## 2019-09-29 NOTE — PHYSICIAN ADVISOR
Current patient class: Inpatient  The patient is currently on Hospital Day: 2 at [address]      This patient was originally admitted to the hospital under observation status  After admission the patient was reevaluated and determined to require further hospitalization  The patient is now documented to require at least a 2nd midnight in the hospital  As such the patient is now expected to satisfy the 2 midnight benchmark and is therefore eligible for inpatient admission  After review of the relevant documentation, labs, vital signs and test results, the patient is appropriate for INPATIENT ADMISSION  Admission to the hospital as an inpatient is a complex decision making process which requires the practitioner to consider the patients presenting complaint, history and physical examination and all relevant testing  With this in mind, in this case, the patient was deemed appropriate for INPATIENT ADMISSION  After review of the documentation and testing available at the time of the admission I concur with this clinical determination of medical necessity  Rationale is as follows: The patient is a 76 yrs Female who presented to the ED at 9/28/2019  4:10 PM with a chief complaint of Leg Swelling (Patient c/o bilateral leg swelling, pain and redness x 1 week  Per son, "this has been ongoing for 2 years and she always goes to [facility] and they give her medication and send her home and I'm sick of it  " )  Patient admitted for lower extremity cellulitis - labs showed leukocytosis and hyponatremia  given her immunocompromised state she was started on antibiotics and IV fluids with serial monitoring of labs   she was hypoglycemic on 7/29 - am lab draw - glucose 42 - she was given juice and also required D50 as it was still low   She continues on IV antibiotics , ID consultation has nilesh requested and is pending - lower extremity doppler requested to r/o DVT       The patients vitals on arrival were ED Triage Vitals   Temperature Pulse Respirations Blood Pressure SpO2   09/28/19 1621 09/28/19 1621 09/28/19 1621 09/28/19 1621 09/28/19 1621   97 7 °F (36 5 °C) (!) 114 18 144/67 96 %      Temp Source Heart Rate Source Patient Position - Orthostatic VS BP Location FiO2 (%)   09/28/19 1621 09/28/19 1621 09/28/19 1621 09/28/19 1621 --   Oral Monitor Lying Right arm       Pain Score       09/28/19 1748       9           Past Medical History:   Diagnosis Date    Asthma     Chest pain on breathing     last assessed 2/5/15    Chronic diastolic [facility]F (congestive [facility] failure) [facility] )     Diabetes mellitus ([facility] )     HTN (hypertension)     Hyperlipidemia     Insomnia     Obesity     Preop cardiovascular exam 2/2/2018    Pulmonary fibrosis [facility])      Past Surgical History:   Procedure Laterality Date    HAND SURGERY         The patient was admitted to the hospital at 1213 on 9/29/19 for the following diagnosis:  Hyponatremia [E87 1]  Leg swelling [M79 89]  Cellulitis of lower extremity, unspecified laterality [V55 285]    Consults have been placed to:   IP CONSULT TO INFECTIOUS DISEASES    Vitals:    09/28/19 2053 09/28/19 2354 09/29/19 0600 09/29/19 0700   BP: 146/67 136/74  132/63   BP Location: Left arm Right arm  Left arm   Pulse: 104 90  102   Resp: 19 18  18   Temp: 98 °F (36 7 °C) 98 °F (36 7 °C)  98 °F (36 7 °C)   TempSrc: Oral Oral  Oral   SpO2: 97% 97%  (!) 80%   Weight: 87 2 kg (192 lb 3 9 oz)  87 kg (191 lb 12 8 oz)    Height: 4' 10" (1 473 m)          Most recent labs:    Recent Labs     09/28/19  1653 09/29/19  0503   WBC 11 56* 10 36*   HGB 13 9 12 9   HCT 44 4 41 5    298   K 3 9 3 8   CALCIUM 9 4 9 4   BUN 20 18   CREATININE 1 18 0 92   AST 22  --    ALT 13  --    ALKPHOS 116  --        Scheduled Meds:  Current Facility-Administered Medications:  acetaminophen 975 mg Oral Q8H [address] Pedro Zuleta MD   albuterol 2 puff Inhalation Q6H PRN Francisca Sawyer MD   albuterol 2 5 mg Nebulization Q6H PRN Cher Massey MD   baclofen 10 mg Oral HS PRN Cher Massey, MD   cefazolin 1,000 mg Intravenous Q8H Shanika Torres PA-C   cyclobenzaprine 5 mg Oral HS Cher Massey, MD   DULoxetine 20 mg Oral Daily Cher Massey, MD   escitalopram 20 mg Oral Daily Cher Massey MD   fluticasone 1 spray Nasal Daily Cher Massey MD   fluticasone-vilanterol 1 puff Inhalation Daily Cher Massey MD   heparin (porcine) 5,000 Units Subcutaneous Q8H [facility] Cher Massey MD   HYDROcodone-acetaminophen 2 tablet Oral Q6H PRN MD Riley Segovia ON 9/30/2019] insulin glargine 25 Units Subcutaneous QAM Shanika Torres PA-C   insulin lispro 1-5 Units Subcutaneous HS Cher Massey MD   insulin lispro 1-6 Units Subcutaneous TID AC Cher Massey, MD   lisinopril 10 mg Oral Daily Cher Massey, MD   loratadine 10 mg Oral Daily Cher Massey, MD   metoprolol succinate 25 mg Oral Daily Cher Massey, MD   mirtazapine 15 mg Oral HS Cher Massey, MD   montelukast 10 mg Oral HS Cher Massey, MD   morphine injection 2 mg Intravenous Q4H PRN Cher Massey, MD   nortriptyline 25 mg Oral HS Pedro Zuleta MD   ondansetron 4 mg Intravenous Q6H PRN Cher Massey, MD   pantoprazole 40 mg Oral Daily Cher Massey, MD   pravastatin 40 mg Oral Daily With Irais Bowles MD   predniSONE 20 mg Oral BID With Meals Becky Davison PA-C   sodium chloride 1 spray Each Nare PRN Cher Massey MD   torsemide 40 mg Oral BID Anastacia Pimentel PA-C     Continuous Infusions:   PRN Meds: albuterol    albuterol    baclofen    HYDROcodone-acetaminophen    morphine injection    ondansetron    sodium chloride    Surgical procedures (if appropriate):

## 2019-09-29 NOTE — PLAN OF CARE
Problem: INFECTION - ADULT  Goal: Absence or prevention of progression during hospitalization  Description  INTERVENTIONS:  - Assess and monitor for signs and symptoms of infection  Patient currently ordered antibiotics   - Monitor lab/diagnostic results  - Monitor all insertion sites, i e  indwelling lines, tubes, and drains  - [place] appropriate cooling/warming therapies per order  - Administer medications as ordered  - Instruct and encourage patient and family to use good hand hygiene technique  - Identify and instruct in appropriate isolation precautions for identified infection/condition   Outcome: Progressing  Goal: Absence of fever/infection during neutropenic period  Description  INTERVENTIONS:  - Monitor WBC    Outcome: Progressing     Problem: SAFETY ADULT  Goal: Patient will remain free of falls  Description  INTERVENTIONS:  - Assess patient frequently for physical needs  Patient ambulates independently with a cane  -  Identify cognitive and physical deficits and behaviors that affect risk of falls    -  [place] fall precautions as indicated by assessment   - Educate patient/family on patient safety including physical limitations  - Instruct patient to call for assistance with activity based on assessment  - Modify environment to reduce risk of injury  - Consider OT/PT consult to assist with strengthening/mobility   Outcome: Progressing  Goal: Maintain or return to baseline ADL function  Description  INTERVENTIONS:  -  Assess patient's ability to carry out ADLs; assess patient's baseline for ADL function and identify physical deficits which impact ability to perform ADLs (bathing, care of mouth/teeth, toileting, grooming, dressing, etc )  - Assess/evaluate cause of self-care deficits   - Assess range of motion  - Assess patient's mobility; develop plan if impaired  - Assess patient's need for assistive devices and provide as appropriate  - Encourage maximum independence but intervene and supervise when necessary  - Involve family in performance of ADLs  - Assess for home care needs following discharge   - Consider OT consult to assist with ADL evaluation and planning for discharge  - Provide patient education as appropriate  Outcome: Progressing  Goal: Maintain or return mobility status to optimal level  Description  INTERVENTIONS:  - Assess patient's baseline mobility status (ambulation, transfers, stairs, etc )    - Identify cognitive and physical deficits and behaviors that affect mobility  - Identify mobility aids required to assist with transfers and/or ambulation (gait belt, sit-to-stand, lift, walker, cane, etc )  - [place] fall precautions as indicated by assessment  - Record patient progress and toleration of activity level on Mobility SBAR; progress patient to next Phase/Stage  - Instruct patient to call for assistance with activity based on assessment  - Consider rehabilitation consult to assist with strengthening/weightbearing, etc   Outcome: Progressing     Problem: Prexisting or High Potential for Compromised Skin Integrity  Goal: Skin integrity is maintained or improved  Description  INTERVENTIONS:  - Identify patients at risk for skin breakdown  - Assess and monitor skin integrity  - Assess and monitor nutrition and hydration status  - Monitor labs   - Assess for incontinence   - Turn and reposition patient  - Assist with mobility/ambulation  - Relieve pressure over bony prominences  - Avoid friction and shearing  - Provide appropriate hygiene as needed including keeping skin clean and dry  - Evaluate need for skin moisturizer/barrier cream  - Collaborate with interdisciplinary team   - Patient/family teaching  - Consider wound care consult   Outcome: Progressing     Problem: DISCHARGE PLANNING  Goal: Discharge to home or other facility with appropriate resources  Description  INTERVENTIONS:  - Identify barriers to discharge w/patient and caregiver  - Arrange for needed discharge resources and transportation as appropriate  - Identify discharge learning needs (meds, wound care, etc )  - Arrange for interpretive services to assist at discharge as needed  - Refer to Case Management Department for coordinating discharge planning if the patient needs post-hospital services based on physician/advanced practitioner order or complex needs related to functional status, cognitive ability, or social support system  Outcome: Progressing     Problem: Knowledge Deficit  Goal: Patient/family/caregiver demonstrates understanding of disease process, treatment plan, medications, and discharge instructions  Description  Complete learning assessment and assess knowledge base    Interventions:  - Provide teaching at level of understanding  - Provide teaching via preferred learning methods  Outcome: Progressing

## 2019-09-29 NOTE — ASSESSMENT & PLAN NOTE
Wt Readings from Last 3 Encounters:   09/29/19 87 kg (191 lb 12 8 oz)   07/26/19 87 5 kg (193 lb)   07/03/19 90 4 kg (199 lb 3 2 oz)     · Monitor I&Os and daily weights  · On Torsemide BID  On Metoprolol

## 2019-09-29 NOTE — PLAN OF CARE
Problem: PHYSICAL THERAPY ADULT  Goal: Performs mobility at highest level of function for planned discharge setting  See evaluation for individualized goals  Description  Treatment/Interventions: Functional transfer training, LE strengthening/ROM, Elevations, Therapeutic exercise, Endurance training, Patient/family training, Equipment eval/education, Bed mobility, Gait training, Spoke to nursing  Equipment Recommended: Ibeth Rivera       See flowsheet documentation for full assessment, interventions and recommendations  Note:   Prognosis: Good  Problem List: Decreased strength, Impaired balance, Decreased endurance, Decreased mobility, Obesity, Decreased skin integrity, Pain  Assessment: pt is a 75y/o f who presents to [facility] c B/L LE cellulitis R>L  PMH significant for CHF, morbid obesity, DM 2, RA, + interstitial lung disease  at baseline, pt mod (I) c functional mobility c sPC  resides c spouse + son in ranch home c 5 MANUEL  currently requires min (A)x1 to complete OOB mobility tasks 2* deficits in strength, balance, gait quality, pain, skin integrity, + activity tolerance noted in PT exam above  [place] Index 50/100  min verbal cues required for safe transfer technique  ambulated 3' c [facility] + 15' c RW c improved stability noted c RW  able to tolerate sitting OOB in chair c chair alarm activated at end of session  would benefit from skilled PT to maximize functional mobility + return home safely  upon d/c, recommend pt return home c hhpt + family support  would also benefit from RW for home use  PT eval of high complexity 2* unstable med status c pt requiring ongoing medical management 2* B/L LE cellulitis  pt c multiple co-morbidities + mobility deficits above requiring min (A)x1 + RW to safely complete mobility tasks  pt c increased LE pain c ambulation  resides c family in ranch home c 5 MANUEL  remains c multiple lines    Barriers to Discharge: Inaccessible home environment     Recommendation: Home PT, Home with family support          See flowsheet documentation for full assessment

## 2019-09-30 ENCOUNTER — APPOINTMENT (INPATIENT)
Dept: ULTRASOUND IMAGING | Facility: HOSPITAL | Age: 74
DRG: 603 | End: 2019-09-30
Payer: MEDICARE

## 2019-09-30 ENCOUNTER — TRANSITIONAL CARE MANAGEMENT (OUTPATIENT)
Dept: INTERNAL MEDICINE CLINIC | Facility: CLINIC | Age: 74
End: 2019-09-30

## 2019-09-30 VITALS
DIASTOLIC BLOOD PRESSURE: 82 MMHG | SYSTOLIC BLOOD PRESSURE: 131 MMHG | HEART RATE: 90 BPM | RESPIRATION RATE: 19 BRPM | OXYGEN SATURATION: 95 % | WEIGHT: 192.9 LBS | BODY MASS INDEX: 40.49 KG/M2 | HEIGHT: 58 IN | TEMPERATURE: 97.7 F

## 2019-09-30 LAB
ANION GAP SERPL CALCULATED.3IONS-SCNC: 10 MMOL/L (ref 4–13)
BASOPHILS # BLD AUTO: 0.02 THOUSANDS/ΜL (ref 0–0.1)
BASOPHILS NFR BLD AUTO: 0 % (ref 0–1)
BUN SERPL-MCNC: 21 MG/DL (ref 5–25)
CALCIUM SERPL-MCNC: 9.4 MG/DL (ref 8.3–10.1)
CHLORIDE SERPL-SCNC: 100 MMOL/L (ref 100–108)
CO2 SERPL-SCNC: 26 MMOL/L (ref 21–32)
CREAT SERPL-MCNC: 1.02 MG/DL (ref 0.6–1.3)
EOSINOPHIL # BLD AUTO: 0 THOUSAND/ΜL (ref 0–0.61)
EOSINOPHIL NFR BLD AUTO: 0 % (ref 0–6)
ERYTHROCYTE [DISTWIDTH] IN BLOOD BY AUTOMATED COUNT: 14.1 % (ref 11.6–15.1)
GFR SERPL CREATININE-BSD FRML MDRD: 54 ML/MIN/1.73SQ M
GLUCOSE SERPL-MCNC: 181 MG/DL (ref 65–140)
GLUCOSE SERPL-MCNC: 203 MG/DL (ref 65–140)
GLUCOSE SERPL-MCNC: 219 MG/DL (ref 65–140)
HCT VFR BLD AUTO: 45.1 % (ref 34.8–46.1)
HGB BLD-MCNC: 14 G/DL (ref 11.5–15.4)
IMM GRANULOCYTES # BLD AUTO: 0.07 THOUSAND/UL (ref 0–0.2)
IMM GRANULOCYTES NFR BLD AUTO: 1 % (ref 0–2)
LYMPHOCYTES # BLD AUTO: 0.92 THOUSANDS/ΜL (ref 0.6–4.47)
LYMPHOCYTES NFR BLD AUTO: 8 % (ref 14–44)
MCH RBC QN AUTO: 28.3 PG (ref 26.8–34.3)
MCHC RBC AUTO-ENTMCNC: 31 G/DL (ref 31.4–37.4)
MCV RBC AUTO: 91 FL (ref 82–98)
MONOCYTES # BLD AUTO: 0.67 THOUSAND/ΜL (ref 0.17–1.22)
MONOCYTES NFR BLD AUTO: 6 % (ref 4–12)
NEUTROPHILS # BLD AUTO: 9.55 THOUSANDS/ΜL (ref 1.85–7.62)
NEUTS SEG NFR BLD AUTO: 85 % (ref 43–75)
NRBC BLD AUTO-RTO: 0 /100 WBCS
PLATELET # BLD AUTO: 304 THOUSANDS/UL (ref 149–390)
PMV BLD AUTO: 10.9 FL (ref 8.9–12.7)
POTASSIUM SERPL-SCNC: 4.1 MMOL/L (ref 3.5–5.3)
PROCALCITONIN SERPL-MCNC: <0.05 NG/ML
RBC # BLD AUTO: 4.95 MILLION/UL (ref 3.81–5.12)
SODIUM SERPL-SCNC: 136 MMOL/L (ref 136–145)
WBC # BLD AUTO: 11.23 THOUSAND/UL (ref 4.31–10.16)

## 2019-09-30 PROCEDURE — 85025 COMPLETE CBC W/AUTO DIFF WBC: CPT | Performed by: PHYSICIAN ASSISTANT

## 2019-09-30 PROCEDURE — 93970 EXTREMITY STUDY: CPT | Performed by: SURGERY

## 2019-09-30 PROCEDURE — 93970 EXTREMITY STUDY: CPT

## 2019-09-30 PROCEDURE — 99223 1ST HOSP IP/OBS HIGH 75: CPT | Performed by: INTERNAL MEDICINE

## 2019-09-30 PROCEDURE — 99239 HOSP IP/OBS DSCHRG MGMT >30: CPT | Performed by: PHYSICIAN ASSISTANT

## 2019-09-30 PROCEDURE — 82948 REAGENT STRIP/BLOOD GLUCOSE: CPT

## 2019-09-30 PROCEDURE — 80048 BASIC METABOLIC PNL TOTAL CA: CPT | Performed by: PHYSICIAN ASSISTANT

## 2019-09-30 PROCEDURE — 84145 PROCALCITONIN (PCT): CPT | Performed by: PHYSICIAN ASSISTANT

## 2019-09-30 RX ORDER — PREDNISONE 10 MG/1
TABLET ORAL
Qty: 12 TABLET | Refills: 0 | Status: SHIPPED | OUTPATIENT
Start: 2019-09-30 | End: 2019-11-14

## 2019-09-30 RX ORDER — PREDNISONE 1 MG/1
5 TABLET ORAL DAILY
Refills: 0
Start: 2019-09-30 | End: 2019-11-14

## 2019-09-30 RX ADMIN — FLUTICASONE FUROATE AND VILANTEROL TRIFENATATE 1 PUFF: 100; 25 POWDER RESPIRATORY (INHALATION) at 10:05

## 2019-09-30 RX ADMIN — INSULIN LISPRO 1 UNITS: 100 INJECTION, SOLUTION INTRAVENOUS; SUBCUTANEOUS at 10:03

## 2019-09-30 RX ADMIN — DULOXETINE HYDROCHLORIDE 20 MG: 20 CAPSULE, DELAYED RELEASE ORAL at 11:57

## 2019-09-30 RX ADMIN — CEFAZOLIN SODIUM 1000 MG: 1 SOLUTION INTRAVENOUS at 00:44

## 2019-09-30 RX ADMIN — INSULIN GLARGINE 25 UNITS: 100 INJECTION, SOLUTION SUBCUTANEOUS at 10:00

## 2019-09-30 RX ADMIN — ESCITALOPRAM OXALATE 20 MG: 10 TABLET ORAL at 10:00

## 2019-09-30 RX ADMIN — HEPARIN SODIUM 5000 UNITS: 5000 INJECTION INTRAVENOUS; SUBCUTANEOUS at 05:40

## 2019-09-30 RX ADMIN — PREDNISONE 20 MG: 20 TABLET ORAL at 10:00

## 2019-09-30 RX ADMIN — LISINOPRIL 10 MG: 10 TABLET ORAL at 10:00

## 2019-09-30 RX ADMIN — TORSEMIDE 40 MG: 20 TABLET ORAL at 10:00

## 2019-09-30 RX ADMIN — LORATADINE 10 MG: 10 TABLET ORAL at 10:00

## 2019-09-30 RX ADMIN — METOPROLOL SUCCINATE 25 MG: 25 TABLET, EXTENDED RELEASE ORAL at 10:00

## 2019-09-30 RX ADMIN — HYDROCODONE BITARTRATE AND ACETAMINOPHEN 2 TABLET: 5; 325 TABLET ORAL at 04:24

## 2019-09-30 RX ADMIN — FLUTICASONE PROPIONATE 1 SPRAY: 50 SPRAY, METERED NASAL at 10:05

## 2019-09-30 RX ADMIN — INSULIN LISPRO 2 UNITS: 100 INJECTION, SOLUTION INTRAVENOUS; SUBCUTANEOUS at 11:57

## 2019-09-30 RX ADMIN — PANTOPRAZOLE SODIUM 40 MG: 40 TABLET, DELAYED RELEASE ORAL at 10:00

## 2019-09-30 RX ADMIN — BACLOFEN 10 MG: 10 TABLET ORAL at 00:41

## 2019-09-30 NOTE — PLAN OF CARE
Problem: Prexisting or High Potential for Compromised Skin Integrity  Goal: Skin integrity is maintained or improved  Description  INTERVENTIONS:  - Identify patients at risk for skin breakdown  - Assess and monitor skin integrity  - Assess and monitor nutrition and hydration status  - Monitor labs   - Assess for incontinence   - Turn and reposition patient  - Assist with mobility/ambulation  - Relieve pressure over bony prominences  - Avoid friction and shearing  - Provide appropriate hygiene as needed including keeping skin clean and dry  - Evaluate need for skin moisturizer/barrier cream  - Collaborate with interdisciplinary team   - Patient/family teaching  - Consider wound care consult   Outcome: Progressing     Problem: INFECTION - ADULT  Goal: Absence or prevention of progression during hospitalization  Description  INTERVENTIONS:  - Assess and monitor for signs and symptoms of infection  Patient currently ordered antibiotics   - Monitor lab/diagnostic results  - Monitor all insertion sites, i e  indwelling lines, tubes, and drains  - [place] appropriate cooling/warming therapies per order  - Administer medications as ordered  - Instruct and encourage patient and family to use good hand hygiene technique  - Identify and instruct in appropriate isolation precautions for identified infection/condition   Outcome: Progressing  Goal: Absence of fever/infection during neutropenic period  Description  INTERVENTIONS:  - Monitor WBC    Outcome: Progressing     Problem: SAFETY ADULT  Goal: Patient will remain free of falls  Description  INTERVENTIONS:  - Assess patient frequently for physical needs  Patient ambulates independently with a cane  -  Identify cognitive and physical deficits and behaviors that affect risk of falls    -  [place] fall precautions as indicated by assessment   - Educate patient/family on patient safety including physical limitations  - Instruct patient to call for assistance with activity based on assessment  - Modify environment to reduce risk of injury  - Consider OT/PT consult to assist with strengthening/mobility   Outcome: Progressing  Goal: Maintain or return to baseline ADL function  Description  INTERVENTIONS:  -  Assess patient's ability to carry out ADLs; assess patient's baseline for ADL function and identify physical deficits which impact ability to perform ADLs (bathing, care of mouth/teeth, toileting, grooming, dressing, etc )  - Assess/evaluate cause of self-care deficits   - Assess range of motion  - Assess patient's mobility; develop plan if impaired  - Assess patient's need for assistive devices and provide as appropriate  - Encourage maximum independence but intervene and supervise when necessary  - Involve family in performance of ADLs  - Assess for home care needs following discharge   - Consider OT consult to assist with ADL evaluation and planning for discharge  - Provide patient education as appropriate  Outcome: Progressing  Goal: Maintain or return mobility status to optimal level  Description  INTERVENTIONS:  - Assess patient's baseline mobility status (ambulation, transfers, stairs, etc )    - Identify cognitive and physical deficits and behaviors that affect mobility  - Identify mobility aids required to assist with transfers and/or ambulation (gait belt, sit-to-stand, lift, walker, cane, etc )  - [place] fall precautions as indicated by assessment  - Record patient progress and toleration of activity level on Mobility SBAR; progress patient to next Phase/Stage  - Instruct patient to call for assistance with activity based on assessment  - Consider rehabilitation consult to assist with strengthening/weightbearing, etc   Outcome: Progressing     Problem: DISCHARGE PLANNING  Goal: Discharge to home or other facility with appropriate resources  Description  INTERVENTIONS:  - Identify barriers to discharge w/patient and caregiver  - Arrange for needed discharge resources and transportation as appropriate  - Identify discharge learning needs (meds, wound care, etc )  - Arrange for interpretive services to assist at discharge as needed  - Refer to Case Management Department for coordinating discharge planning if the patient needs post-hospital services based on physician/advanced practitioner order or complex needs related to functional status, cognitive ability, or social support system  Outcome: Progressing     Problem: Knowledge Deficit  Goal: Patient/family/caregiver demonstrates understanding of disease process, treatment plan, medications, and discharge instructions  Description  Complete learning assessment and assess knowledge base  Interventions:  - Provide teaching at level of understanding  - Provide teaching via preferred learning methods  Outcome: Progressing     Problem: Potential for Falls  Goal: Patient will remain free of falls  Description  INTERVENTIONS:  - Assess patient frequently for physical needs  Patient ambulates independently with a cane  -  Identify cognitive and physical deficits and behaviors that affect risk of falls  -  [place] fall precautions as indicated by assessment   - Educate patient/family on patient safety including physical limitations  - Instruct patient to call for assistance with activity based on assessment  - Modify environment to reduce risk of injury  - Consider OT/PT consult to assist with strengthening/mobility   Outcome: Progressing     Problem: Nutrition/Hydration-ADULT  Goal: Nutrient/Hydration intake appropriate for improving, restoring or maintaining nutritional needs  Description  Monitor and assess patient's nutrition/hydration status for malnutrition  Collaborate with interdisciplinary team and initiate plan and interventions as ordered  Monitor patient's weight and dietary intake as ordered or per policy  Utilize nutrition screening tool and intervene as necessary   Determine patient's food preferences and provide high-protein, high-caloric foods as appropriate       INTERVENTIONS:  - Monitor oral intake, urinary output, labs, and treatment plans  - Assess nutrition and hydration status and recommend course of action  - Evaluate amount of meals eaten  - Assist patient with eating if necessary   - Allow adequate time for meals  - Recommend/ encourage appropriate diets, oral nutritional supplements, and vitamin/mineral supplements  - Order, calculate, and assess calorie counts as needed  - Recommend, monitor, and adjust tube feedings and TPN/PPN based on assessed needs  - Assess need for intravenous fluids  - Provide specific nutrition/hydration education as appropriate  - Include patient/family/caregiver in decisions related to nutrition  Outcome: Progressing

## 2019-09-30 NOTE — ASSESSMENT & PLAN NOTE
· Continue Breo and Nebs  · With chronic respiratory failure on chronic oxygen 2 L as needed and at night  · Follows with pulmonary as outpatient

## 2019-09-30 NOTE — ASSESSMENT & PLAN NOTE
Lab Results   Component Value Date    HGBA1C 9 6 06/26/2019       Recent Labs     09/29/19  1524 09/29/19  2109 09/30/19  0659 09/30/19  1109   POCGLU 163* 276* 181* 219*       Blood Sugar Average: Last 72 hrs:  (P) 162 8    Continue Lantus and Metformin  Close follow up with PCP for DM control

## 2019-09-30 NOTE — DISCHARGE SUMMARY
Discharge- Amy Sickle 1945, 76 y o  female MRN: [id number]    Unit/Bed#: -01 Encounter: [id number]    Primary Care Provider: Salina Mercer MD   Date and time admitted to hospital: 9/28/2019  4:10 PM        Bilateral edema of lower extremity  Assessment & Plan  · Patient has chronic bilateral lower extremity pain and swelling  · Likely multifactorial in the setting of chronic venous stasis, edema, and uncontrolled rheumatoid arthritis  · LE dopplers negative for DVT  · Leg Elevation  · Prednisone taper and close follow up with PCP, Rheumatology, and Pain management  · She is stable for discharge home today  * Cellulitis  Assessment & Plan  · Right lower extremity appeared slightly more red and tender on admission  · Question of prior cellulitis  She is immunosuppressed and was started on empiric antibiotics on admission  No evidence of cellulitis at this time  Appreciate ID input, recommend to discontinue antibiotics  · Patient remains afebrile  Procalcitonin negative  · LE doppler negative for DVT  Venous stasis dermatitis of both lower extremities  Assessment & Plan  · Patient with chronic LE changes  · Elevate legs  Hyponatremia  Assessment & Plan  · History of chronic hyponatremia - sodium lower than her baseline on admission but improved today  Interstitial lung disease ([address] )  Assessment & Plan  · Continue Breo and Nebs  · With chronic respiratory failure on chronic oxygen 2 L as needed and at night  · Follows with pulmonary as outpatient  Rheumatoid arthritis ([facility] [address] )  Assessment & Plan  · Patient follows up with Rheumatology as outpatient  Last visit 6 months ago  · Continues to have worsening arthritic pain in her hand and ankle joints  · ESR and CRP elevated  · On Humira  · Her pain has been acutely worse over the last few days, started on a Prednisone course upon admission with some benefit    Continue Prednisone taper upon discharge and close follow up with Rheumatology  Gastroesophageal reflux disease without esophagitis  Assessment & Plan  · Continue Protonix  Type 2 diabetes mellitus with complication, with long-term current use of insulin [facility])  Assessment & Plan  Lab Results   Component Value Date    HGBA1C 9 6 06/26/2019       Recent Labs     09/29/19  1524 09/29/19  2109 09/30/19  0659 09/30/19  1109   POCGLU 163* 276* 181* 219*       Blood Sugar Average: Last 72 hrs:  (P) 162 8    Continue Lantus and Metformin  Close follow up with PCP for DM control  Ambulatory dysfunction  Assessment & Plan  · PT/OT evaluations appreciated  Home PT  Morbid obesity ([address] )  Assessment & Plan  · Lifestyle and dietary modifications  Mixed hyperlipidemia  Assessment & Plan  · Continue statin  Chronic diastolic congestive heart failure [facility])  Assessment & Plan  Wt Readings from Last 3 Encounters:   09/30/19 87 5 kg (192 lb 14 4 oz)   07/26/19 87 5 kg (193 lb)   07/03/19 90 4 kg (199 lb 3 2 oz)     · Daily weights  · On Torsemide BID  On Metoprolol  Discharging Physician / Practitioner: Jona Knight PA-C  PCP: Dilcia Hamm MD  Admission Date:   Admission Orders (From admission, onward)     Ordered        09/29/19 1213  Inpatient Admission  Once         09/28/19 1936  Place in Observation  Once                   Discharge Date: 09/30/19    Resolved Problems  Date Reviewed: 9/30/2019    None          Consultations During Hospital Stay:  · Infectious Disease    Procedures Performed:   · None    Significant Findings / Test Results:   · LE Dopplers negative for DVT  · Procalcitonin negative  · Elevated ESR and CRP    Incidental Findings:   · None    Test Results Pending at Discharge (will require follow up): · None     Outpatient Tests Requested:  · Close follow up with PCP and Rheumatology      Complications:  None    Reason for Admission: LE pain and swelling    Hospital Course:     Everardo Jackson is a 76 y o  female patient who originally presented to the hospital on 9/28/2019 due to lower extremity pain and swelling  Patient has a history of chronic lower extremity edema, venous stasis changes, and rheumatoid arthritis  She reported worsening pain and presented to the emergency department for further evaluation and management  On admission there was some concern for redness of the right lower extremity and patient was started on IV antibiotics for possible cellulitis  She was also started on Prednisone due to a suspected rheumatoid arthritis flare  Her chronic pain persists but her acute pain has overall improved since admission  She has had no fevers or chills  ID was consulted for input: recommendation to discontinue antibiotics as low clinical suspicion for cellulitis and no current evidence of cellulitis on exam at this time  Lower extremity Dopplers were performed and negative for DVT  Home physical therapy recommended  She is stable for discharge to home today  She she is recommended to follow up closely with her primary care and rheumatologist   She will be sent home on a short prednisone taper  Referral to Pain Management also placed  Please see above list of diagnoses and related plan for additional information  Condition at Discharge: stable     Discharge Day Visit / Exam:     Subjective:  Patient reports of some continued chronic pain but improved since admission and starting Prednisone  No fevers or chills  No SOB or CP  No nausea or vomiting  Cleatrice Handy for discharge home  Vitals: Blood Pressure: 131/82 (09/30/19 0643)  Pulse: 90 (09/30/19 0643)  Temperature: 97 7 °F (36 5 °C) (09/30/19 0643)  Temp Source: Oral (09/30/19 3067)  Respirations: 19 (09/30/19 0643)  Height: 4' 10" (147 3 cm) (09/30/19 1142)  Weight - Scale: 87 5 kg (192 lb 14 4 oz) (09/30/19 1142)  SpO2: 95 % (09/30/19 1446)  Exam:   Physical Exam   Constitutional: She is oriented to person, place, and time  No distress     HENT:   Head: Normocephalic and atraumatic  Eyes: No scleral icterus  Cardiovascular: Normal rate and regular rhythm  Pulmonary/Chest: Effort normal  No respiratory distress  She has no wheezes  Abdominal: Soft  Bowel sounds are normal  She exhibits no distension  There is no tenderness  Musculoskeletal:   Chronic LE edema and venous stasis changes  No signs active infection/cellulitis/warmth  Neurological: She is alert and oriented to person, place, and time  Skin: She is not diaphoretic  Vitals reviewed  Discussion with Family: Spoke with family at bedside    Discharge instructions/Information to patient and family:   See after visit summary for information provided to patient and family  Provisions for Follow-Up Care:  See after visit summary for information related to follow-up care and any pertinent home health orders  Disposition:     Home    For Discharges to [address] SN[facility]:   · Not Applicable to this Patient - Not Applicable to this Patient    Planned Readmission: None     Discharge Statement:  I spent 35 minutes discharging the patient  This time was spent on the day of discharge  I had direct contact with the patient on the day of discharge  Greater than 50% of the total time was spent examining patient, answering all patient questions, arranging and discussing plan of care with patient as well as directly providing post-discharge instructions  Additional time then spent on discharge activities  Discharge Medications:  See after visit summary for reconciled discharge medications provided to patient and family        ** Please Note: This note has been constructed using a voice recognition system **

## 2019-09-30 NOTE — ASSESSMENT & PLAN NOTE
· Right lower extremity appeared slightly more red and tender on admission  · Question of prior cellulitis  She is immunosuppressed and was started on empiric antibiotics on admission  No evidence of cellulitis at this time  Appreciate ID input, recommend to discontinue antibiotics  · Patient remains afebrile  Procalcitonin negative  · LE doppler negative for DVT

## 2019-09-30 NOTE — ASSESSMENT & PLAN NOTE
Wt Readings from Last 3 Encounters:   09/30/19 87 5 kg (192 lb 14 4 oz)   07/26/19 87 5 kg (193 lb)   07/03/19 90 4 kg (199 lb 3 2 oz)     · Daily weights  · On Torsemide BID  On Metoprolol

## 2019-09-30 NOTE — SOCIAL WORK
CM met with pt and her son  CM discussed Home PT rec  CM discussed HHC with them and both agreeable  Both agreeable to any HHC that accepts  CM put in referral to [facility]

## 2019-09-30 NOTE — ASSESSMENT & PLAN NOTE
· Patient has chronic bilateral lower extremity pain and swelling  · Likely multifactorial in the setting of chronic venous stasis, edema, and uncontrolled rheumatoid arthritis  · LE dopplers negative for DVT  · Leg Elevation  · Prednisone taper and close follow up with PCP, Rheumatology, and Pain management  Referral to pain management upon discharge  · She is stable for discharge home today

## 2019-09-30 NOTE — CONSULTS
Consultation - Infectious Disease   Brittney Khalil 76 y o  female MRN: [id number]  Unit/Bed#: -01 Encounter: [id number]      IMPRESSION & RECOMMENDATIONS:   Impression/Recommendations:  1  Bilateral lower extremity pain/swelling  Likely multifactorial in the setting of chronic venous stasis, edema, uncontrolled rheumatoid arthritis  On exam, no clinical evidence of cellulitis  Patient remains afebrile  Procalcitonin level is negative  Lower extremity Dopplers are pending  As I have a low clinical suspicion for cellulitis, will monitor off any more antibiotics     -discontinue cefazolin monitor off any more antibiotics  -follow-up lower extremity Doppler results  -frequent leg elevation  -serial leg exams  -steroid taper per primary service    2  Bilateral lower extremity venous stasis dermatitis  Risk factor for development of cellulitis, but currently no clinical evidence of this  Recommend continued leg elevation and aggressive edema control  3  Rheumatoid arthritis  On Humira  The patient states she is due for her next dose  Seems to be uncontrolled and likely contributing to severe pain  ESR and CRP are elevated  -steroid taper per primary service  -close outpatient follow-up with rheumatology    4  Diabetes mellitus, uncontrolled  Recent hemoglobin A1c was 9 6  Recommend improved blood sugar control  Antibiotics:  Cefazolin 3    I discussed above plan with patient and her son at bedside, and with Soniya Munguia from primary service  Thank you for this consultation  We will follow along with you  HISTORY OF PRESENT ILLNESS:  Reason for Consult:  Possible leg cellulitis    HPI: Brittney Khalil is a 76 y o  female with history of rheumatoid arthritis on Humira, obesity, diabetes, chronic venous stasis who presented to ER on 09/28/2019 with complaint of worsening pain with some associated redness of both lower extremities    Patient states she has chronic lower extremity and upper extremity pain for many years and has frequent flare ups  Recently, her bilateral lower extremity pain became unbearable prompting her to come to the hospital   On admission, there was concern for some redness of right lower extremity and patient was started on IV cefazolin for possible cellulitis  She was also started on oral prednisone due to suspected RA flare  Her pain is persistent today, but has improved since admission  Patient remains afebrile  She denies any fevers, chills or sweats  Patient's son states that she is due for her next dose of Humira  REVIEW OF SYSTEMS:    A complete system-based review of systems is otherwise negative  PAST MEDICAL HISTORY:  Past Medical History:   Diagnosis Date    Asthma     Chest pain on breathing     last assessed 2/5/15    Chronic diastolic CHF (congestive heart failure) (HCC)     Diabetes mellitus ([address] )     HTN (hypertension)     Hyperlipidemia     Insomnia     Obesity     Preop cardiovascular exam 2018    Pulmonary fibrosis (HCC)      Past Surgical History:   Procedure Laterality Date    HAND SURGERY         FAMILY HISTORY:  Non-contributory    SOCIAL HISTORY:  Social History     Substance and Sexual Activity   Alcohol Use Never    Frequency: Never    Binge frequency: Never     Social History     Substance and Sexual Activity   Drug Use No     Social History     Tobacco Use   Smoking Status Former Smoker    Packs/day: 1 50    Years: 18 00    Pack years: 27 00    Types: Cigarettes    Last attempt to quit:     Years since quittin 7   Smokeless Tobacco Never Used   Tobacco Comment    Quit 20 years ago       ALLERGIES:  Allergies   Allergen Reactions    Gabapentin     Vancomycin        MEDICATIONS:  All current active medications have been reviewed      PHYSICAL EXAM:  Vitals:  Temp:  [97 7 °F (36 5 °C)-98 °F (36 7 °C)] 97 7 °F (36 5 °C)  HR:  [] 90  Resp:  [18-19] 19  BP: (131-150)/(64-82) 131/82  SpO2:  [92 %-95 %] 95 %  Temp (24hrs), Av 8 °F (36 6 °C), Min:97 7 °F (36 5 °C), Max:98 °F (36 7 °C)  Current: Temperature: 97 7 °F (36 5 °C)     Physical Exam:  General:  Well-nourished, well-developed, in no acute distress  Eyes:  Conjunctive clear with no hemorrhages or effusions  Oropharynx:  No ulcers, no lesions  Neck:  Supple, no lymphadenopathy  Lungs:  Clear to auscultation bilaterally, no accessory muscle use  Cardiac:  Regular rate and rhythm, no murmurs  Abdomen:  Soft, non-tender, non-distended  Extremities:  No peripheral cyanosis, clubbing, or edema  Skin:  Mild bilateral venous stasis dermatitis  No notable warmth, erythema, fluctuance or crepitus  Neurological:  Moves all four extremities spontaneously, sensation grossly intact      LABS, IMAGING, & OTHER STUDIES:  Lab Results:  I have personally reviewed pertinent labs  Results from last 7 days   Lab Units 19  0530 19  0503 19  1653   POTASSIUM mmol/L 4 1 3 8 3 9   CHLORIDE mmol/L 100 101 94*   CO2 mmol/L 26 28 27   BUN mg/dL 21 18 20   CREATININE mg/dL 1 02 0 92 1 18   EGFR ml/min/1 73sq m 54 62 46   CALCIUM mg/dL 9 4 9 4 9 4   AST U/L  --   --  22   ALT U/L  --   --  13   ALK PHOS U/L  --   --  116     Results from last 7 days   Lab Units 19  0530 19  0503 19  1653   WBC Thousand/uL 11 23* 10 36* 11 56*   HEMOGLOBIN g/dL 14 0 12 9 13 9   PLATELETS Thousands/uL 304 298 308       Imaging Studies:   I have personally reviewed pertinent imaging study reports and images in PACS  EKG, Pathology, and Other Studies:   I have personally reviewed pertinent reports

## 2019-09-30 NOTE — ASSESSMENT & PLAN NOTE
· Patient follows up with Rheumatology as outpatient  Last visit 6 months ago  · Continues to have worsening arthritic pain in her hand and ankle joints  · ESR and CRP elevated  · On Humira  · Her pain has been acutely worse over the last few days, started on a Prednisone course upon admission with some benefit  Continue Prednisone taper upon discharge and close follow up with Rheumatology

## 2019-10-08 ENCOUNTER — OFFICE VISIT (OUTPATIENT)
Dept: INTERNAL MEDICINE CLINIC | Facility: CLINIC | Age: 74
End: 2019-10-08
Payer: MEDICARE

## 2019-10-08 VITALS
TEMPERATURE: 97.5 F | WEIGHT: 188 LBS | SYSTOLIC BLOOD PRESSURE: 138 MMHG | HEART RATE: 75 BPM | OXYGEN SATURATION: 97 % | HEIGHT: 58 IN | DIASTOLIC BLOOD PRESSURE: 78 MMHG | BODY MASS INDEX: 39.47 KG/M2

## 2019-10-08 DIAGNOSIS — Z79.4 TYPE 2 DIABETES MELLITUS WITH COMPLICATION, WITH LONG-TERM CURRENT USE OF INSULIN (HCC): Primary | ICD-10-CM

## 2019-10-08 DIAGNOSIS — R60.0 BILATERAL EDEMA OF LOWER EXTREMITY: ICD-10-CM

## 2019-10-08 DIAGNOSIS — I83.93 VARICOSE VEINS OF BOTH LOWER EXTREMITIES, UNSPECIFIED WHETHER COMPLICATED: ICD-10-CM

## 2019-10-08 DIAGNOSIS — E11.8 TYPE 2 DIABETES MELLITUS WITH COMPLICATION, WITH LONG-TERM CURRENT USE OF INSULIN (HCC): Primary | ICD-10-CM

## 2019-10-08 DIAGNOSIS — I10 ESSENTIAL HYPERTENSION: ICD-10-CM

## 2019-10-08 DIAGNOSIS — J84.9 INTERSTITIAL LUNG DISEASE (HCC): ICD-10-CM

## 2019-10-08 DIAGNOSIS — D84.9 IMMUNOSUPPRESSED STATUS (HCC): ICD-10-CM

## 2019-10-08 DIAGNOSIS — L03.119 CELLULITIS OF LOWER EXTREMITY, UNSPECIFIED LATERALITY: ICD-10-CM

## 2019-10-08 DIAGNOSIS — M05.9 RHEUMATOID ARTHRITIS WITH POSITIVE RHEUMATOID FACTOR, INVOLVING UNSPECIFIED SITE (HCC): ICD-10-CM

## 2019-10-08 PROCEDURE — 99495 TRANSJ CARE MGMT MOD F2F 14D: CPT | Performed by: PHYSICIAN ASSISTANT

## 2019-10-08 NOTE — PROGRESS NOTES
Assessment/Plan:  Reviewed all of her hospital records  She still has diffuse pain of her lower legs sugars have not been well controlled she needs to move up her appointment with Rheumatology getting labs as ordered    No problem-specific Assessment & Plan notes found for this encounter  Diagnoses and all orders for this visit:    Type 2 diabetes mellitus with complication, with long-term current use of insulin (HCC)  -     CBC and differential; Future  -     Basic metabolic panel; Future  -     Hemoglobin A1C; Future    Interstitial lung disease (HCC)  -     CBC and differential; Future  -     Basic metabolic panel; Future  -     Hemoglobin A1C; Future    Essential hypertension  -     CBC and differential; Future  -     Basic metabolic panel; Future  -     Hemoglobin A1C; Future    Varicose veins of both lower extremities, unspecified whether complicated    Rheumatoid arthritis with positive rheumatoid factor, involving unspecified site ([facility])    Cellulitis of lower extremity, unspecified laterality    Bilateral edema of lower extremity  -     CBC and differential; Future  -     Basic metabolic panel; Future  -     Hemoglobin A1C; Future    Immunosuppressed status ([facility])        Subjective:     Patient ID: Mere Camargo is a 76 y o  female  She was hospitalized for 2 days a week ago because of increased pain and swelling of her lower legs she had some redness as well and she was treated with antibiotics for cellulitis seen by infectious disease who did not feel she had an infection antibiotics were stopped  She received steroids which did relieve her pain somewhat she has completed the course of prednisone and she has constant lower leg pain  Swelling is stable no redness fever or chills  She has known interstitial lung disease her breathing has been stable no orthopnea PND or palpitations    She has chronic swelling of her legs her weight is stable she is on diuretics a long history of inflammatory arthritis on Humira  Long history of diabetes on insulin and oral agents sugars have not been well controlled she does not check her sugars at home much  History of interstitial lung disease follows with Pulmonary uses oxygen at home this is stable  No DVT was found on ultrasound venous of both legs      The following portions of the patient's history were reviewed and updated as appropriate:  She  has a past medical history of Asthma, Chest pain on breathing, Chronic diastolic CHF (congestive heart failure) ([facility] ), Diabetes mellitus ([facility] ), HTN (hypertension), Hyperlipidemia, Insomnia, Obesity, Preop cardiovascular exam (2/2/2018), and Pulmonary fibrosis ([facility] )  She   Patient Active Problem List    Diagnosis Date Noted    Hyponatremia 09/28/2019    Cellulitis 09/28/2019    Venous stasis dermatitis of both lower extremities 09/28/2019    Chronic respiratory failure with hypoxia ([facility] ) 07/28/2019    Immunosuppressed status ([facility] ) 03/26/2019    Interstitial lung disease ([facility] ) 03/26/2019    Varicose veins of both lower extremities 05/09/2018    Moderate persistent asthma 11/10/2017    Vitamin D deficiency 06/30/2017    Pain in elbow 06/01/2017    Chronic diastolic congestive heart failure ([facility] ) 07/27/2016    Mixed hyperlipidemia 07/27/2016    Bilateral edema of lower extremity 03/03/2016    Ambulatory dysfunction 02/02/2016    Recurrent major depressive disorder, in partial remission ([facility] ) 02/02/2016    Gastroesophageal reflux disease without esophagitis 02/02/2016    Urinary incontinence 02/02/2016    Morbid obesity ([facility] ) 04/21/2015    Psoriasis vulgaris 12/31/2014    Essential hypertension 05/27/2014    Type 2 diabetes mellitus with complication, with long-term current use of insulin ([facility] ) 04/30/2014    Primary insomnia 04/30/2014    Rheumatoid arthritis ([facility] ) 04/30/2014     She  has a past surgical history that includes Hand surgery    Her family history includes Hypertension in her mother; Rheum arthritis in her mother  She  reports that she quit smoking about 22 years ago  Her smoking use included cigarettes  She has a 27 00 pack-year smoking history  She has never used smokeless tobacco  She reports that she does not drink alcohol or use drugs  Current Outpatient Medications   Medication Sig Dispense Refill    adalimumab (HUMIRA) 40 mg/0 8 mL PSKT Inject 40 mg under the skin every 14 (fourteen) days      albuterol (2 5 mg/3 mL) 0 083 % nebulizer solution Take 1 vial (2 5 mg total) by nebulization every 6 (six) hours as needed for wheezing or shortness of breath 120 vial 3    albuterol (PROAIR HFA) 90 mcg/act inhaler Inhale 2 puffs every 6 (six) hours as needed for wheezing or shortness of breath 1 Inhaler 3    amitriptyline (ELAVIL) 25 mg tablet Take 25 mg by mouth daily at bedtime      B-D TB SYRINGE 1CC/27GX1/2" 27G X 1/2" 1 ML MISC INJECT METHOTREXATE 20 MG (0 8ML) SUBCUTANEOUS ONCE WEEKLY  3    baclofen 10 mg tablet Take 10 mg by mouth daily at bedtime as needed  5    Blood Glucose Monitoring Suppl (ONETOUCH VERIO) w/Device KIT by Does not apply route 3 (three) times a day      cyclobenzaprine (FLEXERIL) 5 mg tablet TAKE 1 TABLETT BY MOUTH EVERY NIGHT AS NEEDED  5    DULoxetine (CYMBALTA) 20 mg capsule Take 1 capsule (20 mg total) by mouth daily 90 capsule 3    escitalopram (LEXAPRO) 20 mg tablet Take 1 tablet (20 mg total) by mouth daily 90 tablet 2    fluticasone (FLONASE) 50 mcg/act nasal spray 1 spray into each nostril daily 1 Bottle 3    fluticasone-vilanterol (BREO ELLIPTA) 100-25 mcg/inh inhaler Inhale 1 puff daily Rinse mouth after use   1 Inhaler 0    fosinopril (MONOPRIL) 10 mg tablet Take 1 tablet (10 mg total) by mouth daily 90 tablet 2    insulin glargine (LANTUS SOLOSTAR) 100 units/mL injection pen INJECT 35 UNITS SUBCUTANEOUSLY IN THE MORNING AND 30 UNITS IN THE EVENING 5 pen 2    Insulin Pen Needle (B-D UF III MINI PEN NEEDLES) 31G X 5 MM MISC Inject under the skin 2 (two) times a day Use as directed 200 each 3    loratadine (CLARITIN) 10 mg tablet TAKE 1 TABLET BY MOUTH EVERY DAY 15 tablet 0    metFORMIN (GLUCOPHAGE) 1000 MG tablet Take 1 tablet (1,000 mg total) by mouth 2 (two) times a day 180 tablet 3    metoprolol succinate (TOPROL-XL) 25 mg 24 hr tablet Take 1 tablet (25 mg total) by mouth daily 90 tablet 2    mirtazapine (REMERON) 15 mg tablet Take 1 tablet (15 mg total) by mouth daily at bedtime 90 tablet 2    montelukast (SINGULAIR) 10 mg tablet Take 1 tablet (10 mg total) by mouth daily at bedtime 30 tablet 3    nortriptyline (PAMELOR) 25 mg capsule Take 1 capsule (25 mg total) by mouth daily at bedtime 90 capsule 1    nystatin (MYCOSTATIN) 500,000 units/5 mL suspension Apply 5 mL (500,000 Units total) to the mouth or throat 4 (four) times a day 473 mL 0    nystatin (MYCOSTATIN) cream Apply 2 g (1 application total) topically 2 (two) times a day To affected area 30 g 2    NYSTATIN powder APPLY 2-3 TIMES DAILY TO AFFECTED AREA(S)   5    omeprazole-sodium bicarbonate (ZEGERID)  MG per capsule Take 1 capsule by mouth every morning before breakfast      ONE TOUCH ULTRA TEST test strip TEST 3 TIMES A  each 3    oxyCODONE-acetaminophen (PERCOCET)  mg per tablet Take 1 tablet by mouth 3 (three) times a day as needed  0    pantoprazole (PROTONIX) 40 mg tablet Take 1 tablet (40 mg total) by mouth daily 90 tablet 2    predniSONE 10 mg tablet 3 by mouth daily for 2 days, 2 po daily for 2 days, and then 1 po daily for 2 days   12 tablet 0    simvastatin (ZOCOR) 40 mg tablet Take 1 tablet (40 mg total) by mouth daily 90 tablet 2    sodium chloride (OCEAN) 0 65 % nasal spray 1 spray into each nostril as needed for congestion or rhinitis 30 mL 3    Tocilizumab (ACTEMRA) 162 MG/0 9ML SOSY Inject under the skin      torsemide (DEMADEX) 20 mg tablet Take 2 tablets (40 mg total) by mouth 2 (two) times a day 360 tablet 3    oxyCODONE (ROXICODONE) 15 mg immediate release tablet Take 15 mg by mouth every 4 (four) hours as needed for moderate pain      predniSONE 5 mg tablet Take 1 tablet (5 mg total) by mouth daily Resume after taper (Patient not taking: Reported on 10/8/2019)  0     No current facility-administered medications for this visit  She is allergic to gabapentin and vancomycin       Review of Systems   Constitutional: Positive for fatigue  Negative for activity change, appetite change, chills, diaphoresis, fever and unexpected weight change  HENT: Negative for congestion, dental problem, drooling, ear discharge, ear pain, facial swelling, hearing loss, nosebleeds, postnasal drip, rhinorrhea, sinus pressure, sinus pain, sneezing, sore throat, tinnitus, trouble swallowing and voice change  Eyes: Negative for photophobia, pain, discharge, redness, itching and visual disturbance  Respiratory: Negative for apnea, cough, choking, chest tightness, shortness of breath, wheezing and stridor  Cardiovascular: Positive for leg swelling  Negative for chest pain and palpitations  Gastrointestinal: Negative for abdominal distention, abdominal pain, anal bleeding, blood in stool, constipation, diarrhea, nausea, rectal pain and vomiting  Endocrine: Negative for cold intolerance, heat intolerance, polydipsia, polyphagia and polyuria  Genitourinary: Negative for decreased urine volume, difficulty urinating, dysuria, enuresis, flank pain, frequency, genital sores, hematuria and urgency  Musculoskeletal: Positive for arthralgias, gait problem and myalgias  Negative for back pain, joint swelling, neck pain and neck stiffness  Skin: Positive for color change  Negative for pallor, rash and wound  Neurological: Positive for weakness  Negative for dizziness, tremors, seizures, syncope, facial asymmetry, speech difficulty, light-headedness, numbness and headaches  Hematological: Negative for adenopathy  Does not bruise/bleed easily  Psychiatric/Behavioral: Negative for agitation, behavioral problems, confusion, decreased concentration, dysphoric mood, hallucinations, self-injury, sleep disturbance and suicidal ideas  The patient is not nervous/anxious and is not hyperactive  Objective:     Physical Exam   Constitutional: She is oriented to person, place, and time  She appears well-developed  obese   HENT:   Head: Normocephalic  Right Ear: External ear normal    Left Ear: External ear normal    Mouth/Throat: Oropharynx is clear and moist    Eyes: Pupils are equal, round, and reactive to light  Conjunctivae are normal    Neck: Neck supple  No thyromegaly present  Cardiovascular: Normal rate, regular rhythm, normal heart sounds and intact distal pulses  No murmur heard  Pulmonary/Chest: Effort normal and breath sounds normal  No stridor  No respiratory distress  She has no wheezes  She has no rales  She exhibits no tenderness  Abdominal: Soft  Bowel sounds are normal    Musculoskeletal: Normal range of motion  She exhibits edema ( trace both lower legs)  Neurological: She is alert and oriented to person, place, and time  She has normal reflexes  Skin: Skin is warm and dry  Rash noted  Hyper pigmented venous stasis skin changes both lower legs  midshin to ankle  Hyperesthesias both legs Homans negative   Vitals reviewed  Vitals:    10/08/19 1335   BP: 138/78   BP Location: Left arm   Patient Position: Sitting   Cuff Size: Standard   Pulse: 75   Temp: 97 5 °F (36 4 °C)   SpO2: 97%   Weight: 85 3 kg (188 lb)   Height: 4' 10" (1 473 m)       Transitional Care Management Review:  Davion Williamson is a 76 y o  female here for TCM follow up       During the TCM phone call patient stated:    TCM Call (since 9/7/2019)     Date and time call was made  10/1/2019 10:45 AM    Hospital care reviewed  Records reviewed    Patient was hospitialized at  [facility]    Date of Admission  09/28/19    Date of discharge 09/30/19    Diagnosis  BLE edema, cellulitis Right lower extremity     Disposition  Home    Were the patients medications reviewed and updated  No <img src='C:FILES (X86)    Current Symptoms  None      TCM Call (since 9/7/2019)     Post hospital issues  None    Scheduled for follow up?   Yes    Patients specialists  Pulmonlolgist    Pulmonologist name  Surya Landaverde [place]    Pulmonologist contact #  [phone]    Endocrinologist name  [phone]    Do you need help managing your prescriptions or medications  No    Is transportation to your appointment needed  No    I have advised the patient to call PCP with any new or worsening symptoms  Marleni Sweeney LPN    Are you recieving any outpatient services  Yes    What type of services  PULM PFT    Are you recieving home care services  No    Interperter language line needed  No    Counseling  Patient    Counseling topics  Importance of RX compliance              Mel Downey PA-C

## 2019-10-14 ENCOUNTER — TELEPHONE (OUTPATIENT)
Dept: INTERNAL MEDICINE CLINIC | Facility: CLINIC | Age: 74
End: 2019-10-14

## 2019-10-14 NOTE — TELEPHONE ENCOUNTER
Camelia Ivan, Physical Therapist with [facility] called to report that patient Willard Bee has increased pain in her legs and lower back  Pain is a 10/10  She was recently hospitalized and diagnosed w/cellulitis  Here urine is dark but no odor   If there are further questions or comments you may call Camelia Ivan at [phone]

## 2019-10-14 NOTE — TELEPHONE ENCOUNTER
Spoke with pt, she was in tears on the phone stating her pain is still 10/10 and has been going on for 2 days  Her pain is radiating from her neck to her buttocks  Offered her an appointment to see Nancy Florian tomorrow morning but pt stated that it is going to take her about 2 hours to try to walk  ER visit? ?

## 2019-10-21 ENCOUNTER — HOSPITAL ENCOUNTER (OUTPATIENT)
Dept: PULMONOLOGY | Facility: HOSPITAL | Age: 74
Discharge: HOME | End: 2019-10-21
Payer: MEDICARE

## 2019-10-21 DIAGNOSIS — R06.02 SHORTNESS OF BREATH: ICD-10-CM

## 2019-10-21 PROCEDURE — 94618 PULMONARY STRESS TESTING: CPT | Performed by: INTERNAL MEDICINE

## 2019-10-21 PROCEDURE — 94729 DIFFUSING CAPACITY: CPT

## 2019-10-21 PROCEDURE — 94060 EVALUATION OF WHEEZING: CPT

## 2019-10-21 PROCEDURE — 94729 DIFFUSING CAPACITY: CPT | Performed by: INTERNAL MEDICINE

## 2019-10-21 PROCEDURE — 94060 EVALUATION OF WHEEZING: CPT | Performed by: INTERNAL MEDICINE

## 2019-10-21 PROCEDURE — 94761 N-INVAS EAR/PLS OXIMETRY MLT: CPT

## 2019-10-21 PROCEDURE — 94727 GAS DIL/WSHOT DETER LNG VOL: CPT

## 2019-10-21 PROCEDURE — 94727 GAS DIL/WSHOT DETER LNG VOL: CPT | Performed by: INTERNAL MEDICINE

## 2019-10-21 RX ORDER — ALBUTEROL SULFATE 2.5 MG/3ML
2.5 SOLUTION RESPIRATORY (INHALATION) ONCE
Status: COMPLETED | OUTPATIENT
Start: 2019-10-21 | End: 2019-10-21

## 2019-10-21 RX ADMIN — ALBUTEROL SULFATE 2.5 MG: 2.5 SOLUTION RESPIRATORY (INHALATION) at 10:16

## 2019-10-25 ENCOUNTER — TELEPHONE (OUTPATIENT)
Dept: PULMONOLOGY | Facility: CLINIC | Age: 74
End: 2019-10-25

## 2019-10-29 ENCOUNTER — OFFICE VISIT (OUTPATIENT)
Dept: PULMONOLOGY | Facility: CLINIC | Age: 74
End: 2019-10-29
Payer: MEDICARE

## 2019-10-29 VITALS
SYSTOLIC BLOOD PRESSURE: 120 MMHG | HEART RATE: 96 BPM | HEIGHT: 58 IN | WEIGHT: 190 LBS | BODY MASS INDEX: 39.88 KG/M2 | DIASTOLIC BLOOD PRESSURE: 70 MMHG | OXYGEN SATURATION: 95 %

## 2019-10-29 DIAGNOSIS — R06.02 SHORTNESS OF BREATH: Primary | ICD-10-CM

## 2019-10-29 DIAGNOSIS — M06.9 RHEUMATOID ARTHRITIS, INVOLVING UNSPECIFIED SITE, UNSPECIFIED RHEUMATOID FACTOR PRESENCE: ICD-10-CM

## 2019-10-29 DIAGNOSIS — J84.9 INTERSTITIAL LUNG DISEASE (HCC): ICD-10-CM

## 2019-10-29 DIAGNOSIS — J45.40 MODERATE PERSISTENT ASTHMA WITHOUT COMPLICATION: ICD-10-CM

## 2019-10-29 PROCEDURE — 99214 OFFICE O/P EST MOD 30 MIN: CPT | Performed by: PHYSICIAN ASSISTANT

## 2019-10-29 NOTE — PROGRESS NOTES
Assessment:    1  Shortness of breath  Echo complete with contrast if indicated   2  Interstitial lung disease ([address] )     3  Moderate persistent asthma without complication     4  Rheumatoid arthritis, involving unspecified site, unspecified rheumatoid factor presence ([facility] [address] )         Plan:   Patient presents today for follow-up  Reports that her breathing has improved  Feels that her main issue at this time is her uncontrolled symptoms from rheumatoid arthritis  Reviewed complete pulmonary function testing with the patient showing moderate restrictive lung disease and severely decreased DLCO  She also did well on the 6 minute walk  PFT results likely secondary to interstitial lung disease, however given the severely decreased DLCO will have her go for echocardiogram to determine if there is any pulmonary hypertension  Last chest CT with stable emphysema, stable subpleural reticular markings, improved interlobular septal thickening consistent with improved overlying pulmonary edema, and resolution of previously seen ground-glass densities  She continues with 2 L of oxygen as needed and nocturnally  Breo, Singulair, nebulizer as needed    All of the patient's questions were answered to their satisfaction and understanding, which was verbalized  Patient was advised to call the office prior to next appointment should they have any questions or concerns prior  Patient made aware of worrisome symptoms that should prompt a visit to the ER or call to 911 such as chest pain, severe shortness of breath, cyanosis, throat closing, syncope, etc     Subjective:     Patient ID: Ramonita Alvarez is a 76 y o  female  Chief Complaint:  Emerson Dakin is a very pleasant  67-year-old female former smoker with past medical history including interstitial lung disease, asthma, chronic respiratory failure on 2 L of oxygen as needed, rheumatoid arthritis on Humira, diabetes, GERD, hypertension, CHF  She presents today for follow-up  Today, reports that she really isn't having any issues with her breathing  Not really having any coughing or wheezing  Her main complaint today is regarding her rheumatoid arthritis  She is having a significant amount of pain  She will follow-up with her rheumatologist   She has also been having some issues with leg swelling  Reviewed records from recent hospitalization, related to the leg swelling and pain  Last chest CT with stable findings and resolution of pulmonary edema and ground-glass opacities  She quit smoking about 20 years ago  The following portions of the patient's history were reviewed in this encounter and updated as appropriate: Past medical, social, surgical, family, allergies    Review of Systems   Constitutional: Positive for fatigue  Respiratory: Positive for cough and shortness of breath  Cardiovascular: Positive for leg swelling  Negative for chest pain  Musculoskeletal: Positive for joint swelling  All other systems reviewed and are negative  Objective:    Physical Exam   Constitutional: She is oriented to person, place, and time  She appears well-developed and well-nourished  No distress  obese   HENT:   Head: Normocephalic and atraumatic  Right Ear: External ear normal    Left Ear: External ear normal    Nose: Nose normal    Mouth/Throat: Oropharynx is clear and moist  No oropharyngeal exudate  Crowded airway   Eyes: Conjunctivae and EOM are normal  Right eye exhibits no discharge  Left eye exhibits no discharge  No scleral icterus  Neck: Normal range of motion  Neck supple  No tracheal deviation present  Short and wide neck   Cardiovascular: Normal rate, regular rhythm and normal heart sounds  Exam reveals no gallop and no friction rub  No murmur heard  Pulmonary/Chest: Effort normal  No stridor  No respiratory distress  She has no wheezes  She has no rales  +crackles at the bases   Abdominal: She exhibits no distension  There is no guarding  Musculoskeletal: She exhibits no tenderness or deformity  Right lower leg: She exhibits edema  Left lower leg: She exhibits edema  Deconditioned    Neurological: She is alert and oriented to person, place, and time  No cranial nerve deficit  Skin: Skin is warm and dry  No rash noted  She is not diaphoretic  No erythema  No pallor  Psychiatric: She has a normal mood and affect  Her behavior is normal  Judgment and thought content normal    Nursing note and vitals reviewed        Lab Review:   Admission on 09/28/2019, Discharged on 09/30/2019   Component Date Value    WBC 09/28/2019 11 56*    RBC 09/28/2019 4 90     Hemoglobin 09/28/2019 13 9     Hematocrit 09/28/2019 44 4     MCV 09/28/2019 91     MCH 09/28/2019 28 4     MCHC 09/28/2019 31 3*    RDW 09/28/2019 14 2     MPV 09/28/2019 10 3     Platelets 49/32/7980 308     nRBC 09/28/2019 0     Neutrophils Relative 09/28/2019 69     Immat GRANS % 09/28/2019 0     Lymphocytes Relative 09/28/2019 21     Monocytes Relative 09/28/2019 9     Eosinophils Relative 09/28/2019 1     Basophils Relative 09/28/2019 0     Neutrophils Absolute 09/28/2019 7 86*    Immature Grans Absolute 09/28/2019 0 05     Lymphocytes Absolute 09/28/2019 2 39     Monocytes Absolute 09/28/2019 1 06     Eosinophils Absolute 09/28/2019 0 15     Basophils Absolute 09/28/2019 0 05     NT-proBNP 09/28/2019 122     Sodium 09/28/2019 129*    Potassium 09/28/2019 3 9     Chloride 09/28/2019 94*    CO2 09/28/2019 27     ANION GAP 09/28/2019 8     BUN 09/28/2019 20     Creatinine 09/28/2019 1 18     Glucose 09/28/2019 146*    Calcium 09/28/2019 9 4     AST 09/28/2019 22     ALT 09/28/2019 13     Alkaline Phosphatase 09/28/2019 116     Total Protein 09/28/2019 8 7*    Albumin 09/28/2019 3 0*    Total Bilirubin 09/28/2019 0 30     eGFR 09/28/2019 46     Sed Rate 09/28/2019 90*    CRP 09/28/2019 66 3*    POC Glucose 09/28/2019 53*    POC Glucose 09/28/2019 69     POC Glucose 09/28/2019 159*    Sodium 09/29/2019 138     Potassium 09/29/2019 3 8     Chloride 09/29/2019 101     CO2 09/29/2019 28     ANION GAP 09/29/2019 9     BUN 09/29/2019 18     Creatinine 09/29/2019 0 92     Glucose 09/29/2019 42*    Calcium 09/29/2019 9 4     eGFR 09/29/2019 62     WBC 09/29/2019 10 36*    RBC 09/29/2019 4 56     Hemoglobin 09/29/2019 12 9     Hematocrit 09/29/2019 41 5     MCV 09/29/2019 91     MCH 09/29/2019 28 3     MCHC 09/29/2019 31 1*    RDW 09/29/2019 14 3     Platelets 89/27/5847 298     MPV 09/29/2019 10 3     POC Glucose 09/29/2019 45*    POC Glucose 09/29/2019 214*    POC Glucose 09/29/2019 249*    POC Glucose 09/29/2019 163*    POC Glucose 09/29/2019 276*    WBC 09/30/2019 11 23*    RBC 09/30/2019 4 95     Hemoglobin 09/30/2019 14 0     Hematocrit 09/30/2019 45 1     MCV 09/30/2019 91     MCH 09/30/2019 28 3     MCHC 09/30/2019 31 0*    RDW 09/30/2019 14 1     MPV 09/30/2019 10 9     Platelets 26/39/9774 304     nRBC 09/30/2019 0     Neutrophils Relative 09/30/2019 85*    Immat GRANS % 09/30/2019 1     Lymphocytes Relative 09/30/2019 8*    Monocytes Relative 09/30/2019 6     Eosinophils Relative 09/30/2019 0     Basophils Relative 09/30/2019 0     Neutrophils Absolute 09/30/2019 9 55*    Immature Grans Absolute 09/30/2019 0 07     Lymphocytes Absolute 09/30/2019 0 92     Monocytes Absolute 09/30/2019 0 67     Eosinophils Absolute 09/30/2019 0 00     Basophils Absolute 09/30/2019 0 02     Sodium 09/30/2019 136     Potassium 09/30/2019 4 1     Chloride 09/30/2019 100     CO2 09/30/2019 26     ANION GAP 09/30/2019 10     BUN 09/30/2019 21     Creatinine 09/30/2019 1 02     Glucose 09/30/2019 203*    Calcium 09/30/2019 9 4     eGFR 09/30/2019 54     Procalcitonin 09/30/2019 <0 05     POC Glucose 09/30/2019 181*    POC Glucose 09/30/2019 219*

## 2019-11-01 ENCOUNTER — TELEPHONE (OUTPATIENT)
Dept: INTERNAL MEDICINE CLINIC | Facility: CLINIC | Age: 74
End: 2019-11-01

## 2019-11-01 NOTE — TELEPHONE ENCOUNTER
Her vital are normal, heart rate elevated  Complaining about her legs and arms rheumatoid arthritis  Pt is on percocet not helping at all, maybe an oxycodone 10 mg  Has allergies to Vicodin  Call Wilda Berg [phone] today if possible    Pt use [facility] [address]

## 2019-11-12 ENCOUNTER — TELEPHONE (OUTPATIENT)
Dept: INTERNAL MEDICINE CLINIC | Facility: CLINIC | Age: 74
End: 2019-11-12

## 2019-11-14 ENCOUNTER — OFFICE VISIT (OUTPATIENT)
Dept: INTERNAL MEDICINE CLINIC | Facility: CLINIC | Age: 74
End: 2019-11-14
Payer: MEDICARE

## 2019-11-14 VITALS
BODY MASS INDEX: 39.21 KG/M2 | OXYGEN SATURATION: 96 % | HEART RATE: 100 BPM | SYSTOLIC BLOOD PRESSURE: 130 MMHG | WEIGHT: 186.8 LBS | HEIGHT: 58 IN | DIASTOLIC BLOOD PRESSURE: 76 MMHG

## 2019-11-14 DIAGNOSIS — M05.9 RHEUMATOID ARTHRITIS WITH POSITIVE RHEUMATOID FACTOR, INVOLVING UNSPECIFIED SITE (HCC): ICD-10-CM

## 2019-11-14 DIAGNOSIS — N18.30 TYPE 2 DIABETES MELLITUS WITH STAGE 3 CHRONIC KIDNEY DISEASE, WITH LONG-TERM CURRENT USE OF INSULIN (HCC): Primary | ICD-10-CM

## 2019-11-14 DIAGNOSIS — I10 ESSENTIAL HYPERTENSION: ICD-10-CM

## 2019-11-14 DIAGNOSIS — Z79.4 TYPE 2 DIABETES MELLITUS WITH STAGE 3 CHRONIC KIDNEY DISEASE, WITH LONG-TERM CURRENT USE OF INSULIN (HCC): Primary | ICD-10-CM

## 2019-11-14 DIAGNOSIS — J45.40 MODERATE PERSISTENT ASTHMA WITHOUT COMPLICATION: ICD-10-CM

## 2019-11-14 DIAGNOSIS — E11.22 TYPE 2 DIABETES MELLITUS WITH STAGE 3 CHRONIC KIDNEY DISEASE, WITH LONG-TERM CURRENT USE OF INSULIN (HCC): Primary | ICD-10-CM

## 2019-11-14 DIAGNOSIS — E78.2 MIXED HYPERLIPIDEMIA: ICD-10-CM

## 2019-11-14 DIAGNOSIS — I50.32 CHRONIC DIASTOLIC CONGESTIVE HEART FAILURE (HCC): ICD-10-CM

## 2019-11-14 DIAGNOSIS — F33.41 RECURRENT MAJOR DEPRESSIVE DISORDER, IN PARTIAL REMISSION (HCC): ICD-10-CM

## 2019-11-14 PROBLEM — L03.90 CELLULITIS: Status: RESOLVED | Noted: 2019-09-28 | Resolved: 2019-11-14

## 2019-11-14 PROCEDURE — 99214 OFFICE O/P EST MOD 30 MIN: CPT | Performed by: INTERNAL MEDICINE

## 2019-11-14 RX ORDER — DULOXETIN HYDROCHLORIDE 30 MG/1
30 CAPSULE, DELAYED RELEASE ORAL DAILY
Qty: 90 CAPSULE | Refills: 3 | Status: SHIPPED | OUTPATIENT
Start: 2019-11-14 | End: 2020-11-04

## 2019-11-14 RX ORDER — NEEDLES, SAFETY 22GX1 1/2"
NEEDLE, DISPOSABLE MISCELLANEOUS 2 TIMES DAILY
Qty: 100 EACH | Refills: 3 | Status: SHIPPED | OUTPATIENT
Start: 2019-11-14

## 2019-11-14 NOTE — PROGRESS NOTES
INTERNAL MEDICINE OFFICE VISIT  [facility] AT [address]  Tel: [phone]      NAME: Amy Nathan  AGE: 76 y o  SEX: female  : 1945   MRN: [id number]    DATE: 2019  TIME: 4:19 PM      Assessment and Plan:  1  Type 2 diabetes mellitus with stage 3 chronic kidney disease, with long-term current use of insulin ([place] [address] )    She was told to continue the same dose of the insulin  The labs are still pending  Depending on the results of the hemoglobin A1c I will call her with any increase in the dosage  - Insulin Pen Needle (B-D UF III MINI PEN NEEDLES) 31G X 5 MM MISC; Inject under the skin 2 (two) times a day Use as directed  Dispense: 200 each; Refill: 3  - B-D TB SYRINGE 1CC/27GX1/2" 27G X 1/2" 1 ML MISC; by Other route 2 (two) times a day  Dispense: 100 each; Refill: 3  - glucose blood (ONE TOUCH ULTRA TEST) test strip; 1 each by Other route daily Use as instructed  Dispense: 100 each; Refill: 3  - insulin glargine (LANTUS SOLOSTAR) 100 units/mL injection pen; INJECT 35 UNITS SUBCUTANEOUSLY IN THE MORNING AND 30 UNITS IN THE EVENING  Dispense: 5 pen; Refill: 2  - CBC and differential; Future  - Comprehensive metabolic panel; Future  - TSH, 3rd generation; Future  - Lipid panel; Future  - Hemoglobin A1C; Future    2  Rheumatoid arthritis with positive rheumatoid factor, involving unspecified site ([place] [address] )  Follow up with Rheumatology and continue the duloxetine  - DULoxetine (CYMBALTA) 30 mg delayed release capsule; Take 1 capsule (30 mg total) by mouth daily  Dispense: 90 capsule; Refill: 3    3  Essential hypertension   continue present medications    4  Mixed hyperlipidemia   continue a low-fat diet    5  Moderate persistent asthma without complication   continue inhalers    6  Chronic diastolic congestive heart failure ([place] [address] )   continue torsemide and follow-up with Cardiology    7   Recurrent major depressive disorder, in partial remission ([facility] [address] )   patient was taking Lexapro as well as Cymbalta  She was told to discontinue the Lexapro  - Counseling Documentation: patient was counseled regarding: diagnostic results, instructions for management, risk factor reductions, prognosis, patient and family education, impressions, risks and benefits of treatment options and importance of compliance with treatment  - Medication Side Effects: Adverse side effects of medications were reviewed with the patient/guardian today  Return for follow up visit in   Four months or earlier, if needed  Chief Complaint:  Chief Complaint   Patient presents with    Well Check     4 month         History of Present Illness:     Type 2 diabetes -she is usually very uncontrolled  She has not had blood work done yet  I will let her know after I get the results  Rheumatoid arthritis - she follows up with Rheumatology but not consistently  She is getting Humira at present but continues to complain of a lot of pain  Hypertension - blood pressure stable on present medication  Hyperlipidemia-does not take any medication for it  Asthma -well controlled on inhalers  Congestive heart failure - she takes torsemide and follows up with Cardiology  Depression- her symptoms are still present because of the pains and aches all over her body            Active Problem List:  Patient Active Problem List   Diagnosis    Bilateral edema of lower extremity    Chronic diastolic congestive heart failure (HCC)    Mixed hyperlipidemia    Essential hypertension    Morbid obesity ([facility] [address] )    Ambulatory dysfunction    Recurrent major depressive disorder, in partial remission ([facility]    Type 2 diabetes mellitus with stage 3 chronic kidney disease, with long-term current use of insulin (HCC)    Gastroesophageal reflux disease without esophagitis    Primary insomnia    Moderate persistent asthma    Psoriasis vulgaris    Rheumatoid arthritis (HCC)    Urinary incontinence    Vitamin D deficiency    Varicose veins of both lower extremities    Pain in elbow    Immunosuppressed status ([facility] )    Interstitial lung disease (HCC)    Chronic respiratory failure with hypoxia (HCC)    Hyponatremia    Venous stasis dermatitis of both lower extremities    Shortness of breath         Past Medical History:  Past Medical History:   Diagnosis Date    Asthma     Chest pain on breathing     last assessed 2/5/15    Chronic diastolic CHF (congestive heart failure) ([facility] )     Diabetes mellitus ([facility] )     HTN (hypertension)     Hyperlipidemia     Insomnia     Obesity     Preop cardiovascular exam 2018    Pulmonary fibrosis (HCC)          Past Surgical History:  Past Surgical History:   Procedure Laterality Date    HAND SURGERY           Family History:  Family History   Problem Relation Age of Onset    Rheum arthritis Mother     Hypertension Mother          Social History:  Social History     Socioeconomic History    Marital status: /Civil Union     Spouse name: None    Number of children: None    Years of education: None    Highest education level: None   Occupational History    None   Social Needs    Financial resource strain: None    Food insecurity:     Worry: None     Inability: None    Transportation needs:     Medical: None     Non-medical: None   Tobacco Use    Smoking status: Former Smoker     Packs/day: 1 50     Years: 18 00     Pack years: 27 00     Types: Cigarettes     Last attempt to quit:      Years since quittin 8    Smokeless tobacco: Never Used    Tobacco comment: Quit 20 years ago   Substance and Sexual Activity    Alcohol use: Never     Frequency: Never     Binge frequency: Never    Drug use: No    Sexual activity: Never   Lifestyle    Physical activity:     Days per week: 0 days     Minutes per session: 0 min    Stress:  Only a little   Relationships    Social connections:     Talks on phone: None     Gets together: None     Attends Ca[employer] service: None     Active member of club or organization: None     Attends meetings of clubs or organizations: None     Relationship status: None    Intimate partner violence:     Fear of current or ex partner: None     Emotionally abused: None     Physically abused: None     Forced sexual activity: None   Other Topics Concern    None   Social History Narrative    No advance directives on file         Allergies:   Allergies   Allergen Reactions    Gabapentin     Vancomycin          Medications:    Current Outpatient Medications:     adalimumab (HUMIRA) 40 mg/0 8 mL PSKT, Inject 40 mg under the skin every 14 (fourteen) days, Disp: , Rfl:     albuterol (2 5 mg/3 mL) 0 083 % nebulizer solution, Take 1 vial (2 5 mg total) by nebulization every 6 (six) hours as needed for wheezing or shortness of breath, Disp: 120 vial, Rfl: 3    albuterol (PROAIR HFA) 90 mcg/act inhaler, Inhale 2 puffs every 6 (six) hours as needed for wheezing or shortness of breath, Disp: 1 Inhaler, Rfl: 3    amitriptyline (ELAVIL) 25 mg tablet, Take 25 mg by mouth daily at bedtime, Disp: , Rfl:     B-D TB SYRINGE 1CC/27GX1/2" 27G X 1/2" 1 ML MISC, by Other route 2 (two) times a day, Disp: 100 each, Rfl: 3    baclofen 10 mg tablet, Take 10 mg by mouth daily at bedtime as needed, Disp: , Rfl: 5    Blood Glucose Monitoring Suppl (ONETOUCH VERIO) w/Device KIT, by Does not apply route 3 (three) times a day, Disp: , Rfl:     DULoxetine (CYMBALTA) 30 mg delayed release capsule, Take 1 capsule (30 mg total) by mouth daily, Disp: 90 capsule, Rfl: 3    fluticasone (FLONASE) 50 mcg/act nasal spray, 1 spray into each nostril daily, Disp: 1 Bottle, Rfl: 3    fluticasone-vilanterol (BREO ELLIPTA) 100-25 mcg/inh inhaler, Inhale 1 puff daily Rinse mouth after use , Disp: 1 Inhaler, Rfl: 0    fosinopril (MONOPRIL) 10 mg tablet, Take 1 tablet (10 mg total) by mouth daily, Disp: 90 tablet, Rfl: 2    glucose blood (ONE TOUCH ULTRA TEST) test strip, 1 each by Other route daily Use as instructed, Disp: 100 each, Rfl: 3    insulin glargine (LANTUS SOLOSTAR) 100 units/mL injection pen, INJECT 35 UNITS SUBCUTANEOUSLY IN THE MORNING AND 30 UNITS IN THE EVENING, Disp: 5 pen, Rfl: 2    Insulin Pen Needle (B-D UF III MINI PEN NEEDLES) 31G X 5 MM MISC, Inject under the skin 2 (two) times a day Use as directed, Disp: 200 each, Rfl: 3    loratadine (CLARITIN) 10 mg tablet, TAKE 1 TABLET BY MOUTH EVERY DAY, Disp: 15 tablet, Rfl: 0    metFORMIN (GLUCOPHAGE) 1000 MG tablet, Take 1 tablet (1,000 mg total) by mouth 2 (two) times a day, Disp: 180 tablet, Rfl: 3    metoprolol succinate (TOPROL-XL) 25 mg 24 hr tablet, Take 1 tablet (25 mg total) by mouth daily, Disp: 90 tablet, Rfl: 2    montelukast (SINGULAIR) 10 mg tablet, Take 1 tablet (10 mg total) by mouth daily at bedtime, Disp: 30 tablet, Rfl: 3    nystatin (MYCOSTATIN) cream, Apply 2 g (1 application total) topically 2 (two) times a day To affected area, Disp: 30 g, Rfl: 2    NYSTATIN powder, APPLY 2-3 TIMES DAILY TO AFFECTED AREA(S) , Disp: , Rfl: 5    omeprazole-sodium bicarbonate (ZEGERID)  MG per capsule, Take 1 capsule by mouth every morning before breakfast, Disp: , Rfl:     oxyCODONE (ROXICODONE) 15 mg immediate release tablet, Take 15 mg by mouth every 4 (four) hours as needed for moderate pain, Disp: , Rfl:     oxyCODONE-acetaminophen (PERCOCET)  mg per tablet, Take 1 tablet by mouth 3 (three) times a day as needed, Disp: , Rfl: 0    simvastatin (ZOCOR) 40 mg tablet, Take 1 tablet (40 mg total) by mouth daily, Disp: 90 tablet, Rfl: 2    sodium chloride (OCEAN) 0 65 % nasal spray, 1 spray into each nostril as needed for congestion or rhinitis, Disp: 30 mL, Rfl: 3    torsemide (DEMADEX) 20 mg tablet, Take 2 tablets (40 mg total) by mouth 2 (two) times a day, Disp: 360 tablet, Rfl: 3      The following portions of the patient's history were reviewed and updated as appropriate: past medical history, past surgical history, family history, social history, allergies, current medications and active problem list       Review of Systems:  Constitutional: Denies fever, chills, weight gain, weight loss, fatigue  Eyes: Denies eye redness, eye discharge, double vision, change in visual acuity  ENT: Denies hearing loss, tinnitus, sneezing, nasal congestion, nasal discharge, sore throat   Respiratory: Denies cough, expectoration, hemoptysis, shortness of breath, wheezing  Cardiovascular: Denies chest pain, palpitations, lower extremity swelling, orthopnea, PND  Gastrointestinal: Denies abdominal pain, heartburn, nausea, vomiting, hematemesis, diarrhea, bloody stools  Genito-Urinary: Denies dysuria, frequency, difficulty in micturition, nocturia, incontinence  Musculoskeletal: has back pain, joint pain, muscle pain  Neurologic: Denies confusion, lightheadedness, syncope, headache, focal weakness, sensory changes, seizures  Endocrine: Denies polyuria, polydipsia, temperature intolerance  Allergy and Immunology: Denies hives, insect bite sensitivity  Hematological and Lymphatic: Denies bleeding problems, swollen glands   Psychological: Denies depression, suicidal ideation, anxiety, panic, mood swings  Dermatological: Denies pruritus, rash, skin lesion changes      Vitals:  Vitals:    11/14/19 1413   BP: 130/76   Pulse: 100   SpO2: 96%       Body mass index is 39 04 kg/m²  Weight (last 2 days)     Date/Time   Weight    11/14/19 1413   84 7 (186 8)                Physical Examination:  General: Patient is not in acute distress  Awake, alert, responding to commands  No weight gain or loss  Head: Normocephalic  Atraumatic  Eyes: Conjunctiva and lids with no swelling, erythema or discharge  Both pupils normal sized, round and reactive to light   Sclera nonicteric  ENT: External examination of nose and ear normal  Otoscopic examination shows translucent tympanic membranes with patent canals without erythema  Oropharynx moist with no erythema, edema, exudate or lesions  Neck: Supple  JVP not raised  Trachea midline  No masses  No thyromegaly  Lungs: No signs of increased work of breathing or respiratory distress  Bilateral bronchovascular breath sounds with no crackles or rhonchi  Chest wall: No tenderness  Cardiovascular: Normal PMI  No thrills  Regular rate and rhythm  S1 and S2 normal  No murmur, rub or gallop  Gastrointestinal: Abdomen soft, nontender  No guarding or rigidity  Liver and spleen not palpable  Bowel sounds present  Neurologic: Cranial nerves II-XII intact  Cortical functions normal  Motor system - Reflexes 2+ and symmetrical  Sensations normal  Musculoskeletal:    Has joint tenderness  Integumentary: Skin normal with no rash or lesions  Lymphatic: No palpable lymph nodes in neck, axilla or groin  Extremities: No clubbing, cyanosis, edema or varicosities  Psychological: Judgement and insight normal  Mood and affect normal      Laboratory Results:  CBC with diff:   Lab Results   Component Value Date    WBC 11 23 (H) 09/30/2019    WBC 7 94 01/05/2016    RBC 4 95 09/30/2019    RBC 4 39 01/05/2016    HGB 14 0 09/30/2019    HGB 12 9 01/05/2016    HCT 45 1 09/30/2019    HCT 40 9 01/05/2016    MCV 91 09/30/2019    MCV 93 01/05/2016    MCH 28 3 09/30/2019    MCH 29 4 01/05/2016    RDW 14 1 09/30/2019    RDW 15 4 (H) 01/05/2016     09/30/2019     01/05/2016       CMP:  Lab Results   Component Value Date    CREATININE 1 02 09/30/2019    CREATININE 0 74 01/05/2016    BUN 21 09/30/2019    BUN 18 01/05/2016     (L) 01/05/2016    K 4 1 09/30/2019    K 4 5 01/05/2016     09/30/2019     01/05/2016    CO2 26 09/30/2019    CO2 26 01/05/2016    GLUCOSE 159 (H) 01/05/2016    PROT 7 3 01/05/2016    ALKPHOS 116 09/28/2019    ALKPHOS 151 (H) 01/05/2016    ALT 13 09/28/2019    ALT 25 01/05/2016    AST 22 09/28/2019    AST 14 01/05/2016       Lab Results   Component Value Date HGBA1C 9 6 2019    HGBA1C 7 8 (H) 2018    HGBA1C 9 8 (H) 2016       No results found for: TROPONINI, CKMB, CKTOTAL    Lipid Profile:   Lab Results   Component Value Date    CHOL 230 2016    CHOL 149 09/15/2015     Lab Results   Component Value Date    HDL 72 (H) 2018    HDL 48 2016     Lab Results   Component Value Date    LDLCALC 108 (H) 2018    LDLCALC 123 (H) 2016     Lab Results   Component Value Date    TRIG 148 2018    TRIG 130 2016       Imaging Results:  Complete PFT with post Bronchodilator and Six Minute walk  Pulmonary Function Test Report  Katlyn Carreon 76 y o  female MRN: [id number]    Date of Testing: 10/21/2019    Date of Interpretation: 10/21/2019    Requesting Physician: Dr Shae Boudreaux    Reason for Testing: SOB    Reference set for interpretation: [id number]    Procedure: The patient was taken to pulmonary function testing laboratory  The patient demonstrated good effort and cooperation  The results of   this test meet ATS standards for acceptability and repeatability  Data   set appears appropriate for interpretation  Post bronchodilator testing performed after the administration of 2 5mg   albuterol in 3cc normal saline administered via nebulizer per   bronchodilator protocol      Results:  FEV1/FVC Ratio: 81 %  Forced Vital Capacity: 1 39 L 68 % predicted  FEV1: 1 13 L 70 % predicted    Lung volumes by body plethysmography:   Total Lung Capacity 65 % predicted   Residual volume 52 % predicted    DLCO corrected for patients hemoglobin level: 38 %    Resting room air saturation: 94%  Resting room air heart rate: 101 bpm  Frandy scale of dyspnea at start of test: 5/10    Ambulation testing:   Lowest saturation: 93%  Heart rate: 108 bpm  Supplemental oxygen: None    Frandy scale of dyspnea at end of test: 5/10  Total stops: 1  Reason if test was stopped early:  Shortness of breath and leg pain   Heart rate at the end of testin bpm    Total 6 minute walk distance: 30 4 meters    Interpretation:    · No obstructive airflow defect     · No significant response to the administration to bronchodilator per ATS   Standards    · Moderate restrictive lung disease as indicated by decreased TLC    · Severely decreased diffusion capacity    · 6 minutes walk test revealed lowest saturation 93% total distance walked   30 4 m, no supplemental oxygen required test ended early secondary to   shortness of breath and leg pain  NEREIDA Alvarez    [facility]       Health Maintenance:  Health Maintenance   Topic Date Due    Hepatitis C Screening  1945    MAMMOGRAM  1945    MMR Vaccine (1 of 1 - Standard series) 04[birth year]16/1946    Varicella Vaccine (1 of 2 - 2-dose childhood series) 04/16/1946    DTaP,Tdap,and Td Vaccines (1 - Tdap) 04/16/1956    Hepatitis B Vaccine (1 of 3 - Risk 3-dose series) 04/16/1964    HEMOGLOBIN A1C  12/26/2019    DXA SCAN  03/19/2020    Diabetic Foot Exam  03/26/2020    DM Eye Exam  06/26/2020    URINE MICROALBUMIN  06/26/2020    Fall Risk  07/03/2020    Urinary Incontinence Screening  07/03/2020    Medicare Annual Wellness Visit (AWV)  07/03/2020    BMI: Followup Plan  07/03/2020    BMI: Adult  10/29/2020    CRC Screening: Colonoscopy  11/05/2020    Influenza Vaccine  Completed    Pneumococcal Vaccine: 65+ Years  Completed    Pneumococcal Vaccine: Pediatrics (0 to 5 Years) and At-Risk Patients (6 to 59 Years)  Aged Out    HIB Vaccine  Aged Out    IPV Vaccine  Aged Out    Hepatitis A Vaccine  Aged Out    HPV Vaccine  Aged [employer] History   Administered Date(s) Administered    INFLUENZA 09/01/2015, 12/20/2016, 11/10/2017    Influenza Split High Dose Preservative Free IM 12/20/2016, 11/10/2017    Influenza TIV (IM) 10/01/2014, 09/01/2015    Influenza, high dose seasonal 0 5 mL 10/17/2018, 09/29/2019    Pneumococcal Conjugate 13-Valent 07/03/2019    Pneumococcal Polysaccharide PPV23 04/21/2008, 12/20/2016    Tdap 1945         Janine Thorne MD  11/14/2019,4:19 PM

## 2019-12-07 ENCOUNTER — HOSPITAL ENCOUNTER (INPATIENT)
Facility: HOSPITAL | Age: 74
LOS: 3 days | Discharge: HOME HEALTH | DRG: 683 | End: 2019-12-10
Attending: EMERGENCY MEDICINE | Admitting: INTERNAL MEDICINE
Payer: MEDICARE

## 2019-12-07 ENCOUNTER — APPOINTMENT (EMERGENCY)
Dept: ULTRASOUND IMAGING | Facility: HOSPITAL | Age: 74
DRG: 683 | End: 2019-12-07
Payer: MEDICARE

## 2019-12-07 ENCOUNTER — APPOINTMENT (EMERGENCY)
Dept: RADIOLOGY | Facility: HOSPITAL | Age: 74
DRG: 683 | End: 2019-12-07
Payer: MEDICARE

## 2019-12-07 DIAGNOSIS — R26.2 AMBULATORY DYSFUNCTION: ICD-10-CM

## 2019-12-07 DIAGNOSIS — E11.22 TYPE 2 DIABETES MELLITUS WITH STAGE 3 CHRONIC KIDNEY DISEASE, WITH LONG-TERM CURRENT USE OF INSULIN (HCC): ICD-10-CM

## 2019-12-07 DIAGNOSIS — M05.79 RHEUMATOID ARTHRITIS INVOLVING MULTIPLE SITES WITH POSITIVE RHEUMATOID FACTOR (HCC): ICD-10-CM

## 2019-12-07 DIAGNOSIS — N17.9 ACUTE KIDNEY INJURY (HCC): Primary | ICD-10-CM

## 2019-12-07 DIAGNOSIS — M79.605 BILATERAL LEG PAIN: ICD-10-CM

## 2019-12-07 DIAGNOSIS — Z79.4 TYPE 2 DIABETES MELLITUS WITH STAGE 3 CHRONIC KIDNEY DISEASE, WITH LONG-TERM CURRENT USE OF INSULIN (HCC): ICD-10-CM

## 2019-12-07 DIAGNOSIS — N18.30 TYPE 2 DIABETES MELLITUS WITH STAGE 3 CHRONIC KIDNEY DISEASE, WITH LONG-TERM CURRENT USE OF INSULIN (HCC): ICD-10-CM

## 2019-12-07 DIAGNOSIS — Z79.891 CHRONIC PRESCRIPTION OPIATE USE: Chronic | ICD-10-CM

## 2019-12-07 DIAGNOSIS — M79.604 BILATERAL LEG PAIN: ICD-10-CM

## 2019-12-07 LAB
ALBUMIN SERPL BCP-MCNC: 2.8 G/DL (ref 3.5–5)
ALP SERPL-CCNC: 117 U/L (ref 46–116)
ALT SERPL W P-5'-P-CCNC: 15 U/L (ref 12–78)
ANION GAP SERPL CALCULATED.3IONS-SCNC: 11 MMOL/L (ref 4–13)
AST SERPL W P-5'-P-CCNC: 21 U/L (ref 5–45)
BASOPHILS # BLD AUTO: 0.04 THOUSANDS/ΜL (ref 0–0.1)
BASOPHILS NFR BLD AUTO: 0 % (ref 0–1)
BILIRUB SERPL-MCNC: 0.3 MG/DL (ref 0.2–1)
BILIRUB UR QL STRIP: NEGATIVE
BUN SERPL-MCNC: 18 MG/DL (ref 5–25)
CALCIUM SERPL-MCNC: 9.4 MG/DL (ref 8.3–10.1)
CHLORIDE SERPL-SCNC: 95 MMOL/L (ref 100–108)
CLARITY UR: CLEAR
CO2 SERPL-SCNC: 27 MMOL/L (ref 21–32)
COLOR UR: YELLOW
CREAT SERPL-MCNC: 1.66 MG/DL (ref 0.6–1.3)
CRP SERPL QL: >90 MG/L
EOSINOPHIL # BLD AUTO: 0.08 THOUSAND/ΜL (ref 0–0.61)
EOSINOPHIL NFR BLD AUTO: 1 % (ref 0–6)
ERYTHROCYTE [DISTWIDTH] IN BLOOD BY AUTOMATED COUNT: 14.2 % (ref 11.6–15.1)
GFR SERPL CREATININE-BSD FRML MDRD: 30 ML/MIN/1.73SQ M
GLUCOSE SERPL-MCNC: 99 MG/DL (ref 65–140)
GLUCOSE UR STRIP-MCNC: NEGATIVE MG/DL
HCT VFR BLD AUTO: 44.5 % (ref 34.8–46.1)
HGB BLD-MCNC: 13.7 G/DL (ref 11.5–15.4)
HGB UR QL STRIP.AUTO: NEGATIVE
IMM GRANULOCYTES # BLD AUTO: 0.04 THOUSAND/UL (ref 0–0.2)
IMM GRANULOCYTES NFR BLD AUTO: 0 % (ref 0–2)
KETONES UR STRIP-MCNC: NEGATIVE MG/DL
LEUKOCYTE ESTERASE UR QL STRIP: NEGATIVE
LYMPHOCYTES # BLD AUTO: 1.68 THOUSANDS/ΜL (ref 0.6–4.47)
LYMPHOCYTES NFR BLD AUTO: 17 % (ref 14–44)
MCH RBC QN AUTO: 28.1 PG (ref 26.8–34.3)
MCHC RBC AUTO-ENTMCNC: 30.8 G/DL (ref 31.4–37.4)
MCV RBC AUTO: 91 FL (ref 82–98)
MONOCYTES # BLD AUTO: 0.83 THOUSAND/ΜL (ref 0.17–1.22)
MONOCYTES NFR BLD AUTO: 8 % (ref 4–12)
NEUTROPHILS # BLD AUTO: 7.25 THOUSANDS/ΜL (ref 1.85–7.62)
NEUTS SEG NFR BLD AUTO: 74 % (ref 43–75)
NITRITE UR QL STRIP: NEGATIVE
NRBC BLD AUTO-RTO: 0 /100 WBCS
NT-PROBNP SERPL-MCNC: 137 PG/ML
PH UR STRIP.AUTO: 5.5 [PH]
PLATELET # BLD AUTO: 337 THOUSANDS/UL (ref 149–390)
PMV BLD AUTO: 10.4 FL (ref 8.9–12.7)
POTASSIUM SERPL-SCNC: 3.9 MMOL/L (ref 3.5–5.3)
PROT SERPL-MCNC: 8.3 G/DL (ref 6.4–8.2)
PROT UR STRIP-MCNC: NEGATIVE MG/DL
RBC # BLD AUTO: 4.88 MILLION/UL (ref 3.81–5.12)
SODIUM SERPL-SCNC: 133 MMOL/L (ref 136–145)
SP GR UR STRIP.AUTO: <=1.005 (ref 1–1.03)
TROPONIN I SERPL-MCNC: <0.02 NG/ML
TSH SERPL DL<=0.05 MIU/L-ACNC: 1.31 UIU/ML (ref 0.36–3.74)
UROBILINOGEN UR QL STRIP.AUTO: 0.2 E.U./DL
WBC # BLD AUTO: 9.92 THOUSAND/UL (ref 4.31–10.16)

## 2019-12-07 PROCEDURE — 84443 ASSAY THYROID STIM HORMONE: CPT | Performed by: INTERNAL MEDICINE

## 2019-12-07 PROCEDURE — 96374 THER/PROPH/DIAG INJ IV PUSH: CPT

## 2019-12-07 PROCEDURE — 96375 TX/PRO/DX INJ NEW DRUG ADDON: CPT

## 2019-12-07 PROCEDURE — 93005 ELECTROCARDIOGRAM TRACING: CPT

## 2019-12-07 PROCEDURE — 99285 EMERGENCY DEPT VISIT HI MDM: CPT

## 2019-12-07 PROCEDURE — 84484 ASSAY OF TROPONIN QUANT: CPT | Performed by: PHYSICIAN ASSISTANT

## 2019-12-07 PROCEDURE — 80053 COMPREHEN METABOLIC PANEL: CPT | Performed by: PHYSICIAN ASSISTANT

## 2019-12-07 PROCEDURE — 83036 HEMOGLOBIN GLYCOSYLATED A1C: CPT | Performed by: INTERNAL MEDICINE

## 2019-12-07 PROCEDURE — 86140 C-REACTIVE PROTEIN: CPT | Performed by: PHYSICIAN ASSISTANT

## 2019-12-07 PROCEDURE — 99285 EMERGENCY DEPT VISIT HI MDM: CPT | Performed by: PHYSICIAN ASSISTANT

## 2019-12-07 PROCEDURE — 93971 EXTREMITY STUDY: CPT

## 2019-12-07 PROCEDURE — 83880 ASSAY OF NATRIURETIC PEPTIDE: CPT | Performed by: PHYSICIAN ASSISTANT

## 2019-12-07 PROCEDURE — 96361 HYDRATE IV INFUSION ADD-ON: CPT

## 2019-12-07 PROCEDURE — 87040 BLOOD CULTURE FOR BACTERIA: CPT | Performed by: INTERNAL MEDICINE

## 2019-12-07 PROCEDURE — 81003 URINALYSIS AUTO W/O SCOPE: CPT | Performed by: INTERNAL MEDICINE

## 2019-12-07 PROCEDURE — 71046 X-RAY EXAM CHEST 2 VIEWS: CPT

## 2019-12-07 PROCEDURE — 85025 COMPLETE CBC W/AUTO DIFF WBC: CPT | Performed by: PHYSICIAN ASSISTANT

## 2019-12-07 PROCEDURE — 36415 COLL VENOUS BLD VENIPUNCTURE: CPT | Performed by: PHYSICIAN ASSISTANT

## 2019-12-07 PROCEDURE — 99223 1ST HOSP IP/OBS HIGH 75: CPT | Performed by: INTERNAL MEDICINE

## 2019-12-07 RX ORDER — ONDANSETRON 2 MG/ML
4 INJECTION INTRAMUSCULAR; INTRAVENOUS EVERY 6 HOURS PRN
Status: DISCONTINUED | OUTPATIENT
Start: 2019-12-07 | End: 2019-12-10 | Stop reason: HOSPADM

## 2019-12-07 RX ORDER — LORATADINE 10 MG/1
10 TABLET ORAL DAILY
Status: DISCONTINUED | OUTPATIENT
Start: 2019-12-08 | End: 2019-12-10 | Stop reason: HOSPADM

## 2019-12-07 RX ORDER — AMITRIPTYLINE HYDROCHLORIDE 25 MG/1
25 TABLET, FILM COATED ORAL
Status: DISCONTINUED | OUTPATIENT
Start: 2019-12-07 | End: 2019-12-10 | Stop reason: HOSPADM

## 2019-12-07 RX ORDER — HYDROMORPHONE HCL/PF 1 MG/ML
0.5 SYRINGE (ML) INJECTION
Status: DISCONTINUED | OUTPATIENT
Start: 2019-12-07 | End: 2019-12-07

## 2019-12-07 RX ORDER — PANTOPRAZOLE SODIUM 40 MG/1
40 TABLET, DELAYED RELEASE ORAL
Status: DISCONTINUED | OUTPATIENT
Start: 2019-12-08 | End: 2019-12-10 | Stop reason: HOSPADM

## 2019-12-07 RX ORDER — HYDROMORPHONE HCL/PF 1 MG/ML
0.5 SYRINGE (ML) INJECTION ONCE
Status: COMPLETED | OUTPATIENT
Start: 2019-12-07 | End: 2019-12-07

## 2019-12-07 RX ORDER — SODIUM CHLORIDE 9 MG/ML
50 INJECTION, SOLUTION INTRAVENOUS CONTINUOUS
Status: DISCONTINUED | OUTPATIENT
Start: 2019-12-07 | End: 2019-12-09

## 2019-12-07 RX ORDER — OMEPRAZOLE AND SODIUM BICARBONATE 40; 1100 MG/1; MG/1
1 CAPSULE ORAL EVERY MORNING
Status: DISCONTINUED | OUTPATIENT
Start: 2019-12-08 | End: 2019-12-07

## 2019-12-07 RX ORDER — DULOXETIN HYDROCHLORIDE 30 MG/1
30 CAPSULE, DELAYED RELEASE ORAL DAILY
Status: DISCONTINUED | OUTPATIENT
Start: 2019-12-08 | End: 2019-12-10 | Stop reason: HOSPADM

## 2019-12-07 RX ORDER — FLUTICASONE FUROATE AND VILANTEROL 100; 25 UG/1; UG/1
1 POWDER RESPIRATORY (INHALATION) DAILY
Status: DISCONTINUED | OUTPATIENT
Start: 2019-12-08 | End: 2019-12-10 | Stop reason: HOSPADM

## 2019-12-07 RX ORDER — BACLOFEN 10 MG/1
10 TABLET ORAL
Status: DISCONTINUED | OUTPATIENT
Start: 2019-12-07 | End: 2019-12-10 | Stop reason: HOSPADM

## 2019-12-07 RX ORDER — INSULIN GLARGINE 100 [IU]/ML
30 INJECTION, SOLUTION SUBCUTANEOUS EVERY 12 HOURS SCHEDULED
Status: DISCONTINUED | OUTPATIENT
Start: 2019-12-07 | End: 2019-12-08

## 2019-12-07 RX ORDER — HYDROMORPHONE HCL/PF 1 MG/ML
0.5 SYRINGE (ML) INJECTION
Status: DISCONTINUED | OUTPATIENT
Start: 2019-12-07 | End: 2019-12-09

## 2019-12-07 RX ORDER — PREDNISONE 20 MG/1
40 TABLET ORAL DAILY
Status: DISCONTINUED | OUTPATIENT
Start: 2019-12-07 | End: 2019-12-10 | Stop reason: HOSPADM

## 2019-12-07 RX ORDER — ALBUTEROL SULFATE 2.5 MG/3ML
2.5 SOLUTION RESPIRATORY (INHALATION) EVERY 6 HOURS PRN
Status: DISCONTINUED | OUTPATIENT
Start: 2019-12-07 | End: 2019-12-10 | Stop reason: HOSPADM

## 2019-12-07 RX ORDER — FLUTICASONE PROPIONATE 50 MCG
1 SPRAY, SUSPENSION (ML) NASAL DAILY
Status: DISCONTINUED | OUTPATIENT
Start: 2019-12-08 | End: 2019-12-10 | Stop reason: HOSPADM

## 2019-12-07 RX ORDER — MONTELUKAST SODIUM 10 MG/1
10 TABLET ORAL
Status: DISCONTINUED | OUTPATIENT
Start: 2019-12-07 | End: 2019-12-10 | Stop reason: HOSPADM

## 2019-12-07 RX ORDER — HYDROMORPHONE HYDROCHLORIDE 2 MG/1
4 TABLET ORAL EVERY 8 HOURS PRN
Status: DISCONTINUED | OUTPATIENT
Start: 2019-12-07 | End: 2019-12-10 | Stop reason: HOSPADM

## 2019-12-07 RX ADMIN — MONTELUKAST 10 MG: 10 TABLET, FILM COATED ORAL at 23:17

## 2019-12-07 RX ADMIN — HYDROMORPHONE HYDROCHLORIDE 0.5 MG: 1 INJECTION, SOLUTION INTRAMUSCULAR; INTRAVENOUS; SUBCUTANEOUS at 19:17

## 2019-12-07 RX ADMIN — SODIUM CHLORIDE 50 ML/HR: 0.9 INJECTION, SOLUTION INTRAVENOUS at 23:23

## 2019-12-07 RX ADMIN — MORPHINE SULFATE 2 MG: 2 INJECTION, SOLUTION INTRAMUSCULAR; INTRAVENOUS at 17:39

## 2019-12-07 RX ADMIN — HYDROMORPHONE HYDROCHLORIDE 0.5 MG: 1 INJECTION, SOLUTION INTRAMUSCULAR; INTRAVENOUS; SUBCUTANEOUS at 23:07

## 2019-12-07 RX ADMIN — AMITRIPTYLINE HYDROCHLORIDE 25 MG: 25 TABLET, FILM COATED ORAL at 23:19

## 2019-12-07 RX ADMIN — SODIUM CHLORIDE 500 ML: 0.9 INJECTION, SOLUTION INTRAVENOUS at 18:57

## 2019-12-07 RX ADMIN — PREDNISONE 40 MG: 20 TABLET ORAL at 23:19

## 2019-12-07 NOTE — ED PROVIDER NOTES
History  Chief Complaint   Patient presents with    Leg Pain     pt c/o bilateral lower leg pain for the past 2 months  68yo female with a PMH of rheumatoid arthritis, fibromyalgia, pulmonary fibrosis on oxygen as needed at home, diabetes, diastolic CHF, HTN, and HLD presenting for evaluation of bilateral lower leg pain that is chronic in nature  Patient follows with rheumatology and pain management for these symptoms  She is on oxycodone Q4 PRN at home with her last dose being 2 hours ago  She had a fall while getting out of bed 3 days ago  She fell onto her buttocks at that time  Patient typically uses a walker for ambulation and has been increasingly weak over the past several days  Patient had similar symptoms in September for which she was admitted  Patient was thought to have cellulitis of her lower extremities but was seen by ID and her antibiotics were discontinued  Patient denies fevers, chills, abdominal pain, chest pain, cough  Patient was recently started on oxygen as needed at home and admits to dyspnea with exertion  She was seen by her pain management specialist about a month ago and she was started on a new medication but she does not know the name  History provided by:  Patient and medical records   used: No    Leg Pain   Location:  Leg  Injury: no    Leg location:  L lower leg and R lower leg  Pain details:     Quality:  Throbbing    Radiates to:  Does not radiate    Severity:  Severe    Onset quality:  Gradual    Timing:  Constant    Progression:  Worsening  Chronicity:  Chronic  Dislocation: no    Prior injury to area:  No  Relieved by:  Nothing  Worsened by:  Bearing weight and activity  Ineffective treatments: Oxycodone    Associated symptoms: swelling    Associated symptoms: no back pain, no decreased ROM, no fatigue, no fever, no muscle weakness, no neck pain, no numbness, no stiffness and no tingling        Prior to Admission Medications   Prescriptions Last Dose Informant Patient Reported? Taking? B-D TB SYRINGE 1CC/27GX1/2" 27G X 1/2" 1 ML MISC   No No   Sig: by Other route 2 (two) times a day   Blood Glucose Monitoring Suppl (ONETOUCH VERIO) w/Device KIT  Self Yes No   Sig: by Does not apply route 3 (three) times a day   DULoxetine (CYMBALTA) 30 mg delayed release capsule   No No   Sig: Take 1 capsule (30 mg total) by mouth daily   Insulin Pen Needle (B-D UF III MINI PEN NEEDLES) 31G X 5 MM MISC   No No   Sig: Inject under the skin 2 (two) times a day Use as directed   NYSTATIN powder  Self Yes No   Sig: APPLY 2-3 TIMES DAILY TO AFFECTED AREA(S)  adalimumab (HUMIRA) 40 mg/0 8 mL PSKT   Yes No   Sig: Inject 40 mg under the skin every 14 (fourteen) days   albuterol (2 5 mg/3 mL) 0 083 % nebulizer solution   No No   Sig: Take 1 vial (2 5 mg total) by nebulization every 6 (six) hours as needed for wheezing or shortness of breath   albuterol (PROAIR HFA) 90 mcg/act inhaler   No No   Sig: Inhale 2 puffs every 6 (six) hours as needed for wheezing or shortness of breath   amitriptyline (ELAVIL) 25 mg tablet   Yes No   Sig: Take 25 mg by mouth daily at bedtime   baclofen 10 mg tablet   Yes No   Sig: Take 10 mg by mouth daily at bedtime as needed   fluticasone (FLONASE) 50 mcg/act nasal spray   No No   Si spray into each nostril daily   fluticasone-vilanterol (BREO ELLIPTA) 100-25 mcg/inh inhaler   No No   Sig: Inhale 1 puff daily Rinse mouth after use     fosinopril (MONOPRIL) 10 mg tablet   No No   Sig: Take 1 tablet (10 mg total) by mouth daily   glucose blood (ONE TOUCH ULTRA TEST) test strip   No No   Si each by Other route daily Use as instructed   insulin glargine (LANTUS SOLOSTAR) 100 units/mL injection pen   No No   Sig: INJECT 35 UNITS SUBCUTANEOUSLY IN THE MORNING AND 30 UNITS IN THE EVENING   loratadine (CLARITIN) 10 mg tablet   No No   Sig: TAKE 1 TABLET BY MOUTH EVERY DAY   metFORMIN (GLUCOPHAGE) 1000 MG tablet   No No   Sig: Take 1 tablet (1,000 mg total) by mouth 2 (two) times a day   metoprolol succinate (TOPROL-XL) 25 mg 24 hr tablet   No No   Sig: Take 1 tablet (25 mg total) by mouth daily   montelukast (SINGULAIR) 10 mg tablet   No No   Sig: Take 1 tablet (10 mg total) by mouth daily at bedtime   nystatin (MYCOSTATIN) cream   No No   Sig: Apply 2 g (1 application total) topically 2 (two) times a day To affected area   omeprazole-sodium bicarbonate (ZEGERID)  MG per capsule   Yes No   Sig: Take 1 capsule by mouth every morning before breakfast   oxyCODONE (ROXICODONE) 15 mg immediate release tablet  Self Yes No   Sig: Take 15 mg by mouth every 4 (four) hours as needed for moderate pain   oxyCODONE-acetaminophen (PERCOCET)  mg per tablet   Yes No   Sig: Take 1 tablet by mouth 3 (three) times a day as needed   simvastatin (ZOCOR) 40 mg tablet   No No   Sig: Take 1 tablet (40 mg total) by mouth daily   sodium chloride (OCEAN) 0 65 % nasal spray   No No   Si spray into each nostril as needed for congestion or rhinitis   torsemide (DEMADEX) 20 mg tablet   No No   Sig: Take 2 tablets (40 mg total) by mouth 2 (two) times a day      Facility-Administered Medications: None       Past Medical History:   Diagnosis Date    Asthma     Chest pain on breathing     last assessed 2/5/15    Chronic diastolic CHF (congestive heart failure) (HCC)     Diabetes mellitus (HCC)     HTN (hypertension)     Hyperlipidemia     Insomnia     Obesity     Preop cardiovascular exam 2018    Pulmonary fibrosis (HCC)        Past Surgical History:   Procedure Laterality Date    HAND SURGERY         Family History   Problem Relation Age of Onset    Rheum arthritis Mother     Hypertension Mother      I have reviewed and agree with the history as documented      Social History     Tobacco Use    Smoking status: Former Smoker     Packs/day: 1 50     Years: 18 00     Pack years: 27 00     Types: Cigarettes     Last attempt to quit:      Years since quitting: 22 9    Smokeless tobacco: Never Used    Tobacco comment: Quit 20 years ago   Substance Use Topics    Alcohol use: Never     Frequency: Never     Binge frequency: Never    Drug use: No        Review of Systems   Constitutional: Negative for chills, fatigue and fever  Respiratory: Positive for shortness of breath  Negative for cough  Cardiovascular: Positive for leg swelling  Negative for chest pain  Gastrointestinal: Negative for abdominal pain, diarrhea and vomiting  Musculoskeletal: Positive for arthralgias and gait problem  Negative for back pain, neck pain and stiffness  Skin: Positive for color change  Allergic/Immunologic: Negative for immunocompromised state  Neurological: Positive for weakness  Negative for numbness  Psychiatric/Behavioral: Negative for confusion  All other systems reviewed and are negative  Physical Exam  Physical Exam   Constitutional: She appears well-developed and well-nourished  No distress  Patient tearful on exam and cries out in pain   HENT:   Head: Normocephalic and atraumatic  Right Ear: External ear normal    Left Ear: External ear normal    Nose: Nose normal    Mouth/Throat: Oropharynx is clear and moist    Eyes: Conjunctivae are normal  Right eye exhibits no discharge  Left eye exhibits no discharge  No scleral icterus  Neck: Normal range of motion  Neck supple  Cardiovascular: Normal rate, regular rhythm and normal heart sounds  No murmur heard  Pulmonary/Chest: Effort normal and breath sounds normal  No stridor  No respiratory distress  She has no wheezes  She has no rales  Abdominal: Soft  Bowel sounds are normal  She exhibits no distension  There is no tenderness  There is no guarding  Musculoskeletal: Normal range of motion  She exhibits no edema or deformity  Chronic venous stasis changes to b/l lower extremities  2+ pitting edema present  Not warm to touch   Patient unable to flex her knees more than 30 degrees due to pain Lymphadenopathy:     She has no cervical adenopathy  Neurological: She is alert  She is not disoriented  GCS eye subscore is 4  GCS verbal subscore is 5  GCS motor subscore is 6  Skin: Skin is warm and dry  She is not diaphoretic  Psychiatric: She has a normal mood and affect  Her behavior is normal    Nursing note and vitals reviewed        Vital Signs  ED Triage Vitals   Temperature Pulse Respirations Blood Pressure SpO2   12/07/19 1657 12/07/19 1657 12/07/19 1657 12/07/19 1657 12/07/19 1657   98 1 °F (36 7 °C) (!) 108 18 121/71 95 %      Temp Source Heart Rate Source Patient Position - Orthostatic VS BP Location FiO2 (%)   12/07/19 1657 12/07/19 1657 12/07/19 1657 12/07/19 1657 --   Oral Monitor Sitting Left arm       Pain Score       12/07/19 1655       Worst Possible Pain           Vitals:    12/07/19 1657 12/07/19 1900 12/07/19 1930   BP: 121/71 113/55 120/56   Pulse: (!) 108 95 99   Patient Position - Orthostatic VS: Sitting Lying Lying         Visual Acuity      ED Medications  Medications   morphine injection 2 mg (2 mg Intravenous Given 12/7/19 1739)   sodium chloride 0 9 % bolus 500 mL (0 mL Intravenous Stopped 12/7/19 2015)   HYDROmorphone (DILAUDID) injection 0 5 mg (0 5 mg Intravenous Given 12/7/19 1917)       Diagnostic Studies  Results Reviewed     Procedure Component Value Units Date/Time    NT-BNP PRO [[id number]]  (Abnormal) Collected:  12/07/19 1737    Lab Status:  Final result Specimen:  Blood from Arm, Left Updated:  12/07/19 1956     NT-proBNP 137 pg/mL     C-reactive protein [[id number]]  (Abnormal) Collected:  12/07/19 1737    Lab Status:  Final result Specimen:  Blood from Arm, Left Updated:  12/07/19 1845     CRP >90 0 mg/L     Comprehensive metabolic panel [[id number]]  (Abnormal) Collected:  12/07/19 1737    Lab Status:  Final result Specimen:  Blood from Arm, Left Updated:  12/07/19 1827     Sodium 133 mmol/L      Potassium 3 9 mmol/L      Chloride 95 mmol/L      CO2 27 mmol/L ANION GAP 11 mmol/L      BUN 18 mg/dL      Creatinine 1 66 mg/dL      Glucose 99 mg/dL      Calcium 9 4 mg/dL      AST 21 U/L      ALT 15 U/L      Alkaline Phosphatase 117 U/L      Total Protein 8 3 g/dL      Albumin 2 8 g/dL      Total Bilirubin 0 30 mg/dL      eGFR 30 ml/min/1 73sq m     Narrative:       National Kidney Disease Foundation guidelines for Chronic Kidney Disease (CKD):     Stage 1 with normal or high GFR (GFR > 90 mL/min/1 73 square meters)    Stage 2 Mild CKD (GFR = 60-89 mL/min/1 73 square meters)    Stage 3A Moderate CKD (GFR = 45-59 mL/min/1 73 square meters)    Stage 3B Moderate CKD (GFR = 30-44 mL/min/1 73 square meters)    Stage 4 Severe CKD (GFR = 15-29 mL/min/1 73 square meters)    Stage 5 End Stage CKD (GFR <15 mL/min/1 73 square meters)  Note: GFR calculation is accurate only with a steady state creatinine    Troponin I [[id number]]  (Normal) Collected:  12/07/19 1737    Lab Status:  Final result Specimen:  Blood from Arm, Left Updated:  12/07/19 1816     Troponin I <0 02 ng/mL     CBC and differential [[id number]]  (Abnormal) Collected:  12/07/19 1737    Lab Status:  Final result Specimen:  Blood from Arm, Left Updated:  12/07/19 1801     WBC 9 92 Thousand/uL      RBC 4 88 Million/uL      Hemoglobin 13 7 g/dL      Hematocrit 44 5 %      MCV 91 fL      MCH 28 1 pg      MCHC 30 8 g/dL      RDW 14 2 %      MPV 10 4 fL      Platelets 358 Thousands/uL      nRBC 0 /100 WBCs      Neutrophils Relative 74 %      Immat GRANS % 0 %      Lymphocytes Relative 17 %      Monocytes Relative 8 %      Eosinophils Relative 1 %      Basophils Relative 0 %      Neutrophils Absolute 7 25 Thousands/µL      Immature Grans Absolute 0 04 Thousand/uL      Lymphocytes Absolute 1 68 Thousands/µL      Monocytes Absolute 0 83 Thousand/µL      Eosinophils Absolute 0 08 Thousand/µL      Basophils Absolute 0 04 Thousands/µL                  VAS lower limb venous duplex study, unilateral/limited    (Results Pending) XR chest 2 views    (Results Pending)              Procedures  ECG 12 Lead Documentation Only  Date/Time: 12/7/2019 8:36 PM  Performed by: Kathy Cruz PA-C  Authorized by: Ktahy Cruz PA-C     Indications / Diagnosis:  Leg pain  ECG reviewed by me, the ED Provider: yes    Patient location:  ED  Previous ECG:     Previous ECG:  Unavailable  Interpretation:     Interpretation: abnormal    Rate:     ECG rate:  99    ECG rate assessment: normal    Rhythm:     Rhythm: sinus rhythm    Ectopy:     Ectopy: none    QRS:     QRS axis:  Left  Conduction:     Conduction: normal    ST segments:     ST segments:  Normal  T waves:     T waves: non-specific    Other findings:     Other findings: poor R wave progression               ED Course  ED Course as of Dec 07 2034   Sat Dec 07, 2019   6658 Informed by ultrasound tech that duplex was negative for DVT      1844 Creatinine(!): 1 66           Identification of Seniors at Risk      Most Recent Value   (ISAR) Identification of Seniors at Risk   Before the illness or injury that brought you to the Emergency, did you need someone to help you on a regular basis? 0 Filed at: 12/07/2019 1657   In the last 24 hours, have you needed more help than usual?  0 Filed at: 12/07/2019 1657   Have you been hospitalized for one or more nights during the past 6 months? 1 Filed at: 12/07/2019 1657   In general, do you see well?  0 Filed at: 12/07/2019 1657   In general, do you have serious problems with your memory? 0 Filed at: 12/07/2019 1657   Do you take more than three different medications every day?   1 Filed at: 12/07/2019 1657   ISAR Score  2 Filed at: 12/07/2019 1657                  MDM  Number of Diagnoses or Management Options  Acute kidney injury [facility]): new and requires workup  Ambulatory dysfunction: new and requires workup  Bilateral leg pain: new and requires workup  Diagnosis management comments: 66yo female with a PMH of rheumatoid arthritis, pulmonary fibrosis, and fibromyalgia presenting for evaluation of bilateral leg pain that is chronic in nature  Patient takes oxycodone at home which has not been providing relief  She is following with pain management  Patient also complains of feeling weak and having more trouble ambulating recently  Differential diagnosis includes but is not limited to: acute exacerbation of chronic pain, rheumatoid arthritis flare, musculoskeletal, DVT, volume overload, anxiety, no signs of cellulitis on exam    Initial ED plan: Will check labs including CBC, CMP, CRP, and troponin  Venous duplex to r/o DVT    Final assessment: Labs significant for CRP >90 and creatinine elevation of 1 66  Per review of records, baseline creatinine appears to be 0 9-1  Remainder of workup unremarkable  Venous duplex negative for DVT  Patient required assistance to ambulate in ED and she would benefit from PT/OT evaluations  Case discussed with Lazarus Embs, Dr Candice Amaya who requested a BNP and CXR  Both appear unremarkable  Patient admitted for further management          Amount and/or Complexity of Data Reviewed  Clinical lab tests: ordered and reviewed  Tests in the radiology section of CPT®: ordered and reviewed  Review and summarize past medical records: yes  Discuss the patient with other providers: yes  Independent visualization of images, tracings, or specimens: yes    Risk of Complications, Morbidity, and/or Mortality  Presenting problems: high  Diagnostic procedures: moderate  Management options: moderate    Patient Progress  Patient progress: stable        Disposition  Final diagnoses:   Acute kidney injury ([facility] [address] )   Ambulatory dysfunction   Bilateral leg pain     Time reflects when diagnosis was documented in both MDM as applicable and the Disposition within this note     Time User Action Codes Description Comment    12/7/2019  8:10  [facility] [N17 9] Acute kidney injury ([facility] [address] )     12/7/2019  8:10 PM [address] [place] [address] [facility] [R26 2] Ambulatory dysfunction     12/7/2019  8:10 PM Santiago Rosas Add [[id number], [id number]] Bilateral leg pain       ED Disposition     ED Disposition Condition Date/Time Comment    Admit Stable Sat Dec 7, 2019  8:10 PM Case was discussed with Dr Amairani Krause and the patient's admission status was agreed to be Admission Status: observation status to the service of Dr Amairani Krause   Follow-up Information    None         Patient's Medications   Discharge Prescriptions    No medications on file     No discharge procedures on file      ED Provider  Electronically Signed by           Guido Owens PA-C  12/07/19 2040

## 2019-12-08 ENCOUNTER — APPOINTMENT (INPATIENT)
Dept: NON INVASIVE DIAGNOSTICS | Facility: HOSPITAL | Age: 74
DRG: 683 | End: 2019-12-08
Payer: MEDICARE

## 2019-12-08 ENCOUNTER — APPOINTMENT (INPATIENT)
Dept: ULTRASOUND IMAGING | Facility: HOSPITAL | Age: 74
DRG: 683 | End: 2019-12-08
Payer: MEDICARE

## 2019-12-08 PROBLEM — N18.2 HYPERTENSIVE KIDNEY DISEASE WITH STAGE 2 CHRONIC KIDNEY DISEASE: Status: ACTIVE | Noted: 2019-12-08

## 2019-12-08 PROBLEM — N17.9 AKI (ACUTE KIDNEY INJURY) (HCC): Status: ACTIVE | Noted: 2019-12-08

## 2019-12-08 PROBLEM — I12.9 HYPERTENSIVE KIDNEY DISEASE WITH STAGE 2 CHRONIC KIDNEY DISEASE: Status: ACTIVE | Noted: 2019-12-08

## 2019-12-08 LAB
ALBUMIN SERPL BCP-MCNC: 2.3 G/DL (ref 3.5–5)
ALP SERPL-CCNC: 100 U/L (ref 46–116)
ALT SERPL W P-5'-P-CCNC: 13 U/L (ref 12–78)
ANION GAP SERPL CALCULATED.3IONS-SCNC: 7 MMOL/L (ref 4–13)
AST SERPL W P-5'-P-CCNC: 18 U/L (ref 5–45)
ATRIAL RATE: 99 BPM
BASOPHILS # BLD AUTO: 0.01 THOUSANDS/ΜL (ref 0–0.1)
BASOPHILS NFR BLD AUTO: 0 % (ref 0–1)
BILIRUB SERPL-MCNC: 0.3 MG/DL (ref 0.2–1)
BUN SERPL-MCNC: 13 MG/DL (ref 5–25)
CALCIUM SERPL-MCNC: 8.9 MG/DL (ref 8.3–10.1)
CHLORIDE SERPL-SCNC: 103 MMOL/L (ref 100–108)
CO2 SERPL-SCNC: 28 MMOL/L (ref 21–32)
CREAT SERPL-MCNC: 0.92 MG/DL (ref 0.6–1.3)
EOSINOPHIL # BLD AUTO: 0 THOUSAND/ΜL (ref 0–0.61)
EOSINOPHIL NFR BLD AUTO: 0 % (ref 0–6)
ERYTHROCYTE [DISTWIDTH] IN BLOOD BY AUTOMATED COUNT: 14.3 % (ref 11.6–15.1)
EST. AVERAGE GLUCOSE BLD GHB EST-MCNC: 177 MG/DL
GFR SERPL CREATININE-BSD FRML MDRD: 62 ML/MIN/1.73SQ M
GLUCOSE SERPL-MCNC: 148 MG/DL (ref 65–140)
GLUCOSE SERPL-MCNC: 219 MG/DL (ref 65–140)
GLUCOSE SERPL-MCNC: 224 MG/DL (ref 65–140)
GLUCOSE SERPL-MCNC: 62 MG/DL (ref 65–140)
GLUCOSE SERPL-MCNC: 71 MG/DL (ref 65–140)
HBA1C MFR BLD: 7.8 % (ref 4.2–6.3)
HCT VFR BLD AUTO: 41.3 % (ref 34.8–46.1)
HGB BLD-MCNC: 12.6 G/DL (ref 11.5–15.4)
IMM GRANULOCYTES # BLD AUTO: 0.03 THOUSAND/UL (ref 0–0.2)
IMM GRANULOCYTES NFR BLD AUTO: 0 % (ref 0–2)
INR PPP: 1.08 (ref 0.84–1.19)
LYMPHOCYTES # BLD AUTO: 0.6 THOUSANDS/ΜL (ref 0.6–4.47)
LYMPHOCYTES NFR BLD AUTO: 9 % (ref 14–44)
MAGNESIUM SERPL-MCNC: 2.1 MG/DL (ref 1.6–2.6)
MCH RBC QN AUTO: 28.3 PG (ref 26.8–34.3)
MCHC RBC AUTO-ENTMCNC: 30.5 G/DL (ref 31.4–37.4)
MCV RBC AUTO: 93 FL (ref 82–98)
MONOCYTES # BLD AUTO: 0.08 THOUSAND/ΜL (ref 0.17–1.22)
MONOCYTES NFR BLD AUTO: 1 % (ref 4–12)
NEUTROPHILS # BLD AUTO: 6.17 THOUSANDS/ΜL (ref 1.85–7.62)
NEUTS SEG NFR BLD AUTO: 90 % (ref 43–75)
NRBC BLD AUTO-RTO: 0 /100 WBCS
P AXIS: 74 DEGREES
PHOSPHATE SERPL-MCNC: 2.7 MG/DL (ref 2.3–4.1)
PLATELET # BLD AUTO: 296 THOUSANDS/UL (ref 149–390)
PMV BLD AUTO: 10 FL (ref 8.9–12.7)
POTASSIUM SERPL-SCNC: 4.4 MMOL/L (ref 3.5–5.3)
PR INTERVAL: 154 MS
PROT SERPL-MCNC: 7.4 G/DL (ref 6.4–8.2)
PROTHROMBIN TIME: 14 SECONDS (ref 11.6–14.5)
QRS AXIS: 264 DEGREES
QRSD INTERVAL: 72 MS
QT INTERVAL: 350 MS
QTC INTERVAL: 450 MS
RBC # BLD AUTO: 4.45 MILLION/UL (ref 3.81–5.12)
SODIUM SERPL-SCNC: 138 MMOL/L (ref 136–145)
T WAVE AXIS: 40 DEGREES
VENTRICULAR RATE: 99 BPM
WBC # BLD AUTO: 6.89 THOUSAND/UL (ref 4.31–10.16)

## 2019-12-08 PROCEDURE — 84100 ASSAY OF PHOSPHORUS: CPT | Performed by: INTERNAL MEDICINE

## 2019-12-08 PROCEDURE — 85025 COMPLETE CBC W/AUTO DIFF WBC: CPT | Performed by: INTERNAL MEDICINE

## 2019-12-08 PROCEDURE — 93010 ELECTROCARDIOGRAM REPORT: CPT | Performed by: INTERNAL MEDICINE

## 2019-12-08 PROCEDURE — 83735 ASSAY OF MAGNESIUM: CPT | Performed by: INTERNAL MEDICINE

## 2019-12-08 PROCEDURE — 76770 US EXAM ABDO BACK WALL COMP: CPT

## 2019-12-08 PROCEDURE — G8978 MOBILITY CURRENT STATUS: HCPCS

## 2019-12-08 PROCEDURE — 99232 SBSQ HOSP IP/OBS MODERATE 35: CPT | Performed by: PHYSICIAN ASSISTANT

## 2019-12-08 PROCEDURE — 82948 REAGENT STRIP/BLOOD GLUCOSE: CPT

## 2019-12-08 PROCEDURE — 93971 EXTREMITY STUDY: CPT | Performed by: SURGERY

## 2019-12-08 PROCEDURE — G8979 MOBILITY GOAL STATUS: HCPCS

## 2019-12-08 PROCEDURE — 80053 COMPREHEN METABOLIC PANEL: CPT | Performed by: INTERNAL MEDICINE

## 2019-12-08 PROCEDURE — 99223 1ST HOSP IP/OBS HIGH 75: CPT | Performed by: INTERNAL MEDICINE

## 2019-12-08 PROCEDURE — 85610 PROTHROMBIN TIME: CPT | Performed by: INTERNAL MEDICINE

## 2019-12-08 PROCEDURE — 97163 PT EVAL HIGH COMPLEX 45 MIN: CPT

## 2019-12-08 PROCEDURE — 84145 PROCALCITONIN (PCT): CPT | Performed by: PHYSICIAN ASSISTANT

## 2019-12-08 RX ORDER — INSULIN GLARGINE 100 [IU]/ML
25 INJECTION, SOLUTION SUBCUTANEOUS EVERY 12 HOURS SCHEDULED
Status: DISCONTINUED | OUTPATIENT
Start: 2019-12-08 | End: 2019-12-10 | Stop reason: HOSPADM

## 2019-12-08 RX ADMIN — INSULIN LISPRO 4 UNITS: 100 INJECTION, SOLUTION INTRAVENOUS; SUBCUTANEOUS at 12:23

## 2019-12-08 RX ADMIN — LORATADINE 10 MG: 10 TABLET ORAL at 08:39

## 2019-12-08 RX ADMIN — INSULIN LISPRO 2 UNITS: 100 INJECTION, SOLUTION INTRAVENOUS; SUBCUTANEOUS at 21:08

## 2019-12-08 RX ADMIN — HYDROMORPHONE HYDROCHLORIDE 4 MG: 2 TABLET ORAL at 16:30

## 2019-12-08 RX ADMIN — ENOXAPARIN SODIUM 30 MG: 30 INJECTION SUBCUTANEOUS at 08:39

## 2019-12-08 RX ADMIN — PANTOPRAZOLE SODIUM 40 MG: 40 TABLET, DELAYED RELEASE ORAL at 06:46

## 2019-12-08 RX ADMIN — HYDROMORPHONE HYDROCHLORIDE 0.5 MG: 1 INJECTION, SOLUTION INTRAMUSCULAR; INTRAVENOUS; SUBCUTANEOUS at 21:03

## 2019-12-08 RX ADMIN — FLUTICASONE FUROATE AND VILANTEROL TRIFENATATE 1 PUFF: 100; 25 POWDER RESPIRATORY (INHALATION) at 08:41

## 2019-12-08 RX ADMIN — INSULIN GLARGINE 25 UNITS: 100 INJECTION, SOLUTION SUBCUTANEOUS at 21:08

## 2019-12-08 RX ADMIN — SODIUM CHLORIDE 50 ML/HR: 0.9 INJECTION, SOLUTION INTRAVENOUS at 19:10

## 2019-12-08 RX ADMIN — DULOXETINE HYDROCHLORIDE 30 MG: 30 CAPSULE, DELAYED RELEASE ORAL at 08:39

## 2019-12-08 RX ADMIN — PREDNISONE 40 MG: 20 TABLET ORAL at 08:39

## 2019-12-08 RX ADMIN — HYDROMORPHONE HYDROCHLORIDE 0.5 MG: 1 INJECTION, SOLUTION INTRAMUSCULAR; INTRAVENOUS; SUBCUTANEOUS at 12:20

## 2019-12-08 RX ADMIN — AMITRIPTYLINE HYDROCHLORIDE 25 MG: 25 TABLET, FILM COATED ORAL at 21:07

## 2019-12-08 RX ADMIN — MONTELUKAST 10 MG: 10 TABLET, FILM COATED ORAL at 21:07

## 2019-12-08 RX ADMIN — HYDROMORPHONE HYDROCHLORIDE 0.5 MG: 1 INJECTION, SOLUTION INTRAMUSCULAR; INTRAVENOUS; SUBCUTANEOUS at 04:51

## 2019-12-08 RX ADMIN — HYDROMORPHONE HYDROCHLORIDE 4 MG: 2 TABLET ORAL at 08:39

## 2019-12-08 RX ADMIN — INSULIN GLARGINE 30 UNITS: 100 INJECTION, SOLUTION SUBCUTANEOUS at 08:40

## 2019-12-08 RX ADMIN — HYDROMORPHONE HYDROCHLORIDE 4 MG: 2 TABLET ORAL at 00:34

## 2019-12-08 RX ADMIN — FLUTICASONE PROPIONATE 1 SPRAY: 50 SPRAY, METERED NASAL at 08:42

## 2019-12-08 NOTE — ASSESSMENT & PLAN NOTE
Wt Readings from Last 3 Encounters:   12/07/19 84 kg (185 lb 3 oz)   11/14/19 84 7 kg (186 lb 12 8 oz)   10/29/19 86 2 kg (190 lb)     · Pt appears euvolemic on exam today   · Demadex is on hold currently secondary to CLAUDIA   · Nephrology following  · Continue gentle IVF hydration and monitor BMP in the AM  · ECHO is pending

## 2019-12-08 NOTE — ED NOTES
1  CC- Bilateral leg swelling/redness/pain for the past 2 months     2  Orientation status- A&OX4     3  Abnormal labs/ focused assessment/ vitals- Na+ 133, CRP >90 0, CR 1 66     4  Medication/drips- 500 ml NS     5  Narcotic time/ pain medication- 2mg morphine @1739, 0 5mg dilaudid @1917     6  IV lines/drain/etc  - #20 L AC     7  Isolation status- n/a     8  Skin- not fully assessed in ED     9  Ambulation status - assist x1 with cane      10   Phone number- [phone] Lorraine Gerardo PARDO, RN  12/07/19 2019

## 2019-12-08 NOTE — ASSESSMENT & PLAN NOTE
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

## 2019-12-08 NOTE — ASSESSMENT & PLAN NOTE
· Pt with worsening generalized weakness  · Was evaluated by PT today, recommending STR, however patient and family declining   · Requesting home with [facility] services  · CM consultation pending

## 2019-12-08 NOTE — PHYSICAL THERAPY NOTE
Physical Therapy Evaluation     Patient's Name: Kasandra Martines    Admitting Diagnosis  Joint pain [M25 50]  Bilateral leg pain [M79 604, M79 605]  Acute kidney injury ([facility] ) [N17 9]  Ambulatory dysfunction [R26 2]    Problem List  Patient Active Problem List   Diagnosis    Bilateral edema of lower extremity    Chronic diastolic congestive heart failure ([facility] )    Mixed hyperlipidemia    Essential hypertension    Morbid obesity ([facility] )    Ambulatory dysfunction    Recurrent major depressive disorder, [facility] partial remission [facility]    Type 2 diabetes mellitus with stage 3 chronic kidney disease, with long-term current use of insulin ([facility])    Gastroesophageal reflux disease without esophagitis    Primary insomnia    Moderate persistent asthma    Psoriasis vulgaris    Rheumatoid arthritis ([facility])    Urinary incontinence    Vitamin D deficiency    Varicose veins of both lower extremities    Pain in elbow    Immunosuppressed status ([facility] )    Interstitial lung disease [facility]    Chronic respiratory failure with hypoxia ([facility])    Hyponatremia    Venous stasis dermatitis of both lower extremities    Shortness of breath    Acute kidney injury ([facility] )    Chronic prescription opiate use    CLAUDIA (acute kidney injury) ([facility] )    Hypertensive kidney disease with stage 2 chronic kidney disease       Past Medical History  Past Medical History:   Diagnosis Date    Asthma     Chest pain on breathing     last assessed 2/5/15    Chronic diastolic CHF (congestive heart failure) ([facility])     Diabetes mellitus ([facility] )     HTN (hypertension)     Hyperlipidemia     Insomnia     Obesity     Preop cardiovascular exam 2/2/2018    Pulmonary fibrosis [facility])        Past Surgical History  Past Surgical History:   Procedure Laterality Date    HAND SURGERY              12/08/19 1055   Note Type   Note type Eval only   Pain Assessment   Pain Assessment 0-10   Pain Score 7   Pain Type Chronic pain   Pain Location Knee;Leg   Pain Orientation Bilateral   Pain Frequency Constant/continuous   Pain Onset Ongoing   Clinical Progression Gradually improving   Hospital Pain Intervention(s) Repositioned; Ambulation/increased activity   Response to Interventions Tolerated   Home Living   Type of [address] One level;Performs ADLs on one level; Able to live on main level with bedroom/bathroom;Stairs to enter with rails  (5 MANUEL)   Bathroom Shower/Tub Walk-in shower   Bathroom Toilet Standard   [address]; Other (Comment)  (home O2 (2-3L))   Prior Function   Level of [place] Independent with ADLs and functional mobility; Needs assistance with IADLs  (ambulates c SPC)   Lives With Spouse;Son  (son works during the day)   Latriciavickiebrandan   IADLs Needs assistance   Falls in the last 6 months 1 to 4   Vocational Retired   Comments patient drives on occasion   Restrictions/Precautions   Wells Yohan Bearing Precautions Per Order No   Braces or Orthoses Other (Comment)  (compression stockings B LE; back support prn for comfort)   Other Precautions Pain; Fall Risk;Multiple lines;O2;Bed Alarm; Chair Alarm  (2L O2 via NC; back safety precautions)   General   Family/Caregiver Present Yes   Cognition   Overall Cognitive Status WFL   Arousal/Participation Cooperative   Orientation Level Oriented X4   Memory Within functional limits   Following Commands Follows all commands and directions without difficulty   Comments patient agreeable to PT eval with encouragement   RUE Assessment   RUE Assessment WFL  (based on functional assessment)   LUE Assessment   LUE Assessment WFL  (based on functional assessment)   RLE Assessment   RLE Assessment WFL  (grossly assessed with functional mobility: at least 3+/5)   LLE Assessment   LLE Assessment WFL  (grossly assessed with functional mobility: at least 3+/5)   Coordination   Movements are Fluid and Coordinated 1   Sensation X  (pt reporting numbness to dorsal surface of feet)   Bed Mobility   Rolling R 4  Minimal assistance   Additional items Increased time required;Verbal cues;LE management;HOB elevated;Assist x 2   Supine to Sit 4  Minimal assistance   Additional items Assist x 2; Increased time required;Verbal cues;LE management;HOB elevated   Sit to Supine 3  Moderate assistance   Additional items Assist x 1; Increased time required;Verbal cues;LE management   Transfers   Sit to Stand 4  Minimal assistance   Additional items Assist x 1; Increased time required;Verbal cues   Stand to Sit 4  Minimal assistance   Additional items Assist x 1; Increased time required;Verbal cues   Toilet transfer 4  Minimal assistance  (CGA - MIN A )   Additional items Assist x 1; Increased time required;Verbal cues; Commode; Other  (Grab bar)   Ambulation/Elevation   Gait pattern Excessively slow; Short stride;Decreased foot clearance; Improper Weight shift; Inconsistent camilo   Gait Assistance 4  Minimal assist  (CGA - MIN A )   Additional items Assist x 1;Verbal cues   Assistive Device Rolling walker   Distance 10' x 2   Stair Management Assistance Not tested   Balance   Static Sitting Fair +   Dynamic Sitting Fair   Static Standing Fair -   Dynamic Standing Fair -   Ambulatory Poor +  (fair - / poor +)   Endurance Deficit   Endurance Deficit Yes   Activity Tolerance   Activity Tolerance Patient limited by fatigue;Patient limited by pain   Nurse Made Aware RN Jennie Ny confirmed patient appropriate for therapy; made aware of session outcomes; post-session: patient returned BTB, all needs within reach and bed alarm engaged   Assessment   Prognosis Good   Problem List Decreased strength;Decreased endurance; Impaired balance;Decreased mobility; Impaired sensation;Pain;Orthopedic restrictions  (back safety precautions)   Assessment Pt is 76 y o  female seen for PT evaluation s/p admit [facility] on 12/7/2019 w/ Acute kidney injury ([facility] )   PT consulted to assess pt's functional mobility and d/c needs  Order placed for PT eval and tx, w/ up and OOB as tolerated order  Performed at least 2 patient identifiers during session: Name and   Comorbidities affecting pt's physical performance at time of assessment include: venous stasis dermatitis of both lower extremities, CLAUDIA, hypertensive kidney disease with stage 2 CKD, ambulatory dysfunction, morbid obesity, chronic respiratory failure with hypoxia  PTA, pt was independent w/ all functional mobility w/ SPC, ambulates community distances and elevations, has 5 MANUEL, lives w/ family in one level house and retired  Personal factors affecting pt at time of IE include: ambulating w/ assistive device, stairs to enter home, inability to navigate community distances, inability to navigate level surfaces w/o external assistance, unable to perform dynamic tasks in community, positive fall history, decreased initiation and engagement, inability to perform IADLs and inability to perform ADLs  Please find objective findings from PT assessment regarding body systems outlined above with impairments and limitations including weakness, impaired balance, decreased endurance, gait deviations, pain, decreased activity tolerance, decreased functional mobility tolerance, altered sensation, fall risk, orthopedic restrictions (back safety precautions) and SOB upon exertion  The following objective measures performed on IE also reveal limitations: Barthel Index: 50/100 and Modified [place]: 4 (moderate/severe disability)  Pt's clinical presentation is currently unstable/unpredictable seen in pt's presentation of ongoing medical management/monitoring, evident in need for assist w/ all phases of mobility when usually mobilizing independently, and pain and fatigue impacting overall mobility status  Pt to benefit from continued PT tx to address deficits as defined above and maximize level of functional independent mobility and consistency   From PT/mobility standpoint, recommendation at time of d/c would be STR pending progress in order to facilitate return to PLOF  Barriers to Discharge Inaccessible home environment;Decreased caregiver support   Goals   Patient Goals none reported: family reports increased stability with gait trials using RW   STG Expiration Date 12/18/19   Short Term Goal #1 In 7-10 days: Increase bilateral LE strength 1/2 grade to facilitate independent mobility, Perform all bed mobility tasks modified independent adhering to back safety precautions 100% of the time to decrease caregiver burden, Perform all transfers modified independent to improve independence, Ambulate > 75 ft  x 2 trials with least restrictive assistive device modified independent w/o LOB and w/ normalized gait pattern 100% of the time, Navigate 5 stairs modified independent with unilateral handrail to facilitate return to previous living environment, Increase all balance 1 grade to decrease risk for falls, Complete exercise program independently, Tolerate 4 hr OOB to faciliate upright tolerance and Complete % of the time   PT Treatment Day 0   Plan   Treatment/Interventions Functional transfer training;Elevations;LE strengthening/ROM; Therapeutic exercise; Endurance training;Patient/family training;Equipment eval/education; Bed mobility;Gait training;Spoke to nursing;Family   PT Frequency Other (Comment)  (3-5x/wk)   Recommendation   Recommendation Short-term skilled PT   Equipment Recommended Walker  (RW)   PT - OK to Discharge Yes  (when medically cleared; if to STR)   Modified [place] Scale   Modified Reynolds Scale 4   Barthel Index   Feeding 10   Bathing 0   Grooming Score 0   Dressing Score 5   Bladder Score 10   Bowels Score 10   Toilet Use Score 5   Transfers (Bed/Chair) Score 10   Mobility (Level Surface) Score 0   Stairs Score 0   Barthel Index Score 50       Nichelle Melo, PT, DPT

## 2019-12-08 NOTE — PROGRESS NOTES
Progress Note - Jaren Brito 1945, 76 y o  female MRN: [id number]    Unit/Bed#: -Lily Encounter: [id number]    Primary Care Provider: Karrie Dean MD   Date and time admitted to hospital: 12/7/2019  4:58 PM      DOS: 12/8/2019    * Acute kidney injury ([facility] )  Assessment & Plan  · POA, CLAUDIA superimposed on CKD stage 2-3  · Baseline cr  Around 0 9-1 1  · Likely pre-renal in setting of hypovolemia as this is improving with fluids   · Nephrology following,  · Cr  1 66 on admission   · Received IVF overnight and improved to 0 92 today   · Renal ultrasound is pending   · Monitor volume status, ECHO pending  · Continue IVF hydration at 50 cc/hr tonight and repeat BMP in the AM  · Avoid nephrotoxic agents  · Demadex and ACEI on hold    Bilateral edema of lower extremity  Assessment & Plan  · Pt with bilateral chronic lower extremity edema and erythema   · Likely in setting of venous stasis, rheumatoid arthritis   · Family concerned for possible cellulitis, discussed extensively with the patient and family that patient's erythema is present bilaterally, she is afebrile without leukocytosis, low suspicion for cellulitis at this time  Likely secondary to chronic illnesses  Advised obtaining procalcitonin and monitoring off of abx at this time  Family in agreement       Chronic prescription opiate use  Assessment & Plan  · Chronic pain with chronic opioid use  · Pt is on hydromorphone 4 mg tabs outpatient, continue PRN regimen here  · Attempt to wean IV narcotics as able   · Pt with history of RA, on Humira injections outpatient, started on PO prednisone 40 mg daily here, continue  · Monitor      Chronic respiratory failure with hypoxia (HCC)  Assessment & Plan  · Pt on supplemental home O2 outpatient  · Saturating well on baseline 2 L NC  · Monitor    Interstitial lung disease ([facility] )  Assessment & Plan  · History of interstitial lung disease  · Currently at baseline respiratory status   · Continue Breo, Flonase  · Monitor     Immunosuppressed status ([facility] )  Assessment & Plan  · Patient is on Humira as an outpatient  · Blood cultures are pending   · Pt is afebrile, no leukocytosis, no evidence of any infectious etiology noted on exam   · Continue to monitor closely     Gastroesophageal reflux disease without esophagitis  Assessment & Plan  · Continue PPI    Type 2 diabetes mellitus with stage 3 chronic kidney disease, with long-term current use of insulin [facility])  Assessment & Plan  Lab Results   Component Value Date    HGBA1C 7 8 (H) 12/07/2019       Recent Labs     12/08/19  0658 12/08/19  1112 12/08/19  1634   POCGLU 62* 224* 148*       Blood Sugar Average: Last 72 hrs:  (P) 365 [id number]   · Decrease Lantus to 25 units BID due to hypoglycemia noted on admission  · SSI coverage  · QID glucose checks  · Monitor and adjust regimen as needed    Ambulatory dysfunction  Assessment & Plan  · Pt with worsening generalized weakness  · Was evaluated by PT today, recommending STR, however patient and family declining   · Requesting home with VNA services  · CM consultation pending    Morbid obesity ([facility] )  Assessment & Plan  · BMI of 36 17  · Encourage weight loss and lifestyle modification     Essential hypertension  Assessment & Plan  · BP stable on review   · Nephrology following,  · Recommending holding home regimen for now to avoid hypotension   · Pt is on ACEI, Demadex and Toprol XL at home  · Monitor    Chronic diastolic congestive heart failure ([facility] )  Assessment & Plan  Wt Readings from Last 3 Encounters:   12/07/19 84 kg (185 lb 3 oz)   11/14/19 84 7 kg (186 lb 12 8 oz)   10/29/19 86 2 kg (190 lb)     · Pt appears euvolemic on exam today   · Demadex is on hold currently secondary to CLAUDIA   · Nephrology following  · Continue gentle IVF hydration and monitor BMP in the AM  · ECHO is pending     VTE Pharmacologic Prophylaxis:   Pharmacologic: Enoxaparin (Lovenox)  Mechanical VTE Prophylaxis in Place: Yes    Patient Centered Rounds: I have performed bedside rounds with nursing staff today  Discussions with Specialists or Other Care Team Provider: Discussed with RN and reviewed previous notes     Education and Discussions with Family / Patient: Discussed with patient and patient's multiple family members at bedside regarding plan of care  Time Spent for Care: 30 minutes  More than 50% of total time spent on counseling and coordination of care as described above  Current Length of Stay: 1 day(s)    Current Patient Status: Inpatient   Certification Statement: The patient will continue to require additional inpatient hospital stay due to continued IVF hydration, monitoring for improvement of pain    Discharge Plan: Not medically stable as above    Code Status: Level 1 - Full Code      Subjective:   Pt reports that she continues to have generalized body pains  Currently denies any chest pain, abdominal pain, nausea, vomiting, diarrhea, constipation or urinary difficulties  Family is questioning why the patient was not placed on antibiotics in the ER secondary to her lower extremity erythema  Education provided and questions answered to the best of my ability  Objective:     Vitals:   Temp (24hrs), Av 9 °F (36 6 °C), Min:97 7 °F (36 5 °C), Max:98 1 °F (36 7 °C)    Temp:  [97 7 °F (36 5 °C)-98 1 °F (36 7 °C)] 97 9 °F (36 6 °C)  HR:  [89-99] 89  Resp:  [18-19] 18  BP: (103-127)/(55-65) 103/57  SpO2:  [96 %-98 %] 96 %  Body mass index is 36 17 kg/m²  Input and Output Summary (last 24 hours): Intake/Output Summary (Last 24 hours) at 2019 1702  Last data filed at 2019 0845  Gross per 24 hour   Intake 980 ml   Output 700 ml   Net 280 ml       Physical Exam:     Physical Exam   Constitutional: No distress  Pt is in no acute distress lying in her hospital bed resting comfortably accompanied by multiple family members  On 2 L NC saturating well  HENT:   Head: Normocephalic and atraumatic     Eyes: Pupils are equal, round, and reactive to light  Conjunctivae are normal    Cardiovascular: Normal rate, regular rhythm and intact distal pulses  Pulmonary/Chest: Effort normal and breath sounds normal  No stridor  No respiratory distress  She has no wheezes  Abdominal: Soft  Bowel sounds are normal  She exhibits no distension  There is no tenderness  There is no guarding  Musculoskeletal: She exhibits edema (mild, trace lower extremities bilaterally with bilateral erythema and chronic venous stasis changes  No evidence of acute cellulitis)  Neurological: She is alert  Skin: Skin is warm and dry  She is not diaphoretic  There is erythema  Vitals reviewed  Additional Data:     Labs:    Results from last 7 days   Lab Units 12/08/19  0648   WBC Thousand/uL 6 89   HEMOGLOBIN g/dL 12 6   HEMATOCRIT % 41 3   PLATELETS Thousands/uL 296   NEUTROS PCT % 90*   LYMPHS PCT % 9*   MONOS PCT % 1*   EOS PCT % 0     Results from last 7 days   Lab Units 12/08/19  0648   POTASSIUM mmol/L 4 4   CHLORIDE mmol/L 103   CO2 mmol/L 28   BUN mg/dL 13   CREATININE mg/dL 0 92   CALCIUM mg/dL 8 9   ALK PHOS U/L 100   ALT U/L 13   AST U/L 18     Results from last 7 days   Lab Units 12/08/19  0648   INR  1 08       * I Have Reviewed All Lab Data Listed Above  * Additional Pertinent Lab Tests Reviewed: All Labs Within Last 24 Hours Reviewed    Imaging:    Imaging Reports Reviewed Today Include: Kidney US, CXR  Imaging Personally Reviewed by Myself Includes:  None    Recent Cultures (last 7 days):     Results from last 7 days   Lab Units 12/07/19  2249   BLOOD CULTURE  Received in Microbiology Lab  Culture in Progress  Received in Microbiology Lab  Culture in Progress         Last 24 Hours Medication List:     Current Facility-Administered Medications:  albuterol 2 5 mg Nebulization Q6H PRN Nuris Linen, DO    amitriptyline 25 mg Oral HS Nuris Linen, DO    baclofen 10 mg Oral HS PRN Nuris Linen, DO    DULoxetine 30 mg Oral Daily Wava Goes, DO    enoxaparin 30 mg Subcutaneous Daily Wava Goes, DO    fluticasone 1 spray Nasal Daily Wava Goes, DO    fluticasone-vilanterol 1 puff Inhalation Daily Wava Goes, DO    HYDROmorphone 0 5 mg Intravenous Q3H PRN Wava Goes, DO    HYDROmorphone 4 mg Oral Q8H PRN Wava Goes, DO    insulin glargine 30 Units Subcutaneous Q12H [facility] [place] Wava Goes, DO    insulin lispro 1-5 Units Subcutaneous HS Wava Goes, DO    insulin lispro 2-12 Units Subcutaneous TID [facility] Wava Goes, DO    loratadine 10 mg Oral Daily Wava Goes, DO    montelukast 10 mg Oral HS Edvin Watkins, DO    ondansetron 4 mg Intravenous Q6H PRN Wava Goes, DO    pantoprazole 40 mg Oral Early Morning Jonathan Ibarra MD    predniSONE 40 mg Oral Daily Wava Goes, DO    sodium chloride 50 mL/hr Intravenous Continuous Wava Goes, DO Last Rate: 50 mL/hr (12/07/19 2355)        Today, Patient Was Seen By: Vladimir Zhang PA-C    ** Please Note: Dictation voice to text software may have been used in the creation of this document   **

## 2019-12-08 NOTE — PLAN OF CARE
Problem: PHYSICAL THERAPY ADULT  Goal: Performs mobility at highest level of function for planned discharge setting  See evaluation for individualized goals  Description  Treatment/Interventions: Functional transfer training, Elevations, LE strengthening/ROM, Therapeutic exercise, Endurance training, Patient/family training, Equipment eval/education, Bed mobility, Gait training, Spoke to nursing, Family  Equipment Recommended: Walker(RW)       See flowsheet documentation for full assessment, interventions and recommendations  Note:   Prognosis: Good  Problem List: Decreased strength, Decreased endurance, Impaired balance, Decreased mobility, Impaired sensation, Pain, Orthopedic restrictions(back safety precautions)  Assessment: Pt is 76 y o  female seen for PT evaluation s/p admit [facility] on 2019 w/ Acute kidney injury ([facility] )  PT consulted to assess pt's functional mobility and d/c needs  Order placed for PT eval and tx, w/ up and OOB as tolerated order  Performed at least 2 patient identifiers during session: Name and   Comorbidities affecting pt's physical performance at time of assessment include: venous stasis dermatitis of both lower extremities, CLAUDIA, hypertensive kidney disease with stage 2 CKD, ambulatory dysfunction, morbid obesity, chronic respiratory failure with hypoxia  PTA, pt was independent w/ all functional mobility w/ SPC, ambulates community distances and elevations, has 5 MANUEL, lives w/ family in one level house and retired  Personal factors affecting pt at time of IE include: ambulating w/ assistive device, stairs to enter home, inability to navigate community distances, inability to navigate level surfaces w/o external assistance, unable to perform dynamic tasks in community, positive fall history, decreased initiation and engagement, inability to perform IADLs and inability to perform ADLs   Please find objective findings from PT assessment regarding body systems outlined above with impairments and limitations including weakness, impaired balance, decreased endurance, gait deviations, pain, decreased activity tolerance, decreased functional mobility tolerance, altered sensation, fall risk, orthopedic restrictions (back safety precautions) and SOB upon exertion  The following objective measures performed on IE also reveal limitations: Barthel Index: 50/100 and Modified [place]: 4 (moderate/severe disability)  Pt's clinical presentation is currently unstable/unpredictable seen in pt's presentation of ongoing medical management/monitoring, evident in need for assist w/ all phases of mobility when usually mobilizing independently, and pain and fatigue impacting overall mobility status  Pt to benefit from continued PT tx to address deficits as defined above and maximize level of functional independent mobility and consistency  From PT/mobility standpoint, recommendation at time of d/c would be STR pending progress in order to facilitate return to PLOF  Barriers to Discharge: Inaccessible home environment, Decreased caregiver support     Recommendation: Short-term skilled PT     PT - OK to Discharge: Yes(when medically cleared; if to STR)    See flowsheet documentation for full assessment

## 2019-12-08 NOTE — ASSESSMENT & PLAN NOTE
· Chronic pain with chronic opioid use  · Pt is on hydromorphone 4 mg tabs outpatient, continue PRN regimen here  · Attempt to wean IV narcotics as able   · Pt with history of RA, on Humira injections outpatient, started on PO prednisone 40 mg daily here, continue  · Monitor

## 2019-12-08 NOTE — ASSESSMENT & PLAN NOTE
Patient is on Humira as an outpatient, check blood cultures, monitor for fever, monitor lower extremities for any worsening erythema

## 2019-12-08 NOTE — ASSESSMENT & PLAN NOTE
· Pt with bilateral chronic lower extremity edema and erythema   · Likely in setting of venous stasis, rheumatoid arthritis   · Family concerned for possible cellulitis, discussed extensively with the patient and family that patient's erythema is present bilaterally, she is afebrile without leukocytosis, low suspicion for cellulitis at this time  Likely secondary to chronic illnesses  Advised obtaining procalcitonin and monitoring off of abx at this time  Family in agreement

## 2019-12-08 NOTE — PLAN OF CARE
Problem: Potential for Falls  Goal: Patient will remain free of falls  Description  INTERVENTIONS:  - Assess patient frequently for physical needs  -  Identify cognitive and physical deficits and behaviors that affect risk of falls    -  [place] fall precautions as indicated by assessment   - Educate patient/family on patient safety including physical limitations  - Instruct patient to call for assistance with activity based on assessment  - Modify environment to reduce risk of injury  - Consider OT/PT consult to assist with strengthening/mobility  Outcome: Progressing     Problem: Prexisting or High Potential for Compromised Skin Integrity  Goal: Skin integrity is maintained or improved  Description  INTERVENTIONS:  - Identify patients at risk for skin breakdown  - Assess and monitor skin integrity  - Assess and monitor nutrition and hydration status  - Monitor labs   - Assess for incontinence   - Turn and reposition patient  - Assist with mobility/ambulation  - Relieve pressure over bony prominences  - Avoid friction and shearing  - Provide appropriate hygiene as needed including keeping skin clean and dry  - Evaluate need for skin moisturizer/barrier cream  - Collaborate with interdisciplinary team   - Patient/family teaching  - Consider wound care consult   Outcome: Progressing     Problem: SAFETY ADULT  Goal: Maintain or return to baseline ADL function  Description  INTERVENTIONS:  -  Assess patient's ability to carry out ADLs; assess patient's baseline for ADL function and identify physical deficits which impact ability to perform ADLs (bathing, care of mouth/teeth, toileting, grooming, dressing, etc )  - Assess/evaluate cause of self-care deficits   - Assess range of motion  - Assess patient's mobility; develop plan if impaired  - Assess patient's need for assistive devices and provide as appropriate  - Encourage maximum independence but intervene and supervise when necessary  - Involve family in performance of ADLs  - Assess for home care needs following discharge   - Consider OT consult to assist with ADL evaluation and planning for discharge  - Provide patient education as appropriate  Outcome: Progressing  Goal: Maintain or return mobility status to optimal level  Description  INTERVENTIONS:  - Assess patient's baseline mobility status (ambulation, transfers, stairs, etc )    - Identify cognitive and physical deficits and behaviors that affect mobility  - Identify mobility aids required to assist with transfers and/or ambulation (gait belt, sit-to-stand, lift, walker, cane, etc )  - [place] fall precautions as indicated by assessment  - Record patient progress and toleration of activity level on Mobility SBAR; progress patient to next Phase/Stage  - Instruct patient to call for assistance with activity based on assessment  - Consider rehabilitation consult to assist with strengthening/weightbearing, etc   Outcome: Progressing     Problem: DISCHARGE PLANNING  Goal: Discharge to home or other facility with appropriate resources  Description  INTERVENTIONS:  - Identify barriers to discharge w/patient and caregiver  - Arrange for needed discharge resources and transportation as appropriate  - Identify discharge learning needs (meds, wound care, etc )  - Arrange for interpretive services to assist at discharge as needed  - Refer to Case Management Department for coordinating discharge planning if the patient needs post-hospital services based on physician/advanced practitioner order or complex needs related to functional status, cognitive ability, or social support system  Outcome: Progressing     Problem: RESPIRATORY - ADULT  Goal: Achieves optimal ventilation and oxygenation  Description  INTERVENTIONS:  - Assess for changes in respiratory status  - Assess for changes in mentation and behavior  - Position to facilitate oxygenation and minimize respiratory effort  - Oxygen administered by appropriate delivery if ordered  - Initiate smoking cessation education as indicated  - Encourage broncho-pulmonary hygiene including cough, deep breathe, Incentive Spirometry  - Assess the need for suctioning and aspirate as needed  - Assess and instruct to report SOB or any respiratory difficulty  - Respiratory Therapy support as indicated  Outcome: Progressing     Problem: METABOLIC, FLUID AND ELECTROLYTES - ADULT  Goal: Electrolytes maintained within normal limits  Description  INTERVENTIONS:  - Monitor labs and assess patient for signs and symptoms of electrolyte imbalances  - Administer electrolyte replacement as ordered  - Monitor response to electrolyte replacements, including repeat lab results as appropriate  - Instruct patient on fluid and nutrition as appropriate  Outcome: Progressing  Goal: Fluid balance maintained  Description  INTERVENTIONS:  - Monitor labs   - Monitor I/O and WT  - Instruct patient on fluid and nutrition as appropriate  - Assess for signs & symptoms of volume excess or deficit  Outcome: Progressing  Goal: Glucose maintained within target range  Description  INTERVENTIONS:  - Monitor Blood Glucose as ordered  - Assess for signs and symptoms of hyperglycemia and hypoglycemia  - Administer ordered medications to maintain glucose within target range  - Assess nutritional intake and initiate nutrition service referral as needed  Outcome: Progressing     Problem: SKIN/TISSUE INTEGRITY - ADULT  Goal: Skin integrity remains intact  Description  INTERVENTIONS  - Identify patients at risk for skin breakdown  - Assess and monitor skin integrity  - Assess and monitor nutrition and hydration status  - Monitor labs (i e  albumin)  - Assess for incontinence   - Turn and reposition patient  - Assist with mobility/ambulation  - Relieve pressure over bony prominences  - Avoid friction and shearing  - Provide appropriate hygiene as needed including keeping skin clean and dry  - Evaluate need for skin moisturizer/barrier cream  - Collaborate with interdisciplinary team (i e  Nutrition, Rehabilitation, etc )   - Patient/family teaching  Outcome: Progressing     Problem: MUSCULOSKELETAL - ADULT  Goal: Maintain or return mobility to safest level of function  Description  INTERVENTIONS:  - Assess patient's ability to carry out ADLs; assess patient's baseline for ADL function and identify physical deficits which impact ability to perform ADLs (bathing, care of mouth/teeth, toileting, grooming, dressing, etc )  - Assess/evaluate cause of self-care deficits   - Assess range of motion  - Assess patient's mobility  - Assess patient's need for assistive devices and provide as appropriate  - Encourage maximum independence but intervene and supervise when necessary  - Involve family in performance of ADLs  - Assess for home care needs following discharge   - Consider OT consult to assist with ADL evaluation and planning for discharge  - Provide patient education as appropriate  Outcome: Progressing

## 2019-12-08 NOTE — RESPIRATORY THERAPY NOTE
RT Protocol Note  Davion Williamson 76 y o  female MRN: [id number]  Unit/Bed#: -01 Encounter: [id number]    Assessment    Principal Problem:    Acute kidney injury [facility])  Active Problems:    Bilateral edema of lower extremity    Chronic diastolic congestive heart failure ([facility] )    Essential hypertension    Morbid obesity ([facility] )    Ambulatory dysfunction    Type 2 diabetes mellitus with stage 3 chronic kidney disease, with long-term current use of insulin ([facility])    Gastroesophageal reflux disease without esophagitis    Rheumatoid arthritis ([facility])    Immunosuppressed status ([facility])    Interstitial lung disease ([facility])    Chronic respiratory failure with hypoxia ([facility])    Hyponatremia    Venous stasis dermatitis of both lower extremities    Chronic prescription opiate use      Home Pulmonary Medications:  Albuterol nebulizer and MDI as needed Patient reports it is not unusual for her to go several days between treatments  Home Devices/Therapy: Home O2(patient is non complient)    Past Medical History:   Diagnosis Date    Asthma     Chest pain on breathing     last assessed 2/5/15    Chronic diastolic CHF (congestive heart failure) ([facility] )     Diabetes mellitus ([place] [address] )     HTN (hypertension)     Hyperlipidemia     Insomnia     Obesity     Preop cardiovascular exam 2018    Pulmonary fibrosis ([place] [address] )      Social History     Socioeconomic History    Marital status: /Civil Union     Spouse name: None    Number of children: None    Years of education: None    Highest education level: None   Occupational History    None   Social Needs    Financial resource strain: None    Food insecurity:     Worry: None     Inability: None    Transportation needs:     Medical: None     Non-medical: None   Tobacco Use    Smoking status: Former Smoker     Packs/day: 1 50     Years: 18 00     Pack years: 27 00     Types: Cigarettes     Last attempt to quit:      Years since quittin 9    Smokeless tobacco: Never Used    Tobacco comment: Quit 20 years ago   Substance and Sexual Activity    Alcohol use: Never     Frequency: Never     Binge frequency: Never    Drug use: No    Sexual activity: Never   Lifestyle    Physical activity:     Days per week: 0 days     Minutes per session: 0 min    Stress: Only a little   Relationships    Social connections:     Talks on phone: None     Gets together: None     Attends Baptism service: None     Active member of club or organization: None     Attends meetings of clubs or organizations: None     Relationship status: None    Intimate partner violence:     Fear of current or ex partner: None     Emotionally abused: None     Physically abused: None     Forced sexual activity: None   Other Topics Concern    None   Social History Narrative    No advance directives on file       Subjective    Subjective Data: Patient' SpO2 was 97% on room air at time of assessment, as patient ate her dinner out of a bowl    Objective    Physical Exam:   Assessment Type: Assess only  General Appearance: Alert, Awake  Respiratory Pattern: Normal  Chest Assessment: Chest expansion symmetrical  Bilateral Breath Sounds: Diminished    Vitals:  Blood pressure 124/63, pulse 91, temperature 98 1 °F (36 7 °C), resp  rate 18, height 5' (1 524 m), weight 84 kg (185 lb 3 oz), SpO2 97 %            Imaging and other studies: Pending        Plan    Respiratory Plan: Mild Distress pathway        Resp Comments: patient reports she is non complient with home O2   "I almost never wear it "

## 2019-12-08 NOTE — ASSESSMENT & PLAN NOTE
History of rheumatoid arthritis on Humira    Start p  O  Prednisone 40 mg p o  Daily, patient appears to have acute flare of polyarthritis, bilateral hand pain, bilateral shoulder pain, with significant joint swelling of bilateral hands      Patient was complaining of increasing pain in bilateral lower extremities

## 2019-12-08 NOTE — ASSESSMENT & PLAN NOTE
Wt Readings from Last 3 Encounters:   12/07/19 84 kg (185 lb 3 oz)   11/14/19 84 7 kg (186 lb 12 8 oz)   10/29/19 86 2 kg (190 lb)       Check echocardiogram, no echocardiogram available for review

## 2019-12-08 NOTE — ASSESSMENT & PLAN NOTE
Lab Results   Component Value Date    HGBA1C 9 6 06/26/2019       No results for input(s): POCGLU in the last 72 hours  Blood Sugar Average: Last 72 hrs:   patient is due for hemoglobin A1c, continue basal insulin, plus sliding scale with meals    Patient likely benefit with mealtime coverage, Lantus 30 units q 12 hours ordered which is similar to outpatient dosing    Patient with poor p o  Intake, regular diet/low sodium diet ordered, if sugars become markedly elevated patient will need her diet changed to diabetic diet    Monitor for postprandial hyperglycemia in the setting of p o   Prednisone

## 2019-12-08 NOTE — ASSESSMENT & PLAN NOTE
Patient with increased erythema right upper extremity his presentation is exactly the same as prior hospital stay in September by chart review      Monitor off of antibiotics    Check blood cultures x2 given chronic immunosuppression

## 2019-12-08 NOTE — ASSESSMENT & PLAN NOTE
Chronic lower extremity edema, hold diuretics, give gentle IV fluid overnight due to acute kidney injury

## 2019-12-08 NOTE — ASSESSMENT & PLAN NOTE
Lab Results   Component Value Date    HGBA1C 7 8 (H) 12/07/2019       Recent Labs     12/08/19  0658 12/08/19  1112 12/08/19  1634   POCGLU 62* 224* 148*       Blood Sugar Average: Last 72 hrs:  (P) 716 [id number]   · Decrease Lantus to 25 units BID due to hypoglycemia noted on admission  · SSI coverage  · QID glucose checks  · Monitor and adjust regimen as needed

## 2019-12-08 NOTE — ASSESSMENT & PLAN NOTE
· History of interstitial lung disease  · Currently at baseline respiratory status   · Continue Breo, Flonase  · Monitor

## 2019-12-08 NOTE — ASSESSMENT & PLAN NOTE
Discontinue ACE-inhibitor  Monitor blood pressure, start IV fluid 50 mL/hour overnight, follow up a m  Labs  Avoid nephrotoxins      Check renal ultrasound, Nephrology consult requested    Patient with underlying chronic kidney disease stage 3 to 4

## 2019-12-08 NOTE — H&P
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    Rheumatoid arthritis ([facility] )  Assessment & Plan  History of rheumatoid arthritis on Humira    Start p  O  Prednisone 40 mg p o  Daily, patient appears to have acute flare of polyarthritis, bilateral hand pain, bilateral shoulder pain, with significant joint swelling of bilateral hands  Patient was complaining of increasing pain in bilateral lower extremities         Venous stasis dermatitis of both lower extremities  Assessment & Plan  Patient with increased erythema right upper extremity his presentation is exactly the same as prior hospital stay in September by chart review  Monitor off of antibiotics    Check blood cultures x2 given chronic immunosuppression    Ambulatory dysfunction  Assessment & Plan  Consult physical therapy occupational therapy    Chronic diastolic congestive heart failure ([facility] )  Assessment & Plan  Wt Readings from Last 3 Encounters:   12/07/19 84 kg (185 lb 3 oz)   11/14/19 84 7 kg (186 lb 12 8 oz)   10/29/19 86 2 kg (190 lb)       Check echocardiogram, no echocardiogram available for review      Type 2 diabetes mellitus with stage 3 chronic kidney disease, with long-term current use of insulin ([facility])  Assessment & Plan  Lab Results   Component Value Date    HGBA1C 9 6 06/26/2019       No results for input(s): POCGLU in the last 72 hours      Blood Sugar Average: Last 72 hrs:   patient is due for hemoglobin A1c, continue basal insulin, plus sliding scale with meals    Patient likely benefit with mealtime coverage, Lantus 30 units q 12 hours ordered which is similar to outpatient dosing    Patient with poor p o  Intake, regular diet/low sodium diet ordered, if sugars become markedly elevated patient will need her diet changed to diabetic diet    Monitor for postprandial hyperglycemia in the setting of p o  Prednisone    Chronic prescription opiate use  Assessment & Plan  Chronic pain, chronic opiate use    Patient has hydromorphone 4 mg tablets ordered as an outpatient    Immunosuppressed status ([facility] )  Assessment & Plan  Patient is on Humira as an outpatient, check blood cultures, monitor for fever, monitor lower extremities for any worsening erythema    Essential hypertension  Assessment & Plan  Continue beta-blocker, hold Demadex and Monopril    Bilateral edema of lower extremity  Assessment & Plan  Chronic lower extremity edema, hold diuretics, give gentle IV fluid overnight due to acute kidney injury  Chronic respiratory failure with hypoxia [facility])  Assessment & Plan  Patient has home oxygen as needed    Hyponatremia  Assessment & Plan  Monitor daily labs    Interstitial lung disease ([place] [address] )  Assessment & Plan  History of interstitial lung disease, continue Breo, respiratory protocol initiated    Gastroesophageal reflux disease without esophagitis  Assessment & Plan  Continue medical management    Morbid obesity ([place] [address] )  Assessment & Plan  BMI of 36 17        VTE Prophylaxis: Enoxaparin (Lovenox)  / sequential compression device and reason for no mechanical VTE prophylaxis Lower extremity edema and pain   Code Status:  Full code  POLST: There is no POLST form on file for this patient (pre-hospital)      Anticipated Length of Stay:  Patient will be admitted on an Inpatient basis with an anticipated length of stay of  greater than 2 midnights  Justification for Hospital Stay:  Evidence of acute kidney injury, polyarthritis underlying rheumatoid arthritis    Pain uncontrolled despite p o  Narcotics  Total Time for Visit, including Counseling / Coordination of Care: 90 minutes  Greater than 50% of this total time spent on direct patient counseling and coordination of care  Chief Complaint:   Increasing leg pain, bilateral shoulder pain, bilateral hand pain    History of Present Illness:    Jenelle Mcnulty is a 76 y o  female who presents with diffuse worsening of chronic pain  Patient reports significant hand swelling, increasing shoulder pain, increasing low back pain increasing bilateral lower leg pain  Patient sustained a fall several days ago, this was mechanical while getting out of bed  Patient reports increased generalized weakness  [place] prescription drug monitoring program was reviewed, patient is on hydromorphone 4 mg tablets 3 times a day as needed for pain  Patient denies any current shortness of breath, no pleuritic chest pain, no fever no chills reported  Family and the patient report decreased p o  Intake  Review of Systems:    Review of Systems   All other systems reviewed and are negative  Past Medical and Surgical History:     Past Medical History:   Diagnosis Date    Asthma     Chest pain on breathing     last assessed 2/5/15    Chronic diastolic CHF (congestive heart failure) (HCC)     Diabetes mellitus ([facility] )     HTN (hypertension)     Hyperlipidemia     Insomnia     Obesity     Preop cardiovascular exam 2/2/2018    Pulmonary fibrosis ([facility] )        Past Surgical History:   Procedure Laterality Date    HAND SURGERY         Meds/Allergies:    Prior to Admission medications    Medication Sig Start Date End Date Taking?  Authorizing Provider   adalimumab (HUMIRA) 40 mg/0 8 mL PSKT Inject 40 mg under the skin every 14 (fourteen) days    Historical Provider, MD   albuterol (2 5 mg/3 mL) 0 083 % nebulizer solution Take 1 vial (2 5 mg total) by nebulization every 6 (six) hours as needed for wheezing or shortness of breath 6/27/19   Claudia Potter PA-C   albuterol (PROAIR HFA) 90 mcg/act inhaler Inhale 2 puffs every 6 (six) hours as needed for wheezing or shortness of breath 6/27/19   Claudia Potter PA-C   amitriptyline (ELAVIL) 25 mg tablet Take 25 mg by mouth daily at bedtime    Historical Provider, MD LIM TB SYRINGE 1CC/27GX1/2" 27G X 1/2" 1 ML MISC by Other route 2 (two) times a day 11/14/19   Brian Cowan MD   baclofen 10 mg tablet Take 10 mg by mouth daily at bedtime as needed 6/6/19   Historical Provider, MD   Blood Glucose Monitoring Suppl [facility]) w/Device KIT by Does not apply route 3 (three) times a day 6/23/16   Historical Provider, MD   DULoxetine (CYMBALTA) 30 mg delayed release capsule Take 1 capsule (30 mg total) by mouth daily 11/14/19 7/7/21  Brian Cowan MD   fluticasone (FLONASE) 50 mcg/act nasal spray 1 spray into each nostril daily 7/26/19   Claudia Potter PA-C   fluticasone-vilanterol (BREO ELLIPTA) 100-25 mcg/inh inhaler Inhale 1 puff daily Rinse mouth after use  6/27/19   lCaudia Potter PA-C   fosinopril (MONOPRIL) 10 mg tablet Take 1 tablet (10 mg total) by mouth daily 7/3/19   Brian Cowan MD   glucose blood (ONE TOUCH ULTRA TEST) test strip 1 each by Other route daily Use as instructed 11/14/19   Brian Cowan MD   insulin glargine (LANTUS SOLOSTAR) 100 units/mL injection pen INJECT 35 UNITS SUBCUTANEOUSLY IN THE MORNING AND 30 UNITS IN THE EVENING 11/14/19   Brian Cowan MD   Insulin Pen Needle (CURT-NEREIDA UF III MINI PEN NEEDLES) 31G X 5 MM MISC Inject under the skin 2 (two) times a day Use as directed 11/14/19   Brian Cowan MD   loratadine (CLARITIN) 10 mg tablet TAKE 1 TABLET BY MOUTH EVERY DAY 7/5/19   Kasie Martin PA-C   metFORMIN (GLUCOPHAGE) 1000 MG tablet Take 1 tablet (1,000 mg total) by mouth 2 (two) times a day 7/3/19   Brian Cowan MD   metoprolol succinate (TOPROL-XL) 25 mg 24 hr tablet Take 1 tablet (25 mg total) by mouth daily 7/3/19   Brian Cowan MD montelukast (SINGULAIR) 10 mg tablet Take 1 tablet (10 mg total) by mouth daily at bedtime 19   Mayda English PA-C   nystatin (MYCOSTATIN) cream Apply 2 g (1 application total) topically 2 (two) times a day To affected area 7/3/19   Karrie Dena MD   NYSTATIN powder APPLY 2-3 TIMES DAILY TO AFFECTED AREA(S)  17   Historical Provider, MD   omeprazole-sodium bicarbonate (ZEGERID)  MG per capsule Take 1 capsule by mouth every morning before breakfast    Historical Provider, MD   oxyCODONE (ROXICODONE) 15 mg immediate release tablet Take 15 mg by mouth every 4 (four) hours as needed for moderate pain    Historical Provider, MD   oxyCODONE-acetaminophen (PERCOCET)  mg per tablet Take 1 tablet by mouth 3 (three) times a day as needed 19   Historical Provider, MD   simvastatin (ZOCOR) 40 mg tablet Take 1 tablet (40 mg total) by mouth daily 7/3/19   Karrie Dean MD   sodium chloride (OCEAN) 0 65 % nasal spray 1 spray into each nostril as needed for congestion or rhinitis 19   Mayda English PA-C   torsemide (DEMADEX) 20 mg tablet Take 2 tablets (40 mg total) by mouth 2 (two) times a day 7/3/19   Karrie Dean MD     I have reviewed home medications with patient personally  Allergies:    Allergies   Allergen Reactions    Gabapentin     Vancomycin        Social History:     Marital Status: /Civil Union     Social History     Substance and Sexual Activity   Alcohol Use Never    Frequency: Never    Binge frequency: Never     Social History     Tobacco Use   Smoking Status Former Smoker    Packs/day: 1 50    Years: 18 00    Pack years: 27 00    Types: Cigarettes    Last attempt to quit:     Years since quittin 9   Smokeless Tobacco Never Used   Tobacco Comment    Quit 20 years ago     Social History     Substance and Sexual Activity   Drug Use No       Family History:    non-contributory    Physical Exam:     Vitals:   Blood Pressure: 124/63 (19 2053)  Pulse: 91 (12/07/19 2053)  Temperature: 98 1 °F (36 7 °C) (12/07/19 2053)  Temp Source: Oral (12/07/19 1657)  Respirations: 18 (12/07/19 2053)  Height: 5' (152 4 cm) (12/07/19 2053)  Weight - Scale: 84 kg (185 lb 3 oz) (12/07/19 2053)  SpO2: 98 % (12/07/19 2053)    Physical Exam   Constitutional: She is oriented to person, place, and time  She appears well-developed and well-nourished  No distress  HENT:   Head: Normocephalic and atraumatic  Right Ear: External ear normal    Left Ear: External ear normal    Eyes: Pupils are equal, round, and reactive to light  Conjunctivae and EOM are normal  Right eye exhibits no discharge  Left eye exhibits no discharge  Cardiovascular: Normal rate  Pulmonary/Chest: Effort normal and breath sounds normal  No stridor  No respiratory distress  Abdominal: Soft  Bowel sounds are normal  She exhibits no distension  There is no tenderness  Musculoskeletal:   Patient bilateral hand swelling, tenderness to palpation in multiple joints including the bilateral shoulders, bilateral hips, bilateral lower extremities  Neurological: She is alert and oriented to person, place, and time  No cranial nerve deficit  Coordination normal    Skin: Skin is warm and dry  No rash noted  She is not diaphoretic  There is erythema  No pallor  Bilateral lower extremity erythema   Nursing note and vitals reviewed  Additional Data:     Lab Results: I have personally reviewed pertinent reports        Results from last 7 days   Lab Units 12/07/19  1737   WBC Thousand/uL 9 92   HEMOGLOBIN g/dL 13 7   HEMATOCRIT % 44 5   PLATELETS Thousands/uL 337   NEUTROS PCT % 74   LYMPHS PCT % 17   MONOS PCT % 8   EOS PCT % 1     Results from last 7 days   Lab Units 12/07/19  1737   SODIUM mmol/L 133*   POTASSIUM mmol/L 3 9   CHLORIDE mmol/L 95*   CO2 mmol/L 27   BUN mg/dL 18   CREATININE mg/dL 1 66*   ANION GAP mmol/L 11   CALCIUM mg/dL 9 4   ALBUMIN g/dL 2 8*   TOTAL BILIRUBIN mg/dL 0 30   ALK PHOS U/L 117*   ALT U/L 15   AST U/L 21   GLUCOSE RANDOM mg/dL 99                       Imaging: I have personally reviewed pertinent reports  VAS lower limb venous duplex study, unilateral/limited    (Results Pending)   XR chest 2 views    (Results Pending)   US kidney and bladder    (Results Pending)     Allscripts / Epic Records Reviewed: Yes     ** Please Note: This note has been constructed using a voice recognition system   **

## 2019-12-08 NOTE — ASSESSMENT & PLAN NOTE
· BP stable on review   · Nephrology following,  · Recommending holding home regimen for now to avoid hypotension   · Pt is on ACEI, Demadex and Toprol XL at home  · Monitor

## 2019-12-08 NOTE — ASSESSMENT & PLAN NOTE
· Patient is on Humira as an outpatient  · Blood cultures are pending   · Pt is afebrile, no leukocytosis, no evidence of any infectious etiology noted on exam   · Continue to monitor closely

## 2019-12-08 NOTE — CONSULTS
NEPHROLOGY CONSULTATION NOTE    Patient: Pamela Law               Sex: female          DOA: 12/7/2019  4:58 PM   Date of Birth: @        Age: @        LOS:  LOS: 1 day     REFERRING PHYSICIAN: Chano Alicia DO      [address] / CONSULTATION: Further management of CLAUDIA    DATE OF CONSULTATION / SERVICE: 12/8/2019    ADMISSION DIAGNOSIS: Acute kidney injury ([facility] )     CHIEF COMPLAINT     I have bilateral lower extremity leg pain    HPI     This is a 28-year-old female who came into the ER yesterday with worsening lower extremity pain  On admission patient was found to have elevated creatinine and Nephrology were consulted for further management of CLAUDIA    Patient's son was present at the time of consultation and history was also obtained from him and all the questions were answered  According to patient patient is getting progressively weak and unable to walk in the past patient was admitted with lower extremity cellulitis and her erythema has improved with the use of NSAIDs  According to patient's son, patient cannot walk at this point and have progressive worsening lower extremity pain  Upon review of old medical records patient's previously known baseline creatinine is between 0 9-1 1  Admission creatinine was 1 6  Patient was started on IV fluid on admission  Currently breathing is stable  Patient also have underlying diabetes type 2 and currently taking insulin and blood glucose levels are acceptable  Currently patient denies nausea, vomiting, headache, dizziness, abdominal pain, constipation or rash      PAST MEDICAL HISTORY     Past Medical History:   Diagnosis Date    Asthma     Chest pain on breathing     last assessed 2/5/15    Chronic diastolic CHF (congestive heart failure) (HCC)     Diabetes mellitus ([facility] )     HTN (hypertension)     Hyperlipidemia     Insomnia     Obesity     Preop cardiovascular exam 2/2/2018    Pulmonary fibrosis (HCC)        PAST SURGICAL HISTORY     Past Surgical History:   Procedure Laterality Date    HAND SURGERY         ALLERGIES     Allergies   Allergen Reactions    Gabapentin     Vancomycin        SOCIAL HISTORY     Social History     Substance and Sexual Activity   Alcohol Use Never    Frequency: Never    Binge frequency: Never     Social History     Substance and Sexual Activity   Drug Use No     Social History     Tobacco Use   Smoking Status Former Smoker    Packs/day: 1 50    Years: 18 00    Pack years: 27 00    Types: Cigarettes    Last attempt to quit:     Years since quittin 9   Smokeless Tobacco Never Used   Tobacco Comment    Quit 20 years ago       FAMILY HISTORY     Family History   Problem Relation Age of Onset    Rheum arthritis Mother     Hypertension Mother        CURRENT MEDICATIONS       Current Facility-Administered Medications:     albuterol inhalation solution 2 5 mg, 2 5 mg, Nebulization, Q6H PRN, Love Sina, DO    amitriptyline (ELAVIL) tablet 25 mg, 25 mg, Oral, HS, Love Sina, DO, 25 mg at 19 2319    baclofen tablet 10 mg, 10 mg, Oral, HS PRN, Love Sina, DO    DULoxetine (CYMBALTA) delayed release capsule 30 mg, 30 mg, Oral, Daily, Love Sina, DO, 30 mg at 19 0839    enoxaparin (LOVENOX) subcutaneous injection 30 mg, 30 mg, Subcutaneous, Daily, Love Sina, DO, 30 mg at 19 0839    fluticasone (FLONASE) 50 mcg/act nasal spray 1 spray, 1 spray, Nasal, Daily, Love Sina, DO, 1 spray at 19 0842    fluticasone-vilanterol (BREO ELLIPTA) 100-25 mcg/inh inhaler 1 puff, 1 puff, Inhalation, Daily, Love Sina, DO, 1 puff at 19 0841    HYDROmorphone (DILAUDID) injection 0 5 mg, 0 5 mg, Intravenous, Q3H PRN, Love Sina, DO, 0 5 mg at 19 0451    HYDROmorphone (DILAUDID) tablet 4 mg, 4 mg, Oral, Q8H PRN, Love Sina, DO, 4 mg at 19 0839    insulin glargine (LANTUS) subcutaneous injection 30 Units 0 3 mL, 30 Units, Subcutaneous, Q12H [address], Coralee Ganser, DO, 30 Units at 12/08/19 0840    insulin lispro (HumaLOG) 100 units/mL subcutaneous injection 1-5 Units, 1-5 Units, Subcutaneous, HS, Coralee Ganser, DO    insulin lispro (HumaLOG) 100 units/mL subcutaneous injection 2-12 Units, 2-12 Units, Subcutaneous, TID AC **AND** Fingerstick Glucose (POCT), , , TID AC, Coralee Ganser, DO    loratadine (CLARITIN) tablet 10 mg, 10 mg, Oral, Daily, Coralee Ganser, DO, 10 mg at 12/08/19 0839    montelukast (SINGULAIR) tablet 10 mg, 10 mg, Oral, HS, Coralee Ganser, DO, 10 mg at 12/07/19 2317    ondansetron (ZOFRAN) injection 4 mg, 4 mg, Intravenous, Q6H PRN, Coralee Ganser, DO    pantoprazole (PROTONIX) EC tablet 40 mg, 40 mg, Oral, Early Morning, Marianna Zimmerman MD, 40 mg at 12/08/19 0646    predniSONE tablet 40 mg, 40 mg, Oral, Daily, Coralee Ganser, DO, 40 mg at 12/08/19 2960    sodium chloride 0 9 % infusion, 50 mL/hr, Intravenous, Continuous, Coralee Ganser, DO, Last Rate: 50 mL/hr at 12/07/19 2323, 50 mL/hr at 12/07/19 2323    REVIEW OF SYSTEMS     Complete 10 points of review of systems were obtained and discussed in length with patient today  Complete 10 points of review of systems were negative/unremarkable except mentioned in the HPI section  OBJECTIVE     Current Weight: Weight - Scale: 84 kg (185 lb 3 oz)  Vitals:    12/08/19 0731   BP: 126/63   Pulse: 95   Resp: 18   Temp: 97 7 °F (36 5 °C)   SpO2: 98%     Body mass index is 36 17 kg/m²  Intake/Output Summary (Last 24 hours) at 12/8/2019 1050  Last data filed at 12/8/2019 0845  Gross per 24 hour   Intake 980 ml   Output 700 ml   Net 280 ml       PHYSICAL EXAMINATION     Physical Exam   Constitutional: No distress  HENT:   Head: Normocephalic and atraumatic  Eyes: No scleral icterus  Neck: Neck supple  No JVD present  Cardiovascular: Normal rate  Pulmonary/Chest: No accessory muscle usage  No respiratory distress  She has no wheezes  Abdominal: Soft   She exhibits no distension  Musculoskeletal:        Right hand: She exhibits no laceration  Left hand: She exhibits no laceration  Bilateral trace pedal edema   Neurological: She is alert  Skin: Skin is warm  No cyanosis  Psychiatric: She is not combative  She expresses no suicidal ideation  LAB RESULTS        Results from last 7 days   Lab Units 12/08/19  0648 12/07/19  1737   WBC Thousand/uL 6 89 9 92   HEMOGLOBIN g/dL 12 6 13 7   HEMATOCRIT % 41 3 44 5   PLATELETS Thousands/uL 296 337   SODIUM mmol/L 138 133*   POTASSIUM mmol/L 4 4 3 9   CHLORIDE mmol/L 103 95*   CO2 mmol/L 28 27   BUN mg/dL 13 18   CREATININE mg/dL 0 92 1 66*   EGFR ml/min/1 73sq m 62 30   CALCIUM mg/dL 8 9 9 4   MAGNESIUM mg/dL 2 1  --    PHOSPHORUS mg/dL 2 7  --        I have personally reviewed the old medical records and patient's previously known baseline creatinine level is ~ 0 9-1 1    RADIOLOGY RESULTS     VAS lower limb venous duplex study, unilateral/limited    (Results Pending)   XR chest 2 views    (Results Pending)   US kidney and bladder    (Results Pending)     12 lead EKG done on 12/7/2019 showed sinus rhythm with ventricular rate 99/min    PLAN / RECOMMENDATIONS      1  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  Present on admission, suspected due to intravascular volume depletion  Upon review of old medical records, patient's previously known baseline creatinine is between 0 9-1 1  Patient was admitted with creatinine of 1 66  Torsemide and Monopril were stopped on admission  Patient was also started on IV fluid  Overnight patient seems nonoliguric and renal function has also improved to creatinine of 0 96 which is now back to baseline  Clinically patient is not in volume overload state  Pending renal ultrasound  Would plan to hold off diuretics for time being and will continue IV fluid and recheck renal function with AM labs  2  Hypertension in chronic kidney disease  Currently blood pressure is under well control  Patient was taking Monopril and metoprolol at home along with diuretics which is currently on hold  Monitor hypertension without any antihypertensive medications today  Thank you for the consultation to participate in patient's care  I have personally discussed my overall above mentioned plan with the current [facility]  Cassandra Montez MD  Nephrology  12/8/2019        Portions of the record may have been created with voice recognition software  Occasional wrong word or "sound a like" substitutions may have occurred due to the inherent limitations of voice recognition software  Read the chart carefully and recognize, using context, where substitutions have occurred

## 2019-12-09 ENCOUNTER — APPOINTMENT (INPATIENT)
Dept: NON INVASIVE DIAGNOSTICS | Facility: HOSPITAL | Age: 74
DRG: 683 | End: 2019-12-09
Payer: MEDICARE

## 2019-12-09 LAB
ANION GAP SERPL CALCULATED.3IONS-SCNC: 6 MMOL/L (ref 4–13)
BUN SERPL-MCNC: 9 MG/DL (ref 5–25)
CALCIUM SERPL-MCNC: 8.4 MG/DL (ref 8.3–10.1)
CHLORIDE SERPL-SCNC: 105 MMOL/L (ref 100–108)
CO2 SERPL-SCNC: 27 MMOL/L (ref 21–32)
CREAT SERPL-MCNC: 0.79 MG/DL (ref 0.6–1.3)
ERYTHROCYTE [DISTWIDTH] IN BLOOD BY AUTOMATED COUNT: 14.3 % (ref 11.6–15.1)
GFR SERPL CREATININE-BSD FRML MDRD: 74 ML/MIN/1.73SQ M
GLUCOSE SERPL-MCNC: 114 MG/DL (ref 65–140)
GLUCOSE SERPL-MCNC: 144 MG/DL (ref 65–140)
GLUCOSE SERPL-MCNC: 228 MG/DL (ref 65–140)
GLUCOSE SERPL-MCNC: 293 MG/DL (ref 65–140)
GLUCOSE SERPL-MCNC: 97 MG/DL (ref 65–140)
HCT VFR BLD AUTO: 39.2 % (ref 34.8–46.1)
HGB BLD-MCNC: 11.9 G/DL (ref 11.5–15.4)
MCH RBC QN AUTO: 28.4 PG (ref 26.8–34.3)
MCHC RBC AUTO-ENTMCNC: 30.4 G/DL (ref 31.4–37.4)
MCV RBC AUTO: 94 FL (ref 82–98)
PLATELET # BLD AUTO: 313 THOUSANDS/UL (ref 149–390)
PMV BLD AUTO: 10.4 FL (ref 8.9–12.7)
POTASSIUM SERPL-SCNC: 4 MMOL/L (ref 3.5–5.3)
PROCALCITONIN SERPL-MCNC: <0.05 NG/ML
RBC # BLD AUTO: 4.19 MILLION/UL (ref 3.81–5.12)
SODIUM SERPL-SCNC: 138 MMOL/L (ref 136–145)
WBC # BLD AUTO: 10.88 THOUSAND/UL (ref 4.31–10.16)

## 2019-12-09 PROCEDURE — 97167 OT EVAL HIGH COMPLEX 60 MIN: CPT

## 2019-12-09 PROCEDURE — 85027 COMPLETE CBC AUTOMATED: CPT | Performed by: PHYSICIAN ASSISTANT

## 2019-12-09 PROCEDURE — 94760 N-INVAS EAR/PLS OXIMETRY 1: CPT

## 2019-12-09 PROCEDURE — 99233 SBSQ HOSP IP/OBS HIGH 50: CPT | Performed by: INTERNAL MEDICINE

## 2019-12-09 PROCEDURE — 94664 DEMO&/EVAL PT USE INHALER: CPT

## 2019-12-09 PROCEDURE — 82948 REAGENT STRIP/BLOOD GLUCOSE: CPT

## 2019-12-09 PROCEDURE — 93306 TTE W/DOPPLER COMPLETE: CPT

## 2019-12-09 PROCEDURE — 93306 TTE W/DOPPLER COMPLETE: CPT | Performed by: INTERNAL MEDICINE

## 2019-12-09 PROCEDURE — G8987 SELF CARE CURRENT STATUS: HCPCS

## 2019-12-09 PROCEDURE — 99232 SBSQ HOSP IP/OBS MODERATE 35: CPT | Performed by: PHYSICIAN ASSISTANT

## 2019-12-09 PROCEDURE — 80048 BASIC METABOLIC PNL TOTAL CA: CPT | Performed by: INTERNAL MEDICINE

## 2019-12-09 PROCEDURE — G8988 SELF CARE GOAL STATUS: HCPCS

## 2019-12-09 RX ORDER — ACETAMINOPHEN 325 MG/1
650 TABLET ORAL EVERY 6 HOURS PRN
Status: DISCONTINUED | OUTPATIENT
Start: 2019-12-09 | End: 2019-12-10 | Stop reason: HOSPADM

## 2019-12-09 RX ADMIN — INSULIN LISPRO 2 UNITS: 100 INJECTION, SOLUTION INTRAVENOUS; SUBCUTANEOUS at 21:44

## 2019-12-09 RX ADMIN — AMITRIPTYLINE HYDROCHLORIDE 25 MG: 25 TABLET, FILM COATED ORAL at 21:44

## 2019-12-09 RX ADMIN — FLUTICASONE PROPIONATE 1 SPRAY: 50 SPRAY, METERED NASAL at 09:04

## 2019-12-09 RX ADMIN — DULOXETINE HYDROCHLORIDE 30 MG: 30 CAPSULE, DELAYED RELEASE ORAL at 09:07

## 2019-12-09 RX ADMIN — HYDROMORPHONE HYDROCHLORIDE 0.5 MG: 1 INJECTION, SOLUTION INTRAMUSCULAR; INTRAVENOUS; SUBCUTANEOUS at 06:07

## 2019-12-09 RX ADMIN — ENOXAPARIN SODIUM 30 MG: 30 INJECTION SUBCUTANEOUS at 09:05

## 2019-12-09 RX ADMIN — PANTOPRAZOLE SODIUM 40 MG: 40 TABLET, DELAYED RELEASE ORAL at 06:08

## 2019-12-09 RX ADMIN — HYDROMORPHONE HYDROCHLORIDE 4 MG: 2 TABLET ORAL at 09:08

## 2019-12-09 RX ADMIN — INSULIN GLARGINE 25 UNITS: 100 INJECTION, SOLUTION SUBCUTANEOUS at 09:05

## 2019-12-09 RX ADMIN — HYDROMORPHONE HYDROCHLORIDE 4 MG: 2 TABLET ORAL at 01:09

## 2019-12-09 RX ADMIN — HYDROMORPHONE HYDROCHLORIDE 4 MG: 2 TABLET ORAL at 17:39

## 2019-12-09 RX ADMIN — INSULIN GLARGINE 25 UNITS: 100 INJECTION, SOLUTION SUBCUTANEOUS at 21:44

## 2019-12-09 RX ADMIN — HYDROMORPHONE HYDROCHLORIDE 0.5 MG: 1 INJECTION, SOLUTION INTRAMUSCULAR; INTRAVENOUS; SUBCUTANEOUS at 11:57

## 2019-12-09 RX ADMIN — LORATADINE 10 MG: 10 TABLET ORAL at 09:07

## 2019-12-09 RX ADMIN — MONTELUKAST 10 MG: 10 TABLET, FILM COATED ORAL at 21:44

## 2019-12-09 RX ADMIN — FLUTICASONE FUROATE AND VILANTEROL TRIFENATATE 1 PUFF: 100; 25 POWDER RESPIRATORY (INHALATION) at 09:02

## 2019-12-09 RX ADMIN — PREDNISONE 40 MG: 20 TABLET ORAL at 09:07

## 2019-12-09 RX ADMIN — INSULIN LISPRO 6 UNITS: 100 INJECTION, SOLUTION INTRAVENOUS; SUBCUTANEOUS at 17:40

## 2019-12-09 NOTE — SOCIAL WORK
CM name and role reviewed  Discharge Checklist reviewed and CM will continue to monitor for progress toward discharge goals in nursing and provider rounds  CM met with pt at bedside  Pt alert  Pt reported that she lives in an one level apartment with her son  She stated that she uses a cane at home  Pt reported that she is on 2LO2 PRN supplied by [employer]  She said that she has a walker as well  She reported that she has help from her son with ADLs  She stated that she has hx of VNA with [facility] and would like to utilize services with [facility] again  CM sent referral to [facility] and [facility] has been accepted  AVS updated  CM informed her that [facility] is recommended  Pt stated that she does not want to go to [facility]  Pt uses [facility] in [address]  Pt uses [facility] in [address] for pain meds  Pt reported that she has not smoked for 26 yrs  Pt stated that her son, Herminia Zabala is POA  She said that her son provides transportation  CM reviewed discharge planning process including the following: identifying help at home, patient preference for discharge planning needs, pharmacy preference, and availability of treatment team to discuss questions or concerns patient and/or family may have regarding understanding medications and recognizing signs and symptoms once discharged  CM also encouraged patient to follow up with all recommended appointments after discharge  Patient advised of importance for patient and family to participate in managing patients medical well being

## 2019-12-09 NOTE — PLAN OF CARE
Problem: OCCUPATIONAL THERAPY ADULT  Goal: Performs self-care activities at highest level of function for planned discharge setting  See evaluation for individualized goals  Description  Treatment Interventions: ADL retraining, Functional transfer training, UE strengthening/ROM, Endurance training, Patient/family training, Equipment evaluation/education, Compensatory technique education, Continued evaluation, Energy conservation, Activityengagement          See flowsheet documentation for full assessment, interventions and recommendations  Note:   Limitation: Decreased ADL status, Decreased Safe judgement during ADL, Decreased endurance, Decreased self-care trans, Decreased high-level ADLs  Prognosis: Good  Assessment: Patient is a 76 y o  female seen for OT evaluation s/p admit to [address] [facility] on 12/7/2019 w/Acute kidney injury ([facility] [address] )  Commorbidities affecting patient's functional performance at time of assessment include: venous stasis dermatitis of both lower extremities, CLAUDIA, hypertensive kidney disease with stage 2 CKD, ambulatory dysfunction, morbid obesity, chronic respiratory failure with hypoxia  Patient presented to ED  with diffuse worsening of chronic pain  Patient reports significant hand swelling, increasing shoulder pain, increasing low back pain increasing bilateral lower leg pain  Orders placed for OT evaluation and treatment  Performed at least two patient identifiers during session including name and wristband  Prior to admission, Patient reporting indepependnet with basic self care, ambulates with a SPC  Patient lives with spouse and Son, in a one story house with 5 MANUEL  Personal factors affecting patient at time of initial evaluation include: steps to enter, difficulty performing ADLs and difficulty performing IADLs   Upon evaluation, patient requires minimal  assist for UB ADLs, moderate assist for LB ADLs, transfers and functional ambulation in room and bathroom with minimal  assist, with the use of Rolling Walker  Occupational performance is affected by the following deficits: flat affect, degenerative arthritic joint changes, dynamic sit/ stand balance deficit with poor standing tolerance time for self care and functional mobility, decreased activity tolerance, decreased safety awareness, increased pain and postural control and postural alignment deficit, requiring external assistance to complete transitional movements  Therapist completed  extensive additional review of medical records and additional review of physical, cognitive or psychosocial history, clinical examination identifying 5 or more performance deficits, clinical decision making of a high complexity , consistent with a high complexity level evaluation  Patient to benefit from continued Occupational Therapy treatment while in the hospital to address deficits as defined above and maximize level of functional independence with ADLs and functional mobility        OT Discharge Recommendation: Short Term Rehab

## 2019-12-09 NOTE — ASSESSMENT & PLAN NOTE
· Pt with bilateral chronic lower extremity edema and erythema   · Likely in setting of venous stasis, rheumatoid arthritis   · Family concerned for possible cellulitis, discussed extensively with the patient and family that patient's erythema is present bilaterally, she is afebrile without leukocytosis, low suspicion for cellulitis at this time  Likely secondary to chronic illnesses  Procalcitonin negative  Mild increase in WBC today likely in setting of prednisone, monitor  No need for abx at this time  · Pt has also had multiple duplex ultrasounds of the lower extremities recently that have been negative for DVT, last duplex obtained on 9/2019  No need for additional image to be obtained at this time

## 2019-12-09 NOTE — ASSESSMENT & PLAN NOTE
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

## 2019-12-09 NOTE — OCCUPATIONAL THERAPY NOTE
Occupational Therapy Evaluation        Patient Name: Zita Lara  ZYPTP'M Date: 12/9/2019 12/09/19 0845   Note Type   Note type Eval only   Restrictions/Precautions   Weight Bearing Precautions Per Order No   Braces or Orthoses Other (Comment)  (compression stockings; back support prn for comfort)   Other Precautions Fall Risk  (O2 via NC)   Pain Assessment   Pain Assessment 0-10   Pain Score   (numeric not given)   Pain Type Chronic pain   Pain Location Leg   Pain Orientation Bilateral   Hospital Pain Intervention(s) Medication (See MAR); Repositioned; Ambulation/increased activity  (RN Reena Scott made aware of pain level)   Home Living   Type of [address] [place] [address] One level;Performs ADLs on one level; Able to live on main level with bedroom/bathroom;Stairs to enter with rails  (5 MANUEL)   Bathroom Shower/Tub Walk-in shower   Bathroom Toilet Standard   Bathroom Equipment Shower chair   [address]; Other (Comment)  (Home O2 2-3 L )   Prior Function   Level of [place] Independent with ADLs and functional mobility; Needs assistance with IADLs  (ambulates c SPC)   Lives With Spouse;Son  (son works during the day)   [place]   IADLs Needs assistance   Falls in the last 6 months 1 to 4  (fell trying to get OOB "slid out")   Vocational Retired   Lifestyle   Autonomy Patient reporting indepependnet with basic self care, ambulates with a SPC  Patient lives with spouse and Son, in a one story house with 5 MANUEL      Reciprocal Relationships Supportive family   Psychosocial   Psychosocial (WDL) WDL   ADL   Eating Assistance 5  Supervision/Setup   Grooming Assistance 5  Supervision/Setup   UB Bathing Assistance 4  Minimal Assistance   LB Bathing Assistance 3  Moderate Assistance   UB Dressing Assistance 4  Minimal [address] 3  Moderate [address]  4  Minimal Assistance Functional Assistance 4  Minimal Assistance   Bed Mobility   Rolling R 4  Minimal assistance   Additional items Assist x 1;HOB elevated; Bedrails; Increased time required;Verbal cues;LE management   Supine to Sit 4  Minimal assistance   Additional items Assist x 1;HOB elevated; Bedrails; Increased time required;Verbal cues;LE management   Sit to Supine 4  Minimal assistance   Additional items Assist x 1; Increased time required;Verbal cues;LE management   Transfers   Sit to Stand 4  Minimal assistance   Additional items Assist x 1; Increased time required;Verbal cues   Toilet transfer 4  Minimal assistance   Additional items Assist x 1; Increased time required;Verbal cues;Standard toilet   Functional Mobility   Functional Mobility 4  Minimal assistance   Additional items Rolling walker   Balance   Static Sitting Fair +   Dynamic Sitting Fair   Static Standing Fair -   Dynamic Standing Fair -   Ambulatory Poor +   Activity Tolerance   Activity Tolerance Patient limited by fatigue;Patient limited by pain   Nurse Made Aware RN [facility] confirmed patient appropriate for therapy; made aware of session outcomes; post-session: patient returned BTB, all needs within reach and bed alarm engaged   RUE Assessment   RUE Assessment WFL  (based on functional assessment)   LUE Assessment   LUE Assessment WFL  (based on functional assessment)   Hand Function   Gross Motor Coordination Functional   Fine Motor Coordination Functional   Sensation   Light Touch No apparent deficits  (BUEs)   Vision-Basic Assessment   Current Vision Does not wear glasses   Patient Visual Report Other (Comment)  (No significant changes reported)   Perception   Inattention/Neglect Appears intact   Cognition   Overall Cognitive Status WFL   Arousal/Participation Alert; Responsive   Attention Within functional limits   Orientation Level Oriented X4   Memory Within functional limits   Following Commands Follows all commands and directions without difficulty Assessment   Limitation Decreased ADL status; Decreased Safe judgement during ADL;Decreased endurance;Decreased self-care trans;Decreased high-level ADLs   Prognosis Good   Assessment Patient is a 76 y o  female seen for OT evaluation s/p admit to [address] [facility] on 12/7/2019 w/Acute kidney injury ([facility] [address] )  Commorbidities affecting patient's functional performance at time of assessment include: venous stasis dermatitis of both lower extremities, CLAUDIA, hypertensive kidney disease with stage 2 CKD, ambulatory dysfunction, morbid obesity, chronic respiratory failure with hypoxia  Patient presented to ED  with diffuse worsening of chronic pain  Patient reports significant hand swelling, increasing shoulder pain, increasing low back pain increasing bilateral lower leg pain  Orders placed for OT evaluation and treatment  Performed at least two patient identifiers during session including name and wristband  Prior to admission, Patient reporting indepependnet with basic self care, ambulates with a SPC  Patient lives with spouse and Son, in a one story house with 5 MANUEL  Personal factors affecting patient at time of initial evaluation include: steps to enter, difficulty performing ADLs and difficulty performing IADLs  Upon evaluation, patient requires minimal  assist for UB ADLs, moderate assist for LB ADLs, transfers and functional ambulation in room and bathroom with minimal  assist, with the use of Rolling Walker  Occupational performance is affected by the following deficits: flat affect, degenerative arthritic joint changes, dynamic sit/ stand balance deficit with poor standing tolerance time for self care and functional mobility, decreased activity tolerance, decreased safety awareness, increased pain and postural control and postural alignment deficit, requiring external assistance to complete transitional movements   Therapist completed  extensive additional review of medical records and additional review of physical, cognitive or psychosocial history, clinical examination identifying 5 or more performance deficits, clinical decision making of a high complexity , consistent with a high complexity level evaluation  Patient to benefit from continued Occupational Therapy treatment while in the hospital to address deficits as defined above and maximize level of functional independence with ADLs and functional mobility  Goals   Patient Goals none reported, family reported "I want her to have therapy at home"   Plan   Treatment Interventions ADL retraining;Functional transfer training;UE strengthening/ROM; Endurance training;Patient/family training;Equipment evaluation/education; Compensatory technique education;Continued evaluation; Energy conservation; Activityengagement   Goal Expiration Date 12/23/19   OT Frequency 3-5x/wk   Recommendation   OT Discharge Recommendation Short Term Rehab   Barthel Index   Feeding 5   Bathing 0   Grooming Score 0   Dressing Score 5   Bladder Score 5   Bowels Score 10   Toilet Use Score 5   Transfers (Bed/Chair) Score 10   Mobility (Level Surface) Score 0   Stairs Score 0   Barthel Index Score 40   Modified [place] Scale   Modified Bledsoe Scale 4          Occupational Performance areas to address include: bathing/ shower, dressing, toilet hygiene, transfer to all surfaces, functional mobility, community mobility, emergency response, health maintenance, IADLS: Household maintenance, IADLs: safety procedures, IADLs: meal prep/ clean up, Leisure Participation and Social participation  From OT standpoint, recommendation at time of d/c would be Short Term Rehab         1 - Patient will verbalize and demonstrate use of energy conservation/ deep breathing technique and work simplification skills during functional activity with no verbal cues  2 - Patient will verbalize and demonstrate good body mechanics and joint protection techniques during  ADLs/ IADLs with no verbal cues      3 - Patient will increase OOB/ sitting tolerance to 2-4 hours per day for increased participation in self care and leisure tasks with no s/s of exertion  4 - Patient will increase standing tolerance time to 5  minutes with unilateral UE support to complete sink level ADLs@ mod I level  5 - Patient will increase sitting tolerance at edge of bed to 20 minutes to complete UB ADLs @ set up assist level  6 - Patient will transfer bed to Chair / toilet at Set up assist level with AD as indicated  7 - Patient will complete UB ADLs with set up assist      8 - Patient will complete LB ADLs with min assist with the use of adaptive equipment       9 - Patient will complete toileting hygiene with set up assist/ supervision for thoroughness    10 - Patient/ Family  will demonstrate competency with UE Home Exercise Program

## 2019-12-09 NOTE — ASSESSMENT & PLAN NOTE
Lab Results   Component Value Date    HGBA1C 7 8 (H) 12/07/2019       Recent Labs     12/08/19  1634 12/08/19  2107 12/09/19  0640 12/09/19  1121   POCGLU 148* 219* 97 144*       Blood Sugar Average: Last 72 hrs:  (P) 149   · Decrease Lantus to 25 units BID due to hypoglycemia noted on admission, continue  · SSI coverage  · QID glucose checks  · Monitor and adjust regimen as needed

## 2019-12-09 NOTE — ASSESSMENT & PLAN NOTE
· Chronic pain with chronic opioid use  · Pt is on hydromorphone 4 mg tabs outpatient, continue PRN regimen here  · D/c IV narcotic medication now  · It was brought up today by patient's family members that the reason pt was brought in to the hospital is because she ran out of her pain medication at home  Pt follows with Dr Synthia Denver with pain management outpatient  Last prescription was given on 11/06  Called Dr Sarabjit Casas office and recommending that Dr Synthia Denver could likely write a script for the patient tomorrow as he was out sick today  Advised family to confirm this as well as day team tomorrow to ensure patient will have her medication when discharged  · Pt with history of RA, on Humira injections outpatient, started on PO prednisone 40 mg daily here, continue   Pt also reports that she ran out of prednisone at home, will need to follow up with her rheumatologist as well  · Monitor

## 2019-12-09 NOTE — ASSESSMENT & PLAN NOTE
· Patient is on Humira as an outpatient  · Blood cultures negative x 24 hours  · Pt is afebrile, no evidence of any infectious etiology noted on exam   · Continue to monitor closely

## 2019-12-09 NOTE — ASSESSMENT & PLAN NOTE
Wt Readings from Last 3 Encounters:   12/07/19 84 kg (185 lb 3 oz)   11/14/19 84 7 kg (186 lb 12 8 oz)   10/29/19 86 2 kg (190 lb)     · Pt appears euvolemic on exam today   · Demadex is on hold currently secondary to CLAUDIA   · Nephrology following  · ECHO is pending

## 2019-12-09 NOTE — PLAN OF CARE
Problem: Potential for Falls  Goal: Patient will remain free of falls  Description  INTERVENTIONS:  - Assess patient frequently for physical needs  -  Identify cognitive and physical deficits and behaviors that affect risk of falls    -  [place] fall precautions as indicated by assessment   - Educate patient/family on patient safety including physical limitations  - Instruct patient to call for assistance with activity based on assessment  - Modify environment to reduce risk of injury  - Consider OT/PT consult to assist with strengthening/mobility  Outcome: Progressing     Problem: Prexisting or High Potential for Compromised Skin Integrity  Goal: Skin integrity is maintained or improved  Description  INTERVENTIONS:  - Identify patients at risk for skin breakdown  - Assess and monitor skin integrity  - Assess and monitor nutrition and hydration status  - Monitor labs   - Assess for incontinence   - Turn and reposition patient  - Assist with mobility/ambulation  - Relieve pressure over bony prominences  - Avoid friction and shearing  - Provide appropriate hygiene as needed including keeping skin clean and dry  - Evaluate need for skin moisturizer/barrier cream  - Collaborate with interdisciplinary team   - Patient/family teaching  - Consider wound care consult   Outcome: Progressing     Problem: SAFETY ADULT  Goal: Maintain or return to baseline ADL function  Description  INTERVENTIONS:  -  Assess patient's ability to carry out ADLs; assess patient's baseline for ADL function and identify physical deficits which impact ability to perform ADLs (bathing, care of mouth/teeth, toileting, grooming, dressing, etc )  - Assess/evaluate cause of self-care deficits   - Assess range of motion  - Assess patient's mobility; develop plan if impaired  - Assess patient's need for assistive devices and provide as appropriate  - Encourage maximum independence but intervene and supervise when necessary  - Involve family in performance of ADLs  - Assess for home care needs following discharge   - Consider OT consult to assist with ADL evaluation and planning for discharge  - Provide patient education as appropriate  Outcome: Progressing  Goal: Maintain or return mobility status to optimal level  Description  INTERVENTIONS:  - Assess patient's baseline mobility status (ambulation, transfers, stairs, etc )    - Identify cognitive and physical deficits and behaviors that affect mobility  - Identify mobility aids required to assist with transfers and/or ambulation (gait belt, sit-to-stand, lift, walker, cane, etc )  - [place] fall precautions as indicated by assessment  - Record patient progress and toleration of activity level on Mobility SBAR; progress patient to next Phase/Stage  - Instruct patient to call for assistance with activity based on assessment  - Consider rehabilitation consult to assist with strengthening/weightbearing, etc   Outcome: Progressing     Problem: DISCHARGE PLANNING  Goal: Discharge to home or other facility with appropriate resources  Description  INTERVENTIONS:  - Identify barriers to discharge w/patient and caregiver  - Arrange for needed discharge resources and transportation as appropriate  - Identify discharge learning needs (meds, wound care, etc )  - Arrange for interpretive services to assist at discharge as needed  - Refer to Case Management Department for coordinating discharge planning if the patient needs post-hospital services based on physician/advanced practitioner order or complex needs related to functional status, cognitive ability, or social support system  Outcome: Progressing     Problem: RESPIRATORY - ADULT  Goal: Achieves optimal ventilation and oxygenation  Description  INTERVENTIONS:  - Assess for changes in respiratory status  - Assess for changes in mentation and behavior  - Position to facilitate oxygenation and minimize respiratory effort  - Oxygen administered by appropriate delivery if ordered  - Initiate smoking cessation education as indicated  - Encourage broncho-pulmonary hygiene including cough, deep breathe, Incentive Spirometry  - Assess the need for suctioning and aspirate as needed  - Assess and instruct to report SOB or any respiratory difficulty  - Respiratory Therapy support as indicated  Outcome: Progressing     Problem: METABOLIC, FLUID AND ELECTROLYTES - ADULT  Goal: Electrolytes maintained within normal limits  Description  INTERVENTIONS:  - Monitor labs and assess patient for signs and symptoms of electrolyte imbalances  - Administer electrolyte replacement as ordered  - Monitor response to electrolyte replacements, including repeat lab results as appropriate  - Instruct patient on fluid and nutrition as appropriate  Outcome: Progressing  Goal: Fluid balance maintained  Description  INTERVENTIONS:  - Monitor labs   - Monitor I/O and WT  - Instruct patient on fluid and nutrition as appropriate  - Assess for signs & symptoms of volume excess or deficit  Outcome: Progressing  Goal: Glucose maintained within target range  Description  INTERVENTIONS:  - Monitor Blood Glucose as ordered  - Assess for signs and symptoms of hyperglycemia and hypoglycemia  - Administer ordered medications to maintain glucose within target range  - Assess nutritional intake and initiate nutrition service referral as needed  Outcome: Progressing     Problem: SKIN/TISSUE INTEGRITY - ADULT  Goal: Skin integrity remains intact  Description  INTERVENTIONS  - Identify patients at risk for skin breakdown  - Assess and monitor skin integrity  - Assess and monitor nutrition and hydration status  - Monitor labs (i e  albumin)  - Assess for incontinence   - Turn and reposition patient  - Assist with mobility/ambulation  - Relieve pressure over bony prominences  - Avoid friction and shearing  - Provide appropriate hygiene as needed including keeping skin clean and dry  - Evaluate need for skin moisturizer/barrier cream  - Collaborate with interdisciplinary team (i e  Nutrition, Rehabilitation, etc )   - Patient/family teaching  Outcome: Progressing     Problem: MUSCULOSKELETAL - ADULT  Goal: Maintain or return mobility to safest level of function  Description  INTERVENTIONS:  - Assess patient's ability to carry out ADLs; assess patient's baseline for ADL function and identify physical deficits which impact ability to perform ADLs (bathing, care of mouth/teeth, toileting, grooming, dressing, etc )  - Assess/evaluate cause of self-care deficits   - Assess range of motion  - Assess patient's mobility  - Assess patient's need for assistive devices and provide as appropriate  - Encourage maximum independence but intervene and supervise when necessary  - Involve family in performance of ADLs  - Assess for home care needs following discharge   - Consider OT consult to assist with ADL evaluation and planning for discharge  - Provide patient education as appropriate  Outcome: Progressing

## 2019-12-09 NOTE — ASSESSMENT & PLAN NOTE
· BP stable on review, soft   · Nephrology following,  · Recommending holding home regimen for now to avoid hypotension   · Pt is on ACEI, Demadex and Toprol XL at home, continue to hold at this time  · Monitor

## 2019-12-09 NOTE — ASSESSMENT & PLAN NOTE
· Pt with worsening generalized weakness  · Was evaluated by PT/OT, recommending STR, however patient and family declining   · Requesting home with VNA services  · CM following

## 2019-12-09 NOTE — PROGRESS NOTES
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  · Baseline cr  Around 0 9-1 1  · Likely pre-renal in setting of hypovolemia as this is improving with fluids   · Nephrology following,  · Cr  1 66 on admission   · Cr  Today 0 79, back to baseline  · Renal ultrasound negative for hydronephrosis   · ECHO is pending, based on results consider resuming demadex, monitor volume status   · D/c IVF hydration   · Avoid nephrotoxic agents  · Demadex and ACEI on hold due to low pressures     Bilateral edema of lower extremity  Assessment & Plan  · Pt with bilateral chronic lower extremity edema and erythema   · Likely in setting of venous stasis, rheumatoid arthritis   · Family concerned for possible cellulitis, discussed extensively with the patient and family that patient's erythema is present bilaterally, she is afebrile without leukocytosis, low suspicion for cellulitis at this time  Likely secondary to chronic illnesses  Procalcitonin negative  Mild increase in WBC today likely in setting of prednisone, monitor  No need for abx at this time  · Pt has also had multiple duplex ultrasounds of the lower extremities recently that have been negative for DVT, last duplex obtained on 9/2019  No need for additional image to be obtained at this time  Chronic prescription opiate use  Assessment & Plan  · Chronic pain with chronic opioid use  · Pt is on hydromorphone 4 mg tabs outpatient, continue PRN regimen here  · D/c IV narcotic medication now  · It was brought up today by patient's family members that the reason pt was brought in to the hospital is because she ran out of her pain medication at home   Pt follows with Dr Gab Hancock with pain management outpatient  Last prescription was given on 11/06  Called Dr Carlyle Shore office and recommending that Dr Aileen Loomis could likely write a script for the patient tomorrow as he was out sick today  Advised family to confirm this as well as day team tomorrow to ensure patient will have her medication when discharged  · Pt with history of RA, on Humira injections outpatient, started on PO prednisone 40 mg daily here, continue   Pt also reports that she ran out of prednisone at home, will need to follow up with her rheumatologist as well  · Monitor    Chronic respiratory failure with hypoxia ([address] )  Assessment & Plan  · Pt on supplemental home O2 outpatient  · Saturating well on baseline 2 L NC  · Monitor    Interstitial lung disease ([facility] [address] )  Assessment & Plan  · History of interstitial lung disease  · Currently at baseline respiratory status   · Continue Breo, Flonase  · Monitor     Immunosuppressed status ([facility] [address] )  Assessment & Plan  · Patient is on Humira as an outpatient  · Blood cultures negative x 24 hours  · Pt is afebrile, no evidence of any infectious etiology noted on exam   · Continue to monitor closely     Gastroesophageal reflux disease without esophagitis  Assessment & Plan  · Continue PPI    Type 2 diabetes mellitus with stage 3 chronic kidney disease, with long-term current use of insulin [facility])  Assessment & Plan  Lab Results   Component Value Date    HGBA1C 7 8 (H) 12/07/2019       Recent Labs     12/08/19  1634 12/08/19  2107 12/09/19  0640 12/09/19  1121   POCGLU 148* 219* 97 144*       Blood Sugar Average: Last 72 hrs:  (P) 149   · Decrease Lantus to 25 units BID due to hypoglycemia noted on admission, continue  · SSI coverage  · QID glucose checks  · Monitor and adjust regimen as needed    Ambulatory dysfunction  Assessment & Plan  · Pt with worsening generalized weakness  · Was evaluated by PT/OT, recommending STR, however patient and family declining   · Requesting home with VNA services  · CM following    Morbid obesity (HCC)  Assessment & Plan  · BMI of 36 17  · Encourage weight loss and lifestyle modification     Essential hypertension  Assessment & Plan  · BP stable on review, soft   · Nephrology following,  · Recommending holding home regimen for now to avoid hypotension   · Pt is on ACEI, Demadex and Toprol XL at home, continue to hold at this time  · Monitor    Chronic diastolic congestive heart failure (HCC)  Assessment & Plan  Wt Readings from Last 3 Encounters:   12/07/19 84 kg (185 lb 3 oz)   11/14/19 84 7 kg (186 lb 12 8 oz)   10/29/19 86 2 kg (190 lb)     · Pt appears euvolemic on exam today   · Demadex is on hold currently secondary to CLAUDIA   · Nephrology following  · ECHO is pending       VTE Pharmacologic Prophylaxis:   Pharmacologic: Enoxaparin (Lovenox)  Mechanical VTE Prophylaxis in Place: Yes    Patient Centered Rounds: I have performed bedside rounds with nursing staff today  Devonte Nation    Discussions with Specialists or Other Care Team Provider: Discussed with nephrology, RN, CM and reviewed previous notes     Education and Discussions with Family / Patient: Discussed with patient and patient's son at bedside regarding plan of care     Time Spent for Care: 30 minutes  More than 50% of total time spent on counseling and coordination of care as described above  Current Length of Stay: 2 day(s)    Current Patient Status: Inpatient   Certification Statement: The patient will continue to require additional inpatient hospital stay due to improvement of pain, weaning IV medications    Discharge Plan: Not medically stable as above  Anticipate next 24 hours  Declining STR  Code Status: Level 1 - Full Code      Subjective:   Pt reports that she is feeling a little better but continues to have her chronic pain  Denies any chest pain, shortness of breath, nausea, vomiting, diarrhea, constipation   Pt's son reports that the reason patient was brought in to the hospital is because she ran out of her pain medications at home  Reports she was recently transitioned to another pain medicine by her pain management doctor as percocet was not helping  Pt reports the dilaudid has improved her pain outpatient  Also reports running out of her prednisone as well  Objective:     Vitals:   Temp (24hrs), Av 8 °F (36 6 °C), Min:97 7 °F (36 5 °C), Max:97 9 °F (36 6 °C)    Temp:  [97 7 °F (36 5 °C)-97 9 °F (36 6 °C)] 97 8 °F (36 6 °C)  HR:  [79-90] 79  Resp:  [18] 18  BP: (100-112)/(45-57) 112/55  SpO2:  [96 %-98 %] 98 %  Body mass index is 36 17 kg/m²  Input and Output Summary (last 24 hours): Intake/Output Summary (Last 24 hours) at 2019 1522  Last data filed at 2019 1932  Gross per 24 hour   Intake 1190 ml   Output 300 ml   Net 890 ml       Physical Exam:     Physical Exam   Constitutional: No distress  Pt is in no acute distress lying in her hospital bed resting comfortably accompanied by her son  NC in place at 2 L   HENT:   Head: Normocephalic and atraumatic  Eyes: Pupils are equal, round, and reactive to light  Conjunctivae are normal    Cardiovascular: Normal rate, regular rhythm and intact distal pulses  Pulmonary/Chest: Effort normal and breath sounds normal  No stridor  No respiratory distress  She has no wheezes  Abdominal: Soft  Bowel sounds are normal  She exhibits no distension  There is no tenderness  There is no guarding  Musculoskeletal: She exhibits edema (mild, trace lower extremities bilaterally with baseline erythema)  Neurological: She is alert  Skin: Skin is warm and dry  She is not diaphoretic  There is erythema  Psychiatric: She has a normal mood and affect  Vitals reviewed        Additional Data:     Labs:    Results from last 7 days   Lab Units 19  0604 19  0648   WBC Thousand/uL 10 88* 6 89   HEMOGLOBIN g/dL 11 9 12 6   HEMATOCRIT % 39 2 41 3   PLATELETS Thousands/uL 313 296   NEUTROS PCT %  --  90*   LYMPHS PCT %  --  9*   MONOS PCT %  -- 1*   EOS PCT %  --  0     Results from last 7 days   Lab Units 12/09/19  0604 12/08/19  0648   POTASSIUM mmol/L 4 0 4 4   CHLORIDE mmol/L 105 103   CO2 mmol/L 27 28   BUN mg/dL 9 13   CREATININE mg/dL 0 79 0 92   CALCIUM mg/dL 8 4 8 9   ALK PHOS U/L  --  100   ALT U/L  --  13   AST U/L  --  18     Results from last 7 days   Lab Units 12/08/19  0648   INR  1 08       * I Have Reviewed All Lab Data Listed Above  * Additional Pertinent Lab Tests Reviewed: All Labs Within Last 24 Hours Reviewed    Imaging:    Imaging Reports Reviewed Today Include: US kidney   Imaging Personally Reviewed by Myself Includes:  None    Recent Cultures (last 7 days):     Results from last 7 days   Lab Units 12/07/19  2249   BLOOD CULTURE  No Growth at 24 hrs  No Growth at 24 hrs         Last 24 Hours Medication List:     Current Facility-Administered Medications:  albuterol 2 5 mg Nebulization Q6H PRN Amy Kate, DO   amitriptyline 25 mg Oral HS Amy Kate, DO   baclofen 10 mg Oral HS PRN Amy Kate, DO   DULoxetine 30 mg Oral Daily Amy Kate, DO   enoxaparin 30 mg Subcutaneous Daily Amy Kate, DO   fluticasone 1 spray Nasal Daily Amy Kate, DO   fluticasone-vilanterol 1 puff Inhalation Daily Amy Kate, DO   HYDROmorphone 0 5 mg Intravenous Q3H PRN Amy Kate, DO   HYDROmorphone 4 mg Oral Q8H PRN Amy Love, DO   insulin glargine 25 Units Subcutaneous Q12H [address] Omar Kelly Davison PA-C   insulin lispro 1-5 Units Subcutaneous HS Amy Kate, DO   insulin lispro 2-12 Units Subcutaneous TID [facility] Amy Love, DO   loratadine 10 mg Oral Daily Edvin Watkins, DO   montelukast 10 mg Oral HS Edvin Watkins, DO   ondansetron 4 mg Intravenous Q6H PRN Amy Love, DO   pantoprazole 40 mg Oral Early Morning Patrick Villa MD   predniSONE 40 mg Oral Daily Amy Kate, DO        Today, Patient Was Seen By: Darrian Doherty PA-C    ** Please Note: Dictation voice to text software may have been used in the creation of this document   **

## 2019-12-09 NOTE — PROGRESS NOTES
NEPHROLOGY PROGRESS NOTE    Patient: Pamela Law               Sex: female          DOA: 12/7/2019  4:58 PM   Date of Birth: @        Age: @        LOS:  LOS: 2 days   12/9/2019    REASON FOR THE CONSULTATION:  Further management of CLAUDIA    HPI     This is a 76 y o  female admitted for Acute kidney injury ([facility] [address] )     SUBJECTIVE     - overnight breathing has remained stable and patient was also found to be nonoliguric  Patient denies nausea, vomiting, headache or dizziness today    - Reviewed last 24 hrs events     CURRENT MEDICATIONS       Current Facility-Administered Medications:     albuterol inhalation solution 2 5 mg, 2 5 mg, Nebulization, Q6H PRN, Dietra Congregation, DO    amitriptyline (ELAVIL) tablet 25 mg, 25 mg, Oral, HS, Dietra Congregation, DO, 25 mg at 12/08/19 2107    baclofen tablet 10 mg, 10 mg, Oral, HS PRN, Dietra Congregation, DO    DULoxetine (CYMBALTA) delayed release capsule 30 mg, 30 mg, Oral, Daily, Dietra Congregation, DO, 30 mg at 12/09/19 0907    enoxaparin (LOVENOX) subcutaneous injection 30 mg, 30 mg, Subcutaneous, Daily, Dietra Congregation, DO, 30 mg at 12/09/19 0905    fluticasone (FLONASE) 50 mcg/act nasal spray 1 spray, 1 spray, Nasal, Daily, Dietra Congregation, DO, 1 spray at 12/09/19 0904    fluticasone-vilanterol (BREO ELLIPTA) 100-25 mcg/inh inhaler 1 puff, 1 puff, Inhalation, Daily, Dietra Congregation, DO, 1 puff at 12/09/19 0902    HYDROmorphone (DILAUDID) injection 0 5 mg, 0 5 mg, Intravenous, Q3H PRN, Dietra Congregation, DO, 0 5 mg at 12/09/19 1157    HYDROmorphone (DILAUDID) tablet 4 mg, 4 mg, Oral, Q8H PRN, Dietra Congregation, DO, 4 mg at 12/09/19 0908    insulin glargine (LANTUS) subcutaneous injection 25 Units 0 25 mL, 25 Units, Subcutaneous, Q12H [address], Becky Davison PA-C, 25 Units at 12/09/19 0905    insulin lispro (HumaLOG) 100 units/mL subcutaneous injection 1-5 Units, 1-5 Units, Subcutaneous, HS, Chano Congregation, DO, 2 Units at 12/08/19 2108    insulin lispro (HumaLOG) 100 units/mL subcutaneous injection 2-12 Units, 2-12 Units, Subcutaneous, TID AC, 4 Units at 12/08/19 1223 **AND** Fingerstick Glucose (POCT), , , TID AC, Timoteo Roche, DO    loratadine (CLARITIN) tablet 10 mg, 10 mg, Oral, Daily, Timoteo Roche, DO, 10 mg at 12/09/19 0907    montelukast (SINGULAIR) tablet 10 mg, 10 mg, Oral, HS, Timoteo Roche, DO, 10 mg at 12/08/19 2107    ondansetron (ZOFRAN) injection 4 mg, 4 mg, Intravenous, Q6H PRN, Timoteo Roche, DO    pantoprazole (PROTONIX) EC tablet 40 mg, 40 mg, Oral, Early Morning, Dick Leong MD, 40 mg at 12/09/19 0608    predniSONE tablet 40 mg, 40 mg, Oral, Daily, Timoteo Roche, DO, 40 mg at 12/09/19 0907    OBJECTIVE     Current Weight: Weight - Scale: 84 kg (185 lb 3 oz)  Vitals:    12/09/19 0945   BP:    Pulse:    Resp:    Temp:    SpO2: 98%     Body mass index is 36 17 kg/m²  Intake/Output Summary (Last 24 hours) at 12/9/2019 1305  Last data filed at 12/8/2019 1932  Gross per 24 hour   Intake 1190 ml   Output 300 ml   Net 890 ml       PHYSICAL EXAMINATION     Physical Exam   Constitutional: No distress  HENT:   Head: Normocephalic and atraumatic  Eyes: No scleral icterus  Neck: Neck supple  No JVD present  Cardiovascular: Normal rate  Pulmonary/Chest: No accessory muscle usage  No respiratory distress  She has no wheezes  Abdominal: Soft  She exhibits no distension  Musculoskeletal:        Right hand: She exhibits no laceration  Left hand: She exhibits no laceration  Neurological: She is alert  Skin: Skin is warm  No cyanosis  Psychiatric: She is not combative  She expresses no suicidal ideation           LAB RESULTS     Results from last 7 days   Lab Units 12/09/19  0604 12/08/19  0648 12/07/19  1737   WBC Thousand/uL 10 88* 6 89 9 92   HEMOGLOBIN g/dL 11 9 12 6 13 7   HEMATOCRIT % 39 2 41 3 44 5   PLATELETS Thousands/uL 313 296 337   SODIUM mmol/L 138 138 133*   POTASSIUM mmol/L 4 0 4 4 3 9   CHLORIDE mmol/L 105 103 95*   CO2 mmol/L 27 28 27 BUN mg/dL 9 13 18   CREATININE mg/dL 0 79 0 92 1 66*   EGFR ml/min/1 73sq m 74 62 30   CALCIUM mg/dL 8 4 8 9 9 4   MAGNESIUM mg/dL  --  2 1  --    PHOSPHORUS mg/dL  --  2 7  --        I have personally reviewed the old medical records and patient's previously known baseline creatinine level is ~ 0 9-1 1    RADIOLOGY RESULTS      US kidney and bladder   Final Result by Sabrina Avilez MD (12/08 1120)      No hydronephrosis  Workstation performed: [id number]         XR chest 2 views   Final Result by Sabrina Avilez MD (12/08 1206)      No acute cardiopulmonary disease  Workstation performed: [id number]         VAS lower limb venous duplex study, unilateral/limited   Final Result by Ken Odonnell DO (12/08 1207)          PLAN / RECOMMENDATIONS      1  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  Present on admission, suspected due to intravascular volume depletion  Renal ultrasound done earlier showed no hydronephrosis or urinary retention  Overnight patient has also received IV fluid and remained nonoliguric  Upon review of old medical records, patient's previously known baseline creatinine was between 0 9-1 1  Current creatinine is 0 79 which is below to previously known baseline creatinine  CLAUDIA has resolved now  Plan to stop IV fluid and consider monitoring renal function while in the hospital     2  Hypertension in chronic kidney disease  Patient's blood pressure is under good control and monitor hypertension without any antihypertensive medications  Patient was taking Monopril and metoprolol as outpatient which is currently on hold  Will sign off today  Overall above mentioned plan was also d/w Houston Chau MD  Nephrology  12/9/2019        Portions of the record may have been created with voice recognition software  Occasional wrong word or "sound a like" substitutions may have occurred due to the inherent limitations of voice recognition software   Read the chart carefully and recognize, using context, where substitutions have occurred

## 2019-12-10 VITALS
HEIGHT: 60 IN | SYSTOLIC BLOOD PRESSURE: 135 MMHG | BODY MASS INDEX: 36.36 KG/M2 | TEMPERATURE: 98.4 F | RESPIRATION RATE: 18 BRPM | OXYGEN SATURATION: 97 % | HEART RATE: 85 BPM | WEIGHT: 185.19 LBS | DIASTOLIC BLOOD PRESSURE: 70 MMHG

## 2019-12-10 LAB
GLUCOSE SERPL-MCNC: 111 MG/DL (ref 65–140)
GLUCOSE SERPL-MCNC: 69 MG/DL (ref 65–140)
GLUCOSE SERPL-MCNC: 82 MG/DL (ref 65–140)

## 2019-12-10 PROCEDURE — 94664 DEMO&/EVAL PT USE INHALER: CPT

## 2019-12-10 PROCEDURE — 94760 N-INVAS EAR/PLS OXIMETRY 1: CPT

## 2019-12-10 PROCEDURE — 99238 HOSP IP/OBS DSCHRG MGMT 30/<: CPT | Performed by: NURSE PRACTITIONER

## 2019-12-10 PROCEDURE — 82948 REAGENT STRIP/BLOOD GLUCOSE: CPT

## 2019-12-10 RX ORDER — HYDROMORPHONE HYDROCHLORIDE 4 MG/1
4 TABLET ORAL EVERY 8 HOURS PRN
Qty: 30 TABLET | Refills: 0
Start: 2019-12-10 | End: 2019-12-20

## 2019-12-10 RX ORDER — PREDNISONE 20 MG/1
40 TABLET ORAL DAILY
Qty: 30 TABLET | Refills: 0 | Status: SHIPPED | OUTPATIENT
Start: 2019-12-11 | End: 2020-06-22

## 2019-12-10 RX ADMIN — PREDNISONE 40 MG: 20 TABLET ORAL at 08:29

## 2019-12-10 RX ADMIN — FLUTICASONE FUROATE AND VILANTEROL TRIFENATATE 1 PUFF: 100; 25 POWDER RESPIRATORY (INHALATION) at 08:32

## 2019-12-10 RX ADMIN — LORATADINE 10 MG: 10 TABLET ORAL at 08:29

## 2019-12-10 RX ADMIN — DULOXETINE HYDROCHLORIDE 30 MG: 30 CAPSULE, DELAYED RELEASE ORAL at 08:29

## 2019-12-10 RX ADMIN — HYDROMORPHONE HYDROCHLORIDE 4 MG: 2 TABLET ORAL at 10:33

## 2019-12-10 RX ADMIN — INSULIN GLARGINE 25 UNITS: 100 INJECTION, SOLUTION SUBCUTANEOUS at 08:34

## 2019-12-10 RX ADMIN — ENOXAPARIN SODIUM 30 MG: 30 INJECTION SUBCUTANEOUS at 08:34

## 2019-12-10 RX ADMIN — FLUTICASONE PROPIONATE 1 SPRAY: 50 SPRAY, METERED NASAL at 08:32

## 2019-12-10 RX ADMIN — HYDROMORPHONE HYDROCHLORIDE 4 MG: 2 TABLET ORAL at 01:52

## 2019-12-10 RX ADMIN — PANTOPRAZOLE SODIUM 40 MG: 40 TABLET, DELAYED RELEASE ORAL at 05:11

## 2019-12-10 NOTE — DISCHARGE INSTR - AVS FIRST PAGE
Thank you for choosing [facility] for year care, please take all prescriptions as instructed, please make appropriate follow-up visits    Please stop smoking  Please follow up with her pain management doctor

## 2019-12-10 NOTE — ASSESSMENT & PLAN NOTE
· Pt with worsening generalized weakness  · Was evaluated by PT/OT, recommending STR, however patient and family refusing, education provided  · Requesting home with VNA services  · CM following

## 2019-12-10 NOTE — ASSESSMENT & PLAN NOTE
Wt Readings from Last 3 Encounters:   12/07/19 84 kg (185 lb 3 oz)   11/14/19 84 7 kg (186 lb 12 8 oz)   10/29/19 86 2 kg (190 lb)     · Pt appears euvolemic on exam today   · Resume Demadex  · Nephrology following  · ECHO no regional wall motion abnormalities, EF of 60% and grade 1 diastolic dysfunction

## 2019-12-10 NOTE — ASSESSMENT & PLAN NOTE
Lab Results   Component Value Date    HGBA1C 7 8 (H) 12/07/2019       Recent Labs     12/09/19  2112 12/10/19  0551 12/10/19  0811 12/10/19  1139   POCGLU 228* 69 111 82       Blood Sugar Average: Last 72 hrs:  (P) 349 [id number]   · Decrease Lantus to 25 units BID due to hypoglycemia noted on admission, continue  · Resume home regimen, follow-up with PCP

## 2019-12-10 NOTE — ASSESSMENT & PLAN NOTE
· Chronic pain with chronic opioid use  · Pt is on hydromorphone 4 mg tabs outpatient, continue PRN regimen here  · D/c IV narcotic medication now  · patient's family members state the reason pt was brought in to the hospital is because she ran out of her pain medication at home  Pt follows with Dr Carleen Valerio with pain management outpatient  Last prescription was given on 11/06  Called Dr Nathan Charles office and informed them of this, new prescription was provided  · Extensive education provided to patient and family on how to use patient's pain medication  · Pt with history of RA, on Humira injections outpatient, started on PO prednisone 40 mg daily here, continue   Pt also reports that she ran out of prednisone at home, will need to follow up with her rheumatologist as well  · Monitor

## 2019-12-10 NOTE — DISCHARGE SUMMARY
[facility]  Discharge- Falguni Li 1945, 76 y o  female MRN: [id number]    Unit/Bed#: -01 Encounter: [id number]    Primary Care Provider: Nichelle Medina MD   Date and time admitted to hospital: 12/7/2019  4:58 PM    Chronic prescription opiate use  Assessment & Plan  · Chronic pain with chronic opioid use  · Pt is on hydromorphone 4 mg tabs outpatient, continue PRN regimen here  · D/c IV narcotic medication now  · patient's family members state the reason pt was brought in to the hospital is because she ran out of her pain medication at home  Pt follows with Dr Aileen Loomis with pain management outpatient  Last prescription was given on 11/06  Called Dr Carlyle Shore office and informed them of this, new prescription was provided  · Extensive education provided to patient and family on how to use patient's pain medication  · Pt with history of RA, on Humira injections outpatient, started on PO prednisone 40 mg daily here, continue  Pt also reports that she ran out of prednisone at home, will need to follow up with her rheumatologist as well  · Monitor    Venous stasis dermatitis of both lower extremities  Assessment & Plan  Patient with increased erythema right upper extremity his presentation is exactly the same as prior hospital stay in September by chart review    No need for antibiotics at this time  Blood cultures have remained negative    Hyponatremia  Assessment & Plan  Monitor daily labs    Chronic respiratory failure with hypoxia (HCC)  Assessment & Plan  · Pt on supplemental home O2 outpatient  · Saturating well on baseline 2 L NC    Interstitial lung disease ([facility] [address] )  Assessment & Plan  · History of interstitial lung disease  · Currently at baseline respiratory status   · Continue Breo, Flonase  · Encouraged smoking cessation    Immunosuppressed status ([facility] [address] )  Assessment & Plan  · Patient is on Humira as an outpatient  · Blood cultures negative x >48 hours  · Pt is afebrile, no evidence of any infectious etiology noted on exam   · Continue to monitor closely     Rheumatoid arthritis [facility])  Assessment & Plan  History of rheumatoid arthritis on Humira    Start p  O  Prednisone 40 mg p o  Daily, patient appears to have acute flare of polyarthritis, bilateral hand pain, bilateral shoulder pain, with significant joint swelling of bilateral hands      Patient was complaining of increasing pain in bilateral lower extremities         Gastroesophageal reflux disease without esophagitis  Assessment & Plan  · Continue PPI    Type 2 diabetes mellitus with stage 3 chronic kidney disease, with long-term current use of insulin [facility])  Assessment & Plan  Lab Results   Component Value Date    HGBA1C 7 8 (H) 12/07/2019       Recent Labs     12/09/19  2112 12/10/19  0551 12/10/19  0811 12/10/19  1139   POCGLU 228* 69 111 82       Blood Sugar Average: Last 72 hrs:  (P) 101 [id number]   · Decrease Lantus to 25 units BID due to hypoglycemia noted on admission, continue  · Resume home regimen, follow-up with PCP    Ambulatory dysfunction  Assessment & Plan  · Pt with worsening generalized weakness  · Was evaluated by PT/OT, recommending STR, however patient and family refusing, education provided  · Requesting home with VNA services  · CM following    Morbid obesity ([address] )  Assessment & Plan  · BMI of 36 17  · Encourage weight loss and lifestyle modification     Essential hypertension  Assessment & Plan  · BP stable on review, soft   · Nephrology signed off  · Resume home regimen      Chronic diastolic congestive heart failure ([facility] [address] )  Assessment & Plan  Wt Readings from Last 3 Encounters:   12/07/19 84 kg (185 lb 3 oz)   11/14/19 84 7 kg (186 lb 12 8 oz)   10/29/19 86 2 kg (190 lb)     · Pt appears euvolemic on exam today   · Resume Demadex  · Nephrology following  · ECHO no regional wall motion abnormalities, EF of 60% and grade 1 diastolic dysfunction    Bilateral edema of lower extremity  Assessment & Plan  · Pt with bilateral chronic lower extremity edema and erythema   · Likely in setting of venous stasis, rheumatoid arthritis   · Family concerned for possible cellulitis, discussed extensively with the patient and family that patient's erythema is present bilaterally, she is afebrile without leukocytosis, low suspicion for cellulitis at this time  Likely secondary to chronic illnesses  Procalcitonin negative  Mild increase in WBC today likely in setting of prednisone, monitor  No need for abx at this time  · Pt has also had multiple duplex ultrasounds of the lower extremities recently that have been negative for DVT, last duplex obtained on 9/2019  No need for additional image to be obtained at this time  * Acute kidney injury ([facility] [address] )  Assessment & Plan  · POA, CLAUDIA superimposed on CKD stage 2-3  · Baseline cr  Around 0 9-1 1  · Likely pre-renal in setting of hypovolemia as this is improving with fluids   · Nephrology signed off  · Cr  1 66 on admission   · Cr   Today 0 79, back to baseline  · Renal ultrasound negative for hydronephrosis   · ECHO Echo has grade 1 diastolic dysfunction, no wall abnormalities, EF 60%, resume Demadex and follow-up with PCP         Discharging Physician / Practitioner: Rajat Macario  PCP: Melissa Bateman MD  Admission Date:   Admission Orders (From admission, onward)     Ordered        12/07/19 2213  Inpatient Admission  Once         12/07/19 2010  Place in Observation  Once                   Discharge Date: 12/10/19    Resolved Problems  Date Reviewed: 12/9/2019    None          Consultations During Hospital Stay:  · Nephrology    Procedures Performed:   · Renal ultrasound  · Chest x-ray  · Echocardiogram    Significant Findings / Test Results:   · No hydronephrosis  · No acute cardiopulmonary disease  · EF of 60%, no regional wall motion abnormalities, grade 1 diastolic dysfunction    Incidental Findings:   · None     Test Results Pending at Discharge (will require follow up):   · None     Outpatient Tests Requested:  · BMP    Complications:  None    Reason for Admission:  Acute kidney injury    Hospital Course:     Everardo Jackson is a 76 y o  female patient who originally presented to the hospital on 12/7/2019 due to acute kidney injury  After further evaluation patient was actually brought in by her family because he ran out of they are home pain regiment medications, extensive education has been provided, her pain management doctor is aware  Smoking cessation has been encouraged, family is aware  Encouraged patient to be more compliant with her medications, education provided regarding her medications  Encouraged follow-up with pain management, Nephrology, repeat basic metabolic panel to evaluate her kidney function    Please see above list of diagnoses and related plan for additional information  Condition at Discharge: stable     Discharge Day Visit / Exam:     Subjective:  Denies any chest pain chest tightness shortness of breath or difficulty breathing is ambulating at her baseline status  Is tolerating her diet  Is agreeable for discharge  Vitals: Blood Pressure: 135/70 (12/10/19 0800)  Pulse: 85 (12/10/19 0800)  Temperature: 98 4 °F (36 9 °C) (12/10/19 0800)  Temp Source: Oral (12/07/19 1657)  Respirations: 18 (12/10/19 0800)  Height: 5' (152 4 cm) (12/07/19 2053)  Weight - Scale: 84 kg (185 lb 3 oz) (12/07/19 2053)  SpO2: 97 % (12/10/19 1101)  Exam:   Physical Exam   Constitutional: She is oriented to person, place, and time  She appears well-developed and well-nourished  No distress  HENT:   Head: Normocephalic  Eyes: Pupils are equal, round, and reactive to light  Neck: Normal range of motion  Cardiovascular: Normal rate  Pulmonary/Chest: Effort normal    Abdominal: Soft  Bowel sounds are normal    Neurological: She is alert and oriented to person, place, and time  Skin: Skin is warm and dry  Psychiatric: She has a normal mood and affect   Her behavior is normal  Thought content normal    Nursing note and vitals reviewed  Discussion with Family:  Son at bedside    Discharge instructions/Information to patient and family:   See after visit summary for information provided to patient and family  Provisions for Follow-Up Care:  See after visit summary for information related to follow-up care and any pertinent home health orders  Disposition:     Home    For Discharges to [facility] SNF:   · Not Applicable to this Patient - Not Applicable to this Patient    Planned Readmission:  No     Discharge Statement:  I spent 30 minutes discharging the patient  This time was spent on the day of discharge  I had direct contact with the patient on the day of discharge  Greater than 50% of the total time was spent examining patient, answering all patient questions, arranging and discussing plan of care with patient as well as directly providing post-discharge instructions  Additional time then spent on discharge activities  Discharge Medications:  See after visit summary for reconciled discharge medications provided to patient and family        ** Please Note: This note has been constructed using a voice recognition system **

## 2019-12-10 NOTE — SOCIAL WORK
[facility] not have the dilaudid in stock for another 2 days  PRIYANK and Christen Moreira contacted Dr Titus Lindsay office to inquire about the dilaudid script that was sent to [facility] put in a message out for Dr Titus Lindsay to possibly have it sent to another pharmacy  CM contacted [facility] whom does have it in stock  CM contacted Dr Titus Lindsay office to have a script sent to [address]  CM made Christen Moreira aware of this  She stated that Dr Titus Lindsay office called her back and they are sending the script to [address]  PRIYANK spoke with Bryon at Dr Titus Lindsay office and was informed that Dr Titus Lindsay sent a 5 day script to [facility] in [facility]

## 2019-12-10 NOTE — ASSESSMENT & PLAN NOTE
· Patient is on Humira as an outpatient  · Blood cultures negative x >48 hours  · Pt is afebrile, no evidence of any infectious etiology noted on exam   · Continue to monitor closely 134.9

## 2019-12-10 NOTE — ASSESSMENT & PLAN NOTE
Patient with increased erythema right upper extremity his presentation is exactly the same as prior hospital stay in September by chart review    No need for antibiotics at this time  Blood cultures have remained negative

## 2019-12-10 NOTE — ASSESSMENT & PLAN NOTE
· History of interstitial lung disease  · Currently at baseline respiratory status   · Continue Breo, Flonase  · Encouraged smoking cessation

## 2019-12-10 NOTE — ASSESSMENT & PLAN NOTE
· POA, CLAUDIA superimposed on CKD stage 2-3  · Baseline cr  Around 0 9-1 1  · Likely pre-renal in setting of hypovolemia as this is improving with fluids   · Nephrology signed off  · Cr  1 66 on admission   · Cr   Today 0 79, back to baseline  · Renal ultrasound negative for hydronephrosis   · ECHO Echo has grade 1 diastolic dysfunction, no wall abnormalities, EF 60%, resume Demadex and follow-up with PCP

## 2019-12-10 NOTE — DISCHARGE INSTRUCTIONS
Acute Kidney Injury, Ambulatory Care   GENERAL INFORMATION:   Acute kidney injury  happens when your kidneys suddenly stop working correctly  Normally, the kidneys turn fluid, chemicals, and waste from your blood into urine  In acute kidney injury, your kidneys can no longer do this  In most cases, it is temporary, but it may become a chronic kidney condition  Common symptoms include the following:   · Decreased urination or dark-colored urine    · Swelling in your arms, legs, or feet     · Abdominal or low back pain    · Vomiting, diarrhea, or loss of appetite    · Fatigue     · Skin rash  Seek immediate care for the following symptoms:   · Heart beating faster than normal for you    · Sudden chest pain or trouble breathing    · Seizure  Treatment for acute kidney injury:  Treatment depends upon the cause of your acute kidney injury and how severe it is  Medicines may be given to increase blood flow to your kidneys and protect your kidneys  You may also need medicine to decrease inflammation in your kidneys  You may be given IV fluids to replenish fluids and help your heart pump blood  Dialysis may be needed to remove chemicals and waste from your blood when your kidneys cannot  Manage acute kidney injury:   · Manage other health conditions  Care for your diabetes, high blood pressure, or heart disease  These conditions increase your risk for acute kidney injury  · Talk to your healthcare provider before you take over-the-counter-medicine  NSAIDs, stomach medicine, or laxatives may harm your kidneys and increase your risk for acute kidney injury  Follow up with your healthcare provider as directed:  Write down your questions so you remember to ask them during your visits  CARE AGREEMENT:   You have the right to help plan your care  Learn about your health condition and how it may be treated  Discuss treatment options with your caregivers to decide what care you want to receive   You always have the right to refuse treatment  The above information is an  only  It is not intended as medical advice for individual conditions or treatments  Talk to your doctor, nurse or pharmacist before following any medical regimen to see if it is safe and effective for you  © 2014 [address] is for End User's use only and may not be sold, redistributed or otherwise used for commercial purposes  All illustrations and images included in CareNotes® are the copyrighted property of A D A M , Inc  or Gene Goldberg  Acute Kidney Injury   AMBULATORY CARE:   Acute kidney injury (CLAUDIA) is also called acute kidney failure, or acute renal failure  CLAUDIA happens when your kidneys suddenly stop working correctly  Normally, the kidneys remove fluid, chemicals, and waste from your blood  These wastes are turned into urine by your kidneys  CLAUDIA usually happens over hours or days  When you have CLAUDIA, your kidneys do not remove the waste, chemicals, or extra fluid from your body  A normal amount of urine is not produced  CLAUDIA is usually temporary, but it may become a chronic kidney condition  Causes of CLAUDIA:   · Decreased blood flow to the kidney, such as from hypercalcemia (high blood calcium level) or severe heart disease     · A disease or condition that affects the kidneys, such as hypertension (high blood pressure) or diabetes     · A blockage in the kidney or ureter, such as a kidney or bladder stone, enlarged prostate, or tumor  Common symptoms include the following: You may not have any symptoms with early or mild CLAUDIA   As CLAUDIA progresses, you may have any of the following:  · Decrease in the amount of urine or no urination    · Swelling in your arms, legs, or feet     · Weakness, drowsiness, or no appetite    · Nausea, flank pain, muscle twitching or muscle cramps    · Itchy skin, or your, breath or body smells like urine    · Behavior changes, confusion, disorientation, or seizures  Call 911 if:   · You have sudden chest pain or trouble breathing  Seek care immediately if:   · Your symptoms get worse  Contact your healthcare provider if:   · Your symptoms return  · Your blood sugar or blood pressure level is not within the range your healthcare provider recommends  · You have questions or concerns about your condition or care  Treatment for CLAUDIA  depends upon the cause of your acute kidney injury and how severe it is  Usually, CLAUDIA will be monitored in the hospital  If you have mild CLAUDIA, you may be able to go home to recover  Your healthcare providers will treat the cause of your CLAUDIA  You may need IV fluids if your CLAUDIA was caused by little or no fluid in your body  You may need dialysis to remove waste and extra fluid from your body  Nutrition:  Your healthcare provider may tell you to eat food low in sodium (salt), potassium, phosphorus, or protein  A dietitian can help you plan your meals  Drink liquids as directed: Your healthcare provider may recommend that you drink a certain amount of liquids  This will help your kidneys work better and decrease your risk for dehydration  Ask how much liquid to drink each day and which liquids are best for you  What you can do to manage and prevent CLAUDIA:   · Monitor and manage other health conditions  such as diabetes, high blood pressure, or heart disease  These conditions increase your risk for acute kidney injury  Take your medicines for these conditions as directed  Also, monitor your blood sugar and blood pressure levels as directed  Contact your healthcare provider if your levels are not in the range he or she says it should be  · Talk to your healthcare provider before you take over-the-counter-medicine  NSAIDs, stomach medicine, or laxatives may harm your kidneys and increase your risk for acute kidney injury  If it is okay to take the medicine, follow the directions on the package  Do not take more than directed  · Tell healthcare providers you have had acute kidney injury  before you get contrast liquid for an x-ray or CT scan  Your healthcare provider may give you medicine to prevent kidney problems caused by the liquid  Follow up with your healthcare provider as directed:  Write down your questions so you remember to ask them during your visits  © 2017 [address] Information is for End User's use only and may not be sold, redistributed or otherwise used for commercial purposes  All illustrations and images included in CareNotes® are the copyrighted property of A D A M , Inc  or Gene Goldberg  The above information is an  only  It is not intended as medical advice for individual conditions or treatments  Talk to your doctor, nurse or pharmacist before following any medical regimen to see if it is safe and effective for you  COPD, Ambulatory Care   GENERAL INFORMATION:   COPD (chronic obstructive pulmonary disease)  is a lung disease that makes it hard for you to breathe  COPD is usually a result of lung damage caused by years of irritation and inflammation  COPD limits air flow in your lungs  Smoking, pollution, genetics, or a history of lung infections can increase your risk for COPD  Common symptoms include the following:   · Shortness of breath     · A dry cough     · Coughing fits that bring up mucus from your lungs     · Wheezing and chest tightness  Seek immediate care for the following symptoms:   · Confusion, dizziness, or lightheadedness    · Red, swollen, warm arm or leg    · Shortness of breath or chest pain    · Coughing up blood  Treatment for COPD  may include medicines to help decrease swelling and inflammation in your lungs  Medicines may also help open your airways or treat and infection  You may need pulmonary rehabilitation to help you manage your symptoms and improve your quality of life  You may need extra oxygen to help you breathe easier    Manage COPD and prevent an exacerbation:   · Do not smoke, and avoid others who smoke  If you smoke, it is never too late to quit  You may have fewer exacerbations  Ask for information about medicines and support programs that can help you quit  · Avoid triggers that make your symptoms worse  Cold weather and sudden temperature changes can trigger an exacerbation  Fumes from cars and chemicals, air pollution, and perfume can also increase your symptoms  · Use pursed-lip breathing when you feel short of breath  Take a deep breath in through your nose  Slowly breathe out through your mouth with your lips pursed for twice as long as you inhaled  You can also practice this breathing pattern while you bend, lift, climb stairs, or exercise  Pursed-lip breathing slows down your breathing and helps move more air in and out of your lungs  · Exercise for at least 20 minutes each day  Exercise can help increase your energy and decrease shortness of breath  Ask about the best exercise plan for you  · Prevent infections that can be dangerous when you have COPD  Get a flu vaccine every year as soon as it becomes available  Ask if you should also get other vaccines, such as those given to prevent pneumonia and tetanus  Avoid people who are sick, and wash your hands often  Follow up with your healthcare provider as directed:  Write down your questions so you remember to ask them during your visits  CARE AGREEMENT:   You have the right to help plan your care  Learn about your health condition and how it may be treated  Discuss treatment options with your caregivers to decide what care you want to receive  You always have the right to refuse treatment  The above information is an  only  It is not intended as medical advice for individual conditions or treatments  Talk to your doctor, nurse or pharmacist before following any medical regimen to see if it is safe and effective for you    © 2014 [employer] [address] is for End User's use only and may not be sold, redistributed or otherwise used for commercial purposes  All illustrations and images included in CareNotes® are the copyrighted property of A D A M , Inc  or Gene Goldberg  Chronic Hypertension   WHAT YOU NEED TO KNOW:   Hypertension is high blood pressure (BP)  Your BP is the force of your blood moving against the walls of your arteries  Normal BP is less than 120/80  Prehypertension is between 120/80 and 139/89  Hypertension is 140/90 or higher  Hypertension causes your BP to get so high that your heart has to work much harder than normal  This can damage your heart  Chronic hypertension is a long-term condition that you can control with a healthy lifestyle or medicines  A controlled blood pressure helps protect your organs, such as your heart, lungs, brain, and kidneys  DISCHARGE INSTRUCTIONS:   Call 911 for any of the following:   · You have discomfort in your chest that feels like squeezing, pressure, fullness, or pain  · You become confused or have difficulty speaking  · You suddenly feel lightheaded or have trouble breathing  · You have pain or discomfort in your back, neck, jaw, stomach, or arm  Seek care immediately if:   · You have a severe headache or vision loss  · You have weakness in an arm or leg  Contact your healthcare provider if:   · You feel faint, dizzy, confused, or drowsy  · You have been taking your BP medicine and your BP is still higher than your healthcare provider says it should be  · You have questions or concerns about your condition or care  Medicines: You may need any of the following:  · Medicine  may be used to help lower your BP  You may need more than one type of medicine  Take the medicine exactly as directed  · Diuretics  help decrease extra fluid that collects in your body  This will help lower your BP   You may urinate more often while you take this medicine  · Cholesterol medicine  helps lower your cholesterol level  A low cholesterol level helps prevent heart disease and makes it easier to control your blood pressure  · Take your medicine as directed  Contact your healthcare provider if you think your medicine is not helping or if you have side effects  Tell him or her if you are allergic to any medicine  Keep a list of the medicines, vitamins, and herbs you take  Include the amounts, and when and why you take them  Bring the list or the pill bottles to follow-up visits  Carry your medicine list with you in case of an emergency  Follow up with your healthcare provider as directed: You will need to return to have your blood pressure checked and to have other lab tests done  Write down your questions so you remember to ask them during your visits  Manage chronic hypertension:  Talk with your healthcare provider about these and other ways to manage hypertension:  · Take your BP at home  Sit and rest for 5 minutes before you take your BP  Extend your arm and support it on a flat surface  Your arm should be at the same level as your heart  Follow the directions that came with your BP monitor  If possible, take at least 2 BP readings each time  Take your BP at least twice a day at the same times each day, such as morning and evening  Keep a record of your BP readings and bring it to your follow-up visits  Ask your healthcare provider what your blood pressure should be  · Limit sodium (salt) as directed  Too much sodium can affect your fluid balance  Check labels to find low-sodium or no-salt-added foods  Some low-sodium foods use potassium salts for flavor  Too much potassium can also cause health problems  Your healthcare provider will tell you how much sodium and potassium are safe for you to have in a day  He or she may recommend that you limit sodium to 2,300 mg a day             · Follow the meal plan recommended by your healthcare provider  A dietitian or your provider can give you more information on low-sodium plans or the DASH (Dietary Approaches to Stop Hypertension) eating plan  The DASH plan is low in sodium, unhealthy fats, and total fat  It is high in potassium, calcium, and fiber  · Exercise to maintain a healthy weight  Exercise at least 30 minutes per day, on most days of the week  This will help decrease your blood pressure  Ask about the best exercise plan for you  · Decrease stress  This may help lower your BP  Learn ways to relax, such as deep breathing or listening to music  · Limit alcohol  Women should limit alcohol to 1 drink a day  Men should limit alcohol to 2 drinks a day  A drink of alcohol is 12 ounces of beer, 5 ounces of wine, or 1½ ounces of liquor  · Do not smoke  Nicotine and other chemicals in cigarettes and cigars can increase your BP and also cause lung damage  Ask your healthcare provider for information if you currently smoke and need help to quit  E-cigarettes or smokeless tobacco still contain nicotine  Talk to your healthcare provider before you use these products  © 2017 [address] [facility] Information is for End User's use only and may not be sold, redistributed or otherwise used for commercial purposes  All illustrations and images included in CareNotes® are the copyrighted property of A D A M , Inc  or Gene Goldberg  The above information is an  only  It is not intended as medical advice for individual conditions or treatments  Talk to your doctor, nurse or pharmacist before following any medical regimen to see if it is safe and effective for you  Chronic Kidney Disease   AMBULATORY CARE:   Chronic kidney disease (CKD)  is the gradual and permanent loss of kidney function  Normally, the kidneys remove fluid, chemicals, and waste from your blood  These wastes are turned into urine by your kidneys   CKD may worsen over time and lead to kidney failure  Common symptoms include the following:   · Changes in how often you need to urinate    · Swelling in your arms, legs, or feet    · Shortness of breath    · Fatigue or weakness    · Bad or bitter taste in your mouth    · Nausea, vomiting, or loss of appetite  Seek care immediately if:   · You are confused and very drowsy  · You have a seizure  · You have shortness of breath  Contact your healthcare provider if:   · You suddenly gain or lose more weight than your healthcare provider has told you is okay  · You have itchy skin or a rash  · You urinate more or less than you normally do  · You have blood in your urine  · You have nausea and repeated vomiting  · You have fatigue or muscle weakness  · You have hiccups that will not stop  · You have questions or concerns about your condition or care  Treatment for CKD:  Medicines may be given to decrease blood pressure and get rid of extra fluid  You may also receive medicine to manage health conditions that may occur with CKD  Dialysis is a treatment to remove chemicals and waste from your blood when your kidneys can no longer do this  Surgery may be needed to create an arteriovenous fistula (AVF) in your arm or insert a catheter into your abdomen so that you can receive dialysis  A kidney transplant may be done if your CKD becomes severe  Manage CKD:   · Maintain a healthy weight  Ask your healthcare provider how much you should weigh  Ask him to help you create a weight loss plan if you are overweight  · Exercise 30 to 60 minutes a day, 4 to 7 times a week, or as directed  Ask about the best exercise plan for you  Regular exercise can help you manage CKD, high blood pressure, and diabetes  · Follow your healthcare provider's advice about what to eat and drink  He may tell you to eat food low in sodium (salt), potassium, phosphorus, or protein  You may need to see a dietitian if you need help planning meals   Ask how much liquid to drink each day and which liquids are best for you  · Limit alcohol  Ask how much alcohol is safe for you to drink  A drink of alcohol is 12 ounces of beer, 5 ounces of wine, or 1½ ounces of liquor  · Do not smoke  Nicotine and other chemicals in cigarettes and cigars can cause lung and kidney damage  Ask your healthcare provider for information if you currently smoke and need help to quit  E-cigarettes or smokeless tobacco still contain nicotine  Talk to your healthcare provider before you use these products  · Ask your healthcare provider if you need vaccines  Infections such as pneumonia, influenza, and hepatitis can be more harmful or more likely to occur in a person who has CKD  Vaccines reduce your risk of infection with these viruses  Follow up with your healthcare provider as directed:  Write down your questions so you remember to ask them during your visits  © 2017 [address] Information is for End User's use only and may not be sold, redistributed or otherwise used for commercial purposes  All illustrations and images included in CareNotes® are the copyrighted property of A D A M , Inc  or Gene Goldberg  The above information is an  only  It is not intended as medical advice for individual conditions or treatments  Talk to your doctor, nurse or pharmacist before following any medical regimen to see if it is safe and effective for you  10% - bad control"> 10% - bad control,Hemoglobin A1c (HbA1c) greater than 10% indicating poor diabetic control,Haemoglobin A1c greater than 10% indicating poor diabetic control">   Diabetes Mellitus Type 2 in Adults, Ambulatory Care   American Diabetes Association : Standards of medical care in diabetes--2014  Diabetes Care 2014; 40 Suppl 1:S14-S80  American Diabetes Association: Standards of medical care in diabetes--2013  Diabetes Care 2013; 36 Suppl 1:S11-S66    American Diabetes Association: Standards of medical care in diabetes--2012  Diabetes Care 2012; 35 Suppl 1:S11-S63  Advisory Committee on [employer]: Recommended Adult Immunization Schedule: [place], 2012  Hilda Intern Med 2012; 156(3):211-217  American Diabetes Association: Standards of Medical Care in Diabetes-2011  Diabetes Care 2011; 34(S1):S11-S61  Product Information: JANUVIA(R) oral tablets, sitagliptin oral tablets  [address] [facility] , [place], [address] [facility], 2010  American Diabetes Association: Standards of medical care in diabetes--2010  Diabetes Care 2010; 33 Suppl 1:S11-S61  Product Information: VICTOZA(R) solution for subcutaneous injection, liraglutide rDNA origin solution for subcutaneous injection  [address] [facility], [place], [address] [place] [address], [facility]  American Diabetes Association: Diagnosis and classification of diabetes mellitus  Diabetes Care, Jan 2005;28(suppl 1):S37-S42  American Diabetes Association: Physical activity/exercise and diabetes mellitus  Diabetes Care, 2003;26:S73-S77  American Diabetes Association : Standards of medical care for patients with diabetes mellitus    Diabetes Care  , 2003; 26(suppl 1):33S-50S  Ally BK : Diabetes mellitus and its chronic complications    AORN J  , 2002; 76(2):266-282  Deep CG : Current management of diabetes mellitus in adults  Nancy Pineda Nurse Pract  , 2003; 11(5):32-37  Christian Moise C : Risk factors for type 2 diabetes mellitus  [facility] Cardiovasc Nurs,   2002; 16(2):17-23  Raf Packer DW : Pathophysiology of diabetes mellitus    [address] , 2004; 27(2):113-125  Chiquita LF, Ron MA, Nino MN : In-hospital management of type 2 diabetes mellitus    Med Clin Ar Am  , 2004; 12:8612-0254  Paula Sa, Narciso I, Kera K et al  : Evidence-based nutritional approaches to the treatment and prevention of diabetes mellitus    Nutr Metab Cardiovasc Dis  , 2004; 14(6):373-394  Shamir Marie DM  : Clinical practice   Initial management of glycemia in type 2 diabetes mellitus  [facility]a [facility] J Med  , 2002; 347(17):0318-6871  Kristie Isaiah RUIZ : Mechanisms in the development of type 2 diabetes mellitus  Marylee Berber [facility] Nurs  , 2002; 16(2):1-16  American Diabetes Association[de-identified] Clinical Practice Recommendations 2003    Diabetes Care  Available at http://care  diabetesjournals  org/content/vol26/suppl_1/ , (cited 10/22/03)  American Diabetes Association[de-identified] Clinical Practice Recommendations 2004    Diabetes Care 2004; 27(1): S5-S10  Available at: http://care  diabetesjournals  org/content/vol27/suppl_1/, (cited 2/25/04)  American Diabetes Association[de-identified] Alcohol    2003  Available at: www diabetes  org/health/nutrition/alcohol/alcohol jsp , (cited 10/23/03)  American Diabetes Association[de-identified] Hyperosmolar Hyperglycemic Nonketotic Syndrome (HHNS):   2003  Available at: www diabetes  org/type2/medical/HHNS jsp, (cited 10/29/03)  American Diabetes Association[de-identified] Nutrition Guide for People with Diabetes    2003  Available at: www diabetes  org/health/nutrition/nutrition_guide  jsp , (cited 10/22/03)  American Diabetes Association[de-identified] Safety Tips    2003  Available at: www diabetes  org/health/exercise/safety/25ways  jsp, (cited 10/23/03)  American Diabetes Association[de-identified] What is Type 2 Diabetes?   2003  Available at: www diabetes  org/type2/info/default jsp, (cited 10/29/03)  The American Dietetic Association: The [place] Dietetic Association and the [facility] Dietetic Association Manual of Clinical Dietetics, 6th ed    The [address] [place] [address], [address] [place] [address] , 2000  Patricia MCCONNELL[de-identified] The Icinetic Manual of Diagnosis and Therapy (Internet Edition, 2003)  Medical Services, [facility]  John Hernandez Available at www Mapflow/Lux[employer] Technologyksmaddyed/mmanual/home jsp , (2003)  Joel Anderson DM[de-identified] Initial Management of Glycemia in Type 2 Diabetes Mellitus    The [facility],[address] of Medicine , 2002; 347(17):3211-1490  Aut[employer] Data of Diabetes and Digestive and Kidney Diseases[de-identified] Diabetes Diagnosis    2003   Available at: http://diabetes  niddk nih gov/dm/pubs/diagnosis/index htm , (cited 10/23/2003)  Angela Paez : The [place] Manual of Nursing Practice  7th ed  JOSE Ford: [place], 2000  Amber Alves et al :: Effects of Exercise on Glycemic Control and Body Mass in Type 2 Diabetes Mellitus    The Journal of the [address] [place] [address] , 2001; [id number]): [id number]  © 2014 [address] is for End User's use only and may not be sold, redistributed or otherwise used for commercial purposes  All illustrations and images included in CareNotes® are the copyrighted property of [employer]  or [employer]  The above information is an  only  It is not intended as medical advice for individual conditions or treatments  Talk to your doctor, nurse or pharmacist before following any medical regimen to see if it is safe and effective for you  10% - bad control"> 10% - bad control,Hemoglobin A1c (HbA1c) greater than 10% indicating poor diabetic control,Haemoglobin A1c greater than 10% indicating poor diabetic control">   Diabetes Mellitus Type 2 in Adults, Ambulatory Care   GENERAL INFORMATION:   Diabetes mellitus type 2  is a disease that affects how your body uses glucose (sugar)  Insulin helps move sugar out of the blood so it can be used for energy  Normally, when the blood sugar level increases, the pancreas makes more insulin  Type 2 diabetes develops because either the body cannot make enough insulin, or it cannot use the insulin correctly  After many years, your pancreas may stop making insulin  Common symptoms include the following:   · More hunger or thirst than usual     · Frequent urination     · Weight loss without trying     · Blurred vision  Seek immediate care for the following symptoms:   · Severe abdominal pain, or pain that spreads to your back  You may also be vomiting      · Trouble staying awake or focusing    · Shaking or sweating    · Blurred or double vision    · Breath has a fruity, sweet smell    · Breathing is deep and labored, or rapid and shallow    · Heartbeat is fast and weak  Treatment for diabetes mellitus type 2  includes keeping your blood sugar at a normal level  You must eat the right foods, and exercise regularly  You may also need medicine if you cannot control your blood sugar level with nutrition and exercise  Manage diabetes mellitus type 2:   · Check your blood sugar level  You will be taught how to check a small drop of blood in a glucose monitor  Ask your healthcare provider when and how often to check during the day  Ask your healthcare provider what your blood sugar levels should be when you check them  · Keep track of carbohydrates (sugar and starchy foods)  Your blood sugar level can get too high if you eat too many carbohydrates  Your dietitian will help you plan meals and snacks that have the right amount of carbohydrates  · Eat low-fat foods  Some examples are skinless chicken and low-fat milk  · Eat less sodium (salt)  Some examples of high-sodium foods to limit are soy sauce, potato chips, and soup  Do not add salt to food you cook  Limit your use of table salt  · Eat high-fiber foods  Foods that are a good source of fiber include vegetables, whole grain bread, and beans  · Limit alcohol  Alcohol affects your blood sugar level and can make it harder to manage your diabetes  Women should limit alcohol to 1 drink a day  Men should limit alcohol to 2 drinks a day  A drink of alcohol is 12 ounces of beer, 5 ounces of wine, or 1½ ounces of liquor  · Get regular exercise  Exercise can help keep your blood sugar level steady, decrease your risk of heart disease, and help you lose weight  Exercise for at least 30 minutes, 5 days a week  Include muscle strengthening activities 2 days each week  Work with your healthcare provider to create an exercise plan      · Check your feet each day  for injuries or open sores  Ask your healthcare provider for activities you can do if you have an open sore  · Quit smoking  If you smoke, it is never too late to quit  Smoking can worsen the problems that may occur with diabetes  Ask your healthcare provider for information about how to stop smoking if you are having trouble quitting  · Ask about your weight:  Ask healthcare providers if you need to lose weight, and how much to lose  Ask them to help you with a weight loss program  Even a 10 to 15 pound weight loss can help you manage your blood sugar level  · Carry medical alert identification  Wear medical alert jewelry or carry a card that says you have diabetes  Ask your healthcare provider where to get these items  · Ask about vaccines  Diabetes puts you at risk of serious illness if you get the flu, pneumonia, or hepatitis  Ask your healthcare provider if you should get a flu, pneumonia, or hepatitis B vaccine, and when to get the vaccine  Follow up with your healthcare provider as directed:  Write down your questions so you remember to ask them during your visits  CARE AGREEMENT:   You have the right to help plan your care  Learn about your health condition and how it may be treated  Discuss treatment options with your caregivers to decide what care you want to receive  You always have the right to refuse treatment  The above information is an  only  It is not intended as medical advice for individual conditions or treatments  Talk to your doctor, nurse or pharmacist before following any medical regimen to see if it is safe and effective for you  © 2014 [address] is for End User's use only and may not be sold, redistributed or otherwise used for commercial purposes  All illustrations and images included in CareNotes® are the copyrighted property of [employer] M [employer]  or Gene Goldberg      Diabetic Kidney Disease   WHAT YOU NEED TO KNOW:   Diabetic kidney disease (DKD) is the gradual and permanent loss of kidney function  This occurs because of kidney damage caused by high blood sugar levels  Normally, the kidneys remove fluid, chemicals, and waste from your blood  These wastes are turned into urine by your kidneys  When you have DKD, your kidneys do not function properly  DISCHARGE INSTRUCTIONS:   Call 911 for any of the following:   · You have a seizure  · You have sudden chest pain or shortness of breath  Seek care immediately:   · Your heart is beating faster than normal for you  · You are confused and very drowsy  Contact your healthcare provider if:   · You suddenly gain or lose more weight than your healthcare provider has told you is okay  · You have itchy skin or a rash  · You have nausea and repeated vomiting  · You have fatigue or muscle weakness  · You have an increased need to urinate, burning or pain when you urinate, blood in your urine, or strong odor to your urine  · You have questions or concerns about your condition or care  Medicines:   · Medicines  may be given to decrease blood pressure and get rid of extra fluid  · Take your medicine as directed  Contact your healthcare provider if you think your medicine is not helping or if you have side effects  Tell him or her if you are allergic to any medicine  Keep a list of the medicines, vitamins, and herbs you take  Include the amounts, and when and why you take them  Bring the list or the pill bottles to follow-up visits  Carry your medicine list with you in case of an emergency  Follow up with your healthcare provider as directed: You will need to return for tests to monitor your kidney function  You may also be referred to a kidney specialist  Write down your questions so you remember to ask them during your visits  Manage your DKD:   · Control your blood sugar levels    If you use insulin or take diabetes medicine, take these as directed  Follow the meal and exercise plan recommended by your healthcare provider  Check your blood sugar levels every day, as often as your healthcare provider has recommended  Your healthcare provider may want you to have your A1c checked every 3 to 6 months  Most people should keep their A1c at or below 7%  · Follow your meal plan as directed  You may need to eat only a certain amount of protein at each meal  Work with your dietitian to develop a meal plan that is right for you  · Control your blood pressure  Decrease sodium (salt) in your diet to help control your blood pressure  Weight loss and regular physical activity can also help to decrease your blood pressure  Other things you can do to help decrease blood pressure include avoiding alcohol and quitting smoking, if you smoke  · Talk to your healthcare provider about over-the-counter (OTC) medicines you should avoid  Some OTC medicines, such as ibuprofen, can damage your kidneys  © 2017 [address] Information is for End User's use only and may not be sold, redistributed or otherwise used for commercial purposes  All illustrations and images included in CareNotes® are the copyrighted property of [employer]  or Gene Goldberg  The above information is an  only  It is not intended as medical advice for individual conditions or treatments  Talk to your doctor, nurse or pharmacist before following any medical regimen to see if it is safe and effective for you  Heart Failure   AMBULATORY CARE:   Heart failure (HF) is a condition that does not allow your heart to fill or pump properly  Not enough oxygen in your blood gets to your organs and tissues  HF can occur in the right side, the left side, or both lower chambers of your heart  HF is often caused by damage or injury to your heart  The damage may be caused by heart attack, other heart conditions, or high blood pressure   HF is a long-term condition that tends to get worse over time  It is important to manage your health to improve your quality of life  HF can be worsened by heavy alcohol use, smoking, diabetes that is not controlled, or obesity  Common signs and symptoms:   · Difficulty breathing with activity that worsens to difficulty breathing at rest    · Shortness of breath while lying flat    · Severe shortness of breath and coughing at night that usually wakes you     · Chest pain at night    · Periods of no breathing, then breathing fast    · Fatigue or lack of energy (often worsened by physical activity)     · Swelling in your ankles, legs, or abdomen    · Fast heartbeat, purple color around your mouth and nailbeds    · Fingers and toes cool to the touch  Call 911 if:   · You have any of the following signs of a heart attack:      ¨ Squeezing, pressure, or pain in your chest that lasts longer than 5 minutes or returns    ¨ Discomfort or pain in your back, neck, jaw, stomach, or arm     ¨ Trouble breathing    ¨ Nausea or vomiting    ¨ Lightheadedness or a sudden cold sweat, especially with chest pain or trouble breathing    Seek care immediately if:   · You gain 3 or more pounds (1 4 kg) in a day, or more than your healthcare provider says you should  · Your heartbeat is fast, slow, or uneven all the time  Contact your healthcare provider if:   · You have symptoms of worsening HF:      ¨ Shortness of breath at rest, at night, or that is getting worse in any way     ¨ Weight gain of 5 or more pounds (2 2 kg) in a week     ¨ More swelling in your legs or ankles     ¨ Abdominal pain or swelling     ¨ More coughing     ¨ Loss of appetite     ¨ Feeling tired all the time    · You feel hopeless or depressed, or you have lost interest in things you used to enjoy  · You often feel worried or afraid  · You have questions or concerns about your condition or care    Treatment for HF  may include any of the following:  · Medicines  may be needed to help regulate your heart rhythm  You may also need medicines to lower your blood pressure, and to get rid of extra fluids  · Oxygen  may help you breathe easier if your oxygen level is lower than normal  A CPAP machine may be used to keep your airway open while you sleep  · Surgery  can be done to implant a pacemaker in your chest to regulate your heart rhythm  Other types of surgery can open blocked heart vessels, replace a damaged heart valve, or remove scar tissue  Manage or prevent HF:   · Do not smoke  Nicotine and other chemicals in cigarettes and cigars can cause lung damage and make HF difficult to manage  Ask your healthcare provider for information if you currently smoke and need help to quit  E-cigarettes or smokeless tobacco still contain nicotine  Talk to your healthcare provider before you use these products  · Do not drink alcohol or take illegal drugs  Alcohol and drugs can worsen your symptoms quickly  · Weigh yourself every morning  Use the same scale, in the same spot  Do this after you use the bathroom, but before you eat or drink anything  Wear the same type of clothing  Do not wear shoes  Record your weight each day so you will notice any sudden weight gain  Swelling and weight gain are signs of fluid retention  If you are overweight, ask how to lose weight safely  · Check your blood pressure and heart rate every day  Ask for more information about how to measure your blood pressure and heart rate correctly  Ask what these numbers should be for you  · Manage any chronic health conditions you have  These include high blood pressure, diabetes, obesity, high cholesterol, metabolic syndrome, and COPD  You will have fewer symptoms if you manage these health conditions  Follow your healthcare provider's recommendations and follow up with him or her regularly  · Eat heart-healthy foods and limit sodium (salt)    An easy way to do this is to eat more fresh fruits and vegetables and fewer canned and processed foods  Replace butter and margarine with heart-healthy oils such as olive oil and canola oil  Other heart-healthy foods include walnuts, whole-grain breads, low-fat dairy products, beans, and lean meats  Fatty fish such as salmon and tuna are also heart healthy  Ask how much salt you can eat each day  Do not use salt substitutes  · Drink liquids as directed  You may need to limit the amount of liquids you drink if you retain fluid  Ask how much liquid to drink each day and which liquids are best for you  · Stay active  If you are not active, your symptoms are likely to worsen quickly  Walking, bicycling, and other types of physical activity help maintain your strength and improve your mood  Physical activity also helps you manage your weight  Work with your healthcare provider to create an exercise plan that is right for you  · Get vaccines as directed  Get a flu shot every year  You may also need the pneumonia vaccine  The flu and pneumonia can be severe for a person who has HF  Vaccines protect you from these infections  Join a support group:  Living with HF can be difficult  It may be helpful to talk with others who have HF  You may learn how to better manage your condition or get emotional support  For more information:   · [address]  [place]ael Contreras [place]   Phone: [phone]  Web Address: [email or web address]  [email or web address]   Follow up with your healthcare provider or cardiologist within 2 weeks or as directed: You may need to return for other tests  You may need home health care  A healthcare provider will monitor your vital signs, weight, and make sure your medicines are working  Write down your questions so you remember to ask them during your visits  © 2017 [address] Information is for End User's use only and may not be sold, redistributed or otherwise used for commercial purposes   All illustrations and images included in CareNotes® are the copyrighted property of [employer]  or Gene Goldberg  The above information is an  only  It is not intended as medical advice for individual conditions or treatments  Talk to your doctor, nurse or pharmacist before following any medical regimen to see if it is safe and effective for you  Hypoxia   WHAT YOU NEED TO KNOW:   What is hypoxia? Hypoxia is a decreased level of oxygen in all or part of your body, such as your brain  What causes hypoxia? Some conditions can cause hypoxia to occur suddenly  Other conditions may cause hypoxia to occur over time  Hypoxia may be caused by any of the following:  · Travel to a high altitude     · Near drowning or choking    · Carbon monoxide poisoning    · Exposure to cold for a long period of time    · Severe anemia    · Chronic lung disease, such as emphysema    · Congestive heart failure  What are the signs and symptoms of hypoxia? · Confusion or memory loss    · Clumsiness     · Drowsiness     · Changes in behavior     · Vision changes    · Bluish-gray lips or nails     · Dizziness, lightheadedness, or fainting  How is hypoxia diagnosed? · A pulse oximeter  is a device that measures the amount of oxygen in your blood  · Blood tests  may be done to measure blood gases, such as oxygen, acids, and carbon dioxide  Blood tests may also be used to find the cause of your hypoxia  How is hypoxia treated? Treatment depends on the cause of your hypoxia  Oxygen therapy will be used to help you breathe easier  You may also need medicines to treat the cause of your hypoxia  Mechanical ventilation and IV fluids may be used for more severe forms of hypoxia  A ventilator is a machine that gives you oxygen The ventilator breathes for you when you cannot breathe well on your own  When should I contact my healthcare provider? · Your muscles jerk       · You have questions or concerns about your condition or care  When should I seek immediate care or call 911? · You have a seizure  · You faint  · You are irritable, confused, or unusually drowsy  CARE AGREEMENT:   You have the right to help plan your care  Learn about your health condition and how it may be treated  Discuss treatment options with your caregivers to decide what care you want to receive  You always have the right to refuse treatment  The above information is an  only  It is not intended as medical advice for individual conditions or treatments  Talk to your doctor, nurse or pharmacist before following any medical regimen to see if it is safe and effective for you  © 2017 [address]  Information is for End User's use only and may not be sold, redistributed or otherwise used for commercial purposes  All illustrations and images included in CareNotes® are the copyrighted property of [employer] M [employer]  or Gene Goldberg  Obesity   WHAT YOU SHOULD KNOW:   Obesity is a medical condition caused by too much body fat  Your caregiver will use your height and weight to calculate your body mass index (BMI)  You are obese if your BMI is greater than 30  Obesity increases your risk of medical conditions such as diabetes, heart disease, and certain types of cancer  Obesity is treated with lifestyle changes, and sometimes medicine or surgery  The goal of treatment is to help you lose weight so your health will improve  Even a small decrease in BMI can reduce the risk of health problems caused by obesity  AFTER YOU LEAVE:   Follow up with your primary healthcare provider [facility]) as directed:  Ask your PHP to help you set a weight loss goal  Keep appointments with your PHP so that he can support you as you lose weight  Write down your questions so you remember to ask them during your visits  How to be successful in losing weight:   · Set small, realistic goals    An example of a small goal is to walk for 20 minutes 5 days a week  Do not try to change everything at once  · Tell friends, family members, and coworkers about your goals  and ask for their support  Ask a friend to lose weight with you, or join a weight-loss support group  · Identify foods or triggers that may cause you to overeat , and find ways to avoid them  Remove tempting high-calorie foods from your home and workplace  Place a bowl of fresh fruit on your kitchen counter  If stress causes you to eat, then find other ways to cope with stress  · Keep a diary to track what you eat and drink, and your daily calorie intake  Also write down how many minutes of physical activity you do each day  Weigh yourself once a week and record it in your diary  Eating changes: You will need to eat 500 to 1000 fewer calories each day than you currently eat to lose 1 to 2 pounds a week  The following changes will help you cut calories:  · Eat smaller portions  Use small plates, no larger than 9 inches in diameter  Fill your plate half full of fruits and vegetables  Measure your food using measuring cups until you know what a serving size looks like  · Eat 3 meals and 1 or 2 snacks each day  Plan your meals in advance  [place] and eat at home most of the time  Eat slowly  · Eat fruits and vegetables at every meal   They are low in calories and high in fiber, which makes you feel full  Do not add butter, margarine, or cream sauce to vegetables  Use herbs to season steamed vegetables  · Eat less fat and fewer fried foods  Eat more baked or grilled chicken and fish  These protein sources are lower in calories and fat than red meat  Limit fast food  Dress your salads with olive oil and vinegar instead of bottled dressing  · Limit the amount of sugar you eat  Do not drink sugary beverages  Limit alcohol  Activity changes:  Physical activity is good for your body in many ways  It helps you burn calories and build strong muscles   It decreases stress and depression, and gives you an overall sense of well-being  It can also help you sleep better  Talk to your PHP before you begin an exercise program   · Exercise for at least 30 minutes 5 days a week  Start slowly  Set aside time each day for physical activity that you enjoy and that is convenient for you  It is best to do both weight training and an activity that increases your heart rate, such as walking, bicycling, or swimming  · Find ways to be more active  Do yard work and housecleaning  Walk up the stairs instead of using elevators  Spend your leisure time going to events that require walking, such as outdoor festivals and art fairs  This extra physical activity can help you lose weight and keep it off  Contact your PHP if:   · You have symptoms of gallbladder or liver disease, such as pain in your upper abdomen  · You have knee or hip pain and discomfort while walking  · You have symptoms of diabetes, such as intense hunger and thirst, and frequent urination  · You have symptoms of sleep apnea, such as snoring or daytime sleepiness  · You have questions or concerns about your condition or care  Seek care immediately or call 911 if:   · You have a severe headache, confusion, or difficulty speaking  · You have weakness on one side of your body  · You have chest pain, sweating, or shortness of breath  © 2014 [address] is for End User's use only and may not be sold, redistributed or otherwise used for commercial purposes  All illustrations and images included in CareNotes® are the copyrighted property of [employer]  or Gene Goldberg  The above information is an  only  It is not intended as medical advice for individual conditions or treatments  Talk to your doctor, nurse or pharmacist before following any medical regimen to see if it is safe and effective for you      Peripheral Vascular Disease   WHAT YOU NEED TO KNOW:   What is peripheral vascular disease (PVD)? PVD is a condition that causes decreased blood flow to your limbs because of blocked blood vessels  The blockage is usually caused by atherosclerosis  This is when material, such as cholesterol, sticks to the inside of your blood vessels and makes them narrow  What increases my risk for PVD? · Smoking    · Obesity or a lack of activity or exercise    · A medical condition, such as high blood pressure, high cholesterol, or diabetes    · History of a blood clot, kidney disease, or heart failure    · Older age    · A family history of peripheral artery disease, heart disease, or stroke  What are the signs and symptoms of PVD? · Painful cramps in your hip, thigh, or calf muscles, especially after you walk or climb stairs    · Burning pain in your hands, fingers, feet, or toes    · Shiny, tight, cold skin, and uneven hair growth on your skin    · A change in your skin color    · Sores on your skin that do not heal    · Weakness or numbness in your hands or feet  How is PVD diagnosed? Your healthcare provider will ask about your symptoms and examine you  You may need any of the following tests:  · Blood or urine tests  may be used to get information about how your body is working  · An ankle brachial index  compares the blood pressure in your arms to the blood pressure in your legs  A lower blood pressure in your legs may mean you have blocked blood vessels  · Doppler ultrasound  pictures may show narrow or blocked blood vessels  · Angiography  pictures may show blood flow and blockage  You may be given contrast liquid to help the pictures show up better  Tell the healthcare provider if you have ever had an allergic reaction to contrast liquid  How is PVD treated? · Medicines  may be given to help decrease your cholesterol level, open blood vessels, or prevent blood clots  · Procedures  may be used to open blocked blood vessels   Metal or plastic stents (tubes) may be put in where the artery was blocked to keep it open  Surgery may be used to place a new blood vessel near the one that is blocked, or replace the area of the blood vessel  How can I manage PVD? · Do not smoke  Nicotine and other chemicals in cigarettes and cigars can damage your blood vessels  Ask your healthcare provider for information if you currently smoke and need help to quit  E-cigarettes or smokeless tobacco still contain nicotine  Talk to your healthcare provider before you use these products  · Get regular exercise  Ask about the best exercise plan for you  Walking is a low-impact way to exercise and increase your blood flow  Stop and rest if you have pain in your legs  · Care for your feet  Look closely at your feet every day  Check for cracks or sores  Wash your feet daily with mild soap and dry them well  Do not walk barefoot in case you step on a hard or sharp object  · Change your sleep position  You may have pain in your legs or feet when you sleep  Raise the head of your bed 4 inches, or use pillows to prop your upper body higher than your legs  This may help more blood go to your feet, decreasing pain  · Protect and cushion your feet and hands  If you have ulcers on your feet, you may need to wear bandages with heel pads  You may also wear foam rubber booties  Hand or foot warmers may decrease pain in your hands or feet  How can I prevent PVD? · Eat a variety of healthy foods  Healthy foods include fruits, vegetables, whole-grain breads, low-fat dairy products, beans, lean meats, and fish  Ask if you need to be on a special diet  · Maintain a healthy weight  Ask your healthcare provider how much you should weigh  Ask him to help you create a weight loss plan if you are overweight  · Manage diabetes  Keep your blood sugar level in the correct range  Check your blood sugar level often   Ask your healthcare provider if you should make changes to your diet, exercise, or medications  Call 911 for any of the following:   · You have any of the following signs of a heart attack:      ¨ Squeezing, pressure, or pain in your chest that lasts longer than 5 minutes or returns    ¨ Discomfort or pain in your back, neck, jaw, stomach, or arm     ¨ Trouble breathing    ¨ Nausea or vomiting    ¨ Lightheadedness or a sudden cold sweat, especially with chest pain or trouble breathing    · You have any of the following signs of a stroke:      ¨ Numbness or drooping on one side of your face     ¨ Weakness in an arm or leg    ¨ Confusion or difficulty speaking    ¨ Dizziness, a severe headache, or vision loss  When should I seek immediate care? · Your arm or leg feels warm, tender, and painful  It may look swollen and red  · You have pain in your legs that does not go away with rest     · You have dark areas on the skin of your legs  · You cannot see out of one or both of your eyes  When should I contact my healthcare provider? · Your signs and symptoms get worse or do not get better, even after treatment  · You have a sore or ulcer that is not healing or gets worse  · You have questions or concerns about your condition or care  CARE AGREEMENT:   You have the right to help plan your care  Learn about your health condition and how it may be treated  Discuss treatment options with your caregivers to decide what care you want to receive  You always have the right to refuse treatment  The above information is an  only  It is not intended as medical advice for individual conditions or treatments  Talk to your doctor, nurse or pharmacist before following any medical regimen to see if it is safe and effective for you  © 2017 [employer] Information is for End User's use only and may not be sold, redistributed or otherwise used for commercial purposes   All illustrations and images included in CareNotes® are the copyrighted property of [employer]  or [employer]

## 2019-12-11 ENCOUNTER — TRANSITIONAL CARE MANAGEMENT (OUTPATIENT)
Dept: INTERNAL MEDICINE CLINIC | Facility: CLINIC | Age: 74
End: 2019-12-11

## 2019-12-11 NOTE — PROGRESS NOTES
1st attempt-LVM asking pt to return call for TCM documentation and TCM appointment        By Gracie Chavez

## 2019-12-13 LAB
BACTERIA BLD CULT: NORMAL
BACTERIA BLD CULT: NORMAL

## 2019-12-19 ENCOUNTER — TELEPHONE (OUTPATIENT)
Dept: INTERNAL MEDICINE CLINIC | Facility: CLINIC | Age: 74
End: 2019-12-19

## 2019-12-19 NOTE — TELEPHONE ENCOUNTER
Hillary Peterson from [facility] stating pt was having SOB, chest pain, shooting pain down both arms for the last 2 hours  Hillary Peterson stated pt did not want to go to the hospital  Dr Kevyn Bell was informed of this and advised pt either Urgent Care or come into the office since pt insist she does not want to go the ER  Hillary Peterson was transferred back to [place] to make appointment

## 2019-12-19 NOTE — TELEPHONE ENCOUNTER
Lexie Do called back to my extension to inform me that James Haskins now said pt is refusing to come into the office as well  Pt stated she just want to stay home  Pt think the chest pain is from the cold weather

## 2020-01-08 LAB
LEFT EYE DIABETIC RETINOPATHY: NORMAL
RIGHT EYE DIABETIC RETINOPATHY: NORMAL

## 2020-01-10 ENCOUNTER — TELEPHONE (OUTPATIENT)
Dept: INTERNAL MEDICINE CLINIC | Facility: CLINIC | Age: 75
End: 2020-01-10

## 2020-01-10 DIAGNOSIS — I50.22 CHRONIC SYSTOLIC CONGESTIVE HEART FAILURE (HCC): ICD-10-CM

## 2020-01-10 RX ORDER — METOPROLOL SUCCINATE 25 MG/1
25 TABLET, EXTENDED RELEASE ORAL DAILY
Qty: 90 TABLET | Refills: 2 | Status: SHIPPED | OUTPATIENT
Start: 2020-01-10 | End: 2020-06-22

## 2020-01-10 NOTE — TELEPHONE ENCOUNTER
Francisco Romero from [facility] called wanting to make Dr Brian Dahl aware that Alycia Paige is not taking her   metoprolol succinate (TOPROL-XL) 25 mg 24 hr tablet  She has not gotten it refilled     Her heart rate is elevated to 106 and 110      Any questions please contact Francisco Romero at  [phone]

## 2020-01-10 NOTE — TELEPHONE ENCOUNTER
Aria Fuentes from [address] [place] [address] called in regards to a script that was faxed over today for a freestyle simeon machine  They need last office note faxed       [phone]  Phone [phone]

## 2020-01-11 DIAGNOSIS — F51.01 PRIMARY INSOMNIA: ICD-10-CM

## 2020-01-13 RX ORDER — NORTRIPTYLINE HYDROCHLORIDE 25 MG/1
25 CAPSULE ORAL
Qty: 90 CAPSULE | Refills: 0 | Status: SHIPPED | OUTPATIENT
Start: 2020-01-13 | End: 2020-04-08

## 2020-01-14 ENCOUNTER — TELEPHONE (OUTPATIENT)
Dept: INTERNAL MEDICINE CLINIC | Facility: CLINIC | Age: 75
End: 2020-01-14

## 2020-01-14 NOTE — TELEPHONE ENCOUNTER
[facility] [employer]  TJ   CALLED  ABOUT      THE  FREE [facility]   BLOOD  SUGAR  METER   NEEDS  THE  CHART  NOTES  FROM  LAST   VISIT  BEFORE  THEY  CAN  SEND PT  THE  NEW  METER   PLEASE    FAX   [phone]  PHONE  [phone]  IF  ANY  QUESTIONS

## 2020-01-23 ENCOUNTER — TELEPHONE (OUTPATIENT)
Dept: INTERNAL MEDICINE CLINIC | Facility: CLINIC | Age: 75
End: 2020-01-23

## 2020-01-28 ENCOUNTER — OFFICE VISIT (OUTPATIENT)
Dept: PULMONOLOGY | Facility: CLINIC | Age: 75
End: 2020-01-28
Payer: MEDICARE

## 2020-01-28 VITALS
WEIGHT: 180 LBS | HEART RATE: 86 BPM | OXYGEN SATURATION: 95 % | SYSTOLIC BLOOD PRESSURE: 124 MMHG | HEIGHT: 60 IN | DIASTOLIC BLOOD PRESSURE: 84 MMHG | BODY MASS INDEX: 35.34 KG/M2

## 2020-01-28 DIAGNOSIS — E11.22 TYPE 2 DIABETES MELLITUS WITH STAGE 3 CHRONIC KIDNEY DISEASE, WITH LONG-TERM CURRENT USE OF INSULIN (HCC): ICD-10-CM

## 2020-01-28 DIAGNOSIS — N18.30 TYPE 2 DIABETES MELLITUS WITH STAGE 3 CHRONIC KIDNEY DISEASE, WITH LONG-TERM CURRENT USE OF INSULIN (HCC): ICD-10-CM

## 2020-01-28 DIAGNOSIS — J45.40 MODERATE PERSISTENT ASTHMA WITHOUT COMPLICATION: ICD-10-CM

## 2020-01-28 DIAGNOSIS — Z79.4 TYPE 2 DIABETES MELLITUS WITH STAGE 3 CHRONIC KIDNEY DISEASE, WITH LONG-TERM CURRENT USE OF INSULIN (HCC): ICD-10-CM

## 2020-01-28 DIAGNOSIS — M05.79 RHEUMATOID ARTHRITIS INVOLVING MULTIPLE SITES WITH POSITIVE RHEUMATOID FACTOR (HCC): ICD-10-CM

## 2020-01-28 DIAGNOSIS — R06.02 SHORTNESS OF BREATH: ICD-10-CM

## 2020-01-28 DIAGNOSIS — J84.9 INTERSTITIAL LUNG DISEASE (HCC): ICD-10-CM

## 2020-01-28 DIAGNOSIS — J96.11 CHRONIC RESPIRATORY FAILURE WITH HYPOXIA (HCC): Primary | ICD-10-CM

## 2020-01-28 PROCEDURE — 99214 OFFICE O/P EST MOD 30 MIN: CPT | Performed by: PHYSICIAN ASSISTANT

## 2020-01-28 RX ORDER — HYDROMORPHONE HYDROCHLORIDE 4 MG/1
4 TABLET ORAL 3 TIMES DAILY PRN
COMMUNITY
End: 2021-12-13 | Stop reason: HOSPADM

## 2020-01-28 NOTE — PROGRESS NOTES
Assessment:    1  Chronic respiratory failure with hypoxia (HCC)  Home Oxygen with Portability   2  Shortness of breath  CT chest high resolution   3  Interstitial lung disease ([facility] [address] )  CT chest high resolution   4  Moderate persistent asthma without complication     5  Rheumatoid arthritis involving multiple sites with positive rheumatoid factor ([facility] )           Plan:   Patient presents today for follow-up  She is at her baseline respiratory status  Patient was initially saturating 95% on room air at rest, and then desaturated to 86% on room air with exertion  Patient was then placed on 2 L of oxygen by nasal cannula and then made to ambulate, oxygen saturations remained greater than 91% on exertion with the 2 L  Discussed with the patient to continue with 2 L of oxygen by nasal cannula continuously with exertion  As noted above, patient is at her baseline respiratory status  Nothing acute currently  PFTs with moderate restriction and severely decreased DLCO  Echo showing estimated peak PA pressure 42 mmHg  Continue Breo 1 puff daily, Singulair, albuterol with the nebulizer as needed  Could also potentially benefit from exchanging [place] [facility]  Would re-visit at her next appointment  There has been gradual worsening of her respiratory status for months now  Last CT of the chest done July 2019 with stable emphysema and ILD  There is a pattern consistent with UIP however her respiratory symptoms do seem to correlate with the arthralgias, unclear if the ILD is related to the RA vs IPF  Following closely with her rheumatologist  ILD once thought to be related to the Methotrexate however there was never any improvement with the breathing once it was discontinued  She has been on the Humira since May 2019  There is still significant pain associated with the RA  She does not want any increased dose of the prednisone  Previously on 40 mg, now down to 10 mg   With higher doses of prednisone, she has severe lower extremity edema  Even on the 10 mg, she still has a significant amount  There could likely be a component of fluid overload contributing to shortness of breath  This is a patient with multiple medical problems including but not limited to ILD, emphysema, moderate persistent asthma, pulmonary hypertension, RA, CHF  Her shortness of breath is likely multifactorial  Will follow-up with high resolution CT of the chest to see if there has been any progression of the ILD, depending on the results would consider the potential benefit of her starting on CellCept or Esbriet  All of the patient's and her son's questions were answered to their satisfaction and understanding, which was verbalized  Patient was advised to call the office prior to next appointment should they have any questions or concerns prior  Patient made aware of worrisome symptoms that should prompt a visit to the ER or call to 911 such as chest pain, severe shortness of breath, cyanosis, throat closing, syncope, etc     Subjective:     Patient ID: Grazyna Antonio is a 76 y o  female  Chief Complaint:  Carter Leyva is a very pleasant 66-year-old female former smoker with past medical history including interstitial lung disease, rheumatoid arthritis on Humira chronic respiratory failure dependent on 2 L of oxygen, asthma, emphysema, diabetes, GERD, hypertension, CHF  She presents today for follow-up  She reports her breathing is at baseline  There are currently no acute issues  There has been gradual worsening of the dyspnea on exertion for months  Of note, she was previously on 40 mg prednisone daily, now on 10 mg and it seems with shortness of breath has significantly worsened with the decreased dose of prednisone  She follows closely with her rheumatologist   She has been on Humira since summer of 2019  She was on Methotrexate in the past  She was recently hospitalized for pain control   There weren't any flare-up's of her pulmonary disease at the time  She does have coughing and wheezing at times  She rarely uses her nebulizer, although it does provide relief when she does use it  She quit smoking 20 years ago  The following portions of the patient's history were reviewed in this encounter and updated as appropriate: Past medical, social, surgical, family, allergies    Review of Systems   Constitutional: Positive for fatigue  Negative for diaphoresis  Respiratory: Positive for cough, chest tightness, shortness of breath and wheezing  Cardiovascular: Positive for leg swelling  Negative for chest pain  Musculoskeletal: Positive for arthralgias  Neurological: Positive for weakness  All other systems reviewed and are negative  Objective:    Physical Exam   Constitutional: She is oriented to person, place, and time  She appears well-developed and well-nourished  No distress  obese   HENT:   Head: Normocephalic and atraumatic  Right Ear: External ear normal    Left Ear: External ear normal    Nose: Nose normal    Mouth/Throat: Oropharynx is clear and moist  No oropharyngeal exudate  Crowded airway   Eyes: Conjunctivae and EOM are normal  Right eye exhibits no discharge  Left eye exhibits no discharge  No scleral icterus  Neck: Normal range of motion  Neck supple  No tracheal deviation present  Short and wide neck   Cardiovascular: Normal rate, regular rhythm and normal heart sounds  Exam reveals no gallop and no friction rub  No murmur heard  Pulmonary/Chest: Effort normal and breath sounds normal  No stridor  No respiratory distress  She has no wheezes  She has no rales  Abdominal: She exhibits no distension  There is no guarding  Musculoskeletal: She exhibits edema and tenderness  She exhibits no deformity  Deconditioned   Neurological: She is alert and oriented to person, place, and time  No cranial nerve deficit  Skin: Skin is warm and dry  No rash noted  She is not diaphoretic  No erythema  No pallor  Psychiatric: She has a normal mood and affect  Her behavior is normal  Judgment and thought content normal    Nursing note and vitals reviewed        Lab Review:   Orders Only on 01/08/2020   Component Date Value    Right Eye Diabetic Retin* 01/08/2020 None     Left Eye Diabetic Retino* 01/08/2020 None    Admission on 12/07/2019, Discharged on 12/10/2019   Component Date Value    WBC 12/07/2019 9 92     RBC 12/07/2019 4 88     Hemoglobin 12/07/2019 13 7     Hematocrit 12/07/2019 44 5     MCV 12/07/2019 91     MCH 12/07/2019 28 1     MCHC 12/07/2019 30 8*    RDW 12/07/2019 14 2     MPV 12/07/2019 10 4     Platelets 11/31/7094 337     nRBC 12/07/2019 0     Neutrophils Relative 12/07/2019 74     Immat GRANS % 12/07/2019 0     Lymphocytes Relative 12/07/2019 17     Monocytes Relative 12/07/2019 8     Eosinophils Relative 12/07/2019 1     Basophils Relative 12/07/2019 0     Neutrophils Absolute 12/07/2019 7 25     Immature Grans Absolute 12/07/2019 0 04     Lymphocytes Absolute 12/07/2019 1 68     Monocytes Absolute 12/07/2019 0 83     Eosinophils Absolute 12/07/2019 0 08     Basophils Absolute 12/07/2019 0 04     Sodium 12/07/2019 133*    Potassium 12/07/2019 3 9     Chloride 12/07/2019 95*    CO2 12/07/2019 27     ANION GAP 12/07/2019 11     BUN 12/07/2019 18     Creatinine 12/07/2019 1 66*    Glucose 12/07/2019 99     Calcium 12/07/2019 9 4     AST 12/07/2019 21     ALT 12/07/2019 15     Alkaline Phosphatase 12/07/2019 117*    Total Protein 12/07/2019 8 3*    Albumin 12/07/2019 2 8*    Total Bilirubin 12/07/2019 0 30     eGFR 12/07/2019 30     Troponin I 12/07/2019 <0 02     CRP 12/07/2019 >90 0*    NT-proBNP 12/07/2019 137*    Hemoglobin A1C 12/07/2019 7 8*    EAG 12/07/2019 177     TSH 3RD GENERATON 12/07/2019 1 309     Color, UA 12/07/2019 Yellow     Clarity, UA 12/07/2019 Clear     Specific Gravity, UA 12/07/2019 <=1 005     pH, UA 12/07/2019 5 5     Leukocytes, UA 12/07/2019 Negative     Nitrite, UA 12/07/2019 Negative     Protein, UA 12/07/2019 Negative     Glucose, UA 12/07/2019 Negative     Ketones, UA 12/07/2019 Negative     Urobilinogen, UA 12/07/2019 0 2     Bilirubin, UA 12/07/2019 Negative     Blood, UA 12/07/2019 Negative     Blood Culture 12/07/2019 No Growth After 5 Days   Blood Culture 12/07/2019 No Growth After 5 Days       Sodium 12/08/2019 138     Potassium 12/08/2019 4 4     Chloride 12/08/2019 103     CO2 12/08/2019 28     ANION GAP 12/08/2019 7     BUN 12/08/2019 13     Creatinine 12/08/2019 0 92     Glucose 12/08/2019 71     Calcium 12/08/2019 8 9     AST 12/08/2019 18     ALT 12/08/2019 13     Alkaline Phosphatase 12/08/2019 100     Total Protein 12/08/2019 7 4     Albumin 12/08/2019 2 3*    Total Bilirubin 12/08/2019 0 30     eGFR 12/08/2019 62     Magnesium 12/08/2019 2 1     Phosphorus 12/08/2019 2 7     WBC 12/08/2019 6 89     RBC 12/08/2019 4 45     Hemoglobin 12/08/2019 12 6     Hematocrit 12/08/2019 41 3     MCV 12/08/2019 93     MCH 12/08/2019 28 3     MCHC 12/08/2019 30 5*    RDW 12/08/2019 14 3     MPV 12/08/2019 10 0     Platelets 26/59/2929 296     nRBC 12/08/2019 0     Neutrophils Relative 12/08/2019 90*    Immat GRANS % 12/08/2019 0     Lymphocytes Relative 12/08/2019 9*    Monocytes Relative 12/08/2019 1*    Eosinophils Relative 12/08/2019 0     Basophils Relative 12/08/2019 0     Neutrophils Absolute 12/08/2019 6 17     Immature Grans Absolute 12/08/2019 0 03     Lymphocytes Absolute 12/08/2019 0 60     Monocytes Absolute 12/08/2019 0 08*    Eosinophils Absolute 12/08/2019 0 00     Basophils Absolute 12/08/2019 0 01     Protime 12/08/2019 14 0     INR 12/08/2019 1 08     POC Glucose 12/08/2019 62*    POC Glucose 12/08/2019 224*    Procalcitonin 12/08/2019 <0 05     POC Glucose 12/08/2019 148*    Ventricular Rate 12/07/2019 99     Atrial Rate 12/07/2019 99     OH Interval 12/07/2019 154     QRSD Interval 12/07/2019 72     QT Interval 12/07/2019 350     QTC Interval 12/07/2019 450     P Axis 12/07/2019 74     QRS [place] 12/07/2019 264     T Wave Axis 12/07/2019 40     POC Glucose 12/08/2019 219*    Sodium 12/09/2019 138     Potassium 12/09/2019 4 0     Chloride 12/09/2019 105     CO2 12/09/2019 27     ANION GAP 12/09/2019 6     BUN 12/09/2019 9     Creatinine 12/09/2019 0 79     Glucose 12/09/2019 114     Calcium 12/09/2019 8 4     eGFR 12/09/2019 74     WBC 12/09/2019 10 88*    RBC 12/09/2019 4 19     Hemoglobin 12/09/2019 11 9     Hematocrit 12/09/2019 39 2     MCV 12/09/2019 94     MCH 12/09/2019 28 4     MCHC 12/09/2019 30 4*    RDW 12/09/2019 14 3     Platelets 53/18/6559 313     MPV 12/09/2019 10 4     POC Glucose 12/09/2019 97     POC Glucose 12/09/2019 144*    POC Glucose 12/09/2019 293*    POC Glucose 12/09/2019 228*    POC Glucose 12/10/2019 69     POC Glucose 12/10/2019 111     POC Glucose 12/10/2019 82

## 2020-01-28 NOTE — PATIENT INSTRUCTIONS
Follow-up for your next appointment in approximately 2 weeks  Please do not hesitate to call the office prior to your next appointment should you have any questions or concerns  Worrisome symptoms that should prompt a call to 911 or visit to the ER include chest pain, severe shortness of breath, cyanosis (blue discoloration of the skin), dizziness/light-headedness, passing out  Continue to follow up with your Primary Care Provider and any other specialists you see

## 2020-01-28 NOTE — TELEPHONE ENCOUNTER
PT NEEDS A NEW RX FOR LANTUS SOLOSTAR 100 UNITS/mL - 30 UNITS/MORNING 30 UNITS/ EVENING  SENT TO [facility]

## 2020-01-31 ENCOUNTER — TELEPHONE (OUTPATIENT)
Dept: INTERNAL MEDICINE CLINIC | Facility: CLINIC | Age: 75
End: 2020-01-31

## 2020-02-07 ENCOUNTER — HOSPITAL ENCOUNTER (OUTPATIENT)
Dept: CT IMAGING | Facility: CLINIC | Age: 75
Discharge: HOME | End: 2020-02-07
Payer: MEDICARE

## 2020-02-07 DIAGNOSIS — R06.02 SHORTNESS OF BREATH: ICD-10-CM

## 2020-02-07 DIAGNOSIS — J84.9 INTERSTITIAL LUNG DISEASE (HCC): ICD-10-CM

## 2020-02-07 PROCEDURE — 71250 CT THORAX DX C-: CPT

## 2020-02-13 ENCOUNTER — APPOINTMENT (EMERGENCY)
Dept: ULTRASOUND IMAGING | Facility: HOSPITAL | Age: 75
End: 2020-02-13
Payer: MEDICARE

## 2020-02-13 ENCOUNTER — HOSPITAL ENCOUNTER (EMERGENCY)
Facility: HOSPITAL | Age: 75
Discharge: HOME | End: 2020-02-13
Attending: EMERGENCY MEDICINE | Admitting: EMERGENCY MEDICINE
Payer: MEDICARE

## 2020-02-13 ENCOUNTER — APPOINTMENT (EMERGENCY)
Dept: RADIOLOGY | Facility: HOSPITAL | Age: 75
End: 2020-02-13
Payer: MEDICARE

## 2020-02-13 VITALS
OXYGEN SATURATION: 98 % | BODY MASS INDEX: 35.82 KG/M2 | TEMPERATURE: 98.7 F | WEIGHT: 183.42 LBS | SYSTOLIC BLOOD PRESSURE: 128 MMHG | HEART RATE: 107 BPM | DIASTOLIC BLOOD PRESSURE: 72 MMHG | RESPIRATION RATE: 20 BRPM

## 2020-02-13 DIAGNOSIS — M25.562 PAIN AND SWELLING OF LEFT KNEE: ICD-10-CM

## 2020-02-13 DIAGNOSIS — M25.462 PAIN AND SWELLING OF LEFT KNEE: ICD-10-CM

## 2020-02-13 DIAGNOSIS — G89.29 CHRONIC LEG PAIN: Primary | ICD-10-CM

## 2020-02-13 DIAGNOSIS — M79.606 CHRONIC LEG PAIN: Primary | ICD-10-CM

## 2020-02-13 LAB
ALBUMIN SERPL BCP-MCNC: 2.8 G/DL (ref 3.5–5)
ALP SERPL-CCNC: 130 U/L (ref 46–116)
ALT SERPL W P-5'-P-CCNC: 26 U/L (ref 12–78)
ANION GAP SERPL CALCULATED.3IONS-SCNC: 8 MMOL/L (ref 4–13)
AST SERPL W P-5'-P-CCNC: 19 U/L (ref 5–45)
ATRIAL RATE: 113 BPM
BACTERIA UR QL AUTO: ABNORMAL /HPF
BASOPHILS # BLD AUTO: 0.06 THOUSANDS/ΜL (ref 0–0.1)
BASOPHILS NFR BLD AUTO: 0 % (ref 0–1)
BILIRUB SERPL-MCNC: 0.5 MG/DL (ref 0.2–1)
BILIRUB UR QL STRIP: NEGATIVE
BUN SERPL-MCNC: 12 MG/DL (ref 5–25)
CALCIUM SERPL-MCNC: 8.8 MG/DL (ref 8.3–10.1)
CHLORIDE SERPL-SCNC: 93 MMOL/L (ref 100–108)
CLARITY UR: CLEAR
CO2 SERPL-SCNC: 26 MMOL/L (ref 21–32)
COLOR UR: YELLOW
CREAT SERPL-MCNC: 0.94 MG/DL (ref 0.6–1.3)
EOSINOPHIL # BLD AUTO: 0.02 THOUSAND/ΜL (ref 0–0.61)
EOSINOPHIL NFR BLD AUTO: 0 % (ref 0–6)
ERYTHROCYTE [DISTWIDTH] IN BLOOD BY AUTOMATED COUNT: 14.6 % (ref 11.6–15.1)
GFR SERPL CREATININE-BSD FRML MDRD: 60 ML/MIN/1.73SQ M
GLUCOSE SERPL-MCNC: 257 MG/DL (ref 65–140)
GLUCOSE SERPL-MCNC: 406 MG/DL (ref 65–140)
GLUCOSE SERPL-MCNC: 445 MG/DL (ref 65–140)
GLUCOSE UR STRIP-MCNC: ABNORMAL MG/DL
HCT VFR BLD AUTO: 40.8 % (ref 34.8–46.1)
HGB BLD-MCNC: 13 G/DL (ref 11.5–15.4)
HGB UR QL STRIP.AUTO: NEGATIVE
IMM GRANULOCYTES # BLD AUTO: 0.06 THOUSAND/UL (ref 0–0.2)
IMM GRANULOCYTES NFR BLD AUTO: 0 % (ref 0–2)
KETONES UR STRIP-MCNC: NEGATIVE MG/DL
LACTATE SERPL-SCNC: 2.2 MMOL/L (ref 0.5–2)
LACTATE SERPL-SCNC: 2.7 MMOL/L (ref 0.5–2)
LEUKOCYTE ESTERASE UR QL STRIP: ABNORMAL
LYMPHOCYTES # BLD AUTO: 1.82 THOUSANDS/ΜL (ref 0.6–4.47)
LYMPHOCYTES NFR BLD AUTO: 11 % (ref 14–44)
MCH RBC QN AUTO: 28.9 PG (ref 26.8–34.3)
MCHC RBC AUTO-ENTMCNC: 31.9 G/DL (ref 31.4–37.4)
MCV RBC AUTO: 91 FL (ref 82–98)
MONOCYTES # BLD AUTO: 1.3 THOUSAND/ΜL (ref 0.17–1.22)
MONOCYTES NFR BLD AUTO: 8 % (ref 4–12)
NEUTROPHILS # BLD AUTO: 13.89 THOUSANDS/ΜL (ref 1.85–7.62)
NEUTS SEG NFR BLD AUTO: 81 % (ref 43–75)
NITRITE UR QL STRIP: NEGATIVE
NON-SQ EPI CELLS URNS QL MICRO: ABNORMAL /HPF
NRBC BLD AUTO-RTO: 0 /100 WBCS
OTHER STN SPEC: ABNORMAL
P AXIS: 74 DEGREES
PH UR STRIP.AUTO: 5 [PH]
PLATELET # BLD AUTO: 302 THOUSANDS/UL (ref 149–390)
PMV BLD AUTO: 12.4 FL (ref 8.9–12.7)
POTASSIUM SERPL-SCNC: 4.2 MMOL/L (ref 3.5–5.3)
PR INTERVAL: 164 MS
PROT SERPL-MCNC: 7.6 G/DL (ref 6.4–8.2)
PROT UR STRIP-MCNC: NEGATIVE MG/DL
QRS AXIS: -43 DEGREES
QRSD INTERVAL: 80 MS
QT INTERVAL: 322 MS
QTC INTERVAL: 441 MS
RBC # BLD AUTO: 4.5 MILLION/UL (ref 3.81–5.12)
RBC #/AREA URNS AUTO: ABNORMAL /HPF
SODIUM SERPL-SCNC: 127 MMOL/L (ref 136–145)
SP GR UR STRIP.AUTO: <=1.005 (ref 1–1.03)
T WAVE AXIS: 58 DEGREES
TROPONIN I SERPL-MCNC: <0.02 NG/ML
UROBILINOGEN UR QL STRIP.AUTO: 0.2 E.U./DL
VENTRICULAR RATE: 113 BPM
WBC # BLD AUTO: 17.15 THOUSAND/UL (ref 4.31–10.16)
WBC #/AREA URNS AUTO: ABNORMAL /HPF

## 2020-02-13 PROCEDURE — 99285 EMERGENCY DEPT VISIT HI MDM: CPT

## 2020-02-13 PROCEDURE — 96360 HYDRATION IV INFUSION INIT: CPT

## 2020-02-13 PROCEDURE — 99284 EMERGENCY DEPT VISIT MOD MDM: CPT | Performed by: PHYSICIAN ASSISTANT

## 2020-02-13 PROCEDURE — 84484 ASSAY OF TROPONIN QUANT: CPT | Performed by: PHYSICIAN ASSISTANT

## 2020-02-13 PROCEDURE — 81001 URINALYSIS AUTO W/SCOPE: CPT | Performed by: PHYSICIAN ASSISTANT

## 2020-02-13 PROCEDURE — 83605 ASSAY OF LACTIC ACID: CPT | Performed by: PHYSICIAN ASSISTANT

## 2020-02-13 PROCEDURE — 82948 REAGENT STRIP/BLOOD GLUCOSE: CPT

## 2020-02-13 PROCEDURE — 93005 ELECTROCARDIOGRAM TRACING: CPT

## 2020-02-13 PROCEDURE — 73562 X-RAY EXAM OF KNEE 3: CPT

## 2020-02-13 PROCEDURE — 80053 COMPREHEN METABOLIC PANEL: CPT | Performed by: PHYSICIAN ASSISTANT

## 2020-02-13 PROCEDURE — 96372 THER/PROPH/DIAG INJ SC/IM: CPT

## 2020-02-13 PROCEDURE — 36415 COLL VENOUS BLD VENIPUNCTURE: CPT | Performed by: PHYSICIAN ASSISTANT

## 2020-02-13 PROCEDURE — 93010 ELECTROCARDIOGRAM REPORT: CPT | Performed by: INTERNAL MEDICINE

## 2020-02-13 PROCEDURE — 85025 COMPLETE CBC W/AUTO DIFF WBC: CPT | Performed by: PHYSICIAN ASSISTANT

## 2020-02-13 PROCEDURE — 93971 EXTREMITY STUDY: CPT

## 2020-02-13 PROCEDURE — 93971 EXTREMITY STUDY: CPT | Performed by: SURGERY

## 2020-02-13 RX ORDER — INSULIN GLARGINE 100 [IU]/ML
35 INJECTION, SOLUTION SUBCUTANEOUS ONCE
Status: COMPLETED | OUTPATIENT
Start: 2020-02-13 | End: 2020-02-13

## 2020-02-13 RX ORDER — HYDROMORPHONE HYDROCHLORIDE 2 MG/1
4 TABLET ORAL ONCE
Status: COMPLETED | OUTPATIENT
Start: 2020-02-13 | End: 2020-02-13

## 2020-02-13 RX ORDER — HYDROMORPHONE HYDROCHLORIDE 2 MG/1
4 TABLET ORAL ONCE
Status: DISCONTINUED | OUTPATIENT
Start: 2020-02-13 | End: 2020-02-13

## 2020-02-13 RX ADMIN — INSULIN GLARGINE 35 UNITS: 100 INJECTION, SOLUTION SUBCUTANEOUS at 07:06

## 2020-02-13 RX ADMIN — HYDROMORPHONE HYDROCHLORIDE 4 MG: 2 TABLET ORAL at 07:09

## 2020-02-13 RX ADMIN — SODIUM CHLORIDE 500 ML: 0.9 INJECTION, SOLUTION INTRAVENOUS at 07:11

## 2020-02-13 NOTE — ED NOTES
Pt assessment:  Pt arrives via EMS due to chronic diabetic nerve pain  Pt states she has pain in her legs and arms bilaterally as well as swelling in her legs  Upon inspection the pt has swollen knees, red legs bilaterally  The legs have poor capillary refill  The pt appears anxious and is tachypnic        Pamela Javed RN  02/13/20 [facility] Gianfranco Schwartz RN  02/13/20 [id number]

## 2020-02-13 NOTE — ED PROVIDER NOTES
History  Chief Complaint   Patient presents with    Pain     Pt arrives via EMS with complains of chronic diabetic nerve pain that she states is getting worse  Toño Del Rosario is a 79-year-old female with past medical history significant for chronic prescription opiate use, venous stasis dermatitis of bilateral lower extremities, immunosuppression due to Humira infusions for rheumatoid arthritis, type 2 diabetes with stage III CKD with long-term insulin use, hypertension, and chronic congestive heart failure who presents via EMS with son at bedside for complaint of worsening chronic diabetic pain  Son states that the patient checked her sugar at home, value in the 500s, did not take her insulin at all today, son's brother administered 60u of insulin  She follows with Dr Carleen Valerio of outpatient pain management, last saw him x 6 months ago  Patient's son at bedside states patient is no longer taking oxycodone or Percocet, now takes Dilaudid 4mg tid, also taking nortriptyline and amitriptyline  Son states that the patient is compliant with her diabetic medications, metformin 1000mg bid and insulin glargine 35u in AM and 30u at bedtime  She is now receiving weakly infusions of Humira for RA  She admits to increased pain in her arms and legs bilaterally  Per son, left knee looks more swollen than usual   She denies chest pain, shortness of breath, abdominal pain, nausea, vomiting  Per son, no recent falls, illness, or infection  Patient lives at home with her  and son, ambulates with a cane  Prior to Admission Medications   Prescriptions Last Dose Informant Patient Reported? Taking?    B-D TB SYRINGE 1CC/27GX1/2" 27G X 1/2" 1 ML MISC   No No   Sig: by Other route 2 (two) times a day   Blood Glucose Monitoring Suppl (ONETOUCH VERIO) w/Device KIT  Self Yes No   Sig: by Does not apply route 3 (three) times a day   DULoxetine (CYMBALTA) 30 mg delayed release capsule   No No   Sig: Take 1 capsule (30 mg total) by mouth daily   HYDROmorphone (DILAUDID) 4 mg tablet   Yes No   Sig: Take 4 mg by mouth 3 (three) times a day as needed for moderate pain   Insulin Pen Needle (B-D UF III MINI PEN NEEDLES) 31G X 5 MM MISC   No No   Sig: Inject under the skin 2 (two) times a day Use as directed   NYSTATIN powder  Self Yes No   Sig: APPLY 2-3 TIMES DAILY TO AFFECTED AREA(S)  adalimumab (HUMIRA) 40 mg/0 8 mL PSKT   Yes No   Sig: Inject 40 mg under the skin every 14 (fourteen) days   albuterol (2 5 mg/3 mL) 0 083 % nebulizer solution   No No   Sig: Take 1 vial (2 5 mg total) by nebulization every 6 (six) hours as needed for wheezing or shortness of breath   albuterol (PROAIR HFA) 90 mcg/act inhaler   No No   Sig: Inhale 2 puffs every 6 (six) hours as needed for wheezing or shortness of breath   amitriptyline (ELAVIL) 25 mg tablet   Yes No   Sig: Take 25 mg by mouth daily at bedtime   baclofen 10 mg tablet   Yes No   Sig: Take 10 mg by mouth daily at bedtime as needed   fluticasone (FLONASE) 50 mcg/act nasal spray   No No   Si spray into each nostril daily   fluticasone-vilanterol (BREO ELLIPTA) 100-25 mcg/inh inhaler   No No   Sig: Inhale 1 puff daily Rinse mouth after use     fosinopril (MONOPRIL) 10 mg tablet   No No   Sig: Take 1 tablet (10 mg total) by mouth daily   glucose blood (ONE TOUCH ULTRA TEST) test strip   No No   Si each by Other route daily Use as instructed   insulin glargine (LANTUS SOLOSTAR) 100 units/mL injection pen   No No   Sig: INJECT 30 UNITS SUBCUTANEOUSLY IN THE MORNING AND 30 UNITS IN THE EVENING   loratadine (CLARITIN) 10 mg tablet   No No   Sig: TAKE 1 TABLET BY MOUTH EVERY DAY   metFORMIN (GLUCOPHAGE) 1000 MG tablet   No No   Sig: Take 1 tablet (1,000 mg total) by mouth 2 (two) times a day   metoprolol succinate (TOPROL-XL) 25 mg 24 hr tablet   No No   Sig: Take 1 tablet (25 mg total) by mouth daily   montelukast (SINGULAIR) 10 mg tablet   No No   Sig: Take 1 tablet (10 mg total) by mouth daily at bedtime   nortriptyline (PAMELOR) 25 mg capsule   No No   Sig: TAKE 1 CAPSULE (25 MG TOTAL) BY MOUTH DAILY AT BEDTIME   nystatin (MYCOSTATIN) cream   No No   Sig: Apply 2 g (1 application total) topically 2 (two) times a day To affected area   omeprazole-sodium bicarbonate (ZEGERID)  MG per capsule   Yes No   Sig: Take 1 capsule by mouth every morning before breakfast   predniSONE 20 mg tablet   No No   Sig: Take 2 tablets (40 mg total) by mouth daily Four tablets for 3 days, 3 tabs for 3 days, 2 tabs for 3 days, 1 tab for 3 days   simvastatin (ZOCOR) 40 mg tablet   No No   Sig: Take 1 tablet (40 mg total) by mouth daily   sodium chloride (OCEAN) 0 65 % nasal spray   No No   Si spray into each nostril as needed for congestion or rhinitis   torsemide (DEMADEX) 20 mg tablet   No No   Sig: Take 2 tablets (40 mg total) by mouth 2 (two) times a day      Facility-Administered Medications: None       Past Medical History:   Diagnosis Date    Asthma     Chest pain on breathing     last assessed 2/5/15    Chronic diastolic CHF (congestive heart failure) (HCC)     Diabetes mellitus (HCC)     HTN (hypertension)     Hyperlipidemia     Insomnia     Obesity     Preop cardiovascular exam 2018    Pulmonary fibrosis (HCC)        Past Surgical History:   Procedure Laterality Date    HAND SURGERY         Family History   Problem Relation Age of Onset    Rheum arthritis Mother     Hypertension Mother      I have reviewed and agree with the history as documented      Social History     Tobacco Use    Smoking status: Former Smoker     Packs/day: 1 50     Years: 18 00     Pack years: 27 00     Types: Cigarettes     Last attempt to quit:      Years since quittin 1    Smokeless tobacco: Never Used    Tobacco comment: Quit 20 years ago   Substance Use Topics    Alcohol use: Never     Frequency: Never     Binge frequency: Never    Drug use: No       Review of Systems   Constitutional: Negative for activity change, appetite change, chills, diaphoresis, fatigue, fever and unexpected weight change  Respiratory: Negative for cough, chest tightness and shortness of breath  Cardiovascular: Positive for leg swelling  Negative for chest pain and palpitations  Gastrointestinal: Negative for abdominal distention, abdominal pain, constipation, diarrhea, nausea and vomiting  Genitourinary: Negative for decreased urine volume, difficulty urinating, dysuria, flank pain, frequency, hematuria and urgency  Musculoskeletal: Positive for arthralgias, gait problem and myalgias  Negative for back pain, joint swelling, neck pain and neck stiffness  Allergic/Immunologic: Positive for immunocompromised state  Neurological: Negative for dizziness, tremors, syncope, weakness, light-headedness and headaches  Physical Exam  Physical Exam   Constitutional: She is oriented to person, place, and time  She appears well-developed and well-nourished  She is cooperative  Non-toxic appearance  No distress  HENT:   Head: Normocephalic and atraumatic  Mouth/Throat: Oropharynx is clear and moist    Eyes: Pupils are equal, round, and reactive to light  EOM are normal    Neck: Normal range of motion  Neck supple  Cardiovascular: Normal rate, regular rhythm, S1 normal, S2 normal, normal heart sounds and intact distal pulses  No murmur heard  Pulmonary/Chest: Effort normal and breath sounds normal    Abdominal: Soft  Normal appearance, normal aorta and bowel sounds are normal  She exhibits no distension  There is no hepatosplenomegaly  There is no tenderness  Musculoskeletal: She exhibits no edema or deformity  Right knee: She exhibits decreased range of motion, swelling and bony tenderness  She exhibits no effusion, no erythema and normal alignment  Left knee: She exhibits decreased range of motion, swelling and bony tenderness  She exhibits no effusion, no erythema and normal alignment   Tenderness found    Diffuse tenderness throughout upper and lower extremities bilaterally   Lymphadenopathy:     She has no cervical adenopathy  Neurological: She is alert and oriented to person, place, and time  GCS eye subscore is 4  GCS verbal subscore is 5  GCS motor subscore is 6  Skin: Skin is warm and intact  Capillary refill takes less than 2 seconds  No abrasion, no bruising, no laceration, no lesion, no petechiae and no rash noted  No erythema  No pallor  Vitals reviewed  Vital Signs  ED Triage Vitals [02/13/20 0345]   Temperature Pulse Respirations Blood Pressure SpO2   98 7 °F (37 1 °C) (!) 118 20 131/86 97 %      Temp Source Heart Rate Source Patient Position - Orthostatic VS BP Location FiO2 (%)   Oral Monitor -- Right arm --      Pain Score       --           Vitals:    02/13/20 0345   BP: 131/86   Pulse: (!) 118         Visual Acuity      ED Medications  Medications - No data to display    Diagnostic Studies  Results Reviewed     Procedure Component Value Units Date/Time    Urine Microscopic [[id number]]  (Abnormal) Collected:  02/13/20 0501    Lab Status:  Final result Specimen:  Urine, Clean Catch Updated:  02/13/20 0517     RBC, UA None Seen /hpf      WBC, UA 2-4 /hpf      Epithelial Cells Occasional /hpf      Bacteria, UA Occasional /hpf      OTHER OBSERVATIONS Yeast Cells Present    Lactic acid, plasma x2 [[id number]]  (Abnormal) Collected:  02/13/20 0419    Lab Status:  Final result Specimen:  Blood from Arm, Right Updated:  02/13/20 0510     LACTIC ACID 2 7 mmol/L     Narrative:       Result may be elevated if tourniquet was used during collection  UA w Reflex to Microscopic w Reflex to Culture [[id number]]  (Abnormal) Collected:  02/13/20 0501    Lab Status:  Final result Specimen:  Urine, Clean Catch Updated:  02/13/20 0509     Color, UA Yellow     Clarity, UA Clear     Specific Gravity, UA <=1 005     pH, UA 5 0     Leukocytes, UA Elevated glucose may cause decreased leukocyte values   See urine microscopic for [facility] result/     Nitrite, UA Negative     Protein, UA Negative mg/dl      Glucose, UA >=1000 (1%) mg/dl      Ketones, UA Negative mg/dl      Urobilinogen, UA 0 2 E U /dl      Bilirubin, UA Negative     Blood, UA Negative    Troponin I [[id number]]  (Normal) Collected:  02/13/20 0419    Lab Status:  Final result Specimen:  Blood from Arm, Right Updated:  02/13/20 0505     Troponin I <0 02 ng/mL     Comprehensive metabolic panel [[id number]]  (Abnormal) Collected:  02/13/20 0419    Lab Status:  Final result Specimen:  Blood from Arm, Right Updated:  02/13/20 0504     Sodium 127 mmol/L      Potassium 4 2 mmol/L      Chloride 93 mmol/L      CO2 26 mmol/L      ANION GAP 8 mmol/L      BUN 12 mg/dL      Creatinine 0 94 mg/dL      Glucose 406 mg/dL      Calcium 8 8 mg/dL      AST 19 U/L      ALT 26 U/L      Alkaline Phosphatase 130 U/L      Total Protein 7 6 g/dL      Albumin 2 8 g/dL      Total Bilirubin 0 50 mg/dL      eGFR 60 ml/min/1 73sq m     Narrative:       [facility] guidelines for Chronic Kidney Disease (CKD):     Stage 1 with normal or high GFR (GFR > 90 mL/min/1 73 square meters)    Stage 2 Mild CKD (GFR = 60-89 mL/min/1 73 square meters)    Stage 3A Moderate CKD (GFR = 45-59 mL/min/1 73 square meters)    Stage 3B Moderate CKD (GFR = 30-44 mL/min/1 73 square meters)    Stage 4 Severe CKD (GFR = 15-29 mL/min/1 73 square meters)    Stage 5 End Stage CKD (GFR <15 mL/min/1 73 square meters)  Note: GFR calculation is accurate only with a steady state creatinine    CBC and differential [[id number]]  (Abnormal) Collected:  02/13/20 0419    Lab Status:  Final result Specimen:  Blood from Arm, Right Updated:  02/13/20 0436     WBC 17 15 Thousand/uL      RBC 4 50 Million/uL      Hemoglobin 13 0 g/dL      Hematocrit 40 8 %      MCV 91 fL      MCH 28 9 pg      MCHC 31 9 g/dL      RDW 14 6 %      MPV 12 4 fL      Platelets 470 Thousands/uL      nRBC 0 /100 WBCs Neutrophils Relative 81 %      Immat GRANS % 0 %      Lymphocytes Relative 11 %      Monocytes Relative 8 %      Eosinophils Relative 0 %      Basophils Relative 0 %      Neutrophils Absolute 13 89 Thousands/µL      Immature Grans Absolute 0 06 Thousand/uL      Lymphocytes Absolute 1 82 Thousands/µL      Monocytes Absolute 1 30 Thousand/µL      Eosinophils Absolute 0 02 Thousand/µL      Basophils Absolute 0 06 Thousands/µL     Lactic acid, plasma x2 [[id number]]     Lab Status:  No result Specimen:  Blood                  XR knee 3 views left non injury   ED Interpretation by Jeanne Linda PA-C (02/13 0522)   No acute disease, normal x-ray      VAS lower limb venous duplex study, unilateral/limited    (Results Pending)              Procedures  ECG 12 Lead Documentation Only  Date/Time: 2/13/2020 5:52 AM  Performed by: Jeanne Linda PA-C  Authorized by: Jeanne Linda PA-C     Indications / Diagnosis:  Poorly controlled diabetic, chronic pain  ECG reviewed by me, the ED Provider: yes    Patient location:  ED and bedside  Previous ECG:     Previous ECG:  Compared to current    Similarity:  No change    Comparison to cardiac monitor: No    Interpretation:     Interpretation: normal    Rate:     ECG rate:  113    ECG rate assessment: tachycardic    Rhythm:     Rhythm: sinus tachycardia    Ectopy:     Ectopy: none    QRS:     QRS axis:  Left    QRS intervals:  Normal  Conduction:     Conduction: normal    ST segments:     ST segments:  Normal  T waves:     T waves: normal               ED Course  ED Course as of Feb 13 0624   Thu Feb 13, 2020   0505 Hyponatremia likely pseudo due to high glucose  Lactate elevated due to dehydration  Labs otherwise non concerning  X-ray negative for acute disease    Discussed with the son at bedside that there are few options for medicinal pain management at this point, as the patient is on a strong dose of Dilaudid and last took this medication at 1am  Due to increased swelling near right knee and severe pain in this area, will get doppler US to r/o DVT  Explained to son that if doppler is negative, there is unfortunately no other treatment for the patient at this time and she needs to f/u with pain management as soon as possible  Offered the option of crisis consult to geriatric psych for weaning off opioids and learning non-opioid options for pain control however son states patient will want no part of that  MDM  Number of Diagnoses or Management Options  Chronic leg pain:   Pain and swelling of left knee:   Diagnosis management comments: 66-year-old female with past medical history significant for chronic prescription opiate use, venous stasis dermatitis of bilateral lower extremities, immunosuppression due to Humira infusions for rheumatoid arthritis, type 2 diabetes with stage III CKD with long-term insulin use, hypertension, and chronic congestive heart failure who presents for complaint of worsening chronic diabetic pain in the and lower extremities bilaterally  On exam patient, verbalizing pain repetitively, nontoxic appearance, afebrile, tachycardic, otherwise stable, awake alert and oriented, GCS 15, abdomen soft and nontender, posterior oropharynx clear and moist, lung sounds clear to auscultation bilaterally, no bodily rash, venous stasis dermatitis changes of bilateral lower extremities, swelling of bilateral knees with increased swelling of left knee, without increased heat, erythema, or skin color changes, no palpable cord, range of motion unable to be assessed due to pain, widespread pain to palpation of bilateral upper and lower extremities       Will obtain x-ray of left knee due to greater amount of swelling, verbalized that this will likely be low yield in the setting of no fall and low suspicion for fracture, more likely chronic RA pain and swelling; also consider possible DVT   Will obtain:  CBC, CMP, UA, troponin, EKG, lactate Due to the patient's chronic pain medication use, including nortriptyline, amitriptyline, and Dilaudid at a rather large dose and frequently daily, I am limited by possible pain control measures  Patient last took Dilaudid 4mg at 1am  Therefore need to hold off on further narcotics now, as as to avoid respiratory depression/decompensation, patient has hx of hypoxic respiratory failure also  Unfortunately the patient ultimately needs to follow up with her pain management doctor and rheumatologist regarding better management of this pain  Patient received 60 units of insulin all at once prior to coming to ER  Glucose rechecked after insulin and decreasing  Will hold further insulin at this time and continue to monitor serial BMPs  Amount and/or Complexity of Data Reviewed  Clinical lab tests: ordered and reviewed  Tests in the radiology section of CPT®: ordered and reviewed  Tests in the medicine section of CPT®: ordered and reviewed  Discussion of test results with the performing providers: yes  Decide to obtain previous medical records or to obtain history from someone other than the patient: yes  Obtain history from someone other than the patient: yes  Review and summarize past medical records: yes  Discuss the patient with other providers: yes  Independent visualization of images, tracings, or specimens: yes    Risk of Complications, Morbidity, and/or Mortality  General comments: Case discussed with and signed out to Fidel Jason DNP  Patient to be evaluated for DVT via doppler; if negative, son is aware of plan to f/u with pain management and rheum, no further pain medications to be given in ED here  See ED course note for dispo and plan  I reviewed and discussed all lab and imaging findings with the patient and son at bedside  I discussed emergency department return parameters       I answered any and all questions the patient and son had regarding emergency department course of evaluation and treatment  The patient and son verbalized understanding of and agreement with plan  Patient Progress  Patient progress: stable        Disposition  Final diagnoses:   Chronic leg pain   Pain and swelling of left knee     Time reflects when diagnosis was documented in both MDM as applicable and the Disposition within this note     Time User Action Codes Description Comment    2/13/2020  6:19 AM Alverta Linen Add [G89 29] Chronic pain     2/13/2020  6:21 AM Alverta Linen Add [M79 606,  G89 29] Chronic leg pain     2/13/2020  6:21 AM Alverta Linen Modify [Q21 584,  G89 29] Chronic leg pain     2/13/2020  6:21 AM Alverta Linen Remove [G89 29] Chronic pain     2/13/2020  6:23 AM Alverta Linen Add [M25 562,  M25 462] Pain and swelling of left knee       ED Disposition     ED Disposition Condition Date/Time Comment    Discharge Stable Thu Feb 13, 2020  6:19 AM Jaren Brito discharge to home/self care              Follow-up Information     Follow up With Specialties Details Why Contact Info Additional Information    Karrie Dean MD Internal Medicine Schedule an appointment as soon as possible for a visit in 3 days For further evaluation [address] Emergency Department Emergency Medicine Go to  If symptoms worsen [address] [facility] [id number] [phone] [place] ED, [address] [place], [place], [address] \[facility]\"" Pain Medicine Schedule an appointment as soon as possible for a visit in 1 day For further evaluation [address] [facility] [id number] [address], [address] [facility]    [address] Rheumatology Schedule an appointment as soon as possible for a visit in 1 day For further evaluation [address] [id number]  [address] [facility], [address], [address]  [facility]          Patient's Medications   Discharge Prescriptions    No medications on file     No discharge procedures on file      PDMP Review     None          ED Provider  Electronically Signed by           Lessie Bosworth, PA-C  02/13/20 0692

## 2020-02-18 ENCOUNTER — OFFICE VISIT (OUTPATIENT)
Dept: PULMONOLOGY | Facility: CLINIC | Age: 75
End: 2020-02-18
Payer: MEDICARE

## 2020-02-18 VITALS
OXYGEN SATURATION: 92 % | HEIGHT: 60 IN | DIASTOLIC BLOOD PRESSURE: 80 MMHG | WEIGHT: 175 LBS | SYSTOLIC BLOOD PRESSURE: 134 MMHG | HEART RATE: 91 BPM | BODY MASS INDEX: 34.36 KG/M2

## 2020-02-18 DIAGNOSIS — R06.02 SHORTNESS OF BREATH: Primary | ICD-10-CM

## 2020-02-18 DIAGNOSIS — J84.9 INTERSTITIAL LUNG DISEASE (HCC): ICD-10-CM

## 2020-02-18 DIAGNOSIS — E66.01 MORBID OBESITY (HCC): ICD-10-CM

## 2020-02-18 DIAGNOSIS — J45.40 MODERATE PERSISTENT ASTHMA WITHOUT COMPLICATION: ICD-10-CM

## 2020-02-18 DIAGNOSIS — J96.11 CHRONIC RESPIRATORY FAILURE WITH HYPOXIA (HCC): ICD-10-CM

## 2020-02-18 DIAGNOSIS — M05.79 RHEUMATOID ARTHRITIS INVOLVING MULTIPLE SITES WITH POSITIVE RHEUMATOID FACTOR (HCC): ICD-10-CM

## 2020-02-18 PROCEDURE — 2022F DILAT RTA XM EVC RTNOPTHY: CPT | Performed by: INTERNAL MEDICINE

## 2020-02-18 PROCEDURE — 99214 OFFICE O/P EST MOD 30 MIN: CPT | Performed by: INTERNAL MEDICINE

## 2020-02-18 PROCEDURE — 4040F PNEUMOC VAC/ADMIN/RCVD: CPT | Performed by: INTERNAL MEDICINE

## 2020-02-18 PROCEDURE — 1160F RVW MEDS BY RX/DR IN RCRD: CPT | Performed by: INTERNAL MEDICINE

## 2020-02-18 PROCEDURE — 3066F NEPHROPATHY DOC TX: CPT | Performed by: INTERNAL MEDICINE

## 2020-02-18 PROCEDURE — 1036F TOBACCO NON-USER: CPT | Performed by: INTERNAL MEDICINE

## 2020-02-18 PROCEDURE — 3079F DIAST BP 80-89 MM HG: CPT | Performed by: INTERNAL MEDICINE

## 2020-02-18 PROCEDURE — 3008F BODY MASS INDEX DOCD: CPT | Performed by: INTERNAL MEDICINE

## 2020-02-18 PROCEDURE — 3075F SYST BP GE 130 - 139MM HG: CPT | Performed by: INTERNAL MEDICINE

## 2020-02-18 NOTE — PROGRESS NOTES
Assessment/Plan:   Diagnoses and all orders for this visit:    Shortness of breath    Chronic respiratory failure with hypoxia (HCC)  -     Portable Concentrators    Interstitial lung disease ([facility] )  -     Portable Concentrators    Moderate persistent asthma without complication    Rheumatoid arthritis involving multiple sites with positive rheumatoid factor ([facility] )    Morbid obesity ([facility] )      Patient presents today for follow-up  She is at her baseline respiratory status      Patient was initially saturating 94% on room air at rest, and then desaturated to 83% on room air with exertion  Patient was then placed on 2 L of oxygen by nasal cannula and then made to ambulate, oxygen saturations remained greater than 91% on exertion with the 2 L  Discussed with the patient to continue with 2 L of oxygen by nasal cannula continuously with exertion  Will also order a portable concentrator for her  PFTs with moderate restriction and severely decreased DLCO  Echo showing estimated peak pressure 42 mmHg  She will continue with Breo 1 puff daily, albuterol via nebulizer as needed and the Singulair 10 mg daily at bedtime  CT of the chest discussed with the patient with bilateral stable emphysema and interstitial changes stable from before  She will continue with her rheumatologist for the treatment for the rheumatoid arthritis with methotrexate as well as Humira and the prednisone 10 mg daily as per the rheumatologist   Vaccinations up-to-date  Plan discussed with the patient's son who has accompanied this office visit  Follow-up in 6 weeks or p r n  earlier as needed  No follow-ups on file  All questions are answered to the patient's satisfaction and understanding  She verbalizes understanding  She is encouraged to call with any further questions or concerns  Portions of the record may have been created with voice recognition software    Occasional wrong word or "sound a like" substitutions may have occurred due to the inherent limitations of voice recognition software  Read the chart carefully and recognize, using context, where substitutions have occurred  Electronically Signed by Jefferson Lam MD    ______________________________________________________________________    Chief Complaint:   Chief Complaint   Patient presents with    Follow-up       Patient ID: Pascual Breaux is a 76 y o  y o  female has a past medical history of Asthma, Chest pain on breathing, Chronic diastolic CHF (congestive heart failure) ([address] ), Diabetes mellitus ([address] ), HTN (hypertension), Hyperlipidemia, Insomnia, Obesity, Preop cardiovascular exam (2/2/2018), and Pulmonary fibrosis ([facility] )  2/18/2020  Patient presents today for follow-up visit  Pascual Breaux is a very pleasant 31-year-old female former smoker with past medical history including interstitial lung disease, rheumatoid arthritis on Humira chronic respiratory failure dependent on 2 L of oxygen, asthma, emphysema, diabetes, GERD, hypertension, CHF  She presents today for follow-up  She reports her breathing is at baseline  There are currently no acute issues  There has been gradual worsening of the dyspnea on exertion for months  Of note, she was previously on 40 mg prednisone daily, now on 10 mg  She had a repeat CT of the chest done high-resolution CT of the chest to see for interstitial lung disease      Review of Systems   Constitutional: Positive for fatigue and unexpected weight change  Negative for appetite change, chills, diaphoresis and fever  HENT: Negative for congestion, ear discharge, ear pain, nosebleeds, postnasal drip, rhinorrhea, sinus pain, sore throat and voice change  Eyes: Negative for pain, discharge and visual disturbance  Respiratory: Positive for cough and shortness of breath  Negative for apnea, choking, chest tightness, wheezing and stridor  Cardiovascular: Negative for chest pain, palpitations and leg swelling     Gastrointestinal: Negative for abdominal pain, blood in stool, constipation, diarrhea and vomiting  Endocrine: Negative for cold intolerance, heat intolerance, polydipsia, polyphagia and polyuria  Genitourinary: Negative for difficulty urinating and dysuria  Musculoskeletal: Negative for arthralgias and neck pain  Skin: Negative for pallor and rash  Allergic/Immunologic: Negative for environmental allergies and food allergies  Neurological: Negative for dizziness, speech difficulty, weakness and light-headedness  Hematological: Negative for adenopathy  Does not bruise/bleed easily  Psychiatric/Behavioral: Negative for agitation, confusion and sleep disturbance  The patient is not nervous/anxious  Smoking history: She reports that she quit smoking about 23 years ago  Her smoking use included cigarettes  She has a 27 00 pack-year smoking history   She has never used smokeless tobacco     The following portions of the patient's history were reviewed and updated as appropriate: allergies, current medications, past family history, past medical history, past social history, past surgical history and problem list     Immunization History   Administered Date(s) Administered    INFLUENZA 09/01/2015, 12/20/2016, 11/10/2017    Influenza Split High Dose Preservative Free IM 12/20/2016, 11/10/2017    Influenza TIV (IM) 10/01/2014, 09/01/2015    Influenza, high dose seasonal 0 5 mL 10/17/2018, 09/29/2019    Pneumococcal Conjugate 13-Valent 07/03/2019    Pneumococcal Polysaccharide PPV23 04/21/2008, 12/20/2016    Tdap 1945     Current Outpatient Medications   Medication Sig Dispense Refill    adalimumab (HUMIRA) 40 mg/0 8 mL PSKT Inject 40 mg under the skin every 14 (fourteen) days      albuterol (2 5 mg/3 mL) 0 083 % nebulizer solution Take 1 vial (2 5 mg total) by nebulization every 6 (six) hours as needed for wheezing or shortness of breath 120 vial 3    albuterol (PROAIR HFA) 90 mcg/act inhaler Inhale 2 puffs every 6 (six) hours as needed for wheezing or shortness of breath 1 Inhaler 3    amitriptyline (ELAVIL) 25 mg tablet Take 25 mg by mouth daily at bedtime      B-D TB SYRINGE 1CC/27GX1/2" 27G X 1/2" 1 ML MISC by Other route 2 (two) times a day 100 each 3    baclofen 10 mg tablet Take 10 mg by mouth daily at bedtime as needed  5    Blood Glucose Monitoring Suppl (ONETOUCH VERIO) w/Device KIT by Does not apply route 3 (three) times a day      DULoxetine (CYMBALTA) 30 mg delayed release capsule Take 1 capsule (30 mg total) by mouth daily 90 capsule 3    fluticasone (FLONASE) 50 mcg/act nasal spray 1 spray into each nostril daily 1 Bottle 3    fluticasone-vilanterol (BREO ELLIPTA) 100-25 mcg/inh inhaler Inhale 1 puff daily Rinse mouth after use   1 Inhaler 0    fosinopril (MONOPRIL) 10 mg tablet Take 1 tablet (10 mg total) by mouth daily 90 tablet 2    glucose blood (ONE TOUCH ULTRA TEST) test strip 1 each by Other route daily Use as instructed 100 each 3    HYDROmorphone (DILAUDID) 4 mg tablet Take 4 mg by mouth 3 (three) times a day as needed for moderate pain      insulin glargine (LANTUS SOLOSTAR) 100 units/mL injection pen INJECT 30 UNITS SUBCUTANEOUSLY IN THE MORNING AND 30 UNITS IN THE EVENING 5 pen 0    Insulin Pen Needle (B-D UF III MINI PEN NEEDLES) 31G X 5 MM MISC Inject under the skin 2 (two) times a day Use as directed 200 each 3    loratadine (CLARITIN) 10 mg tablet TAKE 1 TABLET BY MOUTH EVERY DAY 15 tablet 0    metFORMIN (GLUCOPHAGE) 1000 MG tablet Take 1 tablet (1,000 mg total) by mouth 2 (two) times a day 180 tablet 3    metoprolol succinate (TOPROL-XL) 25 mg 24 hr tablet Take 1 tablet (25 mg total) by mouth daily 90 tablet 2    montelukast (SINGULAIR) 10 mg tablet Take 1 tablet (10 mg total) by mouth daily at bedtime 30 tablet 3    nortriptyline (PAMELOR) 25 mg capsule TAKE 1 CAPSULE (25 MG TOTAL) BY MOUTH DAILY AT BEDTIME 90 capsule 0    nystatin (MYCOSTATIN) cream Apply 2 g (1 application total) topically 2 (two) times a day To affected area 30 g 2    NYSTATIN powder APPLY 2-3 TIMES DAILY TO AFFECTED AREA(S)   5    omeprazole-sodium bicarbonate (ZEGERID)  MG per capsule Take 1 capsule by mouth every morning before breakfast      predniSONE 20 mg tablet Take 2 tablets (40 mg total) by mouth daily Four tablets for 3 days, 3 tabs for 3 days, 2 tabs for 3 days, 1 tab for 3 days 30 tablet 0    simvastatin (ZOCOR) 40 mg tablet Take 1 tablet (40 mg total) by mouth daily 90 tablet 2    sodium chloride (OCEAN) 0 65 % nasal spray 1 spray into each nostril as needed for congestion or rhinitis 30 mL 3    torsemide (DEMADEX) 20 mg tablet Take 2 tablets (40 mg total) by mouth 2 (two) times a day 360 tablet 3     No current facility-administered medications for this visit  Allergies: Gabapentin and Vancomycin    Objective:  Vitals:    02/18/20 1437   BP: 134/80   Pulse: 91   SpO2: 92%   Weight: 79 4 kg (175 lb)   Height: 5' (1 524 m)   Oxygen Therapy  SpO2: 92 %    Wt Readings from Last 3 Encounters:   02/18/20 79 4 kg (175 lb)   02/13/20 83 2 kg (183 lb 6 8 oz)   01/28/20 81 6 kg (180 lb)     Body mass index is 34 18 kg/m²  Physical Exam   Constitutional: She is oriented to person, place, and time  She appears well-developed and well-nourished  HENT:   Head: Normocephalic and atraumatic  Eyes: Pupils are equal, round, and reactive to light  Conjunctivae are normal    Neck: Normal range of motion  Neck supple  No JVD present  No thyromegaly present  Cardiovascular: Normal rate, regular rhythm and normal heart sounds  Exam reveals no gallop and no friction rub  No murmur heard  Pulmonary/Chest: Effort normal  No respiratory distress  She has no wheezes  She has rales  She exhibits no tenderness  Abdominal: Soft  Bowel sounds are normal    Musculoskeletal: Normal range of motion  She exhibits no edema, tenderness or deformity  Lymphadenopathy:     She has no cervical adenopathy  Neurological: She is alert and oriented to person, place, and time  Skin: Skin is warm and dry  Psychiatric: She has a normal mood and affect  Nursing note and vitals reviewed  Diagnostics:  I have personally reviewed pertinent reports  Ct Chest High Resolution    Result Date: 2/11/2020  Narrative: CT CHEST INCLUDING HIGH RESOLUTION SLICES - WITHOUT CONTRAST INDICATION:   J84 9: Interstitial pulmonary disease, unspecified R06 02: Shortness of breath  COMPARISON:  7/23/2019  TECHNIQUE: CT examination of the chest was performed without intravenous contrast   Contiguous 1 25 mm transverse images were obtained along with 1 x 10 mm transverse images with the patient prone  Additional thin section images were performed at 10 mm intervals in supine and prone positioning in inspiration as well as supine in expiration  Reformatted images were created in sagittal, and coronal planes  MIP reconstructions were created in the transverse and coronal planes along with minIP reconstructions in the coronal plane  Sagittal reformatted images were also created  Radiation dose length product (DLP) for this visit:  487 mGy-cm   This examination, like all CT scans performed in the [facility], was performed utilizing techniques to minimize radiation dose exposure, including the use of iterative reconstruction and automated exposure control  FINDINGS: LUNGS:  Stable background emphysema is again identified with associated superimposed subpleural interstitial lung disease noted  No groundglass opacity is seen to suggest active disease  There are no focal pulmonary nodules  PLEURA:  Unremarkable  VESSELS:  Unremarkable for patient's age  HEART:  Unremarkable  MEDIASTINUM AND ISRRAEL:  Calcified hilar and mediastinal lymph nodes are present  CHEST WALL AND LOWER NECK:   Unremarkable  VISUALIZED STRUCTURES IN THE UPPER ABDOMEN:  Unremarkable   OSSEOUS STRUCTURES:  No acute fracture or destructive osseous lesion  Impression: Stable appearance of the patient's combined emphysema and interstitial lung disease  Workstation performed: [id number]     Vas Lower Limb Venous Duplex Study, Unilateral/limited    Result Date: 2/13/2020  Narrative:  [facility] REPORT CLINICAL: Indications: Left Limb Pain [M79 609]  The patient presents with chronic leg pain  Operative History: No heart or vascular surgeries Risk Factors The patient has history of Obesity, Diabetes (Yes) and HLD  She has no history of DVT  FINDINGS:  Segment   Right            Left                Impression       Impression       CFV       Normal (Patent)  Normal (Patent)  Peroneal                   Not Visualized      CONCLUSION: Impression: RIGHT LOWER LIMB LIMITED: Evaluation shows no evidence of thrombus in the common femoral vein  Doppler evaluation shows a normal response to augmentation maneuvers  LEFT LOWER LIMB: No gross evidence of acute deep vein thrombosis No evidence of superficial thrombophlebitis noted  Doppler evaluation shows a normal response to augmentation maneuvers  Popliteal and posterior tibial arterial Doppler waveforms are biphasic  Tech note: Compressions were no performed, the patient refused compressions  Vessels evaluated with color and spectral Doppler flow only  Limited exam secondary to patient's intolerance  Technical findings were given to Winter Antony at time of study    SIGNATURE: Electronically Signed by: Andry Sheikh MD, RPVI on 2020-02-13 09:52:29 AM

## 2020-02-19 ENCOUNTER — TELEPHONE (OUTPATIENT)
Dept: PULMONOLOGY | Facility: CLINIC | Age: 75
End: 2020-02-19

## 2020-03-06 ENCOUNTER — TELEPHONE (OUTPATIENT)
Dept: INTERNAL MEDICINE CLINIC | Facility: CLINIC | Age: 75
End: 2020-03-06

## 2020-03-06 NOTE — TELEPHONE ENCOUNTER
Spoke to the patient and told her to increase the dose of the Lantus to 40 units in the morning and 35 units in the evening

## 2020-03-06 NOTE — TELEPHONE ENCOUNTER
Pt and daughter Ralph Cifuentes called on 3 way  Pt stated her blood sugar was 534  Pt said she was feeling dizzy and tired  Pt also said she had a cortisone injection yesterday and was told her sugar may spike  Dr Denton Arambula was informed of this and then put on the phone with the pt and her daughter  I will forward message to Dr Denton Arambula for her to add the instructions that she gave to pt

## 2020-03-10 DIAGNOSIS — J30.9 ALLERGIC RHINITIS, UNSPECIFIED SEASONALITY, UNSPECIFIED TRIGGER: ICD-10-CM

## 2020-03-10 RX ORDER — FLUTICASONE PROPIONATE 50 MCG
1 SPRAY, SUSPENSION (ML) NASAL DAILY
Qty: 3 BOTTLE | Refills: 3 | Status: CANCELLED | OUTPATIENT
Start: 2020-03-10

## 2020-03-19 DIAGNOSIS — N18.30 TYPE 2 DIABETES MELLITUS WITH STAGE 3 CHRONIC KIDNEY DISEASE, WITH LONG-TERM CURRENT USE OF INSULIN (HCC): ICD-10-CM

## 2020-03-19 DIAGNOSIS — Z79.4 TYPE 2 DIABETES MELLITUS WITH STAGE 3 CHRONIC KIDNEY DISEASE, WITH LONG-TERM CURRENT USE OF INSULIN (HCC): ICD-10-CM

## 2020-03-19 DIAGNOSIS — E11.22 TYPE 2 DIABETES MELLITUS WITH STAGE 3 CHRONIC KIDNEY DISEASE, WITH LONG-TERM CURRENT USE OF INSULIN (HCC): ICD-10-CM

## 2020-03-19 NOTE — TELEPHONE ENCOUNTER
NEEDS REFILL : insulin glargine (LANTUS SOLOSTAR) 100 units/mL injection pen    glucose blood (ONE TOUCH ULTRA TEST) test strip    [facility] # [phone]    [facility] # [phone]

## 2020-03-21 DIAGNOSIS — F51.01 PRIMARY INSOMNIA: ICD-10-CM

## 2020-03-23 RX ORDER — MIRTAZAPINE 15 MG/1
15 TABLET, FILM COATED ORAL
Qty: 90 TABLET | Refills: 2 | Status: SHIPPED | OUTPATIENT
Start: 2020-03-23 | End: 2020-06-22

## 2020-04-07 ENCOUNTER — TELEMEDICINE (OUTPATIENT)
Dept: PULMONOLOGY | Facility: CLINIC | Age: 75
End: 2020-04-07
Payer: MEDICARE

## 2020-04-07 DIAGNOSIS — J96.11 CHRONIC RESPIRATORY FAILURE WITH HYPOXIA (HCC): ICD-10-CM

## 2020-04-07 DIAGNOSIS — R06.02 SHORTNESS OF BREATH: Primary | ICD-10-CM

## 2020-04-07 DIAGNOSIS — J45.40 MODERATE PERSISTENT ASTHMA WITHOUT COMPLICATION: ICD-10-CM

## 2020-04-07 DIAGNOSIS — M05.79 RHEUMATOID ARTHRITIS INVOLVING MULTIPLE SITES WITH POSITIVE RHEUMATOID FACTOR (HCC): ICD-10-CM

## 2020-04-07 DIAGNOSIS — J84.9 INTERSTITIAL LUNG DISEASE (HCC): ICD-10-CM

## 2020-04-07 PROCEDURE — 99441 PR PHYS/QHP TELEPHONE EVALUATION 5-10 MIN: CPT | Performed by: INTERNAL MEDICINE

## 2020-04-08 DIAGNOSIS — F51.01 PRIMARY INSOMNIA: ICD-10-CM

## 2020-04-08 RX ORDER — NORTRIPTYLINE HYDROCHLORIDE 25 MG/1
25 CAPSULE ORAL
Qty: 90 CAPSULE | Refills: 0 | Status: SHIPPED | OUTPATIENT
Start: 2020-04-08 | End: 2020-06-22

## 2020-04-10 DIAGNOSIS — E11.22 TYPE 2 DIABETES MELLITUS WITH STAGE 3 CHRONIC KIDNEY DISEASE, WITH LONG-TERM CURRENT USE OF INSULIN (HCC): ICD-10-CM

## 2020-04-10 DIAGNOSIS — Z79.4 TYPE 2 DIABETES MELLITUS WITH STAGE 3 CHRONIC KIDNEY DISEASE, WITH LONG-TERM CURRENT USE OF INSULIN (HCC): ICD-10-CM

## 2020-04-10 DIAGNOSIS — N18.30 TYPE 2 DIABETES MELLITUS WITH STAGE 3 CHRONIC KIDNEY DISEASE, WITH LONG-TERM CURRENT USE OF INSULIN (HCC): ICD-10-CM

## 2020-04-11 DIAGNOSIS — I10 ESSENTIAL HYPERTENSION: ICD-10-CM

## 2020-04-13 DIAGNOSIS — N18.30 TYPE 2 DIABETES MELLITUS WITH STAGE 3 CHRONIC KIDNEY DISEASE, WITH LONG-TERM CURRENT USE OF INSULIN (HCC): ICD-10-CM

## 2020-04-13 DIAGNOSIS — Z79.4 TYPE 2 DIABETES MELLITUS WITH STAGE 3 CHRONIC KIDNEY DISEASE, WITH LONG-TERM CURRENT USE OF INSULIN (HCC): ICD-10-CM

## 2020-04-13 DIAGNOSIS — E11.22 TYPE 2 DIABETES MELLITUS WITH STAGE 3 CHRONIC KIDNEY DISEASE, WITH LONG-TERM CURRENT USE OF INSULIN (HCC): ICD-10-CM

## 2020-04-13 RX ORDER — INSULIN GLARGINE 100 [IU]/ML
INJECTION, SOLUTION SUBCUTANEOUS
Qty: 5 PEN | Refills: 5 | Status: SHIPPED | OUTPATIENT
Start: 2020-04-13 | End: 2020-04-13 | Stop reason: SDUPTHER

## 2020-04-13 RX ORDER — FOSINOPRIL SODIUM 10 MG/1
TABLET ORAL
Qty: 90 TABLET | Refills: 2 | Status: SHIPPED | OUTPATIENT
Start: 2020-04-13 | End: 2021-01-11

## 2020-04-24 ENCOUNTER — TELEPHONE (OUTPATIENT)
Dept: PULMONOLOGY | Facility: CLINIC | Age: 75
End: 2020-04-24

## 2020-04-24 DIAGNOSIS — J30.9 ALLERGIC RHINITIS, UNSPECIFIED SEASONALITY, UNSPECIFIED TRIGGER: ICD-10-CM

## 2020-05-12 ENCOUNTER — TELEMEDICINE (OUTPATIENT)
Dept: CARDIOLOGY CLINIC | Facility: CLINIC | Age: 75
End: 2020-05-12
Payer: MEDICARE

## 2020-05-12 DIAGNOSIS — E66.9 OBESITY (BMI 30-39.9): ICD-10-CM

## 2020-05-12 DIAGNOSIS — E78.2 MIXED HYPERLIPIDEMIA: ICD-10-CM

## 2020-05-12 DIAGNOSIS — I50.32 CHRONIC DIASTOLIC CONGESTIVE HEART FAILURE (HCC): Primary | ICD-10-CM

## 2020-05-12 DIAGNOSIS — I10 ESSENTIAL HYPERTENSION: ICD-10-CM

## 2020-05-12 PROCEDURE — 99214 OFFICE O/P EST MOD 30 MIN: CPT | Performed by: INTERNAL MEDICINE

## 2020-05-12 RX ORDER — LISINOPRIL 10 MG/1
10 TABLET ORAL DAILY
COMMUNITY
End: 2020-11-17

## 2020-05-13 ENCOUNTER — OFFICE VISIT (OUTPATIENT)
Dept: INTERNAL MEDICINE CLINIC | Facility: CLINIC | Age: 75
End: 2020-05-13
Payer: MEDICARE

## 2020-05-13 ENCOUNTER — TELEPHONE (OUTPATIENT)
Dept: INTERNAL MEDICINE CLINIC | Facility: CLINIC | Age: 75
End: 2020-05-13

## 2020-05-13 VITALS
SYSTOLIC BLOOD PRESSURE: 134 MMHG | HEART RATE: 112 BPM | BODY MASS INDEX: 36.32 KG/M2 | HEIGHT: 60 IN | WEIGHT: 185 LBS | DIASTOLIC BLOOD PRESSURE: 74 MMHG | OXYGEN SATURATION: 94 %

## 2020-05-13 DIAGNOSIS — N18.30 TYPE 2 DIABETES MELLITUS WITH STAGE 3 CHRONIC KIDNEY DISEASE, WITH LONG-TERM CURRENT USE OF INSULIN (HCC): ICD-10-CM

## 2020-05-13 DIAGNOSIS — Z79.4 TYPE 2 DIABETES MELLITUS WITH STAGE 3 CHRONIC KIDNEY DISEASE, WITH LONG-TERM CURRENT USE OF INSULIN (HCC): ICD-10-CM

## 2020-05-13 DIAGNOSIS — M19.90 OSTEOARTHROPATHY: ICD-10-CM

## 2020-05-13 DIAGNOSIS — B36.9 FUNGAL INFECTION OF SKIN: Primary | ICD-10-CM

## 2020-05-13 DIAGNOSIS — E11.22 TYPE 2 DIABETES MELLITUS WITH STAGE 3 CHRONIC KIDNEY DISEASE, WITH LONG-TERM CURRENT USE OF INSULIN (HCC): ICD-10-CM

## 2020-05-13 PROCEDURE — 3078F DIAST BP <80 MM HG: CPT | Performed by: NURSE PRACTITIONER

## 2020-05-13 PROCEDURE — 2022F DILAT RTA XM EVC RTNOPTHY: CPT | Performed by: NURSE PRACTITIONER

## 2020-05-13 PROCEDURE — 1160F RVW MEDS BY RX/DR IN RCRD: CPT | Performed by: NURSE PRACTITIONER

## 2020-05-13 PROCEDURE — 3066F NEPHROPATHY DOC TX: CPT | Performed by: NURSE PRACTITIONER

## 2020-05-13 PROCEDURE — 1036F TOBACCO NON-USER: CPT | Performed by: NURSE PRACTITIONER

## 2020-05-13 PROCEDURE — 4040F PNEUMOC VAC/ADMIN/RCVD: CPT | Performed by: NURSE PRACTITIONER

## 2020-05-13 PROCEDURE — 99213 OFFICE O/P EST LOW 20 MIN: CPT | Performed by: NURSE PRACTITIONER

## 2020-05-13 PROCEDURE — 3008F BODY MASS INDEX DOCD: CPT | Performed by: NURSE PRACTITIONER

## 2020-05-13 PROCEDURE — 3075F SYST BP GE 130 - 139MM HG: CPT | Performed by: NURSE PRACTITIONER

## 2020-05-13 RX ORDER — CLOTRIMAZOLE 1 %
CREAM (GRAM) TOPICAL 2 TIMES DAILY
Qty: 30 G | Refills: 0 | Status: SHIPPED | OUTPATIENT
Start: 2020-05-13 | End: 2020-11-17

## 2020-05-22 DIAGNOSIS — F33.41 RECURRENT MAJOR DEPRESSIVE DISORDER, IN PARTIAL REMISSION (HCC): ICD-10-CM

## 2020-05-22 RX ORDER — ESCITALOPRAM OXALATE 20 MG/1
TABLET ORAL
Qty: 90 TABLET | Refills: 2 | Status: SHIPPED | OUTPATIENT
Start: 2020-05-22 | End: 2021-02-12

## 2020-06-01 ENCOUNTER — HOSPITAL ENCOUNTER (OUTPATIENT)
Dept: MAMMOGRAPHY | Facility: CLINIC | Age: 75
Discharge: HOME | End: 2020-06-01
Payer: MEDICARE

## 2020-06-01 DIAGNOSIS — M19.90 OSTEOARTHROPATHY: ICD-10-CM

## 2020-06-01 PROCEDURE — 77080 DXA BONE DENSITY AXIAL: CPT

## 2020-06-02 ENCOUNTER — TELEPHONE (OUTPATIENT)
Dept: INTERNAL MEDICINE CLINIC | Facility: CLINIC | Age: 75
End: 2020-06-02

## 2020-06-19 ENCOUNTER — TELEPHONE (OUTPATIENT)
Dept: INTERNAL MEDICINE CLINIC | Facility: CLINIC | Age: 75
End: 2020-06-19

## 2020-06-21 DIAGNOSIS — N18.30 TYPE 2 DIABETES MELLITUS WITH STAGE 3 CHRONIC KIDNEY DISEASE, WITH LONG-TERM CURRENT USE OF INSULIN (HCC): ICD-10-CM

## 2020-06-21 DIAGNOSIS — E11.22 TYPE 2 DIABETES MELLITUS WITH STAGE 3 CHRONIC KIDNEY DISEASE, WITH LONG-TERM CURRENT USE OF INSULIN (HCC): ICD-10-CM

## 2020-06-21 DIAGNOSIS — Z79.4 TYPE 2 DIABETES MELLITUS WITH STAGE 3 CHRONIC KIDNEY DISEASE, WITH LONG-TERM CURRENT USE OF INSULIN (HCC): ICD-10-CM

## 2020-06-21 DIAGNOSIS — M05.9 RHEUMATOID ARTHRITIS WITH POSITIVE RHEUMATOID FACTOR, INVOLVING UNSPECIFIED SITE (HCC): ICD-10-CM

## 2020-06-22 ENCOUNTER — APPOINTMENT (OUTPATIENT)
Dept: LAB | Facility: CLINIC | Age: 75
End: 2020-06-22
Payer: MEDICARE

## 2020-06-22 ENCOUNTER — OFFICE VISIT (OUTPATIENT)
Dept: INTERNAL MEDICINE CLINIC | Facility: CLINIC | Age: 75
End: 2020-06-22
Payer: MEDICARE

## 2020-06-22 VITALS
SYSTOLIC BLOOD PRESSURE: 132 MMHG | TEMPERATURE: 97.3 F | DIASTOLIC BLOOD PRESSURE: 64 MMHG | WEIGHT: 199.2 LBS | RESPIRATION RATE: 16 BRPM | BODY MASS INDEX: 39.11 KG/M2 | HEIGHT: 60 IN | HEART RATE: 76 BPM

## 2020-06-22 DIAGNOSIS — E66.9 OBESITY (BMI 30-39.9): ICD-10-CM

## 2020-06-22 DIAGNOSIS — M05.79 RHEUMATOID ARTHRITIS INVOLVING MULTIPLE SITES WITH POSITIVE RHEUMATOID FACTOR (HCC): ICD-10-CM

## 2020-06-22 DIAGNOSIS — E11.22 TYPE 2 DIABETES MELLITUS WITH STAGE 3 CHRONIC KIDNEY DISEASE, WITH LONG-TERM CURRENT USE OF INSULIN (HCC): Primary | ICD-10-CM

## 2020-06-22 DIAGNOSIS — Z79.4 TYPE 2 DIABETES MELLITUS WITH STAGE 3 CHRONIC KIDNEY DISEASE, WITH LONG-TERM CURRENT USE OF INSULIN (HCC): Primary | ICD-10-CM

## 2020-06-22 DIAGNOSIS — I50.32 CHRONIC DIASTOLIC CONGESTIVE HEART FAILURE (HCC): ICD-10-CM

## 2020-06-22 DIAGNOSIS — E78.2 MIXED HYPERLIPIDEMIA: ICD-10-CM

## 2020-06-22 DIAGNOSIS — E11.22 TYPE 2 DIABETES MELLITUS WITH STAGE 3 CHRONIC KIDNEY DISEASE, WITH LONG-TERM CURRENT USE OF INSULIN (HCC): ICD-10-CM

## 2020-06-22 DIAGNOSIS — Z79.4 TYPE 2 DIABETES MELLITUS WITH STAGE 3 CHRONIC KIDNEY DISEASE, WITH LONG-TERM CURRENT USE OF INSULIN (HCC): ICD-10-CM

## 2020-06-22 DIAGNOSIS — N18.30 TYPE 2 DIABETES MELLITUS WITH STAGE 3 CHRONIC KIDNEY DISEASE, WITH LONG-TERM CURRENT USE OF INSULIN (HCC): Primary | ICD-10-CM

## 2020-06-22 DIAGNOSIS — I10 ESSENTIAL HYPERTENSION: ICD-10-CM

## 2020-06-22 DIAGNOSIS — F33.41 RECURRENT MAJOR DEPRESSIVE DISORDER, IN PARTIAL REMISSION (HCC): ICD-10-CM

## 2020-06-22 DIAGNOSIS — N18.30 TYPE 2 DIABETES MELLITUS WITH STAGE 3 CHRONIC KIDNEY DISEASE, WITH LONG-TERM CURRENT USE OF INSULIN (HCC): ICD-10-CM

## 2020-06-22 LAB
ALBUMIN SERPL BCP-MCNC: 3.2 G/DL (ref 3.5–5)
ALP SERPL-CCNC: 121 U/L (ref 46–116)
ALT SERPL W P-5'-P-CCNC: 26 U/L (ref 12–78)
ANION GAP SERPL CALCULATED.3IONS-SCNC: 5 MMOL/L (ref 4–13)
AST SERPL W P-5'-P-CCNC: 21 U/L (ref 5–45)
BASOPHILS # BLD AUTO: 0.05 THOUSANDS/ΜL (ref 0–0.1)
BASOPHILS NFR BLD AUTO: 1 % (ref 0–1)
BILIRUB SERPL-MCNC: 0.31 MG/DL (ref 0.2–1)
BUN SERPL-MCNC: 27 MG/DL (ref 5–25)
CALCIUM SERPL-MCNC: 9.5 MG/DL (ref 8.3–10.1)
CHLORIDE SERPL-SCNC: 106 MMOL/L (ref 100–108)
CHOLEST SERPL-MCNC: 198 MG/DL (ref 50–200)
CO2 SERPL-SCNC: 27 MMOL/L (ref 21–32)
CREAT SERPL-MCNC: 0.88 MG/DL (ref 0.6–1.3)
EOSINOPHIL # BLD AUTO: 0.21 THOUSAND/ΜL (ref 0–0.61)
EOSINOPHIL NFR BLD AUTO: 2 % (ref 0–6)
ERYTHROCYTE [DISTWIDTH] IN BLOOD BY AUTOMATED COUNT: 14.8 % (ref 11.6–15.1)
EST. AVERAGE GLUCOSE BLD GHB EST-MCNC: 235 MG/DL
GFR SERPL CREATININE-BSD FRML MDRD: 64 ML/MIN/1.73SQ M
GLUCOSE P FAST SERPL-MCNC: 77 MG/DL (ref 65–99)
HBA1C MFR BLD: 9.8 %
HCT VFR BLD AUTO: 40.9 % (ref 34.8–46.1)
HDLC SERPL-MCNC: 90 MG/DL
HGB BLD-MCNC: 12.7 G/DL (ref 11.5–15.4)
IMM GRANULOCYTES # BLD AUTO: 0.04 THOUSAND/UL (ref 0–0.2)
IMM GRANULOCYTES NFR BLD AUTO: 0 % (ref 0–2)
LDLC SERPL CALC-MCNC: 90 MG/DL (ref 0–100)
LYMPHOCYTES # BLD AUTO: 4.36 THOUSANDS/ΜL (ref 0.6–4.47)
LYMPHOCYTES NFR BLD AUTO: 42 % (ref 14–44)
MCH RBC QN AUTO: 28.4 PG (ref 26.8–34.3)
MCHC RBC AUTO-ENTMCNC: 31.1 G/DL (ref 31.4–37.4)
MCV RBC AUTO: 92 FL (ref 82–98)
MONOCYTES # BLD AUTO: 1.08 THOUSAND/ΜL (ref 0.17–1.22)
MONOCYTES NFR BLD AUTO: 11 % (ref 4–12)
NEUTROPHILS # BLD AUTO: 4.58 THOUSANDS/ΜL (ref 1.85–7.62)
NEUTS SEG NFR BLD AUTO: 44 % (ref 43–75)
NONHDLC SERPL-MCNC: 108 MG/DL
NRBC BLD AUTO-RTO: 0 /100 WBCS
PLATELET # BLD AUTO: 280 THOUSANDS/UL (ref 149–390)
PMV BLD AUTO: 11.2 FL (ref 8.9–12.7)
POTASSIUM SERPL-SCNC: 4.5 MMOL/L (ref 3.5–5.3)
PROT SERPL-MCNC: 8.1 G/DL (ref 6.4–8.2)
RBC # BLD AUTO: 4.47 MILLION/UL (ref 3.81–5.12)
SODIUM SERPL-SCNC: 138 MMOL/L (ref 136–145)
TRIGL SERPL-MCNC: 90 MG/DL
TSH SERPL DL<=0.05 MIU/L-ACNC: 3.36 UIU/ML (ref 0.36–3.74)
WBC # BLD AUTO: 10.32 THOUSAND/UL (ref 4.31–10.16)

## 2020-06-22 PROCEDURE — 85025 COMPLETE CBC W/AUTO DIFF WBC: CPT

## 2020-06-22 PROCEDURE — 99214 OFFICE O/P EST MOD 30 MIN: CPT | Performed by: INTERNAL MEDICINE

## 2020-06-22 PROCEDURE — 84443 ASSAY THYROID STIM HORMONE: CPT

## 2020-06-22 PROCEDURE — 3066F NEPHROPATHY DOC TX: CPT | Performed by: INTERNAL MEDICINE

## 2020-06-22 PROCEDURE — 3075F SYST BP GE 130 - 139MM HG: CPT | Performed by: INTERNAL MEDICINE

## 2020-06-22 PROCEDURE — 1036F TOBACCO NON-USER: CPT | Performed by: INTERNAL MEDICINE

## 2020-06-22 PROCEDURE — 1160F RVW MEDS BY RX/DR IN RCRD: CPT | Performed by: INTERNAL MEDICINE

## 2020-06-22 PROCEDURE — 80061 LIPID PANEL: CPT

## 2020-06-22 PROCEDURE — 2022F DILAT RTA XM EVC RTNOPTHY: CPT | Performed by: INTERNAL MEDICINE

## 2020-06-22 PROCEDURE — 3008F BODY MASS INDEX DOCD: CPT | Performed by: INTERNAL MEDICINE

## 2020-06-22 PROCEDURE — 4040F PNEUMOC VAC/ADMIN/RCVD: CPT | Performed by: INTERNAL MEDICINE

## 2020-06-22 PROCEDURE — 3078F DIAST BP <80 MM HG: CPT | Performed by: INTERNAL MEDICINE

## 2020-06-22 PROCEDURE — 80053 COMPREHEN METABOLIC PANEL: CPT

## 2020-06-22 PROCEDURE — 83036 HEMOGLOBIN GLYCOSYLATED A1C: CPT

## 2020-06-22 PROCEDURE — 36415 COLL VENOUS BLD VENIPUNCTURE: CPT

## 2020-06-22 RX ORDER — DULOXETIN HYDROCHLORIDE 20 MG/1
CAPSULE, DELAYED RELEASE ORAL
Qty: 90 CAPSULE | Refills: 3 | Status: SHIPPED | OUTPATIENT
Start: 2020-06-22 | End: 2020-06-22

## 2020-06-23 ENCOUNTER — TELEPHONE (OUTPATIENT)
Dept: INTERNAL MEDICINE CLINIC | Facility: CLINIC | Age: 75
End: 2020-06-23

## 2020-06-23 DIAGNOSIS — N18.30 TYPE 2 DIABETES MELLITUS WITH STAGE 3 CHRONIC KIDNEY DISEASE, WITH LONG-TERM CURRENT USE OF INSULIN (HCC): ICD-10-CM

## 2020-06-23 DIAGNOSIS — E11.22 TYPE 2 DIABETES MELLITUS WITH STAGE 3 CHRONIC KIDNEY DISEASE, WITH LONG-TERM CURRENT USE OF INSULIN (HCC): ICD-10-CM

## 2020-06-23 DIAGNOSIS — Z79.4 TYPE 2 DIABETES MELLITUS WITH STAGE 3 CHRONIC KIDNEY DISEASE, WITH LONG-TERM CURRENT USE OF INSULIN (HCC): ICD-10-CM

## 2020-07-06 DIAGNOSIS — I10 ESSENTIAL HYPERTENSION: ICD-10-CM

## 2020-07-06 RX ORDER — TORSEMIDE 20 MG/1
40 TABLET ORAL 2 TIMES DAILY
Qty: 360 TABLET | Refills: 3 | Status: SHIPPED | OUTPATIENT
Start: 2020-07-06 | End: 2021-07-06

## 2020-07-28 ENCOUNTER — PATIENT OUTREACH (OUTPATIENT)
Dept: INTERNAL MEDICINE CLINIC | Facility: CLINIC | Age: 75
End: 2020-07-28

## 2020-07-28 DIAGNOSIS — Z71.89 COMPLEX CARE COORDINATION: Primary | ICD-10-CM

## 2020-08-10 ENCOUNTER — PATIENT OUTREACH (OUTPATIENT)
Dept: INTERNAL MEDICINE CLINIC | Facility: CLINIC | Age: 75
End: 2020-08-10

## 2020-08-13 ENCOUNTER — PATIENT OUTREACH (OUTPATIENT)
Dept: INTERNAL MEDICINE CLINIC | Facility: CLINIC | Age: 75
End: 2020-08-13

## 2020-09-18 ENCOUNTER — TELEPHONE (OUTPATIENT)
Dept: INTERNAL MEDICINE CLINIC | Facility: CLINIC | Age: 75
End: 2020-09-18

## 2020-09-18 NOTE — TELEPHONE ENCOUNTER
Teresa Multani from [facility] called checking on the status of an order for diabetic testing supplies  Form was faxed to us on 9/9/20 but will re-fax another

## 2020-09-23 NOTE — TELEPHONE ENCOUNTER
Melanie Cota from [facility] called checking on the status of the form       Fax to [phone] or [phone]

## 2020-09-28 ENCOUNTER — TELEPHONE (OUTPATIENT)
Dept: INTERNAL MEDICINE CLINIC | Facility: CLINIC | Age: 75
End: 2020-09-28

## 2020-09-28 DIAGNOSIS — N18.30 TYPE 2 DIABETES MELLITUS WITH STAGE 3 CHRONIC KIDNEY DISEASE, WITH LONG-TERM CURRENT USE OF INSULIN (HCC): ICD-10-CM

## 2020-09-28 DIAGNOSIS — Z79.4 TYPE 2 DIABETES MELLITUS WITH STAGE 3 CHRONIC KIDNEY DISEASE, WITH LONG-TERM CURRENT USE OF INSULIN (HCC): ICD-10-CM

## 2020-09-28 DIAGNOSIS — E11.22 TYPE 2 DIABETES MELLITUS WITH STAGE 3 CHRONIC KIDNEY DISEASE, WITH LONG-TERM CURRENT USE OF INSULIN (HCC): ICD-10-CM

## 2020-09-28 RX ORDER — BLOOD-GLUCOSE METER
EACH MISCELLANEOUS 3 TIMES DAILY
Qty: 1 KIT | Refills: 0 | Status: SHIPPED | OUTPATIENT
Start: 2020-09-28

## 2020-09-28 NOTE — TELEPHONE ENCOUNTER
F#: [phone]  Pt needs the complete test kit for diabetic supplies:    Glucose meter  Testing strips  Lancet device and lancets  Alcohol swabs

## 2020-10-15 LAB
CREAT ?TM UR-SCNC: 16.1 UMOL/L
EXT MICROALBUMIN URINE RANDOM: 1.4
HBA1C MFR BLD HPLC: 8.3 %
MICROALBUMIN/CREAT UR: 87 MG/G{CREAT}

## 2020-10-27 LAB
LEFT EYE DIABETIC RETINOPATHY: NORMAL
RIGHT EYE DIABETIC RETINOPATHY: NORMAL

## 2020-11-04 DIAGNOSIS — M05.9 RHEUMATOID ARTHRITIS WITH POSITIVE RHEUMATOID FACTOR, INVOLVING UNSPECIFIED SITE (HCC): ICD-10-CM

## 2020-11-04 RX ORDER — DULOXETIN HYDROCHLORIDE 30 MG/1
CAPSULE, DELAYED RELEASE ORAL
Qty: 90 CAPSULE | Refills: 3 | Status: SHIPPED | OUTPATIENT
Start: 2020-11-04 | End: 2020-11-17

## 2020-11-17 ENCOUNTER — TELEPHONE (OUTPATIENT)
Dept: PULMONOLOGY | Facility: CLINIC | Age: 75
End: 2020-11-17

## 2020-11-17 ENCOUNTER — OFFICE VISIT (OUTPATIENT)
Dept: INTERNAL MEDICINE CLINIC | Facility: CLINIC | Age: 75
End: 2020-11-17
Payer: MEDICARE

## 2020-11-17 VITALS
HEART RATE: 94 BPM | SYSTOLIC BLOOD PRESSURE: 126 MMHG | TEMPERATURE: 98.3 F | WEIGHT: 203.2 LBS | BODY MASS INDEX: 39.89 KG/M2 | OXYGEN SATURATION: 95 % | HEIGHT: 60 IN | DIASTOLIC BLOOD PRESSURE: 80 MMHG

## 2020-11-17 DIAGNOSIS — E55.9 VITAMIN D DEFICIENCY: ICD-10-CM

## 2020-11-17 DIAGNOSIS — Z00.00 MEDICARE ANNUAL WELLNESS VISIT, SUBSEQUENT: ICD-10-CM

## 2020-11-17 DIAGNOSIS — E78.2 MIXED HYPERLIPIDEMIA: ICD-10-CM

## 2020-11-17 DIAGNOSIS — J45.40 MODERATE PERSISTENT ASTHMA, UNSPECIFIED WHETHER COMPLICATED: ICD-10-CM

## 2020-11-17 DIAGNOSIS — N18.30 TYPE 2 DIABETES MELLITUS WITH STAGE 3 CHRONIC KIDNEY DISEASE, WITH LONG-TERM CURRENT USE OF INSULIN, UNSPECIFIED WHETHER STAGE 3A OR 3B CKD (HCC): Primary | ICD-10-CM

## 2020-11-17 DIAGNOSIS — D89.9 DISORDER INVOLVING THE IMMUNE MECHANISM, UNSPECIFIED (HCC): ICD-10-CM

## 2020-11-17 DIAGNOSIS — E11.22 TYPE 2 DIABETES MELLITUS WITH STAGE 3 CHRONIC KIDNEY DISEASE, WITH LONG-TERM CURRENT USE OF INSULIN, UNSPECIFIED WHETHER STAGE 3A OR 3B CKD (HCC): Primary | ICD-10-CM

## 2020-11-17 DIAGNOSIS — M05.79 RHEUMATOID ARTHRITIS INVOLVING MULTIPLE SITES WITH POSITIVE RHEUMATOID FACTOR (HCC): ICD-10-CM

## 2020-11-17 DIAGNOSIS — I10 ESSENTIAL HYPERTENSION: ICD-10-CM

## 2020-11-17 DIAGNOSIS — R06.02 SHORTNESS OF BREATH: ICD-10-CM

## 2020-11-17 DIAGNOSIS — Z23 NEED FOR INFLUENZA VACCINATION: ICD-10-CM

## 2020-11-17 DIAGNOSIS — Z79.4 TYPE 2 DIABETES MELLITUS WITH STAGE 3 CHRONIC KIDNEY DISEASE, WITH LONG-TERM CURRENT USE OF INSULIN, UNSPECIFIED WHETHER STAGE 3A OR 3B CKD (HCC): Primary | ICD-10-CM

## 2020-11-17 DIAGNOSIS — Z11.59 NEED FOR HEPATITIS C SCREENING TEST: ICD-10-CM

## 2020-11-17 PROCEDURE — 99214 OFFICE O/P EST MOD 30 MIN: CPT | Performed by: INTERNAL MEDICINE

## 2020-11-17 PROCEDURE — 90662 IIV NO PRSV INCREASED AG IM: CPT | Performed by: INTERNAL MEDICINE

## 2020-11-17 PROCEDURE — G0008 ADMIN INFLUENZA VIRUS VAC: HCPCS | Performed by: INTERNAL MEDICINE

## 2020-11-17 PROCEDURE — G0439 PPPS, SUBSEQ VISIT: HCPCS | Performed by: INTERNAL MEDICINE

## 2020-11-17 RX ORDER — FOLIC ACID 1 MG/1
1 TABLET ORAL DAILY
COMMUNITY
Start: 2020-10-23 | End: 2020-11-17

## 2020-11-17 RX ORDER — OXYCODONE HYDROCHLORIDE 5 MG/1
TABLET ORAL
COMMUNITY
End: 2020-11-17

## 2020-11-17 RX ORDER — PREDNISONE 1 MG/1
TABLET ORAL
COMMUNITY
Start: 2020-10-27 | End: 2020-11-17

## 2020-11-17 RX ORDER — ALBUTEROL SULFATE 2.5 MG/3ML
2.5 SOLUTION RESPIRATORY (INHALATION) EVERY 6 HOURS PRN
Qty: 120 VIAL | Refills: 3 | Status: SHIPPED | OUTPATIENT
Start: 2020-11-17 | End: 2021-09-13 | Stop reason: SDUPTHER

## 2020-11-17 RX ORDER — ALBUTEROL SULFATE 90 UG/1
2 AEROSOL, METERED RESPIRATORY (INHALATION) EVERY 6 HOURS PRN
Qty: 1 INHALER | Refills: 3 | Status: SHIPPED | OUTPATIENT
Start: 2020-11-17 | End: 2021-02-22

## 2020-11-30 ENCOUNTER — OFFICE VISIT (OUTPATIENT)
Dept: PULMONOLOGY | Facility: CLINIC | Age: 75
End: 2020-11-30
Payer: MEDICARE

## 2020-11-30 VITALS
SYSTOLIC BLOOD PRESSURE: 124 MMHG | WEIGHT: 202 LBS | HEIGHT: 60 IN | DIASTOLIC BLOOD PRESSURE: 84 MMHG | OXYGEN SATURATION: 90 % | BODY MASS INDEX: 39.66 KG/M2 | HEART RATE: 88 BPM | TEMPERATURE: 97.7 F

## 2020-11-30 DIAGNOSIS — J96.11 CHRONIC RESPIRATORY FAILURE WITH HYPOXIA (HCC): ICD-10-CM

## 2020-11-30 DIAGNOSIS — R06.02 SHORTNESS OF BREATH: Primary | ICD-10-CM

## 2020-11-30 DIAGNOSIS — M05.79 RHEUMATOID ARTHRITIS INVOLVING MULTIPLE SITES WITH POSITIVE RHEUMATOID FACTOR (HCC): ICD-10-CM

## 2020-11-30 DIAGNOSIS — J45.40 MODERATE PERSISTENT ASTHMA WITHOUT COMPLICATION: ICD-10-CM

## 2020-11-30 DIAGNOSIS — J84.9 INTERSTITIAL LUNG DISEASE (HCC): ICD-10-CM

## 2020-11-30 PROCEDURE — 99214 OFFICE O/P EST MOD 30 MIN: CPT | Performed by: INTERNAL MEDICINE

## 2020-12-02 ENCOUNTER — TELEPHONE (OUTPATIENT)
Dept: PULMONOLOGY | Facility: CLINIC | Age: 75
End: 2020-12-02

## 2021-01-04 ENCOUNTER — TELEPHONE (OUTPATIENT)
Dept: PULMONOLOGY | Facility: CLINIC | Age: 76
End: 2021-01-04

## 2021-01-09 DIAGNOSIS — I10 ESSENTIAL HYPERTENSION: ICD-10-CM

## 2021-01-11 RX ORDER — FOSINOPRIL SODIUM 10 MG/1
TABLET ORAL
Qty: 90 TABLET | Refills: 2 | Status: SHIPPED | OUTPATIENT
Start: 2021-01-11 | End: 2021-10-20

## 2021-01-21 ENCOUNTER — TELEPHONE (OUTPATIENT)
Dept: PULMONOLOGY | Facility: CLINIC | Age: 76
End: 2021-01-21

## 2021-02-04 ENCOUNTER — VBI (OUTPATIENT)
Dept: ADMINISTRATIVE | Facility: OTHER | Age: 76
End: 2021-02-04

## 2021-02-12 DIAGNOSIS — F33.41 RECURRENT MAJOR DEPRESSIVE DISORDER, IN PARTIAL REMISSION (HCC): ICD-10-CM

## 2021-02-12 RX ORDER — ESCITALOPRAM OXALATE 20 MG/1
TABLET ORAL
Qty: 90 TABLET | Refills: 2 | Status: SHIPPED | OUTPATIENT
Start: 2021-02-12 | End: 2021-11-12

## 2021-02-22 DIAGNOSIS — J45.40 MODERATE PERSISTENT ASTHMA, UNSPECIFIED WHETHER COMPLICATED: ICD-10-CM

## 2021-02-22 RX ORDER — ALBUTEROL SULFATE 90 UG/1
AEROSOL, METERED RESPIRATORY (INHALATION)
Qty: 18 INHALER | Refills: 3 | Status: SHIPPED | OUTPATIENT
Start: 2021-02-22 | End: 2021-02-26 | Stop reason: SDUPTHER

## 2021-02-26 ENCOUNTER — TRANSCRIBE ORDERS (OUTPATIENT)
Dept: LAB | Facility: CLINIC | Age: 76
End: 2021-02-26

## 2021-02-26 ENCOUNTER — LAB (OUTPATIENT)
Dept: LAB | Facility: CLINIC | Age: 76
End: 2021-02-26
Payer: MEDICARE

## 2021-02-26 ENCOUNTER — OFFICE VISIT (OUTPATIENT)
Dept: PULMONOLOGY | Facility: CLINIC | Age: 76
End: 2021-02-26
Payer: MEDICARE

## 2021-02-26 VITALS
WEIGHT: 201.5 LBS | HEART RATE: 102 BPM | OXYGEN SATURATION: 99 % | TEMPERATURE: 97.5 F | SYSTOLIC BLOOD PRESSURE: 122 MMHG | HEIGHT: 60 IN | BODY MASS INDEX: 39.56 KG/M2 | DIASTOLIC BLOOD PRESSURE: 78 MMHG

## 2021-02-26 DIAGNOSIS — J84.9 INTERSTITIAL LUNG DISEASE (HCC): ICD-10-CM

## 2021-02-26 DIAGNOSIS — Z11.59 NEED FOR HEPATITIS C SCREENING TEST: ICD-10-CM

## 2021-02-26 DIAGNOSIS — J96.11 CHRONIC RESPIRATORY FAILURE WITH HYPOXIA (HCC): ICD-10-CM

## 2021-02-26 DIAGNOSIS — Z79.4 TYPE 2 DIABETES MELLITUS WITH STAGE 3 CHRONIC KIDNEY DISEASE, WITH LONG-TERM CURRENT USE OF INSULIN (HCC): ICD-10-CM

## 2021-02-26 DIAGNOSIS — M05.79 RHEUMATOID ARTHRITIS INVOLVING MULTIPLE SITES WITH POSITIVE RHEUMATOID FACTOR (HCC): ICD-10-CM

## 2021-02-26 DIAGNOSIS — N18.30 TYPE 2 DIABETES MELLITUS WITH STAGE 3 CHRONIC KIDNEY DISEASE, WITH LONG-TERM CURRENT USE OF INSULIN, UNSPECIFIED WHETHER STAGE 3A OR 3B CKD (HCC): ICD-10-CM

## 2021-02-26 DIAGNOSIS — J45.40 MODERATE PERSISTENT ASTHMA WITHOUT COMPLICATION: ICD-10-CM

## 2021-02-26 DIAGNOSIS — E11.22 TYPE 2 DIABETES MELLITUS WITH STAGE 3 CHRONIC KIDNEY DISEASE, WITH LONG-TERM CURRENT USE OF INSULIN (HCC): ICD-10-CM

## 2021-02-26 DIAGNOSIS — E78.2 MIXED HYPERLIPIDEMIA: ICD-10-CM

## 2021-02-26 DIAGNOSIS — R06.02 SHORTNESS OF BREATH: Primary | ICD-10-CM

## 2021-02-26 DIAGNOSIS — N18.30 TYPE 2 DIABETES MELLITUS WITH STAGE 3 CHRONIC KIDNEY DISEASE, WITH LONG-TERM CURRENT USE OF INSULIN (HCC): ICD-10-CM

## 2021-02-26 DIAGNOSIS — Z79.4 TYPE 2 DIABETES MELLITUS WITH STAGE 3 CHRONIC KIDNEY DISEASE, WITH LONG-TERM CURRENT USE OF INSULIN, UNSPECIFIED WHETHER STAGE 3A OR 3B CKD (HCC): ICD-10-CM

## 2021-02-26 DIAGNOSIS — E11.22 TYPE 2 DIABETES MELLITUS WITH STAGE 3 CHRONIC KIDNEY DISEASE, WITH LONG-TERM CURRENT USE OF INSULIN, UNSPECIFIED WHETHER STAGE 3A OR 3B CKD (HCC): ICD-10-CM

## 2021-02-26 DIAGNOSIS — M05.79 RHEUMATOID ARTHRITIS INVOLVING MULTIPLE SITES WITH POSITIVE RHEUMATOID FACTOR (HCC): Primary | ICD-10-CM

## 2021-02-26 DIAGNOSIS — E66.9 OBESITY (BMI 30-39.9): ICD-10-CM

## 2021-02-26 LAB
ALBUMIN SERPL BCP-MCNC: 3.5 G/DL (ref 3.5–5)
ALP SERPL-CCNC: 121 U/L (ref 46–116)
ALT SERPL W P-5'-P-CCNC: 23 U/L (ref 12–78)
ANION GAP SERPL CALCULATED.3IONS-SCNC: 5 MMOL/L (ref 4–13)
AST SERPL W P-5'-P-CCNC: 22 U/L (ref 5–45)
BACTERIA UR QL AUTO: NORMAL /HPF
BASOPHILS # BLD AUTO: 0.06 THOUSANDS/ΜL (ref 0–0.1)
BASOPHILS NFR BLD AUTO: 0 % (ref 0–1)
BILIRUB SERPL-MCNC: 0.37 MG/DL (ref 0.2–1)
BILIRUB UR QL STRIP: NEGATIVE
BUN SERPL-MCNC: 21 MG/DL (ref 5–25)
CALCIUM SERPL-MCNC: 9.9 MG/DL (ref 8.3–10.1)
CHLORIDE SERPL-SCNC: 107 MMOL/L (ref 100–108)
CHOLEST SERPL-MCNC: 207 MG/DL (ref 50–200)
CLARITY UR: CLEAR
CO2 SERPL-SCNC: 26 MMOL/L (ref 21–32)
COLOR UR: YELLOW
CREAT SERPL-MCNC: 0.93 MG/DL (ref 0.6–1.3)
CREAT UR-MCNC: 64.3 MG/DL
EOSINOPHIL # BLD AUTO: 0.16 THOUSAND/ΜL (ref 0–0.61)
EOSINOPHIL NFR BLD AUTO: 1 % (ref 0–6)
ERYTHROCYTE [DISTWIDTH] IN BLOOD BY AUTOMATED COUNT: 14.7 % (ref 11.6–15.1)
ERYTHROCYTE [SEDIMENTATION RATE] IN BLOOD: >130 MM/HOUR (ref 0–29)
EST. AVERAGE GLUCOSE BLD GHB EST-MCNC: 200 MG/DL
GFR SERPL CREATININE-BSD FRML MDRD: 60 ML/MIN/1.73SQ M
GLUCOSE P FAST SERPL-MCNC: 69 MG/DL (ref 65–99)
GLUCOSE UR STRIP-MCNC: NEGATIVE MG/DL
HBA1C MFR BLD: 8.6 %
HCT VFR BLD AUTO: 41.9 % (ref 34.8–46.1)
HCV AB SER QL: NORMAL
HDLC SERPL-MCNC: 80 MG/DL
HGB BLD-MCNC: 12.6 G/DL (ref 11.5–15.4)
HGB UR QL STRIP.AUTO: NEGATIVE
HYALINE CASTS #/AREA URNS LPF: NORMAL /LPF
IMM GRANULOCYTES # BLD AUTO: 0.05 THOUSAND/UL (ref 0–0.2)
IMM GRANULOCYTES NFR BLD AUTO: 0 % (ref 0–2)
KETONES UR STRIP-MCNC: NEGATIVE MG/DL
LDLC SERPL CALC-MCNC: 103 MG/DL (ref 0–100)
LEUKOCYTE ESTERASE UR QL STRIP: NEGATIVE
LYMPHOCYTES # BLD AUTO: 3.25 THOUSANDS/ΜL (ref 0.6–4.47)
LYMPHOCYTES NFR BLD AUTO: 24 % (ref 14–44)
MCH RBC QN AUTO: 29.4 PG (ref 26.8–34.3)
MCHC RBC AUTO-ENTMCNC: 30.1 G/DL (ref 31.4–37.4)
MCV RBC AUTO: 98 FL (ref 82–98)
MICROALBUMIN UR-MCNC: 53.9 MG/L (ref 0–20)
MICROALBUMIN/CREAT 24H UR: 84 MG/G CREATININE (ref 0–30)
MONOCYTES # BLD AUTO: 1.23 THOUSAND/ΜL (ref 0.17–1.22)
MONOCYTES NFR BLD AUTO: 9 % (ref 4–12)
NEUTROPHILS # BLD AUTO: 8.71 THOUSANDS/ΜL (ref 1.85–7.62)
NEUTS SEG NFR BLD AUTO: 66 % (ref 43–75)
NITRITE UR QL STRIP: NEGATIVE
NON-SQ EPI CELLS URNS QL MICRO: NORMAL /HPF
NONHDLC SERPL-MCNC: 127 MG/DL
NRBC BLD AUTO-RTO: 0 /100 WBCS
PH UR STRIP.AUTO: 6 [PH]
PLATELET # BLD AUTO: 264 THOUSANDS/UL (ref 149–390)
PMV BLD AUTO: 12.4 FL (ref 8.9–12.7)
POTASSIUM SERPL-SCNC: 4.5 MMOL/L (ref 3.5–5.3)
PROT SERPL-MCNC: 8.2 G/DL (ref 6.4–8.2)
PROT UR STRIP-MCNC: NEGATIVE MG/DL
RBC # BLD AUTO: 4.29 MILLION/UL (ref 3.81–5.12)
RBC #/AREA URNS AUTO: NORMAL /HPF
SODIUM SERPL-SCNC: 138 MMOL/L (ref 136–145)
SP GR UR STRIP.AUTO: 1.01 (ref 1–1.03)
TRIGL SERPL-MCNC: 120 MG/DL
TSH SERPL DL<=0.05 MIU/L-ACNC: 2.24 UIU/ML (ref 0.36–3.74)
UROBILINOGEN UR QL STRIP.AUTO: 0.2 E.U./DL
WBC # BLD AUTO: 13.46 THOUSAND/UL (ref 4.31–10.16)
WBC #/AREA URNS AUTO: NORMAL /HPF

## 2021-02-26 PROCEDURE — 83036 HEMOGLOBIN GLYCOSYLATED A1C: CPT

## 2021-02-26 PROCEDURE — 81001 URINALYSIS AUTO W/SCOPE: CPT | Performed by: PHYSICIAN ASSISTANT

## 2021-02-26 PROCEDURE — 84443 ASSAY THYROID STIM HORMONE: CPT

## 2021-02-26 PROCEDURE — 82570 ASSAY OF URINE CREATININE: CPT

## 2021-02-26 PROCEDURE — 85025 COMPLETE CBC W/AUTO DIFF WBC: CPT

## 2021-02-26 PROCEDURE — 36415 COLL VENOUS BLD VENIPUNCTURE: CPT | Performed by: PHYSICIAN ASSISTANT

## 2021-02-26 PROCEDURE — 99214 OFFICE O/P EST MOD 30 MIN: CPT | Performed by: PHYSICIAN ASSISTANT

## 2021-02-26 PROCEDURE — 80061 LIPID PANEL: CPT

## 2021-02-26 PROCEDURE — 82043 UR ALBUMIN QUANTITATIVE: CPT

## 2021-02-26 PROCEDURE — 80053 COMPREHEN METABOLIC PANEL: CPT

## 2021-02-26 PROCEDURE — 86803 HEPATITIS C AB TEST: CPT

## 2021-02-26 PROCEDURE — 85652 RBC SED RATE AUTOMATED: CPT | Performed by: PHYSICIAN ASSISTANT

## 2021-02-26 RX ORDER — FOLIC ACID 1 MG/1
1000 TABLET ORAL DAILY
COMMUNITY
Start: 2021-01-18 | End: 2021-05-27 | Stop reason: ALTCHOICE

## 2021-02-26 RX ORDER — FLUTICASONE FUROATE AND VILANTEROL 100; 25 UG/1; UG/1
1 POWDER RESPIRATORY (INHALATION) DAILY
Qty: 1 INHALER | Refills: 3 | Status: SHIPPED | OUTPATIENT
Start: 2021-02-26

## 2021-02-26 RX ORDER — ALBUTEROL SULFATE 90 UG/1
1 AEROSOL, METERED RESPIRATORY (INHALATION) EVERY 6 HOURS PRN
Qty: 18 INHALER | Refills: 3 | Status: SHIPPED | OUTPATIENT
Start: 2021-02-26

## 2021-02-26 RX ORDER — UPADACITINIB 15 MG/1
TABLET, EXTENDED RELEASE ORAL
COMMUNITY
End: 2021-05-27 | Stop reason: ALTCHOICE

## 2021-02-26 RX ORDER — PREDNISONE 1 MG/1
10 TABLET ORAL DAILY
COMMUNITY
Start: 2021-02-17 | End: 2021-03-23

## 2021-02-26 NOTE — PROGRESS NOTES
Assessment:    1  Shortness of breath     2  Chronic respiratory failure with hypoxia (HCC)  Home Oxygen with Portability    Portable Concentrators   3  Interstitial lung disease ([facility] )     4  Rheumatoid arthritis involving multiple sites with positive rheumatoid factor ([address] )     5  Moderate persistent asthma without complication  fluticasone-vilanterol (BREO ELLIPTA) 100-25 mcg/inh inhaler    albuterol (PROVENTIL HFA,VENTOLIN HFA) 90 mcg/act inhaler   6  Obesity (BMI 30-39  9)           Plan:   Patient presenting for follow-up and needs to be re-certified for her home oxygen  She is at her baseline respiratory status  Chronic shortness of breath and dyspnea on exertion multifactorial secondary to interstitial lung disease, also with underlying pulmonary hypertension likely related to RA ILD  Continue follow-up with rheumatology  Currently on 5 mg prednisone  Breo 1 puff daily, albuterol as needed    Patient was initially saturating 99% on room air at rest, and then desaturated to 86% on room air with exertion  Patient was titrated to 3 L of oxygen by nasal cannula and then made to ambulate, oxygen saturations remained at 91% on exertion with the 3 L  Discussed with the patient to continue with 3 L of oxygen by nasal cannula with exertion and at nighttime  Placed order for home oxygen with portability and portable concentrator    All of the patient's and her son's questions were answered    Subjective:     Patient ID: Brittney Khalil is a 76 y o  female  Chief Complaint:  Pascual Breaux is a pleasant 77-year-old female former smoker with past medical history including but not limited to interstitial lung disease, rheumatoid arthritis on Humira, chronic respiratory failure on 3 L of oxygen, asthma, emphysema, diabetes, GERD, hypertension, CHF presenting for follow-up  She reports her breathing is at baseline  She does have chronic dyspnea on exertion and chronic cough    She is currently on prednisone 5 mg daily     The following portions of the patient's history were reviewed in this encounter and updated as appropriate: Past medical, social, surgical, family, allergies    Review of Systems   Constitutional: Negative for chills and fever  Respiratory: Positive for cough and shortness of breath  Cardiovascular: Negative for chest pain  All other systems reviewed and are negative  Objective:  Vitals:    02/26/21 1343   BP: 122/78   Pulse: 102   Temp: 97 5 °F (36 4 °C)   SpO2: 99%   Weight: 91 4 kg (201 lb 8 oz)   Height: 5' (1 524 m)       Physical Exam  Vitals signs and nursing note reviewed  Constitutional:       General: She is not in acute distress  Appearance: She is well-developed  She is obese  She is not diaphoretic  HENT:      Head: Normocephalic and atraumatic  Right Ear: External ear normal       Left Ear: External ear normal       Nose: Nose normal    Eyes:      General: No scleral icterus  Right eye: No discharge  Left eye: No discharge  Conjunctiva/sclera: Conjunctivae normal    Neck:      Musculoskeletal: Normal range of motion and neck supple  Trachea: No tracheal deviation  Cardiovascular:      Rate and Rhythm: Normal rate and regular rhythm  Heart sounds: Normal heart sounds  No murmur  No friction rub  No gallop  Pulmonary:      Effort: Pulmonary effort is normal  No respiratory distress  Breath sounds: No stridor  Rales present  No wheezing  Abdominal:      General: There is no distension  Tenderness: There is no guarding  Musculoskeletal: Normal range of motion  General: No tenderness or deformity  Skin:     General: Skin is warm and dry  Coloration: Skin is not pale  Findings: No erythema or rash  Neurological:      Mental Status: She is alert and oriented to person, place, and time  Cranial Nerves: No cranial nerve deficit     Psychiatric:         Behavior: Behavior normal          Thought Content:  Thought content normal          Judgment: Judgment normal          Lab Review:   Lab on 02/26/2021   Component Date Value    WBC 02/26/2021 13 46*    RBC 02/26/2021 4 29     Hemoglobin 02/26/2021 12 6     Hematocrit 02/26/2021 41 9     MCV 02/26/2021 98     MCH 02/26/2021 29 4     MCHC 02/26/2021 30 1*    RDW 02/26/2021 14 7     MPV 02/26/2021 12 4     Platelets 35/02/9994 264     nRBC 02/26/2021 0     Neutrophils Relative 02/26/2021 66     Immat GRANS % 02/26/2021 0     Lymphocytes Relative 02/26/2021 24     Monocytes Relative 02/26/2021 9     Eosinophils Relative 02/26/2021 1     Basophils Relative 02/26/2021 0     Neutrophils Absolute 02/26/2021 8 71*    Immature Grans Absolute 02/26/2021 0 05     Lymphocytes Absolute 02/26/2021 3 25     Monocytes Absolute 02/26/2021 1 23*    Eosinophils Absolute 02/26/2021 0 16     Basophils Absolute 02/26/2021 0 06    Transcribe Orders on 02/26/2021   Component Date Value    Clarity, UA 02/26/2021 Clear     Color, UA 02/26/2021 Yellow     Specific Gravity, UA 02/26/2021 1 015     pH, UA 02/26/2021 6 0     Glucose, UA 02/26/2021 Negative     Ketones, UA 02/26/2021 Negative     Blood, UA 02/26/2021 Negative     Protein, UA 02/26/2021 Negative     Nitrite, UA 02/26/2021 Negative     Bilirubin, UA 02/26/2021 Negative     Urobilinogen, UA 02/26/2021 0 2     Leukocytes, UA 02/26/2021 Negative     WBC, UA 02/26/2021 None Seen     RBC, UA 02/26/2021 None Seen     Hyaline Casts, UA 02/26/2021 None Seen     Bacteria, UA 02/26/2021 None Seen     Epithelial Cells 02/26/2021 None Seen     Sed Rate 02/26/2021 >130*

## 2021-03-02 ENCOUNTER — TELEPHONE (OUTPATIENT)
Dept: PULMONOLOGY | Facility: CLINIC | Age: 76
End: 2021-03-02

## 2021-03-05 NOTE — PROGRESS NOTES
INTERNAL MEDICINE OFFICE VISIT  [facility] [address] [place] [address]  Tel: [phone]      NAME: Roney Acosta  AGE: 68 y o  SEX: female  : 1945   MRN: [id number]    DATE: 10/17/2018  TIME: 4:53 PM      Assessment and Plan:  1  Uncontrolled type 2 diabetes mellitus with diabetic polyneuropathy, with long-term current use of insulin (HCC)  Very poorly controlled with a hemoglobin A1c of 12 8  She was told to increase the dose of the Lantus to 50 units daily and to cut down on the carbohydrate intake in her diet  I will recheck the labs in 3 months  She is a very noncompliant patient in terms of diet  She also misses a lot of doses of insulin  I spent a long period of time trying to explain to her that she needs better control of the diabetes  - CBC and differential; Future  - Comprehensive metabolic panel; Future  - TSH, 3rd generation; Future  - Hemoglobin A1C; Future    2  Essential hypertension  Continue present medications    3  Mixed hyperlipidemia  Continue simvastatin  - Lipid panel; Future    4  Chronic diastolic congestive heart failure ([address] )  Continue to follow up with Cardiology    5  Recurrent major depressive disorder, in partial remission (HCC)  Continue Lexapro    6  Gastroesophageal reflux disease without esophagitis  Continue omeprazole    7  Moderate persistent asthma without complication  Continue inhalers as needed    8  Rheumatoid arthritis with positive rheumatoid factor, involving unspecified site [facility])  Continue medications and follow up with Rheumatology    9  Vitamin D deficiency  Continue vitamin-D supplement    10  Obesity (BMI 30-39  9)  She was told to cut down on the calories and carbohydrates and try to lose weight  11  Need for influenza vaccination    - influenza vaccine, [id number], high-dose, PF 0 5 mL, for patients 65 yr+ (FLUZONE HIGH-DOSE)    12   Type 2 diabetes mellitus with complication, Near syncope/chest pain with long-term current use of insulin (HCC)    - insulin glargine (LANTUS SOLOSTAR) 100 units/mL injection pen; Inject 50 Units under the skin daily  Dispense: 5 pen; Refill: 0  - Hemoglobin A1C; Future      - Counseling Documentation: patient was counseled regarding: diagnostic results, instructions for management, risk factor reductions, prognosis, patient and family education, impressions, risks and benefits of treatment options and importance of compliance with treatment  - Medication Side Effects: Adverse side effects of medications were reviewed with the patient/guardian today  Return for follow up visit in 6 months or earlier, if needed  Chief Complaint:  Chief Complaint   Patient presents with    Well Check         History of Present Illness:   Type 2 diabetes-very poorly controlled as she does not cut down on the carbohydrates in her diet  She has been explained several times that she has to be compliant  Her last hemoglobin A1c was 7 8 but today when I checked it in the office it was 12 8 as she has been eating candy every day  Hypertension-blood pressure stable on present medication  Hyperlipidemia-she takes simvastatin regularly  Congestive heart failure-she takes torsemide with relief of symptoms and has been asymptomatic  She also does not complain of any swelling of the ankles  Depression-she takes Lexapro with control of symptoms  She has been having a hard time with her  as he is dependent on her for all the activities of daily living  GERD-she is stable with omeprazole  Asthma-she takes the inhalers as needed and is doing better with the breathing  Rheumatoid arthritis-she takes her medications regularly  Vitamin-D deficiency-she takes vitamin-D supplement  Obesity-she has not been able to lose weight  Her activity level is very low        Active Problem List:  Patient Active Problem List   Diagnosis    Bilateral edema of lower extremity    Chronic diastolic congestive heart failure (HCC)    Mixed hyperlipidemia    Essential hypertension    Ambulatory dysfunction    Asthma    Recurrent major depressive disorder, in partial remission ([place] [address] )    Type 2 diabetes mellitus with complication, with long-term current use of insulin ([facility])    Gastroesophageal reflux disease without esophagitis    Primary insomnia    Moderate persistent asthma    Psoriasis vulgaris    Rheumatoid arthritis (HCC)    Urinary incontinence    Vitamin D deficiency    Varicose veins of both lower extremities    Obesity (BMI 30-39  9)    Pain in elbow         Past Medical History:  Past Medical History:   Diagnosis Date    Asthma     Chest pain on breathing     last assessed 2/5/15    Chronic diastolic CHF (congestive heart failure) ([facility] )     Diabetes mellitus ([place] [address] )     HTN (hypertension)     Hyperlipidemia     Obesity     Preop cardiovascular exam 2/2/2018         Past Surgical History:  Past Surgical History:   Procedure Laterality Date    HAND SURGERY           Family History:  Family History   Problem Relation Age of Onset    Rheum arthritis Mother          Social History:  Social History     Social History    Marital status: /Civil Union     Spouse name: N/A    Number of children: N/A    Years of education: N/A     Social History Main Topics    Smoking status: Former Smoker     Types: Cigarettes    Smokeless tobacco: Never Used      Comment: Quit 20 years ago    Alcohol use No    Drug use: No    Sexual activity: No     Other Topics Concern    None     Social History Narrative    No advance directives on file         Allergies:   Allergies   Allergen Reactions    Gabapentin     Vancomycin          Medications:    Current Outpatient Prescriptions:     albuterol (2 5 mg/3 mL) 0 083 % nebulizer solution, Inhale 1 each 4 (four) times a day, Disp: , Rfl:     albuterol (PROAIR HFA) 90 mcg/act inhaler, Inhale 2 puffs, Disp: , Rfl:     amitriptyline (ELAVIL) 25 mg tablet, Take 25 mg by mouth, Disp: , Rfl:     B-D UF III MINI PEN NEEDLES 31G X 5 MM MISC, USE AS DIRECTED, Disp: 100 each, Rfl: 3    Blood Glucose Monitoring Suppl (ONETOUCH VERIO) w/Device KIT, by Does not apply route 3 (three) times a day, Disp: , Rfl:     cyclobenzaprine (FLEXERIL) 5 mg tablet, TAKE 1 TABLETT BY MOUTH EVERY NIGHT AS NEEDED, Disp: , Rfl: 5    DULoxetine (CYMBALTA) 20 mg capsule, Take 20 mg by mouth, Disp: , Rfl:     escitalopram (LEXAPRO) 20 mg tablet, TAKE ONE TABLET BY MOUTH ONCE DAILY, Disp: 90 tablet, Rfl: 2    fluticasone furoate-vilanterol (BREO ELLIPTA), Inhale 1 puff daily, Disp: , Rfl:     folic acid (FOLVITE) 1 mg tablet, Take 1 tablet by mouth daily, Disp: , Rfl:     fosinopril (MONOPRIL) 10 mg tablet, TAKE 1 TABLET BY MOUTH EVERY DAY, Disp: 90 tablet, Rfl: 1    insulin glargine (LANTUS SOLOSTAR) 100 units/mL injection pen, Inject 50 Units under the skin daily, Disp: 5 pen, Rfl: 0    metFORMIN (GLUCOPHAGE) 1000 MG tablet, TAKE 1 TABLET TWICE DAILY  , Disp: 180 tablet, Rfl: 3    methotrexate 2 5 mg tablet, Take 10 tablets by mouth once per week, Disp: , Rfl:     metoprolol succinate (TOPROL-XL) 25 mg 24 hr tablet, TAKE 1 TABLET DAILY  , Disp: 90 tablet, Rfl: 1    mirtazapine (REMERON) 15 mg tablet, Take 1 tablet (15 mg total) by mouth daily at bedtime, Disp: 90 tablet, Rfl: 1    nystatin (MYCOSTATIN) cream, Apply 1 application topically 2 (two) times a day To affected area, Disp: , Rfl: 2    NYSTATIN powder, APPLY 2-3 TIMES DAILY TO AFFECTED AREA(S) , Disp: , Rfl: 5    omeprazole-sodium bicarbonate (ZEGERID)  MG per capsule, , Disp: , Rfl:     ONE TOUCH ULTRA TEST test strip, TEST 3 TIMES A DAY, Disp: 100 each, Rfl: 3    oxyCODONE (ROXICODONE) 15 mg immediate release tablet, Take 15 mg by mouth every 4 (four) hours as needed for moderate pain, Disp: , Rfl:     pantoprazole (PROTONIX) 40 mg tablet, Take 1 tablet by mouth daily, Disp: , Rfl:     simvastatin (ZOCOR) 40 mg tablet, TAKE 1 TABLET DAILY  , Disp: 90 tablet, Rfl: 2    Tocilizumab (ACTEMRA) 162 MG/0 9ML SOSY, Inject under the skin, Disp: , Rfl:     torsemide (DEMADEX) 20 mg tablet, Take 20 mg by mouth 2 (two) times a day  , Disp: , Rfl:       The following portions of the patient's history were reviewed and updated as appropriate: past medical history, past surgical history, family history, social history, allergies, current medications and active problem list       Review of Systems:  Constitutional: Denies fever, chills, weight gain, weight loss, complains of fatigue and pain all over her body  Eyes: Denies eye redness, eye discharge, double vision, change in visual acuity  ENT: Denies hearing loss, tinnitus, sneezing, nasal congestion, nasal discharge, sore throat   Respiratory: Denies cough, expectoration, hemoptysis, shortness of breath, wheezing  Cardiovascular: Denies chest pain, palpitations, lower extremity swelling, orthopnea, PND  Gastrointestinal: Denies abdominal pain, heartburn, nausea, vomiting, hematemesis, diarrhea, bloody stools  Genito-Urinary: Denies dysuria, frequency, difficulty in micturition, nocturia, incontinence  Musculoskeletal: Denies back pain, joint pain, muscle pain  Neurologic: Denies confusion, lightheadedness, syncope, headache, focal weakness, sensory changes, seizures  Endocrine: Denies polyuria, polydipsia, temperature intolerance  Allergy and Immunology: Denies hives, insect bite sensitivity  Hematological and Lymphatic: Denies bleeding problems, swollen glands   Psychological: Denies depression, suicidal ideation, anxiety, panic, mood swings  Dermatological: Denies pruritus, rash, skin lesion changes      Vitals:  Vitals:    10/17/18 1614   BP: 126/60   Pulse: 78   SpO2: 94%       Body mass index is 39 6 kg/m²  Weight (last 2 days)     Date/Time   Weight    10/17/18 1614  81 6 (179 8)                Physical Examination:  General: Patient is not in acute distress  Awake, alert, responding to commands  No weight gain or loss  Head: Normocephalic  Atraumatic  Eyes: Conjunctiva and lids with no swelling, erythema or discharge  Both pupils normal sized, round and reactive to light  Sclera nonicteric  ENT: External examination of nose and ear normal  Otoscopic examination shows translucent tympanic membranes with patent canals without erythema  Oropharynx moist with no erythema, edema, exudate or lesions  Neck: Supple  JVP not raised  Trachea midline  No masses  No thyromegaly  Lungs: No signs of increased work of breathing or respiratory distress  Bilateral bronchovascular breath sounds with no crackles or rhonchi  Chest wall: No tenderness  Cardiovascular: Normal PMI  No thrills  Regular rate and rhythm  S1 and S2 normal  No murmur, rub or gallop  Gastrointestinal: Abdomen soft, nontender  No guarding or rigidity  Liver and spleen not palpable  Bowel sounds present  Neurologic: Cranial nerves II-XII intact   Cortical functions normal  Motor system - Reflexes 2+ and symmetrical  Sensations normal  Musculoskeletal: Gait normal  No joint tenderness  Integumentary: Skin normal with no rash or lesions  Lymphatic: No palpable lymph nodes in neck, axilla or groin  Extremities: No clubbing, cyanosis, edema or varicosities  Psychological: Judgement and insight normal  Mood and affect normal      Laboratory Results:  CBC with diff:   Lab Results   Component Value Date    WBC 13 78 (H) 02/16/2018    WBC 7 94 01/05/2016    RBC 4 12 02/16/2018    RBC 4 39 01/05/2016    HGB 13 2 02/16/2018    HGB 12 9 01/05/2016    HCT 40 7 02/16/2018    HCT 40 9 01/05/2016    MCV 99 (H) 02/16/2018    MCV 93 01/05/2016    MCH 32 0 02/16/2018    MCH 29 4 01/05/2016    RDW 14 0 02/16/2018    RDW 15 4 (H) 01/05/2016     02/16/2018     01/05/2016       CMP:  Lab Results   Component Value Date    CREATININE 0 92 02/16/2018    CREATININE 0 74 01/05/2016    BUN 22 02/16/2018    BUN 18 01/05/2016    NA 134 (L) 02/16/2018     (L) 01/05/2016    K 4 3 02/16/2018    K 4 5 01/05/2016     02/16/2018     01/05/2016    CO2 25 02/16/2018    CO2 26 01/05/2016    GLUCOSE 159 (H) 01/05/2016    PROT 7 3 01/05/2016    ALKPHOS 106 02/16/2018    ALKPHOS 151 (H) 01/05/2016    ALT 21 02/16/2018    ALT 25 01/05/2016    AST 16 02/16/2018    AST 14 01/05/2016       Lab Results   Component Value Date    HGBA1C 7 8 (H) 02/16/2018    HGBA1C 9 8 (H) 01/05/2016       No results found for: TROPONINI, CKMB, CKTOTAL    Lipid Profile:   Lab Results   Component Value Date    CHOL 230 01/05/2016    CHOL 149 09/15/2015     Lab Results   Component Value Date    HDL 72 (H) 02/16/2018    HDL 48 06/09/2016     Lab Results   Component Value Date    LDLCALC 108 (H) 02/16/2018    LDLCALC 123 (H) 06/09/2016     Lab Results   Component Value Date    TRIG 148 02/16/2018    TRIG 130 06/09/2016         Health Maintenance:  Health Maintenance   Topic Date Due    MAMMOGRAM  1945    DTaP,Tdap,and Td Vaccines (1 - Tdap) 04/16/1966    Pneumococcal PPSV23/PCV13 65+ Years / Low and Medium Risk (2 of 2 - PCV13) 12/20/2017    Diabetic Foot Exam  06/30/2018    INFLUENZA VACCINE  07/01/2018    HEMOGLOBIN A1C  08/16/2018    Medicare Annual Wellness Visit (AWV)  02/16/2019    URINE MICROALBUMIN  02/16/2019    DM Eye Exam  05/04/2019    Fall Risk  06/11/2019    Urinary Incontinence Screening  06/11/2019    CRC Screening: Colonoscopy  11/05/2020     Immunization History   Administered Date(s) Administered    Influenza Split High Dose Preservative Free IM 12/20/2016, 11/10/2017    Influenza TIV (IM) 10/01/2014, 09/01/2015    Pneumococcal Polysaccharide PPV23 04/21/2008, 12/20/2016    Tdap 1945         Janie Putnam MD  10/17/2018,4:53 PM

## 2021-03-23 ENCOUNTER — TELEMEDICINE (OUTPATIENT)
Dept: INTERNAL MEDICINE CLINIC | Facility: CLINIC | Age: 76
End: 2021-03-23
Payer: MEDICARE

## 2021-03-23 DIAGNOSIS — F33.41 RECURRENT MAJOR DEPRESSIVE DISORDER, IN PARTIAL REMISSION (HCC): ICD-10-CM

## 2021-03-23 DIAGNOSIS — Z79.4 TYPE 2 DIABETES MELLITUS WITH STAGE 3 CHRONIC KIDNEY DISEASE, WITH LONG-TERM CURRENT USE OF INSULIN, UNSPECIFIED WHETHER STAGE 3A OR 3B CKD (HCC): Primary | ICD-10-CM

## 2021-03-23 DIAGNOSIS — M05.79 RHEUMATOID ARTHRITIS INVOLVING MULTIPLE SITES WITH POSITIVE RHEUMATOID FACTOR (HCC): ICD-10-CM

## 2021-03-23 DIAGNOSIS — E11.22 TYPE 2 DIABETES MELLITUS WITH STAGE 3 CHRONIC KIDNEY DISEASE, WITH LONG-TERM CURRENT USE OF INSULIN, UNSPECIFIED WHETHER STAGE 3A OR 3B CKD (HCC): Primary | ICD-10-CM

## 2021-03-23 DIAGNOSIS — E66.9 OBESITY (BMI 30-39.9): ICD-10-CM

## 2021-03-23 DIAGNOSIS — I10 ESSENTIAL HYPERTENSION: ICD-10-CM

## 2021-03-23 DIAGNOSIS — N18.30 TYPE 2 DIABETES MELLITUS WITH STAGE 3 CHRONIC KIDNEY DISEASE, WITH LONG-TERM CURRENT USE OF INSULIN, UNSPECIFIED WHETHER STAGE 3A OR 3B CKD (HCC): Primary | ICD-10-CM

## 2021-03-23 PROBLEM — N17.9 ACUTE KIDNEY INJURY (HCC): Status: RESOLVED | Noted: 2019-12-07 | Resolved: 2021-03-23

## 2021-03-23 PROBLEM — I13.0 HYPERTENSIVE HEART AND KIDNEY DISEASE WITH HEART FAILURE AND WITH CHRONIC KIDNEY DISEASE STAGE III (HCC): Status: ACTIVE | Noted: 2021-03-23

## 2021-03-23 PROCEDURE — 99214 OFFICE O/P EST MOD 30 MIN: CPT | Performed by: INTERNAL MEDICINE

## 2021-03-23 NOTE — PROGRESS NOTES
Virtual Regular Visit      Assessment/Plan:    Problem List Items Addressed This Visit        Endocrine    Type 2 diabetes mellitus with stage 3 chronic kidney disease, with long-term current use of insulin ([facility] ) - Primary    Relevant Orders    CBC and differential    Comprehensive metabolic panel    Lipid panel    TSH, 3rd generation    Hemoglobin A1C       Cardiovascular and Mediastinum    Essential hypertension       Musculoskeletal and Integument    Rheumatoid arthritis (HCC)       Other    Recurrent major depressive disorder, in partial remission ([facility] )    Obesity (BMI 30-39  9)          BMI Counseling: There is no height or weight on file to calculate BMI  The BMI is above normal  Nutrition recommendations include decreasing portion sizes, encouraging healthy choices of fruits and vegetables and moderation in carbohydrate intake  Exercise recommendations include moderate physical activity 150 minutes/week  No pharmacotherapy was ordered  Reason for visit is No chief complaint on file  Encounter provider Virgin Prader, MD    Provider located at [address] [place] [address]      Recent Visits  No visits were found meeting these conditions  Showing recent visits within past 7 days and meeting all other requirements     Today's Visits  Date Type Provider Dept   03/23/21 Telemedicine Virgin Prader, MD [address] [place] [address] today's visits and meeting all other requirements     Future Appointments  No visits were found meeting these conditions  Showing future appointments within next 150 days and meeting all other requirements        The patient was identified by name and date of birth  Jose Jenkins was informed that this is a telemedicine visit and that the visit is being conducted through [address] [place] [address] and patient was informed that this is not a secure, HIPAA-compliant platform  She agrees to proceed  Josefa Hartman My office door was closed  No one else was in the room  She acknowledged consent and understanding of privacy and security of the video platform  The patient has agreed to participate and understands they can discontinue the visit at any time  Patient is aware this is a billable service  Subjective  Everardo Jackson is a 76 y o  female with Type 2 diabetes, hypertension, RA and depression has uncontrolled diabetes with a A1c of 8 6  The other labs are ok  She seems to be depressed as she has a lot of pain due to RA   HPI   Follow up    Past Medical History:   Diagnosis Date    Asthma     Chest pain on breathing     last assessed 2/5/15    [facility]est[facility]     Diabetes mellitus ([address] )     HTN (hypertension)     Hyperlipidemia     Insomnia     Obesity     Preop cardiovascular exam 2/2/2018    Pulmonary fibrosis (HCC)        Past Surgical History:   Procedure Laterality Date    HAND SURGERY         Current Outpatient Medications   Medication Sig Dispense Refill    albuterol (2 5 mg/3 mL) 0 083 % nebulizer solution Take 1 vial (2 5 mg total) by nebulization every 6 (six) hours as needed for wheezing or shortness of breath 120 vial 3    albuterol (PROVENTIL HFA,VENTOLIN HFA) 90 mcg/act inhaler Inhale 1 puff every 6 (six) hours as needed for wheezing or shortness of breath 18 Inhaler 3    B-D TB SYRINGE 1CC/27GX1/2" 27G X 1/2" 1 ML MISC by Other route 2 (two) times a day 100 each 3    Blood Glucose Monitoring Suppl (OneTouch Verio) w/Device KIT by Does not apply route 3 (three) times a day 1 kit 0    diclofenac sodium (Voltaren) 1 % USE AS DIRECTED      Diclofenac Sodium (VOLTAREN) 1 % USE AS DIRECTED      escitalopram (LEXAPRO) 20 mg tablet TAKE 1 TABLET BY MOUTH EVERY DAY 90 tablet 2    fluticasone-vilanterol (BREO ELLIPTA) 100-25 mcg/inh inhaler Inhale 1 puff daily Rinse mouth after use   1 Inhaler 3    folic acid (FOLVITE) 1 mg tablet Take 1,000 mcg by mouth daily      fosinopril (MONOPRIL) 10 mg tablet TAKE 1 TABLET BY MOUTH EVERY DAY 90 tablet 2    glucose blood (ONE TOUCH ULTRA TEST) test strip Patient to test 3 times a day 100 each 3    HYDROmorphone (DILAUDID) 4 mg tablet Take 4 mg by mouth 3 (three) times a day as needed for moderate pain      insulin glargine (Basaglar KwikPen) 100 units/mL injection pen Inject 30 Units under the skin every 12 (twelve) hours Inject 40 units in am and 25 units in pm (Patient taking differently: Inject 55 Units under the skin daily Inject 40 units in am and 25 units in pm) 18 pen 3    Insulin Pen Needle (B-D UF III MINI PEN NEEDLES) 31G X 5 MM MISC Inject under the skin 2 (two) times a day Use as directed 200 each 3    metFORMIN (GLUCOPHAGE) 1000 MG tablet TAKE 1 TABLET BY MOUTH TWICE A  tablet 3    torsemide (DEMADEX) 20 mg tablet TAKE 2 TABLETS (40 MG TOTAL) BY MOUTH 2 (TWO) TIMES A  tablet 3    Upadacitinib ER (Rinvoq) 15 MG TB24 Take by mouth       No current facility-administered medications for this visit  Allergies   Allergen Reactions    Gabapentin     Vancomycin        Review of Systems   Constitutional: Negative for chills, diaphoresis, fatigue and fever  HENT: Negative for congestion, ear discharge, ear pain, hearing loss, postnasal drip, rhinorrhea, sinus pressure, sinus pain, sneezing, sore throat and voice change  Eyes: Negative for pain, discharge, redness and visual disturbance  Respiratory: Negative for cough, chest tightness, shortness of breath and wheezing  Cardiovascular: Negative for chest pain, palpitations and leg swelling  Gastrointestinal: Negative for abdominal distention, abdominal pain, blood in stool, constipation, diarrhea, nausea and vomiting  Endocrine: Negative for cold intolerance, heat intolerance, polydipsia, polyphagia and polyuria  Genitourinary: Negative for dysuria, flank pain, frequency, hematuria and urgency     Musculoskeletal: Positive for arthralgias, gait problem, joint swelling and myalgias  Negative for back pain, neck pain and neck stiffness  Skin: Negative for rash  Neurological: Negative for dizziness, tremors, syncope, facial asymmetry, speech difficulty, weakness, light-headedness, numbness and headaches  Hematological: Does not bruise/bleed easily  Psychiatric/Behavioral: Positive for dysphoric mood  Negative for behavioral problems, confusion and sleep disturbance  The patient is not nervous/anxious  Video Exam    There were no vitals filed for this visit  Physical Exam  Constitutional:       Appearance: She is obese  HENT:      Head: Normocephalic  Eyes:      Conjunctiva/sclera: Conjunctivae normal    Pulmonary:      Effort: Pulmonary effort is normal    Neurological:      Mental Status: She is alert and oriented to person, place, and time  Psychiatric:         Behavior: Behavior normal       Was advised to increase the Basaglar to 55 units daily and to limit carbohydrates in the diet  Will repeat labs in 4 months    I spent 20 minutes with patient today in which greater than 50% of the time was spent in counseling/coordination of care regarding uncontrolled diabetes      VIRTUAL VISIT [address] [place] [address] acknowledges that she has consented to an online visit or consultation  She understands that the online visit is based solely on information provided by her, and that, in the absence of a face-to-face physical evaluation by the physician, the diagnosis she receives is both limited and provisional in terms of accuracy and completeness  This is not intended to replace a full medical face-to-face evaluation by the physician  Kasandra Martines understands and accepts these terms

## 2021-05-12 DIAGNOSIS — Z79.4 TYPE 2 DIABETES MELLITUS WITH STAGE 3 CHRONIC KIDNEY DISEASE, WITH LONG-TERM CURRENT USE OF INSULIN (HCC): ICD-10-CM

## 2021-05-12 DIAGNOSIS — N18.30 TYPE 2 DIABETES MELLITUS WITH STAGE 3 CHRONIC KIDNEY DISEASE, WITH LONG-TERM CURRENT USE OF INSULIN (HCC): ICD-10-CM

## 2021-05-12 DIAGNOSIS — E11.22 TYPE 2 DIABETES MELLITUS WITH STAGE 3 CHRONIC KIDNEY DISEASE, WITH LONG-TERM CURRENT USE OF INSULIN (HCC): ICD-10-CM

## 2021-05-12 RX ORDER — BLOOD SUGAR DIAGNOSTIC
STRIP MISCELLANEOUS
Qty: 100 STRIP | Refills: 3 | Status: SHIPPED | OUTPATIENT
Start: 2021-05-12 | End: 2022-05-25

## 2021-05-21 ENCOUNTER — APPOINTMENT (EMERGENCY)
Dept: CT IMAGING | Facility: HOSPITAL | Age: 76
End: 2021-05-21
Payer: MEDICARE

## 2021-05-21 ENCOUNTER — APPOINTMENT (EMERGENCY)
Dept: RADIOLOGY | Facility: HOSPITAL | Age: 76
End: 2021-05-21
Payer: MEDICARE

## 2021-05-21 ENCOUNTER — HOSPITAL ENCOUNTER (EMERGENCY)
Facility: HOSPITAL | Age: 76
Discharge: HOME | End: 2021-05-21
Attending: EMERGENCY MEDICINE | Admitting: EMERGENCY MEDICINE
Payer: MEDICARE

## 2021-05-21 VITALS
OXYGEN SATURATION: 96 % | TEMPERATURE: 98.4 F | RESPIRATION RATE: 16 BRPM | BODY MASS INDEX: 39.82 KG/M2 | DIASTOLIC BLOOD PRESSURE: 76 MMHG | HEART RATE: 72 BPM | WEIGHT: 202.82 LBS | HEIGHT: 60 IN | SYSTOLIC BLOOD PRESSURE: 174 MMHG

## 2021-05-21 DIAGNOSIS — S22.32XA CLOSED TRAUMATIC NONDISPLACED FRACTURE OF ONE RIB OF LEFT SIDE, INITIAL ENCOUNTER: ICD-10-CM
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
## 2021-05-21 DIAGNOSIS — M25.562 LEFT KNEE PAIN: ICD-10-CM

## 2021-05-21 LAB
ANION GAP SERPL CALCULATED.3IONS-SCNC: 8 MMOL/L (ref 4–13)
APTT PPP: 29 SECONDS (ref 23–37)
ATRIAL RATE: 76 BPM
BASOPHILS # BLD AUTO: 0.05 THOUSANDS/ΜL (ref 0–0.1)
BASOPHILS NFR BLD AUTO: 1 % (ref 0–1)
BUN SERPL-MCNC: 19 MG/DL (ref 5–25)
CALCIUM SERPL-MCNC: 9.7 MG/DL (ref 8.3–10.1)
CHLORIDE SERPL-SCNC: 98 MMOL/L (ref 100–108)
CO2 SERPL-SCNC: 27 MMOL/L (ref 21–32)
CREAT SERPL-MCNC: 1.28 MG/DL (ref 0.6–1.3)
EOSINOPHIL # BLD AUTO: 0.09 THOUSAND/ΜL (ref 0–0.61)
EOSINOPHIL NFR BLD AUTO: 1 % (ref 0–6)
ERYTHROCYTE [DISTWIDTH] IN BLOOD BY AUTOMATED COUNT: 14.6 % (ref 11.6–15.1)
GFR SERPL CREATININE-BSD FRML MDRD: 41 ML/MIN/1.73SQ M
GLUCOSE SERPL-MCNC: 440 MG/DL (ref 65–140)
HCT VFR BLD AUTO: 43 % (ref 34.8–46.1)
HGB BLD-MCNC: 13.6 G/DL (ref 11.5–15.4)
IMM GRANULOCYTES # BLD AUTO: 0.03 THOUSAND/UL (ref 0–0.2)
IMM GRANULOCYTES NFR BLD AUTO: 0 % (ref 0–2)
INR PPP: 1.04 (ref 0.84–1.19)
LYMPHOCYTES # BLD AUTO: 1.43 THOUSANDS/ΜL (ref 0.6–4.47)
LYMPHOCYTES NFR BLD AUTO: 15 % (ref 14–44)
MCH RBC QN AUTO: 29.2 PG (ref 26.8–34.3)
MCHC RBC AUTO-ENTMCNC: 31.6 G/DL (ref 31.4–37.4)
MCV RBC AUTO: 92 FL (ref 82–98)
MONOCYTES # BLD AUTO: 0.54 THOUSAND/ΜL (ref 0.17–1.22)
MONOCYTES NFR BLD AUTO: 6 % (ref 4–12)
NEUTROPHILS # BLD AUTO: 7.27 THOUSANDS/ΜL (ref 1.85–7.62)
NEUTS SEG NFR BLD AUTO: 77 % (ref 43–75)
NRBC BLD AUTO-RTO: 0 /100 WBCS
P AXIS: 59 DEGREES
PLATELET # BLD AUTO: 210 THOUSANDS/UL (ref 149–390)
PMV BLD AUTO: 11 FL (ref 8.9–12.7)
POTASSIUM SERPL-SCNC: 4.6 MMOL/L (ref 3.5–5.3)
PR INTERVAL: 158 MS
PROTHROMBIN TIME: 13 SECONDS (ref 11.6–14.5)
QRS AXIS: -22 DEGREES
QRSD INTERVAL: 82 MS
QT INTERVAL: 388 MS
QTC INTERVAL: 436 MS
RBC # BLD AUTO: 4.66 MILLION/UL (ref 3.81–5.12)
SODIUM SERPL-SCNC: 133 MMOL/L (ref 136–145)
T WAVE AXIS: 47 DEGREES
TROPONIN I SERPL-MCNC: <0.02 NG/ML
VENTRICULAR RATE: 76 BPM
WBC # BLD AUTO: 9.41 THOUSAND/UL (ref 4.31–10.16)

## 2021-05-21 PROCEDURE — 85025 COMPLETE CBC W/AUTO DIFF WBC: CPT | Performed by: EMERGENCY MEDICINE

## 2021-05-21 PROCEDURE — 71260 CT THORAX DX C+: CPT

## 2021-05-21 PROCEDURE — 73564 X-RAY EXAM KNEE 4 OR MORE: CPT

## 2021-05-21 PROCEDURE — G1004 CDSM NDSC: HCPCS

## 2021-05-21 PROCEDURE — 96374 THER/PROPH/DIAG INJ IV PUSH: CPT

## 2021-05-21 PROCEDURE — 85610 PROTHROMBIN TIME: CPT | Performed by: EMERGENCY MEDICINE

## 2021-05-21 PROCEDURE — 80048 BASIC METABOLIC PNL TOTAL CA: CPT | Performed by: EMERGENCY MEDICINE

## 2021-05-21 PROCEDURE — 93010 ELECTROCARDIOGRAM REPORT: CPT | Performed by: INTERNAL MEDICINE

## 2021-05-21 PROCEDURE — 36415 COLL VENOUS BLD VENIPUNCTURE: CPT | Performed by: EMERGENCY MEDICINE

## 2021-05-21 PROCEDURE — 99284 EMERGENCY DEPT VISIT MOD MDM: CPT

## 2021-05-21 PROCEDURE — 74177 CT ABD & PELVIS W/CONTRAST: CPT

## 2021-05-21 PROCEDURE — 99285 EMERGENCY DEPT VISIT HI MDM: CPT | Performed by: EMERGENCY MEDICINE

## 2021-05-21 PROCEDURE — 84484 ASSAY OF TROPONIN QUANT: CPT | Performed by: EMERGENCY MEDICINE

## 2021-05-21 PROCEDURE — 93005 ELECTROCARDIOGRAM TRACING: CPT

## 2021-05-21 PROCEDURE — 85730 THROMBOPLASTIN TIME PARTIAL: CPT | Performed by: EMERGENCY MEDICINE

## 2021-05-21 RX ORDER — METHOCARBAMOL 500 MG/1
500 TABLET, FILM COATED ORAL 2 TIMES DAILY PRN
Qty: 28 TABLET | Refills: 0 | Status: SHIPPED | OUTPATIENT
Start: 2021-05-21

## 2021-05-21 RX ORDER — LIDOCAINE 50 MG/G
1 PATCH TOPICAL ONCE
Status: DISCONTINUED | OUTPATIENT
Start: 2021-05-21 | End: 2021-05-21 | Stop reason: HOSPADM

## 2021-05-21 RX ORDER — HYDROMORPHONE HCL/PF 1 MG/ML
1 SYRINGE (ML) INJECTION ONCE
Status: COMPLETED | OUTPATIENT
Start: 2021-05-21 | End: 2021-05-21

## 2021-05-21 RX ADMIN — LIDOCAINE 5% 1 PATCH: 700 PATCH TOPICAL at 17:28

## 2021-05-21 RX ADMIN — HYDROMORPHONE HYDROCHLORIDE 1 MG: 1 INJECTION, SOLUTION INTRAMUSCULAR; INTRAVENOUS; SUBCUTANEOUS at 15:38

## 2021-05-21 RX ADMIN — IOHEXOL 100 ML: 350 INJECTION, SOLUTION INTRAVENOUS at 16:00

## 2021-05-21 NOTE — DISCHARGE INSTRUCTIONS
Do activities is were able to tolerate  Avoid strenuous activities or heavy lifting until you feel better  Use the incentive spirometer at least once an hour while you are awake to force herself to take a deep breath  Take your prescription pain medication as needed, as per the instructions  You can also use over-the-counter acetaminophen (Tylenol), ice, heat, topical pain medications (Berry-Ng, icy Hot, lidocaine etc)  Use a muscle relaxer as needed for continued pain  This can make you sleepy, so be careful when using it, especially around doses of your hydromorphone  Follow-up with your primary care doctor to make sure you are doing better

## 2021-05-21 NOTE — ED PROVIDER NOTES
History  Chief Complaint   Patient presents with    Fall     pt fell out of bed approx 2 weeks ago, pt c/o left sided rib pain and left knee pain, pt states she did hit her head, -LOC, -BT     HPI    Prior to Admission Medications   Prescriptions Last Dose Informant Patient Reported? Taking? B-D TB SYRINGE 1CC/27GX1/2" 27G X 1/2" 1 ML MISC  Self No No   Sig: by Other route 2 (two) times a day   Blood Glucose Monitoring Suppl (OneTouch Verio) w/Device KIT  Self No No   Sig: by Does not apply route 3 (three) times a day   Diclofenac Sodium (VOLTAREN) 1 %  Self Yes No   Sig: USE AS DIRECTED   HYDROmorphone (DILAUDID) 4 mg tablet  Self Yes No   Sig: Take 4 mg by mouth 3 (three) times a day as needed for moderate pain   Insulin Pen Needle (B-D UF III MINI PEN NEEDLES) 31G X 5 MM MISC  Self No No   Sig: Inject under the skin 2 (two) times a day Use as directed   OneTouch Ultra test strip   No No   Sig: PATIENT TO TEST ONCE A DAY   Upadacitinib ER (Rinvoq) 15 MG TB24   Yes No   Sig: Take by mouth   albuterol (2 5 mg/3 mL) 0 083 % nebulizer solution  Self No No   Sig: Take 1 vial (2 5 mg total) by nebulization every 6 (six) hours as needed for wheezing or shortness of breath   albuterol (PROVENTIL HFA,VENTOLIN HFA) 90 mcg/act inhaler   No No   Sig: Inhale 1 puff every 6 (six) hours as needed for wheezing or shortness of breath   diclofenac sodium (Voltaren) 1 %  Self Yes No   Sig: USE AS DIRECTED   escitalopram (LEXAPRO) 20 mg tablet  Self No No   Sig: TAKE 1 TABLET BY MOUTH EVERY DAY   fluticasone-vilanterol (BREO ELLIPTA) 100-25 mcg/inh inhaler   No No   Sig: Inhale 1 puff daily Rinse mouth after use     folic acid (FOLVITE) 1 mg tablet  Self Yes No   Sig: Take 1,000 mcg by mouth daily   fosinopril (MONOPRIL) 10 mg tablet  Self No No   Sig: TAKE 1 TABLET BY MOUTH EVERY DAY   insulin glargine (Basaglar KwikPen) 100 units/mL injection pen  Self No No   Sig: Inject 30 Units under the skin every 12 (twelve) hours Inject 40 units in am and 25 units in pm   Patient taking differently: Inject 55 Units under the skin daily Inject 40 units in am and 25 units in pm   metFORMIN (GLUCOPHAGE) 1000 MG tablet  Self No No   Sig: TAKE 1 TABLET BY MOUTH TWICE A DAY   torsemide (DEMADEX) 20 mg tablet  Self No No   Sig: TAKE 2 TABLETS (40 MG TOTAL) BY MOUTH 2 (TWO) TIMES A DAY      Facility-Administered Medications: None       Past Medical History:   Diagnosis Date    Asthma     Chest pain on breathing     last assessed 2/5/15    [facility] failure[facility] )     Diabetes mellitus ([facility] )     HTN (hypertension)     Hyperlipidemia     Insomnia     Obesity     Preop cardiovascular exam 2018    Pulmonary fibrosis ([place] [id number] )        Past Surgical History:   Procedure Laterality Date    HAND SURGERY         Family History   Problem Relation Age of Onset    Rheum arthritis Mother     Hypertension Mother      I have reviewed and agree with the history as documented  E-Cigarette/Vaping    E-Cigarette Use Former User      E-Cigarette/Vaping Substances    Nicotine No     THC No     CBD No     Flavoring No     Other No     Unknown No      Social History     Tobacco Use    Smoking status: Former Smoker     Packs/day: 1 50     Years: 18 00     Pack years: 27 00     Types: Cigarettes     Quit date:      Years since quittin 4    Smokeless tobacco: Never Used    Tobacco comment: Quit 20 years ago   Substance Use Topics    Alcohol use: Never     Frequency: Never     Binge frequency: Never    Drug use: No       Review of Systems    Physical Exam  Physical Exam  Vitals signs and nursing note reviewed  Constitutional:       General: She is not in acute distress  Appearance: She is well-developed  She is obese  Comments: Hypertensive; does improve on repeat but still elevated   HENT:      Head: Normocephalic and atraumatic     Eyes:      Conjunctiva/sclera: Conjunctivae normal       Pupils: Pupils are equal, round, and reactive to light  Neck:      Musculoskeletal: Normal range of motion  Trachea: No tracheal deviation  Cardiovascular:      Rate and Rhythm: Normal rate and regular rhythm  Heart sounds: Normal heart sounds  Pulmonary:      Effort: Pulmonary effort is normal  No respiratory distress  Breath sounds: Normal breath sounds  Chest:      Chest wall: Tenderness present  No deformity, swelling or crepitus  Abdominal:      General: Bowel sounds are normal  There is no distension  Palpations: Abdomen is soft  Tenderness: There is no abdominal tenderness  Musculoskeletal:      Left knee: She exhibits normal range of motion (pain with movement), no swelling, no effusion, no ecchymosis and no deformity  Tenderness (diffuse) found  Lumbar back: She exhibits tenderness  She exhibits normal range of motion and no bony tenderness  Back:    Skin:     General: Skin is warm and dry  Neurological:      Mental Status: She is alert and oriented to person, place, and time  GCS: GCS eye subscore is 4  GCS verbal subscore is 5  GCS motor subscore is 6  Psychiatric:         Mood and Affect: Mood is anxious           Behavior: Behavior normal          Vital Signs  ED Triage Vitals [05/21/21 1520]   Temperature Pulse Respirations Blood Pressure SpO2   98 4 °F (36 9 °C) 98 21 (!) 235/105 96 %      Temp Source Heart Rate Source Patient Position - Orthostatic VS BP Location FiO2 (%)   Temporal Monitor Sitting Right arm --      Pain Score       Worst Possible Pain           Vitals:    05/21/21 1520 05/21/21 1528 05/21/21 1530   BP: (!) 235/105 (!) 173/80 (!) 180/77   Pulse: 98 80 75   Patient Position - Orthostatic VS: Sitting Sitting Lying         Visual Acuity  Visual Acuity      Most Recent Value   L Pupil Size (mm)  3   R Pupil Size (mm)  3          ED Medications  Medications   lidocaine (LIDODERM) 5 % patch 1 patch (has no administration in time range) HYDROmorphone (DILAUDID) injection 1 mg (1 mg Intravenous Given 5/21/21 1538)   iohexol (OMNIPAQUE) 350 MG/ML injection (SINGLE-DOSE) 100 mL (100 mL Intravenous Given 5/21/21 1600)       Diagnostic Studies  Results Reviewed     Procedure Component Value Units Date/Time    Troponin I [[id number]]  (Normal) Collected: 05/21/21 1535    Lab Status: Final result Specimen: Blood from Arm, Left Updated: 05/21/21 1559     Troponin I <0 02 ng/mL     Protime-INR [[id number]]  (Normal) Collected: 05/21/21 1535    Lab Status: Final result Specimen: Blood from Arm, Left Updated: 05/21/21 1558     Protime 13 0 seconds      INR 1 04    APTT [[id number]]  (Normal) Collected: 05/21/21 1535    Lab Status: Final result Specimen: Blood from Arm, Left Updated: 05/21/21 1558     PTT 29 seconds     Basic metabolic panel [[id number]]  (Abnormal) Collected: 05/21/21 1535    Lab Status: Final result Specimen: Blood from Arm, Left Updated: 05/21/21 1551     Sodium 133 mmol/L      Potassium 4 6 mmol/L      Chloride 98 mmol/L      CO2 27 mmol/L      ANION GAP 8 mmol/L      BUN 19 mg/dL      Creatinine 1 28 mg/dL      Glucose 440 mg/dL      Calcium 9 7 mg/dL      eGFR 41 ml/min/1 73sq m     Narrative:      [place] guidelines for Chronic Kidney Disease (CKD):     Stage 1 with normal or high GFR (GFR > 90 mL/min/1 73 square meters)    Stage 2 Mild CKD (GFR = 60-89 mL/min/1 73 square meters)    Stage 3A Moderate CKD (GFR = 45-59 mL/min/1 73 square meters)    Stage 3B Moderate CKD (GFR = 30-44 mL/min/1 73 square meters)    Stage 4 Severe CKD (GFR = 15-29 mL/min/1 73 square meters)    Stage 5 End Stage CKD (GFR <15 mL/min/1 73 square meters)  Note: GFR calculation is accurate only with a steady state creatinine    CBC and differential [[id number]]  (Abnormal) Collected: 05/21/21 1535    Lab Status: Final result Specimen: Blood from Arm, Left Updated: 05/21/21 1542     WBC 9 41 Thousand/uL      RBC 4 66 Million/uL Hemoglobin 13 6 g/dL      Hematocrit 43 0 %      MCV 92 fL      MCH 29 2 pg      MCHC 31 6 g/dL      RDW 14 6 %      MPV 11 0 fL      Platelets 946 Thousands/uL      nRBC 0 /100 WBCs      Neutrophils Relative 77 %      Immat GRANS % 0 %      Lymphocytes Relative 15 %      Monocytes Relative 6 %      Eosinophils Relative 1 %      Basophils Relative 1 %      Neutrophils Absolute 7 27 Thousands/µL      Immature Grans Absolute 0 03 Thousand/uL      Lymphocytes Absolute 1 43 Thousands/µL      Monocytes Absolute 0 54 Thousand/µL      Eosinophils Absolute 0 09 Thousand/µL      Basophils Absolute 0 05 Thousands/µL                  CT chest abdomen pelvis w contrast   Final Result by Orlando Clayton DO (05/21 1640)      Nondisplaced fracture left lateral 9th rib  Emphysematous and fibrotic changes of the lungs  The study was marked in [facility] for immediate notification        Workstation performed: [id number]         XR knee 4+ views left injury    (Results Pending)              Procedures  ECG 12 Lead Documentation Only    Date/Time: 5/21/2021 3:57 PM  Performed by: Phoebe Mijares MD  Authorized by: Phoebe Mijares MD     Indications / Diagnosis:  L sided chest pain  ECG reviewed by me, the ED Provider: yes    Patient location:  ED  Previous ECG:     Previous ECG:  Compared to current    Comparison ECG info:  02/13/20    Similarity:  No change  Interpretation:     Interpretation: normal    Rate:     ECG rate:  76    ECG rate assessment: normal    Rhythm:     Rhythm: sinus rhythm    Ectopy:     Ectopy: none    QRS:     QRS axis:  Left    QRS intervals:  Normal  Conduction:     Conduction: normal    ST segments:     ST segments:  Normal  T waves:     T waves: normal               ED Course               Identification of Seniors at Risk      Most Recent Value   (ISAR) Identification of Seniors at Risk   Before the illness or injury that brought you to the Emergency, did you need someone to help you on a regular basis? 0 Filed at: 05/21/2021 1522   In the last 24 hours, have you needed more help than usual?  0 Filed at: 05/21/2021 1522   Have you been hospitalized for one or more nights during the past 6 months? 0 Filed at: 05/21/2021 1522   In general, do you see well?  0 Filed at: 05/21/2021 1522   In general, do you have serious problems with your memory? 0 Filed at: 05/21/2021 1522   Do you take more than three different medications every day? 1 Filed at: 05/21/2021 1522   ISAR Score  1 Filed at: 05/21/2021 1522                    SBIRT 20yo+      Most Recent Value   SBIRT (25 yo +)   In order to provide better care to our patients, we are screening all of our patients for alcohol and drug use  Would it be okay to ask you these screening questions? Yes Filed at: 05/21/2021 1607   Initial Alcohol Screen: US AUDIT-C    1  How often do you have a drink containing alcohol?  0 Filed at: 05/21/2021 1607   2  How many drinks containing alcohol do you have on a typical day you are drinking? 0 Filed at: 05/21/2021 1607   3b  FEMALE Any Age, or MALE 65+: How often do you have 4 or more drinks on one occassion? 0 Filed at: 05/21/2021 1607   Audit-C Score  0 Filed at: 05/21/2021 1607   JOHN PAUL: How many times in the past year have you    Used an illegal drug or used a prescription medication for non-medical reasons? Never Filed at: 05/21/2021 1607                    MDM  Number of Diagnoses or Management Options  Closed traumatic nondisplaced fracture of one rib of left side, initial encounter: new and requires workup  Fall from bed, initial encounter: new and requires workup  Left knee pain: new and requires workup  Diagnosis management comments: This is a 79-year-old female who presents here today with left torso pain and left knee pain  She fell out of bed about two weeks ago  She states she woke up on the floor and is not sure of what happened that made her fall    She has been having some left side chest pain and left knee pain since the fall, which has been controlled with the hydromorphone that she is prescribed for arthritis  She has not taken or done anything else for the pain  Today, she has been complaining of significant pain, primarily to the left side, worse with taking a deep breath and moving  She did have a large bruise to her left upper anterior chest wall initially, but this has resolved  She denies recurrent falls or injuries  She denies shortness of breath  She denies abdominal pain, nausea, vomiting, urinary symptoms  She does have problems with knee pain at baseline due to arthritis but it has been worse recently  The daughter states she has been complaining of some pain since the fall but it is significantly worse today  She is unaware of anything that would have triggered the patient's worsening pain  Review of systems:  Otherwise negative unless stated as above    She is well-appearing, in no acute distress, but does appear uncomfortable  She does have tenderness diffusely along the left side of her torso  This does extend slightly down towards the flank  She has diffuse tenderness the knee and pain movement but does have full range of motion  She has no external signs of trauma on exam   Exam is otherwise unremarkable  We will get imaging to evaluate underlying injuries  Delayed significant pain is not likely due to the injury itself, but more so due to development of secondary issues, such as worsening pulmonary contusion, development of infection soft tissue injury, slow/delayed hemorrhage  It may also be due to underlying pneumonia, ACS  We will check lab work and CT scan to evaluate  X-ray of the knee was reviewed by myself and shows no acute abnormalities  CT scan shows single nondisplaced left-sided rib fracture  There are no other injuries, either primary injuries or secondary to her fracture  She does feel better after pain medications    I discussed with the patient her daughter findings, continued treatment at home, follow-up, and indications for return, and they expressed understanding with this plan  Amount and/or Complexity of Data Reviewed  Clinical lab tests: ordered and reviewed  Tests in the radiology section of CPT®: ordered and reviewed  Obtain history from someone other than the patient: yes (daughter)  Independent visualization of images, tracings, or specimens: yes        Disposition  Final diagnoses:   Fall from bed, initial encounter   Closed traumatic nondisplaced fracture of one rib of left side, initial encounter   Left knee pain - acute on chronic     Time reflects when diagnosis was documented in both MDM as applicable and the Disposition within this note     Time User Action Codes Description Comment    5/21/2021  5:02 PM Geovanna Montoya Add [W06  XXXA] Fall from bed, initial encounter     5/21/2021  5:02 PM Geovanna Montoya Add [S22 [id number]] Closed traumatic nondisplaced fracture of one rib of left side, initial encounter     5/21/2021  5:02 PM Geovanna Montoya Add [M25 [id number]] Left knee pain     5/21/2021  5:02 PM Geovanna Montoya Modify [T93 [id number]] Left knee pain acute on chronic      ED Disposition     ED Disposition Condition Date/Time Comment    Discharge Stable Fri May 21, 2021  4:59 PM Eryn Ortiz discharge to home/self care        Follow-up Information     Follow up With Specialties Details Why Gera Gurrola MD Internal Medicine Schedule an appointment as soon as possible for a visit in 5 days to follow up on your broken rib [address] [place] [address]  [phone]            Patient's Medications   Discharge Prescriptions    METHOCARBAMOL (ROBAXIN) 500 MG TABLET    Take 1 tablet (500 mg total) by mouth 2 (two) times a day as needed for muscle spasms (side pain)       Start Date: 5/21/2021 End Date: --       Order Dose: 500 mg Quantity: 28 tablet    Refills: 0     No discharge procedures on file      PDMP Review     None          ED Provider  Electronically Signed by           Evangelista Durham MD  05/21/21 0968

## 2021-05-25 DIAGNOSIS — N18.30 TYPE 2 DIABETES MELLITUS WITH STAGE 3 CHRONIC KIDNEY DISEASE, WITH LONG-TERM CURRENT USE OF INSULIN (HCC): ICD-10-CM

## 2021-05-25 DIAGNOSIS — Z79.4 TYPE 2 DIABETES MELLITUS WITH STAGE 3 CHRONIC KIDNEY DISEASE, WITH LONG-TERM CURRENT USE OF INSULIN (HCC): ICD-10-CM

## 2021-05-25 DIAGNOSIS — E11.22 TYPE 2 DIABETES MELLITUS WITH STAGE 3 CHRONIC KIDNEY DISEASE, WITH LONG-TERM CURRENT USE OF INSULIN (HCC): ICD-10-CM

## 2021-05-27 ENCOUNTER — OFFICE VISIT (OUTPATIENT)
Dept: CARDIOLOGY CLINIC | Facility: CLINIC | Age: 76
End: 2021-05-27
Payer: MEDICARE

## 2021-05-27 VITALS
DIASTOLIC BLOOD PRESSURE: 74 MMHG | HEART RATE: 89 BPM | WEIGHT: 202 LBS | SYSTOLIC BLOOD PRESSURE: 136 MMHG | HEIGHT: 60 IN | BODY MASS INDEX: 39.66 KG/M2 | RESPIRATION RATE: 18 BRPM | OXYGEN SATURATION: 90 %

## 2021-05-27 DIAGNOSIS — E66.9 OBESITY (BMI 30-39.9): ICD-10-CM

## 2021-05-27 DIAGNOSIS — E78.2 MIXED HYPERLIPIDEMIA: ICD-10-CM

## 2021-05-27 DIAGNOSIS — I10 ESSENTIAL HYPERTENSION: ICD-10-CM

## 2021-05-27 DIAGNOSIS — I50.32 CHRONIC DIASTOLIC CONGESTIVE HEART FAILURE (HCC): Primary | ICD-10-CM

## 2021-05-27 PROCEDURE — 99214 OFFICE O/P EST MOD 30 MIN: CPT | Performed by: INTERNAL MEDICINE

## 2021-05-27 RX ORDER — TOCILIZUMAB 180 MG/ML
162 INJECTION, SOLUTION SUBCUTANEOUS
COMMUNITY
Start: 2021-04-09 | End: 2022-01-25

## 2021-05-27 NOTE — PROGRESS NOTES
CARDIOLOGY OFFICE VISIT  [facility]  [address], [address] [place] [address], [place], [facility] Yessi Washington  Tel: [phone]      NAME: Penny Medico  AGE: 68 y o  SEX: female  : 1945   MRN: [id number]      Chief Complaint:  Chief Complaint   Patient presents with    Follow-up         History of Present Illness:   Patient comes for follow up  States she is doing okay from cardiac stand point and denies chest pain / pressure, palpitations, lightheadedness, syncope, swelling feet, orthopnea, PND, claudication  SOB at baseline    Chronic diastolic CHF -  On  Torsemide, potassium, beta-blocker, ACE-inhibitor  SOB at baseline  No swelling feet    HTN -  Has been hypertensive for many years  Taking medications regularly  Denies lightheadedness, headache, medication side effects  HLP -  Has had hyperlipidemia for many years  Taking statin regularly along with diet control  Denies myalgia  PCP closely monitoring the blood work  Obesity -  Trying to lose weight        Past Medical History:  Past Medical History:   Diagnosis Date    Asthma     Chest pain on breathing     last assessed 2/5/15    Chronic diastolic CHF (congestive heart failure) (HCC)     Diabetes mellitus ([address] )     HTN (hypertension)     Hyperlipidemia     Insomnia     Obesity     Preop cardiovascular exam 2018    Pulmonary fibrosis (HCC)          Past Surgical History:  Past Surgical History:   Procedure Laterality Date    HAND SURGERY           Family History:  Family History   Problem Relation Age of Onset    Rheum arthritis Mother     Hypertension Mother          Social History:  Social History     Socioeconomic History    Marital status: /Civil Union     Spouse name: None    Number of children: None    Years of education: None    Highest education level: None   Occupational History    None   Social Needs    Financial resource strain: None   [place] insecurity     Worry: None     Inability: None    Transportation needs     Medical: None     Non-medical: None   Tobacco Use    Smoking status: Former Smoker     Packs/day: 1 50     Years: 18 00     Pack years: 27 00     Types: Cigarettes     Quit date:      Years since quittin 4    Smokeless tobacco: Never Used    Tobacco comment: Quit 20 years ago   Substance and Sexual Activity    Alcohol use: Never     Frequency: Never     Binge frequency: Never    Drug use: No    Sexual activity: Never   Lifestyle    Physical activity     Days per week: 0 days     Minutes per session: 0 min    Stress: Only a little   Relationships    Social connections     Talks on phone: None     Gets together: None     Attends [employer] service: None     Active member of club or organization: None     Attends meetings of clubs or organizations: None     Relationship status: None    Intimate partner violence     Fear of current or ex partner: None     Emotionally abused: None     Physically abused: None     Forced sexual activity: None   Other Topics Concern    None   Social History Narrative    No advance directives on file         Active Problems:  Patient Active Problem List   Diagnosis    Bilateral edema of lower extremity    Chronic diastolic [facility] failure ([facility] )    Mixed hyperlipidemia    Essential hypertension    Ambulatory dysfunction    Recurrent major depressive disorder, in partial remission ([facility] )    Type 2 diabetes mellitus with stage 3 chronic kidney disease, with long-term current use of insulin ([facility] )    Gastroesophageal reflux disease without esophagitis    Primary insomnia    Moderate persistent asthma    Psoriasis vulgaris    Rheumatoid arthritis ([facility]    Urinary incontinence    Vitamin D deficiency    Varicose veins of both lower extremities    Obesity (BMI 30-39  9)    Pain in elbow    Immunosuppressed status ([facility] )    Interstitial lung disease (HCC)    Chronic respiratory failure with hypoxia (HCC)    Hyponatremia    Venous stasis dermatitis of both lower extremities    Shortness of breath    Chronic prescription opiate use    Fungal infection of skin    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)         The following portions of the patient's history were reviewed and updated as appropriate: past medical history, past surgical history, past family history,  past social history, current medications, allergies and problem list       Review of Systems:  Constitutional: Denies fever, chills  Eyes: Denies eye redness, eye discharge  ENT: Denies hearing loss, tinnitus, sneezing, nasal discharge, sore throat   Respiratory: Denies cough, expectoration  +shortness of breath  Cardiovascular: Denies chest pain, palpitations, orthopnea, PND, lower extremity swelling  Gastrointestinal: Denies abdominal pain, nausea, vomiting, hematemesis, diarrhea, bloody stools  Genito-Urinary: Denies dysuria, incontinence  Musculoskeletal: +rib pain  Neurologic: Denies lightheadedness, syncope, headache, seizures  Endocrine: Denies polydipsia, temperature intolerance  Allergy and Immunology: Denies hives, insect bite sensitivity  Hematological and Lymphatic: Denies bleeding problems, swollen glands   Psychological: Denies depression, suicidal ideation, anxiety, panic  Dermatological: Denies pruritus, rash, skin lesion changes      Vitals:  Vitals:    05/27/21 1501   BP: 136/74   Pulse: 89   Resp: 18   SpO2: 90%       Body mass index is 39 45 kg/m²  Weight (last 2 days)     Date/Time   Weight    05/27/21 1501   91 6 (202)                Physical Examination:  General: Patient is not in acute distress  Awake, alert, oriented in time, place and person  Responding to commands  Head: Normocephalic  Atraumatic  Eyes: Both pupils normal sized, round and reactive to light  Nonicteric  ENT: Normal external ear canals  Neck: Supple  JVP not raised  Trachea central  No thyromegaly  Lungs: Bilateral bronchovascular breath sounds with no crackles or rhonchi  Chest wall: No tenderness  Cardiovascular: RRR  S1 and S2 normal  No murmur, rub or gallop  Gastrointestinal: Abdomen soft, nontender  No guarding or rigidity  Liver and spleen not palpable  Bowel sounds present  Neurologic: Patient is awake, alert, oriented in time, place and person  Responding to command  Moving all extremities  Integumentary:  No skin rash  Lymphatic: No cervical lymphadenopathy  Back: Symmetric   No CVA tenderness  Extremities: No clubbing, cyanosis or edema      Laboratory Results:  CBC with diff:   Lab Results   Component Value Date    WBC 9 41 05/21/2021    WBC 7 94 01/05/2016    RBC 4 66 05/21/2021    RBC 4 39 01/05/2016    HGB 13 6 05/21/2021    HGB 12 9 01/05/2016    HCT 43 0 05/21/2021    HCT 40 9 01/05/2016    MCV 92 05/21/2021    MCV 93 01/05/2016    MCH 29 2 05/21/2021    MCH 29 4 01/05/2016    RDW 14 6 05/21/2021    RDW 15 4 (H) 01/05/2016     05/21/2021     01/05/2016       CMP:  Lab Results   Component Value Date    CREATININE 1 28 05/21/2021    CREATININE 0 74 01/05/2016    BUN 19 05/21/2021    BUN 18 01/05/2016     (L) 01/05/2016    K 4 6 05/21/2021    K 4 5 01/05/2016    CL 98 (L) 05/21/2021     01/05/2016    CO2 27 05/21/2021    CO2 26 01/05/2016    GLUCOSE 159 (H) 01/05/2016    PROT 7 3 01/05/2016    ALKPHOS 121 (H) 02/26/2021    ALKPHOS 151 (H) 01/05/2016    ALT 23 02/26/2021    ALT 25 01/05/2016    AST 22 02/26/2021    AST 14 01/05/2016       Lab Results   Component Value Date    HGBA1C 8 6 (H) 02/26/2021    HGBA1C 8 3 (H) 10/15/2020    MG 2 1 12/08/2019    PHOS 2 7 12/08/2019       Lab Results   Component Value Date    TROPONINI <0 02 05/21/2021    TROPONINI <0 02 02/13/2020    TROPONINI <0 02 12/07/2019       Lipid Profile:   Lab Results   Component Value Date    CHOL 230 01/05/2016    CHOL 149 09/15/2015     Lab Results   Component Value Date    HDL 80 2021    HDL 90 2020    HDL 72 (H) 2018     Lab Results   Component Value Date    LDLCALC 103 (H) 2021    LDLCALC 90 2020    LDLCALC 108 (H) 2018     Lab Results   Component Value Date    TRIG 120 2021    TRIG 90 2020    TRIG 148 2018       Cardiac testing:   Results for orders placed during the hospital encounter of 19   Echo complete with contrast if indicated    Narrative [address] [place], [place] [id number]  [phone]    Transthoracic Echocardiogram  2D, M-mode, Doppler, and Color Doppler    Study date:  09-Dec-2019    Patient: Lizeth Galss  MR number: [id number]  Account number: [de-identified]  : 1945  Age: 76 years  Gender: Female  Status: Inpatient  Location: Bedside  Height: 60 in  Weight: 185 lb  BP: 124/ 63 mmHg    Indications: Hypertensive renal disease, Assess left ventricular function  Diagnoses: I11 9 - Hypertensive heart disease without heart failure    Sonographer:   [facility] , RDCS  Referring Physician:  Quan Bolton DO  Group:  [id number]  Interpreting Physician:  rPiti Harper MD    SUMMARY    LEFT VENTRICLE:  Systolic function was normal  Ejection fraction was estimated to be 60 %  There were no regional wall motion abnormalities  Doppler parameters were consistent with abnormal left ventricular relaxation (grade 1 diastolic dysfunction)  RIGHT VENTRICLE:  The size was normal   Systolic function was normal     MITRAL VALVE:  There was trace regurgitation  TRICUSPID VALVE:  There was mild regurgitation  Estimated peak PA pressure was 42 mmHg  IVC, HEPATIC VEINS:  The inferior vena cava was dilated  The respirophasic change in diameter was more than 50%  HISTORY: PRIOR HISTORY: Congestive heart failure  Risk factors: hypertension, diabetes, hypercholesterolemia, and morbid obesity      PROCEDURE: The procedure was performed at the bedside  This was a routine study  The transthoracic approach was used  The study included complete 2D imaging, M-mode, complete spectral Doppler, and color Doppler  The heart rate was 91 bpm,  at the start of the study  Images were obtained from the parasternal, apical, subcostal, and suprasternal notch acoustic windows  Image quality was adequate  LEFT VENTRICLE: Size was normal  Systolic function was normal  Ejection fraction was estimated to be 60 %  There were no regional wall motion abnormalities  Wall thickness was normal  No evidence of apical thrombus  DOPPLER: Doppler  parameters were consistent with abnormal left ventricular relaxation (grade 1 diastolic dysfunction)  RIGHT VENTRICLE: The size was normal  Systolic function was normal  Wall thickness was normal     LEFT ATRIUM: Size was normal     RIGHT ATRIUM: Size was normal     MITRAL VALVE: Valve structure was normal  There was normal leaflet separation  DOPPLER: The transmitral velocity was within the normal range  There was no evidence for stenosis  There was trace regurgitation  AORTIC VALVE: The valve was trileaflet  Leaflets exhibited mildly increased thickness and normal cuspal separation  DOPPLER: Transaortic velocity was within the normal range  There was no evidence for stenosis  There was no significant  regurgitation  TRICUSPID VALVE: The valve structure was normal  There was normal leaflet separation  DOPPLER: The transtricuspid velocity was within the normal range  There was no evidence for stenosis  There was mild regurgitation  Estimated peak PA  pressure was 42 mmHg  PULMONIC VALVE: Leaflets exhibited normal thickness, no calcification, and normal cuspal separation  DOPPLER: The transpulmonic velocity was within the normal range  There was no significant regurgitation  PERICARDIUM: There was no pericardial effusion  The pericardium was normal in appearance      AORTA: The root exhibited normal size     SYSTEMIC VEINS: IVC: The inferior vena cava was dilated  The respirophasic change in diameter was more than 50%      SYSTEM MEASUREMENT TABLES    2D  %FS: 38 %  Ao Diam: 2 6 cm  EDV(Teich): 127 ml  EF(Teich): 67 8 %  ESV(Teich): 40 9 ml  IVSd: 0 9 cm  LA Area: 19 3 cm2  LA Diam: 4 cm  LVEDV MOD A4C: 86 5 ml  LVEF MOD A4C: 57 9 %  LVESV MOD A4C: 36 4 ml  LVIDd: 5 2 cm  LVIDs: 3 2 cm  LVLd A4C: 6 9 cm  LVLs A4C: 5 5 cm  LVPWd: 0 8 cm  RA Area: 12 8 cm2  RVIDd: 3 4 cm  SV MOD A4C: 50 1 ml  SV(Teich): 86 1 ml    CW  TR Vmax: 3 1 m/s  TR maxP 4 mmHg    MM  TAPSE: 2 cm    PW  E': 0 1 m/s  E/E': 17 7  MV A [place]: 1 3 m/s  MV Dec [place]: 5 9 m/s2  MV DecT: 190 ms  MV E [place]: 1 1 m/s  MV E/A Ratio: 0 9  MV PHT: 55 1 ms  MVA By PHT: 4 cm2    [facility] Accredited Echocardiography Laboratory    Prepared and electronically signed by    Isidoro Braswell MD  Signed 09-Dec-2019 16:37:23       Medications:    Current Outpatient Medications:     albuterol (2 5 mg/3 mL) 0 083 % nebulizer solution, Take 1 vial (2 5 mg total) by nebulization every 6 (six) hours as needed for wheezing or shortness of breath, Disp: 120 vial, Rfl: 3    albuterol (PROVENTIL HFA,VENTOLIN HFA) 90 mcg/act inhaler, Inhale 1 puff every 6 (six) hours as needed for wheezing or shortness of breath, Disp: 18 Inhaler, Rfl: 3    B-D TB SYRINGE 1CC/27GX1/2" 27G X 1/2" 1 ML MISC, by Other route 2 (two) times a day, Disp: 100 each, Rfl: 3    Blood Glucose Monitoring Suppl (OneTouch Verio) w/Device KIT, by Does not apply route 3 (three) times a day, Disp: 1 kit, Rfl: 0    diclofenac sodium (Voltaren) 1 %, USE AS DIRECTED, Disp: , Rfl:     Diclofenac Sodium (VOLTAREN) 1 %, USE AS DIRECTED, Disp: , Rfl:     escitalopram (LEXAPRO) 20 mg tablet, TAKE 1 TABLET BY MOUTH EVERY DAY, Disp: 90 tablet, Rfl: 2    fluticasone-vilanterol (BREO ELLIPTA) 100-25 mcg/inh inhaler, Inhale 1 puff daily Rinse mouth after use , Disp: 1 Inhaler, Rfl: 3   fosinopril (MONOPRIL) 10 mg tablet, TAKE 1 TABLET BY MOUTH EVERY DAY, Disp: 90 tablet, Rfl: 2    HYDROmorphone (DILAUDID) 4 mg tablet, Take 4 mg by mouth 3 (three) times a day as needed for moderate pain, Disp: , Rfl:     insulin glargine (Basaglar KwikPen) 100 units/mL injection pen, Inject 30 Units under the skin every 12 (twelve) hours Inject 40 units in am and 25 units in pm (Patient taking differently: Inject 55 Units under the skin daily Inject 40 units in am and 25 units in pm), Disp: 18 pen, Rfl: 3    Insulin Pen Needle (B-D UF III MINI PEN NEEDLES) 31G X 5 MM MISC, Inject under the skin 2 (two) times a day Use as directed, Disp: 200 each, Rfl: 3    metFORMIN (GLUCOPHAGE) 1000 MG tablet, TAKE 1 TABLET BY MOUTH TWICE A DAY, Disp: 180 tablet, Rfl: 3    methocarbamol (ROBAXIN) 500 mg tablet, Take 1 tablet (500 mg total) by mouth 2 (two) times a day as needed for muscle spasms (side pain), Disp: 28 tablet, Rfl: 0    OneTouch Ultra test strip, PATIENT TO TEST ONCE A DAY, Disp: 100 strip, Rfl: 3    Tocilizumab (Actemra ACTPen) 162 MG/0 9ML SOAJ, Inject 162 mg under the skin, Disp: , Rfl:     torsemide (DEMADEX) 20 mg tablet, TAKE 2 TABLETS (40 MG TOTAL) BY MOUTH 2 (TWO) TIMES A DAY, Disp: 360 tablet, Rfl: 3      Allergies: Allergies   Allergen Reactions    Gabapentin     Vancomycin          Assessment and Plan:  1  Chronic diastolic congestive heart failure (HCC)   euvolemic  Continue diuretic, ACE-inhibitor, beta-blocker  Low-salt diet  Daily weights  2  Essential hypertension    BP stable  Continue current medications  Continue to monitor BP at home and call if abnormal    3  Mixed hyperlipidemia   continue statin and diet control  Her PCP closely monitor the blood work    4  Obesity (BMI 30-39 9)   counseled to try to lose weight    Recommend aggressive risk factor modification and therapeutic lifestyle changes    Low-salt, low-calorie, low-fat, low-cholesterol diet with regular exercise and to optimize weight  I will defer the ordering and monitoring of necessity lab studies to you, but I am available and happy to review and manage any of the data at your request in the future  Discussed concepts of atherosclerosis, including signs and symptoms of cardiac disease  Previous studies were reviewed  Safety measures were reviewed  Questions were entertained and answered  Patient was advised to report any problems requiring medical attention  Follow-up with PCP and appropriate specialist and lab work as discussed  Return for follow up visit as scheduled or earlier, if needed  Thank you for allowing me to participate in the care and evaluation of your patient  Should you have any questions, please feel free to contact me        Karina Mccoy MD  5/27/2021,3:38 PM

## 2021-06-28 ENCOUNTER — OFFICE VISIT (OUTPATIENT)
Dept: PULMONOLOGY | Facility: CLINIC | Age: 76
End: 2021-06-28
Payer: MEDICARE

## 2021-06-28 VITALS
SYSTOLIC BLOOD PRESSURE: 120 MMHG | DIASTOLIC BLOOD PRESSURE: 74 MMHG | BODY MASS INDEX: 39.66 KG/M2 | HEIGHT: 60 IN | WEIGHT: 202 LBS | OXYGEN SATURATION: 92 % | TEMPERATURE: 97 F | HEART RATE: 82 BPM

## 2021-06-28 DIAGNOSIS — J96.11 CHRONIC RESPIRATORY FAILURE WITH HYPOXIA (HCC): ICD-10-CM

## 2021-06-28 DIAGNOSIS — M05.79 RHEUMATOID ARTHRITIS INVOLVING MULTIPLE SITES WITH POSITIVE RHEUMATOID FACTOR (HCC): ICD-10-CM

## 2021-06-28 DIAGNOSIS — E66.9 OBESITY (BMI 30-39.9): ICD-10-CM

## 2021-06-28 DIAGNOSIS — J84.9 INTERSTITIAL LUNG DISEASE (HCC): ICD-10-CM

## 2021-06-28 DIAGNOSIS — J45.40 MODERATE PERSISTENT ASTHMA WITHOUT COMPLICATION: ICD-10-CM

## 2021-06-28 DIAGNOSIS — R06.02 SHORTNESS OF BREATH: Primary | ICD-10-CM

## 2021-06-28 PROCEDURE — 99214 OFFICE O/P EST MOD 30 MIN: CPT | Performed by: INTERNAL MEDICINE

## 2021-06-28 NOTE — PROGRESS NOTES
Assessment/Plan:   Diagnoses and all orders for this visit:    Shortness of breath  -     Echo complete with contrast if indicated; Future  -     Complete PFT with post Bronchodilator and Six Minute walk; Future    Chronic respiratory failure with hypoxia (HCC)    Interstitial lung disease (HCC)    Rheumatoid arthritis involving multiple sites with positive rheumatoid factor (HCC)    Moderate persistent asthma without complication    Obesity (BMI 30-39 9)      shortness of breath and dyspnea on exertion likely multifactorial secondary to her interstitial lung disease and chronic respiratory failure with hypoxia  PFTs with moderate restriction and severely decreased DLCO  Echocardiogram showing estimated peak pressure of around 42 mmHg with mild to moderate pulmonary hypertension likely secondary to the underlying lung disease  She recently had a CT of the chest done with emphysematous and fibrotic changes throughout the lungs, and essentially has been unchanged from the prior CT in February of 2020  She continues to use the Breo 1 puff daily  She is  Using mental in MDI 2 puffs 4 times daily as needed she states she has been needing it about 2 to 3 times a day  Continue with Singulair 10 mg daily at bedtime  She was on 40 mg of prednisone in the past and on tapered down to 10 mg maintenance for a long time, recently for the past few months has been off the prednisone she states  Continues to follow up with Rheumatology for the rheumatoid arthritis treatment currently on   Actemra  Vaccinations up-to-date   will repeat complete PFT with 6 minutes walk test and follow-up  Again discussed with the patient the need to continue with the continuous oxygen especially on exertion as well as nocturnal and the need  For compliance with the oxygen  I have also ordered for a repeat echocardiogram to look for the pulmonary hypertension pressures and will follow-up       Return in about 6 weeks (around 8/9/2021)    All questions are answered to the patient's satisfaction and understanding  She verbalizes understanding  She is encouraged to call with any further questions or concerns  Portions of the record may have been created with voice recognition software  Occasional wrong word or "sound a like" substitutions may have occurred due to the inherent limitations of voice recognition software  Read the chart carefully and recognize, using context, where substitutions have occurred  Electronically Signed by Chiara Lawson MD    ______________________________________________________________________    Chief Complaint: No chief complaint on file  Patient ID: Gisela Coronado is a 68 y o  y o  female has a past medical history of Asthma, Chest pain on breathing, Chronic diastolic CHF (congestive heart failure) ([address] ), Diabetes mellitus ([facility] [address] ), HTN (hypertension), Hyperlipidemia, Insomnia, Obesity, Preop cardiovascular exam (2/2/2018), and Pulmonary fibrosis ([facility] [address] )  6/28/2021  Patient presents today for follow-up visit  Gisela Coronado is a pleasant 27-year-old female former smoker with past medical history including but not limited to interstitial lung disease, rheumatoid arthritis on Humira, chronic respiratory failure on 3 L of oxygen, asthma, emphysema, diabetes, GERD, hypertension, CHF presenting for follow-up  She states for the past few months her shortness of breath and dyspnea on exertion has been gradually worsening also she has not been using her oxygen continuously today she came in into the office without her oxygen  Review of Systems   Constitutional: Positive for fatigue  HENT: Negative  Eyes: Negative  Respiratory: Positive for shortness of breath  Cardiovascular: Negative  Gastrointestinal: Negative  Endocrine: Negative  Genitourinary: Negative  Musculoskeletal: Negative  Skin: Negative  Allergic/Immunologic: Negative  Neurological: Negative  Hematological: Negative  Psychiatric/Behavioral: Positive for sleep disturbance  The patient is nervous/anxious  Smoking history: She reports that she quit smoking about 24 years ago  Her smoking use included cigarettes  She has a 27 00 pack-year smoking history  She has never used smokeless tobacco     The following portions of the patient's history were reviewed and updated as appropriate: allergies, current medications, past family history, past medical history, past social history, past surgical history and problem list     Immunization History   Administered Date(s) Administered    INFLUENZA 09/01/2015, 12/20/2016, 11/10/2017    Influenza Split High Dose Preservative Free IM 12/20/2016, 11/10/2017    Influenza, high dose seasonal 0 7 mL 10/17/2018, 09/29/2019, 11/17/2020    Influenza, seasonal, injectable 10/01/2014, 09/01/2015    Pneumococcal Conjugate 13-Valent 07/03/2019    Pneumococcal Polysaccharide PPV23 04/21/2008, 12/20/2016    Tdap 1945    Zoster Vaccine Recombinant 01/28/2020, 07/21/2020     Current Outpatient Medications   Medication Sig Dispense Refill    albuterol (2 5 mg/3 mL) 0 083 % nebulizer solution Take 1 vial (2 5 mg total) by nebulization every 6 (six) hours as needed for wheezing or shortness of breath 120 vial 3    albuterol (PROVENTIL HFA,VENTOLIN HFA) 90 mcg/act inhaler Inhale 1 puff every 6 (six) hours as needed for wheezing or shortness of breath 18 Inhaler 3    B-D TB SYRINGE 1CC/27GX1/2" 27G X 1/2" 1 ML MISC by Other route 2 (two) times a day 100 each 3    Blood Glucose Monitoring Suppl (OneTouch Verio) w/Device KIT by Does not apply route 3 (three) times a day 1 kit 0    diclofenac sodium (Voltaren) 1 % USE AS DIRECTED      Diclofenac Sodium (VOLTAREN) 1 % USE AS DIRECTED      escitalopram (LEXAPRO) 20 mg tablet TAKE 1 TABLET BY MOUTH EVERY DAY 90 tablet 2    fluticasone-vilanterol (BREO ELLIPTA) 100-25 mcg/inh inhaler Inhale 1 puff daily Rinse mouth after use   1 Inhaler 3  fosinopril (MONOPRIL) 10 mg tablet TAKE 1 TABLET BY MOUTH EVERY DAY 90 tablet 2    HYDROmorphone (DILAUDID) 4 mg tablet Take 4 mg by mouth 3 (three) times a day as needed for moderate pain      insulin glargine (Basaglar KwikPen) 100 units/mL injection pen Inject 30 Units under the skin every 12 (twelve) hours Inject 40 units in am and 25 units in pm (Patient taking differently: Inject 55 Units under the skin daily Inject 40 units in am and 25 units in pm) 18 pen 3    Insulin Pen Needle (B-D UF III MINI PEN NEEDLES) 31G X 5 MM MISC Inject under the skin 2 (two) times a day Use as directed 200 each 3    metFORMIN (GLUCOPHAGE) 1000 MG tablet TAKE 1 TABLET BY MOUTH TWICE A  tablet 3    methocarbamol (ROBAXIN) 500 mg tablet Take 1 tablet (500 mg total) by mouth 2 (two) times a day as needed for muscle spasms (side pain) 28 tablet 0    OneTouch Ultra test strip PATIENT TO TEST ONCE A  strip 3    Tocilizumab (Actemra ACTPen) 162 MG/0 9ML SOAJ Inject 162 mg under the skin      torsemide (DEMADEX) 20 mg tablet TAKE 2 TABLETS (40 MG TOTAL) BY MOUTH 2 (TWO) TIMES A  tablet 3     No current facility-administered medications for this visit  Allergies: Gabapentin and Vancomycin    Objective:  Vitals:    06/28/21 1407   BP: 120/74   Pulse: 82   Temp: (!) 97 °F (36 1 °C)   SpO2: 92%   Weight: 91 6 kg (202 lb)   Height: 5' (1 524 m)   Oxygen Therapy  SpO2: 92 %    Wt Readings from Last 3 Encounters:   06/28/21 91 6 kg (202 lb)   05/27/21 91 6 kg (202 lb)   05/21/21 92 kg (202 lb 13 2 oz)     Body mass index is 39 45 kg/m²  Physical Exam  Vitals and nursing note reviewed  Constitutional:       Appearance: She is well-developed  HENT:      Head: Normocephalic and atraumatic  Eyes:      Conjunctiva/sclera: Conjunctivae normal       Pupils: Pupils are equal, round, and reactive to light  Neck:      Thyroid: No thyromegaly  Vascular: No JVD     Cardiovascular:      Rate and Rhythm: Normal rate and regular rhythm  Heart sounds: Normal heart sounds  No murmur heard  No friction rub  No gallop  Pulmonary:      Effort: Pulmonary effort is normal  No respiratory distress  Breath sounds: Normal breath sounds  No wheezing or rales  Chest:      Chest wall: No tenderness  Musculoskeletal:         General: No tenderness or deformity  Normal range of motion  Cervical back: Normal range of motion and neck supple  Lymphadenopathy:      Cervical: No cervical adenopathy  Skin:     General: Skin is warm and dry  Neurological:      Mental Status: She is alert and oriented to person, place, and time               Diagnostics:  I have personally reviewed pertinent films in PACS

## 2021-07-06 DIAGNOSIS — I10 ESSENTIAL HYPERTENSION: ICD-10-CM

## 2021-07-06 RX ORDER — TORSEMIDE 20 MG/1
TABLET ORAL
Qty: 360 TABLET | Refills: 3 | Status: SHIPPED | OUTPATIENT
Start: 2021-07-06 | End: 2022-07-21

## 2021-07-13 ENCOUNTER — APPOINTMENT (EMERGENCY)
Dept: RADIOLOGY | Facility: HOSPITAL | Age: 76
DRG: 948 | End: 2021-07-13
Payer: MEDICARE

## 2021-07-13 ENCOUNTER — HOSPITAL ENCOUNTER (INPATIENT)
Facility: HOSPITAL | Age: 76
LOS: 1 days | Discharge: HOME | DRG: 948 | End: 2021-07-15
Attending: EMERGENCY MEDICINE | Admitting: STUDENT IN AN ORGANIZED HEALTH CARE EDUCATION/TRAINING PROGRAM
Payer: MEDICARE

## 2021-07-13 DIAGNOSIS — I50.9 CHF (CONGESTIVE HEART FAILURE) (HCC): Primary | ICD-10-CM

## 2021-07-13 LAB
ALBUMIN SERPL BCP-MCNC: 3.7 G/DL (ref 3.5–5)
ALP SERPL-CCNC: 97 U/L (ref 46–116)
ALT SERPL W P-5'-P-CCNC: 27 U/L (ref 12–78)
ANION GAP SERPL CALCULATED.3IONS-SCNC: 6 MMOL/L (ref 4–13)
AST SERPL W P-5'-P-CCNC: 26 U/L (ref 5–45)
BASOPHILS # BLD AUTO: 0.04 THOUSANDS/ΜL (ref 0–0.1)
BASOPHILS NFR BLD AUTO: 1 % (ref 0–1)
BILIRUB SERPL-MCNC: 0.35 MG/DL (ref 0.2–1)
BUN SERPL-MCNC: 15 MG/DL (ref 5–25)
CALCIUM SERPL-MCNC: 8.8 MG/DL (ref 8.3–10.1)
CHLORIDE SERPL-SCNC: 100 MMOL/L (ref 100–108)
CO2 SERPL-SCNC: 31 MMOL/L (ref 21–32)
CREAT SERPL-MCNC: 1.19 MG/DL (ref 0.6–1.3)
EOSINOPHIL # BLD AUTO: 0.25 THOUSAND/ΜL (ref 0–0.61)
EOSINOPHIL NFR BLD AUTO: 3 % (ref 0–6)
ERYTHROCYTE [DISTWIDTH] IN BLOOD BY AUTOMATED COUNT: 14 % (ref 11.6–15.1)
GFR SERPL CREATININE-BSD FRML MDRD: 44 ML/MIN/1.73SQ M
GLUCOSE SERPL-MCNC: 170 MG/DL (ref 65–140)
HCT VFR BLD AUTO: 43.6 % (ref 34.8–46.1)
HGB BLD-MCNC: 14.1 G/DL (ref 11.5–15.4)
IMM GRANULOCYTES # BLD AUTO: 0.02 THOUSAND/UL (ref 0–0.2)
IMM GRANULOCYTES NFR BLD AUTO: 0 % (ref 0–2)
LYMPHOCYTES # BLD AUTO: 2.28 THOUSANDS/ΜL (ref 0.6–4.47)
LYMPHOCYTES NFR BLD AUTO: 28 % (ref 14–44)
MCH RBC QN AUTO: 30.1 PG (ref 26.8–34.3)
MCHC RBC AUTO-ENTMCNC: 32.3 G/DL (ref 31.4–37.4)
MCV RBC AUTO: 93 FL (ref 82–98)
MONOCYTES # BLD AUTO: 0.92 THOUSAND/ΜL (ref 0.17–1.22)
MONOCYTES NFR BLD AUTO: 11 % (ref 4–12)
NEUTROPHILS # BLD AUTO: 4.68 THOUSANDS/ΜL (ref 1.85–7.62)
NEUTS SEG NFR BLD AUTO: 57 % (ref 43–75)
NRBC BLD AUTO-RTO: 0 /100 WBCS
NT-PROBNP SERPL-MCNC: 267 PG/ML
PLATELET # BLD AUTO: 166 THOUSANDS/UL (ref 149–390)
PMV BLD AUTO: 10.8 FL (ref 8.9–12.7)
POTASSIUM SERPL-SCNC: 3.8 MMOL/L (ref 3.5–5.3)
PROT SERPL-MCNC: 7.7 G/DL (ref 6.4–8.2)
RBC # BLD AUTO: 4.68 MILLION/UL (ref 3.81–5.12)
SODIUM SERPL-SCNC: 137 MMOL/L (ref 136–145)
TROPONIN I SERPL-MCNC: <0.02 NG/ML
WBC # BLD AUTO: 8.19 THOUSAND/UL (ref 4.31–10.16)

## 2021-07-13 PROCEDURE — 80053 COMPREHEN METABOLIC PANEL: CPT | Performed by: EMERGENCY MEDICINE

## 2021-07-13 PROCEDURE — 85025 COMPLETE CBC W/AUTO DIFF WBC: CPT | Performed by: EMERGENCY MEDICINE

## 2021-07-13 PROCEDURE — 83880 ASSAY OF NATRIURETIC PEPTIDE: CPT | Performed by: EMERGENCY MEDICINE

## 2021-07-13 PROCEDURE — 96374 THER/PROPH/DIAG INJ IV PUSH: CPT

## 2021-07-13 PROCEDURE — 71045 X-RAY EXAM CHEST 1 VIEW: CPT

## 2021-07-13 PROCEDURE — 84484 ASSAY OF TROPONIN QUANT: CPT | Performed by: EMERGENCY MEDICINE

## 2021-07-13 PROCEDURE — 1124F ACP DISCUSS-NO DSCNMKR DOCD: CPT | Performed by: EMERGENCY MEDICINE

## 2021-07-13 PROCEDURE — 99285 EMERGENCY DEPT VISIT HI MDM: CPT

## 2021-07-13 PROCEDURE — 36415 COLL VENOUS BLD VENIPUNCTURE: CPT | Performed by: EMERGENCY MEDICINE

## 2021-07-13 PROCEDURE — 93005 ELECTROCARDIOGRAM TRACING: CPT

## 2021-07-13 PROCEDURE — 99285 EMERGENCY DEPT VISIT HI MDM: CPT | Performed by: EMERGENCY MEDICINE

## 2021-07-13 RX ORDER — FUROSEMIDE 10 MG/ML
20 INJECTION INTRAMUSCULAR; INTRAVENOUS ONCE
Status: DISCONTINUED | OUTPATIENT
Start: 2021-07-13 | End: 2021-07-14

## 2021-07-13 RX ORDER — BUMETANIDE 0.25 MG/ML
1 INJECTION, SOLUTION INTRAMUSCULAR; INTRAVENOUS ONCE
Status: COMPLETED | OUTPATIENT
Start: 2021-07-14 | End: 2021-07-14

## 2021-07-13 RX ORDER — FENTANYL CITRATE 50 UG/ML
25 INJECTION, SOLUTION INTRAMUSCULAR; INTRAVENOUS ONCE
Status: COMPLETED | OUTPATIENT
Start: 2021-07-13 | End: 2021-07-13

## 2021-07-13 RX ADMIN — FENTANYL CITRATE 25 MCG: 50 INJECTION, SOLUTION INTRAMUSCULAR; INTRAVENOUS at 23:24

## 2021-07-14 ENCOUNTER — APPOINTMENT (INPATIENT)
Dept: NON INVASIVE DIAGNOSTICS | Facility: HOSPITAL | Age: 76
DRG: 948 | End: 2021-07-14
Payer: MEDICARE

## 2021-07-14 PROBLEM — R07.9 CHEST PAIN: Status: ACTIVE | Noted: 2021-07-14

## 2021-07-14 LAB
ANION GAP SERPL CALCULATED.3IONS-SCNC: 10 MMOL/L (ref 4–13)
BASOPHILS # BLD AUTO: 0.03 THOUSANDS/ΜL (ref 0–0.1)
BASOPHILS NFR BLD AUTO: 0 % (ref 0–1)
BUN SERPL-MCNC: 15 MG/DL (ref 5–25)
CALCIUM SERPL-MCNC: 8.8 MG/DL (ref 8.3–10.1)
CHLORIDE SERPL-SCNC: 102 MMOL/L (ref 100–108)
CHOLEST SERPL-MCNC: 234 MG/DL (ref 50–200)
CO2 SERPL-SCNC: 28 MMOL/L (ref 21–32)
CREAT SERPL-MCNC: 0.96 MG/DL (ref 0.6–1.3)
D DIMER PPP FEU-MCNC: 4.47 UG/ML FEU
EOSINOPHIL # BLD AUTO: 0.23 THOUSAND/ΜL (ref 0–0.61)
EOSINOPHIL NFR BLD AUTO: 3 % (ref 0–6)
ERYTHROCYTE [DISTWIDTH] IN BLOOD BY AUTOMATED COUNT: 14.1 % (ref 11.6–15.1)
EST. AVERAGE GLUCOSE BLD GHB EST-MCNC: 226 MG/DL
GFR SERPL CREATININE-BSD FRML MDRD: 58 ML/MIN/1.73SQ M
GLUCOSE P FAST SERPL-MCNC: 91 MG/DL (ref 65–99)
GLUCOSE SERPL-MCNC: 124 MG/DL (ref 65–140)
GLUCOSE SERPL-MCNC: 154 MG/DL (ref 65–140)
GLUCOSE SERPL-MCNC: 186 MG/DL (ref 65–140)
GLUCOSE SERPL-MCNC: 74 MG/DL (ref 65–140)
GLUCOSE SERPL-MCNC: 86 MG/DL (ref 65–140)
GLUCOSE SERPL-MCNC: 91 MG/DL (ref 65–140)
HBA1C MFR BLD: 9.5 %
HCT VFR BLD AUTO: 42.7 % (ref 34.8–46.1)
HDLC SERPL-MCNC: 64 MG/DL
HGB BLD-MCNC: 13.4 G/DL (ref 11.5–15.4)
IMM GRANULOCYTES # BLD AUTO: 0.02 THOUSAND/UL (ref 0–0.2)
IMM GRANULOCYTES NFR BLD AUTO: 0 % (ref 0–2)
LDLC SERPL CALC-MCNC: 134 MG/DL (ref 0–100)
LYMPHOCYTES # BLD AUTO: 1.88 THOUSANDS/ΜL (ref 0.6–4.47)
LYMPHOCYTES NFR BLD AUTO: 23 % (ref 14–44)
MAGNESIUM SERPL-MCNC: 2 MG/DL (ref 1.6–2.6)
MCH RBC QN AUTO: 29.5 PG (ref 26.8–34.3)
MCHC RBC AUTO-ENTMCNC: 31.4 G/DL (ref 31.4–37.4)
MCV RBC AUTO: 94 FL (ref 82–98)
MONOCYTES # BLD AUTO: 0.81 THOUSAND/ΜL (ref 0.17–1.22)
MONOCYTES NFR BLD AUTO: 10 % (ref 4–12)
NEUTROPHILS # BLD AUTO: 5.24 THOUSANDS/ΜL (ref 1.85–7.62)
NEUTS SEG NFR BLD AUTO: 64 % (ref 43–75)
NRBC BLD AUTO-RTO: 0 /100 WBCS
PLATELET # BLD AUTO: 158 THOUSANDS/UL (ref 149–390)
PLATELET # BLD AUTO: 159 THOUSANDS/UL (ref 149–390)
PMV BLD AUTO: 11 FL (ref 8.9–12.7)
PMV BLD AUTO: 11.2 FL (ref 8.9–12.7)
POTASSIUM SERPL-SCNC: 3.8 MMOL/L (ref 3.5–5.3)
RBC # BLD AUTO: 4.55 MILLION/UL (ref 3.81–5.12)
SODIUM SERPL-SCNC: 140 MMOL/L (ref 136–145)
TRIGL SERPL-MCNC: 182 MG/DL
TROPONIN I SERPL-MCNC: <0.02 NG/ML
TROPONIN I SERPL-MCNC: <0.02 NG/ML
WBC # BLD AUTO: 8.21 THOUSAND/UL (ref 4.31–10.16)

## 2021-07-14 PROCEDURE — 99220 PR INITIAL OBSERVATION CARE/DAY 70 MINUTES: CPT | Performed by: STUDENT IN AN ORGANIZED HEALTH CARE EDUCATION/TRAINING PROGRAM

## 2021-07-14 PROCEDURE — 93005 ELECTROCARDIOGRAM TRACING: CPT

## 2021-07-14 PROCEDURE — 82948 REAGENT STRIP/BLOOD GLUCOSE: CPT

## 2021-07-14 PROCEDURE — 83036 HEMOGLOBIN GLYCOSYLATED A1C: CPT | Performed by: PHYSICIAN ASSISTANT

## 2021-07-14 PROCEDURE — 80061 LIPID PANEL: CPT | Performed by: PHYSICIAN ASSISTANT

## 2021-07-14 PROCEDURE — 85049 AUTOMATED PLATELET COUNT: CPT | Performed by: PHYSICIAN ASSISTANT

## 2021-07-14 PROCEDURE — 84484 ASSAY OF TROPONIN QUANT: CPT | Performed by: PHYSICIAN ASSISTANT

## 2021-07-14 PROCEDURE — 80048 BASIC METABOLIC PNL TOTAL CA: CPT | Performed by: PHYSICIAN ASSISTANT

## 2021-07-14 PROCEDURE — 36415 COLL VENOUS BLD VENIPUNCTURE: CPT | Performed by: PHYSICIAN ASSISTANT

## 2021-07-14 PROCEDURE — 83735 ASSAY OF MAGNESIUM: CPT | Performed by: PHYSICIAN ASSISTANT

## 2021-07-14 PROCEDURE — 93306 TTE W/DOPPLER COMPLETE: CPT

## 2021-07-14 PROCEDURE — 85025 COMPLETE CBC W/AUTO DIFF WBC: CPT | Performed by: PHYSICIAN ASSISTANT

## 2021-07-14 PROCEDURE — 84484 ASSAY OF TROPONIN QUANT: CPT | Performed by: STUDENT IN AN ORGANIZED HEALTH CARE EDUCATION/TRAINING PROGRAM

## 2021-07-14 PROCEDURE — 85379 FIBRIN DEGRADATION QUANT: CPT | Performed by: PHYSICIAN ASSISTANT

## 2021-07-14 RX ORDER — HEPARIN SODIUM 5000 [USP'U]/ML
5000 INJECTION, SOLUTION INTRAVENOUS; SUBCUTANEOUS EVERY 8 HOURS SCHEDULED
Status: DISCONTINUED | OUTPATIENT
Start: 2021-07-14 | End: 2021-07-15 | Stop reason: HOSPADM

## 2021-07-14 RX ORDER — METHOCARBAMOL 500 MG/1
500 TABLET, FILM COATED ORAL 2 TIMES DAILY PRN
Status: DISCONTINUED | OUTPATIENT
Start: 2021-07-14 | End: 2021-07-15 | Stop reason: HOSPADM

## 2021-07-14 RX ORDER — ONDANSETRON 2 MG/ML
4 INJECTION INTRAMUSCULAR; INTRAVENOUS EVERY 6 HOURS PRN
Status: DISCONTINUED | OUTPATIENT
Start: 2021-07-14 | End: 2021-07-15 | Stop reason: HOSPADM

## 2021-07-14 RX ORDER — LISINOPRIL 10 MG/1
10 TABLET ORAL DAILY
Status: DISCONTINUED | OUTPATIENT
Start: 2021-07-14 | End: 2021-07-15 | Stop reason: HOSPADM

## 2021-07-14 RX ORDER — ACETAMINOPHEN 325 MG/1
975 TABLET ORAL EVERY 6 HOURS PRN
Status: DISCONTINUED | OUTPATIENT
Start: 2021-07-14 | End: 2021-07-15 | Stop reason: HOSPADM

## 2021-07-14 RX ORDER — HYDROMORPHONE HYDROCHLORIDE 2 MG/1
4 TABLET ORAL 3 TIMES DAILY PRN
Status: DISCONTINUED | OUTPATIENT
Start: 2021-07-14 | End: 2021-07-15 | Stop reason: HOSPADM

## 2021-07-14 RX ORDER — ASPIRIN 81 MG/1
81 TABLET, CHEWABLE ORAL DAILY
Status: DISCONTINUED | OUTPATIENT
Start: 2021-07-15 | End: 2021-07-15 | Stop reason: HOSPADM

## 2021-07-14 RX ORDER — INSULIN GLARGINE 100 [IU]/ML
55 INJECTION, SOLUTION SUBCUTANEOUS EVERY MORNING
Status: DISCONTINUED | OUTPATIENT
Start: 2021-07-14 | End: 2021-07-15 | Stop reason: HOSPADM

## 2021-07-14 RX ORDER — LANOLIN ALCOHOL/MO/W.PET/CERES
3 CREAM (GRAM) TOPICAL
Status: DISCONTINUED | OUTPATIENT
Start: 2021-07-14 | End: 2021-07-15 | Stop reason: HOSPADM

## 2021-07-14 RX ORDER — FLUTICASONE FUROATE AND VILANTEROL 100; 25 UG/1; UG/1
1 POWDER RESPIRATORY (INHALATION) DAILY
Status: DISCONTINUED | OUTPATIENT
Start: 2021-07-14 | End: 2021-07-15 | Stop reason: HOSPADM

## 2021-07-14 RX ORDER — BUMETANIDE 0.25 MG/ML
1 INJECTION, SOLUTION INTRAMUSCULAR; INTRAVENOUS 2 TIMES DAILY
Status: DISCONTINUED | OUTPATIENT
Start: 2021-07-14 | End: 2021-07-15 | Stop reason: HOSPADM

## 2021-07-14 RX ORDER — ALBUTEROL SULFATE 2.5 MG/3ML
2.5 SOLUTION RESPIRATORY (INHALATION) EVERY 6 HOURS PRN
Status: DISCONTINUED | OUTPATIENT
Start: 2021-07-14 | End: 2021-07-15 | Stop reason: HOSPADM

## 2021-07-14 RX ORDER — MAGNESIUM HYDROXIDE/ALUMINUM HYDROXICE/SIMETHICONE 120; 1200; 1200 MG/30ML; MG/30ML; MG/30ML
30 SUSPENSION ORAL EVERY 6 HOURS PRN
Status: DISCONTINUED | OUTPATIENT
Start: 2021-07-14 | End: 2021-07-15 | Stop reason: HOSPADM

## 2021-07-14 RX ADMIN — HYDROMORPHONE HYDROCHLORIDE 4 MG: 2 TABLET ORAL at 14:24

## 2021-07-14 RX ADMIN — HEPARIN SODIUM 5000 UNITS: 5000 INJECTION INTRAVENOUS; SUBCUTANEOUS at 21:05

## 2021-07-14 RX ADMIN — HEPARIN SODIUM 5000 UNITS: 5000 INJECTION INTRAVENOUS; SUBCUTANEOUS at 13:36

## 2021-07-14 RX ADMIN — INSULIN LISPRO 1 UNITS: 100 INJECTION, SOLUTION INTRAVENOUS; SUBCUTANEOUS at 12:09

## 2021-07-14 RX ADMIN — HEPARIN SODIUM 5000 UNITS: 5000 INJECTION INTRAVENOUS; SUBCUTANEOUS at 01:11

## 2021-07-14 RX ADMIN — FLUTICASONE FUROATE AND VILANTEROL TRIFENATATE 1 PUFF: 100; 25 POWDER RESPIRATORY (INHALATION) at 08:36

## 2021-07-14 RX ADMIN — INSULIN GLARGINE 55 UNITS: 100 INJECTION, SOLUTION SUBCUTANEOUS at 08:36

## 2021-07-14 RX ADMIN — BUMETANIDE 1 MG: 0.25 INJECTION INTRAMUSCULAR; INTRAVENOUS at 00:39

## 2021-07-14 RX ADMIN — INSULIN LISPRO 1 UNITS: 100 INJECTION, SOLUTION INTRAVENOUS; SUBCUTANEOUS at 21:06

## 2021-07-14 RX ADMIN — HYDROMORPHONE HYDROCHLORIDE 4 MG: 2 TABLET ORAL at 06:00

## 2021-07-14 RX ADMIN — MELATONIN 3 MG: 3 TAB ORAL at 01:11

## 2021-07-14 RX ADMIN — BUMETANIDE 1 MG: 0.25 INJECTION INTRAMUSCULAR; INTRAVENOUS at 17:26

## 2021-07-14 RX ADMIN — HEPARIN SODIUM 5000 UNITS: 5000 INJECTION INTRAVENOUS; SUBCUTANEOUS at 08:46

## 2021-07-14 RX ADMIN — BUMETANIDE 1 MG: 0.25 INJECTION INTRAMUSCULAR; INTRAVENOUS at 08:42

## 2021-07-14 RX ADMIN — ALUMINA, MAGNESIA, AND SIMETHICONE ORAL SUSPENSION REGULAR STRENGTH 30 ML: 1200; 1200; 120 SUSPENSION ORAL at 10:14

## 2021-07-14 RX ADMIN — METHOCARBAMOL 500 MG: 500 TABLET ORAL at 10:14

## 2021-07-14 RX ADMIN — LISINOPRIL 10 MG: 10 TABLET ORAL at 08:36

## 2021-07-14 RX ADMIN — HYDROMORPHONE HYDROCHLORIDE 4 MG: 2 TABLET ORAL at 22:46

## 2021-07-14 RX ADMIN — MELATONIN 3 MG: 3 TAB ORAL at 21:05

## 2021-07-14 NOTE — ASSESSMENT & PLAN NOTE
· No acute exacerbation noted   Reports SOB is at baseline  · Continue home dose Breo ellipta daily and Albuterol neb PRN

## 2021-07-14 NOTE — CASE MANAGEMENT
Vanesa Bruno referral made to "Intake  Department" [email or web address] to evaluate for help at home

## 2021-07-14 NOTE — PLAN OF CARE
Problem: Potential for Falls  Goal: Patient will remain free of falls  Description: INTERVENTIONS:  - Educate patient/family on patient safety including physical limitations  - Instruct patient to call for assistance with activity   - Consult OT/PT to assist with strengthening/mobility   - Keep Call bell within reach  - Keep bed low and locked with side rails adjusted as appropriate  - Keep care items and personal belongings within reach  - Initiate and maintain comfort rounds  - Make Fall Risk Sign visible to staff  - Offer Toileting every  Hours, in advance of need  - Initiate/Maintain alarm  - Obtain necessary fall risk management equipment  - Apply yellow socks and bracelet for high fall risk patients  - Consider moving patient to room near nurses station  Outcome: Progressing

## 2021-07-14 NOTE — ASSESSMENT & PLAN NOTE
Wt Readings from Last 3 Encounters:   07/13/21 91 6 kg (202 lb)   06/28/21 91 6 kg (202 lb)   05/27/21 91 6 kg (202 lb)     · Increased BLE edema as above   Otherwise does not appear acutely volume overloaded on exam  ·  on admission  · Home dose Torsemide on hold  · IV Bumex 1mg BID  · Daily weight and I/O  · Low Na diet

## 2021-07-14 NOTE — ASSESSMENT & PLAN NOTE
· Reports intermittent right-sided chest pain x 2 days  States SOB is at baseline  No other associated symptoms  · Initial troponin (-)  Will trend x 3  · 12 lead EKG revealed SR w poor R wave progression   No acute ischemic changes  · CXR revealed increased interstitial edema vs  Scarring 2/2 pulmonary fibrosis  · MONI = 3  · Increased BLE edema as above  · Check D dimer in am  · FLP and A1C in am

## 2021-07-14 NOTE — ASSESSMENT & PLAN NOTE
· Reports wears O2 3L NC at baseline  · O2 sat was 97% on RA at rest  · Will continue home dose O2 via NC

## 2021-07-14 NOTE — ASSESSMENT & PLAN NOTE
· Continue home dose Dilaudid 4mg PO TID PRN  · Active prescription verified in [facility]   Last filled #90 x 30d on 7/10/21

## 2021-07-14 NOTE — ASSESSMENT & PLAN NOTE
· BP adequately controlled on current regimen  · Continue home dose Monopril (equivalent)  · Monitor BP per unit protocol

## 2021-07-14 NOTE — ASSESSMENT & PLAN NOTE
· Reports increasing BLE edema and pain x 2 days  Also reports increased redness and blistering of skin on day of admission  Denies fever/chills  · Afebrile   No leukocytosis  · Hold home dose Torsemide 20mg BID  · Will place on IV Bumex 1mg BID for now  · Elevate lower extremities  · Low Na diet  · Daily weight and I/O

## 2021-07-14 NOTE — CASE MANAGEMENT
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  Malika Bass  Patient lives with her son Black Castillo and family in a MIL home attached to son's [address] set up is one floor with no MANUEL  Patient has  DME: walker and cane, oxygen from [employer]  Patient is independent  with ADLs and Mobility  Patient states they are retired, the patient still  drives  Patient has no  admissions and a hx  of MH dx of depression  She has medicine ordered and sometimes needs it  Patient has  Hx of former smoker  No other hx of SA  Patients PCP is: Jimmy Benitez MD   and uses [id number] [phone] for pharmacy  Patient has not used [address] is past  Patient/ family is agreeable to:HHC  at discharge  Discussed HHC and help at home  Daughter does not feel that patient qualifies for MA  She states that they are interested in private pay non medical nursing services  Patient does does have POA son Black Castillo  Patient does not have an AD  NOK is  Diego Rivera  Patient's anticipated transportation home is:  Family  CM reviewed discharge planning process including the following: identifying caregivers at home, preference for d/c planning needs,   availability of [facility] to [facility] program, availability of treatment team to discuss questions or concerns patient and/or family may have regarding diagnosis, plan of care, old or new medications and discharge planning   CM will continue to follow for care coordination and update assessment as appropriate  Patient is a 67 yo female admitted for Bilateral LE edema  VAS, trops, OT/PT pending   Echo, Tele, has home Oxygen daisha Laquita Rossana, caregiver for , lives with son, family interested in private pay services, would accept Marcio Dhillon if indicated, Transportation: family

## 2021-07-14 NOTE — H&P
[address] [facility] 1945, 68 y o  female MRN: [id number]  Unit/Bed#: ED 06 Encounter: [id number]  Primary Care Provider: Chris Crawford MD   Date and time admitted to hospital: 7/13/2021  8:59 PM    * Bilateral lower extremity edema  Assessment & Plan  · Reports increasing BLE edema and pain x 2 days  Also reports increased redness and blistering of skin on day of admission  Denies fever/chills  · Afebrile  No leukocytosis  · Hold home dose Torsemide 20mg BID  · Will place on IV Bumex 1mg BID for now  · Elevate lower extremities  · Low Na diet  · Daily weight and I/O    Chest pain  Assessment & Plan  · Reports intermittent right-sided chest pain x 2 days  States SOB is at baseline  No other associated symptoms  · Initial troponin (-)  Will trend x 3  · 12 lead EKG revealed SR w poor R wave progression  No acute ischemic changes  · CXR revealed increased interstitial edema vs  Scarring 2/2 pulmonary fibrosis  · MONI = 3  · Increased BLE edema as above  · Check D dimer in am  · FLP and A1C in am    Chronic diastolic congestive heart failure [facility])  Assessment & Plan  Wt Readings from Last 3 Encounters:   07/13/21 91 6 kg (202 lb)   06/28/21 91 6 kg (202 lb)   05/27/21 91 6 kg (202 lb)     · Increased BLE edema as above   Otherwise does not appear acutely volume overloaded on exam  ·  on admission  · Home dose Torsemide on hold  · IV Bumex 1mg BID  · Daily weight and I/O  · Low Na diet          Type 2 diabetes mellitus with stage 3 chronic kidney disease, with long-term current use of insulin [facility])  Assessment & Plan  Lab Results   Component Value Date    HGBA1C 8 6 (H) 02/26/2021       Recent Labs     07/14/21  0109   POCGLU 86       Blood Sugar Average: Last 72 hrs:  (P) 86     · Continue home dose Glargine 55 units in am  · Hold home dose Glucophage while inpt  · FSBS AC & HS w SSI  · Diabetic diet  · A1C in am    Essential hypertension  Assessment & Plan  · BP adequately controlled on current regimen  · Continue home dose Monopril (equivalent)  · Monitor BP per unit protocol    Interstitial lung disease ([facility] )  Assessment & Plan  · No acute exacerbation noted  Reports SOB is at baseline  · Continue home dose Breo ellipta daily and Albuterol neb PRN    Chronic respiratory failure with hypoxia (HCC)  Assessment & Plan  · Reports wears O2 3L NC at baseline  · O2 sat was 97% on RA at rest  · Will continue home dose O2 via NC    Venous stasis dermatitis of both lower extremities  Assessment & Plan  · See HPI and AP above    Rheumatoid arthritis ([facility] )  Assessment & Plan  · Receives Actemra aas outpt  · Continue home dose pain med regimen as above    Chronic prescription opiate use  Assessment & Plan  · Continue home dose Dilaudid 4mg PO TID PRN  · Active prescription verified in [facility]  Last filled #90 x 30d on 7/10/21      VTE Prophylaxis: Heparin  / foot pump applied   Code Status: Level 1 Full Code  POLST: POLST form is not discussed and not completed at this time  Discussion with family: NA    Anticipated Length of Stay:  Patient will be admitted on an Observation basis with an anticipated length of stay of  Less than 2 midnights  Justification for Hospital Stay: See AP above    Total Time for Visit, including Counseling / Coordination of Care: 45 minutes  Greater than 50% of this total time spent on direct patient counseling and coordination of care  Chief Complaint:   Increasing BLE edema and pain  Chest pain    History of Present Illness:    Antonio Escamilla is a 68 y o  female who presents with increasing BLE edema and pain x 2 days  Also reports increased redness and blistering of skin on day of admission  Denies fever/chills  Also reports intermittent right-sided chest pain x 2 days  States SOB is at baseline  No other associated symptoms  Review of Systems:    Review of Systems   Constitutional: Positive for appetite change (decreased)   Negative for chills and fever  HENT: Negative for congestion, ear pain, sinus pressure and sore throat  Eyes: Negative for visual disturbance  Respiratory: Positive for shortness of breath (chronic)  Negative for cough and wheezing  Cardiovascular: Positive for chest pain, palpitations and leg swelling  Gastrointestinal: Positive for constipation  Negative for abdominal pain, diarrhea, nausea and vomiting  Genitourinary: Negative for difficulty urinating, dysuria, frequency and urgency  Musculoskeletal: Negative for neck pain and neck stiffness  Skin: Positive for rash (BLE redness)  Neurological: Positive for headaches  Negative for dizziness, syncope and light-headedness  All other systems reviewed and are negative  Past Medical and Surgical History:     Past Medical History:   Diagnosis Date    Asthma     Chest pain on breathing     last assessed 2/5/15    Chronic diastolic CHF (congestive heart failure) [facility]     Chronic respiratory failure with hypoxia ([facility])     Diabetes mellitus ([facility] )     HTN (hypertension)     Hyperlipidemia     Insomnia     Obesity     Preop cardiovascular exam 2/2/2018    Pulmonary fibrosis (HCC)     Rheumatoid arthritis ([facility])        Past Surgical History:   Procedure Laterality Date    HAND SURGERY         Meds/Allergies:    Prior to Admission medications    Medication Sig Start Date End Date Taking?  Authorizing Provider   albuterol (2 5 mg/3 mL) 0 083 % nebulizer solution Take 1 vial (2 5 mg total) by nebulization every 6 (six) hours as needed for wheezing or shortness of breath 11/17/20   Karrie Mena MD   albuterol (PROVENTIL HFA,VENTOLIN HFA) 90 mcg/act inhaler Inhale 1 puff every 6 (six) hours as needed for wheezing or shortness of breath 2/26/21   Eleni Porras PA-C   B-D TB SYRINGE 1CC/27GX1/2" 27G X 1/2" 1 ML MISC by Other route 2 (two) times a day 11/14/19   Karrie Mena MD   Blood Glucose Monitoring Suppl (OneTouch Verio) w/Device KIT by Does not apply route 3 (three) times a day 9/28/20   Ashleigh Britt MD   diclofenac sodium (Voltaren) 1 % USE AS DIRECTED  Patient not taking: Reported on 7/14/2021    Historical Provider, MD   Diclofenac Sodium (VOLTAREN) 1 % USE AS DIRECTED  Patient not taking: Reported on 7/14/2021    Historical Provider, MD   escitalopram (LEXAPRO) 20 mg tablet TAKE 1 TABLET BY MOUTH EVERY DAY  Patient not taking: Reported on 7/14/2021 2/12/21   Ashleigh Britt MD   fluticasone-vilanterol (BREO ELLIPTA) 100-25 mcg/inh inhaler Inhale 1 puff daily Rinse mouth after use  2/26/21   Fela Lopez PA-C   fosinopril (MONOPRIL) 10 mg tablet TAKE 1 TABLET BY MOUTH EVERY DAY 1/11/21   Ashleigh Britt MD   HYDROmorphone (DILAUDID) 4 mg tablet Take 4 mg by mouth 3 (three) times a day as needed for moderate pain    Historical Provider, MD   insulin glargine (Basaglar KwikPen) 100 units/mL injection pen Inject 30 Units under the skin every 12 (twelve) hours Inject 40 units in am and 25 units in pm  Patient taking differently: Inject 55 Units under the skin daily Inject 40 units in am and 25 units in pm 6/23/20   Ashleigh Britt MD   Insulin Pen Needle (B-D UF III MINI PEN NEEDLES) 31G X 5 MM MISC Inject under the skin 2 (two) times a day Use as directed 11/14/19   Ashleigh Britt MD   metFORMIN (GLUCOPHAGE) 1000 MG tablet TAKE 1 TABLET BY MOUTH TWICE A DAY 5/26/21   Ashleigh Britt MD   methocarbamol (ROBAXIN) 500 mg tablet Take 1 tablet (500 mg total) by mouth 2 (two) times a day as needed for muscle spasms (side pain) 5/21/21   Margarita Montoya MD   OneTouch Ultra test strip PATIENT TO TEST ONCE A DAY 5/12/21   Ashleigh Britt MD   Tocilizumab (Actemra ACTPen) 162 MG/0 9ML SOAJ Inject 162 mg under the skin 4/9/21   Historical Provider, MD   torsemide (DEMADEX) 20 mg tablet TAKE 2 TABLETS BY MOUTH 2 (TWO) TIMES A DAY 7/6/21   Ashleigh Britt MD     I have reviewed home medications with patient personally  Allergies:    Allergies   Allergen Reactions    Gabapentin     Vancomycin        Social History:     Marital Status: /Civil Union   Patient Pre-hospital Living Situation: Lives w  and son  Patient Pre-hospital Level of Mobility: Ambulatory w cane  Patient Pre-hospital Diet Restrictions: None  Substance Use History:   Social History     Substance and Sexual Activity   Alcohol Use Never     Social History     Tobacco Use   Smoking Status Former Smoker    Packs/day: 1 50    Years: 18 00    Pack years: 27 00    Types: Cigarettes    Quit date: 0    Years since quittin 5   Smokeless Tobacco Never Used   Tobacco Comment    Quit 20 years ago     Social History     Substance and Sexual Activity   Drug Use No       Family History:    Family History   Problem Relation Age of Onset    Rheum arthritis Mother     Hypertension Mother        Physical Exam:     Vitals:   Blood Pressure: 128/63 (21)  Pulse: 71 (21)  Temperature: 97 9 °F (36 6 °C) (21)  Temp Source: Oral (21)  Respirations: 22 (21)  Weight - Scale: 91 6 kg (202 lb) (21)  SpO2: 97 % (21)    Physical Exam  Vitals reviewed  Constitutional:       General: She is not in acute distress  Appearance: She is obese  HENT:      Head: Normocephalic and atraumatic  Mouth/Throat:      Comments: deferred  Eyes:      Extraocular Movements: Extraocular movements intact  Cardiovascular:      Rate and Rhythm: Normal rate and regular rhythm  Pulses: Normal pulses  Heart sounds: Normal heart sounds  Pulmonary:      Effort: Pulmonary effort is normal  No respiratory distress  Breath sounds: Normal breath sounds  No wheezing or rhonchi  Abdominal:      Palpations: Abdomen is soft  Tenderness: There is no abdominal tenderness  There is no guarding or rebound  Musculoskeletal:         General: Normal range of motion  Cervical back: Normal range of motion and neck supple  Right lower leg: Edema (pitting) present  Left lower leg: Edema (pitting) present  Skin:     Findings: Erythema (BLE) present  Neurological:      General: No focal deficit present  Mental Status: She is alert and oriented to person, place, and time  Psychiatric:         Mood and Affect: Mood normal          Behavior: Behavior normal          Thought Content: Thought content normal          Additional Data:     Lab Results: I have personally reviewed pertinent reports  Results from last 7 days   Lab Units 07/13/21  2134   WBC Thousand/uL 8 19   HEMOGLOBIN g/dL 14 1   HEMATOCRIT % 43 6   PLATELETS Thousands/uL 166   NEUTROS PCT % 57   LYMPHS PCT % 28   MONOS PCT % 11   EOS PCT % 3     Results from last 7 days   Lab Units 07/13/21  2134   SODIUM mmol/L 137   POTASSIUM mmol/L 3 8   CHLORIDE mmol/L 100   CO2 mmol/L 31   BUN mg/dL 15   CREATININE mg/dL 1 19   ANION GAP mmol/L 6   CALCIUM mg/dL 8 8   ALBUMIN g/dL 3 7   TOTAL BILIRUBIN mg/dL 0 35   ALK PHOS U/L 97   ALT U/L 27   AST U/L 26   GLUCOSE RANDOM mg/dL 170*         Results from last 7 days   Lab Units 07/14/21  0109   POC GLUCOSE mg/dl 86               Imaging: I have personally reviewed pertinent films in PACS    XR chest 1 view portable    (Results Pending)       EKG, Pathology, and Other Studies Reviewed on Admission:   · EKG:     Allscripts / Epic Records Reviewed: Yes     ** Please Note: This note has been constructed using a voice recognition system   **

## 2021-07-15 ENCOUNTER — TRANSITIONAL CARE MANAGEMENT (OUTPATIENT)
Dept: INTERNAL MEDICINE CLINIC | Facility: CLINIC | Age: 76
End: 2021-07-15

## 2021-07-15 ENCOUNTER — APPOINTMENT (INPATIENT)
Dept: VASCULAR ULTRASOUND | Facility: HOSPITAL | Age: 76
DRG: 948 | End: 2021-07-15
Payer: MEDICARE

## 2021-07-15 VITALS
HEIGHT: 60 IN | OXYGEN SATURATION: 96 % | TEMPERATURE: 97.5 F | BODY MASS INDEX: 39.56 KG/M2 | DIASTOLIC BLOOD PRESSURE: 57 MMHG | HEART RATE: 74 BPM | RESPIRATION RATE: 20 BRPM | WEIGHT: 201.5 LBS | SYSTOLIC BLOOD PRESSURE: 146 MMHG

## 2021-07-15 LAB
ANION GAP SERPL CALCULATED.3IONS-SCNC: 8 MMOL/L (ref 4–13)
ATRIAL RATE: 83 BPM
BUN SERPL-MCNC: 16 MG/DL (ref 5–25)
CALCIUM SERPL-MCNC: 8.8 MG/DL (ref 8.3–10.1)
CHLORIDE SERPL-SCNC: 103 MMOL/L (ref 100–108)
CO2 SERPL-SCNC: 31 MMOL/L (ref 21–32)
CREAT SERPL-MCNC: 0.87 MG/DL (ref 0.6–1.3)
ERYTHROCYTE [DISTWIDTH] IN BLOOD BY AUTOMATED COUNT: 14.3 % (ref 11.6–15.1)
GFR SERPL CREATININE-BSD FRML MDRD: 65 ML/MIN/1.73SQ M
GLUCOSE SERPL-MCNC: 134 MG/DL (ref 65–140)
GLUCOSE SERPL-MCNC: 165 MG/DL (ref 65–140)
GLUCOSE SERPL-MCNC: 198 MG/DL (ref 65–140)
GLUCOSE SERPL-MCNC: 53 MG/DL (ref 65–140)
GLUCOSE SERPL-MCNC: 69 MG/DL (ref 65–140)
HCT VFR BLD AUTO: 41.7 % (ref 34.8–46.1)
HGB BLD-MCNC: 12.9 G/DL (ref 11.5–15.4)
MCH RBC QN AUTO: 29 PG (ref 26.8–34.3)
MCHC RBC AUTO-ENTMCNC: 30.9 G/DL (ref 31.4–37.4)
MCV RBC AUTO: 94 FL (ref 82–98)
P AXIS: 63 DEGREES
PLATELET # BLD AUTO: 161 THOUSANDS/UL (ref 149–390)
PMV BLD AUTO: 10.7 FL (ref 8.9–12.7)
POTASSIUM SERPL-SCNC: 3.3 MMOL/L (ref 3.5–5.3)
PR INTERVAL: 166 MS
QRS AXIS: -5 DEGREES
QRSD INTERVAL: 72 MS
QT INTERVAL: 396 MS
QTC INTERVAL: 465 MS
RBC # BLD AUTO: 4.45 MILLION/UL (ref 3.81–5.12)
SODIUM SERPL-SCNC: 142 MMOL/L (ref 136–145)
T WAVE AXIS: 39 DEGREES
TROPONIN I SERPL-MCNC: <0.02 NG/ML
VENTRICULAR RATE: 83 BPM
WBC # BLD AUTO: 8.68 THOUSAND/UL (ref 4.31–10.16)

## 2021-07-15 PROCEDURE — 84484 ASSAY OF TROPONIN QUANT: CPT | Performed by: PHYSICIAN ASSISTANT

## 2021-07-15 PROCEDURE — 93970 EXTREMITY STUDY: CPT

## 2021-07-15 PROCEDURE — 93306 TTE W/DOPPLER COMPLETE: CPT | Performed by: INTERNAL MEDICINE

## 2021-07-15 PROCEDURE — 93010 ELECTROCARDIOGRAM REPORT: CPT | Performed by: INTERNAL MEDICINE

## 2021-07-15 PROCEDURE — 97163 PT EVAL HIGH COMPLEX 45 MIN: CPT

## 2021-07-15 PROCEDURE — 82948 REAGENT STRIP/BLOOD GLUCOSE: CPT

## 2021-07-15 PROCEDURE — 80048 BASIC METABOLIC PNL TOTAL CA: CPT | Performed by: STUDENT IN AN ORGANIZED HEALTH CARE EDUCATION/TRAINING PROGRAM

## 2021-07-15 PROCEDURE — 93970 EXTREMITY STUDY: CPT | Performed by: SURGERY

## 2021-07-15 PROCEDURE — 85027 COMPLETE CBC AUTOMATED: CPT | Performed by: STUDENT IN AN ORGANIZED HEALTH CARE EDUCATION/TRAINING PROGRAM

## 2021-07-15 PROCEDURE — 99239 HOSP IP/OBS DSCHRG MGMT >30: CPT | Performed by: STUDENT IN AN ORGANIZED HEALTH CARE EDUCATION/TRAINING PROGRAM

## 2021-07-15 PROCEDURE — 97167 OT EVAL HIGH COMPLEX 60 MIN: CPT

## 2021-07-15 RX ADMIN — ASPIRIN 81 MG CHEWABLE TABLET 81 MG: 81 TABLET CHEWABLE at 09:11

## 2021-07-15 RX ADMIN — HYDROMORPHONE HYDROCHLORIDE 4 MG: 2 TABLET ORAL at 09:11

## 2021-07-15 RX ADMIN — ACETAMINOPHEN 975 MG: 325 TABLET, FILM COATED ORAL at 04:09

## 2021-07-15 RX ADMIN — METHOCARBAMOL 500 MG: 500 TABLET ORAL at 02:39

## 2021-07-15 RX ADMIN — LISINOPRIL 10 MG: 10 TABLET ORAL at 08:59

## 2021-07-15 RX ADMIN — INSULIN GLARGINE 55 UNITS: 100 INJECTION, SOLUTION SUBCUTANEOUS at 08:58

## 2021-07-15 RX ADMIN — HEPARIN SODIUM 5000 UNITS: 5000 INJECTION INTRAVENOUS; SUBCUTANEOUS at 06:11

## 2021-07-15 RX ADMIN — BUMETANIDE 1 MG: 0.25 INJECTION INTRAMUSCULAR; INTRAVENOUS at 10:59

## 2021-07-15 RX ADMIN — INSULIN LISPRO 1 UNITS: 100 INJECTION, SOLUTION INTRAVENOUS; SUBCUTANEOUS at 12:35

## 2021-07-15 NOTE — OCCUPATIONAL THERAPY NOTE
Occupational Therapy Evaluation     Patient Name: Enio Benitez  AAJCA'L Date: 7/15/2021  Problem List  Principal Problem:    Bilateral lower extremity edema  Active Problems:    Chronic diastolic congestive heart failure ([facility] )    Essential hypertension    Type 2 diabetes mellitus with stage 3 chronic kidney disease, with long-term current use of insulin (HCC)    Rheumatoid arthritis (HCC)    Interstitial lung disease (HCC)    Chronic respiratory failure with hypoxia (HCC)    Venous stasis dermatitis of both lower extremities    Chronic prescription opiate use    Chest pain    Past Medical History  Past Medical History:   Diagnosis Date    Asthma     Chest pain on breathing     last assessed 2/5/15    Chronic diastolic CHF (congestive heart failure) (HCC)     Chronic respiratory failure with hypoxia (HCC)     Diabetes mellitus ([facility] )     HTN (hypertension)     Hyperlipidemia     Insomnia     Obesity     Preop cardiovascular exam 2/2/2018    Pulmonary fibrosis (HCC)     Rheumatoid arthritis ([facility] )      Past Surgical History  Past Surgical History:   Procedure Laterality Date    HAND SURGERY           07/15/21 0809   OT Last Visit   OT Visit Date 07/15/21   Note Type   Note type Evaluation   Restrictions/Precautions   Weight Bearing Precautions Per Order No   Braces or Orthoses Other (Comment)  (pt reports having back brace but does not wear it)   Other Precautions Chair Alarm; Bed Alarm; Fall Risk;Pain;O2  (3L O2 NC)   Pain Assessment   Pain Assessment Tool 0-10   Pain Score Worst Possible Pain   Pain Location/Orientation Location: Leg;Orientation: Bilateral;Location: Arm  (+ b/l hands)   Pain Onset/Description Onset: Ongoing; Descriptor: [address] Pain Intervention(s) Emotional support   Home Living   Type of Home House  (apartment in son's house)   Home Layout One level  (5 MANUEL)   Bathroom Shower/Tub Walk-in shower   Bathroom Toilet Standard   Bathroom Equipment Grab bars in shower; Shower chair;Grab bars around toilet   [address] Walker;Cane   Additional Comments Pt used RW at baseline for functional mobility  Pt uses 3 L O2 via NC when moving around house and in community   Prior Function   Level of [place] Independent with ADLs and functional mobility   Lives With Spouse; Son   [place] Help From Family   ADL Assistance Independent   IADLs Independent   Falls in the last 6 months 1 to 4   Vocational Retired   Comments Pt drives  Lifestyle   Autonomy Pt reports INDEP with ADLs and IADLs, and performs functional mobility Mod I using RW   Reciprocal Relationships Pt lives with son and spouse   Service to Others Pt is retired   Intrinsic Gratification Pt did not report   Psychosocial   Psychosocial (WDL) WDL   ADL   Eating Assistance 5  Supervision/Setup   Eating Deficit Setup   Grooming Assistance 4  Minimal Assistance   Grooming Deficit Steadying;Verbal cueing;Supervision/safety; Increased time to complete   UB Bathing Assistance 4  Minimal Assistance   LB Bathing Assistance 3  Moderate Assistance   UB Dressing Assistance 4  Minimal [address] 3  Moderate Assistance   Toileting Assistance  3  Moderate Assistance   Additional Comments Assist levels as outlined above are based on functional assessment of performance skills and deficits   Bed Mobility   Additional Comments Pt seated OOB to chair at start of session and agreeable to OT  Pt returned to chair at end of session with all needs met, call bell within reach, chair alarm active  Transfers   Sit to Stand 5  Supervision   Additional items Assist x 1; Increased time required;Verbal cues   Stand to Sit 5  Supervision   Additional items Assist x 1; Increased time required;Verbal cues   Additional Comments RW for UE support   Functional Mobility   Functional Mobility 4  Minimal assistance   Additional Comments CGA x 1 using RW for short distance <> bathroom   Balance   Static Sitting Fair +   Dynamic Sitting Fair +   Static Standing Fair   Dynamic Standing Fair -   Ambulatory Poor +   Activity Tolerance   Activity Tolerance Patient limited by fatigue;Patient limited by pain   Medical Staff Made Aware care coordination with PT Tori   Nurse Made Aware VINCENT Winslow So verbalized pt appropriate for therapy   RUE Assessment   RUE Assessment X   RUE Overall AROM   R Shoulder Flexion limited by pain - rheumatoid arthritis at baseline   R Elbow Flexion limited by pain - rheumatoid arthritis at baseline   R Wrist Flexion limited by pain - rheumatoid arthritis at baseline   R Mass Grasp WFL - able to hold toothbrush and RW handles   RUE Strength   RUE Overall Strength Deficits;Due to pain   R Shoulder Flexion 3-/5   R Elbow Extension 3/5   LUE Assessment   LUE Assessment X   LUE Overall AROM   L Shoulder Flexion limited by pain - rheumatoid arthritis at baseline   L Elbow Flexion limited by pain - rheumatoid arthritis at baseline   L Wrist Flexion limited by pain - rheumatoid arthritis at baseline   L Mass Grasp WFL - able to hold toothbrush and RW handles   LUE Strength   LUE Overall Strength Deficits;Due to pain   L Shoulder Flexion 3-/5   L Elbow Extension 3/5   Hand Function   Gross Motor Coordination Functional   Fine Motor Coordination Impaired   Vision-Basic Assessment   Current Vision Wears glasses only for reading   Patient Visual Report Other (Comment)  (denies acute visual changes)   Cognition   Overall Cognitive Status WFL   Arousal/Participation Alert; Cooperative   Attention Attends with cues to redirect  (attention limited by pain)   Orientation Level Oriented X4  (generally oriented to time)   Memory Within functional limits   Following Commands Follows multistep commands with increased time or repetition   Comments Pt able to identify self by full name and birthdate  Assessment   Limitation Decreased ADL status; Decreased UE ROM; Decreased UE strength;Decreased endurance;Decreased self-care trans;Decreased high-level ADLs; Decreased fine motor control   Assessment Pt is a 68 y o  female seen for OT evaluation (time 4908-0597) s/p admit to [facility] 7/13/2021 w/ Bilateral lower extremity edema  Comorbidities affecting pt's functional performance at time of assessment include: hypertensive heart and kidney disease with heart failure and with CKD3, fungal infection of skin, SOB, vanous stasis dermatitis of b/l LEs, ambulatory dysfunction, and  has a past medical history of Asthma, Chest pain on breathing, Chronic diastolic CHF (congestive heart failure) ([facility] ), Chronic respiratory failure with hypoxia ([facility] ), Diabetes mellitus ([facility] ), HTN (hypertension), Hyperlipidemia, Insomnia, Obesity, Preop cardiovascular exam (2/2/2018), Pulmonary fibrosis ([facility] ), and Rheumatoid arthritis ([facility] )    Personal factors affecting pt at time of IE include:steps to enter environment, behavioral pattern, difficulty performing ADLS, difficulty performing IADLS  and health management   Prior to admission, Pt reports INDEP with ADLs and IADLs, and performs functional mobility Mod I using RW  Upon evaluation: Based on functional assessment of performance skills and deficits, pt requires set-up for feeding, Min A for grooming and UB ADLs, Mod A for LB ADLs and toileting, supervision for STS with RW, and CGA x 1 using RW for functional mobility <> bathroom, which may indicate the following deficits impacting occupational performance: sustained attention, emotional regulation, psychosocial function, uncontrolled pain, joint range of motion, muscle power and strength, muscle endurance, eye-hand coordination, fine motor control and physical endurance/activity tolerance  Pt to benefit from continued skilled OT tx while in the hospital to address deficits as defined above and maximize level of functional independence w ADL's and functional mobility   Occupational Performance areas to address include: bathing, showering, toileting and hygiene, dressing, functional mobility, personal hygiene and grooming, health management, social and emotional health promotion and maintenance, symptom and condition management, physical activity and family participation  From OT standpoint, recommendation at time of d/c would be home with family support and home OT  Goals   Patient Goals "do what I want to do"   Plan   Treatment Interventions ADL retraining;Functional transfer training;UE strengthening/ROM; Endurance training;Patient/family training;Equipment evaluation/education; Fine motor coordination activities; Compensatory technique education;Continued evaluation; Energy conservation; Activityengagement   Goal Expiration Date 07/25/21   OT Frequency 2-3x/wk   Recommendation   OT Discharge Recommendation Home with home health rehabilitation   OT - OK to Discharge   (once medically cleared)   AM-PAC Daily Activity Inpatient   Lower Body Dressing 2   Bathing 3   Toileting 3   Upper Body Dressing 3   Grooming 3   Eating 4   Daily Activity Raw Score 18   Daily Activity Standardized Score (Calc for Raw Score >=11) 38 66   AM-PAC Applied Cognition Inpatient   Following a Speech/Presentation 4   Understanding Ordinary Conversation 4   Taking Medications 4   Remembering Where Things Are Placed or Put Away 4   Remembering List of 4-5 Errands 3   Taking Care of Complicated Tasks 3   Applied Cognition Raw Score 22   Applied Cognition Standardized Score 47 83   Barthel Index   Feeding 5   Bathing 0   Grooming Score 0   Dressing Score 5   Bladder Score 10   Bowels Score 10   Toilet Use Score 5   Transfers (Bed/Chair) Score 10   Mobility (Level Surface) Score 0   Stairs Score 0   Barthel Index Score 45   Modified [place] Scale   Modified [place] Scale 4     Goals to be met within 10 days:    Pt will complete grooming/oral hygiene tasks Mod I while standing at sink    Pt will complete UB bathing/dressing Mod I while seated    Pt will complete LB bathing/dressing Mod I while seated     Pt will improve functional activity tolerance while standing at sink to 15+ minutes in order to increase safety and independence during functional transfers and ADL tasks  Pt will improve dynamic sitting/standing balance to good to increase safety and independence during functional transfers and ADL and decrease risk for falls  Pt will increase independence during STS transfers with least restrictive AD Mod I     Pt will complete transfer to standard toilet without use of grab bars Mod I     Pt will complete toileting tasks (clothing management up/down, hygiene) Mod I     Pt will identify and implement at least 3 healthy coping strategies to increase participation in meaningful activities and decrease risk for readmission      Dontae Rachel, OTR/L

## 2021-07-15 NOTE — DISCHARGE SUMMARY
[address] [facility]  Discharge- Cyril Nicole 1945, 68 y o  female MRN: [id number]  Unit/Bed#: -01 Encounter: [id number]  Primary Care Provider: Kermit Green MD   Date and time admitted to hospital: 7/13/2021  8:59 PM      * Bilateral lower extremity edema  Assessment & Plan  · Reports increasing BLE edema and pain x 2 days  Also reports increased redness and blistering of skin on day of admission  Denies fever/chills  · Afebrile  No leukocytosis  · Ddimer 4 47  · Bilateral lower extremity Doppler ultrasound report noted negative for acute DVT  · Echo report noted with EF of 60% without regional motion abnormality, grade 1 diastolic dysfunction  · Chest x-ray report noted negative for acute finding  · Patient was treated with IV diuretics, IV Bumex 1 mg b i d with improvement of her symptoms  · Currently hemodynamically stable for discharge  · Plan is to resume home dose of diuretics at the time of the discharge with close outpatient follow-up with primary providers  · Patient and daughter at bedside both agreeable with above plan  Chest pain  Assessment & Plan  · Reports intermittent right-sided chest pain x 2 days  States SOB is at baseline  No other associated symptoms  · 12 lead EKG revealed SR w poor R wave progression  No acute ischemic changes  · Chest x-ray report noted negative for acute finding  · Troponin negative x5  · MONI = 3  · Patient's symptoms appears to be due to overall deconditioning/obesity  · Currently she is asymptomatic and eager to go home  · Recommended close follow-up with primary care provider outpatient  · Patient and daughter at bedside both agreeable with above plan  Chronic prescription opiate use  Assessment & Plan  Opioid dependence, in the setting of pt w/ Rheumatoid arthritis, requiring Dilaudid 4mg PO TID PRN at home, which is continued on this admission    Active prescription verified in PMED   Last filled #90 x 30d on 7/10/21    Venous stasis dermatitis of both lower extremities  Assessment & Plan  · Venous Doppler negative for DVT bilaterally  · Outpatient follow-up  Chronic respiratory failure with hypoxia (HCC)  Assessment & Plan  · Reports wears O2 3L NC at baseline  · O2 sat was 97% on RA at rest  · Will continue home dose O2 via NC  · Recommended close outpatient follow-up with primary care provider and primary pulmonology outpatient  Rheumatoid arthritis ([facility] [address] )  Assessment & Plan  · Receives Actemra as outpt  · Opioid dependence, in the setting of pt w/ Rheumatoid arthritis, requiring Dilaudid 4mg PO TID PRN at home, which is continued on this admission  · Continue outpatient follow-up  Type 2 diabetes mellitus with stage 3 chronic kidney disease, with long-term current use of insulin ([facility])  Assessment & Plan  Lab Results   Component Value Date    HGBA1C 9 5 (H) 07/14/2021       No results for input(s): POCGLU in the last 72 hours  Blood Sugar Average: Last 72 hrs:       · Resume home dose of Glucophage and insulin at the time of the discharge with close outpatient follow-up  Essential hypertension  Assessment & Plan  · BP adequately controlled on current regimen  · Continue home dose Monopril  · Outpatient follow-up  Chronic diastolic congestive heart failure [facility])  Assessment & Plan  Wt Readings from Last 3 Encounters:   07/15/21 91 4 kg (201 lb 8 oz)   06/28/21 91 6 kg (202 lb)   05/27/21 91 6 kg (202 lb)     · Increased BLE edema as above  Otherwise does not appear acutely volume overloaded on exam  ·  on admission  · Patient was treated with IV Bumex 4 mg b i d  with improvement to bilateral lower extremity edema  · Currently clinically appears close to euvolemic  · Resume home dose of diuretics torsemide 40 mg b i d  at the time of the discharge  · Recommended close outpatient follow-up with primary care provider          Discharging Physician / Practitioner: Willard Hoskins MD  PCP: Sanjeev Velásquez MD  Admission Date:   Admission Orders (From admission, onward)     Ordered        07/14/21 1239  Inpatient Admission  Once         07/13/21 2343  Place in Observation  Once                   Discharge Date: 07/15/21    Medical Problems     Resolved Problems  Date Reviewed: 7/15/2021    None                Reason for Admission:  Bilateral lower extremity edema, chest discomfort  Hospital Course:     Jaqueline King is a 68 y o  female patient with past medical history of chronic diastolic CHF, diabetes, hypertension, chronic respiratory failure with hypoxia on 3 L nasal cannula dependent at baseline, rheumatoid arthritis, chronic opioid use, interstitial lung disease, pulmonary hypertension, who originally presented to the hospital on 7/13/2021 due to complaining of bilateral lower extremity edema, pain, mild chest discomfort  Troponin negative x5  Noted with elevated D-dimer  Bilateral lower extremity Doppler negative for DVT  Echo report noted with EF of 60% with grade 1 diastolic dysfunction  Patient was treated with IV Bumex 1 mg b i d , low-salt, fluid-restricted diet, I&O monitoring with improvement of her symptoms  Patient does have chronic bilateral lower extremity venous dermatitis changes  Currently not in acute respiratory distress  O2 saturation at her baseline  Clinically patient denies any new complaint this time and reports feeling better and eager to go home  Plan is to resume home dose of diuretics torsemide 40 mg b i d  at the time of the discharge  Currently hemodynamically stable for discharge  Recommended close follow-up with primary providers outpatient closely  Recommended to seek immediate medical attention if he experiences worsening of respiratory symptoms  Patient and her daughter both are agreeable with above-stated outpatient follow-up plan  Please see above list of diagnoses and related plan for additional information       Condition at Discharge: good     Discharge Day Visit / Exam:     Subjective:  Afebrile, hemodynamically stable  Denies chest pain, dyspnea, nausea, diaphoreses at this time  O2 saturation at her baseline  Currently she reports feeling well and eager to go home  Vitals: Blood Pressure: 146/57 (07/15/21 0856)  Pulse: 74 (07/15/21 0856)  Temperature: 97 5 °F (36 4 °C) (07/15/21 0748)  Temp Source: Oral (07/14/21 1607)  Respirations: 20 (07/15/21 0748)  Height: 5' (152 4 cm) (07/15/21 0000)  Weight - Scale: 91 4 kg (201 lb 8 oz) (07/15/21 0600)  SpO2: 96 % (07/15/21 0856)  Exam:   Physical Exam  Constitutional:       General: She is not in acute distress  Appearance: Normal appearance  She is not ill-appearing  Comments: Morbidly obese female patient, acutely nontoxic appearing  HENT:      Head: Normocephalic and atraumatic  Eyes:      Extraocular Movements: Extraocular movements intact  Conjunctiva/sclera: Conjunctivae normal       Pupils: Pupils are equal, round, and reactive to light  Cardiovascular:      Rate and Rhythm: Normal rate  Pulmonary:      Effort: Pulmonary effort is normal  No respiratory distress  Breath sounds: Normal breath sounds  No stridor  Comments: Decreased lung sounds due to body habitus, no obvious wheezing or rales appreciated  Abdominal:      General: Bowel sounds are normal  There is no distension  Palpations: Abdomen is soft  Tenderness: There is no abdominal tenderness  Musculoskeletal:         General: Normal range of motion  Cervical back: Normal range of motion and neck supple  Right lower leg: Edema (Bilateral lower extremity edema, erythema improved ) present  Left lower leg: Edema present  Neurological:      General: No focal deficit present  Mental Status: She is alert and oriented to person, place, and time  Mental status is at baseline     Psychiatric:         Behavior: Behavior normal          Discussion with Family: spoke with daughter at bedside  Discharge instructions/Information to patient and family:   See after visit summary for information provided to patient and family  Provisions for Follow-Up Care:  See after visit summary for information related to follow-up care and any pertinent home health orders  Disposition:     Home    For Discharges to [facility] SNF:   · Not Applicable to this Patient - Not Applicable to this Patient    Planned Readmission: none     Discharge Statement:  I spent 35 minutes discharging the patient  This time was spent on the day of discharge  I had direct contact with the patient on the day of discharge  Greater than 50% of the total time was spent examining patient, answering all patient questions, arranging and discussing plan of care with patient as well as directly providing post-discharge instructions  Additional time then spent on discharge activities  Discharge Medications:  See after visit summary for reconciled discharge medications provided to patient and family        ** Please Note: This note has been constructed using a voice recognition system **

## 2021-07-15 NOTE — DISCHARGE INSTR - AVS FIRST PAGE
Recommended close follow-up with primary care provider within 1 week of discharge  Recommended close follow-up with primary Cardiology outpatient

## 2021-07-15 NOTE — PLAN OF CARE
Problem: Potential for Falls  Goal: Patient will remain free of falls  Description: INTERVENTIONS:  - Educate patient/family on patient safety including physical limitations  - Instruct patient to call for assistance with activity   - Consult OT/PT to assist with strengthening/mobility   - Keep Call bell within reach  - Keep bed low and locked with side rails adjusted as appropriate  - Keep care items and personal belongings within reach  - Initiate and maintain comfort rounds  - Make Fall Risk Sign visible to staff  - Offer Toileting every 2 Hours, in advance of need  - Initiate/Maintain per shift alarm  - Obtain necessary fall risk management equipment: as needed it  - Apply yellow socks and bracelet for high fall risk patients  - Consider moving patient to room near nurses station  Outcome: Adequate for Discharge

## 2021-07-15 NOTE — PLAN OF CARE
Problem: OCCUPATIONAL THERAPY ADULT  Goal: Performs self-care activities at highest level of function for planned discharge setting  See evaluation for individualized goals  Description: Treatment Interventions: ADL retraining, Functional transfer training, UE strengthening/ROM, Endurance training, Patient/family training, Equipment evaluation/education, Fine motor coordination activities, Compensatory technique education, Continued evaluation, Energy conservation, Activityengagement          See flowsheet documentation for full assessment, interventions and recommendations  Note: Limitation: Decreased ADL status, Decreased UE ROM, Decreased UE strength, Decreased endurance, Decreased self-care trans, Decreased high-level ADLs, Decreased fine motor control     Assessment: Pt is a 68 y o  female seen for OT evaluation (time 7798-7940) s/p admit to [facility] on 7/13/2021 w/ Bilateral lower extremity edema  Comorbidities affecting pt's functional performance at time of assessment include: hypertensive heart and kidney disease with heart failure and with CKD3, fungal infection of skin, SOB, vanous stasis dermatitis of b/l LEs, ambulatory dysfunction, and  has a past medical history of Asthma, Chest pain on breathing, Chronic diastolic CHF (congestive heart failure) ([facility] [address] ), Chronic respiratory failure with hypoxia ([facility] [address] ), Diabetes mellitus ([facility] [address] ), HTN (hypertension), Hyperlipidemia, Insomnia, Obesity, Preop cardiovascular exam (2/2/2018), Pulmonary fibrosis ([facility] ), and Rheumatoid arthritis ([facility] [address] )    Personal factors affecting pt at time of IE include:steps to enter environment, behavioral pattern, difficulty performing ADLS, difficulty performing IADLS  and health management   Prior to admission, Pt reports INDEP with ADLs and IADLs, and performs functional mobility Mod I using RW   Upon evaluation: Based on functional assessment of performance skills and deficits, pt requires set-up for feeding, Min A for grooming and UB ADLs, Mod A for LB ADLs and toileting, supervision for STS with RW, and CGA x 1 using RW for functional mobility <> bathroom, which may indicate the following deficits impacting occupational performance: sustained attention, emotional regulation, psychosocial function, uncontrolled pain, joint range of motion, muscle power and strength, muscle endurance, eye-hand coordination, fine motor control and physical endurance/activity tolerance  Pt to benefit from continued skilled OT tx while in the hospital to address deficits as defined above and maximize level of functional independence w ADL's and functional mobility  Occupational Performance areas to address include: bathing, showering, toileting and hygiene, dressing, functional mobility, personal hygiene and grooming, health management, social and emotional health promotion and maintenance, symptom and condition management, physical activity and family participation  From OT standpoint, recommendation at time of d/c would be home with family support and home OT       OT Discharge Recommendation: Home with home health rehabilitation  OT - OK to Discharge:  (once medically cleared)

## 2021-07-15 NOTE — PHYSICAL THERAPY NOTE
Physical Therapy Evaluation     Patient's Name: Le Ontiveros    Admitting Diagnosis  [facility]est[facility]) ([facility] ) [I50 9]  Chest pain [R07 9]    Problem List  Patient Active Problem List   Diagnosis    Bilateral lower extremity edema    Chronic diastolic congestive heart failure ([facility] )    Mixed hyperlipidemia    Essential hypertension    Ambulatory dysfunction    Recurrent major depressive disorder, in partial remission ([facility] )    Type 2 diabetes mellitus with stage 3 chronic kidney disease, with long-term current use of insulin (HCC)    Gastroesophageal reflux disease without esophagitis    Primary insomnia    Moderate persistent asthma    Psoriasis vulgaris    Rheumatoid arthritis (HCC)    Urinary incontinence    Vitamin D deficiency    Varicose veins of both lower extremities    Obesity (BMI 30-39  9)    Pain in elbow    Immunosuppressed status ([facility] )    Interstitial lung disease (HCC)    Chronic respiratory failure with hypoxia (HCC)    Hyponatremia    Venous stasis dermatitis of both lower extremities    Shortness of breath    Chronic prescription opiate use    Fungal infection of skin    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)    Chest pain       Past Medical History  Past Medical History:   Diagnosis Date    Asthma     Chest pain on breathing     last assessed 2/5/15    Chronic diastolic CHF (congestive heart failure) (HCC)     Chronic respiratory failure with hypoxia (HCC)     Diabetes mellitus ([facility] )     HTN (hypertension)     Hyperlipidemia     Insomnia     Obesity     Preop cardiovascular exam 2/2/2018    Pulmonary fibrosis (HCC)     Rheumatoid arthritis [facility])        Past Surgical History  Past Surgical History:   Procedure Laterality Date    HAND SURGERY            07/15/21 0826   PT Last Visit   PT Visit Date 07/15/21   Note Type   Note type Evaluation   Pain Assessment   Pain Assessment Tool 0-10   Pain Score Worst Possible Pain   Pain Location/Orientation Location: Leg;Orientation: Bilateral  (Location: Hands)   Pain Onset/Description Onset: Ongoing; Descriptor: Aching  (patient reports chronic pain)   Hospital Pain Intervention(s) Emotional support;Repositioned; Ambulation/increased activity   Home Living   Type of Home House  (apartment in son's house)   Home Layout One level;Performs ADLs on one level; Able to live on main level with bedroom/bathroom  (5 MANUEL (CM note 7/14/21 indicates 0 MANUEL))   Bathroom Shower/Tub Walk-in shower   Bathroom Toilet Standard   Bathroom Equipment Grab bars in shower; Shower chair;Grab bars around toilet   [address] Walker;Cane  (patient ambulates with SPC at baseline)   Additional Comments patient uses 3L O2 NC when moving around the house and in the community   Prior Function   Level of [place] Independent with ADLs and functional mobility   Lives With Spouse; Blaise Mullins Help From Family   ADL Assistance Independent   IADLs Independent   Falls in the last 6 months 1 to 4   Vocational Retired   Comments patient drives   Restrictions/Precautions   [place] Bearing Precautions Per Order No   Braces or Orthoses Other (Comment)  (pt reports having back brace but does not wear it)   Other Precautions Chair Alarm; Bed Alarm; Fall Risk;Pain;O2  (3L O2 NC)   General   Family/Caregiver Present No   Cognition   Overall Cognitive Status WFL   Arousal/Participation Cooperative   Attention Attends with cues to redirect  (attention limited by pain)   Orientation Level Oriented X4  (generally oriented to time)   Memory Within functional limits   Following Commands Follows multistep commands with increased time or repetition   Comments patient agreeable to PT eval   RUE Assessment   RUE Assessment X   RUE Strength   RUE Overall Strength Deficits;Due to pain   R Shoulder Flexion 3-/5   R Elbow Extension 3/5   LUE Assessment   LUE Assessment X LUE Strength   LUE Overall Strength Deficits;Due to pain   L Shoulder Flexion 3-/5   L Elbow Extension 3/5   RLE Assessment   RLE Assessment WFL  (grossly assessed with functional mobility: at least 3+/5)   LLE Assessment   LLE Assessment WFL  (grossly assessed with functional mobility: at least 3+/5)   Coordination   Movements are Fluid and Coordinated 1   Bed Mobility   Additional Comments patient seated OOB in recliner chair upon arrival   Transfers   Sit to Stand 5  Supervision   Additional items Assist x 1; Increased time required;Verbal cues   Stand to Sit 5  Supervision   Additional items Assist x 1; Increased time required;Verbal cues   Ambulation/Elevation   Gait pattern Excessively slow; Short stride;Decreased foot clearance   Gait Assistance 4  Minimal assist  (CGA)   Additional items Assist x 1;Verbal cues   Assistive Device Rolling walker   Distance 15' x 2   Stair Management Assistance Not tested   Balance   Static Sitting Fair +   Dynamic Sitting Fair +   Static Standing Fair   Dynamic Standing Fair -   Ambulatory Fair -   Endurance Deficit   Endurance Deficit Yes   Activity Tolerance   Activity Tolerance Patient limited by fatigue;Patient limited by pain   Medical Staff Made Aware OT Junius    Nurse Made Aware VINCENT Dukes confirmed patient appropriate for therapy; post-session: patient returned to recliner chair, all needs within reach and chair alarm engaged   Assessment   Prognosis Good   Problem List Decreased strength;Decreased endurance; Impaired balance;Decreased mobility;Pain   Assessment Pt is 68 y o  female seen for PT evaluation s/p admit to [facility] on 2021 w/ Bilateral lower extremity edema  PT consulted to assess pt's functional mobility and d/c needs  Order placed for PT eval and tx, w/ up w/ A order  Performed at least 2 patient identifiers during session: Name and    Pt has a past medical history of Asthma, Chest pain on breathing, Chronic diastolic CHF (congestive heart failure) ([facility] ), Chronic respiratory failure with hypoxia ([facility] ), Diabetes mellitus ([facility] ), HTN (hypertension), Hyperlipidemia, Insomnia, Obesity, Preop cardiovascular exam (2/2/2018), Pulmonary fibrosis ([facility] ), and Rheumatoid arthritis ([facility] )  PTA, pt was independent w/ all functional mobility w/ SPC, ambulates community distances and elevations, has 5 MANUEL, lives w/ family in one level house and retired  Personal factors affecting pt at time of IE include: ambulating w/ assistive device, ? stairs to enter home, inability to navigate community distances, inability to navigate level surfaces w/o external assistance, unable to perform dynamic tasks in community, positive fall history, inability to perform IADLs and inability to perform ADLs  Please find objective findings from PT assessment regarding body systems outlined above with impairments and limitations including weakness, impaired balance, decreased endurance, gait deviations, pain, decreased activity tolerance, decreased functional mobility tolerance and fall risk  The following objective measures performed on IE also reveal limitations: Barthel Index: 45/100 and Modified [place]: 4 (moderate/severe disability)  Pt's clinical presentation is currently unstable/unpredictable seen in pt's presentation of ongoing medical management/monitoring, fatigue and pain impacting overall mobility status and need for input for mobility technique/safety  Pt to benefit from continued PT tx to address deficits as defined above and maximize level of functional independent mobility and consistency  From PT/mobility standpoint, recommendation at time of d/c would be home with home health rehabilitation pending progress in order to facilitate return to PLOF     Barriers to Discharge None  (CM note 7/14/21 indicates 0 MANUEL)   Goals   Patient Goals "do what I want to do"   STG Expiration Date 07/25/21   Short Term Goal #1 In 7-10 days: Increase bilateral LE strength 1/2 grade to facilitate independent mobility, Perform all bed mobility tasks modified independent to decrease caregiver burden, Perform all transfers modified independent to improve independence, Ambulate > 150 ft  with least restrictive assistive device modified independent w/o LOB and w/ normalized gait pattern 100% of the time, Navigate 5 stairs modified independent with unilateral handrail to facilitate return to previous living environment, Increase all balance 1 grade to decrease risk for falls and Complete exercise program independently   PT Treatment Day 0   Plan   Treatment/Interventions Functional transfer training;LE strengthening/ROM; Elevations; Therapeutic exercise; Endurance training;Patient/family training;Equipment eval/education; Bed mobility;Gait training;Spoke to nursing;OT   PT Frequency Other (Comment)  (3-5x/wk)   Recommendation   PT Discharge Recommendation Home with home health rehabilitation   [address] Mobility Inpatient   Turning in Bed Without Bedrails 3   Lying on Back to Sitting on Edge of Flat Bed 3   Moving Bed to Chair 3   Standing Up From Chair 3   Walk in Room 3   Climb 3-5 Stairs 3   Basic Mobility Inpatient Raw Score 18   Basic Mobility Standardized Score 41 05   Modified [place] Scale   Modified [place] Scale 4   Barthel Index   Feeding 5   Bathing 0   Grooming Score 0   Dressing Score 5   Bladder Score 10   Bowels Score 10   Toilet Use Score 5   Transfers (Bed/Chair) Score 10   Mobility (Level Surface) Score 0   Stairs Score 0   Barthel Index Score 45         Magdy Greenwood, PT, DPT

## 2021-07-15 NOTE — PLAN OF CARE
Problem: PHYSICAL THERAPY ADULT  Goal: Performs mobility at highest level of function for planned discharge setting  See evaluation for individualized goals  Description: Treatment/Interventions: Functional transfer training, LE strengthening/ROM, Elevations, Therapeutic exercise, Endurance training, Patient/family training, Equipment eval/education, Bed mobility, Gait training, Spoke to nursing, OT          See flowsheet documentation for full assessment, interventions and recommendations  Note: Prognosis: Good  Problem List: Decreased strength, Decreased endurance, Impaired balance, Decreased mobility, Pain  Assessment: Pt is 68 y o  female seen for PT evaluation s/p admit to [facility] on 2021 w/ Bilateral lower extremity edema  PT consulted to assess pt's functional mobility and d/c needs  Order placed for PT eval and tx, w/ up w/ A order  Performed at least 2 patient identifiers during session: Name and   Pt has a past medical history of Asthma, Chest pain on breathing, Chronic diastolic CHF (congestive heart failure) ([facility] [address] ), Chronic respiratory failure with hypoxia ([facility] [address] ), Diabetes mellitus ([facility] [address] ), HTN (hypertension), Hyperlipidemia, Insomnia, Obesity, Preop cardiovascular exam (2018), Pulmonary fibrosis ([facility] [address] ), and Rheumatoid arthritis ([facility] [address] )  PTA, pt was independent w/ all functional mobility w/ SPC, ambulates community distances and elevations, has 5 MANUEL, lives w/ family in one level house and retired  Personal factors affecting pt at time of IE include: ambulating w/ assistive device, ? stairs to enter home, inability to navigate community distances, inability to navigate level surfaces w/o external assistance, unable to perform dynamic tasks in community, positive fall history, inability to perform IADLs and inability to perform ADLs   Please find objective findings from PT assessment regarding body systems outlined above with impairments and limitations including weakness, impaired balance, decreased endurance, gait deviations, pain, decreased activity tolerance, decreased functional mobility tolerance and fall risk  The following objective measures performed on IE also reveal limitations: Barthel Index: 45/100 and Modified [place]: 4 (moderate/severe disability)  Pt's clinical presentation is currently unstable/unpredictable seen in pt's presentation of ongoing medical management/monitoring, fatigue and pain impacting overall mobility status and need for input for mobility technique/safety  Pt to benefit from continued PT tx to address deficits as defined above and maximize level of functional independent mobility and consistency  From PT/mobility standpoint, recommendation at time of d/c would be home with home health rehabilitation pending progress in order to facilitate return to PLOF  Barriers to Discharge: None (CM note 7/14/21 indicates 0 MANUEL)        PT Discharge Recommendation: Home with home health rehabilitation          See flowsheet documentation for full assessment

## 2021-07-15 NOTE — PLAN OF CARE
Problem: Potential for Falls  Goal: Patient will remain free of falls  Description: INTERVENTIONS:  - Educate patient/family on patient safety including physical limitations  - Instruct patient to call for assistance with activity   - Consult OT/PT to assist with strengthening/mobility   - Keep Call bell within reach  - Keep bed low and locked with side rails adjusted as appropriate  - Keep care items and personal belongings within reach  - Initiate and maintain comfort rounds  - Make Fall Risk Sign visible to staff  - Offer Toileting every  Hours, in advance of need  - Initiate/Maintain alarm  - Obtain necessary fall risk management equipment:   - Apply yellow socks and bracelet for high fall risk patients  - Consider moving patient to room near nurses station  Outcome: Adequate for Discharge     Problem: MOBILITY - ADULT  Goal: Maintain or return to baseline ADL function  Description: INTERVENTIONS:  -  Assess patient's ability to carry out ADLs; assess patient's baseline for ADL function and identify physical deficits which impact ability to perform ADLs (bathing, care of mouth/teeth, toileting, grooming, dressing, etc )  - Assess/evaluate cause of self-care deficits   - Assess range of motion  - Assess patient's mobility; develop plan if impaired  - Assess patient's need for assistive devices and provide as appropriate  - Encourage maximum independence but intervene and supervise when necessary  - Involve family in performance of ADLs  - Assess for home care needs following discharge   - Consider OT consult to assist with ADL evaluation and planning for discharge  - Provide patient education as appropriate  Outcome: Adequate for Discharge  Goal: Maintains/Returns to pre admission functional level  Description: INTERVENTIONS:  - Perform BMAT or MOVE assessment daily    - Set and communicate daily mobility goal to care team and patient/family/caregiver     - Collaborate with rehabilitation services on mobility goals if consulted  - Perform Range of Motion  times a day  - Reposition patient every  hours    - Dangle patient  times a day  - Stand patient  times a day  - Ambulate patient  times a day  - Out of bed to chair  times a day   - Out of bed for meals  times a day  - Out of bed for toileting  - Record patient progress and toleration of activity level   Outcome: Adequate for Discharge     Problem: Prexisting or High Potential for Compromised Skin Integrity  Goal: Skin integrity is maintained or improved  Description: INTERVENTIONS:  - Identify patients at risk for skin breakdown  - Assess and monitor skin integrity  - Assess and monitor nutrition and hydration status  - Monitor labs   - Assess for incontinence   - Turn and reposition patient  - Assist with mobility/ambulation  - Relieve pressure over bony prominences  - Avoid friction and shearing  - Provide appropriate hygiene as needed including keeping skin clean and dry  - Evaluate need for skin moisturizer/barrier cream  - Collaborate with interdisciplinary team   - Patient/family teaching  - Consider wound care consult   Outcome: Adequate for Discharge     Problem: PAIN - ADULT  Goal: Verbalizes/displays adequate comfort level or baseline comfort level  Description: Interventions:  - Encourage patient to monitor pain and request assistance  - Assess pain using appropriate pain scale  - Administer analgesics based on type and severity of pain and evaluate response  - Implement non-pharmacological measures as appropriate and evaluate response  - Consider cultural and social influences on pain and pain management  - Notify physician/advanced practitioner if interventions unsuccessful or patient reports new pain  Outcome: Adequate for Discharge     Problem: INFECTION - ADULT  Goal: Absence or prevention of progression during hospitalization  Description: INTERVENTIONS:  - Assess and monitor for signs and symptoms of infection  - Monitor lab/diagnostic results  - Monitor all insertion sites, i e  indwelling lines, tubes, and drains  - Monitor endotracheal if appropriate and nasal secretions for changes in amount and color  - Dix appropriate cooling/warming therapies per order  - Administer medications as ordered  - Instruct and encourage patient and family to use good hand hygiene technique  - Identify and instruct in appropriate isolation precautions for identified infection/condition  Outcome: Adequate for Discharge  Goal: Absence of fever/infection during neutropenic period  Description: INTERVENTIONS:  - Monitor WBC    Outcome: Adequate for Discharge     Problem: SAFETY ADULT  Goal: Patient will remain free of falls  Description: INTERVENTIONS:  - Educate patient/family on patient safety including physical limitations  - Instruct patient to call for assistance with activity   - Consult OT/PT to assist with strengthening/mobility   - Keep Call bell within reach  - Keep bed low and locked with side rails adjusted as appropriate  - Keep care items and personal belongings within reach  - Initiate and maintain comfort rounds  - Make Fall Risk Sign visible to staff  - Offer Toileting every  Hours, in advance of need  - Initiate/Maintain alarm  - Obtain necessary fall risk management equipment:   - Apply yellow socks and bracelet for high fall risk patients  - Consider moving patient to room near nurses station  Outcome: Adequate for Discharge  Goal: Maintain or return to baseline ADL function  Description: INTERVENTIONS:  -  Assess patient's ability to carry out ADLs; assess patient's baseline for ADL function and identify physical deficits which impact ability to perform ADLs (bathing, care of mouth/teeth, toileting, grooming, dressing, etc )  - Assess/evaluate cause of self-care deficits   - Assess range of motion  - Assess patient's mobility; develop plan if impaired  - Assess patient's need for assistive devices and provide as appropriate  - Encourage maximum independence but intervene and supervise when necessary  - Involve family in performance of ADLs  - Assess for home care needs following discharge   - Consider OT consult to assist with ADL evaluation and planning for discharge  - Provide patient education as appropriate  Outcome: Adequate for Discharge  Goal: Maintains/Returns to pre admission functional level  Description: INTERVENTIONS:  - Perform BMAT or MOVE assessment daily    - Set and communicate daily mobility goal to care team and patient/family/caregiver  - Collaborate with rehabilitation services on mobility goals if consulted  - Perform Range of Motion  times a day  - Reposition patient every  hours  - Dangle patient  times a day  - Stand patient  times a day  - Ambulate patient  times a day  - Out of bed to chair  times a day   - Out of bed for meals  times a day  - Out of bed for toileting  - Record patient progress and toleration of activity level   Outcome: Adequate for Discharge     Problem: DISCHARGE PLANNING  Goal: Discharge to home or other facility with appropriate resources  Description: INTERVENTIONS:  - Identify barriers to discharge w/patient and caregiver  - Arrange for needed discharge resources and transportation as appropriate  - Identify discharge learning needs (meds, wound care, etc )  - Arrange for interpretive services to assist at discharge as needed  - Refer to Case Management Department for coordinating discharge planning if the patient needs post-hospital services based on physician/advanced practitioner order or complex needs related to functional status, cognitive ability, or social support system  Outcome: Adequate for Discharge     Problem: Knowledge Deficit  Goal: Patient/family/caregiver demonstrates understanding of disease process, treatment plan, medications, and discharge instructions  Description: Complete learning assessment and assess knowledge base    Interventions:  - Provide teaching at level of understanding  - Provide teaching via preferred learning methods  Outcome: Adequate for Discharge

## 2021-07-16 ENCOUNTER — TRANSITIONAL CARE MANAGEMENT (OUTPATIENT)
Dept: INTERNAL MEDICINE CLINIC | Facility: CLINIC | Age: 76
End: 2021-07-16

## 2021-07-16 LAB
ATRIAL RATE: 69 BPM
P AXIS: 53 DEGREES
PR INTERVAL: 168 MS
QRS AXIS: -27 DEGREES
QRSD INTERVAL: 78 MS
QT INTERVAL: 432 MS
QTC INTERVAL: 462 MS
T WAVE AXIS: 13 DEGREES
VENTRICULAR RATE: 69 BPM

## 2021-07-16 PROCEDURE — 93010 ELECTROCARDIOGRAM REPORT: CPT | Performed by: INTERNAL MEDICINE

## 2021-07-19 NOTE — ASSESSMENT & PLAN NOTE
Wt Readings from Last 3 Encounters:   07/15/21 91 4 kg (201 lb 8 oz)   06/28/21 91 6 kg (202 lb)   05/27/21 91 6 kg (202 lb)     · Increased BLE edema as above   Otherwise does not appear acutely volume overloaded on exam  ·  on admission  · Home dose Torsemide on hold  · IV Bumex 1mg BID  · Daily weight and I/O  · Low Na diet

## 2021-07-19 NOTE — ASSESSMENT & PLAN NOTE
Lab Results   Component Value Date    HGBA1C 9 5 (H) 07/14/2021       No results for input(s): POCGLU in the last 72 hours      Blood Sugar Average: Last 72 hrs:       · Continue home dose Glargine 55 units in am  · Hold home dose Glucophage while inpt  · FSBS AC & HS w SSI  · Diabetic diet  · A1C in am

## 2021-07-21 NOTE — PHYSICIAN ADVISOR
Current patient class: Inpatient  The patient is currently on Hospital Day: 3 at [address]      The patient was admitted to the hospital at 12:39 PM on 7/14/21 for the following diagnosis:  CHF (congestive heart failure) ([address] ) [I50 9]  Chest pain [R07 9]       There is documentation in the medical record of an expected length of stay of at least 2 midnights  The patient is therefore expected to satisfy the 2 midnight benchmark and given the 2 midnight presumption is appropriate for INPATIENT ADMISSION  Given this expectation of a satisfying stay, CMS instructs us that the patient is most often appropriate for inpatient admission under part A provided medical necessity is documented in the chart  After review of the relevant documentation, labs, vital signs and test results, the patient is appropriate for INPATIENT ADMISSION  Admission to the hospital as an inpatient is a complex decision making process which requires the practitioner to consider the patients presenting complaint, history and physical examination and all relevant testing  With this in mind, in this case, the patient was deemed appropriate for INPATIENT ADMISSION  After review of the documentation and testing available at the time of the admission I concur with this clinical determination of medical necessity  Rationale is as follows: The patient is a 68 yrs old Female who presented to the ED at 7/13/2021  8:59 PM with a chief complaint of Chest Pain (started to days ago, )   Patient with past medical history of chronic diastolic CHF, diabetes, hypertension, chronic respiratory failure with hypoxia on 3 L nasal cannula dependent at baseline, rheumatoid arthritis, chronic opioid use, interstitial lung disease, pulmonary hypertension, who originally presented to the hospital on 7/13/2021 due to complaining of bilateral lower extremity edema, pain, mild chest discomfort  Troponin negative x5   Noted with elevated D-dimer  Bilateral lower extremity Doppler negative for DVT  Echo report noted with EF of 60% with grade 1 diastolic dysfunction  Patient was treated with IV Bumex 1 mg b i d , low-salt, fluid-restricted diet, I&O monitoring with improvement of her symptoms  Patient does have chronic bilateral lower extremity venous dermatitis changes  Currently not in acute respiratory distress  O2 saturation at her baseline at time of discharge  Given her comorbidities with volume overload, she is inpatient appropriate  The patients vitals on arrival were   ED Triage Vitals [07/13/21 2056]   Temperature Pulse Respirations Blood Pressure SpO2   97 9 °F (36 6 °C) 80 18 158/86 97 %      Temp Source Heart Rate Source Patient Position - Orthostatic VS BP Location FiO2 (%)   Oral Monitor Sitting Right arm --      Pain Score       Worst Possible Pain           Past Medical History:   Diagnosis Date    Asthma     Chest pain on breathing     last assessed 2/5/15    Chronic diastolic CHF (congestive heart failure) (HCC)     Chronic respiratory failure with hypoxia (HCC)     Diabetes mellitus (HCC)     HTN (hypertension)     Hyperlipidemia     Insomnia     Obesity     Preop cardiovascular exam 2/2/2018    Pulmonary fibrosis (HCC)     Rheumatoid arthritis (HCC)      Past Surgical History:   Procedure Laterality Date    HAND SURGERY             Consults have been placed to:   None    Vitals:    07/15/21 0257 07/15/21 0600 07/15/21 0748 07/15/21 0856   BP: 101/78  143/77 146/57   BP Location:       Pulse: 70  74 74   Resp: 18  20    Temp: 97 8 °F (36 6 °C)  97 5 °F (36 4 °C)    TempSrc:       SpO2: 97%  (!) 86% 96%   Weight:  91 4 kg (201 lb 8 oz)     Height:           Most recent labs:    No results for input(s): WBC, HGB, HCT, PLT, K, NA, CALCIUM, BUN, CREATININE, LIPASE, AMYLASE, INR, TROPONINI, CKTOTAL, AST, ALT, ALKPHOS, BILITOT in the last 72 hours      Scheduled Meds:  Continuous Infusions:No current facility-administered medications for this encounter  PRN Meds:      Surgical procedures (if appropriate):

## 2021-07-22 ENCOUNTER — OFFICE VISIT (OUTPATIENT)
Dept: INTERNAL MEDICINE CLINIC | Facility: CLINIC | Age: 76
End: 2021-07-22
Payer: MEDICARE

## 2021-07-22 VITALS
HEIGHT: 60 IN | TEMPERATURE: 97.9 F | BODY MASS INDEX: 39.85 KG/M2 | WEIGHT: 203 LBS | DIASTOLIC BLOOD PRESSURE: 64 MMHG | SYSTOLIC BLOOD PRESSURE: 120 MMHG

## 2021-07-22 DIAGNOSIS — Z79.4 TYPE 2 DIABETES MELLITUS WITH STAGE 3 CHRONIC KIDNEY DISEASE, WITH LONG-TERM CURRENT USE OF INSULIN, UNSPECIFIED WHETHER STAGE 3A OR 3B CKD (HCC): ICD-10-CM

## 2021-07-22 DIAGNOSIS — E78.2 MIXED HYPERLIPIDEMIA: ICD-10-CM

## 2021-07-22 DIAGNOSIS — E55.9 VITAMIN D DEFICIENCY: ICD-10-CM

## 2021-07-22 DIAGNOSIS — I50.32 CHRONIC DIASTOLIC CONGESTIVE HEART FAILURE (HCC): Primary | ICD-10-CM

## 2021-07-22 DIAGNOSIS — N18.30 TYPE 2 DIABETES MELLITUS WITH STAGE 3 CHRONIC KIDNEY DISEASE, WITH LONG-TERM CURRENT USE OF INSULIN, UNSPECIFIED WHETHER STAGE 3A OR 3B CKD (HCC): ICD-10-CM

## 2021-07-22 DIAGNOSIS — F33.41 RECURRENT MAJOR DEPRESSIVE DISORDER, IN PARTIAL REMISSION (HCC): ICD-10-CM

## 2021-07-22 DIAGNOSIS — I10 ESSENTIAL HYPERTENSION: ICD-10-CM

## 2021-07-22 DIAGNOSIS — E11.22 TYPE 2 DIABETES MELLITUS WITH STAGE 3 CHRONIC KIDNEY DISEASE, WITH LONG-TERM CURRENT USE OF INSULIN, UNSPECIFIED WHETHER STAGE 3A OR 3B CKD (HCC): ICD-10-CM

## 2021-07-22 PROBLEM — M25.529 PAIN IN ELBOW: Status: RESOLVED | Noted: 2017-06-01 | Resolved: 2021-07-22

## 2021-07-22 PROCEDURE — 99496 TRANSJ CARE MGMT HIGH F2F 7D: CPT | Performed by: INTERNAL MEDICINE

## 2021-07-22 NOTE — PROGRESS NOTES
Assessment/Plan:     No problem-specific Assessment & Plan notes found for this encounter  Diagnoses and all orders for this visit:    Chronic diastolic congestive heart failure ([address] )   patient was told to continue taking the torsemide at the same dose and follow-up with cardiology     Type 2 diabetes mellitus with stage 3 chronic kidney disease, with long-term current use of insulin, unspecified whether stage 3a or 3b CKD (HCC)  -     CBC and differential; Future  -     Comprehensive metabolic panel; Future  -     Lipid panel; Future  -     TSH, 3rd generation; Future  -     Hemoglobin A1C; Future  She was advised to  Increase the dose of the insulin to 60 units daily and repeat labs in 3 months  Mixed hyperlipidemia  Continue low-fat diet   Essential hypertension  continue medications  Recurrent major depressive disorder, in partial remission (HCC)   stable, continue Lexapro  Vitamin D deficiency   continue vitamin-D  Other orders  -     Cimzia Starter Kit 6 X 200 MG/ML KIT         Subjective:     Patient ID: eNelima Alcantar is a 68 y o  female  HPI   patient was in the hospital with the exacerbation of CHF and was treated with IV diuretics  She was told to continue the torsemide after discharge  The swelling in her legs is better now but she still has some swelling as well as shortness of breath  She has not made an appointment with her cardiologist for follow-up of the hospital   Her hemoglobin A1c was also very high when she was in the hospital   She has not been taking the advised units of insulin  Review of Systems   Constitutional: Positive for fatigue  Negative for chills, diaphoresis and fever  HENT: Negative for congestion, ear discharge, ear pain, hearing loss, postnasal drip, rhinorrhea, sinus pressure, sinus pain, sneezing, sore throat and voice change  Eyes: Negative for pain, discharge, redness and visual disturbance  Respiratory: Positive for shortness of breath  Negative for cough, chest tightness and wheezing  Cardiovascular: Positive for leg swelling  Negative for chest pain and palpitations  Gastrointestinal: Negative for abdominal distention, abdominal pain, blood in stool, constipation, diarrhea, nausea and vomiting  Endocrine: Negative for cold intolerance, heat intolerance, polydipsia, polyphagia and polyuria  Genitourinary: Negative for dysuria, flank pain, frequency, hematuria and urgency  Musculoskeletal: Negative for arthralgias, back pain, gait problem, joint swelling, myalgias, neck pain and neck stiffness  Skin: Negative for rash  Neurological: Negative for dizziness, tremors, syncope, facial asymmetry, speech difficulty, weakness, light-headedness, numbness and headaches  Hematological: Does not bruise/bleed easily  Psychiatric/Behavioral: Negative for behavioral problems, confusion and sleep disturbance  The patient is not nervous/anxious  Objective:     Physical Exam  Constitutional:       General: She is not in acute distress  Appearance: She is well-developed  She is not diaphoretic  HENT:      Head: Normocephalic and atraumatic  Right Ear: External ear normal       Left Ear: External ear normal       Nose: Nose normal    Eyes:      General: No scleral icterus  Right eye: No discharge  Left eye: No discharge  Conjunctiva/sclera: Conjunctivae normal    Neck:      Thyroid: No thyromegaly  Vascular: No JVD  Trachea: No tracheal deviation  Cardiovascular:      Rate and Rhythm: Normal rate and regular rhythm  Heart sounds: Normal heart sounds  No murmur heard  No friction rub  No gallop  Pulmonary:      Effort: Pulmonary effort is normal  No respiratory distress  Breath sounds: Normal breath sounds  No wheezing or rales  Chest:      Chest wall: No tenderness  Abdominal:      General: Bowel sounds are normal  There is no distension  Palpations: Abdomen is soft  Tenderness: There is no abdominal tenderness  There is no guarding or rebound  Musculoskeletal:         General: No tenderness  Normal range of motion  Cervical back: Normal range of motion and neck supple  Right lower leg: Edema present  Left lower leg: Edema present  Lymphadenopathy:      Cervical: No cervical adenopathy  Skin:     General: Skin is warm and dry  Findings: No erythema or rash  Neurological:      Mental Status: She is alert and oriented to person, place, and time  Cranial Nerves: No cranial nerve deficit  Motor: No abnormal muscle tone  Coordination: Coordination normal    Psychiatric:         Judgment: Judgment normal            Vitals:    07/22/21 1310   BP: 120/64   BP Location: Right arm   Patient Position: Sitting   Temp: 97 9 °F (36 6 °C)   TempSrc: Tympanic   Weight: 92 1 kg (203 lb)   Height: 5' (1 524 m)       Transitional Care Management Review:  Emani Hong is a 68 y o  female here for TCM follow up  During the TCM phone call patient stated:    TCM Call (since 6/21/2021)     Date and time call was made  7/16/2021  9:22 AM    Hospital care reviewed  Records reviewed    Patient was hospitialized at  [facility]        Date of Admission  07/13/21    Date of discharge  07/15/21    Diagnosis  Bilateral lower extremity edema    Disposition  Home      TCM Call (since 6/21/2021)     Scheduled for follow up?   Yes (Comment)  7/22-1:15    Patients specialists  Pulmonlolgist; Other (comment)    Pulmonologist name  Cayetano Joseph MD    Pulmonologist contact #  [phone]    Endocrinologist name  [phone]    Referrals needed  [facility] /           Shaina Meyer MD

## 2021-07-26 NOTE — ED PROVIDER NOTES
History  Chief Complaint   Patient presents with    Chest Pain     started to days ago,      67 yo f presenting to the emergency department for eval of chest pain  Pt began with substernal tightness/pressure 2 days ago, also has had b/l leg swelling and arreola in this time  No fever or cough  No abd pain  N/v/d  Prior to Admission Medications   Prescriptions Last Dose Informant Patient Reported? Taking? B-D TB SYRINGE 1CC/27GX1/2" 27G X 1/2" 1 ML MISC 7/13/2021 at Unknown time Self No Yes   Sig: by Other route 2 (two) times a day   Blood Glucose Monitoring Suppl (OneTouch Verio) w/Device KIT 7/13/2021 at Unknown time Self No Yes   Sig: by Does not apply route 3 (three) times a day   HYDROmorphone (DILAUDID) 4 mg tablet 7/13/2021 at Unknown time Self Yes Yes   Sig: Take 4 mg by mouth 3 (three) times a day as needed for moderate pain   Insulin Pen Needle (B-D UF III MINI PEN NEEDLES) 31G X 5 MM MISC 7/13/2021 at Unknown time Self No Yes   Sig: Inject under the skin 2 (two) times a day Use as directed   OneTouch Ultra test strip 7/13/2021 at Unknown time  No Yes   Sig: PATIENT TO TEST ONCE A DAY   Tocilizumab (Actemra ACTPen) 162 MG/0 9ML SOAJ 7/13/2021 at Unknown time  Yes Yes   Sig: Inject 162 mg under the skin   albuterol (2 5 mg/3 mL) 0 083 % nebulizer solution 7/13/2021 at Unknown time Self No Yes   Sig: Take 1 vial (2 5 mg total) by nebulization every 6 (six) hours as needed for wheezing or shortness of breath   albuterol (PROVENTIL HFA,VENTOLIN HFA) 90 mcg/act inhaler 7/13/2021 at Unknown time  No Yes   Sig: Inhale 1 puff every 6 (six) hours as needed for wheezing or shortness of breath   escitalopram (LEXAPRO) 20 mg tablet Not Taking at Unknown time Self No No   Sig: TAKE 1 TABLET BY MOUTH EVERY DAY   fluticasone-vilanterol (BREO ELLIPTA) 100-25 mcg/inh inhaler 7/13/2021 at Unknown time  No Yes   Sig: Inhale 1 puff daily Rinse mouth after use     fosinopril (MONOPRIL) 10 mg tablet 7/13/2021 at Unknown time Self No Yes   Sig: TAKE 1 TABLET BY MOUTH EVERY DAY   insulin glargine (Basaglar KwikPen) 100 units/mL injection pen 2021 at Unknown time Self No Yes   Sig: Inject 30 Units under the skin every 12 (twelve) hours Inject 40 units in am and 25 units in pm   Patient taking differently: Inject 60 Units under the skin daily Inject 40 units in am and 25 units in pm   metFORMIN (GLUCOPHAGE) 1000 MG tablet 2021 at Unknown time  No Yes   Sig: TAKE 1 TABLET BY MOUTH TWICE A DAY   methocarbamol (ROBAXIN) 500 mg tablet 2021 at Unknown time  No Yes   Sig: Take 1 tablet (500 mg total) by mouth 2 (two) times a day as needed for muscle spasms (side pain)   torsemide (DEMADEX) 20 mg tablet 2021 at Unknown time  No Yes   Sig: TAKE 2 TABLETS BY MOUTH 2 (TWO) TIMES A DAY      Facility-Administered Medications: None       Past Medical History:   Diagnosis Date    Asthma     Chest pain on breathing     last assessed 2/5/15    Chronic diastolic CHF (congestive heart failure) (HCC)     Chronic respiratory failure with hypoxia (HCC)     Diabetes mellitus ([facility] [address] [facility]     HTN (hypertension)     Hyperlipidemia     Insomnia     Obesity     Preop cardiovascular exam 2018    Pulmonary fibrosis ([facility] [address] [facility]     Rheumatoid arthritis ([facility] [address] )        Past Surgical History:   Procedure Laterality Date    HAND SURGERY         Family History   Problem Relation Age of Onset    Rheum arthritis Mother     Hypertension Mother      I have reviewed and agree with the history as documented      E-Cigarette/Vaping    E-Cigarette Use Former User      E-Cigarette/Vaping Substances    Nicotine No     THC No     CBD No     Flavoring No     Other No     Unknown No      Social History     Tobacco Use    Smoking status: Former Smoker     Packs/day: 1 50     Years: 18 00     Pack years: 27 00     Types: Cigarettes     Quit date:      Years since quittin 5    Smokeless tobacco: Never Used    Tobacco comment: Quit 20 years ago Vaping Use    Vaping Use: Former   Substance Use Topics    Alcohol use: Never    Drug use: No       Review of Systems   Constitutional: Negative for appetite change, chills, fatigue and fever  HENT: Negative for sneezing and sore throat  Eyes: Negative for visual disturbance  Respiratory: Positive for shortness of breath  Negative for cough, choking, chest tightness and wheezing  Cardiovascular: Positive for chest pain and leg swelling  Negative for palpitations  Gastrointestinal: Negative for abdominal pain, constipation, diarrhea, nausea and vomiting  Genitourinary: Negative for difficulty urinating and dysuria  Neurological: Negative for dizziness, weakness, light-headedness, numbness and headaches  All other systems reviewed and are negative  Physical Exam  Physical Exam  Constitutional:       General: She is not in acute distress  Appearance: She is well-developed  She is not diaphoretic  HENT:      Head: Normocephalic and atraumatic  Eyes:      Pupils: Pupils are equal, round, and reactive to light  Neck:      Vascular: No JVD  Trachea: No tracheal deviation  Cardiovascular:      Rate and Rhythm: Normal rate and regular rhythm  Heart sounds: Normal heart sounds  No murmur heard  No friction rub  No gallop  Pulmonary:      Effort: Pulmonary effort is normal  No respiratory distress  Breath sounds: Normal breath sounds  No wheezing or rales  Abdominal:      General: Bowel sounds are normal  There is no distension  Palpations: Abdomen is soft  Tenderness: There is no abdominal tenderness  There is no guarding or rebound  Skin:     General: Skin is warm and dry  Coloration: Skin is not pale  Neurological:      Mental Status: She is alert and oriented to person, place, and time  Cranial Nerves: No cranial nerve deficit  Motor: No abnormal muscle tone     Psychiatric:         Behavior: Behavior normal          Vital Signs  ED Triage Vitals [07/13/21 2056]   Temperature Pulse Respirations Blood Pressure SpO2   97 9 °F (36 6 °C) 80 18 158/86 97 %      Temp Source Heart Rate Source Patient Position - Orthostatic VS BP Location FiO2 (%)   Oral Monitor Sitting Right arm --      Pain Score       Worst Possible Pain           Vitals:    07/15/21 0034 07/15/21 0257 07/15/21 0748 07/15/21 0856   BP:  101/78 143/77 146/57   Pulse: 62 70 74 74   Patient Position - Orthostatic VS:             Visual Acuity  Visual Acuity      Most Recent Value   L Pupil Size (mm)  3   R Pupil Size (mm)  3   L Pupil Shape  Round   R Pupil Shape  Round          ED Medications  Medications   fentanyl citrate (PF) 100 MCG/2ML 25 mcg (25 mcg Intravenous Given 7/13/21 2324)   bumetanide (BUMEX) injection 1 mg (1 mg Intravenous Given 7/14/21 0039)       Diagnostic Studies  Results Reviewed     Procedure Component Value Units Date/Time    Platelet count [[id number]]  (Normal) Collected: 07/14/21 2348    Lab Status: Final result Specimen: Blood from Hand, Left Updated: 07/14/21 2354     Platelets 531 Thousands/uL      MPV 11 0 fL     Troponin I [[id number]]  (Normal) Collected: 07/14/21 1416    Lab Status: Final result Specimen: Blood from Arm, Left Updated: 07/14/21 1439     Troponin I <0 02 ng/mL     Hemoglobin A1c w/EAG Estimation (Orders if not completed within the last 90 days) [[id number]]  (Abnormal) Collected: 07/14/21 0652    Lab Status: Final result Specimen: Blood from Arm, Left Updated: 07/14/21 1234     Hemoglobin A1C 9 5 %       mg/dl     Fingerstick Glucose (POCT) [[id number]]  (Abnormal) Collected: 07/14/21 1117    Lab Status: Final result Updated: 07/14/21 1119     POC Glucose 186 mg/dl     Fingerstick Glucose (POCT) [[id number]]  (Normal) Collected: 07/14/21 0823    Lab Status: Final result Updated: 07/14/21 0825     POC Glucose 74 mg/dl     D-dimer, quantitative [[id number]]  (Abnormal) Collected: 07/14/21 0652    Lab Status: Final result Specimen: Blood from Arm, Left Updated: 07/14/21 0725     D-Dimer, Quant 4 47 ug/ml FEU     Troponin I [[id number]]  (Normal) Collected: 07/14/21 0652    Lab Status: Final result Specimen: Blood from Arm, Left Updated: 07/14/21 0719     Troponin I <0 02 ng/mL     Lipid Panel with Direct LDL reflex [[id number]]  (Abnormal) Collected: 07/14/21 0652    Lab Status: Final result Specimen: Blood from Arm, Left Updated: 07/14/21 0717     Cholesterol 234 mg/dL      Triglycerides 182 mg/dL      HDL, Direct 64 mg/dL      LDL Calculated 134 mg/dL     Basic metabolic panel [[id number]] Collected: 07/14/21 0652    Lab Status: Final result Specimen: Blood from Arm, Left Updated: 07/14/21 0717     Sodium 140 mmol/L      Potassium 3 8 mmol/L      Chloride 102 mmol/L      CO2 28 mmol/L      ANION GAP 10 mmol/L      BUN 15 mg/dL      Creatinine 0 96 mg/dL      Glucose 91 mg/dL      Glucose, Fasting 91 mg/dL      Calcium 8 8 mg/dL      eGFR 58 ml/min/1 73sq m     Narrative:      [place] guidelines for Chronic Kidney Disease (CKD):     Stage 1 with normal or high GFR (GFR > 90 mL/min/1 73 square meters)    Stage 2 Mild CKD (GFR = 60-89 mL/min/1 73 square meters)    Stage 3A Moderate CKD (GFR = 45-59 mL/min/1 73 square meters)    Stage 3B Moderate CKD (GFR = 30-44 mL/min/1 73 square meters)    Stage 4 Severe CKD (GFR = 15-29 mL/min/1 73 square meters)    Stage 5 End Stage CKD (GFR <15 mL/min/1 73 square meters)  Note: GFR calculation is accurate only with a steady state creatinine    Magnesium [[id number]]  (Normal) Collected: 07/14/21 0652    Lab Status: Final result Specimen: Blood from Arm, Left Updated: 07/14/21 0717     Magnesium 2 0 mg/dL     CBC and differential [[id number]] Collected: 07/14/21 8579    Lab Status: Final result Specimen: Blood from Arm, Left Updated: 07/14/21 0701     WBC 8 21 Thousand/uL      RBC 4 55 Million/uL      Hemoglobin 13 4 g/dL      Hematocrit 42 7 %      MCV 94 fL      MCH 29 5 pg      MCHC 31 4 g/dL      RDW 14 1 %      MPV 11 2 fL      Platelets 052 Thousands/uL      nRBC 0 /100 WBCs      Neutrophils Relative 64 %      Immat GRANS % 0 %      Lymphocytes Relative 23 %      Monocytes Relative 10 %      Eosinophils Relative 3 %      Basophils Relative 0 %      Neutrophils Absolute 5 24 Thousands/µL      Immature Grans Absolute 0 02 Thousand/uL      Lymphocytes Absolute 1 88 Thousands/µL      Monocytes Absolute 0 81 Thousand/µL      Eosinophils Absolute 0 23 Thousand/µL      Basophils Absolute 0 03 Thousands/µL     Fingerstick Glucose (POCT) [[id number]]  (Normal) Collected: 07/14/21 0109    Lab Status: Final result Updated: 07/14/21 0211     POC Glucose 86 mg/dl     NT-BNP PRO [[id number]]  (Normal) Collected: 07/13/21 2134    Lab Status: Final result Specimen: Blood from Arm, Left Updated: 07/13/21 2236     NT-proBNP 267 pg/mL     Troponin I [[id number]]  (Normal) Collected: 07/13/21 2134    Lab Status: Final result Specimen: Blood from Arm, Left Updated: 07/13/21 2157     Troponin I <0 02 ng/mL     Comprehensive metabolic panel [[id number]]  (Abnormal) Collected: 07/13/21 2134    Lab Status: Final result Specimen: Blood from Arm, Left Updated: 07/13/21 2155     Sodium 137 mmol/L      Potassium 3 8 mmol/L      Chloride 100 mmol/L      CO2 31 mmol/L      ANION GAP 6 mmol/L      BUN 15 mg/dL      Creatinine 1 19 mg/dL      Glucose 170 mg/dL      Calcium 8 8 mg/dL      AST 26 U/L      ALT 27 U/L      Alkaline Phosphatase 97 U/L      Total Protein 7 7 g/dL      Albumin 3 7 g/dL      Total Bilirubin 0 35 mg/dL      eGFR 44 ml/min/1 73sq m     Narrative:      [place] guidelines for Chronic Kidney Disease (CKD):     Stage 1 with normal or high GFR (GFR > 90 mL/min/1 73 square meters)    Stage 2 Mild CKD (GFR = 60-89 mL/min/1 73 square meters)    Stage 3A Moderate CKD (GFR = 45-59 mL/min/1 73 square meters)    Stage 3B Moderate CKD (GFR = 30-44 mL/min/1 73 square meters)    Stage 4 Severe CKD (GFR = 15-29 mL/min/1 73 square meters)    Stage 5 End Stage CKD (GFR <15 mL/min/1 73 square meters)  Note: GFR calculation is accurate only with a steady state creatinine    CBC and differential [[id number]] Collected: 07/13/21 2134    Lab Status: Final result Specimen: Blood from Arm, Left Updated: 07/13/21 2145     WBC 8 19 Thousand/uL      RBC 4 68 Million/uL      Hemoglobin 14 1 g/dL      Hematocrit 43 6 %      MCV 93 fL      MCH 30 1 pg      MCHC 32 3 g/dL      RDW 14 0 %      MPV 10 8 fL      Platelets 639 Thousands/uL      nRBC 0 /100 WBCs      Neutrophils Relative 57 %      Immat GRANS % 0 %      Lymphocytes Relative 28 %      Monocytes Relative 11 %      Eosinophils Relative 3 %      Basophils Relative 1 %      Neutrophils Absolute 4 68 Thousands/µL      Immature Grans Absolute 0 02 Thousand/uL      Lymphocytes Absolute 2 28 Thousands/µL      Monocytes Absolute 0 92 Thousand/µL      Eosinophils Absolute 0 25 Thousand/µL      Basophils Absolute 0 04 Thousands/µL                  VAS lower limb venous duplex study, complete bilateral   Final Result by Magdaleno Ibarra MD (07/15 2239)      XR chest 1 view portable   Final Result by Brayan Mercado MD (07/14 2577)      No acute cardiopulmonary disease  Workstation performed: [id number]                    Procedures  Procedures         ED Course             HEART Risk Score      Most Recent Value   Heart Score Risk Calculator   History  1 Filed at: 07/25/2021 2122   ECG  1 Filed at: 07/25/2021 2122   Age  2 Filed at: 07/25/2021 2122   Risk Factors  2 Filed at: 07/25/2021 2122   Troponin  0 Filed at: 07/25/2021 2122   HEART Score  6 Filed at: 07/25/2021 2122                      SBIRT 22yo+      Most Recent Value   SBIRT (23 yo +)   In order to provide better care to our patients, we are screening all of our patients for alcohol and drug use  Would it be okay to ask you these screening questions?   Unable to answer at this time Filed at: 07/13/2021 2252        MONI Risk Score      Most Recent Value   Age >= 72  1 Filed at: 07/14/2021 0047   Known CAD (stenosis >= 50%)  0 Filed at: 07/14/2021 0047   Recent (<=24 hrs) Service Angina  1 Filed at: 07/14/2021 0047   ST Deviation >= 0 5 mm  0 Filed at: 07/14/2021 0047   3+ CAD Risk Factors (FHx, HTN, HLP, DM, Smoker)  1 Filed at: 07/14/2021 0047   Aspirin Use Past 7 Days  0 Filed at: 07/14/2021 0047   Elevated Cardiac Markers  0 Filed at: 07/14/2021 0047   MONI Risk Score (Calculated)  3 Filed at: 07/14/2021 0047                  [facility]  Number of Diagnoses or Management Options  CHF (congestive heart failure) ([facility])  Diagnosis management comments: 67 yo f with chest pain, possble chf, will check labs, ekg, trop, likely admit given age and acs risk factors      Disposition  Final diagnoses:   CHF (congestive heart failure) ([facility] [address] )     Time reflects when diagnosis was documented in both MDM as applicable and the Disposition within this note     Time User Action Codes Description Comment    7/13/2021 11:43 PM Abdi, [address] [facility] [I50 9] CHF (congestive heart failure) [facility])       ED Disposition     ED Disposition Condition Date/Time Comment    Admit Stable Tue Jul 13, 2021 11:43 PM Case was discussed with SLIM and the patient's admission status was agreed to be Admission Status: observation status to the service of Dr Alejandro Fonseca          Follow-up Information     Follow up With Specialties Details Why Contact Info    Yuliet Thapa MD Internal Medicine Call in 1 day(s)  [address] [facility] [address] [place]  Follow up resource for Private pay, non medical  help at home [address]  [facility] [address] [facility]  [phone]    [employer]  Follow up resource for Private pay, non medical  help at home [address]      [address]  Follow up resource for Private pay, non medical  help at home [address] [facility] [address] [place]  [phone]    [facility] office of Aging  Call in 1 day(s) call to ask about any home services your are eligible thru [facility]  [phone]          Discharge Medication List as of 7/15/2021 12:46 PM      CONTINUE these medications which have NOT CHANGED    Details   albuterol (2 5 mg/3 mL) 0 083 % nebulizer solution Take 1 vial (2 5 mg total) by nebulization every 6 (six) hours as needed for wheezing or shortness of breath, Starting Tue 11/17/2020, Normal      albuterol (PROVENTIL HFA,VENTOLIN HFA) 90 mcg/act inhaler Inhale 1 puff every 6 (six) hours as needed for wheezing or shortness of breath, Starting Fri 2/26/2021, Normal      B-D TB SYRINGE 1CC/27GX1/2" 27G X 1/2" 1 ML MISC by Other route 2 (two) times a day, Starting Thu 11/14/2019, Normal      Blood Glucose Monitoring Suppl (OneTouch Verio) w/Device KIT by Does not apply route 3 (three) times a day, Starting Mon 9/28/2020, Normal      fluticasone-vilanterol (BREO ELLIPTA) 100-25 mcg/inh inhaler Inhale 1 puff daily Rinse mouth after use , Starting Fri 2/26/2021, Normal      fosinopril (MONOPRIL) 10 mg tablet TAKE 1 TABLET BY MOUTH EVERY DAY, Normal      HYDROmorphone (DILAUDID) 4 mg tablet Take 4 mg by mouth 3 (three) times a day as needed for moderate pain, Historical Med      insulin glargine (Basaglar KwikPen) 100 units/mL injection pen Inject 30 Units under the skin every 12 (twelve) hours Inject 40 units in am and 25 units in pm, Starting Tue 6/23/2020, Normal      Insulin Pen Needle (B-D UF III MINI PEN NEEDLES) 31G X 5 MM MISC Inject under the skin 2 (two) times a day Use as directed, Starting Thu 11/14/2019, Normal      metFORMIN (GLUCOPHAGE) 1000 MG tablet TAKE 1 TABLET BY MOUTH TWICE A DAY, Normal      methocarbamol (ROBAXIN) 500 mg tablet Take 1 tablet (500 mg total) by mouth 2 (two) times a day as needed for muscle spasms (side pain), Starting Fri 5/21/2021, Print OneTouch Ultra test strip PATIENT TO TEST ONCE A DAY, Normal      Tocilizumab (Actemra ACTPen) 162 MG/0 9ML SOAJ Inject 162 mg under the skin, Starting Fri 4/9/2021, Historical Med      torsemide (DEMADEX) 20 mg tablet TAKE 2 TABLETS BY MOUTH 2 (TWO) TIMES A DAY, Normal      escitalopram (LEXAPRO) 20 mg tablet TAKE 1 TABLET BY MOUTH EVERY DAY, Normal      diclofenac sodium (Voltaren) 1 % USE AS DIRECTED, Historical Med      Diclofenac Sodium (VOLTAREN) 1 % USE AS DIRECTED, Historical Med           No discharge procedures on file      PDMP Review     None          ED Provider  Electronically Signed by           Trinidad Esparza MD  07/25/21 2672

## 2021-08-20 DIAGNOSIS — E11.22 TYPE 2 DIABETES MELLITUS WITH STAGE 3 CHRONIC KIDNEY DISEASE, WITH LONG-TERM CURRENT USE OF INSULIN (HCC): ICD-10-CM

## 2021-08-20 DIAGNOSIS — Z79.4 TYPE 2 DIABETES MELLITUS WITH STAGE 3 CHRONIC KIDNEY DISEASE, WITH LONG-TERM CURRENT USE OF INSULIN (HCC): ICD-10-CM

## 2021-08-20 DIAGNOSIS — N18.30 TYPE 2 DIABETES MELLITUS WITH STAGE 3 CHRONIC KIDNEY DISEASE, WITH LONG-TERM CURRENT USE OF INSULIN (HCC): ICD-10-CM

## 2021-08-23 RX ORDER — INSULIN GLARGINE 100 [IU]/ML
INJECTION, SOLUTION SUBCUTANEOUS
Qty: 15 ML | Refills: 3 | Status: SHIPPED | OUTPATIENT
Start: 2021-08-23 | End: 2021-12-03

## 2021-09-13 ENCOUNTER — OFFICE VISIT (OUTPATIENT)
Dept: PULMONOLOGY | Facility: CLINIC | Age: 76
End: 2021-09-13
Payer: MEDICARE

## 2021-09-13 VITALS
HEART RATE: 92 BPM | OXYGEN SATURATION: 96 % | TEMPERATURE: 97 F | SYSTOLIC BLOOD PRESSURE: 124 MMHG | HEIGHT: 60 IN | BODY MASS INDEX: 39.07 KG/M2 | DIASTOLIC BLOOD PRESSURE: 84 MMHG | WEIGHT: 199 LBS

## 2021-09-13 DIAGNOSIS — J96.11 CHRONIC RESPIRATORY FAILURE WITH HYPOXIA (HCC): ICD-10-CM

## 2021-09-13 DIAGNOSIS — J84.9 INTERSTITIAL LUNG DISEASE (HCC): ICD-10-CM

## 2021-09-13 DIAGNOSIS — M05.79 RHEUMATOID ARTHRITIS INVOLVING MULTIPLE SITES WITH POSITIVE RHEUMATOID FACTOR (HCC): ICD-10-CM

## 2021-09-13 DIAGNOSIS — R06.02 SHORTNESS OF BREATH: Primary | ICD-10-CM

## 2021-09-13 DIAGNOSIS — E66.9 OBESITY (BMI 30-39.9): ICD-10-CM

## 2021-09-13 DIAGNOSIS — J45.40 MODERATE PERSISTENT ASTHMA WITHOUT COMPLICATION: ICD-10-CM

## 2021-09-13 PROCEDURE — 99214 OFFICE O/P EST MOD 30 MIN: CPT | Performed by: INTERNAL MEDICINE

## 2021-09-13 RX ORDER — ALBUTEROL SULFATE 2.5 MG/3ML
2.5 SOLUTION RESPIRATORY (INHALATION) EVERY 6 HOURS PRN
Qty: 120 ML | Refills: 3 | Status: SHIPPED | OUTPATIENT
Start: 2021-09-13 | End: 2021-10-13

## 2021-09-13 NOTE — PROGRESS NOTES
Assessment/Plan:   Diagnoses and all orders for this visit:    Shortness of breath  -     albuterol (2 5 mg/3 mL) 0 083 % nebulizer solution; Take 3 mL (2 5 mg total) by nebulization every 6 (six) hours as needed for wheezing or shortness of breath    Chronic respiratory failure with hypoxia (HCC)    Interstitial lung disease (HCC)    Rheumatoid arthritis involving multiple sites with positive rheumatoid factor (HCC)    Moderate persistent asthma without complication    Obesity (BMI 30-39 9)        shortness of breath and dyspnea on exertion likely multifactorial secondary to her interstitial lung disease and chronic respiratory failure with hypoxia  PFTs with moderate restriction and severely decreased DLCO  Echocardiogram showing estimated peak pressure of around 42 mmHg with mild to moderate pulmonary hypertension likely secondary to the underlying lung disease  She recently had a CT of the chest done with emphysematous and fibrotic changes throughout the lungs, and essentially has been unchanged from the prior CT in February of 2020  She continues to use the Breo 1 puff daily  She is  Using    Ventolin MDI 2 puffs 4 times daily as needed she states she has been needing it about 2 to 3 times a day  also given her a nebulizer with albuterol via to be used 4 times daily as needed she did have the albuterol medications and that she did have have a nebulizer the past year so ordered the nebulizer as well as the medication  Continue with Singulair 10 mg daily at bedtime  Continues to follow up with Rheumatology for the rheumatoid arthritis treatment currently on   Actemra  Vaccinations up-to-date   will repeat complete PFT with 6 minutes walk test and follow-up  Again discussed with the patient the need to continue with the continuous oxygen especially on exertion as well as nocturnal and the need  For compliance with the oxygen     recent repeat echocardiogram demonstrating right atrium mildly dilated, pulmonary artery systolic pressure is within normal limits  Recommend weight loss  No follow-ups on file  All questions are answered to the patient's satisfaction and understanding  She verbalizes understanding  She is encouraged to call with any further questions or concerns  Portions of the record may have been created with voice recognition software  Occasional wrong word or "sound a like" substitutions may have occurred due to the inherent limitations of voice recognition software  Read the chart carefully and recognize, using context, where substitutions have occurred  Electronically Signed by Gayatri Montemayor MD    ______________________________________________________________________    Chief Complaint:   Chief Complaint   Patient presents with    Follow-up       Patient ID: Dakota Freed is a 68 y o  y o  female has a past medical history of Asthma, Chest pain on breathing, Chronic diastolic CHF (congestive heart failure) ([address] ), Chronic respiratory failure with hypoxia ([address] ), Diabetes mellitus ([address] ), HTN (hypertension), Hyperlipidemia, Insomnia, Obesity, Preop cardiovascular exam (2/2/2018), Pulmonary fibrosis ([address] ), and Rheumatoid arthritis ([address] )  9/13/2021  Patient presents today for follow-up visit  Dakota Freed is a pleasant 45-year-old female former smoker with past medical history including but not limited to interstitial lung disease, rheumatoid arthritis on Humira, chronic respiratory failure on 3 L of oxygen, asthma, emphysema, diabetes, GERD, hypertension, CHF presenting for follow-up  She states for the past few months her shortness of breath and dyspnea on exertion has been gradually worsening also she has not been using her oxygen continuously today she came in into the office without her oxygen      Review of Systems   Constitutional: Positive for fatigue  HENT: Negative  Eyes: Negative  Respiratory: Positive for cough and shortness of breath  Cardiovascular: Negative  Gastrointestinal: Negative  Endocrine: Negative  Genitourinary: Negative  Musculoskeletal: Negative  Allergic/Immunologic: Negative  Neurological: Negative  Hematological: Negative  Psychiatric/Behavioral: Negative  Smoking history: She reports that she quit smoking about 24 years ago  Her smoking use included cigarettes  She started smoking about 58 years ago  She has a 27 00 pack-year smoking history   She has never used smokeless tobacco     The following portions of the patient's history were reviewed and updated as appropriate: allergies, current medications, past family history, past medical history, past social history, past surgical history and problem list     Immunization History   Administered Date(s) Administered    INFLUENZA 09/01/2015, 12/20/2016, 11/10/2017    Influenza Split High Dose Preservative Free IM 12/20/2016, 11/10/2017    Influenza, high dose seasonal 0 7 mL 10/17/2018, 09/29/2019, 11/17/2020    Influenza, seasonal, injectable 10/01/2014, 09/01/2015    Pneumococcal Conjugate 13-Valent 07/03/2019    Pneumococcal Polysaccharide PPV23 04/21/2008, 12/20/2016    SARS-CoV-2 / COVID-19 mRNA IM (Moderna) 06/20/2021, 07/18/2021    Tdap 1945    Zoster Vaccine Recombinant 01/28/2020, 07/21/2020     Current Outpatient Medications   Medication Sig Dispense Refill    albuterol (2 5 mg/3 mL) 0 083 % nebulizer solution Take 3 mL (2 5 mg total) by nebulization every 6 (six) hours as needed for wheezing or shortness of breath 120 mL 3    albuterol (PROVENTIL HFA,VENTOLIN HFA) 90 mcg/act inhaler Inhale 1 puff every 6 (six) hours as needed for wheezing or shortness of breath 18 Inhaler 3    B-D TB SYRINGE 1CC/27GX1/2" 27G X 1/2" 1 ML MISC by Other route 2 (two) times a day 100 each 3    Basaglar KwikPen 100 units/mL injection pen INJECT 40 UNITS IN THE MORNING AND 25 UNITS IN THE EVENING 15 mL 3    Blood Glucose Monitoring Suppl (OneTouch Verio) w/Device KIT by Does not apply route 3 (three) times a day 1 kit 0    Cimzia Starter Kit 6 X 200 MG/ML KIT       escitalopram (LEXAPRO) 20 mg tablet TAKE 1 TABLET BY MOUTH EVERY DAY 90 tablet 2    fluticasone-vilanterol (BREO ELLIPTA) 100-25 mcg/inh inhaler Inhale 1 puff daily Rinse mouth after use  1 Inhaler 3    fosinopril (MONOPRIL) 10 mg tablet TAKE 1 TABLET BY MOUTH EVERY DAY 90 tablet 2    HYDROmorphone (DILAUDID) 4 mg tablet Take 4 mg by mouth 3 (three) times a day as needed for moderate pain      Insulin Pen Needle (B-D UF III MINI PEN NEEDLES) 31G X 5 MM MISC Inject under the skin 2 (two) times a day Use as directed 200 each 3    metFORMIN (GLUCOPHAGE) 1000 MG tablet TAKE 1 TABLET BY MOUTH TWICE A  tablet 3    methocarbamol (ROBAXIN) 500 mg tablet Take 1 tablet (500 mg total) by mouth 2 (two) times a day as needed for muscle spasms (side pain) 28 tablet 0    OneTouch Ultra test strip PATIENT TO TEST ONCE A  strip 3    Tocilizumab (Actemra ACTPen) 162 MG/0 9ML SOAJ Inject 162 mg under the skin      torsemide (DEMADEX) 20 mg tablet TAKE 2 TABLETS BY MOUTH 2 (TWO) TIMES A  tablet 3     No current facility-administered medications for this visit  Allergies: Gabapentin and Vancomycin    Objective:  Vitals:    09/13/21 1515   BP: 124/84   Pulse: 92   Temp: (!) 97 °F (36 1 °C)   SpO2: 96%   Weight: 90 3 kg (199 lb)   Height: 5' (1 524 m)   Oxygen Therapy  SpO2: 96 %    Wt Readings from Last 3 Encounters:   09/13/21 90 3 kg (199 lb)   07/22/21 92 1 kg (203 lb)   07/15/21 91 4 kg (201 lb 8 oz)     Body mass index is 38 86 kg/m²  Physical Exam  Constitutional:       Appearance: She is well-developed  HENT:      Head: Normocephalic and atraumatic  Eyes:      Pupils: Pupils are equal, round, and reactive to light  Neck:      Comments: Short and wide neck  Cardiovascular:      Rate and Rhythm: Normal rate and regular rhythm  Heart sounds: Normal heart sounds     Pulmonary:      Effort: Pulmonary effort is normal       Breath sounds: Normal breath sounds  Musculoskeletal:         General: Normal range of motion  Cervical back: Normal range of motion and neck supple  Skin:     General: Skin is warm and dry  Neurological:      Mental Status: She is alert and oriented to person, place, and time     Psychiatric:         Behavior: Behavior normal              Diagnostics:  I have personally reviewed pertinent films in PACS

## 2021-09-24 ENCOUNTER — HOSPITAL ENCOUNTER (OUTPATIENT)
Dept: INFUSION CENTER | Facility: CLINIC | Age: 76
Discharge: HOME | End: 2021-09-24
Payer: MEDICARE

## 2021-09-24 PROCEDURE — 96372 THER/PROPH/DIAG INJ SC/IM: CPT

## 2021-09-24 RX ADMIN — CERTOLIZUMAB PEGOL 400 MG: 200 INJECTION, SOLUTION SUBCUTANEOUS at 15:06

## 2021-09-24 NOTE — PROGRESS NOTES
Pt here for cimzia injection  Offers no complaints  Tolerated injection in the upper left and right arms without incident  AVS given  Walked out in stable condition  Reached out to [facility], [place] to clarify orders, she will addend her note to match the orders that she sent over       She would like:  Cimzia 200 mg (x2) subcutaneous kit  On weeks 0, 2, 4, then every 4 weeks

## 2021-10-08 ENCOUNTER — HOSPITAL ENCOUNTER (OUTPATIENT)
Dept: PULMONOLOGY | Facility: HOSPITAL | Age: 76
Discharge: HOME | End: 2021-10-08
Attending: INTERNAL MEDICINE
Payer: MEDICARE

## 2021-10-08 ENCOUNTER — HOSPITAL ENCOUNTER (OUTPATIENT)
Dept: INFUSION CENTER | Facility: CLINIC | Age: 76
Discharge: HOME | End: 2021-10-08
Payer: MEDICARE

## 2021-10-08 DIAGNOSIS — R06.02 SHORTNESS OF BREATH: ICD-10-CM

## 2021-10-08 PROCEDURE — 94761 N-INVAS EAR/PLS OXIMETRY MLT: CPT

## 2021-10-08 PROCEDURE — 94729 DIFFUSING CAPACITY: CPT

## 2021-10-08 PROCEDURE — 94729 DIFFUSING CAPACITY: CPT | Performed by: INTERNAL MEDICINE

## 2021-10-08 PROCEDURE — 94727 GAS DIL/WSHOT DETER LNG VOL: CPT

## 2021-10-08 PROCEDURE — 94060 EVALUATION OF WHEEZING: CPT | Performed by: INTERNAL MEDICINE

## 2021-10-08 PROCEDURE — 94060 EVALUATION OF WHEEZING: CPT

## 2021-10-08 PROCEDURE — 94727 GAS DIL/WSHOT DETER LNG VOL: CPT | Performed by: INTERNAL MEDICINE

## 2021-10-08 PROCEDURE — 96372 THER/PROPH/DIAG INJ SC/IM: CPT

## 2021-10-08 PROCEDURE — 94618 PULMONARY STRESS TESTING: CPT | Performed by: INTERNAL MEDICINE

## 2021-10-08 RX ORDER — ALBUTEROL SULFATE 2.5 MG/3ML
2.5 SOLUTION RESPIRATORY (INHALATION) ONCE
Status: COMPLETED | OUTPATIENT
Start: 2021-10-08 | End: 2021-10-08

## 2021-10-08 RX ADMIN — ALBUTEROL SULFATE 2.5 MG: 2.5 SOLUTION RESPIRATORY (INHALATION) at 13:54

## 2021-10-08 RX ADMIN — CERTOLIZUMAB PEGOL 400 MG: 200 INJECTION, SOLUTION SUBCUTANEOUS at 14:39

## 2021-10-12 ENCOUNTER — HOSPITAL ENCOUNTER (OUTPATIENT)
Dept: NON INVASIVE DIAGNOSTICS | Facility: CLINIC | Age: 76
Discharge: HOME | End: 2021-10-12
Payer: MEDICARE

## 2021-10-12 VITALS
WEIGHT: 199 LBS | SYSTOLIC BLOOD PRESSURE: 120 MMHG | DIASTOLIC BLOOD PRESSURE: 60 MMHG | HEIGHT: 58 IN | BODY MASS INDEX: 41.77 KG/M2

## 2021-10-12 DIAGNOSIS — R06.02 SHORTNESS OF BREATH: ICD-10-CM

## 2021-10-12 LAB
AORTIC ROOT: 2.9 CM
APICAL FOUR CHAMBER EJECTION FRACTION: 56 %
AV LVOT PEAK GRADIENT: 5 MMHG
AV PEAK GRADIENT: 12 MMHG
E WAVE DECELERATION TIME: 122 MS
FRACTIONAL SHORTENING: 30 % (ref 28–44)
INTERVENTRICULAR SEPTUM IN DIASTOLE (PARASTERNAL SHORT AXIS VIEW): 1.1 CM
LEFT INTERNAL DIMENSION IN SYSTOLE: 3.1 CM (ref 2.1–4)
LEFT VENTRICULAR INTERNAL DIMENSION IN DIASTOLE: 4.4 CM (ref 4.69–6.98)
LEFT VENTRICULAR POSTERIOR WALL IN END DIASTOLE: 1.1 CM
LEFT VENTRICULAR STROKE VOLUME: 52 ML
LV EF: 50 %
MV E'TISSUE VEL-SEP: 7 CM/S
MV PEAK A VEL: 1.27 M/S
MV PEAK E VEL: 110 CM/S
MV STENOSIS PRESSURE HALF TIME: 0 MS
RIGHT VENTRICLE ID DIMENSION: 3.6 CM
SL CV PED ECHO LEFT VENTRICLE DIASTOLIC VOLUME (MOD BIPLANE) 2D: 89 ML
SL CV PED ECHO LEFT VENTRICLE SYSTOLIC VOLUME (MOD BIPLANE) 2D: 37 ML
TR PEAK VELOCITY: 3.1 M/S
TRICUSPID VALVE PEAK REGURGITATION VELOCITY: 3.13 M/S
TRICUSPID VALVE S': 1.1 CM/S
TV PEAK GRADIENT: 39 MMHG
Z-SCORE OF LEFT VENTRICULAR DIMENSION IN END SYSTOLE: -2.62

## 2021-10-12 PROCEDURE — 93306 TTE W/DOPPLER COMPLETE: CPT

## 2021-10-12 PROCEDURE — 93306 TTE W/DOPPLER COMPLETE: CPT | Performed by: INTERNAL MEDICINE

## 2021-10-18 ENCOUNTER — OFFICE VISIT (OUTPATIENT)
Dept: CARDIOLOGY CLINIC | Facility: CLINIC | Age: 76
End: 2021-10-18
Payer: MEDICARE

## 2021-10-18 VITALS
WEIGHT: 193 LBS | DIASTOLIC BLOOD PRESSURE: 74 MMHG | OXYGEN SATURATION: 97 % | BODY MASS INDEX: 40.51 KG/M2 | SYSTOLIC BLOOD PRESSURE: 124 MMHG | HEIGHT: 58 IN | HEART RATE: 90 BPM | RESPIRATION RATE: 16 BRPM

## 2021-10-18 DIAGNOSIS — I10 ESSENTIAL HYPERTENSION: ICD-10-CM

## 2021-10-18 DIAGNOSIS — M79.604 LEG PAIN, BILATERAL: Primary | ICD-10-CM

## 2021-10-18 DIAGNOSIS — I50.32 CHRONIC DIASTOLIC CONGESTIVE HEART FAILURE (HCC): ICD-10-CM

## 2021-10-18 DIAGNOSIS — E66.9 OBESITY (BMI 30-39.9): ICD-10-CM

## 2021-10-18 DIAGNOSIS — M79.605 LEG PAIN, BILATERAL: Primary | ICD-10-CM

## 2021-10-18 DIAGNOSIS — E78.2 MIXED HYPERLIPIDEMIA: ICD-10-CM

## 2021-10-18 PROCEDURE — 99214 OFFICE O/P EST MOD 30 MIN: CPT | Performed by: INTERNAL MEDICINE

## 2021-10-20 DIAGNOSIS — I10 ESSENTIAL HYPERTENSION: ICD-10-CM

## 2021-10-20 RX ORDER — FOSINOPRIL SODIUM 10 MG/1
TABLET ORAL
Qty: 90 TABLET | Refills: 2 | Status: SHIPPED | OUTPATIENT
Start: 2021-10-20 | End: 2022-05-24

## 2021-11-05 ENCOUNTER — HOSPITAL ENCOUNTER (OUTPATIENT)
Dept: INFUSION CENTER | Facility: CLINIC | Age: 76
Discharge: HOME | End: 2021-11-05
Payer: MEDICARE

## 2021-11-05 PROCEDURE — 96372 THER/PROPH/DIAG INJ SC/IM: CPT

## 2021-11-05 RX ADMIN — CERTOLIZUMAB PEGOL 400 MG: 200 INJECTION, SOLUTION SUBCUTANEOUS at 15:07

## 2021-11-12 DIAGNOSIS — F33.41 RECURRENT MAJOR DEPRESSIVE DISORDER, IN PARTIAL REMISSION (HCC): ICD-10-CM

## 2021-11-12 RX ORDER — ESCITALOPRAM OXALATE 20 MG/1
TABLET ORAL
Qty: 90 TABLET | Refills: 2 | Status: SHIPPED | OUTPATIENT
Start: 2021-11-12

## 2021-11-22 ENCOUNTER — HOSPITAL ENCOUNTER (OUTPATIENT)
Dept: NON INVASIVE DIAGNOSTICS | Facility: CLINIC | Age: 76
Discharge: HOME | End: 2021-11-22
Payer: MEDICARE

## 2021-11-22 DIAGNOSIS — M79.604 LEG PAIN, BILATERAL: ICD-10-CM

## 2021-11-22 DIAGNOSIS — M79.605 LEG PAIN, BILATERAL: ICD-10-CM

## 2021-11-22 PROCEDURE — 93923 UPR/LXTR ART STDY 3+ LVLS: CPT

## 2021-11-23 PROCEDURE — 93923 UPR/LXTR ART STDY 3+ LVLS: CPT | Performed by: INTERNAL MEDICINE

## 2021-12-02 DIAGNOSIS — E11.22 TYPE 2 DIABETES MELLITUS WITH STAGE 3 CHRONIC KIDNEY DISEASE, WITH LONG-TERM CURRENT USE OF INSULIN (HCC): ICD-10-CM

## 2021-12-02 DIAGNOSIS — N18.30 TYPE 2 DIABETES MELLITUS WITH STAGE 3 CHRONIC KIDNEY DISEASE, WITH LONG-TERM CURRENT USE OF INSULIN (HCC): ICD-10-CM

## 2021-12-02 DIAGNOSIS — Z79.4 TYPE 2 DIABETES MELLITUS WITH STAGE 3 CHRONIC KIDNEY DISEASE, WITH LONG-TERM CURRENT USE OF INSULIN (HCC): ICD-10-CM

## 2021-12-03 ENCOUNTER — HOSPITAL ENCOUNTER (OUTPATIENT)
Dept: INFUSION CENTER | Facility: CLINIC | Age: 76
Discharge: HOME | End: 2021-12-03
Payer: MEDICARE

## 2021-12-03 ENCOUNTER — TELEPHONE (OUTPATIENT)
Dept: PULMONOLOGY | Facility: CLINIC | Age: 76
End: 2021-12-03

## 2021-12-03 PROCEDURE — 96372 THER/PROPH/DIAG INJ SC/IM: CPT

## 2021-12-03 RX ORDER — INSULIN GLARGINE 100 [IU]/ML
INJECTION, SOLUTION SUBCUTANEOUS
Qty: 15 ML | Refills: 3 | Status: SHIPPED | OUTPATIENT
Start: 2021-12-03 | End: 2022-03-20

## 2021-12-03 RX ADMIN — CERTOLIZUMAB PEGOL 400 MG: 200 INJECTION, SOLUTION SUBCUTANEOUS at 14:10

## 2021-12-10 ENCOUNTER — APPOINTMENT (EMERGENCY)
Dept: RADIOLOGY | Facility: HOSPITAL | Age: 76
DRG: 640 | End: 2021-12-10
Payer: MEDICARE

## 2021-12-10 ENCOUNTER — HOSPITAL ENCOUNTER (INPATIENT)
Facility: HOSPITAL | Age: 76
LOS: 2 days | Discharge: HOME HEALTH | DRG: 640 | End: 2021-12-13
Attending: EMERGENCY MEDICINE | Admitting: INTERNAL MEDICINE
Payer: MEDICARE

## 2021-12-10 DIAGNOSIS — I50.9 CHF (CONGESTIVE HEART FAILURE) (HCC): ICD-10-CM

## 2021-12-10 DIAGNOSIS — R07.9 CHEST PAIN, UNSPECIFIED: Primary | ICD-10-CM

## 2021-12-10 DIAGNOSIS — R60.0 BILATERAL LOWER EXTREMITY EDEMA: ICD-10-CM

## 2021-12-10 DIAGNOSIS — Z79.891 CHRONIC PRESCRIPTION OPIATE USE: Chronic | ICD-10-CM

## 2021-12-10 DIAGNOSIS — M05.79 RHEUMATOID ARTHRITIS INVOLVING MULTIPLE SITES WITH POSITIVE RHEUMATOID FACTOR (HCC): ICD-10-CM

## 2021-12-10 PROBLEM — E87.70 VOLUME OVERLOAD: Status: ACTIVE | Noted: 2021-12-10

## 2021-12-10 LAB
2HR DELTA HS TROPONIN: 1 NG/L
4HR DELTA HS TROPONIN: 0 NG/L
ALBUMIN SERPL BCP-MCNC: 2.8 G/DL (ref 3.5–5)
ALP SERPL-CCNC: 120 U/L (ref 46–116)
ALT SERPL W P-5'-P-CCNC: 15 U/L (ref 12–78)
ANION GAP SERPL CALCULATED.3IONS-SCNC: 8 MMOL/L (ref 4–13)
APTT PPP: 44 SECONDS (ref 23–37)
AST SERPL W P-5'-P-CCNC: 23 U/L (ref 5–45)
ATRIAL RATE: 70 BPM
BASOPHILS # BLD AUTO: 0.08 THOUSANDS/ΜL (ref 0–0.1)
BASOPHILS NFR BLD AUTO: 1 % (ref 0–1)
BILIRUB SERPL-MCNC: 0.42 MG/DL (ref 0.2–1)
BUN SERPL-MCNC: 11 MG/DL (ref 5–25)
CALCIUM ALBUM COR SERPL-MCNC: 9.3 MG/DL (ref 8.3–10.1)
CALCIUM SERPL-MCNC: 8.3 MG/DL (ref 8.3–10.1)
CARDIAC TROPONIN I PNL SERPL HS: 5 NG/L
CARDIAC TROPONIN I PNL SERPL HS: 5 NG/L
CARDIAC TROPONIN I PNL SERPL HS: 6 NG/L
CHLORIDE SERPL-SCNC: 102 MMOL/L (ref 100–108)
CO2 SERPL-SCNC: 29 MMOL/L (ref 21–32)
CREAT SERPL-MCNC: 0.93 MG/DL (ref 0.6–1.3)
EOSINOPHIL # BLD AUTO: 0.32 THOUSAND/ΜL (ref 0–0.61)
EOSINOPHIL NFR BLD AUTO: 3 % (ref 0–6)
ERYTHROCYTE [DISTWIDTH] IN BLOOD BY AUTOMATED COUNT: 14.6 % (ref 11.6–15.1)
FLUAV RNA RESP QL NAA+PROBE: NEGATIVE
FLUBV RNA RESP QL NAA+PROBE: NEGATIVE
GFR SERPL CREATININE-BSD FRML MDRD: 60 ML/MIN/1.73SQ M
GLUCOSE SERPL-MCNC: 117 MG/DL (ref 65–140)
GLUCOSE SERPL-MCNC: 148 MG/DL (ref 65–140)
GLUCOSE SERPL-MCNC: 196 MG/DL (ref 65–140)
GLUCOSE SERPL-MCNC: 63 MG/DL (ref 65–140)
GLUCOSE SERPL-MCNC: 86 MG/DL (ref 65–140)
HCT VFR BLD AUTO: 41.2 % (ref 34.8–46.1)
HGB BLD-MCNC: 12.5 G/DL (ref 11.5–15.4)
IMM GRANULOCYTES # BLD AUTO: 0.05 THOUSAND/UL (ref 0–0.2)
IMM GRANULOCYTES NFR BLD AUTO: 0 % (ref 0–2)
INR PPP: 1.23 (ref 0.84–1.19)
LYMPHOCYTES # BLD AUTO: 1.41 THOUSANDS/ΜL (ref 0.6–4.47)
LYMPHOCYTES NFR BLD AUTO: 13 % (ref 14–44)
MCH RBC QN AUTO: 28.5 PG (ref 26.8–34.3)
MCHC RBC AUTO-ENTMCNC: 30.3 G/DL (ref 31.4–37.4)
MCV RBC AUTO: 94 FL (ref 82–98)
MONOCYTES # BLD AUTO: 1.17 THOUSAND/ΜL (ref 0.17–1.22)
MONOCYTES NFR BLD AUTO: 10 % (ref 4–12)
NEUTROPHILS # BLD AUTO: 8.18 THOUSANDS/ΜL (ref 1.85–7.62)
NEUTS SEG NFR BLD AUTO: 73 % (ref 43–75)
NRBC BLD AUTO-RTO: 0 /100 WBCS
NT-PROBNP SERPL-MCNC: 317 PG/ML
P AXIS: 45 DEGREES
PLATELET # BLD AUTO: 301 THOUSANDS/UL (ref 149–390)
PMV BLD AUTO: 10.3 FL (ref 8.9–12.7)
POTASSIUM SERPL-SCNC: 3.6 MMOL/L (ref 3.5–5.3)
PR INTERVAL: 130 MS
PROT SERPL-MCNC: 8.6 G/DL (ref 6.4–8.2)
PROTHROMBIN TIME: 15 SECONDS (ref 11.6–14.5)
QRS AXIS: 19 DEGREES
QRSD INTERVAL: 78 MS
QT INTERVAL: 444 MS
QTC INTERVAL: 479 MS
RBC # BLD AUTO: 4.38 MILLION/UL (ref 3.81–5.12)
RSV RNA RESP QL NAA+PROBE: NEGATIVE
SARS-COV-2 RNA RESP QL NAA+PROBE: NEGATIVE
SODIUM SERPL-SCNC: 139 MMOL/L (ref 136–145)
T WAVE AXIS: 16 DEGREES
VENTRICULAR RATE: 70 BPM
WBC # BLD AUTO: 11.21 THOUSAND/UL (ref 4.31–10.16)

## 2021-12-10 PROCEDURE — 96374 THER/PROPH/DIAG INJ IV PUSH: CPT

## 2021-12-10 PROCEDURE — 96376 TX/PRO/DX INJ SAME DRUG ADON: CPT

## 2021-12-10 PROCEDURE — 93010 ELECTROCARDIOGRAM REPORT: CPT | Performed by: INTERNAL MEDICINE

## 2021-12-10 PROCEDURE — 83036 HEMOGLOBIN GLYCOSYLATED A1C: CPT | Performed by: NURSE PRACTITIONER

## 2021-12-10 PROCEDURE — 84484 ASSAY OF TROPONIN QUANT: CPT | Performed by: EMERGENCY MEDICINE

## 2021-12-10 PROCEDURE — 99285 EMERGENCY DEPT VISIT HI MDM: CPT | Performed by: EMERGENCY MEDICINE

## 2021-12-10 PROCEDURE — 71045 X-RAY EXAM CHEST 1 VIEW: CPT

## 2021-12-10 PROCEDURE — 0241U HB NFCT DS VIR RESP RNA 4 TRGT: CPT | Performed by: EMERGENCY MEDICINE

## 2021-12-10 PROCEDURE — 80053 COMPREHEN METABOLIC PANEL: CPT | Performed by: EMERGENCY MEDICINE

## 2021-12-10 PROCEDURE — 96375 TX/PRO/DX INJ NEW DRUG ADDON: CPT

## 2021-12-10 PROCEDURE — 83880 ASSAY OF NATRIURETIC PEPTIDE: CPT | Performed by: EMERGENCY MEDICINE

## 2021-12-10 PROCEDURE — 36415 COLL VENOUS BLD VENIPUNCTURE: CPT | Performed by: EMERGENCY MEDICINE

## 2021-12-10 PROCEDURE — 99220 PR INITIAL OBSERVATION CARE/DAY 70 MINUTES: CPT | Performed by: NURSE PRACTITIONER

## 2021-12-10 PROCEDURE — 85025 COMPLETE CBC W/AUTO DIFF WBC: CPT | Performed by: EMERGENCY MEDICINE

## 2021-12-10 PROCEDURE — 85730 THROMBOPLASTIN TIME PARTIAL: CPT | Performed by: EMERGENCY MEDICINE

## 2021-12-10 PROCEDURE — 82948 REAGENT STRIP/BLOOD GLUCOSE: CPT

## 2021-12-10 PROCEDURE — 99285 EMERGENCY DEPT VISIT HI MDM: CPT

## 2021-12-10 PROCEDURE — 93005 ELECTROCARDIOGRAM TRACING: CPT

## 2021-12-10 PROCEDURE — 85610 PROTHROMBIN TIME: CPT | Performed by: EMERGENCY MEDICINE

## 2021-12-10 RX ORDER — MORPHINE SULFATE 10 MG/ML
6 INJECTION, SOLUTION INTRAMUSCULAR; INTRAVENOUS ONCE
Status: COMPLETED | OUTPATIENT
Start: 2021-12-10 | End: 2021-12-10

## 2021-12-10 RX ORDER — MORPHINE SULFATE 4 MG/ML
4 INJECTION, SOLUTION INTRAMUSCULAR; INTRAVENOUS ONCE
Status: COMPLETED | OUTPATIENT
Start: 2021-12-10 | End: 2021-12-10

## 2021-12-10 RX ORDER — HEPARIN SODIUM 5000 [USP'U]/ML
5000 INJECTION, SOLUTION INTRAVENOUS; SUBCUTANEOUS EVERY 8 HOURS SCHEDULED
Status: DISCONTINUED | OUTPATIENT
Start: 2021-12-10 | End: 2021-12-13 | Stop reason: HOSPADM

## 2021-12-10 RX ORDER — METHOCARBAMOL 500 MG/1
500 TABLET, FILM COATED ORAL 2 TIMES DAILY PRN
Status: DISCONTINUED | OUTPATIENT
Start: 2021-12-10 | End: 2021-12-13 | Stop reason: HOSPADM

## 2021-12-10 RX ORDER — FUROSEMIDE 10 MG/ML
40 INJECTION INTRAMUSCULAR; INTRAVENOUS 2 TIMES DAILY
Status: DISCONTINUED | OUTPATIENT
Start: 2021-12-10 | End: 2021-12-12

## 2021-12-10 RX ORDER — FLUTICASONE FUROATE AND VILANTEROL 100; 25 UG/1; UG/1
1 POWDER RESPIRATORY (INHALATION) DAILY
Status: DISCONTINUED | OUTPATIENT
Start: 2021-12-10 | End: 2021-12-13 | Stop reason: HOSPADM

## 2021-12-10 RX ORDER — LISINOPRIL 10 MG/1
10 TABLET ORAL DAILY
Status: DISCONTINUED | OUTPATIENT
Start: 2021-12-10 | End: 2021-12-13 | Stop reason: HOSPADM

## 2021-12-10 RX ORDER — ALBUTEROL SULFATE 90 UG/1
1 AEROSOL, METERED RESPIRATORY (INHALATION) EVERY 6 HOURS PRN
Status: DISCONTINUED | OUTPATIENT
Start: 2021-12-10 | End: 2021-12-13 | Stop reason: HOSPADM

## 2021-12-10 RX ORDER — INSULIN GLARGINE 100 [IU]/ML
15 INJECTION, SOLUTION SUBCUTANEOUS
Status: DISCONTINUED | OUTPATIENT
Start: 2021-12-10 | End: 2021-12-13 | Stop reason: HOSPADM

## 2021-12-10 RX ORDER — INSULIN GLARGINE 100 [IU]/ML
25 INJECTION, SOLUTION SUBCUTANEOUS
Status: DISCONTINUED | OUTPATIENT
Start: 2021-12-10 | End: 2021-12-10

## 2021-12-10 RX ORDER — ACETAMINOPHEN 325 MG/1
650 TABLET ORAL EVERY 6 HOURS PRN
Status: DISCONTINUED | OUTPATIENT
Start: 2021-12-10 | End: 2021-12-13 | Stop reason: HOSPADM

## 2021-12-10 RX ORDER — INSULIN GLARGINE 100 [IU]/ML
40 INJECTION, SOLUTION SUBCUTANEOUS EVERY MORNING
Status: DISCONTINUED | OUTPATIENT
Start: 2021-12-11 | End: 2021-12-13 | Stop reason: HOSPADM

## 2021-12-10 RX ORDER — ONDANSETRON 2 MG/ML
4 INJECTION INTRAMUSCULAR; INTRAVENOUS EVERY 6 HOURS PRN
Status: DISCONTINUED | OUTPATIENT
Start: 2021-12-10 | End: 2021-12-13 | Stop reason: HOSPADM

## 2021-12-10 RX ORDER — ESCITALOPRAM OXALATE 10 MG/1
20 TABLET ORAL DAILY
Status: DISCONTINUED | OUTPATIENT
Start: 2021-12-10 | End: 2021-12-13 | Stop reason: HOSPADM

## 2021-12-10 RX ORDER — ASPIRIN 81 MG/1
324 TABLET, CHEWABLE ORAL ONCE
Status: COMPLETED | OUTPATIENT
Start: 2021-12-10 | End: 2021-12-10

## 2021-12-10 RX ORDER — FUROSEMIDE 10 MG/ML
40 INJECTION INTRAMUSCULAR; INTRAVENOUS ONCE
Status: COMPLETED | OUTPATIENT
Start: 2021-12-10 | End: 2021-12-10

## 2021-12-10 RX ADMIN — FUROSEMIDE 40 MG: 10 INJECTION, SOLUTION INTRAMUSCULAR; INTRAVENOUS at 11:06

## 2021-12-10 RX ADMIN — ESCITALOPRAM OXALATE 20 MG: 20 TABLET ORAL at 16:21

## 2021-12-10 RX ADMIN — MORPHINE SULFATE 6 MG: 10 INJECTION INTRAVENOUS at 10:09

## 2021-12-10 RX ADMIN — INSULIN GLARGINE 15 UNITS: 100 INJECTION, SOLUTION SUBCUTANEOUS at 23:01

## 2021-12-10 RX ADMIN — FUROSEMIDE 40 MG: 10 INJECTION, SOLUTION INTRAMUSCULAR; INTRAVENOUS at 17:17

## 2021-12-10 RX ADMIN — ASPIRIN 324 MG: 81 TABLET, CHEWABLE ORAL at 10:08

## 2021-12-10 RX ADMIN — FLUTICASONE FUROATE AND VILANTEROL TRIFENATATE 1 PUFF: 100; 25 POWDER RESPIRATORY (INHALATION) at 16:21

## 2021-12-10 RX ADMIN — MORPHINE SULFATE 4 MG: 4 INJECTION INTRAVENOUS at 11:06

## 2021-12-10 RX ADMIN — HEPARIN SODIUM 5000 UNITS: 5000 INJECTION INTRAVENOUS; SUBCUTANEOUS at 16:22

## 2021-12-11 LAB
ANION GAP SERPL CALCULATED.3IONS-SCNC: 5 MMOL/L (ref 4–13)
BUN SERPL-MCNC: 10 MG/DL (ref 5–25)
CALCIUM SERPL-MCNC: 8.5 MG/DL (ref 8.3–10.1)
CHLORIDE SERPL-SCNC: 103 MMOL/L (ref 100–108)
CO2 SERPL-SCNC: 33 MMOL/L (ref 21–32)
CREAT SERPL-MCNC: 0.67 MG/DL (ref 0.6–1.3)
EST. AVERAGE GLUCOSE BLD GHB EST-MCNC: 146 MG/DL
GFR SERPL CREATININE-BSD FRML MDRD: 86 ML/MIN/1.73SQ M
GLUCOSE SERPL-MCNC: 61 MG/DL (ref 65–140)
GLUCOSE SERPL-MCNC: 68 MG/DL (ref 65–140)
GLUCOSE SERPL-MCNC: 70 MG/DL (ref 65–140)
GLUCOSE SERPL-MCNC: 71 MG/DL (ref 65–140)
GLUCOSE SERPL-MCNC: 80 MG/DL (ref 65–140)
GLUCOSE SERPL-MCNC: 89 MG/DL (ref 65–140)
GLUCOSE SERPL-MCNC: 97 MG/DL (ref 65–140)
HBA1C MFR BLD: 6.7 %
POTASSIUM SERPL-SCNC: 3.6 MMOL/L (ref 3.5–5.3)
SODIUM SERPL-SCNC: 141 MMOL/L (ref 136–145)

## 2021-12-11 PROCEDURE — 82948 REAGENT STRIP/BLOOD GLUCOSE: CPT

## 2021-12-11 PROCEDURE — 99233 SBSQ HOSP IP/OBS HIGH 50: CPT | Performed by: NURSE PRACTITIONER

## 2021-12-11 PROCEDURE — 80048 BASIC METABOLIC PNL TOTAL CA: CPT | Performed by: NURSE PRACTITIONER

## 2021-12-11 RX ORDER — HYDROMORPHONE HYDROCHLORIDE 4 MG/1
4 TABLET ORAL EVERY 8 HOURS
Status: DISCONTINUED | OUTPATIENT
Start: 2021-12-11 | End: 2021-12-13 | Stop reason: HOSPADM

## 2021-12-11 RX ADMIN — FUROSEMIDE 40 MG: 10 INJECTION, SOLUTION INTRAMUSCULAR; INTRAVENOUS at 17:58

## 2021-12-11 RX ADMIN — FUROSEMIDE 40 MG: 10 INJECTION, SOLUTION INTRAMUSCULAR; INTRAVENOUS at 10:29

## 2021-12-11 RX ADMIN — HEPARIN SODIUM 5000 UNITS: 5000 INJECTION INTRAVENOUS; SUBCUTANEOUS at 00:59

## 2021-12-11 RX ADMIN — HYDROMORPHONE HYDROCHLORIDE 4 MG: 4 TABLET ORAL at 22:16

## 2021-12-11 RX ADMIN — ACETAMINOPHEN 650 MG: 325 TABLET, FILM COATED ORAL at 10:43

## 2021-12-11 RX ADMIN — HEPARIN SODIUM 5000 UNITS: 5000 INJECTION INTRAVENOUS; SUBCUTANEOUS at 14:44

## 2021-12-11 RX ADMIN — METHOCARBAMOL 500 MG: 500 TABLET ORAL at 10:43

## 2021-12-11 RX ADMIN — HEPARIN SODIUM 5000 UNITS: 5000 INJECTION INTRAVENOUS; SUBCUTANEOUS at 05:44

## 2021-12-11 RX ADMIN — FLUTICASONE FUROATE AND VILANTEROL TRIFENATATE 1 PUFF: 100; 25 POWDER RESPIRATORY (INHALATION) at 10:29

## 2021-12-11 RX ADMIN — ESCITALOPRAM OXALATE 20 MG: 20 TABLET ORAL at 10:29

## 2021-12-11 RX ADMIN — HYDROMORPHONE HYDROCHLORIDE 4 MG: 4 TABLET ORAL at 16:06

## 2021-12-11 RX ADMIN — HEPARIN SODIUM 5000 UNITS: 5000 INJECTION INTRAVENOUS; SUBCUTANEOUS at 22:16

## 2021-12-11 RX ADMIN — LISINOPRIL 10 MG: 10 TABLET ORAL at 10:29

## 2021-12-12 LAB
ANION GAP SERPL CALCULATED.3IONS-SCNC: 6 MMOL/L (ref 4–13)
BUN SERPL-MCNC: 14 MG/DL (ref 5–25)
CALCIUM SERPL-MCNC: 9.2 MG/DL (ref 8.3–10.1)
CHLORIDE SERPL-SCNC: 101 MMOL/L (ref 100–108)
CO2 SERPL-SCNC: 34 MMOL/L (ref 21–32)
CREAT SERPL-MCNC: 0.82 MG/DL (ref 0.6–1.3)
D DIMER PPP FEU-MCNC: 6.18 UG/ML FEU
ERYTHROCYTE [DISTWIDTH] IN BLOOD BY AUTOMATED COUNT: 14.6 % (ref 11.6–15.1)
GFR SERPL CREATININE-BSD FRML MDRD: 70 ML/MIN/1.73SQ M
GLUCOSE SERPL-MCNC: 111 MG/DL (ref 65–140)
GLUCOSE SERPL-MCNC: 121 MG/DL (ref 65–140)
GLUCOSE SERPL-MCNC: 145 MG/DL (ref 65–140)
GLUCOSE SERPL-MCNC: 155 MG/DL (ref 65–140)
GLUCOSE SERPL-MCNC: 185 MG/DL (ref 65–140)
GLUCOSE SERPL-MCNC: 71 MG/DL (ref 65–140)
GLUCOSE SERPL-MCNC: 99 MG/DL (ref 65–140)
HCT VFR BLD AUTO: 44 % (ref 34.8–46.1)
HGB BLD-MCNC: 13.2 G/DL (ref 11.5–15.4)
MCH RBC QN AUTO: 28.3 PG (ref 26.8–34.3)
MCHC RBC AUTO-ENTMCNC: 30 G/DL (ref 31.4–37.4)
MCV RBC AUTO: 94 FL (ref 82–98)
PLATELET # BLD AUTO: 324 THOUSANDS/UL (ref 149–390)
PMV BLD AUTO: 10.3 FL (ref 8.9–12.7)
POTASSIUM SERPL-SCNC: 3.7 MMOL/L (ref 3.5–5.3)
RBC # BLD AUTO: 4.67 MILLION/UL (ref 3.81–5.12)
SODIUM SERPL-SCNC: 141 MMOL/L (ref 136–145)
WBC # BLD AUTO: 9.12 THOUSAND/UL (ref 4.31–10.16)

## 2021-12-12 PROCEDURE — 99232 SBSQ HOSP IP/OBS MODERATE 35: CPT | Performed by: NURSE PRACTITIONER

## 2021-12-12 PROCEDURE — 85027 COMPLETE CBC AUTOMATED: CPT | Performed by: NURSE PRACTITIONER

## 2021-12-12 PROCEDURE — 82948 REAGENT STRIP/BLOOD GLUCOSE: CPT

## 2021-12-12 PROCEDURE — 85379 FIBRIN DEGRADATION QUANT: CPT | Performed by: NURSE PRACTITIONER

## 2021-12-12 PROCEDURE — 80048 BASIC METABOLIC PNL TOTAL CA: CPT | Performed by: NURSE PRACTITIONER

## 2021-12-12 RX ORDER — TORSEMIDE 20 MG/1
40 TABLET ORAL 2 TIMES DAILY
Status: DISCONTINUED | OUTPATIENT
Start: 2021-12-12 | End: 2021-12-13 | Stop reason: HOSPADM

## 2021-12-12 RX ADMIN — TORSEMIDE 40 MG: 20 TABLET ORAL at 20:51

## 2021-12-12 RX ADMIN — LISINOPRIL 10 MG: 10 TABLET ORAL at 10:46

## 2021-12-12 RX ADMIN — HYDROMORPHONE HYDROCHLORIDE 4 MG: 4 TABLET ORAL at 14:04

## 2021-12-12 RX ADMIN — HYDROMORPHONE HYDROCHLORIDE 4 MG: 4 TABLET ORAL at 06:06

## 2021-12-12 RX ADMIN — FLUTICASONE FUROATE AND VILANTEROL TRIFENATATE 1 PUFF: 100; 25 POWDER RESPIRATORY (INHALATION) at 11:03

## 2021-12-12 RX ADMIN — HEPARIN SODIUM 5000 UNITS: 5000 INJECTION INTRAVENOUS; SUBCUTANEOUS at 05:21

## 2021-12-12 RX ADMIN — ALBUTEROL SULFATE 1 PUFF: 90 AEROSOL, METERED RESPIRATORY (INHALATION) at 06:08

## 2021-12-12 RX ADMIN — HEPARIN SODIUM 5000 UNITS: 5000 INJECTION INTRAVENOUS; SUBCUTANEOUS at 14:04

## 2021-12-12 RX ADMIN — INSULIN LISPRO 1 UNITS: 100 INJECTION, SOLUTION INTRAVENOUS; SUBCUTANEOUS at 21:46

## 2021-12-12 RX ADMIN — HYDROMORPHONE HYDROCHLORIDE 4 MG: 4 TABLET ORAL at 22:02

## 2021-12-12 RX ADMIN — INSULIN GLARGINE 15 UNITS: 100 INJECTION, SOLUTION SUBCUTANEOUS at 21:43

## 2021-12-12 RX ADMIN — ESCITALOPRAM OXALATE 20 MG: 20 TABLET ORAL at 10:45

## 2021-12-12 RX ADMIN — INSULIN LISPRO 1 UNITS: 100 INJECTION, SOLUTION INTRAVENOUS; SUBCUTANEOUS at 11:38

## 2021-12-12 RX ADMIN — TORSEMIDE 40 MG: 20 TABLET ORAL at 10:46

## 2021-12-12 RX ADMIN — HEPARIN SODIUM 5000 UNITS: 5000 INJECTION INTRAVENOUS; SUBCUTANEOUS at 21:43

## 2021-12-13 ENCOUNTER — APPOINTMENT (INPATIENT)
Dept: CT IMAGING | Facility: HOSPITAL | Age: 76
DRG: 640 | End: 2021-12-13
Payer: MEDICARE

## 2021-12-13 VITALS
HEART RATE: 67 BPM | BODY MASS INDEX: 40.26 KG/M2 | TEMPERATURE: 97.9 F | DIASTOLIC BLOOD PRESSURE: 46 MMHG | WEIGHT: 191.8 LBS | OXYGEN SATURATION: 97 % | RESPIRATION RATE: 18 BRPM | SYSTOLIC BLOOD PRESSURE: 110 MMHG | HEIGHT: 58 IN

## 2021-12-13 PROBLEM — R79.89 ELEVATED D-DIMER: Status: ACTIVE | Noted: 2021-12-13

## 2021-12-13 LAB
GLUCOSE SERPL-MCNC: 102 MG/DL (ref 65–140)
GLUCOSE SERPL-MCNC: 138 MG/DL (ref 65–140)
GLUCOSE SERPL-MCNC: 143 MG/DL (ref 65–140)
GLUCOSE SERPL-MCNC: 169 MG/DL (ref 65–140)

## 2021-12-13 PROCEDURE — 71275 CT ANGIOGRAPHY CHEST: CPT

## 2021-12-13 PROCEDURE — 82948 REAGENT STRIP/BLOOD GLUCOSE: CPT

## 2021-12-13 PROCEDURE — NC001 PR NO CHARGE: Performed by: NURSE PRACTITIONER

## 2021-12-13 PROCEDURE — 99239 HOSP IP/OBS DSCHRG MGMT >30: CPT | Performed by: NURSE PRACTITIONER

## 2021-12-13 PROCEDURE — G1004 CDSM NDSC: HCPCS

## 2021-12-13 RX ORDER — OXYCODONE HYDROCHLORIDE 5 MG/1
5 TABLET ORAL EVERY 6 HOURS PRN
Qty: 28 TABLET | Refills: 0 | Status: SHIPPED | OUTPATIENT
Start: 2021-12-13 | End: 2021-12-20

## 2021-12-13 RX ADMIN — FLUTICASONE FUROATE AND VILANTEROL TRIFENATATE 1 PUFF: 100; 25 POWDER RESPIRATORY (INHALATION) at 09:24

## 2021-12-13 RX ADMIN — HYDROMORPHONE HYDROCHLORIDE 4 MG: 4 TABLET ORAL at 14:15

## 2021-12-13 RX ADMIN — HEPARIN SODIUM 5000 UNITS: 5000 INJECTION INTRAVENOUS; SUBCUTANEOUS at 14:15

## 2021-12-13 RX ADMIN — ESCITALOPRAM OXALATE 20 MG: 20 TABLET ORAL at 09:19

## 2021-12-13 RX ADMIN — HYDROMORPHONE HYDROCHLORIDE 4 MG: 4 TABLET ORAL at 06:03

## 2021-12-13 RX ADMIN — HEPARIN SODIUM 5000 UNITS: 5000 INJECTION INTRAVENOUS; SUBCUTANEOUS at 06:03

## 2021-12-13 RX ADMIN — INSULIN LISPRO 1 UNITS: 100 INJECTION, SOLUTION INTRAVENOUS; SUBCUTANEOUS at 12:00

## 2021-12-13 RX ADMIN — IOHEXOL 85 ML: 350 INJECTION, SOLUTION INTRAVENOUS at 12:56

## 2021-12-14 ENCOUNTER — TRANSITIONAL CARE MANAGEMENT (OUTPATIENT)
Dept: INTERNAL MEDICINE CLINIC | Facility: CLINIC | Age: 76
End: 2021-12-14

## 2021-12-15 ENCOUNTER — TELEPHONE (OUTPATIENT)
Dept: PHYSICAL THERAPY | Facility: OTHER | Age: 76
End: 2021-12-15

## 2021-12-15 ENCOUNTER — TELEPHONE (OUTPATIENT)
Dept: INTERNAL MEDICINE CLINIC | Facility: CLINIC | Age: 76
End: 2021-12-15

## 2021-12-15 ENCOUNTER — TELEMEDICINE (OUTPATIENT)
Dept: INTERNAL MEDICINE CLINIC | Facility: CLINIC | Age: 76
End: 2021-12-15
Payer: MEDICARE

## 2021-12-15 DIAGNOSIS — R07.9 CHEST PAIN, UNSPECIFIED TYPE: Primary | ICD-10-CM

## 2021-12-15 PROCEDURE — 99441 PR PHYS/QHP TELEPHONE EVALUATION 5-10 MIN: CPT

## 2021-12-16 ENCOUNTER — PATIENT OUTREACH (OUTPATIENT)
Dept: CASE MANAGEMENT | Facility: HOSPITAL | Age: 76
End: 2021-12-16

## 2021-12-16 DIAGNOSIS — I50.33 ACUTE ON CHRONIC DIASTOLIC (CONGESTIVE) HEART FAILURE (HCC): Primary | ICD-10-CM

## 2021-12-21 ENCOUNTER — PATIENT OUTREACH (OUTPATIENT)
Dept: CASE MANAGEMENT | Facility: HOSPITAL | Age: 76
End: 2021-12-21

## 2021-12-23 ENCOUNTER — EPISODE CHANGES (OUTPATIENT)
Dept: CASE MANAGEMENT | Facility: OTHER | Age: 76
End: 2021-12-23

## 2021-12-28 ENCOUNTER — PATIENT OUTREACH (OUTPATIENT)
Dept: CASE MANAGEMENT | Facility: HOSPITAL | Age: 76
End: 2021-12-28

## 2022-01-05 ENCOUNTER — EPISODE CHANGES (OUTPATIENT)
Dept: CASE MANAGEMENT | Facility: OTHER | Age: 77
End: 2022-01-05

## 2022-01-07 ENCOUNTER — HOSPITAL ENCOUNTER (OUTPATIENT)
Dept: INFUSION CENTER | Facility: CLINIC | Age: 77
Discharge: HOME | End: 2022-01-07
Payer: MEDICARE

## 2022-01-07 VITALS — TEMPERATURE: 98.3 F

## 2022-01-07 PROCEDURE — 96372 THER/PROPH/DIAG INJ SC/IM: CPT

## 2022-01-07 RX ADMIN — CERTOLIZUMAB PEGOL 400 MG: 200 INJECTION, SOLUTION SUBCUTANEOUS at 10:57

## 2022-01-07 NOTE — PROGRESS NOTES
Pt here for Cimzia injection  Offers no complaints  Tolerated injection in the left and right upper arms without incident  AVS given  Walked out in stable condition

## 2022-01-17 ENCOUNTER — TELEMEDICINE (OUTPATIENT)
Dept: PULMONOLOGY | Facility: CLINIC | Age: 77
End: 2022-01-17
Payer: MEDICARE

## 2022-01-17 VITALS — HEIGHT: 58 IN | BODY MASS INDEX: 40.09 KG/M2

## 2022-01-17 DIAGNOSIS — J96.11 CHRONIC RESPIRATORY FAILURE WITH HYPOXIA (HCC): ICD-10-CM

## 2022-01-17 DIAGNOSIS — J45.40 MODERATE PERSISTENT ASTHMA WITHOUT COMPLICATION: Primary | ICD-10-CM

## 2022-01-17 DIAGNOSIS — J84.9 INTERSTITIAL LUNG DISEASE (HCC): ICD-10-CM

## 2022-01-17 PROCEDURE — 99212 OFFICE O/P EST SF 10 MIN: CPT | Performed by: PHYSICIAN ASSISTANT

## 2022-01-17 RX ORDER — ALBUTEROL SULFATE 2.5 MG/3ML
2.5 SOLUTION RESPIRATORY (INHALATION) EVERY 6 HOURS PRN
Qty: 360 ML | Refills: 2 | Status: SHIPPED | OUTPATIENT
Start: 2022-01-17

## 2022-01-17 NOTE — PROGRESS NOTES
Virtual Brief Visit    Patient is located in the following state in which I hold an active license [place]      Assessment/Plan:    Problem List Items Addressed This Visit        Respiratory    Moderate persistent asthma - Primary    Relevant Medications    albuterol (2 5 mg/3 mL) 0 083 % nebulizer solution    Interstitial lung disease (HCC)    Relevant Medications    albuterol (2 5 mg/3 mL) 0 083 % nebulizer solution    Chronic respiratory failure with hypoxia ([address] )        Telephone visit was done today for follow-up  Patient feels her breathing is improved from her last visit with us  She was hospitalized in December and treated for acute CHF/fluid overload  She continues with her Breo daily  She did receive a nebulizer but does not have any albuterol to use which I will send of pharmacy  She continues follow-up with her cardiologist   She is using her oxygen at 3 L  Does periodically take it off while she is resting  Since her last visit with us she had a PFT done which shows restrictive airflow limitation with moderately to severely decreased DLCO  She has had high-resolution CT scans done in the past   Most recent CT scan done in December showed stable findings of her interstitial lung disease  She will continue with her Breo, albuterol 4 times per day as needed  She will follow-up with us in 4 months or sooner if necessary  Recent Visits  No visits were found meeting these conditions  Showing recent visits within past 7 days and meeting all other requirements  Today's Visits  Date Type Provider Dept   01/17/22 Telemedicine Lise Eid PA-C [facility] [place]   Showing today's visits and meeting all other requirements  Future Appointments  No visits were found meeting these conditions    Showing future appointments within next 150 days and meeting all other requirements     Patient is a 51-year-old female with past medical history of ILD, rheumatoid arthritis, chronic respiratory failure on 3 L O2, asthma, diabetes, GERD, hypertension, CHF  Telephone visit was done today for follow-up  She overall feels her breathing has improved from her last visit  She was hospitalized back in December      I spent 8 minutes directly with the patient during this visit

## 2022-01-25 ENCOUNTER — TELEPHONE (OUTPATIENT)
Dept: INTERNAL MEDICINE CLINIC | Facility: CLINIC | Age: 77
End: 2022-01-25

## 2022-01-25 ENCOUNTER — OFFICE VISIT (OUTPATIENT)
Dept: INTERNAL MEDICINE CLINIC | Facility: CLINIC | Age: 77
End: 2022-01-25
Payer: MEDICARE

## 2022-01-25 VITALS
WEIGHT: 179 LBS | TEMPERATURE: 98.9 F | OXYGEN SATURATION: 96 % | BODY MASS INDEX: 37.57 KG/M2 | HEIGHT: 58 IN | SYSTOLIC BLOOD PRESSURE: 142 MMHG | HEART RATE: 85 BPM | DIASTOLIC BLOOD PRESSURE: 70 MMHG

## 2022-01-25 DIAGNOSIS — E66.9 OBESITY (BMI 30-39.9): ICD-10-CM

## 2022-01-25 DIAGNOSIS — J45.40 MODERATE PERSISTENT ASTHMA WITHOUT COMPLICATION: ICD-10-CM

## 2022-01-25 DIAGNOSIS — N95.0 POSTMENOPAUSAL BLEEDING: ICD-10-CM

## 2022-01-25 DIAGNOSIS — E78.2 MIXED HYPERLIPIDEMIA: ICD-10-CM

## 2022-01-25 DIAGNOSIS — N18.2 TYPE 2 DIABETES MELLITUS WITH STAGE 2 CHRONIC KIDNEY DISEASE, WITH LONG-TERM CURRENT USE OF INSULIN (HCC): Primary | ICD-10-CM

## 2022-01-25 DIAGNOSIS — E11.22 TYPE 2 DIABETES MELLITUS WITH STAGE 2 CHRONIC KIDNEY DISEASE, WITH LONG-TERM CURRENT USE OF INSULIN (HCC): Primary | ICD-10-CM

## 2022-01-25 DIAGNOSIS — I10 ESSENTIAL HYPERTENSION: ICD-10-CM

## 2022-01-25 DIAGNOSIS — F11.20 CONTINUOUS OPIOID DEPENDENCE (HCC): ICD-10-CM

## 2022-01-25 DIAGNOSIS — F33.41 RECURRENT MAJOR DEPRESSIVE DISORDER, IN PARTIAL REMISSION (HCC): ICD-10-CM

## 2022-01-25 DIAGNOSIS — Z00.00 MEDICARE ANNUAL WELLNESS VISIT, SUBSEQUENT: ICD-10-CM

## 2022-01-25 DIAGNOSIS — I50.32 CHRONIC DIASTOLIC CONGESTIVE HEART FAILURE (HCC): ICD-10-CM

## 2022-01-25 DIAGNOSIS — M05.79 RHEUMATOID ARTHRITIS INVOLVING MULTIPLE SITES WITH POSITIVE RHEUMATOID FACTOR (HCC): ICD-10-CM

## 2022-01-25 DIAGNOSIS — Z79.4 TYPE 2 DIABETES MELLITUS WITH STAGE 2 CHRONIC KIDNEY DISEASE, WITH LONG-TERM CURRENT USE OF INSULIN (HCC): Primary | ICD-10-CM

## 2022-01-25 PROBLEM — E87.70 VOLUME OVERLOAD: Status: RESOLVED | Noted: 2021-12-10 | Resolved: 2022-01-25

## 2022-01-25 LAB
LEFT EYE DIABETIC RETINOPATHY: ABNORMAL
LEFT EYE IMAGE QUALITY: ABNORMAL
LEFT EYE MACULAR EDEMA: ABNORMAL
LEFT EYE OTHER RETINOPATHY: ABNORMAL
RIGHT EYE DIABETIC RETINOPATHY: ABNORMAL
RIGHT EYE IMAGE QUALITY: ABNORMAL
RIGHT EYE MACULAR EDEMA: ABNORMAL
RIGHT EYE OTHER RETINOPATHY: ABNORMAL
SEVERITY (EYE EXAM): ABNORMAL

## 2022-01-25 PROCEDURE — 92250 FUNDUS PHOTOGRAPHY W/I&R: CPT | Performed by: INTERNAL MEDICINE

## 2022-01-25 PROCEDURE — 99214 OFFICE O/P EST MOD 30 MIN: CPT | Performed by: INTERNAL MEDICINE

## 2022-01-25 PROCEDURE — G0439 PPPS, SUBSEQ VISIT: HCPCS | Performed by: INTERNAL MEDICINE

## 2022-01-25 RX ORDER — OXYCODONE AND ACETAMINOPHEN 10; 325 MG/1; MG/1
TABLET ORAL
COMMUNITY
Start: 2022-01-14 | End: 2022-05-24

## 2022-01-25 NOTE — PROGRESS NOTES
Assessment and Plan:     Problem List Items Addressed This Visit        Endocrine    Type 2 diabetes mellitus with stage 2 chronic kidney disease, with long-term current use of insulin ([address] ) - Primary    Relevant Orders    IRIS Diabetic eye exam    CBC and differential    Comprehensive metabolic panel    Lipid panel    TSH, 3rd generation    Hemoglobin A1C    Microalbumin / creatinine urine ratio       Respiratory    Moderate persistent asthma       Cardiovascular and Mediastinum    Essential hypertension       Musculoskeletal and Integument    Rheumatoid arthritis involving multiple sites with positive rheumatoid factor (HCC)    Venous stasis dermatitis of both lower extremities       Other    Mixed hyperlipidemia    Recurrent major depressive disorder, in partial remission (HCC)    Obesity (BMI 30-39  9)    Continuous opioid dependence ([address] )      Other Visit Diagnoses     Postmenopausal bleeding        Relevant Orders    Ambulatory Referral to Obstetrics / Gynecology    Medicare annual wellness visit, subsequent               Preventive health issues were discussed with patient, and age appropriate screening tests were ordered as noted in patient's After Visit Summary  Personalized health advice and appropriate referrals for health education or preventive services given if needed, as noted in patient's After Visit Summary       History of Present Illness:     Patient presents for Medicare Annual Wellness visit    Patient Care Team:  Sharif Dempsey MD as PCP - General  Farnaz Ortega MD     Problem List:     Patient Active Problem List   Diagnosis    Bilateral lower extremity edema    Acute on chronic diastolic (congestive) heart failure ([address] )    Mixed hyperlipidemia    Essential hypertension    Ambulatory dysfunction    Recurrent major depressive disorder, in partial remission ([address] )    Type 2 diabetes mellitus with stage 2 chronic kidney disease, with long-term current use of insulin ([address] )    Gastroesophageal reflux disease without esophagitis    Primary insomnia    Moderate persistent asthma    Psoriasis vulgaris    Rheumatoid arthritis involving multiple sites with positive rheumatoid factor (HCC)    Urinary incontinence    Vitamin D deficiency    Varicose veins of both lower extremities    Obesity (BMI 30-39  9)    Immunosuppressed status ([place] [address] )    Interstitial lung disease ([place] [address] )    Chronic respiratory failure with hypoxia (HCC)    Hyponatremia    Venous stasis dermatitis of both lower extremities    Shortness of breath    Chronic prescription opiate use    Fungal infection of skin    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)    Exertional chest pain    Elevated d-dimer    Continuous opioid dependence ([place] [address] )      Past Medical and Surgical History:     Past Medical History:   Diagnosis Date    Asthma     Chest pain on breathing     last assessed 2/5/15    Chronic diastolic CHF (congestive heart failure) (HCC)     Chronic respiratory failure with hypoxia (HCC)     Diabetes mellitus ([place] [address] )     HTN (hypertension)     Hyperlipidemia     Insomnia     Obesity     Preop cardiovascular exam 2/2/2018    Pulmonary fibrosis (HCC)     Rheumatoid arthritis (HCC)     Volume overload 12/10/2021     Past Surgical History:   Procedure Laterality Date    HAND SURGERY        Family History:     Family History   Problem Relation Age of Onset    Rheum arthritis Mother     Hypertension Mother       Social History:     Social History     Socioeconomic History    Marital status: /Civil Union     Spouse name: None    Number of children: None    Years of education: None    Highest education level: None   Occupational History    Occupation: retired   Tobacco Use    Smoking status: Former Smoker     Packs/day: 1 50     Years: 18 00     Pack years: 27 00     Types: Cigarettes     Start date: 1963     Quit date: 1997 Years since quittin 0    Smokeless tobacco: Never Used    Tobacco comment: Quit 20 years ago   Vaping Use    Vaping Use: Former   Substance and Sexual Activity    Alcohol use: Never    Drug use: No    Sexual activity: Never   Other Topics Concern    None   Social History Narrative    No advance directives on file     Social Determinants of Health     Financial Resource Strain: Not on file   Food Insecurity: No Food Insecurity    Worried About Running Out of Food in the Last Year: Never true    Collins of Food in the Last Year: Never true   Transportation Needs: No Transportation Needs    Lack of Transportation (Medical): No    Lack of Transportation (Non-Medical):  No   Physical Activity: Not on file   Stress: Not on file   Social Connections: Not on file   Intimate Partner Violence: Not on file   Housing Stability: Low Risk     Unable to Pay for Housing in the Last Year: No    Number of Places Lived in the Last Year: 1    Unstable Housing in the Last Year: No      Medications and Allergies:     Current Outpatient Medications   Medication Sig Dispense Refill    albuterol (2 5 mg/3 mL) 0 083 % nebulizer solution Take 3 mL (2 5 mg total) by nebulization every 6 (six) hours as needed for wheezing or shortness of breath 360 mL 2    albuterol (PROVENTIL HFA,VENTOLIN HFA) 90 mcg/act inhaler Inhale 1 puff every 6 (six) hours as needed for wheezing or shortness of breath 18 Inhaler 3    B-D TB SYRINGE 1CC/27GX1/2" 27G X 1/2" 1 ML MISC by Other route 2 (two) times a day 100 each 3    Basaglar KwikPen 100 units/mL injection pen INJECT 40 UNITS IN THE MORNING AND 25 UNITS IN THE EVENING 15 mL 3    Blood Glucose Monitoring Suppl (OneTouch Verio) w/Device KIT by Does not apply route 3 (three) times a day 1 kit 0    Cimzia Starter Kit 6 X 200 MG/ML KIT       escitalopram (LEXAPRO) 20 mg tablet TAKE 1 TABLET BY MOUTH EVERY DAY 90 tablet 2    fluticasone-vilanterol (BREO ELLIPTA) 100-25 mcg/inh inhaler Inhale 1 puff daily Rinse mouth after use  1 Inhaler 3    fosinopril (MONOPRIL) 10 mg tablet TAKE 1 TABLET BY MOUTH EVERY DAY 90 tablet 2    Insulin Pen Needle (B-D UF III MINI PEN NEEDLES) 31G X 5 MM MISC Inject under the skin 2 (two) times a day Use as directed 200 each 3    metFORMIN (GLUCOPHAGE) 1000 MG tablet TAKE 1 TABLET BY MOUTH TWICE A  tablet 3    methocarbamol (ROBAXIN) 500 mg tablet Take 1 tablet (500 mg total) by mouth 2 (two) times a day as needed for muscle spasms (side pain) 28 tablet 0    OneTouch Ultra test strip PATIENT TO TEST ONCE A  strip 3    oxyCODONE-acetaminophen (PERCOCET)  mg per tablet       torsemide (DEMADEX) 20 mg tablet TAKE 2 TABLETS BY MOUTH 2 (TWO) TIMES A  tablet 3    Tocilizumab (Actemra ACTPen) 162 MG/0 9ML SOAJ Inject 162 mg under the skin (Patient not taking: Reported on 10/18/2021)       No current facility-administered medications for this visit       Allergies   Allergen Reactions    Gabapentin     Vancomycin       Immunizations:     Immunization History   Administered Date(s) Administered    COVID-19 MODERNA VACC 0 5 ML IM 06/20/2021, 07/18/2021    INFLUENZA 09/01/2015, 12/20/2016, 11/10/2017    Influenza Split High Dose Preservative Free IM 12/20/2016, 11/10/2017    Influenza, high dose seasonal 0 7 mL 10/17/2018, 09/29/2019, 11/17/2020    Influenza, seasonal, injectable 10/01/2014, 09/01/2015    Pneumococcal Conjugate 13-Valent 07/03/2019    Pneumococcal Polysaccharide PPV23 04/21/2008, 12/20/2016    Tdap 1945    Zoster Vaccine Recombinant 01/28/2020, 07/21/2020      Health Maintenance:         Topic Date Due    Breast Cancer Screening: Mammogram  Never done    DXA SCAN  06/01/2022    Hepatitis C Screening  Completed         Topic Date Due    DTaP,Tdap,and Td Vaccines (1 - Tdap) 04/16/1966    COVID-19 Vaccine (3 - Moderna risk 4-dose series) 08/15/2021    Influenza Vaccine (1) 09/01/2021      Medicare Health Risk Assessment:     /70 (BP Location: Left arm, Patient Position: Sitting)   Pulse 85   Temp 98 9 °F (37 2 °C) (Tympanic)   Ht 4' 10" (1 473 m)   Wt 81 2 kg (179 lb)   SpO2 96%   BMI 37 41 kg/m²      Chela is here for her Subsequent Wellness visit  Health Risk Assessment:   Patient rates overall health as poor  Patient feels that their physical health rating is same  Patient is satisfied with their life  Eyesight was rated as same  Hearing was rated as same  Patient feels that their emotional and mental health rating is same  Patients states they are never, rarely angry  Patient states they are sometimes unusually tired/fatigued  Pain experienced in the last 7 days has been a lot  Patient's pain rating has been 10/10  Depression Screening:   PHQ-9 Score: 9      Fall Risk Screening: In the past year, patient has experienced: no history of falling in past year      Home Safety:  Patient has trouble with stairs inside or outside of their home  Patient has working smoke alarms and has working carbon monoxide detector  Nutrition:   Current diet is Regular  Medications:   Patient is currently taking over-the-counter supplements  OTC medications include: see medication list  Patient is able to manage medications  Activities of Daily Living (ADLs)/Instrumental Activities of Daily Living (IADLs):   Walk and transfer into and out of bed and chair?: Yes  Dress and groom yourself?: Yes    Bathe or shower yourself?: Yes    Feed yourself?  Yes  Do your laundry/housekeeping?: Yes  Manage your money, pay your bills and track your expenses?: Yes  Make your own meals?: Yes    Do your own shopping?: Yes    Previous Hospitalizations:   Any hospitalizations or ED visits within the last 12 months?: Yes    How many hospitalizations have you had in the last year?: 1-2    Advance Care Planning:   Living will: No    Advanced directive: No    Advanced directive counseling given: No    End of Life Decisions reviewed with patient: No      Cognitive Screening:   Provider or family/friend/caregiver concerned regarding cognition?: No    PREVENTIVE SCREENINGS      Cardiovascular Screening:    General: Screening Not Indicated and History Lipid Disorder      Diabetes Screening:     General: Screening Not Indicated and History Diabetes      Colorectal Cancer Screening:     General: Screening Not Indicated      Breast Cancer Screening:     General: Screening Not Indicated      Cervical Cancer Screening:    General: Screening Not Indicated      Osteoporosis Screening:    General: Screening Current      Abdominal Aortic Aneurysm (AAA) Screening:        General: Screening Not Indicated      Lung Cancer Screening:     General: Screening Not Indicated      Hepatitis C Screening:    General: Screening Current    Review of Current Opioid Use    Opioid Risk Tool (ORT) Interpretation: Complete Opioid Risk Tool (ORT)    Other Counseling Topics:   Car/seat belt/driving safety, skin self-exam, sunscreen and calcium and vitamin D intake and regular weightbearing exercise         Jomar Haskins MD

## 2022-01-25 NOTE — TELEPHONE ENCOUNTER
----- Message from Stacey Brooks MD sent at 1/25/2022  2:56 PM EST -----   Eye exam does not show diabetic retinopathy but she has macular disease on the right side    Please tell her to make an appointment with the eye doctor

## 2022-01-25 NOTE — PROGRESS NOTES
INTERNAL MEDICINE OFFICE VISIT  [facility]  [address], [address], [address] [facility]  Tel: [phone]      NAME: Jammie Lambert  AGE: 68 y o  SEX: female  : 1945   MRN: [id number]    DATE: 2022  TIME: 1:33 PM      Assessment and Plan:  1  Type 2 diabetes mellitus with stage 2 chronic kidney disease, with long-term current use of insulin (HCC)   continue present medications  - IRIS Diabetic eye exam  - CBC and differential; Future  - Comprehensive metabolic panel; Future  - Lipid panel; Future  - TSH, 3rd generation; Future  - Hemoglobin A1C; Future  - Microalbumin / creatinine urine ratio; Future    2  Essential hypertension   continue medication    3  Mixed hyperlipidemia   continue low-fat diet    4  Rheumatoid arthritis involving multiple sites with positive rheumatoid factor (HCC)   stable, follows up with Rheumatology    5  Venous stasis dermatitis of both lower extremities   stable    6  Recurrent major depressive disorder, in partial remission ([facility] [address] )   continue Lexapro    7  Moderate persistent asthma without complication   continue inhalers as advised    8  Postmenopausal bleeding   was told to see gynecology as soon as she can  - Ambulatory Referral to Obstetrics / Gynecology; Future    9  Continuous opioid dependence ([facility] [address] )   follow-up with pain management    10  Obesity (BMI 30-39  9)  BMI Counseling: Body mass index is 37 41 kg/m²  The BMI is above normal  Nutrition recommendations include decreasing portion sizes, encouraging healthy choices of fruits and vegetables and moderation in carbohydrate intake  Rationale for BMI follow-up plan is due to patient being overweight or obese           11  Medicare annual wellness visit, subsequent        - Counseling Documentation: patient was counseled regarding: diagnostic results, instructions for management, risk factor reductions, prognosis, patient and family education, risks and benefits of treatment options and importance of compliance with treatment  - Medication Side Effects: Adverse side effects of medications were reviewed with the patient/guardian today  Return for follow up visit in  4 months or earlier, if needed  Chief Complaint:  Chief Complaint   Patient presents with    routine / labs         History of Present Illness:    type 2 diabetes is fairly well controlled with hemoglobin A1c of 6 3   Blood pressure and cholesterol are under control  She was recently in the hospital with acute exacerbation of congestive heart failure and is now on oxygen  Rheumatoid arthritis is stable but she goes to pain management for the chronic pain that she has   Depression is still there despite taking the Lexapro 20 mg daily   Asthma is controlled  For the last few days she has been bleeding vaginally but did not seek help  She needs to lose weight      Active Problem List:  Patient Active Problem List   Diagnosis    Bilateral lower extremity edema    Acute on chronic diastolic (congestive) heart failure (HCC)    Mixed hyperlipidemia    Essential hypertension    Ambulatory dysfunction    Recurrent major depressive disorder, in partial remission ([facility] [address] )    Type 2 diabetes mellitus with stage 2 chronic kidney disease, with long-term current use of insulin (HCC)    Gastroesophageal reflux disease without esophagitis    Primary insomnia    Moderate persistent asthma    Psoriasis vulgaris    Rheumatoid arthritis involving multiple sites with positive rheumatoid factor (HCC)    Urinary incontinence    Vitamin D deficiency    Varicose veins of both lower extremities    Obesity (BMI 30-39  9)    Immunosuppressed status ([address] )    Interstitial lung disease ([facility] [address] )    Chronic respiratory failure with hypoxia (HCC)    Hyponatremia    Venous stasis dermatitis of both lower extremities    Shortness of breath    Chronic prescription opiate use    Fungal infection of skin    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)    Exertional chest pain    Elevated d-dimer    Continuous opioid dependence (HCC)         Past Medical History:  Past Medical History:   Diagnosis Date    Asthma     Chest pain on breathing     last assessed 2/5/15    Chronic diastolic CHF (congestive heart failure) (HCC)     Chronic respiratory failure with hypoxia (HCC)     Diabetes mellitus (HCC)     HTN (hypertension)     Hyperlipidemia     Insomnia     Obesity     Preop cardiovascular exam 2018    Pulmonary fibrosis (HCC)     Rheumatoid arthritis (HCC)     Volume overload 12/10/2021         Past Surgical History:  Past Surgical History:   Procedure Laterality Date    HAND SURGERY           Family History:  Family History   Problem Relation Age of Onset    Rheum arthritis Mother     Hypertension Mother          Social History:  Social History     Socioeconomic History    Marital status: /Civil Union     Spouse name: None    Number of children: None    Years of education: None    Highest education level: None   Occupational History    Occupation: retired   Tobacco Use    Smoking status: Former Smoker     Packs/day: 1 50     Years: 18 00     Pack years: 27 00     Types: Cigarettes     Start date:      Quit date:      Years since quittin 0    Smokeless tobacco: Never Used    Tobacco comment: Quit 20 years ago   Vaping Use    Vaping Use: Former   Substance and Sexual Activity    Alcohol use: Never    Drug use: No    Sexual activity: Never   Other Topics Concern    None   Social History Narrative    No advance directives on file     Social Determinants of Health     Financial Resource Strain: Not on file   Food Insecurity: No Food Insecurity    Worried About Running Out of Food in the Last Year: Never true    Collins of Food in the Last Year: Never true   Transportation Needs: No Transportation Needs    Lack of Transportation (Medical): No    Lack of Transportation (Non-Medical): No   Physical Activity: Not on file   Stress: Not on file   Social Connections: Not on file   Intimate Partner Violence: Not on file   Housing Stability: Low Risk     Unable to Pay for Housing in the Last Year: No    Number of Places Lived in the Last Year: 1    Unstable Housing in the Last Year: No         Allergies:   Allergies   Allergen Reactions    Gabapentin     Vancomycin          Medications:    Current Outpatient Medications:     albuterol (2 5 mg/3 mL) 0 083 % nebulizer solution, Take 3 mL (2 5 mg total) by nebulization every 6 (six) hours as needed for wheezing or shortness of breath, Disp: 360 mL, Rfl: 2    albuterol (PROVENTIL HFA,VENTOLIN HFA) 90 mcg/act inhaler, Inhale 1 puff every 6 (six) hours as needed for wheezing or shortness of breath, Disp: 18 Inhaler, Rfl: 3    B-D TB SYRINGE 1CC/27GX1/2" 27G X 1/2" 1 ML MISC, by Other route 2 (two) times a day, Disp: 100 each, Rfl: 3    Basaglar KwikPen 100 units/mL injection pen, INJECT 40 UNITS IN THE MORNING AND 25 UNITS IN THE EVENING, Disp: 15 mL, Rfl: 3    Blood Glucose Monitoring Suppl (OneTouch Verio) w/Device KIT, by Does not apply route 3 (three) times a day, Disp: 1 kit, Rfl: 0    Cimzia Starter Kit 6 X 200 MG/ML KIT, , Disp: , Rfl:     escitalopram (LEXAPRO) 20 mg tablet, TAKE 1 TABLET BY MOUTH EVERY DAY, Disp: 90 tablet, Rfl: 2    fluticasone-vilanterol (BREO ELLIPTA) 100-25 mcg/inh inhaler, Inhale 1 puff daily Rinse mouth after use , Disp: 1 Inhaler, Rfl: 3    fosinopril (MONOPRIL) 10 mg tablet, TAKE 1 TABLET BY MOUTH EVERY DAY, Disp: 90 tablet, Rfl: 2    Insulin Pen Needle (B-D UF III MINI PEN NEEDLES) 31G X 5 MM MISC, Inject under the skin 2 (two) times a day Use as directed, Disp: 200 each, Rfl: 3    metFORMIN (GLUCOPHAGE) 1000 MG tablet, TAKE 1 TABLET BY MOUTH TWICE A DAY, Disp: 180 tablet, Rfl: 3    methocarbamol (ROBAXIN) 500 mg tablet, Take 1 tablet (500 mg total) by mouth 2 (two) times a day as needed for muscle spasms (side pain), Disp: 28 tablet, Rfl: 0    OneTouch Ultra test strip, PATIENT TO TEST ONCE A DAY, Disp: 100 strip, Rfl: 3    oxyCODONE-acetaminophen (PERCOCET)  mg per tablet, , Disp: , Rfl:     torsemide (DEMADEX) 20 mg tablet, TAKE 2 TABLETS BY MOUTH 2 (TWO) TIMES A DAY, Disp: 360 tablet, Rfl: 3    Tocilizumab (Actemra ACTPen) 162 MG/0 9ML SOAJ, Inject 162 mg under the skin (Patient not taking: Reported on 10/18/2021), Disp: , Rfl:       The following portions of the patient's history were reviewed and updated as appropriate: past medical history, past surgical history, family history, social history, allergies, current medications and active problem list       Review of Systems:  Constitutional: Denies fever, chills, weight gain, weight loss, fatigue  Eyes: Denies eye redness, eye discharge, double vision, change in visual acuity  ENT: Denies hearing loss, tinnitus, sneezing, nasal congestion, nasal discharge, sore throat   Respiratory: Denies cough, expectoration, hemoptysis, shortness of breath, wheezing  Cardiovascular: Denies chest pain, palpitations, lower extremity swelling, orthopnea, PND  Gastrointestinal: Denies abdominal pain, heartburn, nausea, vomiting, hematemesis, diarrhea, bloody stools  Genito-Urinary: Denies dysuria, frequency, difficulty in micturition, nocturia, incontinence    Complains of post menopausal bleeding  Musculoskeletal:  Complains of back pain, joint pain, muscle pain  Neurologic: Denies confusion, lightheadedness, syncope, headache, focal weakness, sensory changes, seizures  Endocrine: Denies polyuria, polydipsia, temperature intolerance  Allergy and Immunology: Denies hives, insect bite sensitivity  Hematological and Lymphatic: Denies bleeding problems, swollen glands   Psychological:  has depression, denies suicidal ideation, anxiety, panic, mood swings  Dermatological: Denies pruritus, rash, skin lesion changes      Vitals:  Vitals:    01/25/22 1253   BP: 142/70   Pulse: 85   Temp: 98 9 °F (37 2 °C)   SpO2: 96%       Body mass index is 37 41 kg/m²  Weight (last 2 days)     Date/Time Weight    01/25/22 1253 81 2 (179)            Physical Examination:  General: Patient is not in acute distress  Awake, alert, responding to commands  No weight gain or loss  Head: Normocephalic  Atraumatic  Eyes: Conjunctiva and lids with no swelling, erythema or discharge  Both pupils normal sized, round and reactive to light  Sclera nonicteric  ENT: External examination of nose and ear normal  Otoscopic examination shows translucent tympanic membranes with patent canals without erythema  Oropharynx moist with no erythema, edema, exudate or lesions  Neck: Supple  JVP not raised  Trachea midline  No masses  No thyromegaly  Lungs: No signs of increased work of breathing or respiratory distress  Bilateral bronchovascular breath sounds with no crackles or rhonchi  Chest wall: No tenderness  Cardiovascular: Normal PMI  No thrills  Regular rate and rhythm  S1 and S2 normal  No murmur, rub or gallop  Gastrointestinal: Abdomen soft, nontender  No guarding or rigidity  Liver and spleen not palpable  Bowel sounds present  Neurologic: Cranial nerves II-XII intact  Cortical functions normal  Motor system - Reflexes 2+ and symmetrical  Sensations normal  Musculoskeletal: Gait normal  No joint tenderness  Integumentary: Skin normal with no rash or lesions  Lymphatic: No palpable lymph nodes in neck, axilla or groin  Extremities: No clubbing, cyanosis, edema or varicosities  Psychological: Judgement and insight normal   depressed    Patient's shoes and socks removed  Right Foot/Ankle   Right Foot Inspection  Skin Exam: skin normal and skin intact  No dry skin, no warmth, no callus, no erythema, no maceration, no abnormal color, no pre-ulcer, no ulcer and no callus  Toe Exam: ROM and strength within normal limits       Sensory Proprioception: diminished and intact  Monofilament testing: intact    Vascular  Capillary refills: < 3 seconds  The right DP pulse is 2+  The right PT pulse is 2+  Left Foot/Ankle  Left Foot Inspection  Skin Exam: skin normal and skin intact  No dry skin, no warmth, no erythema, no maceration, normal color, no pre-ulcer, no ulcer and no callus  Toe Exam: ROM and strength within normal limits  Sensory   Proprioception: intact  Monofilament testing: intact    Vascular  Capillary refills: < 3 seconds  The left DP pulse is 2+  The left PT pulse is 2+       Assign Risk Category  No deformity present  No loss of protective sensation  No weak pulses  Risk: 0      Laboratory Results:  CBC with diff:   Lab Results   Component Value Date    WBC 9 12 12/12/2021    WBC 7 94 01/05/2016    RBC 4 67 12/12/2021    RBC 4 39 01/05/2016    HGB 13 2 12/12/2021    HGB 12 9 01/05/2016    HCT 44 0 12/12/2021    HCT 40 9 01/05/2016    MCV 94 12/12/2021    MCV 93 01/05/2016    MCH 28 3 12/12/2021    MCH 29 4 01/05/2016    RDW 14 6 12/12/2021    RDW 15 4 (H) 01/05/2016     12/12/2021     01/05/2016       CMP:  Lab Results   Component Value Date    CREATININE 0 82 12/12/2021    CREATININE 0 74 01/05/2016    BUN 14 12/12/2021    BUN 18 01/05/2016     (L) 01/05/2016    K 3 7 12/12/2021    K 4 5 01/05/2016     12/12/2021     01/05/2016    CO2 34 (H) 12/12/2021    CO2 26 01/05/2016    GLUCOSE 159 (H) 01/05/2016    PROT 7 3 01/05/2016    ALKPHOS 120 (H) 12/10/2021    ALKPHOS 151 (H) 01/05/2016    ALT 15 12/10/2021    ALT 25 01/05/2016    AST 23 12/10/2021    AST 14 01/05/2016       Lab Results   Component Value Date    HGBA1C 6 7 (H) 12/10/2021    HGBA1C 8 3 (H) 10/15/2020    MG 2 0 07/14/2021    PHOS 2 7 12/08/2019       Lab Results   Component Value Date    TROPONINI <0 02 07/15/2021    TROPONINI <0 02 07/15/2021    TROPONINI <0 02 07/14/2021       Lipid Profile:   Lab Results   Component Value Date CHOL 230 01/05/2016    CHOL 149 09/15/2015     Lab Results   Component Value Date    HDL 64 07/14/2021    HDL 80 02/26/2021     Lab Results   Component Value Date    LDLCALC 134 (H) 07/14/2021    LDLCALC 103 (H) 02/26/2021     Lab Results   Component Value Date    TRIG 182 (H) 07/14/2021    TRIG 120 02/26/2021       Imaging Results:  CTA chest pe study  Narrative: CTA - CHEST WITH IV CONTRAST - PULMONARY ANGIOGRAM    INDICATION:   Pulmonary embolism (PE) suspected, positive D-dimer  Rule out PE; Elevated D-dimer  "68 y o  female presenting with chest pain and body aches  Patient has had body aches for 1 week as well as chest pain this morning  The body aches are described as sharp severe pain throughout the entire body, worse   with movement better at rest   Patient has a history of chronic pain similar to this  The chest pain is new, it began this morning, is a feeling of pressure in the center of the chest, dull, severe, radiating throughout the chest, worse with exertion   and better at rest"    COMPARISON: CT chest high-resolution 4/27/2019  CT chest abdomen pelvis 5/21/2021  TECHNIQUE: CTA examination of the chest was performed using angiographic technique according to a protocol specifically tailored to evaluate for pulmonary embolism  Axial, sagittal, and coronal 2D reformatted images were created from the source data and   submitted for interpretation  In addition, coronal 3D MIP postprocessing was performed on the acquisition scanner  Radiation dose length product (DLP) for this visit:  404 mGy-cm   This examination, like all CT scans performed in the [facility], was performed utilizing techniques to minimize radiation dose exposure, including the use of iterative   reconstruction and automated exposure control  IV Contrast:  85 mL of iohexol (OMNIPAQUE)     FINDINGS:    PULMONARY ARTERIAL TREE:  No pulmonary embolus is seen       LUNGS:  Interlobular septal thickening likely on the basis of mild pulmonary edema  Mild/moderate upper lobe predominant centrilobular pulmonary emphysema  Mild dependent honeycombing in both lower lobes a unchanged from prior studies  No focal infiltrate/consolidation  PLEURA:  Unremarkable  HEART/GREAT VESSELS:  Cardiomegaly  No pericardial effusion  Atherosclerotic calcifications of the aorta and coronary arteries  No thoracic aortic aneurysm    MEDIASTINUM AND ISRRAEL:  Unremarkable  CHEST WALL AND LOWER NECK:   Unremarkable  VISUALIZED STRUCTURES IN THE UPPER ABDOMEN:  Unremarkable  OSSEOUS STRUCTURES:  No acute fracture or destructive osseous lesion  Impression: No acute pulmonary arterial embolism  Cardiomegaly and mild diffuse interlobular lobular septal thickening most prominent in the upper lobes likely on the basis of CHF  Mild areas of dependent honeycombing in the lower lobes unchanged from the 2019 study in keeping with and unchanged interstitial lung disease  The study was marked in [facility] for immediate notification      Workstation performed: [id number]       Health Maintenance:  Health Maintenance   Topic Date Due    DTaP,Tdap,and Td Vaccines (1 - Tdap) 04/16/1966    Breast Cancer Screening: Mammogram  Never done    Diabetic Foot Exam  06/22/2021    COVID-19 Vaccine (3 - Moderna risk 4-dose series) 08/15/2021    Influenza Vaccine (1) 09/01/2021    DM Eye Exam  10/27/2021    BMI: Followup Plan  03/23/2022    DXA SCAN  06/01/2022    HEMOGLOBIN A1C  06/10/2022    Fall Risk  01/25/2023    Medicare Annual Wellness Visit (AWV)  01/25/2023    BMI: Adult  01/25/2023    Depression Remission PHQ  01/25/2023    Hepatitis C Screening  Completed    Osteoporosis Screening  Completed    Pneumococcal Vaccine: 65+ Years  Completed    HIB Vaccine  Aged Out    Hepatitis B Vaccine  Aged Out    IPV Vaccine  Aged Out    Hepatitis A Vaccine  Aged Out    Meningococcal ACWY Vaccine  Aged Out    HPV Vaccine  Aged Out     Immunization History   Administered Date(s) Administered    COVID-19 MODERNA VACC 0 5 ML IM 06/20/2021, 07/18/2021    INFLUENZA 09/01/2015, 12/20/2016, 11/10/2017    Influenza Split High Dose Preservative Free IM 12/20/2016, 11/10/2017    Influenza, high dose seasonal 0 7 mL 10/17/2018, 09/29/2019, 11/17/2020    Influenza, seasonal, injectable 10/01/2014, 09/01/2015    Pneumococcal Conjugate 13-Valent 07/03/2019    Pneumococcal Polysaccharide PPV23 04/21/2008, 12/20/2016    Tdap 1945    Zoster Vaccine Recombinant 01/28/2020, 07/21/2020         Justin Coyle MD  1/25/2022,1:33 PM

## 2022-02-04 ENCOUNTER — HOSPITAL ENCOUNTER (OUTPATIENT)
Dept: INFUSION CENTER | Facility: CLINIC | Age: 77
Discharge: HOME | End: 2022-02-04
Payer: MEDICARE

## 2022-02-04 VITALS — TEMPERATURE: 97.8 F

## 2022-02-04 PROCEDURE — 96372 THER/PROPH/DIAG INJ SC/IM: CPT

## 2022-02-04 RX ADMIN — CERTOLIZUMAB PEGOL 400 MG: 200 INJECTION, SOLUTION SUBCUTANEOUS at 14:15

## 2022-02-04 NOTE — PROGRESS NOTES
Pt here for Cimzia injection  Offers no complaints  Tolerated injection in the Right and left upper arms, 1 injection each arm without incident  AVS declined  Aware of next appt  Walked out in stable condition

## 2022-03-04 ENCOUNTER — HOSPITAL ENCOUNTER (OUTPATIENT)
Dept: INFUSION CENTER | Facility: CLINIC | Age: 77
Discharge: HOME | End: 2022-03-04
Payer: MEDICARE

## 2022-03-04 PROCEDURE — 96372 THER/PROPH/DIAG INJ SC/IM: CPT

## 2022-03-04 RX ADMIN — CERTOLIZUMAB PEGOL 400 MG: 200 INJECTION, SOLUTION SUBCUTANEOUS at 11:10

## 2022-03-04 NOTE — PROGRESS NOTES
Pt presents for Cimzia injection  Pt w/o complaints  Injection adminstered to BL UE's per pt request    Pt tolerated well  AVS declined  Next appt reviewed

## 2022-03-20 DIAGNOSIS — Z79.4 TYPE 2 DIABETES MELLITUS WITH STAGE 3 CHRONIC KIDNEY DISEASE, WITH LONG-TERM CURRENT USE OF INSULIN (HCC): ICD-10-CM

## 2022-03-20 DIAGNOSIS — N18.30 TYPE 2 DIABETES MELLITUS WITH STAGE 3 CHRONIC KIDNEY DISEASE, WITH LONG-TERM CURRENT USE OF INSULIN (HCC): ICD-10-CM

## 2022-03-20 DIAGNOSIS — E11.22 TYPE 2 DIABETES MELLITUS WITH STAGE 3 CHRONIC KIDNEY DISEASE, WITH LONG-TERM CURRENT USE OF INSULIN (HCC): ICD-10-CM

## 2022-03-20 RX ORDER — INSULIN GLARGINE 100 [IU]/ML
INJECTION, SOLUTION SUBCUTANEOUS
Qty: 15 ML | Refills: 3 | Status: ON HOLD | OUTPATIENT
Start: 2022-03-20 | End: 2022-05-24 | Stop reason: SDUPTHER

## 2022-04-01 ENCOUNTER — HOSPITAL ENCOUNTER (OUTPATIENT)
Dept: INFUSION CENTER | Facility: CLINIC | Age: 77
Discharge: HOME | End: 2022-04-01

## 2022-04-25 NOTE — TELEPHONE ENCOUNTER
Giorgi Lassiter  1950   Chief Complaint   Patient presents with    Follow-up     left shoulder        HISTORY OF PRESENT ILLNESS  Giorgi Lassiter is a 67 y.o. female who presents today for reevaluation of left shoulder. Patient rates pain as 5/10 today. She is s/p Left shoulder arthroscopic partial synovectomy on 3/4/2022 following a reverse total shoulder arthroplasty on 7/2/21. She has been wearing a sling and has limited activities with the arm. She recently went to the ED for tachycardia and is receiving treatment for this. Presents today ambulating with a walker. Patient denies any fever, chills, chest pain, shortness of breath or calf pain. The remainder of the review of systems is negative. There are no new illness or injuries to report since last seen in the office. There are no changes to medications, allergies, family or social history. PHYSICAL EXAM:   Visit Vitals  Pulse 87   Temp (!) 96.4 °F (35.8 °C) (Temporal)   Wt 220 lb (99.8 kg)   SpO2 99%   BMI 33.45 kg/m²     The patient is a well-developed, well-nourished female   in no acute distress. The patient is alert and oriented times three. The patient is alert and oriented times three. Mood and affect are normal.  LYMPHATIC: lymph nodes are not enlarged and are within normal limits  SKIN: normal in color and non tender to palpation. There are no bruises or abrasions noted. NEUROLOGICAL: Motor sensory exam is within normal limits. Reflexes are equal bilaterally.  There is normal sensation to pinprick and light touch  MUSCULOSKELETAL:  ORTHOPEDIC EXAM of left shoulder:  Inspection: swelling not present,  Bruising not present  Incision, clean, dry, intact, sutures in place  Passive glenohumeral abduction 0-50 degrees  Stability: Stable  Strength: n/a  2+ distal pulses    PROCEDURE: none    IMAGING: XR of the scapula with 2 views obtained in the office dated 4/25/2022 was reviewed and read by Dr. Ekaterina Wilkins: scapula spine fracture with no Pt called very upset she stated she was in the ER for edema  Her legs are hurting her very badly  She needs a hospital follow up   I offered her an appt but she denied it because it was too early for her, she needs a afternoon appt and she will only come in if her  has an appt with her as they are both [place], pt was crying on the phone and stated she cant wait til November for an appt   Please advise can pt and  be overbooked in dr Ora Campo schedule, pt #[phone] displacement       CT of the left shoulder dated 3/15/2022 was reviewed and read by Dr. Gui Persaud:   IMPRESSION  1. Reverse total left shoulder arthroplasty. No CT evidence of hardware  loosening. 2.  Acute nondisplaced fracture through the scapular spine. 3.  Chronic inferiorly displaced fracture through the coracoid base. 4.  Ectatic ascending thoracic aorta, measuring approximately 4.0 cm diameter. IMPRESSION:      ICD-10-CM ICD-9-CM    1. Stress fracture of left humerus, initial encounter  M84.322A 733.95 AMB POC XRAY; SCAPULA, COMPLETE        PLAN:   1. She is s/p Left shoulder arthroscopic partial synovectomy on 3/4/2022 following a reverse total shoulder arthroplasty on 7/2/21. She now has a nondisplaced fracture of the scapular spine. She has improved since last OV. Advised to use pain as a guide. OK to d/c sling. Risk factors include: htn, dm   2. No ultrasound exam indicated today  3. No cortisone injection indicated today   4. No Physical/Occupational Therapy indicated today  5. No diagnostic test indicated today:   6. No durable medical equipment indicated today  7. No referral indicated today   8. No medications indicated today:   9. No Narcotic indicated today       RTC 6 weeks       Scribed by [facility]) as dictated by Gui Sandoval MD    I, Dr. Gui Sandoval, confirm that all documentation is accurate.     Gui Sandoval M.D.   [facility]

## 2022-05-05 ENCOUNTER — OFFICE VISIT (OUTPATIENT)
Dept: PULMONOLOGY | Facility: CLINIC | Age: 77
End: 2022-05-05
Payer: MEDICARE

## 2022-05-05 VITALS
SYSTOLIC BLOOD PRESSURE: 128 MMHG | DIASTOLIC BLOOD PRESSURE: 62 MMHG | HEIGHT: 58 IN | TEMPERATURE: 98.4 F | WEIGHT: 162 LBS | BODY MASS INDEX: 34 KG/M2 | OXYGEN SATURATION: 97 % | HEART RATE: 91 BPM

## 2022-05-05 DIAGNOSIS — J45.40 MODERATE PERSISTENT ASTHMA WITHOUT COMPLICATION: Primary | ICD-10-CM

## 2022-05-05 DIAGNOSIS — J84.9 INTERSTITIAL LUNG DISEASE (HCC): ICD-10-CM

## 2022-05-05 DIAGNOSIS — J96.11 CHRONIC RESPIRATORY FAILURE WITH HYPOXIA (HCC): ICD-10-CM

## 2022-05-05 DIAGNOSIS — M05.79 RHEUMATOID ARTHRITIS INVOLVING MULTIPLE SITES WITH POSITIVE RHEUMATOID FACTOR (HCC): ICD-10-CM

## 2022-05-05 PROCEDURE — 99213 OFFICE O/P EST LOW 20 MIN: CPT | Performed by: PHYSICIAN ASSISTANT

## 2022-05-06 NOTE — PROGRESS NOTES
Assessment/Plan:   Diagnoses and all orders for this visit:    Moderate persistent asthma without complication    Interstitial lung disease ([facility] )    Chronic respiratory failure with hypoxia (HCC)    Rheumatoid arthritis involving multiple sites with positive rheumatoid factor ([facility] )    Patient is here today for follow-up  She remains stable with her breathing, continues on Breo daily, albuterol as needed  Last imaging showed stable ILD findings related to her rheumatoid arthritis, does have restrictive lung disease on PFT  She does have oxygen 3 L to use, does not currently have it with her today as she finds it is hard to bring out of the house  Her sats are in the 90s on room air at rest     She is currently off any treatment for her rheumatoid arthritis, from notes it looks like she was to be changed to Remicade but has not had any infusions yet  She has noted worsening joint aches in general off of her Cimzia  She has a follow up with rheumatology next week  She can follow-up with us in 4 months or sooner if necessary  Return in about 4 months (around 9/5/2022)  All questions are answered to the patient's satisfaction and understanding  She verbalizes understanding  She is encouraged to call with any further questions or concerns  Portions of the record may have been created with voice recognition software  Occasional wrong word or "sound a like" substitutions may have occurred due to the inherent limitations of voice recognition software  Read the chart carefully and recognize, using context, where substitutions have occurred  Electronically Signed by Ashley Antonio PA-C    ______________________________________________________________________    Chief Complaint: No chief complaint on file        Patient ID: Patsy Johns is a 68 y o  y o  female has a past medical history of Asthma, Chest pain on breathing, Chronic diastolic CHF (congestive heart failure) ([facility] ), Chronic respiratory failure with hypoxia ([address] ), Diabetes mellitus ([address] ), HTN (hypertension), Hyperlipidemia, Insomnia, Obesity, Preop cardiovascular exam (2/2/2018), Pulmonary fibrosis ([address] ), Rheumatoid arthritis ([address] ), and Volume overload (12/10/2021)  5/5/2022  Patient presents today for follow-up visit  Patient is a 24-year-old female former smoker with past medical history of ILD, RA, chronic respiratory failure on 3 L O2, asthma, emphysema, diabetes, GERD, hypertension, CHF  She is here today for follow-up  Overall her breathing has been stable  She does have oxygen which she is supposed to use continuously, finds that the tanks are hard to take out of the house with her  She continues with her Breo daily, using her nebulizer usually at least once per day  Review of Systems   Constitutional: Negative  HENT: Negative  Respiratory: Positive for shortness of breath  Cardiovascular: Negative  Gastrointestinal: Negative  Genitourinary: Negative  Musculoskeletal: Positive for arthralgias, back pain and joint swelling  Skin: Negative  Allergic/Immunologic: Negative  Neurological: Negative  Psychiatric/Behavioral: Negative  Smoking history: She reports that she quit smoking about 25 years ago  Her smoking use included cigarettes  She started smoking about 59 years ago  She has a 27 00 pack-year smoking history   She has never used smokeless tobacco     The following portions of the patient's history were reviewed and updated as appropriate: allergies, current medications, past family history, past medical history, past social history, past surgical history and problem list     Immunization History   Administered Date(s) Administered    COVID-19 MODERNA VACC 0 5 ML IM 06/20/2021, 07/18/2021    INFLUENZA 09/01/2015, 12/20/2016, 11/10/2017    Influenza Split High Dose Preservative Free IM 12/20/2016, 11/10/2017    Influenza, high dose seasonal 0 7 mL 10/17/2018, 09/29/2019, 11/17/2020    Influenza, seasonal, injectable 10/01/2014, 09/01/2015    Pneumococcal Conjugate 13-Valent 07/03/2019    Pneumococcal Polysaccharide PPV23 04/21/2008, 12/20/2016    Tdap 1945    Zoster Vaccine Recombinant 01/28/2020, 07/21/2020     Current Outpatient Medications   Medication Sig Dispense Refill    albuterol (2 5 mg/3 mL) 0 083 % nebulizer solution Take 3 mL (2 5 mg total) by nebulization every 6 (six) hours as needed for wheezing or shortness of breath 360 mL 2    albuterol (PROVENTIL HFA,VENTOLIN HFA) 90 mcg/act inhaler Inhale 1 puff every 6 (six) hours as needed for wheezing or shortness of breath 18 Inhaler 3    B-D TB SYRINGE 1CC/27GX1/2" 27G X 1/2" 1 ML MISC by Other route 2 (two) times a day 100 each 3    Basaglar KwikPen 100 units/mL injection pen INJECT 40 UNITS IN THE MORNING AND 25 UNITS IN THE EVENING 15 mL 3    Blood Glucose Monitoring Suppl (OneTouch Verio) w/Device KIT by Does not apply route 3 (three) times a day 1 kit 0    Cimzia Starter Kit 6 X 200 MG/ML KIT       escitalopram (LEXAPRO) 20 mg tablet TAKE 1 TABLET BY MOUTH EVERY DAY 90 tablet 2    fluticasone-vilanterol (BREO ELLIPTA) 100-25 mcg/inh inhaler Inhale 1 puff daily Rinse mouth after use   1 Inhaler 3    fosinopril (MONOPRIL) 10 mg tablet TAKE 1 TABLET BY MOUTH EVERY DAY 90 tablet 2    Insulin Pen Needle (B-D UF III MINI PEN NEEDLES) 31G X 5 MM MISC Inject under the skin 2 (two) times a day Use as directed 200 each 3    metFORMIN (GLUCOPHAGE) 1000 MG tablet TAKE 1 TABLET BY MOUTH TWICE A  tablet 3    methocarbamol (ROBAXIN) 500 mg tablet Take 1 tablet (500 mg total) by mouth 2 (two) times a day as needed for muscle spasms (side pain) 28 tablet 0    OneTouch Ultra test strip PATIENT TO TEST ONCE A  strip 3    oxyCODONE-acetaminophen (PERCOCET)  mg per tablet       torsemide (DEMADEX) 20 mg tablet TAKE 2 TABLETS BY MOUTH 2 (TWO) TIMES A  tablet 3     No current facility-administered medications for this visit  Allergies: Gabapentin and Vancomycin    Objective:  Vitals:    05/05/22 1436   BP: 128/62   Pulse: 91   Temp: 98 4 °F (36 9 °C)   SpO2: 97%   Weight: 73 5 kg (162 lb)   Height: 4' 10" (1 473 m)   Oxygen Therapy  SpO2: 97 %    Wt Readings from Last 3 Encounters:   05/05/22 73 5 kg (162 lb)   01/25/22 81 2 kg (179 lb)   12/13/21 87 kg (191 lb 12 8 oz)     Body mass index is 33 86 kg/m²  Physical Exam  Vitals reviewed  Constitutional:       Appearance: Normal appearance  HENT:      Head: Normocephalic and atraumatic  Mouth/Throat:      Pharynx: Oropharynx is clear  Eyes:      Conjunctiva/sclera: Conjunctivae normal    Cardiovascular:      Rate and Rhythm: Normal rate and regular rhythm  Pulmonary:      Effort: Pulmonary effort is normal  No respiratory distress  Breath sounds: Normal breath sounds  No decreased breath sounds, wheezing, rhonchi or rales  Abdominal:      General: Abdomen is flat  There is no distension  Musculoskeletal:         General: Normal range of motion  Cervical back: Normal range of motion  Right lower leg: No edema  Left lower leg: No edema  Skin:     General: Skin is warm and dry  Neurological:      Mental Status: She is alert and oriented to person, place, and time     Psychiatric:         Mood and Affect: Mood normal          Behavior: Behavior normal          Lab Review:   Lab Results   Component Value Date     (L) 01/05/2016    K 3 7 12/12/2021    K 4 5 01/05/2016     12/12/2021     01/05/2016    CO2 34 (H) 12/12/2021    CO2 26 01/05/2016    BUN 14 12/12/2021    BUN 18 01/05/2016    CREATININE 0 82 12/12/2021    CREATININE 0 74 01/05/2016    GLUCOSE 159 (H) 01/05/2016    CALCIUM 9 2 12/12/2021    CALCIUM 9 2 01/05/2016     Lab Results   Component Value Date    WBC 9 12 12/12/2021    WBC 7 94 01/05/2016    HGB 13 2 12/12/2021    HGB 12 9 01/05/2016    HCT 44 0 12/12/2021    HCT 40 9 01/05/2016 MCV 94 12/12/2021    MCV 93 01/05/2016     12/12/2021     01/05/2016       Diagnostics:  I have personally reviewed pertinent reports  and I have personally reviewed pertinent films in PACS  Reviewed prior imaging  Office Spirometry Results:     ESS:    No results found

## 2022-05-18 ENCOUNTER — TELEPHONE (OUTPATIENT)
Dept: PULMONOLOGY | Facility: CLINIC | Age: 77
End: 2022-05-18

## 2022-05-22 ENCOUNTER — HOSPITAL ENCOUNTER (INPATIENT)
Facility: HOSPITAL | Age: 77
LOS: 2 days | Discharge: HOME HEALTH | DRG: 864 | End: 2022-05-24
Attending: EMERGENCY MEDICINE | Admitting: INTERNAL MEDICINE
Payer: MEDICARE

## 2022-05-22 ENCOUNTER — APPOINTMENT (EMERGENCY)
Dept: CT IMAGING | Facility: HOSPITAL | Age: 77
DRG: 864 | End: 2022-05-22
Payer: MEDICARE

## 2022-05-22 ENCOUNTER — APPOINTMENT (EMERGENCY)
Dept: RADIOLOGY | Facility: HOSPITAL | Age: 77
DRG: 864 | End: 2022-05-22
Payer: MEDICARE

## 2022-05-22 DIAGNOSIS — E11.22 TYPE 2 DIABETES MELLITUS WITH STAGE 3 CHRONIC KIDNEY DISEASE, WITH LONG-TERM CURRENT USE OF INSULIN (HCC): ICD-10-CM

## 2022-05-22 DIAGNOSIS — B36.9 FUNGAL INFECTION OF SKIN: ICD-10-CM
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
## 2022-05-22 DIAGNOSIS — E16.2 HYPOGLYCEMIA: ICD-10-CM

## 2022-05-22 DIAGNOSIS — R50.9 FEVER: ICD-10-CM

## 2022-05-22 DIAGNOSIS — N18.30 TYPE 2 DIABETES MELLITUS WITH STAGE 3 CHRONIC KIDNEY DISEASE, WITH LONG-TERM CURRENT USE OF INSULIN (HCC): ICD-10-CM

## 2022-05-22 DIAGNOSIS — D72.829 LEUKOCYTOSIS: ICD-10-CM

## 2022-05-22 DIAGNOSIS — M05.79 RHEUMATOID ARTHRITIS INVOLVING MULTIPLE SITES WITH POSITIVE RHEUMATOID FACTOR (HCC): ICD-10-CM

## 2022-05-22 DIAGNOSIS — E87.1 HYPONATREMIA: ICD-10-CM

## 2022-05-22 DIAGNOSIS — Z79.4 TYPE 2 DIABETES MELLITUS WITH STAGE 3 CHRONIC KIDNEY DISEASE, WITH LONG-TERM CURRENT USE OF INSULIN (HCC): ICD-10-CM

## 2022-05-22 DIAGNOSIS — R45.1 AGITATION: ICD-10-CM

## 2022-05-22 DIAGNOSIS — R09.02 HYPOXEMIA: Primary | ICD-10-CM

## 2022-05-22 DIAGNOSIS — R41.82 ALTERED MENTAL STATUS, UNSPECIFIED ALTERED MENTAL STATUS TYPE: ICD-10-CM

## 2022-05-22 PROBLEM — R65.10 SIRS (SYSTEMIC INFLAMMATORY RESPONSE SYNDROME) (HCC): Status: ACTIVE | Noted: 2022-05-22

## 2022-05-22 LAB
2HR DELTA HS TROPONIN: -2 NG/L
4HR DELTA HS TROPONIN: -6 NG/L
ABO GROUP BLD: NORMAL
ALBUMIN SERPL BCP-MCNC: 2.5 G/DL (ref 3.5–5)
ALP SERPL-CCNC: 120 U/L (ref 46–116)
ALT SERPL W P-5'-P-CCNC: 16 U/L (ref 12–78)
ANION GAP SERPL CALCULATED.3IONS-SCNC: 8 MMOL/L (ref 4–13)
APAP SERPL-MCNC: <2 UG/ML (ref 10–20)
APTT PPP: 37 SECONDS (ref 23–37)
AST SERPL W P-5'-P-CCNC: 39 U/L (ref 5–45)
BACTERIA UR QL AUTO: ABNORMAL /HPF
BASE EX.OXY STD BLDV CALC-SCNC: 68.3 % (ref 60–80)
BASE EXCESS BLDV CALC-SCNC: 4.5 MMOL/L
BASOPHILS # BLD AUTO: 0.05 THOUSANDS/ΜL (ref 0–0.1)
BASOPHILS NFR BLD AUTO: 0 % (ref 0–1)
BILIRUB DIRECT SERPL-MCNC: 0.2 MG/DL (ref 0–0.2)
BILIRUB SERPL-MCNC: 0.78 MG/DL (ref 0.2–1)
BILIRUB UR QL STRIP: ABNORMAL
BLD GP AB SCN SERPL QL: NEGATIVE
BUN SERPL-MCNC: 19 MG/DL (ref 5–25)
CALCIUM SERPL-MCNC: 9 MG/DL (ref 8.3–10.1)
CARDIAC TROPONIN I PNL SERPL HS: 13 NG/L
CARDIAC TROPONIN I PNL SERPL HS: 17 NG/L
CARDIAC TROPONIN I PNL SERPL HS: 19 NG/L
CHLORIDE SERPL-SCNC: 92 MMOL/L (ref 100–108)
CK MB SERPL-MCNC: <1 % (ref 0–2.5)
CK MB SERPL-MCNC: <1 NG/ML (ref 0–5)
CK SERPL-CCNC: 159 U/L (ref 26–192)
CLARITY UR: CLEAR
CO2 SERPL-SCNC: 29 MMOL/L (ref 21–32)
COLOR UR: YELLOW
CREAT SERPL-MCNC: 0.84 MG/DL (ref 0.6–1.3)
EOSINOPHIL # BLD AUTO: 0.4 THOUSAND/ΜL (ref 0–0.61)
EOSINOPHIL NFR BLD AUTO: 3 % (ref 0–6)
ERYTHROCYTE [DISTWIDTH] IN BLOOD BY AUTOMATED COUNT: 13.8 % (ref 11.6–15.1)
ETHANOL SERPL-MCNC: <3 MG/DL (ref 0–3)
FLUAV RNA RESP QL NAA+PROBE: NEGATIVE
FLUBV RNA RESP QL NAA+PROBE: NEGATIVE
GFR SERPL CREATININE-BSD FRML MDRD: 67 ML/MIN/1.73SQ M
GLUCOSE SERPL-MCNC: 114 MG/DL (ref 65–140)
GLUCOSE SERPL-MCNC: 61 MG/DL (ref 65–140)
GLUCOSE SERPL-MCNC: 79 MG/DL (ref 65–140)
GLUCOSE SERPL-MCNC: 88 MG/DL (ref 65–140)
GLUCOSE UR STRIP-MCNC: NEGATIVE MG/DL
HCO3 BLDV-SCNC: 29.3 MMOL/L (ref 24–30)
HCT VFR BLD AUTO: 43.4 % (ref 34.8–46.1)
HGB BLD-MCNC: 13.8 G/DL (ref 11.5–15.4)
HGB UR QL STRIP.AUTO: NEGATIVE
IMM GRANULOCYTES # BLD AUTO: 0.07 THOUSAND/UL (ref 0–0.2)
IMM GRANULOCYTES NFR BLD AUTO: 1 % (ref 0–2)
INR PPP: 1.22 (ref 0.84–1.19)
KETONES UR STRIP-MCNC: ABNORMAL MG/DL
LACTATE SERPL-SCNC: 1.2 MMOL/L (ref 0.5–2)
LEUKOCYTE ESTERASE UR QL STRIP: ABNORMAL
LYMPHOCYTES # BLD AUTO: 0.82 THOUSANDS/ΜL (ref 0.6–4.47)
LYMPHOCYTES NFR BLD AUTO: 6 % (ref 14–44)
MCH RBC QN AUTO: 28.2 PG (ref 26.8–34.3)
MCHC RBC AUTO-ENTMCNC: 31.8 G/DL (ref 31.4–37.4)
MCV RBC AUTO: 89 FL (ref 82–98)
MONOCYTES # BLD AUTO: 1.16 THOUSAND/ΜL (ref 0.17–1.22)
MONOCYTES NFR BLD AUTO: 8 % (ref 4–12)
NEUTROPHILS # BLD AUTO: 12.07 THOUSANDS/ΜL (ref 1.85–7.62)
NEUTS SEG NFR BLD AUTO: 82 % (ref 43–75)
NITRITE UR QL STRIP: NEGATIVE
NON-SQ EPI CELLS URNS QL MICRO: ABNORMAL /HPF
NRBC BLD AUTO-RTO: 0 /100 WBCS
NT-PROBNP SERPL-MCNC: 1810 PG/ML
O2 CT BLDV-SCNC: 14.3 ML/DL
PCO2 BLDV: 44.4 MM HG (ref 42–50)
PH BLDV: 7.44 [PH] (ref 7.3–7.4)
PH UR STRIP.AUTO: 6 [PH]
PLATELET # BLD AUTO: 273 THOUSANDS/UL (ref 149–390)
PLATELET # BLD AUTO: 325 THOUSANDS/UL (ref 149–390)
PMV BLD AUTO: 10.4 FL (ref 8.9–12.7)
PMV BLD AUTO: 9.8 FL (ref 8.9–12.7)
PO2 BLDV: 36.2 MM HG (ref 35–45)
POTASSIUM SERPL-SCNC: 3.9 MMOL/L (ref 3.5–5.3)
PROCALCITONIN SERPL-MCNC: 1.09 NG/ML
PROT SERPL-MCNC: 7.3 G/DL (ref 6.4–8.2)
PROT UR STRIP-MCNC: ABNORMAL MG/DL
PROTHROMBIN TIME: 14.9 SECONDS (ref 11.6–14.5)
RBC # BLD AUTO: 4.9 MILLION/UL (ref 3.81–5.12)
RBC #/AREA URNS AUTO: ABNORMAL /HPF
RH BLD: POSITIVE
RSV RNA RESP QL NAA+PROBE: NEGATIVE
SALICYLATES SERPL-MCNC: <3 MG/DL (ref 3–20)
SARS-COV-2 RNA RESP QL NAA+PROBE: NEGATIVE
SODIUM SERPL-SCNC: 129 MMOL/L (ref 136–145)
SP GR UR STRIP.AUTO: 1.02 (ref 1–1.03)
SPECIMEN EXPIRATION DATE: NORMAL
TSH SERPL DL<=0.05 MIU/L-ACNC: 0.78 UIU/ML (ref 0.45–4.5)
UROBILINOGEN UR QL STRIP.AUTO: 1 E.U./DL
WBC # BLD AUTO: 14.57 THOUSAND/UL (ref 4.31–10.16)
WBC #/AREA URNS AUTO: ABNORMAL /HPF

## 2022-05-22 PROCEDURE — 82805 BLOOD GASES W/O2 SATURATION: CPT | Performed by: EMERGENCY MEDICINE

## 2022-05-22 PROCEDURE — 74176 CT ABD & PELVIS W/O CONTRAST: CPT

## 2022-05-22 PROCEDURE — 82948 REAGENT STRIP/BLOOD GLUCOSE: CPT

## 2022-05-22 PROCEDURE — 87040 BLOOD CULTURE FOR BACTERIA: CPT | Performed by: EMERGENCY MEDICINE

## 2022-05-22 PROCEDURE — 86850 RBC ANTIBODY SCREEN: CPT | Performed by: EMERGENCY MEDICINE

## 2022-05-22 PROCEDURE — 70450 CT HEAD/BRAIN W/O DYE: CPT

## 2022-05-22 PROCEDURE — 80179 DRUG ASSAY SALICYLATE: CPT | Performed by: EMERGENCY MEDICINE

## 2022-05-22 PROCEDURE — 82550 ASSAY OF CK (CPK): CPT | Performed by: EMERGENCY MEDICINE

## 2022-05-22 PROCEDURE — 84145 PROCALCITONIN (PCT): CPT | Performed by: EMERGENCY MEDICINE

## 2022-05-22 PROCEDURE — 96361 HYDRATE IV INFUSION ADD-ON: CPT

## 2022-05-22 PROCEDURE — 80048 BASIC METABOLIC PNL TOTAL CA: CPT | Performed by: EMERGENCY MEDICINE

## 2022-05-22 PROCEDURE — 84443 ASSAY THYROID STIM HORMONE: CPT | Performed by: EMERGENCY MEDICINE

## 2022-05-22 PROCEDURE — 93005 ELECTROCARDIOGRAM TRACING: CPT

## 2022-05-22 PROCEDURE — 72125 CT NECK SPINE W/O DYE: CPT

## 2022-05-22 PROCEDURE — 36415 COLL VENOUS BLD VENIPUNCTURE: CPT | Performed by: EMERGENCY MEDICINE

## 2022-05-22 PROCEDURE — 99285 EMERGENCY DEPT VISIT HI MDM: CPT

## 2022-05-22 PROCEDURE — 83880 ASSAY OF NATRIURETIC PEPTIDE: CPT | Performed by: EMERGENCY MEDICINE

## 2022-05-22 PROCEDURE — 80076 HEPATIC FUNCTION PANEL: CPT | Performed by: EMERGENCY MEDICINE

## 2022-05-22 PROCEDURE — 96365 THER/PROPH/DIAG IV INF INIT: CPT

## 2022-05-22 PROCEDURE — 81001 URINALYSIS AUTO W/SCOPE: CPT | Performed by: EMERGENCY MEDICINE

## 2022-05-22 PROCEDURE — 96375 TX/PRO/DX INJ NEW DRUG ADDON: CPT

## 2022-05-22 PROCEDURE — 83605 ASSAY OF LACTIC ACID: CPT | Performed by: EMERGENCY MEDICINE

## 2022-05-22 PROCEDURE — 80143 DRUG ASSAY ACETAMINOPHEN: CPT | Performed by: EMERGENCY MEDICINE

## 2022-05-22 PROCEDURE — 96376 TX/PRO/DX INJ SAME DRUG ADON: CPT

## 2022-05-22 PROCEDURE — 86900 BLOOD TYPING SEROLOGIC ABO: CPT | Performed by: EMERGENCY MEDICINE

## 2022-05-22 PROCEDURE — 85610 PROTHROMBIN TIME: CPT | Performed by: EMERGENCY MEDICINE

## 2022-05-22 PROCEDURE — G1004 CDSM NDSC: HCPCS

## 2022-05-22 PROCEDURE — 71250 CT THORAX DX C-: CPT

## 2022-05-22 PROCEDURE — 85025 COMPLETE CBC W/AUTO DIFF WBC: CPT | Performed by: EMERGENCY MEDICINE

## 2022-05-22 PROCEDURE — 99285 EMERGENCY DEPT VISIT HI MDM: CPT | Performed by: EMERGENCY MEDICINE

## 2022-05-22 PROCEDURE — 87086 URINE CULTURE/COLONY COUNT: CPT | Performed by: EMERGENCY MEDICINE

## 2022-05-22 PROCEDURE — 82553 CREATINE MB FRACTION: CPT | Performed by: EMERGENCY MEDICINE

## 2022-05-22 PROCEDURE — 71045 X-RAY EXAM CHEST 1 VIEW: CPT

## 2022-05-22 PROCEDURE — 99223 1ST HOSP IP/OBS HIGH 75: CPT | Performed by: INTERNAL MEDICINE

## 2022-05-22 PROCEDURE — 84484 ASSAY OF TROPONIN QUANT: CPT | Performed by: EMERGENCY MEDICINE

## 2022-05-22 PROCEDURE — 85730 THROMBOPLASTIN TIME PARTIAL: CPT | Performed by: EMERGENCY MEDICINE

## 2022-05-22 PROCEDURE — 0241U HB NFCT DS VIR RESP RNA 4 TRGT: CPT | Performed by: EMERGENCY MEDICINE

## 2022-05-22 PROCEDURE — 85049 AUTOMATED PLATELET COUNT: CPT | Performed by: INTERNAL MEDICINE

## 2022-05-22 PROCEDURE — 82077 ASSAY SPEC XCP UR&BREATH IA: CPT | Performed by: EMERGENCY MEDICINE

## 2022-05-22 PROCEDURE — 86901 BLOOD TYPING SEROLOGIC RH(D): CPT | Performed by: EMERGENCY MEDICINE

## 2022-05-22 RX ORDER — DEXTROSE MONOHYDRATE 25 G/50ML
25 INJECTION, SOLUTION INTRAVENOUS ONCE
Status: COMPLETED | OUTPATIENT
Start: 2022-05-22 | End: 2022-05-22

## 2022-05-22 RX ORDER — ALBUTEROL SULFATE 90 UG/1
1 AEROSOL, METERED RESPIRATORY (INHALATION) EVERY 6 HOURS PRN
Status: DISCONTINUED | OUTPATIENT
Start: 2022-05-22 | End: 2022-05-24 | Stop reason: HOSPADM

## 2022-05-22 RX ORDER — INSULIN LISPRO 100 [IU]/ML
1-5 INJECTION, SOLUTION INTRAVENOUS; SUBCUTANEOUS
Status: DISCONTINUED | OUTPATIENT
Start: 2022-05-22 | End: 2022-05-24 | Stop reason: HOSPADM

## 2022-05-22 RX ORDER — FENTANYL CITRATE 50 UG/ML
50 INJECTION, SOLUTION INTRAMUSCULAR; INTRAVENOUS ONCE
Status: COMPLETED | OUTPATIENT
Start: 2022-05-22 | End: 2022-05-22

## 2022-05-22 RX ORDER — INSULIN GLARGINE 100 [IU]/ML
20 INJECTION, SOLUTION SUBCUTANEOUS EVERY MORNING
Status: DISCONTINUED | OUTPATIENT
Start: 2022-05-23 | End: 2022-05-23

## 2022-05-22 RX ORDER — ACETAMINOPHEN 325 MG/1
650 TABLET ORAL EVERY 6 HOURS PRN
Status: DISCONTINUED | OUTPATIENT
Start: 2022-05-22 | End: 2022-05-24 | Stop reason: HOSPADM

## 2022-05-22 RX ORDER — FLUTICASONE FUROATE AND VILANTEROL 100; 25 UG/1; UG/1
1 POWDER RESPIRATORY (INHALATION) DAILY
Status: DISCONTINUED | OUTPATIENT
Start: 2022-05-23 | End: 2022-05-24 | Stop reason: HOSPADM

## 2022-05-22 RX ORDER — LISINOPRIL 10 MG/1
10 TABLET ORAL DAILY
Status: DISCONTINUED | OUTPATIENT
Start: 2022-05-23 | End: 2022-05-23

## 2022-05-22 RX ORDER — METHOCARBAMOL 500 MG/1
500 TABLET, FILM COATED ORAL 2 TIMES DAILY PRN
Status: DISCONTINUED | OUTPATIENT
Start: 2022-05-22 | End: 2022-05-24 | Stop reason: HOSPADM

## 2022-05-22 RX ORDER — TORSEMIDE 10 MG/1
20 TABLET ORAL 2 TIMES DAILY
Status: DISCONTINUED | OUTPATIENT
Start: 2022-05-22 | End: 2022-05-23

## 2022-05-22 RX ORDER — ESCITALOPRAM OXALATE 10 MG/1
20 TABLET ORAL DAILY
Status: DISCONTINUED | OUTPATIENT
Start: 2022-05-23 | End: 2022-05-24 | Stop reason: HOSPADM

## 2022-05-22 RX ORDER — ACETAMINOPHEN 325 MG/1
975 TABLET ORAL ONCE
Status: COMPLETED | OUTPATIENT
Start: 2022-05-22 | End: 2022-05-22

## 2022-05-22 RX ORDER — HEPARIN SODIUM 5000 [USP'U]/ML
5000 INJECTION, SOLUTION INTRAVENOUS; SUBCUTANEOUS EVERY 8 HOURS SCHEDULED
Status: DISCONTINUED | OUTPATIENT
Start: 2022-05-23 | End: 2022-05-24 | Stop reason: HOSPADM

## 2022-05-22 RX ADMIN — DEXTROSE MONOHYDRATE 25 ML: 25 INJECTION, SOLUTION INTRAVENOUS at 17:37

## 2022-05-22 RX ADMIN — CEFEPIME HYDROCHLORIDE 2000 MG: 2 INJECTION, POWDER, FOR SOLUTION INTRAVENOUS at 16:41

## 2022-05-22 RX ADMIN — FENTANYL CITRATE 50 MCG: 50 INJECTION, SOLUTION INTRAMUSCULAR; INTRAVENOUS at 16:31

## 2022-05-22 RX ADMIN — ACETAMINOPHEN 650 MG: 325 TABLET, FILM COATED ORAL at 23:15

## 2022-05-22 RX ADMIN — SODIUM CHLORIDE 500 ML: 0.9 INJECTION, SOLUTION INTRAVENOUS at 17:37

## 2022-05-22 RX ADMIN — ACETAMINOPHEN 975 MG: 325 TABLET, FILM COATED ORAL at 16:18

## 2022-05-22 RX ADMIN — FENTANYL CITRATE 50 MCG: 50 INJECTION, SOLUTION INTRAMUSCULAR; INTRAVENOUS at 18:05

## 2022-05-22 RX ADMIN — TORSEMIDE 20 MG: 10 TABLET ORAL at 21:47

## 2022-05-22 NOTE — H&P
[id number] [facility] 1945, 68 y o  female MRN: [id number]  Unit/Bed#: ED 07 Encounter: [id number]  Primary Care Provider: Guillermo Wray MD   Date and time admitted to hospital: 5/22/2022  2:33 PM    * SIRS (systemic inflammatory response syndrome) ([facility] )  Assessment & Plan  Presented with fever and leukocytosis with associated tachycardia  Unclear source at this time  Chest x-ray did not appear to show any focal consolidation  Will check urinalysis  Currently pending  Will place on ceftriaxone for now  Follow-up blood cultures  Trend WBC count and fever curve    Chronic respiratory failure with hypoxia ([facility] )  Assessment & Plan  Currently at baseline 3 L nasal cannula    Type 2 diabetes mellitus with stage 2 chronic kidney disease, with long-term current use of insulin ([place] [address] )  Assessment & Plan    Lab Results   Component Value Date    HGBA1C 6 7 (H) 12/10/2021   Presented with hypoglycemia was asymptomatic he is on significant insulin regimen Lantus 25 units q a m  And Lantus 40 units q h s  Given well controlled diabetes with A1c 6 7 and age risks of significant insulin regimen outweigh benefits  Will reduce Lantus to 20 units q a m   For now  Sliding scale insulin  Monitor blood glucose    Ambulatory dysfunction  Assessment & Plan  Noted to have fall at home  PT/OT    Essential hypertension  Assessment & Plan  BP currently controlled  Continue home torsemide and ACE-inhibitor    Chronic diastolic congestive heart failure (HCC)  Assessment & Plan  Wt Readings from Last 3 Encounters:   05/22/22 73 6 kg (162 lb 4 1 oz)   05/05/22 73 5 kg (162 lb)   01/25/22 81 2 kg (179 lb)     Currently appears euvolemic  Continue home torsemide        VTE Prophylaxis: Heparin  / sequential compression device   Code Status: full code  POLST: There is no POLST form on file for this patient (pre-hospital)  Discussion with family: pt    Anticipated Length of Stay:  Patient will be admitted on an Inpatient basis with an anticipated length of stay of  > 2 midnights  Justification for Hospital Stay: sirs    Total Time for Visit, including Counseling / Coordination of Care: 60 minutes  Greater than 50% of this total time spent on direct patient counseling and coordination of care  Chief Complaint:  Fever    History of Present Illness:    Dasha Mckeon is a 68 y o  female with past medical history significant congestive heart failure, chronic hypoxic respiratory failure on 3 L nasal cannula at baseline, hypertension, type 2 diabetes initially presented with fever  She otherwise denies any acute complaints  She reports of fall out of bed overnight  Review of Systems:    Review of Systems   Constitutional: Positive for fatigue and fever  Negative for activity change, appetite change, chills, diaphoresis and unexpected weight change  HENT: Negative for congestion, facial swelling and rhinorrhea  Eyes: Negative for photophobia and visual disturbance  Respiratory: Negative for cough, shortness of breath and wheezing  Cardiovascular: Negative for chest pain and palpitations  Gastrointestinal: Negative for abdominal pain, blood in stool, constipation, diarrhea, nausea and vomiting  Genitourinary: Negative for decreased urine volume, difficulty urinating, dysuria, flank pain, frequency, hematuria and urgency  Musculoskeletal: Negative for arthralgias, back pain, joint swelling and myalgias  Neurological: Negative for dizziness, syncope, facial asymmetry, light-headedness, numbness and headaches  Psychiatric/Behavioral: Negative for confusion and decreased concentration  The patient is not nervous/anxious          Past Medical and Surgical History:     Past Medical History:   Diagnosis Date    Asthma     Chest pain on breathing     last assessed 2/5/15    [facility]est[facility]) [facility])     Chronic respiratory failure with hypoxia ([facility] )     Diabetes mellitus ([address] )     HTN (hypertension)     Hyperlipidemia     Insomnia     Obesity     Preop cardiovascular exam 2/2/2018    Pulmonary fibrosis (HCC)     Rheumatoid arthritis (HCC)     Volume overload 12/10/2021       Past Surgical History:   Procedure Laterality Date    HAND SURGERY         Meds/Allergies:    Prior to Admission medications    Medication Sig Start Date End Date Taking?  Authorizing Provider   albuterol (2 5 mg/3 mL) 0 083 % nebulizer solution Take 3 mL (2 5 mg total) by nebulization every 6 (six) hours as needed for wheezing or shortness of breath 1/17/22   Adriana Wan PA-C   albuterol (PROVENTIL HFA,VENTOLIN HFA) 90 mcg/act inhaler Inhale 1 puff every 6 (six) hours as needed for wheezing or shortness of breath 2/26/21   Kg Atkins PA-C   B-D TB SYRINGE 1CC/27GX1/2" 27G X 1/2" 1 ML MISC by Other route 2 (two) times a day 11/14/19   Virgin Prader, MD   Basaglar KwikPen 100 units/mL injection pen INJECT 40 UNITS IN THE MORNING AND 25 UNITS IN THE EVENING 3/20/22   Virgin Prader, MD   Blood Glucose Monitoring Suppl (OneTouch Verio) w/Device KIT by Does not apply route 3 (three) times a day 9/28/20   Virgin Prader, MD   Cimzia Starter Kit 6 X 200 MG/ML KIT  7/15/21   Historical Provider, MD   escitalopram (LEXAPRO) 20 mg tablet TAKE 1 TABLET BY MOUTH EVERY DAY 11/12/21   RYAN Snow   fluticasone-vilanterol [facility] ELLIPTA) 100-25 mcg/inh inhaler Inhale 1 puff daily Rinse mouth after use  2/26/21   Kg Atkins PA-C   fosinopril (MONOPRIL) 10 mg tablet TAKE 1 TABLET BY MOUTH EVERY DAY 10/20/21   Virgin Prader, MD   Insulin Pen Needle (B-D UF III MINI PEN NEEDLES) 31G X 5 MM MISC Inject under the skin 2 (two) times a day Use as directed 11/14/19   Virgin Prader, MD   metFORMIN (GLUCOPHAGE) 1000 MG tablet TAKE 1 TABLET BY MOUTH TWICE A DAY 5/26/21   Virgin Prader, MD   methocarbamol (ROBAXIN) 500 mg tablet Take 1 tablet (500 mg total) by mouth 2 (two) times a day as needed for muscle spasms (side pain) 21   Tomás Mello MD   OneTouch Ultra test strip PATIENT TO TEST ONCE A DAY 21   Nancy Tamez MD   oxyCODONE-acetaminophen (PERCOCET)  mg per tablet  22   Historical Provider, MD   torsemide (DEMADEX) 20 mg tablet TAKE 2 TABLETS BY MOUTH 2 (TWO) TIMES A DAY 21   Nancy Tamez MD     I have reviewed home medications with patient personally  Allergies: Allergies   Allergen Reactions    Gabapentin     Vancomycin        Social History:     Marital Status: /Civil Union     Patient Pre-hospital Living Situation: home  Patient Pre-hospital Level of Mobility: w assistance  Patient Pre-hospital Diet Restrictions: dm  Substance Use History:   Social History     Substance and Sexual Activity   Alcohol Use Never     Social History     Tobacco Use   Smoking Status Former Smoker    Packs/day: 1 50    Years: 18 00    Pack years: 27 00    Types: Cigarettes    Start date:     Quit date: 0    Years since quittin 4   Smokeless Tobacco Never Used   Tobacco Comment    Quit 20 years ago     Social History     Substance and Sexual Activity   Drug Use No       Family History:    Family History   Problem Relation Age of Onset    Rheum arthritis Mother     Hypertension Mother        Physical Exam:     Vitals:   Blood Pressure: 110/60 (22 1730)  Pulse: 79 (22 1730)  Temperature: (!) 100 6 °F (38 1 °C) (22 1524)  Temp Source: Oral (22 1524)  Respirations: 19 (22 1730)  Height: 4' 10" (147 3 cm) (22 1440)  Weight - Scale: 73 6 kg (162 lb 4 1 oz) (22 1524)  SpO2: 90 % (22 1730)    Physical Exam  Constitutional:       General: She is not in acute distress  Appearance: She is well-developed  She is not diaphoretic  HENT:      Head: Normocephalic and atraumatic  Nose: Nose normal       Mouth/Throat:      Pharynx: No oropharyngeal exudate     Eyes:      General: No scleral icterus  Right eye: No discharge  Left eye: No discharge  Conjunctiva/sclera: Conjunctivae normal    Neck:      Thyroid: No thyromegaly  Vascular: No JVD  Cardiovascular:      Rate and Rhythm: Normal rate and regular rhythm  Heart sounds: Normal heart sounds  No murmur heard  No friction rub  No gallop  Pulmonary:      Effort: Pulmonary effort is normal  No respiratory distress  Breath sounds: Normal breath sounds  No wheezing or rales  Chest:      Chest wall: No tenderness  Abdominal:      General: Bowel sounds are normal  There is no distension  Palpations: Abdomen is soft  Tenderness: There is no abdominal tenderness  There is no guarding or rebound  Musculoskeletal:         General: No tenderness or deformity  Normal range of motion  Cervical back: Normal range of motion and neck supple  Skin:     General: Skin is warm and dry  Findings: No erythema or rash  Neurological:      Mental Status: She is alert  Mental status is at baseline  Cranial Nerves: No cranial nerve deficit  Sensory: No sensory deficit  Motor: No abnormal muscle tone  Coordination: Coordination normal              Additional Data:     Lab Results: I have personally reviewed pertinent reports        Results from last 7 days   Lab Units 05/22/22  1526   WBC Thousand/uL 14 57*   HEMOGLOBIN g/dL 13 8   HEMATOCRIT % 43 4   PLATELETS Thousands/uL 325   NEUTROS PCT % 82*   LYMPHS PCT % 6*   MONOS PCT % 8   EOS PCT % 3     Results from last 7 days   Lab Units 05/22/22  1636   SODIUM mmol/L 129*   POTASSIUM mmol/L 3 9   CHLORIDE mmol/L 92*   CO2 mmol/L 29   BUN mg/dL 19   CREATININE mg/dL 0 84   ANION GAP mmol/L 8   CALCIUM mg/dL 9 0   ALBUMIN g/dL 2 5*   TOTAL BILIRUBIN mg/dL 0 78   ALK PHOS U/L 120*   ALT U/L 16   AST U/L 39   GLUCOSE RANDOM mg/dL 61*     Results from last 7 days   Lab Units 05/22/22  1526   INR  1 22*     Results from last 7 days   Lab Units 05/22/22  1449   POC GLUCOSE mg/dl 79         Results from last 7 days   Lab Units 05/22/22  1636   LACTIC ACID mmol/L 1 2   PROCALCITONIN ng/ml 1 09*       Imaging: I have personally reviewed pertinent reports  TRAUMA - CT head wo contrast   Final Result by Albino Rodriguez MD (05/22 7186)      No acute intracranial abnormality  Workstation performed: [id number]         TRAUMA - CT spine cervical wo contrast   Final Result by Albino Rodriguez MD (05/22 3564)      No cervical spine fracture or traumatic malalignment  Workstation performed: [id number]         CT chest abdomen pelvis wo contrast   Final Result by Albino Rodriguez MD (05/22 9955)      1  No acute traumatic abnormality   2  Chronic appearing T4 compression deformity                  Workstation performed: [id number]         XR Trauma chest portable    (Results Pending)       EKG, Pathology, and Other Studies Reviewed on Admission:   · EKG:  Reviewed    Allscripts / Epic Records Reviewed: Yes     ** Please Note: This note has been constructed using a voice recognition system   **

## 2022-05-22 NOTE — ASSESSMENT & PLAN NOTE
Lab Results   Component Value Date    HGBA1C 6 7 (H) 12/10/2021   Continue home Lantus  Sliding scale insulin

## 2022-05-22 NOTE — ASSESSMENT & PLAN NOTE
Lab Results   Component Value Date    HGBA1C 6 7 (H) 12/10/2021     · Presented with hypoglycemia, asymptomatic  · She is on significant insulin regimen: Lantus 25 units q a m  And Lantus 40 units q h s    · Given well controlled diabetes with A1c 6 7 and age risks of significant insulin regimen outweigh benefits  · Reduced Lantus to 20 units q a m  - will discontinue for now  · Sliding scale insulin  · Monitor blood glucose

## 2022-05-22 NOTE — ASSESSMENT & PLAN NOTE
Presented with fever and leukocytosis with associated tachycardia  Unclear source at this time  Chest x-ray did not appear to show any focal consolidation  Will check urinalysis  Currently pending  Will place on ceftriaxone for now  Follow-up blood cultures  Trend WBC count and fever curve

## 2022-05-22 NOTE — ASSESSMENT & PLAN NOTE
Wt Readings from Last 3 Encounters:   05/22/22 73 6 kg (162 lb 4 1 oz)   05/05/22 73 5 kg (162 lb)   01/25/22 81 2 kg (179 lb)     Currently appears euvolemic

## 2022-05-22 NOTE — ED PROVIDER NOTES
Pt Name: Ruma Farfan  MRN: [id number]  [place] 1945  Age/Sex: 68 y o  female  Date of evaluation: 2022  PCP: Cindy Bernal MD    CHIEF COMPLAINT    Chief Complaint   Patient presents with   Naila Dad     Daughter states pt fell of her bed this morning  Per family pt is not on BT, c/o all over body aches  HPI    68 y o  female presenting with pain over her entire body as well as confusion  Per patient's family, she fell out of her bed at approximately 3 this morning, was on the floor for some time before son was able to get her up and back into the bed  She seems confused and less responsive than usual, is also complaining of pain over the entire body  Patient is a diabetic, has had blood sugars that have been low, in the 60s over the past few days  Patient also has a history of congestive heart failure  No recent illnesses or fevers noted by family        HPI      Past Medical and Surgical History    Past Medical History:   Diagnosis Date    Asthma     Chest pain on breathing     last assessed 2/5/15    Chronic diastolic CHF (congestive heart failure) (HCC)     Chronic respiratory failure with hypoxia (HCC)     Diabetes mellitus ([address] )     HTN (hypertension)     Hyperlipidemia     Insomnia     Obesity     Preop cardiovascular exam 2018    Pulmonary fibrosis (HCC)     Rheumatoid arthritis (HCC)     Volume overload 12/10/2021       Past Surgical History:   Procedure Laterality Date    HAND SURGERY         Family History   Problem Relation Age of Onset    Rheum arthritis Mother     Hypertension Mother        Social History     Tobacco Use    Smoking status: Former Smoker     Packs/day: 1 50     Years: 18 00     Pack years: 27 00     Types: Cigarettes     Start date:      Quit date:      Years since quittin 4    Smokeless tobacco: Never Used    Tobacco comment: Quit 20 years ago   Vaping Use    Vaping Use: Former   Substance Use Topics    Alcohol use: Never    Drug use: No           Allergies    Allergies   Allergen Reactions    Gabapentin     Vancomycin        Home Medications    Prior to Admission medications    Medication Sig Start Date End Date Taking?  Authorizing Provider   albuterol (2 5 mg/3 mL) 0 083 % nebulizer solution Take 3 mL (2 5 mg total) by nebulization every 6 (six) hours as needed for wheezing or shortness of breath 1/17/22   Yanick Cuellar PA-C   albuterol (PROVENTIL HFA,VENTOLIN HFA) 90 mcg/act inhaler Inhale 1 puff every 6 (six) hours as needed for wheezing or shortness of breath 2/26/21   Maye Medina PA-C   B-D TB SYRINGE 1CC/27GX1/2" 27G X 1/2" 1 ML MISC by Other route 2 (two) times a day 11/14/19   MD Manoj Peralta 100 units/mL injection pen INJECT 40 UNITS IN THE MORNING AND 25 UNITS IN THE EVENING 3/20/22   Sheela Hernandez MD   Blood Glucose Monitoring Suppl (OneTouch Verio) w/Device KIT by Does not apply route 3 (three) times a day 9/28/20   Sheela Hernandez MD   Cimzia Starter Kit 6 X 200 MG/ML KIT  7/15/21   Historical Provider, MD   escitalopram (LEXAPRO) 20 mg tablet TAKE 1 TABLET BY MOUTH EVERY DAY 11/12/21   RYAN Craven   fluticasone-vilanterol (BREO ELLIPTA) 100-25 mcg/inh inhaler Inhale 1 puff daily Rinse mouth after use  2/26/21   Maye Medina PA-C   fosinopril (MONOPRIL) 10 mg tablet TAKE 1 TABLET BY MOUTH EVERY DAY 10/20/21   Sheela Hernandez MD   Insulin Pen Needle (B-D UF III MINI PEN NEEDLES) 31G X 5 MM MISC Inject under the skin 2 (two) times a day Use as directed 11/14/19   Sheela Hernandez MD   metFORMIN (GLUCOPHAGE) 1000 MG tablet TAKE 1 TABLET BY MOUTH TWICE A DAY 5/26/21   Sheela Hernandez MD   methocarbamol (ROBAXIN) 500 mg tablet Take 1 tablet (500 mg total) by mouth 2 (two) times a day as needed for muscle spasms (side pain) 5/21/21   Jasmin Acuna MD   OneTouch Ultra test strip PATIENT TO TEST ONCE A DAY 5/12/21   Sheela Hernandez MD   oxyCODONE-acetaminophen (PERCOCET)  mg per tablet  1/14/22   Historical Provider, MD   torsemide (DEMADEX) 20 mg tablet TAKE 2 TABLETS BY MOUTH 2 (TWO) TIMES A DAY 7/6/21   Criss Mccarthy MD           Review of Systems    Review of Systems   Constitutional: Negative for activity change, chills and fever  HENT: Negative for drooling and facial swelling  Eyes: Negative for pain, discharge and visual disturbance  Respiratory: Negative for apnea, cough, chest tightness, shortness of breath and wheezing  Cardiovascular: Positive for leg swelling  Negative for chest pain  Gastrointestinal: Negative for abdominal pain, constipation, diarrhea, nausea and vomiting  Genitourinary: Negative for difficulty urinating, dysuria and urgency  Musculoskeletal: Positive for myalgias  Negative for arthralgias, back pain and gait problem  Skin: Negative for color change and rash  Neurological: Negative for dizziness, speech difficulty, weakness and headaches  Psychiatric/Behavioral: Positive for confusion  Negative for agitation and behavioral problems  All other systems reviewed and negative  Physical Exam      ED Triage Vitals   Temperature Pulse Respirations Blood Pressure SpO2   05/22/22 1524 05/22/22 1440 05/22/22 1440 05/22/22 1440 05/22/22 1440   (!) 100 6 °F (38 1 °C) 98 16 149/62 92 %      Temp Source Heart Rate Source Patient Position - Orthostatic VS BP Location FiO2 (%)   05/22/22 1524 05/22/22 1524 05/22/22 1440 05/22/22 1440 --   Oral Monitor Lying Left arm       Pain Score       05/22/22 1524       8               Physical Exam  Vitals and nursing note reviewed  Constitutional:       General: She is in acute distress  Appearance: She is well-developed  She is ill-appearing  She is not toxic-appearing or diaphoretic  HENT:      Head: Normocephalic and atraumatic        Right Ear: External ear normal       Left Ear: External ear normal    Eyes:      Conjunctiva/sclera: Conjunctivae normal  Pupils: Pupils are equal, round, and reactive to light  Cardiovascular:      Rate and Rhythm: Normal rate and regular rhythm  Heart sounds: Normal heart sounds  Pulmonary:      Effort: Pulmonary effort is normal  No respiratory distress  Breath sounds: Rales present  No wheezing  Comments: Bibasilar crackles  Abdominal:      General: There is no distension  Palpations: Abdomen is soft  Tenderness: There is no abdominal tenderness  There is no guarding or rebound  Musculoskeletal:         General: No deformity  Normal range of motion  Cervical back: Normal range of motion and neck supple  Right lower leg: Edema present  Left lower leg: Edema present  Comments: 1+ pitting edema bilateral lower extremities, strength sensation pulse and cap refill intact  Red discoloration noted to both lower legs and feet in stocking distribution, felt more consistent with venous stasis than with acute cellulitis   Skin:     General: Skin is warm and dry  Findings: Erythema and rash present  Neurological:      Mental Status: She is alert and oriented to person, place, and time  Cranial Nerves: No cranial nerve deficit  Motor: No weakness  Coordination: Coordination normal       Gait: Gait normal    Psychiatric:         Behavior: Behavior normal          Thought Content: Thought content normal          Judgment: Judgment normal               Diagnostic Results    EKG Interpretation    Rate:  68  BPM  Rhythm:  Normal Sinus Rhythm   Axis:  Left axis deviation  Intervals: Normal, no blocks, QTc  455 ms  Q waves:  No pathologic Q waves   T waves:  Normal   ST segments:  No significant elevations or depressions     Impression:  Baseline artifact noted, Normal sinus rhythm without evidence of acute ischemia or significant arrhythmia      EKG for comparison:  EKG dated 10 December 2021 similar in character with no major changes    EKG interpreted by me  Labs:    Results Reviewed     Procedure Component Value Units Date/Time    HS Troponin I 4hr [[id number]]  (Normal) Collected: 05/22/22 2152    Lab Status: Final result Specimen: Blood from Arm, Left Updated: 05/22/22 2238     hs TnI 4hr 13 ng/L      Delta 4hr hsTnI -6 ng/L     Platelet count [[id number]]  (Normal) Collected: 05/22/22 2152    Lab Status: Final result Specimen: Blood from Arm, Left Updated: 05/22/22 2204     Platelets 427 Thousands/uL      MPV 9 8 fL     Fingerstick Glucose (POCT) [[id number]]  (Normal) Collected: 05/22/22 2151    Lab Status: Final result Updated: 05/22/22 2154     POC Glucose 88 mg/dl     Urine Microscopic [[id number]]  (Abnormal) Collected: 05/22/22 1956    Lab Status: Final result Specimen: Urine, Clean Catch Updated: 05/22/22 2022     RBC, UA None Seen /hpf      WBC, UA 10-20 /hpf      Epithelial Cells Occasional /hpf      Bacteria, UA Occasional /hpf     Urine culture [[id number]] Collected: 05/22/22 1956    Lab Status:  In process Specimen: Urine, Clean Catch Updated: 05/22/22 2019    UA w Reflex to Microscopic w Reflex to Culture [[id number]]  (Abnormal) Collected: 05/22/22 1956    Lab Status: Final result Specimen: Urine, Clean Catch Updated: 05/22/22 2003     Color, UA Yellow     Clarity, UA Clear     Specific Gravity, UA 1 025     pH, UA 6 0     Leukocytes, UA Moderate     Nitrite, UA Negative     Protein, UA 30 (1+) mg/dl      Glucose, UA Negative mg/dl      Ketones, UA Trace mg/dl      Urobilinogen, UA 1 0 E U /dl      Bilirubin, UA Small     Blood, UA Negative    HS Troponin I 2hr [[id number]]  (Normal) Collected: 05/22/22 1902    Lab Status: Final result Specimen: Blood from Arm, Right Updated: 05/22/22 1936     hs TnI 2hr 17 ng/L      Delta 2hr hsTnI -2 ng/L     Fingerstick Glucose (POCT) [[id number]]  (Normal) Collected: 05/22/22 1901    Lab Status: Final result Updated: 05/22/22 1904     POC Glucose 114 mg/dl     CKMB [[id number]]  (Normal) Collected: 05/22/22 3436    Lab Status: Final result Specimen: Blood from Arm, Right Updated: 05/22/22 1815     CK-MB Index <1 0 %      CK-MB <1 0 ng/mL     CK (with reflex to MB) [[id number]]  (Normal) Collected: 05/22/22 1636    Lab Status: Final result Specimen: Blood from Arm, Right Updated: 05/22/22 1814     Total  U/L     COVID/FLU/RSV - 2 hour TAT [[id number]]  (Normal) Collected: 05/22/22 1636    Lab Status: Final result Specimen: Nares from Nose Updated: 05/22/22 1735     SARS-CoV-2 Negative     INFLUENZA A PCR Negative     INFLUENZA B PCR Negative     RSV PCR Negative    Narrative:      FOR PEDIATRIC PATIENTS - copy/paste COVID Guidelines URL to browser: [email or web address]  [employer]    SARS-CoV-2 assay is a Nucleic Acid Amplification assay intended for the  qualitative detection of nucleic acid from SARS-CoV-2 in nasopharyngeal  swabs  Results are for the presumptive identification of SARS-CoV-2 RNA  Positive results are indicative of infection with SARS-CoV-2, the virus  causing COVID-19, but do not rule out bacterial infection or co-infection  with other viruses  Laboratories within the [place] and its  territories are required to report all positive results to the appropriate  public health authorities  Negative results do not preclude SARS-CoV-2  infection and should not be used as the sole basis for treatment or other  patient management decisions  Negative results must be combined with  clinical observations, patient history, and epidemiological information  This test has not been FDA cleared or approved  This test has been authorized by FDA under an Emergency Use Authorization  (EUA)   This test is only authorized for the duration of time the  declaration that circumstances exist justifying the authorization of the  emergency use of an in vitro diagnostic tests for detection of SARS-CoV-2  virus and/or diagnosis of COVID-19 infection under section 564(b)(1) of  the Act, [place]  360bbb-3(b)(1), unless the authorization is terminated  or revoked sooner  The test has been validated but independent review by FDA  and CLIA is pending  Test performed using [employer] GeneXpert: This RT-PCR assay targets N2,  a region unique to SARS-CoV-2  A conserved region in the E-gene was chosen  for pan-Sarbecovirus detection which includes SARS-CoV-2  Ethanol [[id number]]  (Normal) Collected: 05/22/22 1636    Lab Status: Final result Specimen: Blood from Arm, Right Updated: 05/22/22 1723     Ethanol Lvl <3 mg/dL     HS Troponin 0hr (reflex protocol) [[id number]]  (Normal) Collected: 05/22/22 1636    Lab Status: Final result Specimen: Blood from Arm, Right Updated: 05/22/22 1718     hs TnI 0hr 19 ng/L     TSH [[id number]]  (Normal) Collected: 05/22/22 1636    Lab Status: Final result Specimen: Blood from Arm, Right Updated: 05/22/22 1718     TSH 3RD GENERATON 0 783 uIU/mL     Narrative:      Patients undergoing fluorescein dye angiography may retain small amounts of fluorescein in the body for 48-72 hours post procedure  Samples containing fluorescein can produce falsely depressed TSH values  If the patient had this procedure,a specimen should be resubmitted post fluorescein clearance  Salicylate level [[id number]]  (Abnormal) Collected: 05/22/22 1636    Lab Status: Final result Specimen: Blood from Arm, Right Updated: 86/54/97 6924     Salicylate Lvl <3 mg/dL     Acetaminophen level-If concentration is detectable, please discuss with medical  on call   [[id number]]  (Abnormal) Collected: 05/22/22 1636    Lab Status: Final result Specimen: Blood from Arm, Right Updated: 05/22/22 1718     Acetaminophen Level <2 0 ug/mL     Hepatic function panel [[id number]]  (Abnormal) Collected: 05/22/22 1636    Lab Status: Final result Specimen: Blood from Arm, Right Updated: 05/22/22 1718     Total Bilirubin 0 78 mg/dL      Bilirubin, Direct 0 20 mg/dL      Alkaline Phosphatase 120 U/L      AST 39 U/L      ALT 16 U/L      Total Protein 7 3 g/dL      Albumin 2 5 g/dL     Basic metabolic panel [[id number]]  (Abnormal) Collected: 05/22/22 1636    Lab Status: Final result Specimen: Blood from Arm, Right Updated: 05/22/22 1718     Sodium 129 mmol/L      Potassium 3 9 mmol/L      Chloride 92 mmol/L      CO2 29 mmol/L      ANION GAP 8 mmol/L      BUN 19 mg/dL      Creatinine 0 84 mg/dL      Glucose 61 mg/dL      Calcium 9 0 mg/dL      eGFR 67 ml/min/1 73sq m     Narrative:      [facility] guidelines for Chronic Kidney Disease (CKD):     Stage 1 with normal or high GFR (GFR > 90 mL/min/1 73 square meters)    Stage 2 Mild CKD (GFR = 60-89 mL/min/1 73 square meters)    Stage 3A Moderate CKD (GFR = 45-59 mL/min/1 73 square meters)    Stage 3B Moderate CKD (GFR = 30-44 mL/min/1 73 square meters)    Stage 4 Severe CKD (GFR = 15-29 mL/min/1 73 square meters)    Stage 5 End Stage CKD (GFR <15 mL/min/1 73 square meters)  Note: GFR calculation is accurate only with a steady state creatinine    Procalcitonin [[id number]]  (Abnormal) Collected: 05/22/22 1636    Lab Status: Final result Specimen: Blood from Arm, Right Updated: 05/22/22 1718     Procalcitonin 1 09 ng/ml     NT-BNP PRO [[id number]]  (Abnormal) Collected: 05/22/22 1636    Lab Status: Final result Specimen: Blood from Arm, Right Updated: 05/22/22 1717     NT-proBNP 1,810 pg/mL     Lactic acid, plasma [[id number]]  (Normal) Collected: 05/22/22 1636    Lab Status: Final result Specimen: Blood from Arm, Right Updated: 05/22/22 1710     LACTIC ACID 1 2 mmol/L     Narrative:      Result may be elevated if tourniquet was used during collection  Blood culture #1 [[id number]] Collected: 05/22/22 1636    Lab Status: In process Specimen: Blood from Arm, Right Updated: 05/22/22 1643    Blood culture #2 [[id number]] Collected: 05/22/22 1636    Lab Status:  In process Specimen: Blood from Arm, Right Updated: 05/22/22 [address]if[address] [[id number]] (Abnormal) Collected: 05/22/22 1526    Lab Status: Final result Specimen: Blood from Arm, Right Updated: 05/22/22 1552     Protime 14 9 seconds      INR 1 22    APTT [[id number]]  (Normal) Collected: 05/22/22 1526    Lab Status: Final result Specimen: Blood from Arm, Right Updated: 05/22/22 1552     PTT 37 seconds     CBC and differential [[id number]]  (Abnormal) Collected: 05/22/22 1526    Lab Status: Final result Specimen: Blood from Arm, Right Updated: 05/22/22 1546     WBC 14 57 Thousand/uL      RBC 4 90 Million/uL      Hemoglobin 13 8 g/dL      Hematocrit 43 4 %      MCV 89 fL      MCH 28 2 pg      MCHC 31 8 g/dL      RDW 13 8 %      MPV 10 4 fL      Platelets 649 Thousands/uL      nRBC 0 /100 WBCs      Neutrophils Relative 82 %      Immat GRANS % 1 %      Lymphocytes Relative 6 %      Monocytes Relative 8 %      Eosinophils Relative 3 %      Basophils Relative 0 %      Neutrophils Absolute 12 07 Thousands/µL      Immature Grans Absolute 0 07 Thousand/uL      Lymphocytes Absolute 0 82 Thousands/µL      Monocytes Absolute 1 16 Thousand/µL      Eosinophils Absolute 0 40 Thousand/µL      Basophils Absolute 0 05 Thousands/µL     Blood gas, venous [[id number]]  (Abnormal) Collected: 05/22/22 1526    Lab Status: Final result Specimen: Blood from Arm, Right Updated: 05/22/22 1540     pH, Saad 7 438     pCO2, Saad 44 4 mm Hg      pO2, Saad 36 2 mm Hg      HCO3, Saad 29 3 mmol/L      Base Excess, Saad 4 5 mmol/L      O2 Content, Saad 14 3 ml/dL      O2 HGB, VENOUS 68 3 %     Fingerstick Glucose (POCT) [[id number]]  (Normal) Collected: 05/22/22 1449    Lab Status: Final result Updated: 05/22/22 1450     POC Glucose 79 mg/dl           All labs reviewed and utilized in the medical decision making process    Radiology:    XR Trauma chest portable   Final Result      1  No acute traumatic abnormality   2    Interstitial pulmonary opacities, favor mild pulmonary edema                  Workstation performed: [id number]         TRAUMA - CT head wo contrast   Final Result      No acute intracranial abnormality  Workstation performed: [id number]         TRAUMA - CT spine cervical wo contrast   Final Result      No cervical spine fracture or traumatic malalignment  Workstation performed: [id number]         CT chest abdomen pelvis wo contrast   Final Result      1  No acute traumatic abnormality   2  Chronic appearing T4 compression deformity                  Workstation performed: [id number]             All radiology studies independently viewed by me and interpreted by the radiologist     Procedure    Procedures        ED Course of Care and Re-Assessments      Pain improved with fentanyl and Tylenol initial fingerstick showed sugar in 70s, on formal blood work lower, given D50  Some concern for sepsis based on fever, leukocytosis, heart rate greater than 90, started on cefepime      Medications   heparin (porcine) subcutaneous injection 5,000 Units (has no administration in time range)   acetaminophen (TYLENOL) tablet 650 mg (has no administration in time range)   ceftriaxone (ROCEPHIN) 1 g/50 mL in dextrose IVPB (has no administration in time range)   torsemide (DEMADEX) tablet 20 mg (20 mg Oral Given 5/22/22 2147)   methocarbamol (ROBAXIN) tablet 500 mg (has no administration in time range)   lisinopril (ZESTRIL) tablet 10 mg (has no administration in time range)   fluticasone-vilanterol (BREO ELLIPTA) 100-25 mcg/inh inhaler 1 puff (has no administration in time range)   escitalopram (LEXAPRO) tablet 20 mg (has no administration in time range)   albuterol (PROVENTIL HFA,VENTOLIN HFA) inhaler 1 puff (has no administration in time range)   insulin glargine (LANTUS) subcutaneous injection 20 Units 0 2 mL (has no administration in time range)   insulin lispro (HumaLOG) 100 units/mL subcutaneous injection 1-5 Units (1 Units Subcutaneous Not Given 5/22/22 1902)   insulin lispro (HumaLOG) 100 units/mL subcutaneous injection 1-5 Units (1 Units Subcutaneous Not Given 5/22/22 2152)   acetaminophen (TYLENOL) tablet 975 mg (975 mg Oral Given 5/22/22 1618)   cefepime (MAXIPIME) 2 g/50 mL dextrose IVPB (0 mg Intravenous Stopped 5/22/22 1711)   fentanyl citrate (PF) 100 MCG/2ML 50 mcg (50 mcg Intravenous Given 5/22/22 1631)   sodium chloride 0 9 % bolus 500 mL (0 mL Intravenous Stopped 5/22/22 1837)   dextrose 50 % IV solution 25 mL (25 mL Intravenous Given 5/22/22 1737)   fentanyl citrate (PF) 100 MCG/2ML 50 mcg (50 mcg Intravenous Given 5/22/22 1805)           FINAL IMPRESSION    Final diagnoses:   Fall, initial encounter   Hyponatremia   Fever   Hypoxemia   Leukocytosis   Hypoglycemia         DISPOSITION/PLAN    Presentation as above with fever hyponatremia hypoxemia leukocytosis hypoglycemia in the setting of a recent fall  Suspect fall secondary to weakness from other process  Unclear if hypoxemia is secondary to acute infectious etiology versus patient has previously diagnosed pulmonary fibrosis  Imaging overall unimpressive without evidence of pneumonia but based on fever and diagnostic uncertainty started on broad-spectrum antibiotics  Hypoxemia responded well to supplemental oxygen  Acute traumatic workup reassuring  After initial workup and resuscitation emergency department, started on broad-spectrum antibiotics and admitted to Internal Medicine for further care, hemodynamically stable and comfortable at that time  Time reflects when diagnosis was documented in both MDM as applicable and the Disposition within this note     Time User Action Codes Description Comment    5/22/2022  6:12 PM Virgia Raring Add [U65  JVGN] Fall, initial encounter     5/22/2022  6:12 PM Virgia Raring Add [E87 1] Hyponatremia     5/22/2022  6:12 PM Virgia Raring Add [R50 9] Fever     5/22/2022  6:12 PM Virgia Raring Add [R09 02] Hypoxemia     5/22/2022  6:12 PM Virgia Raring Add [D72 829] Leukocytosis 5/22/2022  6:12 PM Eleuterio Henley Modify [D66  UGHD] Fall, initial encounter     5/22/2022  6:12 PM Eleuterio Henley Modify [R09 02] Hypoxemia     5/22/2022  6:12 PM Monty PEDRAZA Add [E16 2] Hypoglycemia       ED Disposition     ED Disposition   Admit    Condition   Stable    Date/Time   Sun May 22, 2022  6:12 PM    Comment   Case was discussed with MARIBEL and the patient's admission status was agreed to be Admission Status: inpatient status to the service of Dr Armond Curry  Follow-up Information    None           PATIENT REFERRED TO:    No follow-up provider specified  DISCHARGE MEDICATIONS:    Patient's Medications   Discharge Prescriptions    No medications on file       No discharge procedures on file  Debborah Rubinstein, MD    Portions of the record may have been created with voice recognition software  Occasional wrong word or "sound alike" substitutions may have occurred due to the inherent limitations of voice recognition software    Please read the chart carefully and recognize, using context, where substitutions have occurred     Debborah Rubinstein, MD  05/22/22 [id number]

## 2022-05-22 NOTE — ASSESSMENT & PLAN NOTE
Background:  Presented with fever and leukocytosis with associated tachycardia, status post fall out of bed  · Unclear source of SIRS at this time  · CXR 5/22:  Pulmonary edema?   No infiltrates   · UA 5/22: Not compelling for UTI  · Currently on IV ceftriaxone, day #2  · Leukocytosis improving, blood cultures pending  · Fever trending down

## 2022-05-22 NOTE — ASSESSMENT & PLAN NOTE
Wt Readings from Last 3 Encounters:   05/22/22 73 6 kg (162 lb 4 1 oz)   05/05/22 73 5 kg (162 lb)   01/25/22 81 2 kg (179 lb)     · Currently appears euvolemic  · Continue home torsemide

## 2022-05-23 ENCOUNTER — APPOINTMENT (INPATIENT)
Dept: ULTRASOUND IMAGING | Facility: HOSPITAL | Age: 77
DRG: 864 | End: 2022-05-23
Payer: MEDICARE

## 2022-05-23 PROBLEM — E87.6 HYPOKALEMIA: Status: ACTIVE | Noted: 2022-05-23

## 2022-05-23 LAB
ALBUMIN SERPL BCP-MCNC: 2.4 G/DL (ref 3.5–5)
ALP SERPL-CCNC: 118 U/L (ref 46–116)
ALT SERPL W P-5'-P-CCNC: 18 U/L (ref 12–78)
ANION GAP SERPL CALCULATED.3IONS-SCNC: 7 MMOL/L (ref 4–13)
ANION GAP SERPL CALCULATED.3IONS-SCNC: 7 MMOL/L (ref 4–13)
AST SERPL W P-5'-P-CCNC: 51 U/L (ref 5–45)
ATRIAL RATE: 68 BPM
ATRIAL RATE: 75 BPM
BASOPHILS # BLD AUTO: 0.03 THOUSANDS/ΜL (ref 0–0.1)
BASOPHILS NFR BLD AUTO: 0 % (ref 0–1)
BILIRUB DIRECT SERPL-MCNC: 0.31 MG/DL (ref 0–0.2)
BILIRUB SERPL-MCNC: 0.72 MG/DL (ref 0.2–1)
BUN SERPL-MCNC: 21 MG/DL (ref 5–25)
BUN SERPL-MCNC: 24 MG/DL (ref 5–25)
CALCIUM SERPL-MCNC: 7.8 MG/DL (ref 8.3–10.1)
CALCIUM SERPL-MCNC: 9.1 MG/DL (ref 8.3–10.1)
CHLORIDE SERPL-SCNC: 93 MMOL/L (ref 100–108)
CHLORIDE SERPL-SCNC: 97 MMOL/L (ref 100–108)
CO2 SERPL-SCNC: 27 MMOL/L (ref 21–32)
CO2 SERPL-SCNC: 31 MMOL/L (ref 21–32)
CREAT SERPL-MCNC: 0.98 MG/DL (ref 0.6–1.3)
CREAT SERPL-MCNC: 1.11 MG/DL (ref 0.6–1.3)
EOSINOPHIL # BLD AUTO: 0.87 THOUSAND/ΜL (ref 0–0.61)
EOSINOPHIL NFR BLD AUTO: 8 % (ref 0–6)
ERYTHROCYTE [DISTWIDTH] IN BLOOD BY AUTOMATED COUNT: 13.7 % (ref 11.6–15.1)
ERYTHROCYTE [DISTWIDTH] IN BLOOD BY AUTOMATED COUNT: 13.8 % (ref 11.6–15.1)
GFR SERPL CREATININE-BSD FRML MDRD: 48 ML/MIN/1.73SQ M
GFR SERPL CREATININE-BSD FRML MDRD: 55 ML/MIN/1.73SQ M
GLUCOSE SERPL-MCNC: 100 MG/DL (ref 65–140)
GLUCOSE SERPL-MCNC: 113 MG/DL (ref 65–140)
GLUCOSE SERPL-MCNC: 119 MG/DL (ref 65–140)
GLUCOSE SERPL-MCNC: 46 MG/DL (ref 65–140)
GLUCOSE SERPL-MCNC: 67 MG/DL (ref 65–140)
GLUCOSE SERPL-MCNC: 71 MG/DL (ref 65–140)
GLUCOSE SERPL-MCNC: 89 MG/DL (ref 65–140)
GLUCOSE SERPL-MCNC: 90 MG/DL (ref 65–140)
HCT VFR BLD AUTO: 42.3 % (ref 34.8–46.1)
HCT VFR BLD AUTO: 46 % (ref 34.8–46.1)
HGB BLD-MCNC: 13.5 G/DL (ref 11.5–15.4)
HGB BLD-MCNC: 14.8 G/DL (ref 11.5–15.4)
IMM GRANULOCYTES # BLD AUTO: 0.03 THOUSAND/UL (ref 0–0.2)
IMM GRANULOCYTES NFR BLD AUTO: 0 % (ref 0–2)
LACTATE SERPL-SCNC: 0.8 MMOL/L (ref 0.5–2)
LYMPHOCYTES # BLD AUTO: 0.39 THOUSANDS/ΜL (ref 0.6–4.47)
LYMPHOCYTES NFR BLD AUTO: 4 % (ref 14–44)
MAGNESIUM SERPL-MCNC: 2.2 MG/DL (ref 1.6–2.6)
MCH RBC QN AUTO: 28.4 PG (ref 26.8–34.3)
MCH RBC QN AUTO: 28.4 PG (ref 26.8–34.3)
MCHC RBC AUTO-ENTMCNC: 31.9 G/DL (ref 31.4–37.4)
MCHC RBC AUTO-ENTMCNC: 32.2 G/DL (ref 31.4–37.4)
MCV RBC AUTO: 88 FL (ref 82–98)
MCV RBC AUTO: 89 FL (ref 82–98)
MONOCYTES # BLD AUTO: 0.55 THOUSAND/ΜL (ref 0.17–1.22)
MONOCYTES NFR BLD AUTO: 5 % (ref 4–12)
NEUTROPHILS # BLD AUTO: 8.43 THOUSANDS/ΜL (ref 1.85–7.62)
NEUTS SEG NFR BLD AUTO: 83 % (ref 43–75)
NRBC BLD AUTO-RTO: 0 /100 WBCS
P AXIS: 68 DEGREES
PLATELET # BLD AUTO: 281 THOUSANDS/UL (ref 149–390)
PLATELET # BLD AUTO: 283 THOUSANDS/UL (ref 149–390)
PMV BLD AUTO: 10 FL (ref 8.9–12.7)
PMV BLD AUTO: 10.3 FL (ref 8.9–12.7)
POTASSIUM SERPL-SCNC: 3 MMOL/L (ref 3.5–5.3)
POTASSIUM SERPL-SCNC: 3.3 MMOL/L (ref 3.5–5.3)
PR INTERVAL: 188 MS
PROT SERPL-MCNC: 8.2 G/DL (ref 6.4–8.2)
QRS AXIS: -55 DEGREES
QRS AXIS: -62 DEGREES
QRSD INTERVAL: 66 MS
QRSD INTERVAL: 80 MS
QT INTERVAL: 428 MS
QT INTERVAL: 436 MS
QTC INTERVAL: 455 MS
QTC INTERVAL: 486 MS
RBC # BLD AUTO: 4.76 MILLION/UL (ref 3.81–5.12)
RBC # BLD AUTO: 5.21 MILLION/UL (ref 3.81–5.12)
SODIUM SERPL-SCNC: 131 MMOL/L (ref 136–145)
SODIUM SERPL-SCNC: 131 MMOL/L (ref 136–145)
T WAVE AXIS: 31 DEGREES
T WAVE AXIS: 32 DEGREES
VENTRICULAR RATE: 68 BPM
VENTRICULAR RATE: 75 BPM
WBC # BLD AUTO: 10.3 THOUSAND/UL (ref 4.31–10.16)
WBC # BLD AUTO: 12.54 THOUSAND/UL (ref 4.31–10.16)

## 2022-05-23 PROCEDURE — 36415 COLL VENOUS BLD VENIPUNCTURE: CPT | Performed by: INTERNAL MEDICINE

## 2022-05-23 PROCEDURE — 80048 BASIC METABOLIC PNL TOTAL CA: CPT | Performed by: PHYSICIAN ASSISTANT

## 2022-05-23 PROCEDURE — 99233 SBSQ HOSP IP/OBS HIGH 50: CPT | Performed by: PHYSICIAN ASSISTANT

## 2022-05-23 PROCEDURE — 80048 BASIC METABOLIC PNL TOTAL CA: CPT | Performed by: INTERNAL MEDICINE

## 2022-05-23 PROCEDURE — 93005 ELECTROCARDIOGRAM TRACING: CPT

## 2022-05-23 PROCEDURE — 97167 OT EVAL HIGH COMPLEX 60 MIN: CPT

## 2022-05-23 PROCEDURE — 76770 US EXAM ABDO BACK WALL COMP: CPT

## 2022-05-23 PROCEDURE — 93010 ELECTROCARDIOGRAM REPORT: CPT | Performed by: INTERNAL MEDICINE

## 2022-05-23 PROCEDURE — 97163 PT EVAL HIGH COMPLEX 45 MIN: CPT

## 2022-05-23 PROCEDURE — 85027 COMPLETE CBC AUTOMATED: CPT | Performed by: PHYSICIAN ASSISTANT

## 2022-05-23 PROCEDURE — 82948 REAGENT STRIP/BLOOD GLUCOSE: CPT

## 2022-05-23 PROCEDURE — 85025 COMPLETE CBC W/AUTO DIFF WBC: CPT | Performed by: INTERNAL MEDICINE

## 2022-05-23 PROCEDURE — 83735 ASSAY OF MAGNESIUM: CPT | Performed by: PHYSICIAN ASSISTANT

## 2022-05-23 PROCEDURE — 80076 HEPATIC FUNCTION PANEL: CPT | Performed by: INTERNAL MEDICINE

## 2022-05-23 PROCEDURE — 83605 ASSAY OF LACTIC ACID: CPT | Performed by: PHYSICIAN ASSISTANT

## 2022-05-23 RX ORDER — POTASSIUM CHLORIDE 20 MEQ/1
40 TABLET, EXTENDED RELEASE ORAL 2 TIMES DAILY
Status: DISCONTINUED | OUTPATIENT
Start: 2022-05-23 | End: 2022-05-23

## 2022-05-23 RX ORDER — DEXTROSE AND SODIUM CHLORIDE 5; .9 G/100ML; G/100ML
125 INJECTION, SOLUTION INTRAVENOUS CONTINUOUS
Status: DISCONTINUED | OUTPATIENT
Start: 2022-05-23 | End: 2022-05-24

## 2022-05-23 RX ORDER — SODIUM CHLORIDE 9 MG/ML
50 INJECTION, SOLUTION INTRAVENOUS ONCE
Status: COMPLETED | OUTPATIENT
Start: 2022-05-23 | End: 2022-05-23

## 2022-05-23 RX ORDER — HYDROMORPHONE HYDROCHLORIDE 4 MG/1
TABLET ORAL
COMMUNITY
Start: 2022-04-26

## 2022-05-23 RX ORDER — HYDROMORPHONE HYDROCHLORIDE 4 MG/1
4 TABLET ORAL EVERY 8 HOURS PRN
Status: DISCONTINUED | OUTPATIENT
Start: 2022-05-23 | End: 2022-05-24 | Stop reason: HOSPADM

## 2022-05-23 RX ORDER — POTASSIUM CHLORIDE 20MEQ/15ML
40 LIQUID (ML) ORAL 2 TIMES DAILY
Status: DISCONTINUED | OUTPATIENT
Start: 2022-05-23 | End: 2022-05-24

## 2022-05-23 RX ORDER — HYDROMORPHONE HCL/PF 1 MG/ML
0.5 SYRINGE (ML) INJECTION ONCE
Status: COMPLETED | OUTPATIENT
Start: 2022-05-23 | End: 2022-05-23

## 2022-05-23 RX ORDER — ALBUMIN (HUMAN) 12.5 G/50ML
25 SOLUTION INTRAVENOUS ONCE
Status: COMPLETED | OUTPATIENT
Start: 2022-05-23 | End: 2022-05-23

## 2022-05-23 RX ORDER — ALBUTEROL SULFATE 2.5 MG/3ML
2.5 SOLUTION RESPIRATORY (INHALATION) EVERY 6 HOURS PRN
Status: DISCONTINUED | OUTPATIENT
Start: 2022-05-23 | End: 2022-05-23

## 2022-05-23 RX ORDER — ONDANSETRON 2 MG/ML
4 INJECTION INTRAMUSCULAR; INTRAVENOUS ONCE
Status: COMPLETED | OUTPATIENT
Start: 2022-05-23 | End: 2022-05-23

## 2022-05-23 RX ADMIN — CEFTRIAXONE SODIUM 1000 MG: 10 INJECTION, POWDER, FOR SOLUTION INTRAVENOUS at 03:09

## 2022-05-23 RX ADMIN — HYDROMORPHONE HYDROCHLORIDE 0.5 MG: 1 INJECTION, SOLUTION INTRAMUSCULAR; INTRAVENOUS; SUBCUTANEOUS at 22:26

## 2022-05-23 RX ADMIN — ESCITALOPRAM OXALATE 20 MG: 20 TABLET ORAL at 10:48

## 2022-05-23 RX ADMIN — HEPARIN SODIUM 5000 UNITS: 5000 INJECTION INTRAVENOUS; SUBCUTANEOUS at 06:08

## 2022-05-23 RX ADMIN — DEXTROSE AND SODIUM CHLORIDE 50 ML/HR: 5; .9 INJECTION, SOLUTION INTRAVENOUS at 10:51

## 2022-05-23 RX ADMIN — FLUTICASONE FUROATE AND VILANTEROL TRIFENATATE 1 PUFF: 100; 25 POWDER RESPIRATORY (INHALATION) at 08:24

## 2022-05-23 RX ADMIN — HYDROMORPHONE HYDROCHLORIDE 4 MG: 4 TABLET ORAL at 10:22

## 2022-05-23 RX ADMIN — POTASSIUM CHLORIDE 40 MEQ: 1500 TABLET, EXTENDED RELEASE ORAL at 12:09

## 2022-05-23 RX ADMIN — SODIUM CHLORIDE 1000 ML: 0.9 INJECTION, SOLUTION INTRAVENOUS at 14:17

## 2022-05-23 RX ADMIN — HEPARIN SODIUM 5000 UNITS: 5000 INJECTION INTRAVENOUS; SUBCUTANEOUS at 21:34

## 2022-05-23 RX ADMIN — SODIUM CHLORIDE 500 ML: 0.9 INJECTION, SOLUTION INTRAVENOUS at 10:21

## 2022-05-23 RX ADMIN — SODIUM CHLORIDE 50 ML/HR: 0.9 INJECTION, SOLUTION INTRAVENOUS at 10:25

## 2022-05-23 RX ADMIN — INSULIN GLARGINE 20 UNITS: 100 INJECTION, SOLUTION SUBCUTANEOUS at 08:28

## 2022-05-23 RX ADMIN — ALBUMIN (HUMAN) 25 G: 0.25 INJECTION, SOLUTION INTRAVENOUS at 15:19

## 2022-05-23 RX ADMIN — HYDROMORPHONE HYDROCHLORIDE 4 MG: 4 TABLET ORAL at 03:09

## 2022-05-23 RX ADMIN — ACETAMINOPHEN 650 MG: 325 TABLET, FILM COATED ORAL at 08:20

## 2022-05-23 RX ADMIN — ALBUMIN (HUMAN) 25 G: 0.25 INJECTION, SOLUTION INTRAVENOUS at 18:36

## 2022-05-23 RX ADMIN — METHOCARBAMOL 500 MG: 500 TABLET ORAL at 21:34

## 2022-05-23 RX ADMIN — LISINOPRIL 10 MG: 10 TABLET ORAL at 08:22

## 2022-05-23 RX ADMIN — POTASSIUM CHLORIDE 40 MEQ: 20 SOLUTION ORAL at 18:39

## 2022-05-23 RX ADMIN — METHOCARBAMOL 500 MG: 500 TABLET ORAL at 00:47

## 2022-05-23 RX ADMIN — DICLOFENAC SODIUM TOPICAL GEL, 1%, 2 G: 10 GEL TOPICAL at 18:38

## 2022-05-23 RX ADMIN — ONDANSETRON 4 MG: 2 INJECTION INTRAMUSCULAR; INTRAVENOUS at 22:25

## 2022-05-23 NOTE — ASSESSMENT & PLAN NOTE
Wt Readings from Last 3 Encounters:   05/22/22 73 6 kg (162 lb 4 1 oz)   05/05/22 73 5 kg (162 lb)   01/25/22 81 2 kg (179 lb)     · Currently appears euvolemic  · HOLD torsemide, lisinopril

## 2022-05-23 NOTE — ASSESSMENT & PLAN NOTE
Background:  Presented with fever and leukocytosis with associated tachycardia, status post fall out of bed  · Unclear source of SIRS at this time  potentially stress response from fall  · CXR 5/22:  Pulmonary edema?   No infiltrates   · UA 5/22: Not compelling for UTI; urine culture negative  · Currently on IV ceftriaxone, day #3  · Leukocytosis improving, blood cultures negative for 24 hours  · Afebrile for over 24 hours

## 2022-05-23 NOTE — PHYSICAL THERAPY NOTE
Physical Therapy Evaluation     Patient's Name: Andres Rodarte    Admitting Diagnosis  AMS (altered mental status) [R41 [id number]]    Problem List  Patient Active Problem List   Diagnosis    Bilateral lower extremity edema    Chronic diastolic congestive heart failure (HCC)    Mixed hyperlipidemia    Essential hypertension    Ambulatory dysfunction    Recurrent major depressive disorder, in partial remission ([facility] )    Type 2 diabetes mellitus with stage 2 chronic kidney disease, with long-term current use of insulin (HCC)    Gastroesophageal reflux disease without esophagitis    Primary insomnia    Moderate persistent asthma    Psoriasis vulgaris    Rheumatoid arthritis involving multiple sites with positive rheumatoid factor (HCC)    Urinary incontinence    Vitamin D deficiency    Varicose veins of both lower extremities    Obesity (BMI 30-39  9)    Immunosuppressed status ([facility] )    Interstitial lung disease (HCC)    Chronic respiratory failure with hypoxia (HCC)    Hyponatremia    Venous stasis dermatitis of both lower extremities    Shortness of breath    Chronic prescription opiate use    Fungal infection of skin    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)    Exertional chest pain    Elevated d-dimer    Continuous opioid dependence (HCC)    SIRS (systemic inflammatory response syndrome) (HCC)    Hypokalemia     Past Medical History  Past Medical History:   Diagnosis Date    Asthma     Chest pain on breathing     last assessed 2/5/15    Chronic diastolic CHF (congestive heart failure) (HCC)     Chronic respiratory failure with hypoxia (HCC)     Diabetes mellitus ([facility] )     HTN (hypertension)     Hyperlipidemia     Insomnia     Obesity     Preop cardiovascular exam 2/2/2018    Pulmonary fibrosis (HCC)     Rheumatoid arthritis (HCC)     Volume overload 12/10/2021     Past Surgical History  Past Surgical History:   Procedure Laterality Date    HAND SURGERY 05/23/22 0741   PT Last Visit   PT Visit Date 05/23/22   Note Type   Note type Evaluation   Pain Assessment   Pain Assessment Tool 0-10   Pain Score 10 - Worst Possible Pain   Pain Location/Orientation Orientation: Bilateral;Location: Arm;Location: Leg   Pain Onset/Description Onset: Ongoing  (chronic)   Hospital Pain Intervention(s) Repositioned  (RN aware)   Restrictions/Precautions   Weight Bearing Precautions Per Order No   Braces or Orthoses Other (Comment)  (history of back brace-does not wear)   Other Precautions Chair Alarm; Bed Alarm;Multiple lines;Telemetry;O2;Fall Risk;Pain   Home Living   Type of [address] [place] [address] Two level; Able to live on main level with bedroom/bathroom;Stairs to enter with rails  (4 MANUEL)   Bathroom Shower/Tub Tub/shower unit   Bathroom Toilet Standard   Bathroom Equipment Grab bars in shower;Grab bars around toilet   [address]; Other (Comment)  (home O2)   Additional Comments Pt ambulates with a cane  Prior Function   Level of Early Independent with ADLs and functional mobility  (assist with ADL's; it takes me 1/2 hour to get out of bed)   Lives With Son;Daughter;Family  (lives upstairs)   Receives Help From Family   ADL Assistance Needs assistance   IADLs Needs assistance   Falls in the last 6 months 1 to 4  (3 falls)   Comments Pt has recently been requiring increased assistance  General   Family/Caregiver Present No   Cognition   Overall Cognitive Status WFL   Arousal/Participation Alert   Attention Within functional limits   Orientation Level Oriented to person;Oriented to place;Oriented to situation   Memory Within functional limits   Following Commands Follows one step commands without difficulty   Comments Pt agreeable to PT     Subjective   Subjective "I hurt all over "   RUE Assessment   RUE Assessment   (defer to OT assessment)   LUE Assessment   LUE Assessment   (defer to OT assessment)   RLE Assessment   RLE Assessment X   Strength RLE   RLE Overall Strength 3+/5   LLE Assessment   LLE Assessment X   Strength LLE   LLE Overall Strength 3/5   Light Touch   RLE Light Touch Grossly intact   LLE Light Touch Grossly intact   Bed Mobility   Rolling R 3  Moderate assistance   Additional items Assist x 2; Increased time required;Verbal cues;LE management   Rolling L 3  Moderate assistance   Additional items Assist x 2; Increased time required;Verbal cues;LE management   Supine to Sit 3  Moderate assistance   Additional items Assist x 2;HOB elevated; Increased time required;Verbal cues;LE management   Sit to Supine 3  Moderate assistance   Additional items Assist x 2; Increased time required;Verbal cues;LE management   Transfers   Sit to Stand Unable to assess  (deferred secondary to c/o pain)   Ambulation/Elevation   Gait pattern Not tested   Balance   Static Sitting Fair -   Dynamic Sitting Poor +   Endurance Deficit   Endurance Deficit Yes   Endurance Deficit Description decreased activity tolerance   Activity Tolerance   Activity Tolerance Patient limited by pain   Medical Staff Made Aware OT Janis Garcia 6 Discussed case with RN   Assessment   Prognosis Good   Problem List Decreased strength;Decreased endurance; Impaired balance;Decreased mobility;Pain   Assessment Pt is 68year old female seen for PT evaluation s/p admit to [facility] on 5/22/2022 with SIRS (systemic inflammatory response syndrome) ([facility] )  PT consulted to assess pt's functional mobility and d/c needs  Order placed for PT eval and tx, with up and OOB as tolerated order  Comorbidities affecting pt's physical performance at time of assessment include chronic diastolic CHF, essential hypertension, ambulatory dysfunction, type 2 D with stage 2 CKD, chronic respiratory failure with hypoxia, hyponatremia, and hypokalemia   PTA, pt was independent with functional mobility; however has recently been requiring increased assist  Personal factors affecting pt at time of IE include lives in a two story house, stairs to enter home, inability to ambulate household distances, inability to navigate level surfaces without external assistance, positive fall history, inability to perform IADLs, and inability to perform ADLs  Please find objective findings from PT assessment regarding body systems outlined above with impairments and limitations including weakness, impaired balance, decreased endurance, pain, decreased activity tolerance, decreased functional mobility tolerance, and fall risk  The following objective measures performed on IE also reveal limitations: Barthel Index: 30/100, Modified [place]: 4 (moderate/severe disability) and AM-PAC 6-Clicks: 7/41  Pt's clinical presentation is currently unstable/unpredictable seen in pt's presentation of need for ongoing medical management/monitoring, pt is a fall risk, and pt requires cues and assist of two for safety with functional mobility  Pt to benefit from continued PT tx to address deficits as defined above and maximize level of functional independent mobility and consistency  From PT/mobility standpoint, recommendation at time of d/c would be STR pending progress in order to facilitate return to PLOF  Barriers to Discharge Inaccessible home environment   Goals   STG Expiration Date 06/02/22   Short Term Goal #1 In 10 days: Increase bilateral LE strength 1/2 grade to facilitate independent mobility, Perform all bed mobility tasks with min A of 1 to decrease caregiver burden, Increase static and dynamic sitting balance 1/2 grade to decrease risk for falls, and PT to see and establish goals for transfers, gait, and standing balance when appropriate   Plan   Treatment/Interventions Functional transfer training;LE strengthening/ROM; Therapeutic exercise; Endurance training;Patient/family training;Bed mobility;Continued evaluation;Spoke to nursing;OT   PT Frequency 3-5x/wk   Recommendation   PT Discharge Recommendation Post acute rehabilitation services   AM-PAC Basic Mobility Inpatient   Turning in Bed Without Bedrails 1   Lying on Back to Sitting on Edge of Flat Bed 1   Moving Bed to Chair 1   Standing Up From Chair 1   Walk in Room 1   Climb 3-5 Stairs 1   Basic Mobility Inpatient Raw Score 6   Turning Head Towards Sound 4   Follow Simple Instructions 4   Low Function Basic Mobility Raw Score 14   Low Function Basic Mobility Standardized Score 22 01   Highest Level Of Mobility   -[facility] Goal 2: Bed activities/Dependent transfer   -[facility] Achieved 3: Sit at edge of bed   Modified [place] Scale   Modified [place] Scale 4   Barthel Index   Feeding 5   Bathing 0   Grooming Score 0   Dressing Score 5   Bladder Score 5   Bowels Score 5   Toilet Use Score 5   Transfers (Bed/Chair) Score 5   Mobility (Level Surface) Score 0   Stairs Score 0   Barthel Index Score 30     PT Evaluation Time: 0722-9236    Ray Ramirez, PT, DPT

## 2022-05-23 NOTE — PLAN OF CARE
Problem: Potential for Falls  Goal: Patient will remain free of falls  Description: INTERVENTIONS:  - Educate patient/family on patient safety including physical limitations  - Instruct patient to call for assistance with activity   - Consult OT/PT to assist with strengthening/mobility   - Keep Call bell within reach  - Keep bed low and locked with side rails adjusted as appropriate  - Keep care items and personal belongings within reach  - Initiate and maintain comfort rounds  - Make Fall Risk Sign visible to staff  - Offer Toileting every 3 Hours, in advance of need  - Initiate/Maintain bed alarm  - Obtain necessary fall risk management equipment:   - Apply yellow socks and bracelet for high fall risk patients  - Consider moving patient to room near nurses station  Outcome: Progressing     Problem: MOBILITY - ADULT  Goal: Maintain or return to baseline ADL function  Description: INTERVENTIONS:  -  Assess patient's ability to carry out ADLs; assess patient's baseline for ADL function and identify physical deficits which impact ability to perform ADLs (bathing, care of mouth/teeth, toileting, grooming, dressing, etc )  - Assess/evaluate cause of self-care deficits   - Assess range of motion  - Assess patient's mobility; develop plan if impaired  - Assess patient's need for assistive devices and provide as appropriate  - Encourage maximum independence but intervene and supervise when necessary  - Involve family in performance of ADLs  - Assess for home care needs following discharge   - Consider OT consult to assist with ADL evaluation and planning for discharge  - Provide patient education as appropriate  Outcome: Progressing  Goal: Maintains/Returns to pre admission functional level  Description: INTERVENTIONS:  - Perform BMAT or MOVE assessment daily    - Set and communicate daily mobility goal to care team and patient/family/caregiver     - Collaborate with rehabilitation services on mobility goals if consulted  - Reposition patient every 2 hours    - Dangle patient 3 times a day  - Stand patient 3 times a day  - Ambulate patient 3 times a day  - Out of bed to chair 3 times a day   - Out of bed for meals 3 times a day  - Out of bed for toileting  - Record patient progress and toleration of activity level   Outcome: Progressing     Problem: Prexisting or High Potential for Compromised Skin Integrity  Goal: Skin integrity is maintained or improved  Description: INTERVENTIONS:  - Identify patients at risk for skin breakdown  - Assess and monitor skin integrity  - Assess and monitor nutrition and hydration status  - Monitor labs   - Assess for incontinence   - Turn and reposition patient  - Assist with mobility/ambulation  - Relieve pressure over bony prominences  - Avoid friction and shearing  - Provide appropriate hygiene as needed including keeping skin clean and dry  - Evaluate need for skin moisturizer/barrier cream  - Collaborate with interdisciplinary team   - Patient/family teaching  - Consider wound care consult   Outcome: Progressing     Problem: PAIN - ADULT  Goal: Verbalizes/displays adequate comfort level or baseline comfort level  Description: Interventions:  - Encourage patient to monitor pain and request assistance  - Assess pain using appropriate pain scale  - Administer analgesics based on type and severity of pain and evaluate response  - Implement non-pharmacological measures as appropriate and evaluate response  - Consider cultural and social influences on pain and pain management  - Notify physician/advanced practitioner if interventions unsuccessful or patient reports new pain  Outcome: Progressing     Problem: INFECTION - ADULT  Goal: Absence or prevention of progression during hospitalization  Description: INTERVENTIONS:  - Assess and monitor for signs and symptoms of infection  - Monitor lab/diagnostic results  - Monitor all insertion sites, i e  indwelling lines, tubes, and drains  - Monitor endotracheal if appropriate and nasal secretions for changes in amount and color  - Sandy appropriate cooling/warming therapies per order  - Administer medications as ordered  - Instruct and encourage patient and family to use good hand hygiene technique  - Identify and instruct in appropriate isolation precautions for identified infection/condition  Outcome: Progressing     Problem: SAFETY ADULT  Goal: Patient will remain free of falls  Description: INTERVENTIONS:  - Educate patient/family on patient safety including physical limitations  - Instruct patient to call for assistance with activity   - Consult OT/PT to assist with strengthening/mobility   - Keep Call bell within reach  - Keep bed low and locked with side rails adjusted as appropriate  - Keep care items and personal belongings within reach  - Initiate and maintain comfort rounds  - Make Fall Risk Sign visible to staff  - Offer Toileting every 3 Hours, in advance of need  - Initiate/Maintain bed alarm  - Obtain necessary fall risk management equipment:   - Apply yellow socks and bracelet for high fall risk patients  - Consider moving patient to room near nurses station  Outcome: Progressing  Goal: Maintain or return to baseline ADL function  Description: INTERVENTIONS:  -  Assess patient's ability to carry out ADLs; assess patient's baseline for ADL function and identify physical deficits which impact ability to perform ADLs (bathing, care of mouth/teeth, toileting, grooming, dressing, etc )  - Assess/evaluate cause of self-care deficits   - Assess range of motion  - Assess patient's mobility; develop plan if impaired  - Assess patient's need for assistive devices and provide as appropriate  - Encourage maximum independence but intervene and supervise when necessary  - Involve family in performance of ADLs  - Assess for home care needs following discharge   - Consider OT consult to assist with ADL evaluation and planning for discharge  - Provide patient education as appropriate  Outcome: Progressing  Goal: Maintains/Returns to pre admission functional level  Description: INTERVENTIONS:  - Perform BMAT or MOVE assessment daily    - Set and communicate daily mobility goal to care team and patient/family/caregiver  - Collaborate with rehabilitation services on mobility goals if consulted  - Reposition patient every 2 hours  - Dangle patient 3 times a day  - Stand patient 3 times a day  - Ambulate patient 3 times a day  - Out of bed to chair 3 times a day   - Out of bed for meals 3 times a day  - Out of bed for toileting  - Record patient progress and toleration of activity level   Outcome: Progressing     Problem: DISCHARGE PLANNING  Goal: Discharge to home or other facility with appropriate resources  Description: INTERVENTIONS:  - Identify barriers to discharge w/patient and caregiver  - Arrange for needed discharge resources and transportation as appropriate  - Identify discharge learning needs (meds, wound care, etc )  - Arrange for interpretive services to assist at discharge as needed  - Refer to Case Management Department for coordinating discharge planning if the patient needs post-hospital services based on physician/advanced practitioner order or complex needs related to functional status, cognitive ability, or social support system  Outcome: Progressing     Problem: Knowledge Deficit  Goal: Patient/family/caregiver demonstrates understanding of disease process, treatment plan, medications, and discharge instructions  Description: Complete learning assessment and assess knowledge base    Interventions:  - Provide teaching at level of understanding  - Provide teaching via preferred learning methods  Outcome: Progressing     Problem: CARDIOVASCULAR - ADULT  Goal: Maintains optimal cardiac output and hemodynamic stability  Description: INTERVENTIONS:  - Monitor I/O, vital signs and rhythm  - Monitor for S/S and trends of decreased cardiac output  - Administer and titrate ordered vasoactive medications to optimize hemodynamic stability  - Assess quality of pulses, skin color and temperature  - Assess for signs of decreased coronary artery perfusion  - Instruct patient to report change in severity of symptoms  Outcome: Progressing

## 2022-05-23 NOTE — OCCUPATIONAL THERAPY NOTE
Occupational Therapy Evaluation         Patient Name: Ramonita Alvarez  ADWHF'Q Date: 5/23/2022 05/23/22 0806   OT Last Visit   OT Visit Date 05/23/22   Note Type   Note type Evaluation   Restrictions/Precautions   Weight Bearing Precautions Per Order No   Braces or Orthoses Other (Comment)  (history of back brace-does not wear)   Other Precautions Bed Alarm;Multiple lines;Telemetry;O2;Fall Risk;Pain  (3L of O2)   Pain Assessment   Pain Assessment Tool 0-10   Pain Score 10 - Worst Possible Pain  (RN made aware)   Pain Location/Orientation Orientation: Bilateral;Location: Arm;Location: Leg   Pain Onset/Description Onset: Ongoing   Hospital Pain Intervention(s) Repositioned   Home Living   Type of Home House   Home Layout Two level; Able to live on main level with bedroom/bathroom;Stairs to enter with rails  (4 MANUEL)   Bathroom Shower/Tub Tub/shower unit   Bathroom Toilet Standard   Bathroom Equipment Grab bars in shower;Grab bars around toilet   [address]; Other (Comment)  (home O2 ~3L)   Additional Comments Patient ambulates with a cane   Prior Function   Level of [place] Independent with ADLs and functional mobility; Needs assistance with IADLs  (assist with ADL's; it takes me 1/2 hour to get out of bed)   Lives With Son;Daughter;Family  (lives upstairs)   Receives Help From Family   ADL Assistance Needs assistance   IADLs Needs assistance   Falls in the last 6 months 1 to 4  (3 falls)   Comments Pt has recently been requiring increased assistance  Lifestyle   Autonomy Patient reported independent with ADLs and functional mobility but required assistance for IADLs  Patient lives with family in a two story home with 4 steps to enter and bed/bath located on the main level  Patient ambulates with the use of a cane and reports 3 falls in the last 6 months   Patient has recently been requiring increased assistance reporting it takes almost half an hour to get out of bed  Reciprocal Relationships supportive family   Psychosocial   Psychosocial (WDL) WDL   Patient Behaviors/Mood Tearful   Subjective   Subjective "Please it hurts"   ADL   Eating Assistance 4  Minimal Assistance   Grooming Assistance 5  Supervision/Setup   UB Bathing Assistance 2  Maximal Assistance   LB Bathing Assistance 2  Maximal Assistance   UB Dressing Assistance 2  Maximal Assistance   LB Dressing Assistance 2  Maximal [address]  2  Maximal Assistance   Functional Assistance 2  Maximal Assistance   Bed Mobility   Rolling R 3  Moderate assistance   Additional items Assist x 2; Increased time required;Verbal cues;LE management   Rolling L 3  Moderate assistance   Additional items Assist x 2; Increased time required;Verbal cues;LE management   Supine to Sit 3  Moderate assistance   Additional items Assist x 2;HOB elevated; Increased time required;Verbal cues;LE management   Sit to Supine 3  Moderate assistance   Additional items Assist x 2;HOB elevated; Increased time required;Verbal cues;LE management   Transfers   Sit to Stand Unable to assess  (deffered at this time due patients pain 10/10)   Balance   Static Sitting Fair -   Dynamic Sitting Poor +   Activity Tolerance   Activity Tolerance Patient limited by pain; Patient limited by fatigue   Medical Staff Made Aware PT Soniya Munguia   Nurse Made Aware RN on duty made aware of patient's complain of pain   RUE Assessment   RUE Assessment WFL  (grossly WFL, slightly decreased hand to mouth coordination for self feeding)   RUE Strength   RUE Overall Strength Deficits  (3+/5)   LUE Assessment   LUE Assessment WFL  (grossly WFL)   LUE Strength   LUE Overall Strength Deficits  (3+/5)   Hand Function   Gross Motor Coordination Impaired   Fine Motor Coordination Impaired   Sensation   Light Touch No apparent deficits  (BUEs)   Proprioception   Proprioception No apparent deficits  (BUEs)   Vision-Basic Assessment   Current Vision Wears glasses only for reading   Cognition   Overall Cognitive Status [facility]   Arousal/Participation Alert; Responsive   Attention Within functional limits   Orientation Level Oriented X4   Memory Within functional limits   Following Commands Follows one step commands without difficulty   Assessment   Limitation Decreased ADL status; Decreased UE strength;Decreased endurance;Decreased fine motor control;Decreased self-care trans;Decreased high-level ADLs   Prognosis Good   Assessment Patient is a 68 yr  old female who was seen for an OT evaluation s/p admission to [address] [place] [address] on 5/22/22 due to a fever  Patient was diagnosed with systemic inflammatory response syndrome  Other contributing factors affecting the patients occupational performance at this time include congestive heart failure, chronic hypoxic respiratory failure, hypertension, type 2 diabetes with stage 2 chronic kidney disease, and an ambulatory dysfunction  Orders were placed for OT evaluation and treatment  At least two patient identifiers performed during session including name and wristband  Prior to admission patient reported independent with ADLs and functional mobility but required assistance for IADLs  Patient lives with family in a two story home with 4 steps to enter and bed/bath located on the main level  Patient ambulates with the use of a cane and reports 3 falls in the last 6 months  Patient has recently been requiring increased assistance reporting it takes almost half an hour to get out of bed  Personal factors that are inhibiting the patients performance at time of evaluation include: steps to enter, difficulty performing ADLs, difficulty performing IADLs, and decreased initiation and engagement  Upon evaluation patient requires Mod A x 2 for bed mobility, and Max A for UB/LB ADLs   Patients occupational performance is affected by the following deficits: limited UE strength, impaired fine/gross motor coordination, decreased static/dynamic sitting balance, decreased activity tolerance, decreased endurance, and pain  Patient to benefit from continued Occupational Therapy treatment while in the hospital to address the stated deficits and improve occupational performance and overall quality of life  From an OT standpoint at this time, d/c recommendation would be post acute rehab services  Plan   Treatment Interventions ADL retraining;Functional transfer training;UE strengthening/ROM; Endurance training;Patient/family training;Equipment evaluation/education; Fine motor coordination activities; Compensatory technique education;Continued evaluation; Energy conservation; Activityengagement   Goal Expiration Date 06/06/22   OT Frequency 3-5x/wk   Recommendation   OT Discharge Recommendation Post acute rehabilitation services   AM-PAC Daily Activity Inpatient   Lower Body Dressing 2   Bathing 2   Toileting 2   Upper Body Dressing 2   Grooming 3   Eating 3   Daily Activity Raw Score 14   Daily Activity Standardized Score (Calc for Raw Score >=11) 33 39   AM-PAC Applied Cognition Inpatient   Following a Speech/Presentation 4   Understanding Ordinary Conversation 4   Taking Medications 4   Remembering Where Things Are Placed or Put Away 4   Remembering List of 4-5 Errands 4   Taking Care of Complicated Tasks 4   Applied Cognition Raw Score 24   Applied Cognition Standardized Score 62 21   Barthel Index   Feeding 5   Bathing 0   Grooming Score 0   Dressing Score 5   Bladder Score 5   Bowels Score 5   Toilet Use Score 5   Transfers (Bed/Chair) Score 5   Mobility (Level Surface) Score 0   Stairs Score 0   Barthel Index Score 30     Patient will complete grooming tasks seated EOB with setup assist      Patient will complete toileting hygiene with Min assist      Patient will verbalize and demonstrate the use of deep breathing/energy conservation techniques during functional activities with no verbal cues       Patient will improve standing tolerance to 5-10 min to increase activity tolerance during ADL/IADL tasks  Patient will complete UB/LB bathing while seated in shower with Min A and the use of adaptative techniques  Patient will complete UB/LB dressing tasks while seated EOB with setup assist      Patient will complete transfers from bed to chair/toilet with supervision assist and AD as indicated  Patient will demonstrate OOB/ sitting tolerance to 2-4 hours per day for increased participation in self care and leisure tasks with no exertion  Patient will demonstrate fair+ dynamic/static sitting balance for increased participation in ADL/IADL activities  Patient will increase overall UE strength to 4/5 for completion of ADL/IADL tasks  Patient will identify 3 leisure activities in hospital/home/community setting which promote physical/emotional or cognitive well being

## 2022-05-23 NOTE — PLAN OF CARE
Problem: OCCUPATIONAL THERAPY ADULT  Goal: Performs self-care activities at highest level of function for planned discharge setting  See evaluation for individualized goals  Description: Treatment Interventions: ADL retraining, Functional transfer training, UE strengthening/ROM, Endurance training, Patient/family training, Equipment evaluation/education, Fine motor coordination activities, Compensatory technique education, Continued evaluation, Energy conservation, Activityengagement        See flowsheet documentation for full assessment, interventions and recommendations  Note: Limitation: Decreased ADL status, Decreased UE strength, Decreased endurance, Decreased fine motor control, Decreased self-care trans, Decreased high-level ADLs  Prognosis: Good  Assessment: Patient is a 68 yr  old female who was seen for an OT evaluation s/p admission to [address] [place] [address] on 5/22/22 due to a fever  Patient was diagnosed with systemic inflammatory response syndrome  Other contributing factors affecting the patients occupational performance at this time include congestive heart failure, chronic hypoxic respiratory failure, hypertension, type 2 diabetes with stage 2 chronic kidney disease, and an ambulatory dysfunction  Orders were placed for OT evaluation and treatment  At least two patient identifiers performed during session including name and wristband  Prior to admission patient reported independent with ADLs and functional mobility but required assistance for IADLs  Patient lives with family in a two story home with 4 steps to enter and bed/bath located on the main level  Patient ambulates with the use of a cane and reports 3 falls in the last 6 months  Patient has recently been requiring increased assistance reporting it takes almost half an hour to get out of bed   Personal factors that are inhibiting the patients performance at time of evaluation include: steps to enter, difficulty performing ADLs, difficulty performing IADLs, and decreased initiation and engagement  Upon evaluation patient requires Mod A x 2 for bed mobility, and Max A for UB/LB ADLs  Patients occupational performance is affected by the following deficits: limited UE strength, impaired fine/gross motor coordination, decreased static/dynamic sitting balance, decreased activity tolerance, decreased endurance, and pain  Patient to benefit from continued Occupational Therapy treatment while in the hospital to address the stated deficits and improve occupational performance and overall quality of life  From an OT standpoint at this time, d/c recommendation would be post acute rehab services       OT Discharge Recommendation: Post acute rehabilitation services

## 2022-05-23 NOTE — CASE MANAGEMENT
Case Management Progress Note    Patient name Paulo Mendez  Location ED 19/ED 19 MRN [id number]  : 1945 Date 2022       LOS (days): 1  Geometric Mean LOS (GMLOS) (days): 2 70  Days to GMLOS:2        OBJECTIVE:        Current admission status: Inpatient  Preferred Pharmacy:   [address] [place] [address]  Phone: [phone] Fax: [phone]    [facility]/pharmacy #[id number] [address]  Phone: [phone] Fax: [phone]    Primary Care Provider: Melissa Bateman MD    Primary Insurance: MEDICARE  Secondary Insurance: BLUE CROSS    PROGRESS NOTE:    CM attempted to meet with patient at bedside  RN present at this time  CM to come back to complete general assessment and discharge planning  CM department will continue to follow patient through discharge

## 2022-05-23 NOTE — ASSESSMENT & PLAN NOTE
· Noted to have fall at home  · PT/OT recommending short-term rehab family and patient refusing  · ambualtes at home with a cane, good family support, OP pt every other week

## 2022-05-23 NOTE — PROGRESS NOTES
[facility] Progress Note - Kasandra Martines 1945, 68 y o  female MRN: [id number]  Unit/Bed#: ED 19 Encounter: [id number]  Primary Care Provider: Lauro Ortega MD   Date and time admitted to hospital: 5/22/2022  2:33 PM    * SIRS (systemic inflammatory response syndrome) ([facility] [address] )  Assessment & Plan  Background:  Presented with fever and leukocytosis with associated tachycardia, status post fall out of bed  · Unclear source of SIRS at this time  · CXR 5/22:  Pulmonary edema? No infiltrates   · UA 5/22: Not compelling for UTI  · Currently on IV ceftriaxone, day #2  · Leukocytosis improving, blood cultures pending  · Fever trending down     Chronic respiratory failure with hypoxia (HCC)  Assessment & Plan  · Currently at baseline 3 L nasal cannula    Ambulatory dysfunction  Assessment & Plan  · Noted to have fall at home  · PT/OT consulted     Chronic diastolic congestive heart failure (HCC)  Assessment & Plan  Wt Readings from Last 3 Encounters:   05/22/22 73 6 kg (162 lb 4 1 oz)   05/05/22 73 5 kg (162 lb)   01/25/22 81 2 kg (179 lb)     · Currently appears euvolemic  · Low BP, hold home torsemide    Essential hypertension  Assessment & Plan  · Low BP, hold home torsemide and ACE-inhibitor  · Give IV fluid bolus now, continue maintenance D5 due to hypoglycemia    Type 2 diabetes mellitus with stage 2 chronic kidney disease, with long-term current use of insulin (HCC)  Assessment & Plan    Lab Results   Component Value Date    HGBA1C 6 7 (H) 12/10/2021     · Presented with hypoglycemia, asymptomatic  · She is on significant insulin regimen: Lantus 25 units q a m  And Lantus 40 units q h s    · Given well controlled diabetes with A1c 6 7 and age risks of significant insulin regimen outweigh benefits  · Reduced Lantus to 20 units q a m  - will discontinue for now  · Sliding scale insulin  · Monitor blood glucose  · Start D5 due to hypoglycemia    Hyponatremia  Assessment & Plan  History of low sodium  Present on admit in the setting of fever as evidenced by Na 129>131, treated with 500ml NS bolus, serial BMP's    Hypokalemia  Assessment & Plan  Suspect secondary to decreased oral intake and use of torsemide, replace and repeat BMP  EKG without any changes, can discontinue telemetry    VTE Pharmacologic Prophylaxis: VTE Score: 3 Moderate Risk (Score 3-4) - Pharmacological DVT Prophylaxis Ordered: heparin  Patient Centered Rounds: I performed bedside rounds with nursing staff today  Discussions with Specialists or Other Care Team Provider:     Education and Discussions with Family / Patient: Updated  (son) at bedside  Time Spent for Care: 45 minutes  More than 50% of total time spent on counseling and coordination of care as described above  Current Length of Stay: 1 day(s)  Current Patient Status: Inpatient   Certification Statement: The patient will continue to require additional inpatient hospital stay due to hypotension, fever, lab abnormalities requiring IV medications and monitoring  Discharge Plan: Anticipate discharge in 24-48 hrs to rehab facility  Code Status: Level 1 - Full Code    Subjective:   Patient seen this morning, blood pressure has gone well  Patient is asymptomatic  She has diffuse hand and foot pain, this is not abnormal   Denies any subjective fevers overnight  No chest pain or shortness of breath  She has not had a very good appetite, her son reports this is also true at home  Patient is under lot of stress right now, her  has been admitted for quite a long time and patient has been going back and forth to the hospital to see him  Currently staying with another son  Patient is tearful, but receptive      Objective:     Vitals:   Temp (24hrs), Av 1 °F (37 3 °C), Min:97 6 °F (36 4 °C), Max:100 6 °F (38 1 °C)    Temp:  [97 6 °F (36 4 °C)-100 6 °F (38 1 °C)] 97 6 °F (36 4 °C)  HR:  [67-98] 77  Resp:  [13-27] 16  BP: (103-149)/(50-66) 107/64  SpO2:  [86 %-100 %] 99 %  Body mass index is 33 91 kg/m²  Input and Output Summary (last 24 hours): Intake/Output Summary (Last 24 hours) at 5/23/2022 0844  Last data filed at 5/22/2022 1837  Gross per 24 hour   Intake 550 ml   Output --   Net 550 ml       Physical Exam:   Physical Exam  Vitals and nursing note reviewed  Constitutional:       General: She is not in acute distress  Appearance: She is obese  She is not ill-appearing or toxic-appearing  Cardiovascular:      Rate and Rhythm: Normal rate and regular rhythm  Heart sounds: Normal heart sounds  Pulmonary:      Effort: Pulmonary effort is normal  No respiratory distress  Breath sounds: Normal breath sounds  No wheezing or rales  Abdominal:      General: Bowel sounds are normal  There is no distension  Palpations: Abdomen is soft  Tenderness: There is no abdominal tenderness  Musculoskeletal:      Right lower leg: No edema  Left lower leg: No edema  Skin:     Coloration: Skin is not pale  Neurological:      Mental Status: She is alert and oriented to person, place, and time     Psychiatric:         Mood and Affect: Mood normal          Behavior: Behavior normal            Additional Data:     Labs:  Results from last 7 days   Lab Units 05/23/22  0411   WBC Thousand/uL 10 30*   HEMOGLOBIN g/dL 14 8   HEMATOCRIT % 46 0   PLATELETS Thousands/uL 283   NEUTROS PCT % 83*   LYMPHS PCT % 4*   MONOS PCT % 5   EOS PCT % 8*     Results from last 7 days   Lab Units 05/23/22  0411   SODIUM mmol/L 131*   POTASSIUM mmol/L 3 0*   CHLORIDE mmol/L 93*   CO2 mmol/L 31   BUN mg/dL 21   CREATININE mg/dL 0 98   ANION GAP mmol/L 7   CALCIUM mg/dL 9 1   ALBUMIN g/dL 2 4*   TOTAL BILIRUBIN mg/dL 0 72   ALK PHOS U/L 118*   ALT U/L 18   AST U/L 51*   GLUCOSE RANDOM mg/dL 90     Results from last 7 days   Lab Units 05/22/22  1526   INR  1 22*     Results from last 7 days   Lab Units 05/22/22  2151 05/22/22  1901 05/22/22  1449   POC GLUCOSE mg/dl 88 114 79         Results from last 7 days   Lab Units 05/22/22  1636   LACTIC ACID mmol/L 1 2   PROCALCITONIN ng/ml 1 09*       Lines/Drains:  Invasive Devices  Report    Peripheral Intravenous Line  Duration           Peripheral IV 05/22/22 Left Antecubital <1 day    Peripheral IV 05/22/22 Right;Dorsal (posterior) Forearm <1 day          Drain  Duration           External Urinary Catheter <1 day                  Telemetry:  Telemetry Orders (From admission, onward)             24 Hour Telemetry Monitoring  Continuous x 24 Hours (Telem)        References:    Telemetry Guidelines   Question:  Reason for 24 Hour Telemetry  Answer:  Metabolic/Electrolyte Disturbance with High Probability of Dysrhythmia (K level <3 or >6, or KCL infusion >=10mEq/hr)                 Telemetry Reviewed: Normal Sinus Rhythm  Indication for Continued Telemetry Use: No indication for continued use  Will discontinue  Imaging: Reviewed radiology reports from this admission including: trauma imaging    Recent Cultures (last 7 days):   Results from last 7 days   Lab Units 05/22/22  1636   BLOOD CULTURE  Received in Microbiology Lab  Culture in Progress  Received in Microbiology Lab  Culture in Progress         Last 24 Hours Medication List:   Current Facility-Administered Medications   Medication Dose Route Frequency Provider Last Rate    acetaminophen  650 mg Oral Q6H PRN Juan Jose Peña MD      albuterol  1 puff Inhalation Q6H PRN Juan Jose Peña MD      albuterol  2 5 mg Nebulization Q6H PRN Kg Samuel PA-C      cefTRIAXone  1,000 mg Intravenous Q24H Juan Jose Peña MD 1,000 mg (05/23/22 0309)    escitalopram  20 mg Oral Daily Juan Jose Peña MD      fluticasone-vilanterol  1 puff Inhalation Daily Juan Jose Peña MD      heparin (porcine)  5,000 Units Subcutaneous Q8H [address] Juan Jose Peña MD      HYDROmorphone  4 mg Oral Q8H PRN Kg Samuel PA-C      insulin lispro  1-5 Units Subcutaneous TID AC Juan Jose Peña MD      insulin lispro  1-5 Units Subcutaneous HS Juan Jose Peña MD      lisinopril  10 mg Oral Daily Juan Jose Peña MD      methocarbamol  500 mg Oral BID PRN Vicky Shah MD      torsemide  20 mg Oral BID Vicky Shah MD          Today, Patient Was Seen By: Shen Patton PA-C    **Please Note: This note may have been constructed using a voice recognition system  **

## 2022-05-23 NOTE — PLAN OF CARE
Problem: PHYSICAL THERAPY ADULT  Goal: Performs mobility at highest level of function for planned discharge setting  See evaluation for individualized goals  Description: Treatment/Interventions: Functional transfer training, LE strengthening/ROM, Therapeutic exercise, Endurance training, Patient/family training, Bed mobility, Continued evaluation, Spoke to nursing, OT          See flowsheet documentation for full assessment, interventions and recommendations  Note: Prognosis: Good  Problem List: Decreased strength, Decreased endurance, Impaired balance, Decreased mobility, Pain  Assessment: Pt is 68year old female seen for PT evaluation s/p admit to [facility] on 5/22/2022 with SIRS (systemic inflammatory response syndrome) ([facility] [address] )  PT consulted to assess pt's functional mobility and d/c needs  Order placed for PT eval and tx, with up and OOB as tolerated order  Comorbidities affecting pt's physical performance at time of assessment include chronic diastolic CHF, essential hypertension, ambulatory dysfunction, type 2 D with stage 2 CKD, chronic respiratory failure with hypoxia, hyponatremia, and hypokalemia  PTA, pt was independent with functional mobility; however has recently been requiring increased assist  Personal factors affecting pt at time of IE include lives in a two story house, stairs to enter home, inability to ambulate household distances, inability to navigate level surfaces without external assistance, positive fall history, inability to perform IADLs, and inability to perform ADLs  Please find objective findings from PT assessment regarding body systems outlined above with impairments and limitations including weakness, impaired balance, decreased endurance, pain, decreased activity tolerance, decreased functional mobility tolerance, and fall risk   The following objective measures performed on IE also reveal limitations: Barthel Index: 30/100, Modified [place]: 4 (moderate/severe disability) and AM-PAC 6-Clicks: 2/47  Pt's clinical presentation is currently unstable/unpredictable seen in pt's presentation of need for ongoing medical management/monitoring, pt is a fall risk, and pt requires cues and assist of two for safety with functional mobility  Pt to benefit from continued PT tx to address deficits as defined above and maximize level of functional independent mobility and consistency  From PT/mobility standpoint, recommendation at time of d/c would be STR pending progress in order to facilitate return to PLOF  Barriers to Discharge: Inaccessible home environment     PT Discharge Recommendation: Post acute rehabilitation services     See flowsheet documentation for full assessment

## 2022-05-23 NOTE — ASSESSMENT & PLAN NOTE
Lab Results   Component Value Date    HGBA1C 6 7 (H) 12/10/2021     · Presented with hypoglycemia, asymptomatic  · She is on significant insulin regimen: Lantus 25 units q a m  And Lantus 40 units q h s    · Given well controlled diabetes with A1c 6 7 and age risks of significant insulin regimen outweigh benefits  · Discontinued insulins entirely for now  · Sliding scale insulin  · Monitor blood glucose  · Hypoglycemia protocol

## 2022-05-23 NOTE — ASSESSMENT & PLAN NOTE
History of low sodium    Present on admit in the setting of fever as evidenced by Na 129 now improved to 133  Corrected for hyperglycemia is 136  Daily BMP's

## 2022-05-23 NOTE — QUICK NOTE
BP remains low despite bolus 500 mL this am and continuous fluids since  Patient remains asymptomatic, just reports feeling tired  Will give additional 1000 mL bolus along with albumin 25 g x1 and reassess, remains on bedrest pending improvement

## 2022-05-23 NOTE — ASSESSMENT & PLAN NOTE
History of low sodium    Present on admit in the setting of fever as evidenced by Na 129>131, treated with 500ml NS bolus, serial BMP's

## 2022-05-24 ENCOUNTER — APPOINTMENT (INPATIENT)
Dept: RADIOLOGY | Facility: HOSPITAL | Age: 77
DRG: 864 | End: 2022-05-24
Payer: MEDICARE

## 2022-05-24 VITALS
HEART RATE: 78 BPM | HEIGHT: 58 IN | DIASTOLIC BLOOD PRESSURE: 57 MMHG | RESPIRATION RATE: 37 BRPM | WEIGHT: 164.24 LBS | BODY MASS INDEX: 34.48 KG/M2 | OXYGEN SATURATION: 99 % | SYSTOLIC BLOOD PRESSURE: 117 MMHG | TEMPERATURE: 98.1 F

## 2022-05-24 DIAGNOSIS — Z79.4 TYPE 2 DIABETES MELLITUS WITH STAGE 3 CHRONIC KIDNEY DISEASE, WITH LONG-TERM CURRENT USE OF INSULIN (HCC): ICD-10-CM

## 2022-05-24 DIAGNOSIS — E11.22 TYPE 2 DIABETES MELLITUS WITH STAGE 3 CHRONIC KIDNEY DISEASE, WITH LONG-TERM CURRENT USE OF INSULIN (HCC): ICD-10-CM

## 2022-05-24 DIAGNOSIS — N18.30 TYPE 2 DIABETES MELLITUS WITH STAGE 3 CHRONIC KIDNEY DISEASE, WITH LONG-TERM CURRENT USE OF INSULIN (HCC): ICD-10-CM

## 2022-05-24 PROBLEM — R41.82 AMS (ALTERED MENTAL STATUS): Status: ACTIVE | Noted: 2022-05-24

## 2022-05-24 LAB
ANION GAP SERPL CALCULATED.3IONS-SCNC: 7 MMOL/L (ref 4–13)
BACTERIA UR CULT: NORMAL
BUN SERPL-MCNC: 15 MG/DL (ref 5–25)
CALCIUM SERPL-MCNC: 7.9 MG/DL (ref 8.3–10.1)
CHLORIDE SERPL-SCNC: 100 MMOL/L (ref 100–108)
CO2 SERPL-SCNC: 26 MMOL/L (ref 21–32)
CREAT SERPL-MCNC: 0.89 MG/DL (ref 0.6–1.3)
ERYTHROCYTE [DISTWIDTH] IN BLOOD BY AUTOMATED COUNT: 13.9 % (ref 11.6–15.1)
GFR SERPL CREATININE-BSD FRML MDRD: 62 ML/MIN/1.73SQ M
GLUCOSE SERPL-MCNC: 133 MG/DL (ref 65–140)
GLUCOSE SERPL-MCNC: 155 MG/DL (ref 65–140)
GLUCOSE SERPL-MCNC: 276 MG/DL (ref 65–140)
HCT VFR BLD AUTO: 38.2 % (ref 34.8–46.1)
HGB BLD-MCNC: 12.1 G/DL (ref 11.5–15.4)
MCH RBC QN AUTO: 28.4 PG (ref 26.8–34.3)
MCHC RBC AUTO-ENTMCNC: 31.7 G/DL (ref 31.4–37.4)
MCV RBC AUTO: 90 FL (ref 82–98)
PLATELET # BLD AUTO: 251 THOUSANDS/UL (ref 149–390)
PMV BLD AUTO: 10.3 FL (ref 8.9–12.7)
POTASSIUM SERPL-SCNC: 3.9 MMOL/L (ref 3.5–5.3)
RBC # BLD AUTO: 4.26 MILLION/UL (ref 3.81–5.12)
SODIUM SERPL-SCNC: 133 MMOL/L (ref 136–145)
WBC # BLD AUTO: 9.6 THOUSAND/UL (ref 4.31–10.16)

## 2022-05-24 PROCEDURE — 92610 EVALUATE SWALLOWING FUNCTION: CPT

## 2022-05-24 PROCEDURE — 71045 X-RAY EXAM CHEST 1 VIEW: CPT

## 2022-05-24 PROCEDURE — 82948 REAGENT STRIP/BLOOD GLUCOSE: CPT

## 2022-05-24 PROCEDURE — 99239 HOSP IP/OBS DSCHRG MGMT >30: CPT | Performed by: NURSE PRACTITIONER

## 2022-05-24 PROCEDURE — 85027 COMPLETE CBC AUTOMATED: CPT | Performed by: PHYSICIAN ASSISTANT

## 2022-05-24 PROCEDURE — 80048 BASIC METABOLIC PNL TOTAL CA: CPT | Performed by: PHYSICIAN ASSISTANT

## 2022-05-24 RX ORDER — INSULIN GLARGINE 100 [IU]/ML
INJECTION, SOLUTION SUBCUTANEOUS
Qty: 15 ML | Refills: 0 | Status: SHIPPED | OUTPATIENT
Start: 2022-05-24

## 2022-05-24 RX ORDER — POTASSIUM CHLORIDE 29.8 MG/ML
40 INJECTION INTRAVENOUS ONCE
Status: DISCONTINUED | OUTPATIENT
Start: 2022-05-24 | End: 2022-05-24

## 2022-05-24 RX ORDER — POTASSIUM CHLORIDE 14.9 MG/ML
20 INJECTION INTRAVENOUS ONCE
Status: DISCONTINUED | OUTPATIENT
Start: 2022-05-24 | End: 2022-05-24

## 2022-05-24 RX ORDER — NYSTATIN 100000 [USP'U]/G
POWDER TOPICAL 4 TIMES DAILY
Qty: 15 G | Refills: 0 | Status: SHIPPED | OUTPATIENT
Start: 2022-05-24

## 2022-05-24 RX ADMIN — METHOCARBAMOL 500 MG: 500 TABLET ORAL at 15:37

## 2022-05-24 RX ADMIN — CEFTRIAXONE SODIUM 1000 MG: 10 INJECTION, POWDER, FOR SOLUTION INTRAVENOUS at 04:05

## 2022-05-24 RX ADMIN — DICLOFENAC SODIUM TOPICAL GEL, 1%, 2 G: 10 GEL TOPICAL at 12:45

## 2022-05-24 RX ADMIN — INSULIN LISPRO 2 UNITS: 100 INJECTION, SOLUTION INTRAVENOUS; SUBCUTANEOUS at 06:30

## 2022-05-24 RX ADMIN — DEXTROSE AND SODIUM CHLORIDE 125 ML/HR: 5; .9 INJECTION, SOLUTION INTRAVENOUS at 00:54

## 2022-05-24 RX ADMIN — HEPARIN SODIUM 5000 UNITS: 5000 INJECTION INTRAVENOUS; SUBCUTANEOUS at 05:35

## 2022-05-24 RX ADMIN — DEXTROSE AND SODIUM CHLORIDE 125 ML/HR: 5; .9 INJECTION, SOLUTION INTRAVENOUS at 10:57

## 2022-05-24 NOTE — DISCHARGE SUMMARY
[address] [facility]  Discharge- Carmella Romero 1945, 68 y o  female MRN: [id number]  Unit/Bed#:  Encounter: [id number]  Primary Care Provider: Angelique Lezama MD   Date and time admitted to hospital: 5/22/2022  2:33 PM    * SIRS (systemic inflammatory response syndrome) ([facility] )  Assessment & Plan  Background:  Presented with fever and leukocytosis with associated tachycardia, status post fall out of bed  · Unclear source of SIRS at this time  potentially stress response from fall  · CXR 5/22:  Pulmonary edema? No infiltrates   · UA 5/22: Not compelling for UTI; urine culture negative  · Currently on IV ceftriaxone, day #3  · Leukocytosis improving, blood cultures negative for 24 hours  · Afebrile for over 24 hours    Ambulatory dysfunction  Assessment & Plan  · Noted to have fall at home  · PT/OT recommending short-term rehab family and patient refusing  · ambualtes at home with a cane, good family support, OP pt every other week    Chronic diastolic congestive heart failure ([place] [address] )  Assessment & Plan  Wt Readings from Last 3 Encounters:   05/22/22 73 6 kg (162 lb 4 1 oz)   05/05/22 73 5 kg (162 lb)   01/25/22 81 2 kg (179 lb)     · Currently appears euvolemic  · HOLD torsemide, lisinopril       Type 2 diabetes mellitus with stage 2 chronic kidney disease, with long-term current use of insulin ([place] [address] )  Assessment & Plan    Lab Results   Component Value Date    HGBA1C 6 7 (H) 12/10/2021     · Presented with hypoglycemia, asymptomatic  · She is on significant insulin regimen: Lantus 25 units q a m  And Lantus 40 units q h s    · Given well controlled diabetes with A1c 6 7 and age risks of significant insulin regimen outweigh benefits  · Discontinued insulins entirely for now  · Sliding scale insulin  · Monitor blood glucose  · Hypoglycemia protocol    AMS (altered mental status)  Assessment & Plan  Patient seems a little lethargic in strange today-patient did receive IV Dilaudid last night which is likely contributing to her lethargy  Per nursing she appears to be having some difficulty taking her pills  Speech cleared for regular diet thin liquids  Patient is also more agitated and growling at staff after discussion with family this appears to be some normal behavior for the patient  Patient is back to her baseline mentation this afternoon per family      Hypokalemia  Assessment & Plan  · Resolved with supplementation    Hyponatremia  Assessment & Plan  History of low sodium  Present on admit in the setting of fever as evidenced by Na 129 now improved to 133  Corrected for hyperglycemia is 136  Daily BMP's      Chronic respiratory failure with hypoxia (HCC)  Assessment & Plan  · Currently at baseline 3 L nasal cannula    Essential hypertension  Assessment & Plan  · BP low yesterday, medications on hold         Discharging Physician / Practitioner: Rajat Marin  PCP: Salina Mercer MD  Admission Date:   Admission Orders (From admission, onward)     Ordered        05/22/22 1815  Inpatient Admission  Once                      Discharge Date: 05/24/22    Medical Problems             Resolved Problems  Date Reviewed: 5/23/2022   None                 Consultations During Hospital Stay:  · IP CONSULT TO PSYCHIATRY    Procedures Performed:   CT chest abdomen pelvis wo contrast    Result Date: 5/22/2022  Narrative: CT CHEST, ABDOMEN AND PELVIS WITHOUT IV CONTRAST INDICATION:   Polytrauma, blunt fall, ams  COMPARISON:  CT 5/21/21, 12/13/21 TECHNIQUE: CT examination of the chest, abdomen and pelvis was performed without intravenous contrast  This examination was performed without intravenous contrast in the context of the critical nationwide Omnipaque shortage  Axial, sagittal, and coronal 2D reformatted images were created from the source data and submitted for interpretation  Radiation dose length product (DLP) for this visit:  455 mGy-cm     This examination, like all CT scans performed in the [facility], was performed utilizing techniques to minimize radiation dose exposure, including the use of iterative reconstruction and automated exposure control  Enteric contrast was administered  FINDINGS: CHEST LUNGS: Significant respiratory motion There is interstitial septal thickening Minor fissural nodule, likely intrapulmonary lymph node Hypoinflated lungs PLEURA:  Unremarkable  HEART/GREAT VESSELS: Heart is unremarkable for patient's age  No thoracic aortic aneurysm  MEDIASTINUM AND ISRRAEL:  Unremarkable  CHEST WALL AND LOWER NECK:  Unremarkable  ABDOMEN LIVER/BILIARY TREE:  Unremarkable  GALLBLADDER:  Layering gallstones SPLEEN:  Unremarkable  PANCREAS:  Unremarkable  ADRENAL GLANDS:  Unremarkable  KIDNEYS/URETERS:  Unremarkable  No hydronephrosis  STOMACH AND BOWEL:  Unremarkable  APPENDIX:  No findings to suggest appendicitis  ABDOMINOPELVIC CAVITY:  No ascites  No pneumoperitoneum  No lymphadenopathy  VESSELS:  Unremarkable for patient's age  PELVIS REPRODUCTIVE ORGANS:  Unremarkable for patient's age  URINARY BLADDER:  Unremarkable  ABDOMINAL WALL/INGUINAL REGIONS:  Unremarkable  OSSEOUS STRUCTURES:  There is a moderate T4 superior endplate compression, with interval loss of height when compared to the prior study  This appears chronic  Old left lateral eccentric fracture     Impression: 1  No acute traumatic abnormality 2  Chronic appearing T4 compression deformity Workstation performed: [id number]     XR Trauma chest portable    Result Date: 5/22/2022  Narrative: CHEST INDICATION:   TRAUMA  COMPARISON:  X-ray 12/10/21 EXAM PERFORMED/VIEWS:  XR CHEST PORTABLE FINDINGS: Cardiomediastinal silhouette appears unremarkable  Diffuse, predominantly interstitial opacities, progressed from prior  No pneumothorax  No large pleural effusion  Osseous structures appear within normal limits for patient age  Impression: 1  No acute traumatic abnormality 2    Interstitial pulmonary opacities, favor mild pulmonary edema Workstation performed: [id number]     TRAUMA - CT head wo contrast    Result Date: 5/22/2022  Narrative: CT BRAIN - WITHOUT CONTRAST INDICATION:   Ataxia, head trauma TRAUMA  COMPARISON:  None  TECHNIQUE:  CT examination of the brain was performed  In addition to axial images, sagittal and coronal 2D reformatted images were created and submitted for interpretation  Radiation dose length product (DLP) for this visit:  630 mGy-cm   This examination, like all CT scans performed in the [facility], was performed utilizing techniques to minimize radiation dose exposure, including the use of iterative reconstruction and automated exposure control  IMAGE QUALITY:  Diagnostic  FINDINGS: PARENCHYMA: Decreased attenuation is noted in periventricular and subcortical white matter demonstrating an appearance that is statistically most likely to represent moderate microangiopathic change  No CT signs of acute infarction  No intracranial mass, mass effect or midline shift  No acute parenchymal hemorrhage  VENTRICLES AND EXTRA-AXIAL SPACES:  Normal for the patient's age  VISUALIZED ORBITS AND PARANASAL SINUSES:  Unremarkable  CALVARIUM AND EXTRACRANIAL SOFT TISSUES:  Normal      Impression: No acute intracranial abnormality  Workstation performed: [id number]     TRAUMA - CT spine cervical wo contrast    Result Date: 5/22/2022  Narrative: CT CERVICAL SPINE - WITHOUT CONTRAST INDICATION:   Neck trauma (Age >= 65y) TRAUMA  COMPARISON:  None  TECHNIQUE:  CT examination of the cervical spine was performed without intravenous contrast   Contiguous axial images were obtained  Sagittal and coronal reconstructions were performed  Radiation dose length product (DLP) for this visit:  808 mGy-cm     This examination, like all CT scans performed in the [facility], was performed utilizing techniques to minimize radiation dose exposure, including the use of iterative reconstruction and automated exposure control  IMAGE QUALITY:  Diagnostic  FINDINGS: ALIGNMENT:  Normal alignment of the cervical spine  No subluxation  VERTEBRAL BODIES:  No fracture  DEGENERATIVE CHANGES:  No significant cervical degenerative changes are noted  PREVERTEBRAL AND PARASPINAL SOFT TISSUES:  Unremarkable  THORACIC INLET:  Please refer to the concurrent chest, abdomen, and pelvic CT report for description of the thoracic inlet findings  Impression: No cervical spine fracture or traumatic malalignment  Workstation performed: [id number]     US kidney and bladder    Result Date: 5/23/2022  Narrative: RENAL ULTRASOUND INDICATION:   urinary retention, eval for hydronephrosis  COMPARISON: CT chest abdomen and pelvis May 22, 2022 TECHNIQUE:   Ultrasound of the retroperitoneum was performed with a curvilinear transducer utilizing volumetric sweeps and still imaging techniques  FINDINGS: KIDNEYS: Symmetric and normal size  Right kidney:  9 8 x 3 9 x 3 7 cm  Volume 74 6 mL Left kidney:  9 3 x 4 6 x 3 7 cm  Volume 82 0 mL Right kidney Normal echogenicity and contour  No mass is identified  No hydronephrosis  No shadowing calculi  No perinephric fluid collections  Left kidney Normal echogenicity and contour  Left parapelvic cyst measuring 0 9 x 0 7 x 1 2 cm  No mass is identified  No hydronephrosis  No shadowing calculi  No perinephric fluid collections  URETERS: Nonvisualized  BLADDER: Normally distended  No focal thickening or mass lesions  Bilateral ureteral jets not detected  Impression: Left renal cyst  Workstation performed: [id number]   ·   ·     Significant Findings / Test Results:   · In see above    Incidental Findings:   · None     Test Results Pending at Discharge (will require follow up):    None     Outpatient Tests Requested:  · BMP    Complications:  None    Past Medical History:   Diagnosis Date    Asthma     Chest pain on breathing     last assessed 2/5/15    Chronic diastolic CHF (congestive heart failure) ([facility] )     Chronic respiratory failure with hypoxia (HCC)     Diabetes mellitus ([facility] )     HTN (hypertension)     Hyperlipidemia     Insomnia     Obesity     Preop cardiovascular exam 2/2/2018    Pulmonary fibrosis (HCC)     Rheumatoid arthritis ([facility] )     Volume overload 12/10/2021       Reason for Admission: Fall (Daughter states pt fell of her bed this morning  Per family pt is not on BT, c/o all over body aches  )       Hospital Course:     Everardo Jackson is a 68 y o  female patient with past medical history of see above who originally presented to the hospital on 5/22/2022 due to Fall (Daughter states pt fell of her bed this morning  Per family pt is not on BT, c/o all over body aches  ) fallen body aches  After discussion with the family it appears that she lost her balance while getting out of bed and missed her cane and therefore had a fall trauma workup was largely negative she did have some leukocytosis on admission that has resolved  Infectious workup has been negative  Patient did has some alterations in mentation but this is likely related to receiving IV Dilaudid late last night and this is also resolved  Per discussion with the son the patient is back to her baseline  She was recommended to go to short-term rehab family and patient refusing      Please see above list of diagnoses and related plan for additional information  Condition at Discharge: poor     Discharge Day Visit / Exam:     Subjective:  Patient is complaining of some generalized body aches but overall feeling okay does appear to be back to her baseline offers no acute complaints  Vitals: Blood Pressure: 117/57 (05/24/22 1400)  Pulse: 78 (Simultaneous filing  User may not have seen previous data ) (05/24/22 1400)  Temperature: 98 1 °F (36 7 °C) (05/24/22 1400)  Temp Source: Oral (05/24/22 1400)  Respirations: (!) 37 (Simultaneous filing   User may not have seen previous data ) (05/24/22 1400)  Height: 4' 10" (147 3 cm) (05/23/22 1600)  Weight - Scale: 74 5 kg (164 lb 3 9 oz) (05/24/22 0500)  SpO2: 99 % (Simultaneous filing  User may not have seen previous data ) (05/24/22 1400)  Exam:   Physical Exam  Vitals reviewed  Constitutional:       Appearance: She is obese  She is ill-appearing  Pulmonary:      Effort: Pulmonary effort is normal    Abdominal:      Palpations: Abdomen is soft  Musculoskeletal:         General: Normal range of motion  Neurological:      Mental Status: She is alert  Mental status is at baseline  Psychiatric:         Mood and Affect: Mood normal      Discussion with Family:  Discussed with son    Discharge instructions/Information to patient and family:   See after visit summary for information provided to patient and family  Provisions for Follow-Up Care:  See after visit summary for information related to follow-up care and any pertinent home health orders  Disposition:     Home  ·     Planned Readmission:  No     Discharge Statement:  I spent 45 minutes discharging the patient  This time was spent on the day of discharge  I had direct contact with the patient on the day of discharge  Greater than 50% of the total time was spent examining patient, answering all patient questions, arranging and discussing plan of care with patient as well as directly providing post-discharge instructions  Additional time then spent on discharge activities  Discharge Medications:  See after visit summary for reconciled discharge medications provided to patient and family        ** Please Note: This note has been constructed using a voice recognition system **

## 2022-05-24 NOTE — SPEECH THERAPY NOTE
Speech-Language Pathology Bedside Swallow Evaluation        Patient Name: Isaura Paul    PDSKM'D Date: 5/24/2022     Problem List  Principal Problem:    SIRS (systemic inflammatory response syndrome) ([facility])  Active Problems:    Chronic diastolic congestive heart failure ([facility] )    Essential hypertension    Ambulatory dysfunction    Type 2 diabetes mellitus with stage 2 chronic kidney disease, with long-term current use of insulin (HCC)    Chronic respiratory failure with hypoxia (HCC)    Hyponatremia    Hypokalemia    AMS (altered mental status)         Past Medical History  Past Medical History:   Diagnosis Date    Asthma     Chest pain on breathing     last assessed 2/5/15    Chronic diastolic CHF (congestive heart failure) (HCC)     Chronic respiratory failure with hypoxia (HCC)     Diabetes mellitus ([facility] )     HTN (hypertension)     Hyperlipidemia     Insomnia     Obesity     Preop cardiovascular exam 2/2/2018    Pulmonary fibrosis (HCC)     [facility]     Volume overload 12/10/2021       Past Surgical History  Past Surgical History:   Procedure Laterality Date    HAND SURGERY         Summary/Impressions:   Bedside observations support grossly intact oropharyngeal swallow function across all consistencies tested  Self-fed solid and liquid trials with no s/s of dysphagia or distress  Patient denies difficulties or changes from baseline function  Note, pt is known to take pills cut with water  Also, trials limited 2/2 pt participation        Recommendations:   Diet: regular diet and thin liquids   Meds: cut with liquid    Feeding assistance: assist as needed/tray set up  Frequent Oral care: 2-4x/day  Aspiration precautions and compensatory swallowing strategies: upright posture and slow rate of feeding  Other Recommendations/ considerations: Provide pills (cut small if medically cleared to do so) with water       Current Medical Status  Pt is a 68 y o  female who presented to [address] [place] [address]  [facility] with pain over her entire body as well as confusion  Per patient's family, she fell out of her bed at approximately 3am and was on the floor for some time before son was able to get her up and back into the bed  She seems confused and less responsive than usual, is also complaining of pain over the entire body  Patient is a diabetic, has had blood sugars that have been low, in the 60s over the past few days  Patient also has a history of congestive heart failure  No recent illnesses or fevers noted by family  At present dysphagia evaluation orders placed following pt's inability to swallow medication (with water) resulting in vomiting  Son present and denies h/o dysphagia, however all pills must be cut into small pieces before pt is able to swallow  Past medical history:  Please see H&P for details    Special Studies:  5/22/22 CXR:  1  No acute traumatic abnormality  2  Interstitial pulmonary opacities, favor mild pulmonary edema    5/22/22 CT Chest:  1  No acute traumatic abnormality  2    Chronic appearing T4 compression deformity    Social/Education/Vocational Hx:  Pt lives with family    Swallow Information   Current Risks for Dysphagia & Aspiration: difficulty swallowing pills  Current Symptoms/Concerns: coughing with po  Current Diet: NPO (reg/thin held for dysphagia eval)   Baseline Diet: regular diet and thin liquids    Baseline Assessment   Behavior/Cognition: alert  Speech/Language Status: able to participate in conversation  Patient Positioning: upright in bed    Swallow Mechanism Exam   Facial: symmetrical  Labial: unable to test 2/2 limited command following (pt participation limited)  Lingual: unable to test 2/2 limited command following (pt participation limited)    Velum: unable to visualize  Mandible: adequate ROM  Dentition: edentulous and full dentures  Vocal quality:clear/adequate   Volitional Cough: unable to initiate volitional cough   Respiratory: NC 3L    Consistencies Assessed and Performance   Consistencies Administered: ice chips, thin liquids, puree and hard solids    Oral Stage: WFL  Patient presents with adequate bolus acceptance, containment and manipulation  Mastication judged to be efficient and complete on limited trials accepted  No oral residue  Pharyngeal Stage: Appears functional   Laryngeal rise noted upon palpation  Swallow initiation appears timely  No overt s/s of aspiration or distress  Vocal quality remains clear and dry  Note, trials limited  Esophageal Concerns: none reported     Plan  No additional follow-up at this time  Please re-order should pt exhibit change in status or concerns arise       Results Reviewed with: patient, RN, CRNP and family         Rossy Montoya [address], [facility] Pathologist  [place] #[id number]  [place] #[id number] alert/awake

## 2022-05-24 NOTE — CASE MANAGEMENT
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 ([place] [address] ) 2021    Fungal infection of skin 2020    Chronic prescription opiate use 2019    Shortness of breath     Hyponatremia 2019    Venous stasis dermatitis of both lower extremities 2019    Chronic respiratory failure with hypoxia ([facility] ) 2019    Immunosuppressed status ([place] [address] ) 2019    Interstitial lung disease ([place] [address] ) 2019    Obesity (BMI 30-39 9) 2018    Varicose veins of both lower extremities 2018    Moderate persistent asthma 11/10/2017    Vitamin D deficiency 2017    Chronic diastolic congestive heart failure ([facility] ) 2016    Mixed hyperlipidemia 2016    Bilateral lower extremity edema 2016    Ambulatory dysfunction 2016    Recurrent major depressive disorder, in partial remission ([facility] ) 2016    Gastroesophageal reflux disease without esophagitis 2016    Urinary incontinence 2016    Psoriasis vulgaris 2014    Essential hypertension 2014    Type 2 diabetes mellitus with stage 2 chronic kidney disease, with long-term current use of insulin [facility] ) 2014    Primary insomnia 2014    Rheumatoid arthritis involving multiple sites with positive rheumatoid factor ([facility] ) 2014      LOS (days): 2  Geometric Mean LOS (GMLOS) (days): 2 70  Days to GMLOS:0 8     OBJECTIVE:  Risk of Unplanned Readmission Score: 26 11         Current admission status: Inpatient   Preferred Pharmacy:   [address] [place] [address] 22  [facility] [address]  Phone: [phone] Fax: [phone] [facility] [address]  Phone: [phone] Fax: [phone]    Primary Care Provider: Ridge Lazar MD    Primary Insurance: MEDICARE  Secondary Insurance: BLUE CROSS    DISCHARGE DETAILS:    Discharge planning discussed with[de-identified] DR Fatuma Villalta [phone] POA  Freedom of Choice: Yes  Comments - Freedom of Choice: CM DISCUSSED FREEDOM OF CHOICE   WANTS [facility] SERVICES, [facility] 5/26/2022 PER YANELY/INTAKE  CM contacted family/caregiver?: Yes  Were Treatment Team discharge recommendations reviewed with patient/caregiver?: Yes  Did patient/caregiver verbalize understanding of patient care needs?: Yes  Were patient/caregiver advised of the risks associated with not following Treatment Team discharge recommendations?: Yes    Contacts  Patient Contacts: DR Fatuma Villalta [phone] POA  Relationship to Patient[de-identified] Family  Contact Method: Phone  Phone Number: DR Fatuma Villalta [phone] POA  Reason/Outcome: Continuity of Care, Discharge Planning, Emergency [address]         Is the patient interested in [address] at discharge?: No    DME Referral Provided  Referral made for DME?: No    Other Referral/Resources/Interventions Provided:  Interventions: HHC  Referral Comments: SEE NOTES ABOVE         Treatment Team Recommendation: Short Term Rehab  Discharge Destination Plan[de-identified] Home, Home with [place] at Discharge : Family

## 2022-05-24 NOTE — DISCHARGE INSTR - AVS FIRST PAGE
Your blood sugars were low here in the hospital please monitor your blood sugars closely and titrate your insulin use pending your blood sugars  Your blood sugars will likely improve once he gets home and resume your normal diet    Your blood pressure was also low here in the hospital   We discontinued your blood pressure pill and encourage you to check your blood pressure daily and follow-up with your primary care provider      Physical therapy evaluated you while you were here in the hospital recommended short-term rehab however you and your family have decided to go home please be careful at home be sure to use your cane and be sure to go to outpatient physical therapy

## 2022-05-24 NOTE — CASE MANAGEMENT
Case Management Discharge Planning Note    Patient name Gene Bacon  Location / MRN [id number]  : 1945 Date 2022       Current Admission Date: 2022  Current Admission Diagnosis:SIRS (systemic inflammatory response syndrome) ([facility] )   Patient Active Problem List    Diagnosis Date Noted    AMS (altered mental status) 2022    Hypokalemia 2022    SIRS (systemic inflammatory response syndrome) ([facility] ) 2022    Continuous opioid dependence ([place] [address] ) 2022    Elevated d-dimer 2021    Exertional chest pain 2021    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 ([place] [address] ) 2021    Fungal infection of skin 2020    Chronic prescription opiate use 2019    Shortness of breath     Hyponatremia 2019    Venous stasis dermatitis of both lower extremities 2019    Chronic respiratory failure with hypoxia ([facility] ) 2019    Immunosuppressed status ([place] [address] ) 2019    Interstitial lung disease ([place] [address] ) 2019    Obesity (BMI 30-39 9) 2018    Varicose veins of both lower extremities 2018    Moderate persistent asthma 11/10/2017    Vitamin D deficiency 2017    Chronic diastolic congestive heart failure ([facility] ) 2016    Mixed hyperlipidemia 2016    Bilateral lower extremity edema 2016    Ambulatory dysfunction 2016    Recurrent major depressive disorder, in partial remission ([facility] ) 2016    Gastroesophageal reflux disease without esophagitis 2016    Urinary incontinence 2016    Psoriasis vulgaris 2014    Essential hypertension 2014    Type 2 diabetes mellitus with stage 2 chronic kidney disease, with long-term current use of insulin ([facility] ) 2014    Primary insomnia 2014    Rheumatoid arthritis involving multiple sites with positive rheumatoid factor ([facility] ) 2014      LOS (days): 2  Geometric Mean LOS (GMLOS) (days): 2 70  Days to GMLOS:0 9     OBJECTIVE:  Risk of Unplanned Readmission Score: 26 11         Current admission status: Inpatient   Preferred Pharmacy:   [address] [place] [address] 22  300 [facility] 830 [facility]  Phone: [phone] Fax: [phone] [address] [place] [address]  Phone: [phone] Fax: [phone]    Primary Care Provider: Rola Armijo MD    Primary Insurance: MEDICARE  Secondary Insurance: BLUE CROSS    DISCHARGE DETAILS:    Discharge planning discussed with[de-identified] DR Pardeep Severino [phone]  Freedom of Choice: Yes  Comments - Freedom of Choice: CM DISCUSSED FREEDOM OF CHOICE  WANTS [facility] SERVICES  REFERRALS MADE VIA CARE PORT  AWAIT INTAKE DETERMINATION ADAMNATLY DECLINES STR  SERIVCES  CM contacted family/caregiver?: Yes  Were Treatment Team discharge recommendations reviewed with patient/caregiver?: Yes  Did patient/caregiver verbalize understanding of patient care needs?: Yes  Were patient/caregiver advised of the risks associated with not following Treatment Team discharge recommendations?: Yes    Contacts  Patient Contacts: DR Pardeep Severino [phone]  Relationship to Patient[de-identified] Family  Contact Method: Phone  Phone Number: DR Pardeep Severino [phone]  Reason/Outcome: Continuity of Care, Discharge Planning, Emergency [address]         Is the patient interested in [facility] at discharge?: No    DME Referral Provided  Referral made for DME?: No    Other Referral/Resources/Interventions Provided:  Interventions: [facility]  Referral Comments: REFERRAL MADE VIA CARE PORT  AWAIT INTAKE DETERMINATION  DECLNES STR           Treatment Team Recommendation: Home with [address], Home  Discharge Destination Plan[de-identified] Home, Home with [place] at Discharge : Family

## 2022-05-24 NOTE — CASE MANAGEMENT
Case Management Discharge Planning Note    Patient name Andres Rodarte  Location / MRN [id number]  : 1945 Date 2022       Current Admission Date: 2022  Current Admission Diagnosis:SIRS (systemic inflammatory response syndrome) ([facility] )   Patient Active Problem List    Diagnosis Date Noted    AMS (altered mental status) 2022    Hypokalemia 2022    SIRS (systemic inflammatory response syndrome) ([facility] ) 2022    Continuous opioid dependence ([place] [address] ) 2022    Elevated d-dimer 2021    Exertional chest pain 2021    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 ([place] [address] ) 2021    Fungal infection of skin 2020    Chronic prescription opiate use 2019    Shortness of breath     Hyponatremia 2019    Venous stasis dermatitis of both lower extremities 2019    Chronic respiratory failure with hypoxia ([facility] ) 2019    Immunosuppressed status ([place] [address] ) 2019    Interstitial lung disease ([place] [address] ) 2019    Obesity (BMI 30-39 9) 2018    Varicose veins of both lower extremities 2018    Moderate persistent asthma 11/10/2017    Vitamin D deficiency 2017    Chronic diastolic congestive heart failure ([facility] ) 2016    Mixed hyperlipidemia 2016    Bilateral lower extremity edema 2016    Ambulatory dysfunction 2016    Recurrent major depressive disorder, in partial remission ([facility] ) 2016    Gastroesophageal reflux disease without esophagitis 2016    Urinary incontinence 2016    Psoriasis vulgaris 2014    Essential hypertension 2014    Type 2 diabetes mellitus with stage 2 chronic kidney disease, with long-term current use of insulin [facility] ) 2014    Primary insomnia 2014    Rheumatoid arthritis involving multiple sites with positive rheumatoid factor ([facility] ) 2014      LOS (days): 2  Geometric Mean LOS (GMLOS) (days): 2 70  Days to GMLOS:0 8     OBJECTIVE:  Risk of Unplanned Readmission Score: 26 11         Current admission status: Inpatient   Preferred Pharmacy:   [address] [place] [address] 22  300 [facility] [address]  Phone: [phone] Fax: [phone] [address] [address]  Phone: [phone] Fax: [phone]    Primary Care Provider: Artemio Mcdermott MD    Primary Insurance: MEDICARE  Secondary Insurance: BLUE CROSS    DISCHARGE DETAILS:    Discharge planning discussed with[de-identified] DR Tiffanie Grajeda [phone] POA  Freedom of Choice: Yes  Comments - Freedom of Choice: CM DISCUSSED FREEDOM OF CHOICE   WANTS [facility] SERVICES, [facility] ACCEPTS / [facility] 5/26/2022 PER YANELY/INTAKE  CM contacted family/caregiver?: Yes  Were Treatment Team discharge recommendations reviewed with patient/caregiver?: Yes  Did patient/caregiver verbalize understanding of patient care needs?: Yes  Were patient/caregiver advised of the risks associated with not following Treatment Team discharge recommendations?: Yes    Contacts  Patient Contacts: DR Tiffanie Grajeda [phone] POA  Relationship to Patient[de-identified] Family  Contact Method: Phone  Phone Number: DR Tiffanie Grajeda [phone] POA  Reason/Outcome: Continuity of Care, Discharge Planning, Emergency [address]         Is the patient interested in [address] at discharge?: No    DME Referral Provided  Referral made for DME?: No    Other Referral/Resources/Interventions Provided:  Interventions: HHC  Referral Comments: SEE NOTES ABOVE         Treatment Team Recommendation: Short Term Rehab  Discharge Destination Plan[de-identified] Home, Home with [place] at Discharge : Family ( [address] TRANSPORTED [address] )

## 2022-05-24 NOTE — CASE MANAGEMENT
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 ([facility] ) 2021    Fungal infection of skin 2020    Chronic prescription opiate use 2019    Shortness of breath     Hyponatremia 2019    Venous stasis dermatitis of both lower extremities 2019    Chronic respiratory failure with hypoxia ([facility] ) 2019    Immunosuppressed status ([facility] ) 2019    Interstitial lung disease ([facility] ) 2019    Obesity (BMI 30-39 9) 2018    Varicose veins of both lower extremities 2018    Moderate persistent asthma 11/10/2017    Vitamin D deficiency 2017    [facility] diastolic [facility]ive [facility] failure [facility] ) 2016    Mixed hyperlipidemia 2016    Bilateral lower extremity edema 2016    Ambulatory dysfunction 2016    Recurrent major depressive disorder, in partial remission ([facility] ) 2016    Gastroesophageal reflux disease without esophagitis 2016    Urinary incontinence 2016    Psoriasis vulgaris 2014    Essential hypertension 2014    Type 2 diabetes mellitus with stage 2 chronic kidney disease, with long-term current use of insulin ([facility] ) 2014    Primary insomnia 2014    Rheumatoid arthritis involving multiple sites with positive rheumatoid factor ([facility] ) 2014      LOS (days): 2  Geometric Mean LOS (GMLOS) (days): 2 70  Days to GMLOS:1 1     OBJECTIVE:    Risk of Unplanned Readmission Score: 26 05         Current admission status: Inpatient       Preferred Pharmacy:   [address] [place] [address] 22  300 [facility] [address]  Phone: [phone] Fax: [phone] [address] [facility] [address]  [address] [facility] [address]  Phone: [phone] Fax: [phone]    Primary Care Provider: Salina Mercer MD    Primary Insurance: MEDICARE  Secondary Insurance: BLUE CROSS    ASSESSMENT:  Active Health Care Proxies     [address]  - Son   Primary Phone: [phone] (Mobile)                         Readmission Root Cause  30 Day Readmission: No    Patient Information  Admitted from[de-identified] Home  Mental Status: Alert  During Assessment patient was accompanied by: Not accompanied during assessment  Assessment information provided by[de-identified] Son  Primary Caregiver: Self  Support Systems: Son, Daughter  [place] of Residence: [place] [address] do you live in?: [facility] entry access options   Select all that apply : Stairs  Number of steps to enter home : 5  Do the steps have railings?: Yes  Type of Current Residence: [place]  In the last 12 months, was there a time when you were not able to pay the mortgage or rent on time?: No  In the last 12 months, how many places have you lived?: 1  In the last 12 months, was there a time when you did not have a steady place to sleep or slept in a shelter (including now)?: No  Homeless/housing insecurity resource given?: Refused, N/A  Living Arrangements: Lives w/ Extended Family, Lives w/ Son  Is patient a ?: No    Activities of Daily Living Prior to Admission  Functional Status: Independent  Completes ADLs independently?: Yes  Ambulates independently?: Yes  Does patient use assisted devices?: Yes  Assisted Devices (DME) used: Straight Cane, Home Oxygen concentrator, Portable Oxygen tanks  DME Company Name (respiratory supplies): [employer]  O2 Rate(s): 3  Does patient currently own DME?: Yes  What DME does the patient currently own?: Portable Oxygen tanks, Home Oxygen concentrator, Straight Cane  Does patient have a history of Outpatient Therapy (PT/OT)?: No  Does the patient have a history of Short-Term Rehab?: No  Does patient have a history of HHC?: Yes  Does patient currently have [facility]?: No         Patient Information Continued  Income Source: Pension/skilled nursing  Does patient have prescription coverage?: Yes  Within the past 12 months, you worried that your food would run out before you got the money to buy more : Never true  Food insecurity resource given?: N/A  Does patient receive dialysis treatments?: No  Does patient have a history of substance abuse?: No  Does patient have a history of Mental Health Diagnosis?: No         Means of Transportation  Means of Transport to Appts[de-identified] Family transport  In the past 12 months, has lack of transportation kept you from medical appointments or from getting medications?: No  In the past 12 months, has lack of transportation kept you from meetings, work, or from getting things needed for daily living?: No  Was application for public transport provided?: N/A        DISCHARGE DETAILS:    Discharge planning discussed with[de-identified] DARIEL ANDRA  Freedom of Choice: Yes  Comments - Freedom of Choice: CM DISCUSSED FREEDOM OF CHOICE  WANTS V[facility] SERVICES  REFERRALS MADE VIA CARE PORT  AWAIT INTAKE DETERMINATION    CM contacted family/caregiver?: Yes  Were Treatment Team discharge recommendations reviewed with patient/caregiver?: Yes  Did patient/caregiver verbalize understanding of patient care needs?: Yes  Were patient/caregiver advised of the risks associated with not following Treatment Team discharge recommendations?: Yes    Contacts  Patient Contacts: Connor Arceo  Relationship to Patient[de-identified] Family  Reason/Outcome: Continuity of Care, Discharge [address]         Is the patient interested in [place] at discharge?: Yes  [address] requested[de-identified] Nursing, Physical Therapy, [address] Name[de-identified] Other  [address] [place] [address] Provider[de-identified] Referring Provider  Home Health Services Needed[de-identified] Evaluate Functional Status and Safety, Gait/ADL Training, Strengthening/Theraputic Exercises to Improve Function  Homebound Criteria Met[de-identified] Uses an Assist Device (i e  cane, walker, etc)  Supporting Clincal Findings[de-identified] Limited Endurance, Fatigues Easliy in Short Distances, Dyspnea with Exertion, Requires Oxygen    DME Referral Provided  Referral made for DME?: No    Other Referral/Resources/Interventions Provided:  Interventions: [facility]         Treatment Team Recommendation: Home with [address] [place] [address], Home  Discharge Destination Plan[de-identified] Home, Home with [place] at Discharge : Family

## 2022-05-24 NOTE — ASSESSMENT & PLAN NOTE
Patient seems a little lethargic in strange today-patient did receive IV Dilaudid last night which is likely contributing to her lethargy  Per nursing she appears to be having some difficulty taking her pills  Speech cleared for regular diet thin liquids  Patient is also more agitated and growling at staff after discussion with family this appears to be some normal behavior for the patient      Patient is back to her baseline mentation this afternoon per family

## 2022-05-25 ENCOUNTER — TRANSITIONAL CARE MANAGEMENT (OUTPATIENT)
Dept: INTERNAL MEDICINE CLINIC | Facility: CLINIC | Age: 77
End: 2022-05-25

## 2022-05-25 DIAGNOSIS — N18.30 TYPE 2 DIABETES MELLITUS WITH STAGE 3 CHRONIC KIDNEY DISEASE, WITH LONG-TERM CURRENT USE OF INSULIN (HCC): ICD-10-CM

## 2022-05-25 DIAGNOSIS — Z79.4 TYPE 2 DIABETES MELLITUS WITH STAGE 3 CHRONIC KIDNEY DISEASE, WITH LONG-TERM CURRENT USE OF INSULIN (HCC): ICD-10-CM

## 2022-05-25 DIAGNOSIS — E11.22 TYPE 2 DIABETES MELLITUS WITH STAGE 3 CHRONIC KIDNEY DISEASE, WITH LONG-TERM CURRENT USE OF INSULIN (HCC): ICD-10-CM

## 2022-05-25 RX ORDER — BLOOD SUGAR DIAGNOSTIC
STRIP MISCELLANEOUS
Qty: 100 STRIP | Refills: 3 | Status: SHIPPED | OUTPATIENT
Start: 2022-05-25

## 2022-05-26 ENCOUNTER — TELEPHONE (OUTPATIENT)
Dept: INTERNAL MEDICINE CLINIC | Facility: CLINIC | Age: 77
End: 2022-05-26

## 2022-05-26 NOTE — TELEPHONE ENCOUNTER
POA is Abdias Altman, mother has an appt tomorrow  Wants to know if you can call after appt, patient is still very out of it and not able to articulate    Will be coming with other son to appt

## 2022-05-27 ENCOUNTER — TELEPHONE (OUTPATIENT)
Dept: INTERNAL MEDICINE CLINIC | Facility: CLINIC | Age: 77
End: 2022-05-27

## 2022-05-27 ENCOUNTER — OFFICE VISIT (OUTPATIENT)
Dept: INTERNAL MEDICINE CLINIC | Facility: CLINIC | Age: 77
End: 2022-05-27
Payer: MEDICARE

## 2022-05-27 VITALS
HEIGHT: 58 IN | SYSTOLIC BLOOD PRESSURE: 114 MMHG | TEMPERATURE: 97 F | OXYGEN SATURATION: 95 % | HEART RATE: 92 BPM | RESPIRATION RATE: 18 BRPM | DIASTOLIC BLOOD PRESSURE: 54 MMHG | BODY MASS INDEX: 34.33 KG/M2

## 2022-05-27 DIAGNOSIS — I50.32 CHRONIC DIASTOLIC CONGESTIVE HEART FAILURE (HCC): ICD-10-CM

## 2022-05-27 DIAGNOSIS — E11.22 TYPE 2 DIABETES MELLITUS WITH STAGE 3 CHRONIC KIDNEY DISEASE, WITH LONG-TERM CURRENT USE OF INSULIN, UNSPECIFIED WHETHER STAGE 3A OR 3B CKD (HCC): ICD-10-CM

## 2022-05-27 DIAGNOSIS — E11.22 TYPE 2 DIABETES MELLITUS WITH STAGE 3 CHRONIC KIDNEY DISEASE, WITH LONG-TERM CURRENT USE OF INSULIN (HCC): ICD-10-CM

## 2022-05-27 DIAGNOSIS — Z79.4 TYPE 2 DIABETES MELLITUS WITH STAGE 3 CHRONIC KIDNEY DISEASE, WITH LONG-TERM CURRENT USE OF INSULIN, UNSPECIFIED WHETHER STAGE 3A OR 3B CKD (HCC): ICD-10-CM

## 2022-05-27 DIAGNOSIS — J45.40 MODERATE PERSISTENT ASTHMA WITHOUT COMPLICATION: ICD-10-CM

## 2022-05-27 DIAGNOSIS — N18.2 TYPE 2 DIABETES MELLITUS WITH STAGE 2 CHRONIC KIDNEY DISEASE, WITH LONG-TERM CURRENT USE OF INSULIN (HCC): ICD-10-CM

## 2022-05-27 DIAGNOSIS — N18.30 TYPE 2 DIABETES MELLITUS WITH STAGE 3 CHRONIC KIDNEY DISEASE, WITH LONG-TERM CURRENT USE OF INSULIN, UNSPECIFIED WHETHER STAGE 3A OR 3B CKD (HCC): ICD-10-CM

## 2022-05-27 DIAGNOSIS — E11.22 TYPE 2 DIABETES MELLITUS WITH STAGE 2 CHRONIC KIDNEY DISEASE, WITH LONG-TERM CURRENT USE OF INSULIN (HCC): ICD-10-CM

## 2022-05-27 DIAGNOSIS — J84.9 INTERSTITIAL LUNG DISEASE (HCC): ICD-10-CM

## 2022-05-27 DIAGNOSIS — N18.30 TYPE 2 DIABETES MELLITUS WITH STAGE 3 CHRONIC KIDNEY DISEASE, WITH LONG-TERM CURRENT USE OF INSULIN (HCC): ICD-10-CM

## 2022-05-27 DIAGNOSIS — R41.82 ALTERED MENTAL STATUS, UNSPECIFIED ALTERED MENTAL STATUS TYPE: ICD-10-CM

## 2022-05-27 DIAGNOSIS — Z79.4 TYPE 2 DIABETES MELLITUS WITH STAGE 2 CHRONIC KIDNEY DISEASE, WITH LONG-TERM CURRENT USE OF INSULIN (HCC): ICD-10-CM

## 2022-05-27 DIAGNOSIS — Z79.4 TYPE 2 DIABETES MELLITUS WITH STAGE 3 CHRONIC KIDNEY DISEASE, WITH LONG-TERM CURRENT USE OF INSULIN (HCC): ICD-10-CM

## 2022-05-27 DIAGNOSIS — R65.10 SIRS (SYSTEMIC INFLAMMATORY RESPONSE SYNDROME) (HCC): Primary | ICD-10-CM

## 2022-05-27 LAB — SL AMB POCT HEMOGLOBIN AIC: 7.4 (ref ?–6.5)

## 2022-05-27 PROCEDURE — 99496 TRANSJ CARE MGMT HIGH F2F 7D: CPT | Performed by: INTERNAL MEDICINE

## 2022-05-27 PROCEDURE — 83036 HEMOGLOBIN GLYCOSYLATED A1C: CPT | Performed by: INTERNAL MEDICINE

## 2022-05-27 RX ORDER — CELECOXIB 200 MG/1
CAPSULE ORAL
COMMUNITY
Start: 2022-05-24

## 2022-05-27 RX ORDER — PREDNISONE 2.5 MG
TABLET ORAL
COMMUNITY
Start: 2022-05-10

## 2022-05-27 NOTE — TELEPHONE ENCOUNTER
Can the dr please call Dr Rosita Rocha and fill him in on his mother's visit today  Chela lives with Magalis Astudillo is concerned with any issues

## 2022-05-28 ENCOUNTER — TELEPHONE (OUTPATIENT)
Dept: INTERNAL MEDICINE CLINIC | Facility: CLINIC | Age: 77
End: 2022-05-28

## 2022-05-28 LAB
BACTERIA BLD CULT: NORMAL
BACTERIA BLD CULT: NORMAL

## 2022-05-28 NOTE — TELEPHONE ENCOUNTER
At [phone] received call from Darling Ross from [facility]  He spoke with patient and her son and they refused ER evaluation  I called patient and spoke with patient's son,  He states his mother does not want to ER  States she had something sweet and that is why her blood sugar was elevated  States it has come down to 300's  I explained dangers of elevated blood sugar and why an ER evaluation was ordered  They still decline at this time  He states she took her basal insulin this morning  States she feel fine  I gave signs and symptoms to seek further evaluation

## 2022-05-28 NOTE — TELEPHONE ENCOUNTER
Received text from [address] [place] [address] at 1426 to call [facility]  I spoke with him and he reports upon admitting patient to their services her blood sugar was found to be 425  She is not on short acting insulin  Recommended patient be evaluated in ER  Phoebe Rene will speak with patient and family

## 2022-05-31 RX ORDER — ALBUTEROL SULFATE 2.5 MG/3ML
2.5 SOLUTION RESPIRATORY (INHALATION) EVERY 6 HOURS PRN
Qty: 360 ML | Refills: 2 | Status: CANCELLED | OUTPATIENT
Start: 2022-05-31

## 2022-06-01 NOTE — TELEPHONE ENCOUNTER
Spoke to son  His concern is that the hospital changed his mothers insulin from 65 units to 20 units in the hospital and now her sugars are running between 300 and 400  Do we want to increase insulin again ?  She is also taking metformin in the am

## 2022-07-11 ENCOUNTER — TELEPHONE (OUTPATIENT)
Dept: PULMONOLOGY | Facility: CLINIC | Age: 77
End: 2022-07-11

## 2022-07-21 DIAGNOSIS — I10 ESSENTIAL HYPERTENSION: ICD-10-CM

## 2022-07-21 RX ORDER — TORSEMIDE 20 MG/1
TABLET ORAL
Qty: 360 TABLET | Refills: 3 | Status: SHIPPED | OUTPATIENT
Start: 2022-07-21

## 2022-08-10 DIAGNOSIS — J45.40 MODERATE PERSISTENT ASTHMA WITHOUT COMPLICATION: ICD-10-CM

## 2022-08-10 DIAGNOSIS — J84.9 INTERSTITIAL LUNG DISEASE (HCC): ICD-10-CM

## 2022-08-11 RX ORDER — ALBUTEROL SULFATE 2.5 MG/3ML
SOLUTION RESPIRATORY (INHALATION)
Qty: 375 ML | Refills: 2 | Status: SHIPPED | OUTPATIENT
Start: 2022-08-11

## 2022-08-31 DIAGNOSIS — N18.30 TYPE 2 DIABETES MELLITUS WITH STAGE 3 CHRONIC KIDNEY DISEASE, WITH LONG-TERM CURRENT USE OF INSULIN (HCC): ICD-10-CM

## 2022-08-31 DIAGNOSIS — Z79.4 TYPE 2 DIABETES MELLITUS WITH STAGE 3 CHRONIC KIDNEY DISEASE, WITH LONG-TERM CURRENT USE OF INSULIN (HCC): ICD-10-CM

## 2022-08-31 DIAGNOSIS — E11.22 TYPE 2 DIABETES MELLITUS WITH STAGE 3 CHRONIC KIDNEY DISEASE, WITH LONG-TERM CURRENT USE OF INSULIN (HCC): ICD-10-CM

## 2022-08-31 RX ORDER — INSULIN GLARGINE 100 [IU]/ML
INJECTION, SOLUTION SUBCUTANEOUS
Qty: 15 ML | Refills: 4 | Status: SHIPPED | OUTPATIENT
Start: 2022-08-31 | End: 2022-09-26 | Stop reason: SDUPTHER

## 2022-08-31 RX ORDER — INSULIN GLARGINE 100 [IU]/ML
INJECTION, SOLUTION SUBCUTANEOUS
Qty: 15 ML | Refills: 0 | Status: SHIPPED | OUTPATIENT
Start: 2022-08-31 | End: 2022-08-31

## 2022-09-26 ENCOUNTER — OFFICE VISIT (OUTPATIENT)
Dept: INTERNAL MEDICINE CLINIC | Facility: CLINIC | Age: 77
End: 2022-09-26
Payer: MEDICARE

## 2022-09-26 VITALS
DIASTOLIC BLOOD PRESSURE: 72 MMHG | OXYGEN SATURATION: 94 % | HEART RATE: 81 BPM | BODY MASS INDEX: 35.77 KG/M2 | SYSTOLIC BLOOD PRESSURE: 124 MMHG | HEIGHT: 58 IN | WEIGHT: 170.4 LBS

## 2022-09-26 DIAGNOSIS — D89.9 DISORDER INVOLVING THE IMMUNE MECHANISM, UNSPECIFIED (HCC): ICD-10-CM

## 2022-09-26 DIAGNOSIS — E11.22 TYPE 2 DIABETES MELLITUS WITH STAGE 3 CHRONIC KIDNEY DISEASE, WITH LONG-TERM CURRENT USE OF INSULIN (HCC): ICD-10-CM

## 2022-09-26 DIAGNOSIS — Z79.4 TYPE 2 DIABETES MELLITUS WITH STAGE 3 CHRONIC KIDNEY DISEASE, WITH LONG-TERM CURRENT USE OF INSULIN (HCC): ICD-10-CM

## 2022-09-26 DIAGNOSIS — J45.40 MODERATE PERSISTENT ASTHMA WITHOUT COMPLICATION: ICD-10-CM

## 2022-09-26 DIAGNOSIS — N18.30 TYPE 2 DIABETES MELLITUS WITH STAGE 3 CHRONIC KIDNEY DISEASE, WITH LONG-TERM CURRENT USE OF INSULIN (HCC): ICD-10-CM

## 2022-09-26 DIAGNOSIS — M81.0 AGE-RELATED OSTEOPOROSIS WITHOUT CURRENT PATHOLOGICAL FRACTURE: ICD-10-CM

## 2022-09-26 DIAGNOSIS — J96.11 CHRONIC RESPIRATORY FAILURE WITH HYPOXIA (HCC): ICD-10-CM

## 2022-09-26 DIAGNOSIS — R26.2 AMBULATORY DYSFUNCTION: ICD-10-CM

## 2022-09-26 DIAGNOSIS — I50.32 CHRONIC DIASTOLIC CONGESTIVE HEART FAILURE (HCC): ICD-10-CM

## 2022-09-26 DIAGNOSIS — M05.79 RHEUMATOID ARTHRITIS INVOLVING MULTIPLE SITES WITH POSITIVE RHEUMATOID FACTOR (HCC): Primary | ICD-10-CM

## 2022-09-26 DIAGNOSIS — F33.41 RECURRENT MAJOR DEPRESSIVE DISORDER, IN PARTIAL REMISSION (HCC): ICD-10-CM

## 2022-09-26 PROBLEM — R07.9 EXERTIONAL CHEST PAIN: Status: RESOLVED | Noted: 2021-07-14 | Resolved: 2022-09-26

## 2022-09-26 PROBLEM — R65.10 SIRS (SYSTEMIC INFLAMMATORY RESPONSE SYNDROME) (HCC): Status: RESOLVED | Noted: 2022-05-22 | Resolved: 2022-09-26

## 2022-09-26 PROBLEM — R41.82 AMS (ALTERED MENTAL STATUS): Status: RESOLVED | Noted: 2022-05-24 | Resolved: 2022-09-26

## 2022-09-26 LAB — SL AMB POCT HEMOGLOBIN AIC: 8.2 (ref ?–6.5)

## 2022-09-26 PROCEDURE — 83036 HEMOGLOBIN GLYCOSYLATED A1C: CPT | Performed by: INTERNAL MEDICINE

## 2022-09-26 PROCEDURE — 99214 OFFICE O/P EST MOD 30 MIN: CPT | Performed by: INTERNAL MEDICINE

## 2022-09-26 RX ORDER — MIRTAZAPINE 15 MG/1
15 TABLET, FILM COATED ORAL
Qty: 90 TABLET | Refills: 3 | Status: SHIPPED | OUTPATIENT
Start: 2022-09-26

## 2022-09-26 RX ORDER — PEN NEEDLE, DIABETIC 31 GX5/16"
NEEDLE, DISPOSABLE MISCELLANEOUS 2 TIMES DAILY
Qty: 200 EACH | Refills: 3 | Status: SHIPPED | OUTPATIENT
Start: 2022-09-26

## 2022-09-26 RX ORDER — INSULIN GLARGINE 100 [IU]/ML
INJECTION, SOLUTION SUBCUTANEOUS
Qty: 15 ML | Refills: 4 | Status: SHIPPED | OUTPATIENT
Start: 2022-09-26 | End: 2022-09-27

## 2022-09-26 NOTE — PROGRESS NOTES
INTERNAL MEDICINE OFFICE VISIT  [facility] AT [address]  Tel: [phone]      NAME: Roney Acosta  AGE: 68 y o  SEX: female  : 1945   MRN: [id number]    DATE: 2022  TIME: 8:51 AM      Assessment and Plan:  1  Rheumatoid arthritis involving multiple sites with positive rheumatoid factor ([facility] )   patient was advised to follow-up with her rheumatologist as her symptoms have increased tremendously and she could not complete the Remicade infusion recently due to an allergy that she developed  She is also not on any other medication for her symptoms  Due to the extreme pain in her joints due to the rheumatoid arthritis, she has not been able to take care of herself  She lives with her son but he works and can only help her when he is home  She needs an aide who can help her with her activities of daily living and a referral was placed with [address] [place] [address] Referral to [address] [place] [address] [facility]; Future    2  Type 2 diabetes mellitus with stage 3 chronic kidney disease, with long-term current use of insulin (HCC)   her A1c was high this time at 8 2  She was told to increase the dose of the glargine insulin to 50 units daily  She will continue to take the lispro at the same dose    - POCT hemoglobin A1c  - Insulin Pen Needle (B-D UF III MINI PEN NEEDLES) 31G X 5 MM MISC; Inject under the skin 2 (two) times a day Use as directed  Dispense: 200 each; Refill: 3  - Insulin Glargine Solostar (Basaglar KwikPen) 100 UNIT/ML SOPN; Inject 50 units in the morning daily  Dispense: 15 mL; Refill: 4    3  Chronic diastolic congestive heart failure (HCC)  Continue torsemide  - EXTERNAL Referral to Home Health; Future    4  Chronic respiratory failure with hypoxia (HCC)   continue oxygen as advised  - EXTERNAL Referral to  [facility]; Future    5   Moderate persistent asthma without complication   continue inhalers  - EXTERNAL Referral to Home Health; Future    6  Ambulatory dysfunction    - EXTERNAL Referral to Home Health; Future    7  Age-related osteoporosis without current pathological fracture    - DXA bone density spine hip and pelvis; Future    8  Disorder involving the immune mechanism, unspecified ([facility] )   stable    9  Recurrent major depressive disorder, in partial remission ([facility] )   was started on mirtazapine as she has a lot of difficulty sleeping  It will help her sleep and will help her in the depression  She was told to wean herself off the Lexapro  - mirtazapine (REMERON) 15 mg tablet; Take 1 tablet (15 mg total) by mouth daily at bedtime  Dispense: 90 tablet; Refill: 3      - Counseling Documentation: patient was counseled regarding: diagnostic results, instructions for management, risk factor reductions, prognosis, patient and family education, risks and benefits of treatment options and importance of compliance with treatment  - Medication Side Effects: Adverse side effects of medications were reviewed with the patient/guardian today  Return for follow up visit in  1 month or earlier, if needed  Chief Complaint:  Chief Complaint   Patient presents with    Leg Swelling     Arm swelling  And having pain  History of Present Illness:    patient follows up with the rheumatologist in [facility] and her  Remicade of confusion that she got recently caused an allergic reaction and it had to be stopped in between  She now has extreme pain and swelling of both her arms despite taking the hydrocodone and the  Celebrex  Her type 2 diabetes is also not well controlled this time with hemoglobin A1c of 8 2   Congestive heart failure is stable   Asthma is stable on inhalers   She complains of depression as well as difficulty sleeping for which her medication was changed        Active Problem List:  Patient Active Problem List   Diagnosis    Bilateral lower extremity edema    Chronic diastolic congestive heart failure ([place] [address] )    Mixed hyperlipidemia    Essential hypertension    Ambulatory dysfunction    Recurrent major depressive disorder, in partial remission ([place] [address] )    Type 2 diabetes mellitus with stage 3 chronic kidney disease, with long-term current use of insulin ([facility])    Gastroesophageal reflux disease without esophagitis    Primary insomnia    Moderate persistent asthma    Psoriasis vulgaris    Rheumatoid arthritis involving multiple sites with positive rheumatoid factor (HCC)    Urinary incontinence    Vitamin D deficiency    Varicose veins of both lower extremities    Obesity (BMI 30-39  9)    Immunosuppressed status ([place] [address] )    Interstitial lung disease ([place] [address] )    Chronic respiratory failure with hypoxia ([facility])    Hyponatremia    Venous stasis dermatitis of both lower extremities    Shortness of breath    Chronic prescription opiate use    Fungal infection of skin    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 ([facility])    Elevated d-dimer    Continuous opioid dependence ([facility])    Hypokalemia    Age-related osteoporosis without current pathological fracture         Past Medical History:  Past Medical History:   Diagnosis Date    AMS (altered mental status) 5/24/2022    Asthma     Chest pain on breathing     last assessed 2/5/15    Chronic diastolic CHF (congestive heart failure) (HCC)     Chronic respiratory failure with hypoxia (HCC)     Diabetes mellitus (HCC)     Exertional chest pain 7/14/2021    HTN (hypertension)     Hyperlipidemia     Insomnia     Obesity     Preop cardiovascular exam 2/2/2018    Pulmonary fibrosis (HCC)     Rheumatoid arthritis (HCC)     SIRS (systemic inflammatory response syndrome) ([place] [address] ) 5/22/2022    Volume overload 12/10/2021         Past Surgical History:  Past Surgical History:   Procedure Laterality Date    HAND SURGERY           Family History:  Family History   Problem Relation Age of Onset    Rheum arthritis Mother     Hypertension Mother          Social History:  Social History     Socioeconomic History    Marital status: /Civil Union     Spouse name: None    Number of children: None    Years of education: None    Highest education level: None   Occupational History    Occupation: retired   Tobacco Use    Smoking status: Former Smoker     Packs/day: 1 50     Years: 18 00     Pack years: 27 00     Types: Cigarettes     Start date:      Quit date:      Years since quittin 7    Smokeless tobacco: Never Used    Tobacco comment: Quit 20 years ago   Vaping Use    Vaping Use: Former   Substance and Sexual Activity    Alcohol use: Never    Drug use: No    Sexual activity: Never   Other Topics Concern    None   Social History Narrative    No advance directives on file     Social Determinants of Health     Financial Resource Strain: Not on file   Food Insecurity: No Food Insecurity    Worried About [employer] in the Last Year: Never true    [employer] in the Last Year: Never true   Transportation Needs: No Transportation Needs    Lack of Transportation (Medical): No    Lack of Transportation (Non-Medical): No   Physical Activity: Not on file   Stress: Not on file   Social Connections: Not on file   Intimate Partner Violence: Not on file   Housing Stability: Low Risk     Unable to Pay for Housing in the Last Year: No    Number of Places Lived in the Last Year: 1    Unstable Housing in the Last Year: No         Allergies:   Allergies   Allergen Reactions    Gabapentin     Vancomycin          Medications:    Current Outpatient Medications:     albuterol (2 5 mg/3 mL) 0 083 % nebulizer solution, USE 1 VIAL VIA NEBULIZER EVERY 6 HOURS AS NEEDED FOR WHEEZE OR FOR SHORTNESS OF BREATH, Disp: 375 mL, Rfl: 2    albuterol (PROVENTIL HFA,VENTOLIN HFA) 90 mcg/act inhaler, Inhale 1 puff every 6 (six) hours as needed for wheezing or shortness of breath, Disp: 18 Inhaler, Rfl: 3    B-D TB SYRINGE 1CC/27GX1/2" 27G X 1/2" 1 ML MISC, by Other route 2 (two) times a day, Disp: 100 each, Rfl: 3    Blood Glucose Monitoring Suppl (OneTouch Verio) w/Device KIT, by Does not apply route 3 (three) times a day, Disp: 1 kit, Rfl: 0    celecoxib (CeleBREX) 200 mg capsule, , Disp: , Rfl:     Cimzia Starter Kit 6 X 200 MG/ML KIT, , Disp: , Rfl:     Diclofenac Sodium (VOLTAREN) 1 %, Apply 2 g topically in the morning and 2 g at noon and 2 g in the evening and 2 g before bedtime  , Disp: 50 g, Rfl: 0    fluticasone-vilanterol (BREO ELLIPTA) 100-25 mcg/inh inhaler, Inhale 1 puff daily Rinse mouth after use , Disp: 1 Inhaler, Rfl: 3    HYDROmorphone (DILAUDID) 4 mg tablet, TAKE 1 TABLET BY MOUTH EVERY 8 HOURS AS NEEDED FOR 30 DAYS, Disp: , Rfl:     Insulin Glargine Solostar (Basaglar KwikPen) 100 UNIT/ML SOPN, Inject 50 units in the morning daily, Disp: 15 mL, Rfl: 4    Insulin Pen Needle (B-D UF III MINI PEN NEEDLES) 31G X 5 MM MISC, Inject under the skin 2 (two) times a day Use as directed, Disp: 200 each, Rfl: 3    metFORMIN (GLUCOPHAGE) 1000 MG tablet, TAKE 1 TABLET BY MOUTH TWICE A DAY, Disp: 180 tablet, Rfl: 3    methocarbamol (ROBAXIN) 500 mg tablet, Take 1 tablet (500 mg total) by mouth 2 (two) times a day as needed for muscle spasms (side pain), Disp: 28 tablet, Rfl: 0    mirtazapine (REMERON) 15 mg tablet, Take 1 tablet (15 mg total) by mouth daily at bedtime, Disp: 90 tablet, Rfl: 3    nystatin (MYCOSTATIN) powder, Apply topically 4 (four) times a day groin, Disp: 15 g, Rfl: 0    OneTouch Ultra test strip, PATIENT TO TEST ONCE A DAY, Disp: 100 strip, Rfl: 3    torsemide (DEMADEX) 20 mg tablet, TAKE 2 TABLETS BY MOUTH 2 (TWO) TIMES A DAY, Disp: 360 tablet, Rfl: 3    predniSONE 2 5 mg tablet, TAKE 3 TABLETS (7 5 MG TOTAL) BY MOUTH DAILY   USE FOR PREDNISONE TAPER (Patient not taking: Reported on 9/26/2022), Disp: , Rfl:       The following portions of the patient's history were reviewed and updated as appropriate: past medical history, past surgical history, family history, social history, allergies, current medications and active problem list       Review of Systems:  Constitutional: Denies fever, chills, weight gain, weight loss, fatigue  Eyes: Denies eye redness, eye discharge, double vision, change in visual acuity  ENT: Denies hearing loss, tinnitus, sneezing, nasal congestion, nasal discharge, sore throat   Respiratory: Denies cough, expectoration, hemoptysis, shortness of breath, wheezing  Cardiovascular: Denies chest pain, palpitations, lower extremity swelling, orthopnea, PND  Gastrointestinal: Denies abdominal pain, heartburn, nausea, vomiting, hematemesis, diarrhea, bloody stools  Genito-Urinary: Denies dysuria, frequency, difficulty in micturition, nocturia, incontinence  Musculoskeletal:   Complains of back pain, joint pain, muscle pain  Neurologic: Denies confusion, lightheadedness, syncope, headache, focal weakness, sensory changes, seizures  Endocrine: Denies polyuria, polydipsia, temperature intolerance  Allergy and Immunology: Denies hives, insect bite sensitivity  Hematological and Lymphatic: Denies bleeding problems, swollen glands   Psychological:  Has difficulty sleeping, depression, denies suicidal ideation, anxiety, panic, mood swings  Dermatological: Denies pruritus, rash, skin lesion changes      Vitals:  Vitals:    09/26/22 0800   BP: 124/72   Pulse: 81   SpO2: 94%       Body mass index is 35 61 kg/m²  Weight (last 2 days)     Date/Time Weight    09/26/22 0800 77 3 (170 4)            Physical Examination:  General: Patient is not in acute distress  Awake, alert, responding to commands  No weight gain or loss  Head: Normocephalic  Atraumatic  Eyes: Conjunctiva and lids with no swelling, erythema or discharge  Both pupils normal sized, round and reactive to light   Sclera nonicteric  ENT: External examination of nose and ear normal  Otoscopic examination shows translucent tympanic membranes with patent canals without erythema  Oropharynx moist with no erythema, edema, exudate or lesions  Neck: Supple  JVP not raised  Trachea midline  No masses  No thyromegaly  Lungs: No signs of increased work of breathing or respiratory distress  Bilateral bronchovascular breath sounds with no crackles or rhonchi  Chest wall: No tenderness  Cardiovascular: Normal PMI  No thrills  Regular rate and rhythm  S1 and S2 normal  No murmur, rub or gallop  Gastrointestinal: Abdomen soft, nontender  No guarding or rigidity  Liver and spleen not palpable  Bowel sounds present  Neurologic: Cranial nerves II-XII intact   Cortical functions normal  Motor system - Reflexes 2+ and symmetrical  Sensations normal  Musculoskeletal:  Has multiple joint tenderness  Integumentary: Skin normal with no rash or lesions  Lymphatic: No palpable lymph nodes in neck, axilla or groin  Extremities: No clubbing, cyanosis, edema or varicosities  Psychological: Judgement and insight normal   depressed      Laboratory Results:  CBC with diff:   Lab Results   Component Value Date    WBC 9 60 05/24/2022    WBC 7 94 01/05/2016    RBC 4 26 05/24/2022    RBC 4 39 01/05/2016    HGB 12 1 05/24/2022    HGB 12 9 01/05/2016    HCT 38 2 05/24/2022    HCT 40 9 01/05/2016    MCV 90 05/24/2022    MCV 93 01/05/2016    MCH 28 4 05/24/2022    MCH 29 4 01/05/2016    RDW 13 9 05/24/2022    RDW 15 4 (H) 01/05/2016     05/24/2022     01/05/2016       CMP:  Lab Results   Component Value Date    CREATININE 0 89 05/24/2022    CREATININE 0 74 01/05/2016    BUN 15 05/24/2022    BUN 18 01/05/2016     (L) 01/05/2016    K 3 9 05/24/2022    K 4 5 01/05/2016     05/24/2022     01/05/2016    CO2 26 05/24/2022    CO2 26 01/05/2016    GLUCOSE 159 (H) 01/05/2016    PROT 7 3 01/05/2016    ALKPHOS 118 (H) 05/23/2022    ALKPHOS 151 (H) 01/05/2016    ALT 18 05/23/2022    ALT 25 01/05/2016    AST 51 (H) 05/23/2022    AST 14 01/05/2016 BILIDIR 0 31 (H) 05/23/2022       Lab Results   Component Value Date    HGBA1C 7 4 (A) 05/27/2022    HGBA1C 6 7 (H) 12/10/2021    HGBA1C 8 3 (H) 10/15/2020    MG 2 2 05/23/2022    PHOS 2 7 12/08/2019       Lab Results   Component Value Date    TROPONINI <0 02 07/15/2021    TROPONINI <0 02 07/15/2021    TROPONINI <0 02 07/14/2021    CKMB <1 0 05/22/2022    CKTOTAL 159 05/22/2022       Lipid Profile:   Lab Results   Component Value Date    CHOL 230 01/05/2016    CHOL 149 09/15/2015     Lab Results   Component Value Date    HDL 64 07/14/2021    HDL 80 02/26/2021     Lab Results   Component Value Date    LDLCALC 134 (H) 07/14/2021    LDLCALC 103 (H) 02/26/2021     Lab Results   Component Value Date    TRIG 182 (H) 07/14/2021    TRIG 120 02/26/2021       Imaging Results:  XR chest 1 view  Narrative: CHEST     INDICATION:   Tachypnea  COMPARISON:  Chest radiograph from 5/22/2022 and 12/10/2021, chest CT from 5/22/2022  EXAM PERFORMED/VIEWS:  XR CHEST 1 VIEW    FINDINGS:    Cardiomediastinal silhouette appears unremarkable  Slight worsening of pulmonary edema  Trace effusions  No pneumothorax  Osseous structures appear within normal limits for patient age  Impression: Slight increase in pulmonary edema      Workstation performed: [id number]       Health Maintenance:  Health Maintenance   Topic Date Due    Breast Cancer Screening: Mammogram  Never done    COVID-19 Vaccine (3 - Moderna risk series) 08/15/2021    URINE MICROALBUMIN  02/26/2022    DXA SCAN  06/01/2022    Influenza Vaccine (1) 09/01/2022    HEMOGLOBIN A1C  11/27/2022    BMI: Followup Plan  01/25/2023    Fall Risk  01/25/2023    Medicare Annual Wellness Visit (AWV)  01/25/2023    Depression Remission PHQ  01/25/2023    Diabetic Foot Exam  01/25/2023    DM Eye Exam  01/25/2023    Urinary Incontinence Screening  09/26/2023    BMI: Adult  09/26/2023    Hepatitis C Screening  Completed    Osteoporosis Screening  Completed    Pneumococcal Vaccine: 65+ Years  Completed    HIB Vaccine  Aged Out    Hepatitis B Vaccine  Aged Out    IPV Vaccine  Aged Out    Hepatitis A Vaccine  Aged Out    Meningococcal ACWY Vaccine  Aged Out    HPV Vaccine  Aged Out     Immunization History   Administered Date(s) Administered    COVID-19 MODERNA VACC 0 5 ML IM 06/20/2021, 07/18/2021    INFLUENZA 09/01/2015, 12/20/2016, 11/10/2017, 02/20/2022    Influenza Split High Dose Preservative Free IM 12/20/2016, 11/10/2017    Influenza, high dose seasonal 0 7 mL 10/17/2018, 09/29/2019, 11/17/2020    Influenza, seasonal, injectable 10/01/2014, 09/01/2015    Pneumococcal Conjugate 13-Valent 07/03/2019    Pneumococcal Polysaccharide PPV23 04/21/2008, 12/20/2016    Tdap 1945    Zoster Vaccine Recombinant 01/28/2020, 07/21/2020         Diana Crook MD  9/26/2022,8:51 AM

## 2022-09-27 RX ORDER — INSULIN DEGLUDEC INJECTION 100 U/ML
INJECTION, SOLUTION SUBCUTANEOUS
Qty: 15 ML | Refills: 4 | Status: SHIPPED | OUTPATIENT
Start: 2022-09-27

## 2022-10-16 NOTE — PROGRESS NOTES
Blood glucose rechecked after patient consumed snack and juice  BS continued to be less than 50  1 amp of dextrose administered IV with positive results  AA=[id number] by [id number]  No s/s of hypoglycemia noted at that time  No acute distress noted during this shift  No

## 2022-10-27 ENCOUNTER — RA CDI HCC (OUTPATIENT)
Dept: OTHER | Facility: HOSPITAL | Age: 77
End: 2022-10-27

## 2022-10-27 NOTE — PROGRESS NOTES
E66,01  [facility]  coding opportunities          Chart Reviewed number of suggestions sent to Provider: 1     Patients Insurance     Medicare Insurance: [employer]

## 2022-12-15 DIAGNOSIS — E11.22 TYPE 2 DIABETES MELLITUS WITH STAGE 3 CHRONIC KIDNEY DISEASE, WITH LONG-TERM CURRENT USE OF INSULIN (HCC): ICD-10-CM

## 2022-12-15 DIAGNOSIS — N18.30 TYPE 2 DIABETES MELLITUS WITH STAGE 3 CHRONIC KIDNEY DISEASE, WITH LONG-TERM CURRENT USE OF INSULIN (HCC): ICD-10-CM

## 2022-12-15 DIAGNOSIS — Z79.4 TYPE 2 DIABETES MELLITUS WITH STAGE 3 CHRONIC KIDNEY DISEASE, WITH LONG-TERM CURRENT USE OF INSULIN (HCC): ICD-10-CM

## 2022-12-15 RX ORDER — INSULIN DEGLUDEC INJECTION 100 U/ML
50 INJECTION, SOLUTION SUBCUTANEOUS DAILY
Qty: 45 ML | Refills: 3 | Status: SHIPPED | OUTPATIENT
Start: 2022-12-15 | End: 2023-12-10

## 2022-12-15 NOTE — TELEPHONE ENCOUNTER
Chela's son spoke with the pharmacy- [facility] in [facility] on [address]    Dr Eloise Real has to change the script for the: insulin degludec Sarah Irons FlexTouch) to every 90 days- instead of every 30    The pt can't afford $140 a month- it'll be cheaper if filled every 90 days

## 2023-01-24 ENCOUNTER — OFFICE VISIT (OUTPATIENT)
Dept: PULMONOLOGY | Facility: CLINIC | Age: 78
End: 2023-01-24

## 2023-01-24 ENCOUNTER — RA CDI HCC (OUTPATIENT)
Dept: OTHER | Facility: HOSPITAL | Age: 78
End: 2023-01-24

## 2023-01-24 VITALS
HEIGHT: 58 IN | SYSTOLIC BLOOD PRESSURE: 126 MMHG | TEMPERATURE: 98.2 F | BODY MASS INDEX: 40.3 KG/M2 | WEIGHT: 192 LBS | HEART RATE: 86 BPM | DIASTOLIC BLOOD PRESSURE: 80 MMHG | OXYGEN SATURATION: 92 %

## 2023-01-24 DIAGNOSIS — J96.11 CHRONIC RESPIRATORY FAILURE WITH HYPOXIA (HCC): ICD-10-CM

## 2023-01-24 DIAGNOSIS — M05.79 RHEUMATOID ARTHRITIS INVOLVING MULTIPLE SITES WITH POSITIVE RHEUMATOID FACTOR (HCC): ICD-10-CM

## 2023-01-24 DIAGNOSIS — J84.9 ILD (INTERSTITIAL LUNG DISEASE) (HCC): ICD-10-CM

## 2023-01-24 DIAGNOSIS — J45.40 MODERATE PERSISTENT ASTHMA WITHOUT COMPLICATION: ICD-10-CM

## 2023-01-24 DIAGNOSIS — R06.02 SHORTNESS OF BREATH: Primary | ICD-10-CM

## 2023-01-24 DIAGNOSIS — E66.9 OBESITY (BMI 30-39.9): ICD-10-CM

## 2023-01-24 DIAGNOSIS — J84.9 INTERSTITIAL LUNG DISEASE (HCC): ICD-10-CM

## 2023-01-24 RX ORDER — ALBUTEROL SULFATE 2.5 MG/3ML
2.5 SOLUTION RESPIRATORY (INHALATION) EVERY 6 HOURS PRN
Qty: 375 ML | Refills: 2 | Status: SHIPPED | OUTPATIENT
Start: 2023-01-24

## 2023-01-24 RX ORDER — FLUTICASONE FUROATE, UMECLIDINIUM BROMIDE AND VILANTEROL TRIFENATATE 100; 62.5; 25 UG/1; UG/1; UG/1
1 POWDER RESPIRATORY (INHALATION) DAILY
Qty: 60 BLISTER | Refills: 5 | Status: SHIPPED | OUTPATIENT
Start: 2023-01-24 | End: 2023-02-23

## 2023-01-24 NOTE — PROGRESS NOTES
E66 01  [facility]  coding opportunities          Chart Reviewed number of suggestions sent to Provider: 1     Patients Insurance     Medicare Insurance: [employer]

## 2023-02-13 ENCOUNTER — RA CDI HCC (OUTPATIENT)
Dept: OTHER | Facility: HOSPITAL | Age: 78
End: 2023-02-13

## 2023-02-22 ENCOUNTER — OFFICE VISIT (OUTPATIENT)
Dept: INTERNAL MEDICINE CLINIC | Facility: CLINIC | Age: 78
End: 2023-02-22

## 2023-02-22 ENCOUNTER — APPOINTMENT (OUTPATIENT)
Dept: LAB | Facility: CLINIC | Age: 78
End: 2023-02-22

## 2023-02-22 VITALS
DIASTOLIC BLOOD PRESSURE: 82 MMHG | RESPIRATION RATE: 20 BRPM | BODY MASS INDEX: 41.56 KG/M2 | HEART RATE: 90 BPM | HEIGHT: 58 IN | TEMPERATURE: 98.1 F | OXYGEN SATURATION: 90 % | SYSTOLIC BLOOD PRESSURE: 138 MMHG | WEIGHT: 198 LBS

## 2023-02-22 DIAGNOSIS — Z00.00 MEDICARE ANNUAL WELLNESS VISIT, SUBSEQUENT: ICD-10-CM

## 2023-02-22 DIAGNOSIS — Z79.4 TYPE 2 DIABETES MELLITUS WITH STAGE 3 CHRONIC KIDNEY DISEASE, WITH LONG-TERM CURRENT USE OF INSULIN, UNSPECIFIED WHETHER STAGE 3A OR 3B CKD (HCC): ICD-10-CM

## 2023-02-22 DIAGNOSIS — E11.22 TYPE 2 DIABETES MELLITUS WITH STAGE 3 CHRONIC KIDNEY DISEASE, WITH LONG-TERM CURRENT USE OF INSULIN, UNSPECIFIED WHETHER STAGE 3A OR 3B CKD (HCC): Primary | ICD-10-CM

## 2023-02-22 DIAGNOSIS — N18.30 TYPE 2 DIABETES MELLITUS WITH STAGE 3 CHRONIC KIDNEY DISEASE, WITH LONG-TERM CURRENT USE OF INSULIN, UNSPECIFIED WHETHER STAGE 3A OR 3B CKD (HCC): Primary | ICD-10-CM

## 2023-02-22 DIAGNOSIS — Z79.4 TYPE 2 DIABETES MELLITUS WITH STAGE 3 CHRONIC KIDNEY DISEASE, WITH LONG-TERM CURRENT USE OF INSULIN, UNSPECIFIED WHETHER STAGE 3A OR 3B CKD (HCC): Primary | ICD-10-CM

## 2023-02-22 DIAGNOSIS — F11.20 CONTINUOUS OPIOID DEPENDENCE (HCC): ICD-10-CM

## 2023-02-22 DIAGNOSIS — F33.41 RECURRENT MAJOR DEPRESSIVE DISORDER, IN PARTIAL REMISSION (HCC): ICD-10-CM

## 2023-02-22 DIAGNOSIS — N18.30 TYPE 2 DIABETES MELLITUS WITH STAGE 3 CHRONIC KIDNEY DISEASE, WITH LONG-TERM CURRENT USE OF INSULIN, UNSPECIFIED WHETHER STAGE 3A OR 3B CKD (HCC): ICD-10-CM

## 2023-02-22 DIAGNOSIS — E78.2 MIXED HYPERLIPIDEMIA: ICD-10-CM

## 2023-02-22 DIAGNOSIS — I10 ESSENTIAL HYPERTENSION: ICD-10-CM

## 2023-02-22 DIAGNOSIS — E11.22 TYPE 2 DIABETES MELLITUS WITH STAGE 3 CHRONIC KIDNEY DISEASE, WITH LONG-TERM CURRENT USE OF INSULIN, UNSPECIFIED WHETHER STAGE 3A OR 3B CKD (HCC): ICD-10-CM

## 2023-02-22 DIAGNOSIS — J84.9 INTERSTITIAL LUNG DISEASE (HCC): ICD-10-CM

## 2023-02-22 DIAGNOSIS — I50.32 CHRONIC DIASTOLIC CONGESTIVE HEART FAILURE (HCC): ICD-10-CM

## 2023-02-22 DIAGNOSIS — D89.9 DISORDER INVOLVING THE IMMUNE MECHANISM, UNSPECIFIED (HCC): ICD-10-CM

## 2023-02-22 DIAGNOSIS — E66.01 MORBID OBESITY (HCC): ICD-10-CM

## 2023-02-22 DIAGNOSIS — M05.79 RHEUMATOID ARTHRITIS INVOLVING MULTIPLE SITES WITH POSITIVE RHEUMATOID FACTOR (HCC): ICD-10-CM

## 2023-02-22 DIAGNOSIS — E55.9 VITAMIN D DEFICIENCY: ICD-10-CM

## 2023-02-22 LAB
25(OH)D3 SERPL-MCNC: 19 NG/ML (ref 30–100)
ALBUMIN SERPL BCP-MCNC: 3 G/DL (ref 3.5–5)
ALP SERPL-CCNC: 156 U/L (ref 46–116)
ALT SERPL W P-5'-P-CCNC: 39 U/L (ref 12–78)
ANION GAP SERPL CALCULATED.3IONS-SCNC: 4 MMOL/L (ref 4–13)
AST SERPL W P-5'-P-CCNC: 20 U/L (ref 5–45)
BASOPHILS # BLD AUTO: 0.06 THOUSANDS/ÂΜL (ref 0–0.1)
BASOPHILS NFR BLD AUTO: 1 % (ref 0–1)
BILIRUB SERPL-MCNC: 0.38 MG/DL (ref 0.2–1)
BUN SERPL-MCNC: 30 MG/DL (ref 5–25)
CALCIUM ALBUM COR SERPL-MCNC: 10.4 MG/DL (ref 8.3–10.1)
CALCIUM SERPL-MCNC: 9.6 MG/DL (ref 8.3–10.1)
CHLORIDE SERPL-SCNC: 99 MMOL/L (ref 96–108)
CHOLEST SERPL-MCNC: 229 MG/DL
CO2 SERPL-SCNC: 28 MMOL/L (ref 21–32)
CREAT SERPL-MCNC: 1.1 MG/DL (ref 0.6–1.3)
EOSINOPHIL # BLD AUTO: 0.2 THOUSAND/ÂΜL (ref 0–0.61)
EOSINOPHIL NFR BLD AUTO: 2 % (ref 0–6)
ERYTHROCYTE [DISTWIDTH] IN BLOOD BY AUTOMATED COUNT: 14.2 % (ref 11.6–15.1)
GFR SERPL CREATININE-BSD FRML MDRD: 48 ML/MIN/1.73SQ M
GLUCOSE P FAST SERPL-MCNC: 292 MG/DL (ref 65–99)
HCT VFR BLD AUTO: 46.2 % (ref 34.8–46.1)
HDLC SERPL-MCNC: 89 MG/DL
HGB BLD-MCNC: 14.5 G/DL (ref 11.5–15.4)
IMM GRANULOCYTES # BLD AUTO: 0.08 THOUSAND/UL (ref 0–0.2)
IMM GRANULOCYTES NFR BLD AUTO: 1 % (ref 0–2)
LDLC SERPL CALC-MCNC: 104 MG/DL (ref 0–100)
LYMPHOCYTES # BLD AUTO: 4.37 THOUSANDS/ÂΜL (ref 0.6–4.47)
LYMPHOCYTES NFR BLD AUTO: 33 % (ref 14–44)
MCH RBC QN AUTO: 28.8 PG (ref 26.8–34.3)
MCHC RBC AUTO-ENTMCNC: 31.4 G/DL (ref 31.4–37.4)
MCV RBC AUTO: 92 FL (ref 82–98)
MONOCYTES # BLD AUTO: 0.91 THOUSAND/ÂΜL (ref 0.17–1.22)
MONOCYTES NFR BLD AUTO: 7 % (ref 4–12)
NEUTROPHILS # BLD AUTO: 7.71 THOUSANDS/ÂΜL (ref 1.85–7.62)
NEUTS SEG NFR BLD AUTO: 56 % (ref 43–75)
NONHDLC SERPL-MCNC: 140 MG/DL
NRBC BLD AUTO-RTO: 0 /100 WBCS
PLATELET # BLD AUTO: 166 THOUSANDS/UL (ref 149–390)
PMV BLD AUTO: 12.3 FL (ref 8.9–12.7)
POTASSIUM SERPL-SCNC: 4.4 MMOL/L (ref 3.5–5.3)
PROT SERPL-MCNC: 7.6 G/DL (ref 6.4–8.4)
RBC # BLD AUTO: 5.03 MILLION/UL (ref 3.81–5.12)
SODIUM SERPL-SCNC: 131 MMOL/L (ref 135–147)
TRIGL SERPL-MCNC: 182 MG/DL
TSH SERPL DL<=0.05 MIU/L-ACNC: 2.22 UIU/ML (ref 0.45–4.5)
WBC # BLD AUTO: 13.33 THOUSAND/UL (ref 4.31–10.16)

## 2023-02-22 RX ORDER — PREDNISONE 10 MG/1
20 TABLET ORAL DAILY
COMMUNITY
Start: 2023-01-31

## 2023-02-22 NOTE — PATIENT INSTRUCTIONS
Medicare Preventive Visit Patient Instructions  Thank you for completing your Welcome to Medicare Visit or Medicare Annual Wellness Visit today  Your next wellness visit will be due in one year (2/23/2024)  The screening/preventive services that you may require over the next 5-10 years are detailed below  Some tests may not apply to you based off risk factors and/or age  Screening tests ordered at today's visit but not completed yet may show as past due  Also, please note that scanned in results may not display below  Preventive Screenings:  Service Recommendations Previous Testing/Comments   Colorectal Cancer Screening  * Colonoscopy    * Fecal Occult Blood Test (FOBT)/Fecal Immunochemical Test (FIT)  * Fecal DNA/Cologuard Test  * Flexible Sigmoidoscopy Age: 39-70 years old   Colonoscopy: every 10 years (may be performed more frequently if at higher risk)  OR  FOBT/FIT: every 1 year  OR  Cologuard: every 3 years  OR  Sigmoidoscopy: every 5 years  Screening may be recommended earlier than age 39 if at higher risk for colorectal cancer  Also, an individualized decision between you and your healthcare provider will decide whether screening between the ages of 74-80 would be appropriate  Colonoscopy: 11/05/2010  FOBT/FIT: Not on file  Cologuard: Not on file  Sigmoidoscopy: Not on file          Breast Cancer Screening Age: 36 years old  Frequency: every 1-2 years  Not required if history of left and right mastectomy Mammogram: Not on file        Cervical Cancer Screening Between the ages of 21-29, pap smear recommended once every 3 years  Between the ages of 33-67, can perform pap smear with HPV co-testing every 5 years     Recommendations may differ for women with a history of total hysterectomy, cervical cancer, or abnormal pap smears in past  Pap Smear: Not on file    Screening Not Indicated   Hepatitis C Screening Once for adults born between [facility]  More frequently in patients at high risk for Hepatitis C Hep C Antibody: 02/26/2021    Screening Current   Diabetes Screening 1-2 times per year if you're at risk for diabetes or have pre-diabetes Fasting glucose: 91 mg/dL (7/14/2021)  A1C: 8 2 (9/26/2022)  Screening Not Indicated  History Diabetes   Cholesterol Screening Once every 5 years if you don't have a lipid disorder  May order more often based on risk factors  Lipid panel: 07/14/2021    Screening Not Indicated  History Lipid Disorder     Other Preventive Screenings Covered by Medicare:  1  Abdominal Aortic Aneurysm (AAA) Screening: covered once if your at risk  You're considered to be at risk if you have a family history of AAA  2  Lung Cancer Screening: covers low dose CT scan once per year if you meet all of the following conditions: (1) Age 50-69; (2) No signs or symptoms of lung cancer; (3) Current smoker or have quit smoking within the last 15 years; (4) You have a tobacco smoking history of at least 20 pack years (packs per day multiplied by number of years you smoked); (5) You get a written order from a healthcare provider  3  Glaucoma Screening: covered annually if you're considered high risk: (1) You have diabetes OR (2) Family history of glaucoma OR (3)  aged 48 and older OR (3)  American aged 72 and older  3  Osteoporosis Screening: covered every 2 years if you meet one of the following conditions: (1) You're estrogen deficient and at risk for osteoporosis based off medical history and other findings; (2) Have a vertebral abnormality; (3) On glucocorticoid therapy for more than 3 months; (4) Have primary hyperparathyroidism; (5) On osteoporosis medications and need to assess response to drug therapy  · Last bone density test (DXA Scan): 06/01/2020   5  HIV Screening: covered annually if you're between the age of 15-65  Also covered annually if you are younger than 13 and older than 72 with risk factors for HIV infection   For pregnant patients, it is covered up to 3 times per pregnancy  Immunizations:  Immunization Recommendations   Influenza Vaccine Annual influenza vaccination during flu season is recommended for all persons aged >= 6 months who do not have contraindications   Pneumococcal Vaccine   * Pneumococcal conjugate vaccine = PCV13 (Prevnar 13), PCV15 (Vaxneuvance), PCV20 (Prevnar 20)  * Pneumococcal polysaccharide vaccine = PPSV23 (Pneumovax) Adults 25-60 years old: 1-3 doses may be recommended based on certain risk factors  Adults 72 years old: 1-2 doses may be recommended based off what pneumonia vaccine you previously received   Hepatitis B Vaccine 3 dose series if at intermediate or high risk (ex: diabetes, end stage renal disease, liver disease)   Tetanus (Td) Vaccine - COST NOT COVERED BY MEDICARE PART B Following completion of primary series, a booster dose should be given every 10 years to maintain immunity against tetanus  Td may also be given as tetanus wound prophylaxis  Tdap Vaccine - COST NOT COVERED BY MEDICARE PART B Recommended at least once for all adults  For pregnant patients, recommended with each pregnancy  Shingles Vaccine (Shingrix) - COST NOT COVERED BY MEDICARE PART B  2 shot series recommended in those aged 48 and above     Health Maintenance Due:      Topic Date Due   • Breast Cancer Screening: Mammogram  Never done   • DXA SCAN  06/01/2022   • Hepatitis C Screening  Completed   • Colorectal Cancer Screening  Discontinued     Immunizations Due:      Topic Date Due   • COVID-19 Vaccine (3 - Moderna risk series) 08/15/2021   • Influenza Vaccine (1) 09/01/2022     Advance Directives   What are advance directives? Advance directives are legal documents that state your wishes and plans for medical care  These plans are made ahead of time in case you lose your ability to make decisions for yourself  Advance directives can apply to any medical decision, such as the treatments you want, and if you want to donate organs     What are the types of advance directives? There are many types of advance directives, and each state has rules about how to use them  You may choose a combination of any of the following:  · Living will: This is a written record of the treatment you want  You can also choose which treatments you do not want, which to limit, and which to stop at a certain time  This includes surgery, medicine, IV fluid, and tube feedings  · Durable power of  for healthcare [facility]): This is a written record that states who you want to make healthcare choices for you when you are unable to make them for yourself  This person, called a proxy, is usually a family member or a friend  You may choose more than 1 proxy  · Do not resuscitate (DNR) order:  A DNR order is used in case your heart stops beating or you stop breathing  It is a request not to have certain forms of treatment, such as CPR  A DNR order may be included in other types of advance directives  · Medical directive: This covers the care that you want if you are in a coma, near death, or unable to make decisions for yourself  You can list the treatments you want for each condition  Treatment may include pain medicine, surgery, blood transfusions, dialysis, IV or tube feedings, and a ventilator (breathing machine)  · Values history: This document has questions about your views, beliefs, and how you feel and think about life  This information can help others choose the care that you would choose  Why are advance directives important? An advance directive helps you control your care  Although spoken wishes may be used, it is better to have your wishes written down  Spoken wishes can be misunderstood, or not followed  Treatments may be given even if you do not want them  An advance directive may make it easier for your family to make difficult choices about your care  Fall Prevention    Fall prevention  includes ways to make your home and other areas safer   It also includes ways you can move more carefully to prevent a fall  Health conditions that cause changes in your blood pressure, vision, or muscle strength and coordination may increase your risk for falls  Medicines may also increase your risk for falls if they make you dizzy, weak, or sleepy  Fall prevention tips:   · Stand or sit up slowly  · Use assistive devices as directed  · Wear shoes that fit well and have soles that   · Wear a personal alarm  · Stay active  · Manage your medical conditions  Home Safety Tips:  · Add items to prevent falls in the bathroom  · Keep paths clear  · Install bright lights in your home  · Keep items you use often on shelves within reach  · Paint or place reflective tape on the edges of your stairs  Urinary Incontinence   Urinary incontinence (UI)  is when you lose control of your bladder  UI develops because your bladder cannot store or empty urine properly  The 3 most common types of UI are stress incontinence, urge incontinence, or both  Medicines:   · May be given to help strengthen your bladder control  Report any side effects of medication to your healthcare provider  Do pelvic muscle exercises often:  Your pelvic muscles help you stop urinating  Squeeze these muscles tight for 5 seconds, then relax for 5 seconds  Gradually work up to squeezing for 10 seconds  Do 3 sets of 15 repetitions a day, or as directed  This will help strengthen your pelvic muscles and improve bladder control  Train your bladder:  Go to the bathroom at set times, such as every 2 hours, even if you do not feel the urge to go  You can also try to hold your urine when you feel the urge to go  For example, hold your urine for 5 minutes when you feel the urge to go  As that becomes easier, hold your urine for 10 minutes  Self-care:   · Keep a UI record  Write down how often you leak urine and how much you leak   Make a note of what you were doing when you leaked urine   · Drink liquids as directed  You may need to limit the amount of liquid you drink to help control your urine leakage  Do not drink any liquid right before you go to bed  Limit or do not have drinks that contain caffeine or alcohol  · Prevent constipation  Eat a variety of high-fiber foods  Good examples are high-fiber cereals, beans, vegetables, and whole-grain breads  Walking is the best way to trigger your intestines to have a bowel movement  · Exercise regularly and maintain a healthy weight  Weight loss and exercise will decrease pressure on your bladder and help you control your leakage  · Use a catheter as directed  to help empty your bladder  A catheter is a tiny, plastic tube that is put into your bladder to drain your urine  · Go to behavior therapy as directed  Behavior therapy may be used to help you learn to control your urge to urinate  Weight Management   Why it is important to manage your weight:  Being overweight increases your risk of health conditions such as heart disease, high blood pressure, type 2 diabetes, and certain types of cancer  It can also increase your risk for osteoarthritis, sleep apnea, and other respiratory problems  Aim for a slow, steady weight loss  Even a small amount of weight loss can lower your risk of health problems  How to lose weight safely:  A safe and healthy way to lose weight is to eat fewer calories and get regular exercise  You can lose up about 1 pound a week by decreasing the number of calories you eat by 500 calories each day  Healthy meal plan for weight management:  A healthy meal plan includes a variety of foods, contains fewer calories, and helps you stay healthy  A healthy meal plan includes the following:  · Eat whole-grain foods more often  A healthy meal plan should contain fiber  Fiber is the part of grains, fruits, and vegetables that is not broken down by your body   Whole-grain foods are healthy and provide extra fiber in your diet  Some examples of whole-grain foods are whole-wheat breads and pastas, oatmeal, brown rice, and bulgur  · Eat a variety of vegetables every day  Include dark, leafy greens such as spinach, kale, shelbi greens, and mustard greens  Eat yellow and orange vegetables such as carrots, sweet potatoes, and winter squash  · Eat a variety of fruits every day  Choose fresh or canned fruit (canned in its own juice or light syrup) instead of juice  Fruit juice has very little or no fiber  · Eat low-fat dairy foods  Drink fat-free (skim) milk or 1% milk  Eat fat-free yogurt and low-fat cottage cheese  Try low-fat cheeses such as mozzarella and other reduced-fat cheeses  · Choose meat and other protein foods that are low in fat  Choose beans or other legumes such as split peas or lentils  Choose fish, skinless poultry (chicken or turkey), or lean cuts of red meat (beef or pork)  Before you cook meat or poultry, cut off any visible fat  · Use less fat and oil  Try baking foods instead of frying them  Add less fat, such as margarine, sour cream, regular salad dressing and mayonnaise to foods  Eat fewer high-fat foods  Some examples of high-fat foods include french fries, doughnuts, ice cream, and cakes  · Eat fewer sweets  Limit foods and drinks that are high in sugar  This includes candy, cookies, regular soda, and sweetened drinks  Exercise:  Exercise at least 30 minutes per day on most days of the week  Some examples of exercise include walking, biking, dancing, and swimming  You can also fit in more physical activity by taking the stairs instead of the elevator or parking farther away from stores  Ask your healthcare provider about the best exercise plan for you     Narcotic (Opioid) Safety    Use narcotics safely:  · Take prescribed narcotics exactly as directed  · Do not give narcotics to others or take narcotics that belong to someone else  · Do not mix narcotics without medicines or alcohol  · Do not drive or operate heavy machinery after you take the narcotic  · Monitor for side effects and notify your healthcare provider if you experienced side effects such as nausea, sleepiness, itching, or trouble thinking clearly  Manage constipation:    Constipation is the most common side effect of narcotic medicine  Constipation is when you have hard, dry bowel movements, or you go longer than usual between bowel movements  Tell your healthcare provider about all changes in your bowel movements while you are taking narcotics  He or she may recommend laxative medicine to help you have a bowel movement  He or she may also change the kind of narcotic you are taking, or change when you take it  The following are more ways you can prevent or relieve constipation:    · Drink liquids as directed  You may need to drink extra liquids to help soften and move your bowels  Ask how much liquid to drink each day and which liquids are best for you  · Eat high-fiber foods  This may help decrease constipation by adding bulk to your bowel movements  High-fiber foods include fruits, vegetables, whole-grain breads and cereals, and beans  Your healthcare provider or dietitian can help you create a high-fiber meal plan  Your provider may also recommend a fiber supplement if you cannot get enough fiber from food  · Exercise regularly  Regular physical activity can help stimulate your intestines  Walking is a good exercise to prevent or relieve constipation  Ask which exercises are best for you  · Schedule a time each day to have a bowel movement  This may help train your body to have regular bowel movements  Bend forward while you are on the toilet to help move the bowel movement out  Sit on the toilet for at least 10 minutes, even if you do not have a bowel movement  Store narcotics safely:   · Store narcotics where others cannot easily get them  Keep them in a locked cabinet or secure area   Do not  keep them in a purse or other bag you carry with you  A person may be looking for something else and find the narcotics  · Make sure narcotics are stored out of the reach of children  A child can easily overdose on narcotics  Narcotics may look like candy to a small child  The best way to dispose of narcotics: The laws vary by country and area  In the [place], the best way is to return the narcotics through a take-back program  This program is offered by the [employer] ([employer])  The following are options for using the program:  · Take the narcotics to a VI collection site  The site is often a law enforcement center  Call your local law enforcement center for scheduled take-back days in your area  You will be given information on where to go if the collection site is in a different location  · Take the narcotics to an approved pharmacy or hospital   A pharmacy or hospital may be set up as a collection site  You will need to ask if it is a VI collection site if you were not directed there  A pharmacy or doctor's office may not be able to take back narcotics unless it is a VI site  · Use a mail-back system  This means you are given containers to put the narcotics into  You will then mail them in the containers  · Use a take-back drop box  This is a place to leave the narcotics at any time  People and animals will not be able to get into the box  Your local law enforcement agency can tell you where to find a drop box in your area  Other ways to manage pain:   · Ask your healthcare provider about non-narcotic medicines to control pain  Nonprescription medicines include NSAIDs (such as ibuprofen) and acetaminophen  Prescription medicines include muscle relaxers, antidepressants, and steroids  · Pain may be managed without any medicines  Some ways to relieve pain include massage, aromatherapy, or meditation  Physical or occupational therapy may also help      For more information:   · Drug Enforcement Administration  [address] [facility]  [address]  Phone: 4- 741 - [phone]  Web Address: [email or web address]_[email or web address]    · [address]  and Drug Administration  [place] , [address]  Phone: 8- 832 - 646-7769  Web Address: http://nGame/     © Copyright [employer] 2018 Information is for End User's use only and may not be sold, redistributed or otherwise used for commercial purposes   All illustrations and images included in CareNotes® are the copyrighted property of [employer]  or [address] [place], [place] [address]miguelina

## 2023-02-22 NOTE — PROGRESS NOTES
Assessment and Plan:   Type 2 diabetes is usually well controlled but she did not do blood work for a long time  Was advised to continue the insulin and metformin  Continue present medications for blood pressure, it is controlled  Has not been taking any statins  Takes torsemide but still has swelling of her ankles off and on  Was told to follow-up with cardiology  Was advised to follow the instructions given by the pulmonologist regarding use of nebulizer 3 times a day for the asthma and the use of oxygen for the interstitial lung disease  Has been having cough off and on  Has a lot of pain due to the rheumatoid arthritis, was told to follow-up with rheumatology  Seems to be very depressed but is presently not taking any medication except Remeron  Needs to lose weight but her activity is very limited  Problem List Items Addressed This Visit    None       Preventive health issues were discussed with patient, and age appropriate screening tests were ordered as noted in patient's After Visit Summary  Personalized health advice and appropriate referrals for health education or preventive services given if needed, as noted in patient's After Visit Summary  History of Present Illness:     Patient presents for a Medicare Wellness Visit    HPI  Follow-up      Patient Care Team:  Neli Ledesma MD as PCP - General  Ty Hill MD     Review of Systems:     Review of Systems   Constitutional: Positive for activity change and fatigue  Negative for chills, diaphoresis and fever  HENT: Negative for congestion, ear discharge, ear pain, hearing loss, postnasal drip, rhinorrhea, sinus pressure, sinus pain, sneezing, sore throat and voice change  Eyes: Negative for pain, discharge, redness and visual disturbance  Respiratory: Positive for cough, shortness of breath and wheezing  Negative for chest tightness  Cardiovascular: Positive for leg swelling  Negative for chest pain and palpitations  Gastrointestinal: Negative for abdominal distention, abdominal pain, blood in stool, constipation, diarrhea, nausea and vomiting  Endocrine: Negative for cold intolerance, heat intolerance, polydipsia, polyphagia and polyuria  Genitourinary: Negative for dysuria, flank pain, frequency, hematuria and urgency  Musculoskeletal: Positive for back pain  Negative for arthralgias, gait problem, joint swelling, myalgias, neck pain and neck stiffness  Skin: Negative for rash  Neurological: Negative for dizziness, tremors, syncope, facial asymmetry, speech difficulty, weakness, light-headedness, numbness and headaches  Hematological: Does not bruise/bleed easily  Psychiatric/Behavioral: Positive for dysphoric mood  Negative for behavioral problems, confusion and sleep disturbance  The patient is not nervous/anxious  Problem List:     Patient Active Problem List   Diagnosis   • Bilateral lower extremity edema   • Chronic diastolic congestive heart failure ([facility])   • Mixed hyperlipidemia   • Essential hypertension   • Ambulatory dysfunction   • Recurrent major depressive disorder, in partial remission ([facility])   • Type 2 diabetes mellitus with stage 3 chronic kidney disease, with long-term current use of insulin ([facility])   • Gastroesophageal reflux disease without esophagitis   • Primary insomnia   • Moderate persistent asthma   • Psoriasis vulgaris   • Rheumatoid arthritis involving multiple sites with positive rheumatoid factor ([facility])   • Urinary incontinence   • Vitamin D deficiency   • Varicose veins of both lower extremities   • Obesity (BMI 30-39  9)   • Immunosuppressed status ([facility])   • Interstitial lung disease ([facility] [address] )   • Chronic respiratory failure with hypoxia ([facility])   • Hyponatremia   • Venous stasis dermatitis of both lower extremities   • Shortness of breath   • Chronic prescription opiate use   • Fungal infection of skin   • 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)   • Elevated d-dimer   • Continuous opioid dependence (HCC)   • Hypokalemia   • Age-related osteoporosis without current pathological fracture      Past Medical and Surgical History:     Past Medical History:   Diagnosis Date   • AMS (altered mental status) 2022   • Asthma    • Chest pain on breathing     last assessed 2/5/15   • Chronic diastolic CHF (congestive heart failure) (HCC)    • Chronic respiratory failure with hypoxia (HCC)    • Diabetes mellitus (HCC)    • Exertional chest pain 2021   • HTN (hypertension)    • Hyperlipidemia    • Insomnia    • Obesity    • Preop cardiovascular exam 2018   • Pulmonary fibrosis (HCC)    • Rheumatoid arthritis (HCC)    • SIRS (systemic inflammatory response syndrome) ([facility] [address] ) 2022   • Volume overload 12/10/2021     Past Surgical History:   Procedure Laterality Date   • HAND SURGERY        Family History:     Family History   Problem Relation Age of Onset   • Rheum arthritis Mother    • Hypertension Mother       Social History:     Social History     Socioeconomic History   • Marital status: /Civil Union     Spouse name: None   • Number of children: None   • Years of education: None   • Highest education level: None   Occupational History   • Occupation: retired   Tobacco Use   • Smoking status: Former     Packs/day: 1 50     Years: 18 00     Pack years: 27 00     Types: Cigarettes     Start date:      Quit date:      Years since quittin 1   • Smokeless tobacco: Never   • Tobacco comments:     Quit 20 years ago   Vaping Use   • Vaping Use: Former   Substance and Sexual Activity   • Alcohol use: Never   • Drug use: No   • Sexual activity: Never   Other Topics Concern   • None   Social History Narrative    No advance directives on file     Social Determinants of Health     Financial Resource Strain: Not on file   Food Insecurity: Unknown   • Worried About Running Out of Food in the Last Year: Never true   • Ran Out of Food in the Last Year: Not on file   Transportation Needs: No Transportation Needs   • Lack of Transportation (Medical): No   • Lack of Transportation (Non-Medical): No   Physical Activity: Not on file   Stress: Not on file   Social Connections: Not on file   Intimate Partner Violence: Not on file   Housing Stability: Low Risk    • Unable to Pay for Housing in the Last Year: No   • Number of Places Lived in the Last Year: 1   • Unstable Housing in the Last Year: No      Medications and Allergies:     Current Outpatient Medications   Medication Sig Dispense Refill   • albuterol (2 5 mg/3 mL) 0 083 % nebulizer solution Take 3 mL (2 5 mg total) by nebulization every 6 (six) hours as needed for wheezing or shortness of breath 375 mL 2   • albuterol (PROVENTIL HFA,VENTOLIN HFA) 90 mcg/act inhaler Inhale 1 puff every 6 (six) hours as needed for wheezing or shortness of breath 18 Inhaler 3   • B-D TB SYRINGE 1CC/27GX1/2" 27G X 1/2" 1 ML MISC by Other route 2 (two) times a day 100 each 3   • Blood Glucose Monitoring Suppl (OneTouch Verio) w/Device KIT by Does not apply route 3 (three) times a day 1 kit 0   • celecoxib (CeleBREX) 200 mg capsule      • Cimzia Starter Kit 6 X 200 MG/ML KIT      • Diclofenac Sodium (VOLTAREN) 1 % Apply 2 g topically in the morning and 2 g at noon and 2 g in the evening and 2 g before bedtime  50 g 0   • fluticasone-umeclidinium-vilanterol (Trelegy Ellipta) 100-62 5-25 mcg/actuation inhaler Inhale 1 puff daily Rinse mouth after use   60 blister 5   • HYDROmorphone (DILAUDID) 4 mg tablet TAKE 1 TABLET BY MOUTH EVERY 8 HOURS AS NEEDED FOR 30 DAYS     • insulin degludec Andrew Plum FlexTouch) 100 units/mL injection pen Inject 50 Units under the skin daily 45 mL 3   • Insulin Pen Needle (B-D UF III MINI PEN NEEDLES) 31G X 5 MM MISC Inject under the skin 2 (two) times a day Use as directed 200 each 3   • metFORMIN (GLUCOPHAGE) 1000 MG tablet TAKE 1 TABLET BY MOUTH TWICE A  tablet 3   • methocarbamol (ROBAXIN) 500 mg tablet Take 1 tablet (500 mg total) by mouth 2 (two) times a day as needed for muscle spasms (side pain) 28 tablet 0   • mirtazapine (REMERON) 15 mg tablet Take 1 tablet (15 mg total) by mouth daily at bedtime 90 tablet 3   • nystatin (MYCOSTATIN) powder Apply topically 4 (four) times a day groin 15 g 0   • OneTouch Ultra test strip PATIENT TO TEST ONCE A  strip 3   • predniSONE 10 mg tablet Take 20 mg by mouth daily     • torsemide (DEMADEX) 20 mg tablet TAKE 2 TABLETS BY MOUTH 2 (TWO) TIMES A  tablet 3   • predniSONE 2 5 mg tablet  (Patient not taking: Reported on 2/22/2023)       No current facility-administered medications for this visit  Allergies   Allergen Reactions   • Gabapentin    • Vancomycin       Immunizations:     Immunization History   Administered Date(s) Administered   • COVID-19 MODERNA VACC 0 5 ML IM 06/20/2021, 07/18/2021   • INFLUENZA 09/01/2015, 12/20/2016, 11/10/2017, 02/20/2022   • Influenza Split High Dose Preservative Free IM 12/20/2016, 11/10/2017   • Influenza, high dose seasonal 0 7 mL 10/17/2018, 09/29/2019, 11/17/2020   • Influenza, seasonal, injectable 10/01/2014, 09/01/2015   • Pneumococcal Conjugate 13-Valent 07/03/2019   • Pneumococcal Polysaccharide PPV23 04/21/2008, 12/20/2016   • Tdap 1945   • Zoster Vaccine Recombinant 01/28/2020, 07/21/2020      Health Maintenance:         Topic Date Due   • Breast Cancer Screening: Mammogram  Never done   • DXA SCAN  06/01/2022   • Hepatitis C Screening  Completed   • Colorectal Cancer Screening  Discontinued         Topic Date Due   • COVID-19 Vaccine (3 - Moderna risk series) 08/15/2021   • Influenza Vaccine (1) 09/01/2022      Medicare Screening Tests and Risk Assessments:     Chela is here for her Subsequent Wellness visit  Health Risk Assessment:   Patient rates overall health as fair  Patient feels that their physical health rating is slightly worse   Patient is very satisfied with their life  Eyesight was rated as same  Hearing was rated as same  Patient feels that their emotional and mental health rating is same  Patients states they are never, rarely angry  Patient states they are always unusually tired/fatigued  Pain experienced in the last 7 days has been a lot  Patient's pain rating has been 10/10  Patient states that she has experienced no weight loss or gain in last 6 months  Fall Risk Screening: In the past year, patient has experienced: history of falling in past year    Number of falls: 1  Injured during fall?: No    Feels unsteady when standing or walking?: Yes    Worried about falling?: Yes      Urinary Incontinence Screening:   Patient has leaked urine accidently in the last six months  Home Safety:  Patient has trouble with stairs inside or outside of their home  Patient has working smoke alarms and has working carbon monoxide detector  Home safety hazards include: none  Nutrition:   Current diet is Regular and Diabetic  Medications:   Patient is not currently taking any over-the-counter supplements  Patient is able to manage medications  Activities of Daily Living (ADLs)/Instrumental Activities of Daily Living (IADLs):   Walk and transfer into and out of bed and chair?: Yes  Dress and groom yourself?: Yes    Bathe or shower yourself?: Yes    Feed yourself? Yes  Do your laundry/housekeeping?: Yes  Manage your money, pay your bills and track your expenses?: Yes  Make your own meals?: Yes    Do your own shopping?: Yes    ADL comments:  With assistance     Previous Hospitalizations:   Any hospitalizations or ED visits within the last 12 months?: No      Cognitive Screening:   Provider or family/friend/caregiver concerned regarding cognition?: No    PREVENTIVE SCREENINGS      Cardiovascular Screening:    General: Screening Not Indicated and History Lipid Disorder      Diabetes Screening:     General: Screening Not Indicated and History Diabetes Cervical Cancer Screening:    General: Screening Not Indicated      Osteoporosis Screening:    General: Screening Not Indicated and History Osteoporosis      Lung Cancer Screening:     General: Screening Not Indicated      Hepatitis C Screening:    General: Screening Current    Screening, Brief Intervention, and Referral to Treatment (SBIRT)    Screening  Typical number of drinks in a day: 0  Typical number of drinks in a week: 0  Interpretation: Low risk drinking behavior  Single Item Drug Screening:  How often have you used an illegal drug (including marijuana) or a prescription medication for non-medical reasons in the past year? never    Single Item Drug Screen Score: 0  Interpretation: Negative screen for possible drug use disorder    Review of Current Opioid Use    Opioid Risk Tool (ORT) Interpretation: Complete Opioid Risk Tool (ORT)    Other Counseling Topics:   Car/seat belt/driving safety, skin self-exam, sunscreen and calcium and vitamin D intake and regular weightbearing exercise  No results found  Physical Exam:     /82 (BP Location: Left arm, Patient Position: Sitting, Cuff Size: Large)   Pulse 90   Temp 98 1 °F (36 7 °C) (Tympanic)   Resp 20   Ht 4' 10" (1 473 m)   Wt 89 8 kg (198 lb)   SpO2 90%   BMI 41 38 kg/m²     Physical Exam  Constitutional:       General: She is not in acute distress  Appearance: She is well-developed  She is not diaphoretic  HENT:      Head: Normocephalic and atraumatic  Right Ear: External ear normal       Left Ear: External ear normal       Nose: Nose normal    Eyes:      General: No scleral icterus  Right eye: No discharge  Left eye: No discharge  Conjunctiva/sclera: Conjunctivae normal    Cardiovascular:      Rate and Rhythm: Normal rate and regular rhythm  Heart sounds: Normal heart sounds  No murmur heard  No friction rub  No gallop     Pulmonary:      Effort: Pulmonary effort is normal  No respiratory distress  Breath sounds: Wheezing and rales present  Abdominal:      General: Bowel sounds are normal  There is no distension  Palpations: Abdomen is soft  Tenderness: There is no abdominal tenderness  There is no guarding or rebound  Musculoskeletal:         General: No tenderness  Right lower leg: Edema present  Left lower leg: Edema present  Comments: Has tenderness in the lower back   Skin:     General: Skin is warm and dry  Findings: No erythema or rash  Neurological:      Mental Status: She is alert and oriented to person, place, and time  Cranial Nerves: No cranial nerve deficit  Sensory: No sensory deficit  Motor: No abnormal muscle tone     Psychiatric:         Behavior: Behavior normal       Comments: Depressed          Neli Ledesma MD

## 2023-02-23 LAB
EST. AVERAGE GLUCOSE BLD GHB EST-MCNC: 306 MG/DL
HBA1C MFR BLD: 12.3 %

## 2023-02-24 ENCOUNTER — HOSPITAL ENCOUNTER (OUTPATIENT)
Dept: MRI IMAGING | Facility: CLINIC | Age: 78
Discharge: HOME | End: 2023-02-24

## 2023-02-24 DIAGNOSIS — M47.897 OTHER SPONDYLOSIS, LUMBOSACRAL REGION: ICD-10-CM

## 2023-02-24 DIAGNOSIS — E11.22 TYPE 2 DIABETES MELLITUS WITH STAGE 3 CHRONIC KIDNEY DISEASE, WITH LONG-TERM CURRENT USE OF INSULIN (HCC): ICD-10-CM

## 2023-02-24 DIAGNOSIS — N18.30 TYPE 2 DIABETES MELLITUS WITH STAGE 3 CHRONIC KIDNEY DISEASE, WITH LONG-TERM CURRENT USE OF INSULIN (HCC): ICD-10-CM

## 2023-02-24 DIAGNOSIS — Z79.4 TYPE 2 DIABETES MELLITUS WITH STAGE 3 CHRONIC KIDNEY DISEASE, WITH LONG-TERM CURRENT USE OF INSULIN (HCC): ICD-10-CM

## 2023-02-24 RX ORDER — INSULIN DEGLUDEC INJECTION 100 U/ML
60 INJECTION, SOLUTION SUBCUTANEOUS DAILY
Qty: 54 ML | Refills: 3 | Status: SHIPPED | OUTPATIENT
Start: 2023-02-24 | End: 2024-02-19

## 2023-02-27 ENCOUNTER — TELEPHONE (OUTPATIENT)
Dept: INTERNAL MEDICINE CLINIC | Facility: CLINIC | Age: 78
End: 2023-02-27

## 2023-02-27 NOTE — TELEPHONE ENCOUNTER
----- Message from Shelbie Billings MD sent at 2/24/2023  6:05 PM EST -----  Blood sugar is extremely high, please increase the dose of the insulin to 60 units daily

## 2023-02-27 NOTE — TELEPHONE ENCOUNTER
I called Jun Masker she didn't  I left her a message I also spoke to her son Deidre Malik he is aware of Jun Masker results of her  blood sugar and providers message to increase insulin to 60 daily and he understood if further questions he will call us back

## 2023-02-27 NOTE — TELEPHONE ENCOUNTER
A1c is high at 12  This isn't influenced by what was eaten the night before  Average blood sugars are  higher than 200  PCP recommend increasing  If they don't want to I recommend follow up with PCP in office

## 2023-02-27 NOTE — TELEPHONE ENCOUNTER
Spoke with son he mentions the day of the blood test pt was not fasting  He would like to make sure levels are appropriate before increasing

## 2023-04-21 ENCOUNTER — TELEPHONE (OUTPATIENT)
Age: 78
End: 2023-04-21

## 2023-04-24 ENCOUNTER — TELEPHONE (OUTPATIENT)
Age: 78
End: 2023-04-24

## 2023-04-24 DIAGNOSIS — Z78.9 NEED FOR FOLLOW-UP BY SOCIAL WORKER: Primary | ICD-10-CM

## 2023-04-24 NOTE — TELEPHONE ENCOUNTER
[id number]  [phone]     Patient canceled PT for today, son states she is complaining of both feet hurting & unable to work

## 2023-04-26 ENCOUNTER — PATIENT OUTREACH (OUTPATIENT)
Age: 78
End: 2023-04-26

## 2023-04-26 ENCOUNTER — TELEPHONE (OUTPATIENT)
Age: 78
End: 2023-04-26

## 2023-04-26 NOTE — PROGRESS NOTES
LETITIA YOST received a referral regarding patient's need for social work follow up due to needing community resources and info on Meals on Wheels  LETITIA YOST attempted to contact patient at this time  LVM requesting a call in return at patient's earliest convenience  Will continue attempts to reach patient

## 2023-04-26 NOTE — TELEPHONE ENCOUNTER
Pt cancelled pt/ot claims pain is so bad can't get out of bed  [facility] nurse will be going tomorrow to see her  Her  is [facility] Sober but she is on the road  OFFICE # [phone]  Meds not working

## 2023-05-05 ENCOUNTER — PATIENT OUTREACH (OUTPATIENT)
Age: 78
End: 2023-05-05

## 2023-05-05 NOTE — PROGRESS NOTES
LETITIA YOST received a referral from patient's PCP regarding patient's need for social work follow up  LETITIA YOST has made multiple attempts to contact patient to assist, however, attempts to reach patient have been unsuccessful at this time  UTR letter sent  Referral will be closed, however, LETITIA YOST will be available if needed

## 2023-05-05 NOTE — LETTER
[address] [place] [address]  [phone]    Re: Jose Wells to reach    5/5/2023       Dear Kisha Kimbrough,    We tried to reach you by phone and were unfortunately unable to reach you  It is important that you contact the [address] at [phone]      Sincerely,         John Ruben

## 2023-05-11 DIAGNOSIS — Z71.89 COMPLEX CARE COORDINATION: Primary | ICD-10-CM

## 2023-05-12 ENCOUNTER — PATIENT OUTREACH (OUTPATIENT)
Age: 78
End: 2023-05-12

## 2023-05-12 NOTE — PROGRESS NOTES
Complex Care Management Note:  Inbasket notification for complex outpatient care management received as identified via HRR report  Referral received from [facility]  Chart review completed  Referral of OPCM SW had previously been placed by PCP  Outreach attempts by SW were unsucessful  ED visits and Hospitalizations:   Patient recently hospitalized at [address] from 05/01/23 to 05/07/23 for hyponatremia and CLAUDIA  Patient was cleared to discharge to home with recommendation forSNP placement  Patietn and family declined this plan  Patient discharged home with [facility] to follow up with PCP  Patient is under the care of [facility]  PMH Includes:  COPD, CHF, DM2 with last A1C 6 7 on 12/10/21  Refer to problem list for complete list of medical diagnosis  Admission or ED risk score is 55  Future Appointments are:    05/26/23 with Infusion  06/08/23 with nephrology [facility]    Patient's VS and DW and currently being monitored via [facility] Remote Patient Monitoring    Case meets screening criteria for complex care management      I plan to attempt first outreach within the next 10 days

## 2023-05-13 DIAGNOSIS — N18.30 TYPE 2 DIABETES MELLITUS WITH STAGE 3 CHRONIC KIDNEY DISEASE, WITH LONG-TERM CURRENT USE OF INSULIN (HCC): ICD-10-CM

## 2023-05-13 DIAGNOSIS — Z79.4 TYPE 2 DIABETES MELLITUS WITH STAGE 3 CHRONIC KIDNEY DISEASE, WITH LONG-TERM CURRENT USE OF INSULIN (HCC): ICD-10-CM

## 2023-05-13 DIAGNOSIS — I10 ESSENTIAL HYPERTENSION: ICD-10-CM

## 2023-05-13 DIAGNOSIS — E11.22 TYPE 2 DIABETES MELLITUS WITH STAGE 3 CHRONIC KIDNEY DISEASE, WITH LONG-TERM CURRENT USE OF INSULIN (HCC): ICD-10-CM

## 2023-05-15 RX ORDER — FOSINOPRIL SODIUM 10 MG/1
TABLET ORAL
Qty: 90 TABLET | Refills: 2 | Status: SHIPPED | OUTPATIENT
Start: 2023-05-15

## 2023-05-16 ENCOUNTER — TELEPHONE (OUTPATIENT)
Age: 78
End: 2023-05-16

## 2023-05-16 NOTE — TELEPHONE ENCOUNTER
JUST   RELEASE    [facility]     MADE  AN  APPT  FOR  NEXT   WEEK  BUT  WANTS TO KNOW  IF  SHE  CAN  GET   [address]

## 2023-05-23 ENCOUNTER — PATIENT OUTREACH (OUTPATIENT)
Age: 78
End: 2023-05-23

## 2023-05-23 ENCOUNTER — OFFICE VISIT (OUTPATIENT)
Age: 78
End: 2023-05-23

## 2023-05-23 VITALS
HEIGHT: 58 IN | OXYGEN SATURATION: 94 % | DIASTOLIC BLOOD PRESSURE: 52 MMHG | RESPIRATION RATE: 21 BRPM | SYSTOLIC BLOOD PRESSURE: 130 MMHG | TEMPERATURE: 99.7 F | BODY MASS INDEX: 44.96 KG/M2 | WEIGHT: 214.2 LBS | HEART RATE: 102 BPM

## 2023-05-23 DIAGNOSIS — N18.30 TYPE 2 DIABETES MELLITUS WITH STAGE 3 CHRONIC KIDNEY DISEASE, WITH LONG-TERM CURRENT USE OF INSULIN (HCC): ICD-10-CM

## 2023-05-23 DIAGNOSIS — M79.89 ARM SWELLING: Primary | ICD-10-CM

## 2023-05-23 DIAGNOSIS — Z79.4 TYPE 2 DIABETES MELLITUS WITH STAGE 3 CHRONIC KIDNEY DISEASE, WITH LONG-TERM CURRENT USE OF INSULIN (HCC): ICD-10-CM

## 2023-05-23 DIAGNOSIS — J84.9 INTERSTITIAL LUNG DISEASE (HCC): ICD-10-CM

## 2023-05-23 DIAGNOSIS — T80.1XXA VASCULAR COMPLICATIONS FOLLOWING INFUSION, TRANSFUSION AND THERAPEUTIC INJECTION, INITIAL ENCOUNTER: ICD-10-CM

## 2023-05-23 DIAGNOSIS — E11.22 TYPE 2 DIABETES MELLITUS WITH STAGE 3 CHRONIC KIDNEY DISEASE, WITH LONG-TERM CURRENT USE OF INSULIN (HCC): ICD-10-CM

## 2023-05-23 DIAGNOSIS — R26.2 AMBULATORY DYSFUNCTION: ICD-10-CM

## 2023-05-23 DIAGNOSIS — J96.11 CHRONIC RESPIRATORY FAILURE WITH HYPOXIA (HCC): ICD-10-CM

## 2023-05-23 DIAGNOSIS — F33.41 RECURRENT MAJOR DEPRESSIVE DISORDER, IN PARTIAL REMISSION (HCC): ICD-10-CM

## 2023-05-23 DIAGNOSIS — B36.9 FUNGAL INFECTION OF SKIN: ICD-10-CM

## 2023-05-23 DIAGNOSIS — I50.32 CHRONIC DIASTOLIC CONGESTIVE HEART FAILURE (HCC): ICD-10-CM

## 2023-05-23 PROBLEM — G89.4 CHRONIC PAIN DISORDER: Status: ACTIVE | Noted: 2023-04-22

## 2023-05-23 PROBLEM — I50.9 CHF (CONGESTIVE HEART FAILURE) (HCC): Status: ACTIVE | Noted: 2023-04-01

## 2023-05-23 LAB
DME PARACHUTE DELIVERY DATE REQUESTED: NORMAL
DME PARACHUTE ITEM DESCRIPTION: NORMAL
DME PARACHUTE ITEM DESCRIPTION: NORMAL
DME PARACHUTE ORDER STATUS: NORMAL
DME PARACHUTE SUPPLIER NAME: NORMAL
DME PARACHUTE SUPPLIER PHONE: NORMAL

## 2023-05-23 RX ORDER — NYSTATIN 100000 [USP'U]/G
POWDER TOPICAL 4 TIMES DAILY
Qty: 60 G | Refills: 4 | Status: SHIPPED | OUTPATIENT
Start: 2023-05-23

## 2023-05-23 RX ORDER — DOCUSATE SODIUM -SENNOSIDES 50; 8.6 MG/1; MG/1
1 TABLET, COATED ORAL DAILY
COMMUNITY
Start: 2023-05-07

## 2023-05-23 RX ORDER — OXYCODONE HYDROCHLORIDE 10 MG/1
TABLET ORAL
COMMUNITY
Start: 2023-05-22

## 2023-05-23 RX ORDER — INSULIN DEGLUDEC INJECTION 100 U/ML
40 INJECTION, SOLUTION SUBCUTANEOUS DAILY
Qty: 36 ML | Refills: 3 | Status: SHIPPED | OUTPATIENT
Start: 2023-05-23 | End: 2024-05-17

## 2023-05-23 RX ORDER — MIRTAZAPINE 15 MG/1
15 TABLET, FILM COATED ORAL
Qty: 90 TABLET | Refills: 3 | Status: SHIPPED | OUTPATIENT
Start: 2023-05-23

## 2023-05-23 RX ORDER — NYSTATIN 100000 U/G
1 CREAM TOPICAL 2 TIMES DAILY
COMMUNITY
Start: 2023-05-07 | End: 2023-05-23

## 2023-05-23 NOTE — PROGRESS NOTES
Outpatient Care Management Note:  Inbasket reminder received to attempt patient outreach  Telephone outreach attempt #1 made to introduce self and role of outpatient care management, follow up on general health, and outreach for any possible medical or psychosocial services needed  No answer  Unable to leave message as there was no voice mailbox set up  Inbasket reminder set for second outreach attempt in a few days

## 2023-05-23 NOTE — PROGRESS NOTES
INTERNAL MEDICINE OFFICE VISIT  [facility] AT [address]  Tel: [phone]      NAME: Valerie Kidd  AGE: 66 y o  SEX: female  : 1945   MRN: [id number]    DATE: 2023  TIME: 1:36 PM      Assessment and Plan:  1  Arm swelling  Patient had a vascular ultrasound done more than 2 weeks ago which was negative for DVT but the swelling is not going down and she is very concerned  We will repeat the ultrasound to rule out a DVT  - VAS upper limb venous duplex scan, unilateral/limited; Future    2  Vascular complications following infusion, transfusion and therapeutic injection, initial encounter  We will get back to her with the results  - VAS upper limb venous duplex scan, unilateral/limited; Future    3  Chronic diastolic congestive heart failure (HCC)  Continue torsemide    4  Chronic respiratory failure with hypoxia (HCC)  Continue oxygen    5  Recurrent major depressive disorder, in partial remission ([facility] )  Patient has not been taking the Remeron and her appetite has gone down  She was told to start taking Remeron again  - mirtazapine (REMERON) 15 mg tablet; Take 1 tablet (15 mg total) by mouth daily at bedtime  Dispense: 90 tablet; Refill: 3    6  Interstitial lung disease (HCC)  Stable, follow-up with pulmonology    7  Fungal infection of skin    - nystatin (MYCOSTATIN) powder; Apply topically 4 (four) times a day groin  Dispense: 60 g; Refill: 4    8  Type 2 diabetes mellitus with stage 3 chronic kidney disease, with long-term current use of insulin (HCC)  The dose of the insulin was decreased to 20 units in the hospital but her A1c is very high and her fasting glucose is above 250 at all times  The dose was increased to 40 units daily  - insulin degludec Сергей Moors FlexTouch) 100 units/mL injection pen; Inject 40 Units under the skin daily  Dispense: 36 mL; Refill: 3    9   Ambulatory dysfunction  Patient is not able to ambulate at all due to the chronic respiratory failure and the congestive heart failure  She needs 24-hour care and the son was requesting an order for home health aide which was given as well as an electric chair  She is not able to do any of her activities of daily living without help  - Counseling Documentation: patient was counseled regarding: diagnostic results, instructions for management, risk factor reductions, prognosis, patient and family education, risks and benefits of treatment options and importance of compliance with treatment  - Medication Side Effects: Adverse side effects of medications were reviewed with the patient/guardian today  Return for follow up visit in 3 months or earlier, if needed  Chief Complaint:  Chief Complaint   Patient presents with   • Transition of Care Management         History of Present Illness:   Patient was in [facility] in the beginning of the month and was discharged on May 7 from there  When she was in the hospital she developed swelling of the right upper arm and hand which they were told was due to the IV infiltration  A vascular ultrasound was done at that time which was within normal limits  As the swelling is not going away and the pain is still a lot, the patient and her son are very concerned  She is still has a lot of fatigue and difficulty breathing mostly due to the congestive heart failure, chronic respiratory failure and interstitial lung disease  She is very depressed due to her medical conditions  She has a fungal rash in her abdominal folds and under her breast which is not getting better with the nystatin cream  Type 2 diabetes is not controlled well and her A1c is above 11  She has been taking 20 units of the insulin as per the instructions of the hospitalist   She was previously taking 60 units  She cannot ambulate on her own and requires 24-hour help for her activities of daily living        Active Problem List:  Patient Active Problem List   Diagnosis   • Bilateral lower extremity edema   • Chronic diastolic congestive heart failure ([facility])   • Mixed hyperlipidemia   • Essential hypertension   • Morbid obesity ([place] [address] )   • Ambulatory dysfunction   • Recurrent major depressive disorder, in partial remission ([place] [address] )   • Type 2 diabetes mellitus with stage 3 chronic kidney disease, with long-term current use of insulin ([facility])   • Gastroesophageal reflux disease without esophagitis   • Primary insomnia   • Moderate persistent asthma   • Psoriasis vulgaris   • Rheumatoid arthritis involving multiple sites with positive rheumatoid factor ([facility])   • Urinary incontinence   • Vitamin D deficiency   • Varicose veins of both lower extremities   • Obesity (BMI 30-39  9)   • Immunosuppressed status ([facility])   • Interstitial lung disease ([place] [address] )   • Chronic respiratory failure with hypoxia ([facility])   • Hyponatremia   • Venous stasis dermatitis of both lower extremities   • Shortness of breath   • Chronic prescription opiate use   • Fungal infection of skin   • 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 ([facility])   • Elevated d-dimer   • Continuous opioid dependence ([facility])   • Hypokalemia   • Age-related osteoporosis without current pathological fracture   • Chronic pain disorder   • CHF (congestive heart failure) ([place] [address] )         Past Medical History:  Past Medical History:   Diagnosis Date   • AMS (altered mental status) 5/24/2022   • Asthma    • Chest pain on breathing     last assessed 2/5/15   • Chronic diastolic CHF (congestive heart failure) ([facility])    • Chronic respiratory failure with hypoxia ([facility])    • Diabetes mellitus ([facility])    • Exertional chest pain 7/14/2021   • HTN (hypertension)    • Hyperlipidemia    • Insomnia    • Obesity    • Preop cardiovascular exam 2/2/2018   • Pulmonary fibrosis ([facility])    • Rheumatoid arthritis ([place] [address] )    • SIRS (systemic inflammatory response syndrome) ([place] [address] ) 2022   • Volume overload 12/10/2021         Past Surgical History:  Past Surgical History:   Procedure Laterality Date   • HAND SURGERY           Family History:  Family History   Problem Relation Age of Onset   • Rheum arthritis Mother    • Hypertension Mother          Social History:  Social History     Socioeconomic History   • Marital status: /Civil Union     Spouse name: None   • Number of children: None   • Years of education: None   • Highest education level: None   Occupational History   • Occupation: retired   Tobacco Use   • Smoking status: Former     Packs/day: 1 50     Years: 18 00     Pack years: 27 00     Types: Cigarettes     Start date:      Quit date:      Years since quittin 4   • Smokeless tobacco: Never   • Tobacco comments:     Quit 20 years ago   Vaping Use   • Vaping Use: Former   Substance and Sexual Activity   • Alcohol use: Never   • Drug use: No   • Sexual activity: Never   Other Topics Concern   • None   Social History Narrative    No advance directives on file     Social Determinants of Health     Financial Resource Strain: Low Risk    • Difficulty of Paying Living Expenses: Not hard at all   Food Insecurity: Unknown   • Worried About [employer] in the Last Year: Never true   • Ran Out of Food in the Last Year: Not on file   Transportation Needs: No Transportation Needs   • Lack of Transportation (Medical): No   • Lack of Transportation (Non-Medical): No   Physical Activity: Not on file   Stress: Not on file   Social Connections: Not on file   Intimate Partner Violence: Not on file   Housing Stability: Low Risk    • Unable to Pay for Housing in the Last Year: No   • Number of Places Lived in the Last Year: 1   • Unstable Housing in the Last Year: No         Allergies:   Allergies   Allergen Reactions   • Gabapentin    • Vancomycin          Medications:    Current Outpatient Medications:   •  albuterol (2 5 mg/3 mL) 0 083 % nebulizer solution, Take 3 mL "(2 5 mg total) by nebulization every 6 (six) hours as needed for wheezing or shortness of breath, Disp: 375 mL, Rfl: 2  •  albuterol (PROVENTIL HFA,VENTOLIN HFA) 90 mcg/act inhaler, Inhale 1 puff every 6 (six) hours as needed for wheezing or shortness of breath, Disp: 18 Inhaler, Rfl: 3  •  B-D TB SYRINGE 1CC/27GX1/2\" 27G X 1/2\" 1 ML MISC, by Other route 2 (two) times a day, Disp: 100 each, Rfl: 3  •  Blood Glucose Monitoring Suppl (OneTouch Verio) w/Device KIT, Use 3 (three) times a day, Disp: 1 kit, Rfl: 0  •  celecoxib (CeleBREX) 200 mg capsule, , Disp: , Rfl:   •  Cimzia Starter Kit 6 X 200 MG/ML KIT, , Disp: , Rfl:   •  Diclofenac Sodium (VOLTAREN) 1 %, Apply 2 g topically in the morning and 2 g at noon and 2 g in the evening and 2 g before bedtime  , Disp: 50 g, Rfl: 0  •  fosinopril (MONOPRIL) 10 mg tablet, TAKE 1 TABLET BY MOUTH EVERY DAY, Disp: 90 tablet, Rfl: 2  •  insulin degludec Valene Duet FlexTouch) 100 units/mL injection pen, Inject 40 Units under the skin daily, Disp: 36 mL, Rfl: 3  •  Insulin Pen Needle (B-D UF III MINI PEN NEEDLES) 31G X 5 MM MISC, Inject under the skin 2 (two) times a day Use as directed, Disp: 200 each, Rfl: 3  •  metFORMIN (GLUCOPHAGE) 1000 MG tablet, TAKE 1 TABLET BY MOUTH TWICE A DAY, Disp: 180 tablet, Rfl: 3  •  methocarbamol (ROBAXIN) 500 mg tablet, Take 1 tablet (500 mg total) by mouth 2 (two) times a day as needed for muscle spasms (side pain), Disp: 28 tablet, Rfl: 0  •  mirtazapine (REMERON) 15 mg tablet, Take 1 tablet (15 mg total) by mouth daily at bedtime, Disp: 90 tablet, Rfl: 3  •  nystatin (MYCOSTATIN) powder, Apply topically 4 (four) times a day groin, Disp: 60 g, Rfl: 4  •  OneTouch Ultra test strip, PATIENT TO TEST ONCE A DAY, Disp: 100 strip, Rfl: 3  •  predniSONE 10 mg tablet, Take 20 mg by mouth daily, Disp: , Rfl:   •  torsemide (DEMADEX) 20 mg tablet, TAKE 2 TABLETS BY MOUTH 2 (TWO) TIMES A DAY, Disp: 360 tablet, Rfl: 3  •  fluticasone-umeclidinium-vilanterol " (Trelegy Ellipta) 100-62 5-25 mcg/actuation inhaler, Inhale 1 puff daily Rinse mouth after use , Disp: 60 blister, Rfl: 5  •  oxyCODONE (ROXICODONE) 10 MG TABS, , Disp: , Rfl:   •  Senexon-S 8 6-50 MG per tablet, Take 1 tablet by mouth daily, Disp: , Rfl:       The following portions of the patient's history were reviewed and updated as appropriate: past medical history, past surgical history, family history, social history, allergies, current medications and active problem list       Review of Systems:  Constitutional: Denies fever, chills, weight gain, weight loss, fatigue  Eyes: Denies eye redness, eye discharge, double vision, change in visual acuity  ENT: Denies hearing loss, tinnitus, sneezing, nasal congestion, nasal discharge, sore throat   Respiratory: Denies cough, expectoration, hemoptysis, has shortness of breath, wheezing  Cardiovascular: Denies chest pain, palpitations, lower extremity swelling, orthopnea, PND  Gastrointestinal: Denies abdominal pain, heartburn, nausea, vomiting, hematemesis, diarrhea, bloody stools  Genito-Urinary: Denies dysuria, frequency, difficulty in micturition, nocturia, incontinence  Musculoskeletal: Denies back pain, joint pain, muscle pain  Has swelling and pain of the right arm  Neurologic: Denies confusion, lightheadedness, syncope, headache, focal weakness, sensory changes, seizures  Endocrine: Denies polyuria, polydipsia, temperature intolerance  Allergy and Immunology: Denies hives, insect bite sensitivity  Hematological and Lymphatic: Denies bleeding problems, swollen glands   Psychological: Denies depression, suicidal ideation, anxiety, panic, mood swings  Dermatological: Has a  rash in the abdominal folds and under the breast      Vitals:  Vitals:    05/23/23 1252   BP: 130/52   Pulse: 102   Resp: 21   Temp: 99 7 °F (37 6 °C)   SpO2: 94%       Body mass index is 44 77 kg/m²      Weight (last 2 days)     Date/Time Weight    05/23/23 1252 97 2 (214 2) Physical Examination:  General: Patient is not in acute distress  Awake, alert, responding to commands  No weight gain or loss  Head: Normocephalic  Atraumatic  Eyes: Conjunctiva and lids with no swelling, erythema or discharge  Both pupils normal sized, round and reactive to light  Sclera nonicteric  ENT: External examination of nose and ear normal  Otoscopic examination shows translucent tympanic membranes with patent canals without erythema  Oropharynx moist with no erythema, edema, exudate or lesions  Neck: Supple  JVP not raised  Trachea midline  No masses  No thyromegaly  Lungs: No signs of increased work of breathing or respiratory distress  Bilateral  crackles and rhonchi  Chest wall: No tenderness  Cardiovascular: Normal PMI  No thrills  Regular rate and rhythm  S1 and S2 normal  No murmur, rub or gallop  Gastrointestinal: Abdomen soft, nontender  No guarding or rigidity  Liver and spleen not palpable  Bowel sounds present  Neurologic: Cranial nerves II-XII intact  Cortical functions normal  Motor system - Reflexes 2+ and symmetrical  Sensations normal  Musculoskeletal: Gait normal  No joint tenderness  Integumentary: Intertrigo in the abdominal folds and under the breasts  Lymphatic: No palpable lymph nodes in neck, axilla or groin  Extremities: No clubbing, cyanosis, edema or varicosities    The right arm is edematous and tender  Psychological: Judgement and insight normal  Mood and affect normal      Laboratory Results:  CBC with diff:   Lab Results   Component Value Date    WBC 13 33 (H) 02/22/2023    WBC 7 94 01/05/2016    RBC 5 03 02/22/2023    RBC 4 39 01/05/2016    HGB 14 5 02/22/2023    HGB 12 9 01/05/2016    HCT 46 2 (H) 02/22/2023    HCT 40 9 01/05/2016    MCV 92 02/22/2023    MCV 93 01/05/2016    MCH 28 8 02/22/2023    MCH 29 4 01/05/2016    RDW 14 2 02/22/2023    RDW 15 4 (H) 01/05/2016     02/22/2023     01/05/2016       CMP:  Lab Results   Component Value Date CREATININE 1 10 02/22/2023    CREATININE 0 74 01/05/2016    BUN 30 (H) 02/22/2023    BUN 18 01/05/2016     (L) 01/05/2016    K 4 4 02/22/2023    K 4 5 01/05/2016    CL 99 02/22/2023     01/05/2016    CO2 28 02/22/2023    CO2 26 01/05/2016    GLUCOSE 159 (H) 01/05/2016    PROT 7 3 01/05/2016    ALKPHOS 156 (H) 02/22/2023    ALKPHOS 151 (H) 01/05/2016    ALT 39 02/22/2023    ALT 25 01/05/2016    AST 20 02/22/2023    AST 14 01/05/2016    BILIDIR 0 31 (H) 05/23/2022       Lab Results   Component Value Date    HGBA1C 11 1 (H) 05/06/2023    MG 2 2 05/23/2022    PHOS 2 7 12/08/2019       Lab Results   Component Value Date    TROPONINI <0 02 07/15/2021    TROPONINI <0 02 07/15/2021    TROPONINI <0 02 07/14/2021    CKMB <1 0 05/22/2022    CKTOTAL 159 05/22/2022       Lipid Profile:   Lab Results   Component Value Date    CHOL 230 01/05/2016    CHOL 149 09/15/2015     Lab Results   Component Value Date    HDL 89 02/22/2023    HDL 64 07/14/2021     Lab Results   Component Value Date    LDLCALC 104 (H) 02/22/2023    LDLCALC 134 (H) 07/14/2021     Lab Results   Component Value Date    TRIG 182 (H) 02/22/2023    TRIG 182 (H) 07/14/2021       Imaging Results:  MRI lumbar spine wo contrast  Narrative: MRI LUMBAR SPINE WITHOUT CONTRAST    INDICATION: [id number]: Other spondylosis, lumbosacral region  COMPARISON:  None  TECHNIQUE:  Multiplanar, multisequence imaging of the lumbar spine was performed         IMAGE QUALITY:  Diagnostic    FINDINGS:    VERTEBRAL BODIES:  There are 5 lumbar type vertebral bodies  There is mild levoscoliosis of the lower thoracic spine and dextroscoliosis of the lumbar spine  Slightly exaggerated lumbar lordosis with no spondylolisthesis or spondylolysis  No   compression fracture  Normal marrow signal is identified within the visualized bony structures  No discrete marrow lesion  SACRUM:  Normal signal within the sacrum  No evidence of insufficiency or stress fracture      DISTAL CORD AND CONUS:  Normal size and signal within the distal cord and conus  PARASPINAL SOFT TISSUES:  Paraspinal soft tissues are unremarkable  LOWER THORACIC DISC SPACES:  Mild lower thoracic degenerative change most prominent at T11-12 on the right where there is mild foraminal narrowing as a result of disc and endplate change  No cord compression  LUMBAR DISC SPACES:    L1-L2:  Normal     L2-L3:  Normal     L3-L4:  Normal     L4-L5:  Minor annular bulging and mild facet degenerative change  No focal disc herniation  No canal stenosis  No foraminal narrowing  L5-S1:  Moderate facet degenerative change with small facet joint effusions  Slight annular bulging  No disc herniation, canal stenosis or foraminal narrowing  OTHER FINDINGS:  None  Impression: Mild noncompressive lumbar degenerative change and scoliosis      Workstation performed: [id number]       Health Maintenance:  Health Maintenance   Topic Date Due   • Breast Cancer Screening: Mammogram  Never done   • COVID-19 Vaccine (4 - Booster for Orvil Pollo series) 04/17/2022   • DXA SCAN  06/01/2022   • BMI: Followup Plan  01/25/2023   • Diabetic Foot Exam  01/25/2023   • DM Eye Exam  01/25/2023   • Influenza Vaccine (Season Ended) 09/01/2023   • HEMOGLOBIN A1C  11/06/2023   • Depression Remission PHQ  11/23/2023   • Fall Risk  02/22/2024   • Urinary Incontinence Screening  02/22/2024   • Medicare Annual Wellness Visit (AWV)  02/22/2024   • BMI: Adult  05/23/2024   • Hepatitis C Screening  Completed   • Osteoporosis Screening  Completed   • Pneumococcal Vaccine: 65+ Years  Completed   • HIB Vaccine  Aged Out   • IPV Vaccine  Aged Out   • Hepatitis A Vaccine  Aged Out   • Meningococcal ACWY Vaccine  Aged Out   • HPV Vaccine  Aged Out   • Colorectal Cancer Screening  Discontinued     Immunization History   Administered Date(s) Administered   • COVID-19 MODERNA VACC 0 5 ML IM 06/20/2021, 07/18/2021   • INFLUENZA 09/01/2015, 12/20/2016, 11/10/2017, 02/20/2022   • Influenza Split High Dose Preservative Free IM 12/20/2016, 11/10/2017   • Influenza, high dose seasonal 0 7 mL 10/17/2018, 09/29/2019, 11/17/2020   • Influenza, seasonal, injectable 10/01/2014, 09/01/2015   • Pneumococcal Conjugate 13-Valent 07/03/2019   • Pneumococcal Polysaccharide PPV23 04/21/2008, 12/20/2016   • Tdap 1945   • Zoster Vaccine Recombinant 01/28/2020, 07/21/2020         Cali Ceja MD  2/12/8671,1:96 PM

## 2023-05-25 ENCOUNTER — TELEPHONE (OUTPATIENT)
Age: 78
End: 2023-05-25

## 2023-05-25 DIAGNOSIS — N18.30 TYPE 2 DIABETES MELLITUS WITH STAGE 3 CHRONIC KIDNEY DISEASE, WITH LONG-TERM CURRENT USE OF INSULIN (HCC): ICD-10-CM

## 2023-05-25 DIAGNOSIS — E11.22 TYPE 2 DIABETES MELLITUS WITH STAGE 3 CHRONIC KIDNEY DISEASE, WITH LONG-TERM CURRENT USE OF INSULIN (HCC): ICD-10-CM

## 2023-05-25 DIAGNOSIS — Z79.4 TYPE 2 DIABETES MELLITUS WITH STAGE 3 CHRONIC KIDNEY DISEASE, WITH LONG-TERM CURRENT USE OF INSULIN (HCC): ICD-10-CM

## 2023-05-25 RX ORDER — BLOOD SUGAR DIAGNOSTIC
STRIP MISCELLANEOUS
Qty: 100 STRIP | Refills: 3 | Status: SHIPPED | OUTPATIENT
Start: 2023-05-25

## 2023-05-25 NOTE — TELEPHONE ENCOUNTER
Son LVM in regards to documentation that needs to be sent over to [facility] medical    Requesting cb

## 2023-05-26 NOTE — TELEPHONE ENCOUNTER
Her son called back to make sure the proper paperwork was Faxed to [employer]oretta Oropeza to get her electric wheelchair- ordered by Dr Janie Burris

## 2023-05-31 LAB
DME PARACHUTE DELIVERY DATE ACTUAL: NORMAL
DME PARACHUTE DELIVERY DATE REQUESTED: NORMAL
DME PARACHUTE ITEM DESCRIPTION: NORMAL
DME PARACHUTE ITEM DESCRIPTION: NORMAL
DME PARACHUTE ORDER STATUS: NORMAL
DME PARACHUTE SUPPLIER NAME: NORMAL
DME PARACHUTE SUPPLIER PHONE: NORMAL

## 2023-06-01 ENCOUNTER — TELEPHONE (OUTPATIENT)
Age: 78
End: 2023-06-01

## 2023-06-01 NOTE — TELEPHONE ENCOUNTER
Visiting Nurse / Evangelista Moralez    Patient being discharged from [facility]  Her certification period expires in a few days  Has done all the education they can at this point  She still has significant shortness of breath     She's a chronic CHF patient     Ph# [phone]

## 2023-06-05 ENCOUNTER — PATIENT OUTREACH (OUTPATIENT)
Age: 78
End: 2023-06-05

## 2023-06-05 NOTE — LETTER
Date: 06/05/23    Dear Delia Kapadia,   My name is [facility] and I am a registered nurse care manager working with  [facility]  I have not been able to reach you and would like to set a time that I can talk with you over the phone  My work is to help patients that have complex medical conditions get the care they need  This includes patients who may have been in the hospital or emergency room  I work to get to know you, and help you in your care, your recovery, or assist you reach your personal health goals  I have enclosed information for you  Please call me at [phone] with any questions you may have  I look forward to speaking with you    Sincerely,  [facility], RN  Outpatient Care Manager  [phone]

## 2023-06-05 NOTE — PROGRESS NOTES
Outpatient Care Management Note:  Inbasket reminder received to attempt patient outreach  Telephone outreach attempt #2 made to introduce self and role of outpatient care management, follow up on general health, and outreach for any possible medical or psychosocial services needed  No answer  Left general contact information on voice mail message  I have been unable to reach this patient by phone  A letter is being sent to the last known home address  Complex CM episode will be closed  I have removed myself from the care team   Episode will be re-opened if patient replies to letter and agrees to [facility] services

## 2023-06-07 ENCOUNTER — TELEPHONE (OUTPATIENT)
Age: 78
End: 2023-06-07

## 2023-06-07 NOTE — TELEPHONE ENCOUNTER
WANTS   UPDATE  ON  POWER   WHEEL  CHAIR   SAID   THEY NEEDED MORE  INFO    WANTS  TO  KNOW  IF  THAT  WAS  TAKEN  CARE  OF

## 2023-06-07 NOTE — TELEPHONE ENCOUNTER
Geoffrey 78, fax sent over for face to face examination documentation, awaiting fax  Son aware provider is out of the office until Monday

## 2023-06-14 PROBLEM — J84.10 PULMONARY FIBROSIS (HCC): Status: ACTIVE | Noted: 2023-06-08

## 2023-06-14 PROBLEM — N18.2 CHRONIC KIDNEY DISEASE, STAGE 2 (MILD): Status: ACTIVE | Noted: 2023-06-08

## 2023-06-14 PROBLEM — R60.0 EDEMA OF LOWER EXTREMITY: Status: ACTIVE | Noted: 2023-06-08

## 2023-06-19 ENCOUNTER — HOSPITAL ENCOUNTER (EMERGENCY)
Facility: HOSPITAL | Age: 78
Discharge: HOME | End: 2023-06-19
Attending: EMERGENCY MEDICINE | Admitting: EMERGENCY MEDICINE
Payer: MEDICARE

## 2023-06-19 VITALS
RESPIRATION RATE: 18 BRPM | HEIGHT: 60 IN | TEMPERATURE: 98.2 F | HEART RATE: 94 BPM | BODY MASS INDEX: 37.5 KG/M2 | OXYGEN SATURATION: 100 % | DIASTOLIC BLOOD PRESSURE: 58 MMHG | SYSTOLIC BLOOD PRESSURE: 119 MMHG | WEIGHT: 191 LBS

## 2023-06-19 DIAGNOSIS — M25.50 ARTHRALGIA: Primary | ICD-10-CM

## 2023-06-19 DIAGNOSIS — G89.29 CHRONIC PAIN: ICD-10-CM

## 2023-06-19 LAB
ANION GAP SERPL CALCULATED.3IONS-SCNC: 7 MMOL/L (ref 4–13)
BASOPHILS # BLD AUTO: 0.07 THOUSANDS/ÂΜL (ref 0–0.1)
BASOPHILS NFR BLD AUTO: 1 % (ref 0–1)
BUN SERPL-MCNC: 14 MG/DL (ref 5–25)
CALCIUM SERPL-MCNC: 9 MG/DL (ref 8.4–10.2)
CHLORIDE SERPL-SCNC: 92 MMOL/L (ref 96–108)
CO2 SERPL-SCNC: 27 MMOL/L (ref 21–32)
CREAT SERPL-MCNC: 0.84 MG/DL (ref 0.6–1.3)
EOSINOPHIL # BLD AUTO: 0.51 THOUSAND/ÂΜL (ref 0–0.61)
EOSINOPHIL NFR BLD AUTO: 5 % (ref 0–6)
ERYTHROCYTE [DISTWIDTH] IN BLOOD BY AUTOMATED COUNT: 14.6 % (ref 11.6–15.1)
GFR SERPL CREATININE-BSD FRML MDRD: 66 ML/MIN/1.73SQ M
GLUCOSE SERPL-MCNC: 367 MG/DL (ref 65–140)
HCT VFR BLD AUTO: 35.7 % (ref 34.8–46.1)
HGB BLD-MCNC: 11.3 G/DL (ref 11.5–15.4)
IMM GRANULOCYTES # BLD AUTO: 0.02 THOUSAND/UL (ref 0–0.2)
IMM GRANULOCYTES NFR BLD AUTO: 0 % (ref 0–2)
LYMPHOCYTES # BLD AUTO: 2.24 THOUSANDS/ÂΜL (ref 0.6–4.47)
LYMPHOCYTES NFR BLD AUTO: 22 % (ref 14–44)
MCH RBC QN AUTO: 27.6 PG (ref 26.8–34.3)
MCHC RBC AUTO-ENTMCNC: 31.7 G/DL (ref 31.4–37.4)
MCV RBC AUTO: 87 FL (ref 82–98)
MONOCYTES # BLD AUTO: 1.13 THOUSAND/ÂΜL (ref 0.17–1.22)
MONOCYTES NFR BLD AUTO: 11 % (ref 4–12)
NEUTROPHILS # BLD AUTO: 6.27 THOUSANDS/ÂΜL (ref 1.85–7.62)
NEUTS SEG NFR BLD AUTO: 61 % (ref 43–75)
NRBC BLD AUTO-RTO: 0 /100 WBCS
PLATELET # BLD AUTO: 420 THOUSANDS/UL (ref 149–390)
PMV BLD AUTO: 9.9 FL (ref 8.9–12.7)
POTASSIUM SERPL-SCNC: 3.9 MMOL/L (ref 3.5–5.3)
RBC # BLD AUTO: 4.09 MILLION/UL (ref 3.81–5.12)
SODIUM SERPL-SCNC: 126 MMOL/L (ref 135–147)
WBC # BLD AUTO: 10.24 THOUSAND/UL (ref 4.31–10.16)

## 2023-06-19 PROCEDURE — 96375 TX/PRO/DX INJ NEW DRUG ADDON: CPT

## 2023-06-19 PROCEDURE — 85025 COMPLETE CBC W/AUTO DIFF WBC: CPT | Performed by: EMERGENCY MEDICINE

## 2023-06-19 PROCEDURE — 96374 THER/PROPH/DIAG INJ IV PUSH: CPT

## 2023-06-19 PROCEDURE — 99284 EMERGENCY DEPT VISIT MOD MDM: CPT

## 2023-06-19 PROCEDURE — 80048 BASIC METABOLIC PNL TOTAL CA: CPT | Performed by: EMERGENCY MEDICINE

## 2023-06-19 PROCEDURE — 36415 COLL VENOUS BLD VENIPUNCTURE: CPT | Performed by: EMERGENCY MEDICINE

## 2023-06-19 RX ORDER — PREDNISONE 50 MG/1
50 TABLET ORAL DAILY
Qty: 5 TABLET | Refills: 0 | Status: SHIPPED | OUTPATIENT
Start: 2023-06-19

## 2023-06-19 RX ORDER — HYDROMORPHONE HYDROCHLORIDE 2 MG/ML
2 INJECTION, SOLUTION INTRAMUSCULAR; INTRAVENOUS; SUBCUTANEOUS ONCE
Status: COMPLETED | OUTPATIENT
Start: 2023-06-19 | End: 2023-06-19

## 2023-06-19 RX ORDER — DEXAMETHASONE SODIUM PHOSPHATE 10 MG/ML
8 INJECTION, SOLUTION INTRAMUSCULAR; INTRAVENOUS ONCE
Status: DISCONTINUED | OUTPATIENT
Start: 2023-06-19 | End: 2023-06-19

## 2023-06-19 RX ORDER — DIPHENHYDRAMINE HYDROCHLORIDE 50 MG/ML
25 INJECTION INTRAMUSCULAR; INTRAVENOUS ONCE
Status: COMPLETED | OUTPATIENT
Start: 2023-06-19 | End: 2023-06-19

## 2023-06-19 RX ORDER — DEXAMETHASONE SODIUM PHOSPHATE 10 MG/ML
8 INJECTION, SOLUTION INTRAMUSCULAR; INTRAVENOUS ONCE
Status: COMPLETED | OUTPATIENT
Start: 2023-06-19 | End: 2023-06-19

## 2023-06-19 RX ADMIN — DIPHENHYDRAMINE HYDROCHLORIDE 25 MG: 50 INJECTION, SOLUTION INTRAMUSCULAR; INTRAVENOUS at 18:28

## 2023-06-19 RX ADMIN — HYDROMORPHONE HYDROCHLORIDE 2 MG: 2 INJECTION, SOLUTION INTRAMUSCULAR; INTRAVENOUS; SUBCUTANEOUS at 18:15

## 2023-06-19 RX ADMIN — DEXAMETHASONE SODIUM PHOSPHATE 8 MG: 10 INJECTION, SOLUTION INTRAMUSCULAR; INTRAVENOUS at 18:15

## 2023-06-20 NOTE — DISCHARGE INSTRUCTIONS
A  personal message from Dr Aixa Ibarra,  Thank you so much for allowing me to care for you today  I pride myself in the care and attention I give all my patients  I hope you were a witness to this tonight  If for any reason your condition does not improve or worsens, or you have a question that was not answered during your visit you can feel free to text me on my personal phone #  # [phone]  I will answer to your message and continue your care past your emergency room visit  Please understand that although you are being discharged because your condition has been deemed stable and able to be managed on an outpatient setting  However your condition may worsen as part of the natural progression of the illness/condition, if this occurs please come back to the emergency department for a repeat evaluation

## 2023-06-20 NOTE — ED PROVIDER NOTES
"History  Chief Complaint   Patient presents with   • Leg Pain     Son reports pt has had severe pain in arms and legs for several days  Hx of RA  Meds are not keeping up with pain  This is a 66-year-old female that presents to the emergency department shortly after an infusion for rheumatoid arthritis  She has chronic pain and takes hydromorphone 4 mg every 8 hours  She comes emergency department because the pain is diffuse in all her joints and she cannot stand it  The pain is severe, constant  No recent fevers or chills nausea or vomiting  History provided by:  Patient and relative   used: No    Leg Pain  Associated symptoms: no back pain and no fever        Prior to Admission Medications   Prescriptions Last Dose Informant Patient Reported? Taking? B-D TB SYRINGE 1CC/27GX1/2\" 27G X 1/2\" 1 ML MISC  Self No No   Sig: by Other route 2 (two) times a day   Blood Glucose Monitoring Suppl (OneTouch Verio) w/Device KIT   No No   Sig: Use 3 (three) times a day   Cimzia Starter Kit 6 X 200 MG/ML KIT  Self Yes No   Diclofenac Sodium (VOLTAREN) 1 %   No No   Sig: Apply 2 g topically in the morning and 2 g at noon and 2 g in the evening and 2 g before bedtime     Insulin Pen Needle (B-D UF III MINI PEN NEEDLES) 31G X 5 MM MISC   No No   Sig: Inject under the skin 2 (two) times a day Use as directed   OneTouch Ultra test strip   No No   Sig: PATIENT TO TEST ONCE A DAY   Senexon-S 8 6-50 MG per tablet   Yes No   Sig: Take 1 tablet by mouth daily   albuterol (2 5 mg/3 mL) 0 083 % nebulizer solution   No No   Sig: Take 3 mL (2 5 mg total) by nebulization every 6 (six) hours as needed for wheezing or shortness of breath   albuterol (PROVENTIL HFA,VENTOLIN HFA) 90 mcg/act inhaler  Self No No   Sig: Inhale 1 puff every 6 (six) hours as needed for wheezing or shortness of breath   celecoxib (CeleBREX) 200 mg capsule   Yes No   fluticasone-umeclidinium-vilanterol (Trelegy Ellipta) 100-62 5-25 " mcg/actuation inhaler   No No   Sig: Inhale 1 puff daily Rinse mouth after use  fosinopril (MONOPRIL) 10 mg tablet   No No   Sig: TAKE 1 TABLET BY MOUTH EVERY DAY   insulin degludec Cecilia Damico FlexTouch) 100 units/mL injection pen   No No   Sig: Inject 40 Units under the skin daily   metFORMIN (GLUCOPHAGE) 1000 MG tablet   No No   Sig: TAKE 1 TABLET BY MOUTH TWICE A DAY   methocarbamol (ROBAXIN) 500 mg tablet  Self No No   Sig: Take 1 tablet (500 mg total) by mouth 2 (two) times a day as needed for muscle spasms (side pain)   mirtazapine (REMERON) 15 mg tablet   No No   Sig: Take 1 tablet (15 mg total) by mouth daily at bedtime   nystatin (MYCOSTATIN) powder   No No   Sig: Apply topically 4 (four) times a day groin   oxyCODONE (ROXICODONE) 10 MG TABS   Yes No   predniSONE 10 mg tablet   Yes No   Sig: Take 20 mg by mouth daily   torsemide (DEMADEX) 20 mg tablet   No No   Sig: TAKE 2 TABLETS BY MOUTH 2 (TWO) TIMES A DAY      Facility-Administered Medications: None       Past Medical History:   Diagnosis Date   • AMS (altered mental status) 5/24/2022   • Asthma    • Chest pain on breathing     last assessed 2/5/15   • Chronic diastolic CHF (congestive heart failure) ([facility])    • Chronic respiratory failure with hypoxia ([facility])    • Diabetes mellitus (HCC)    • Exertional chest pain 7/14/2021   • HTN (hypertension)    • Hyperlipidemia    • Insomnia    • Obesity    • Preop cardiovascular exam 2/2/2018   • Pulmonary fibrosis (HCC)    • Rheumatoid arthritis (HCC)    • SIRS (systemic inflammatory response syndrome) ([facility]) 5/22/2022   • Volume overload 12/10/2021       Past Surgical History:   Procedure Laterality Date   • HAND SURGERY         Family History   Problem Relation Age of Onset   • Rheum arthritis Mother    • Hypertension Mother      I have reviewed and agree with the history as documented      E-Cigarette/Vaping   • E-Cigarette Use Former User      E-Cigarette/Vaping Substances   • Nicotine No    • THC No    • CBD No • Flavoring No    • Other No    • Unknown No      Social History     Tobacco Use   • Smoking status: Former     Packs/day: 1 50     Years: 18 00     Total pack years: 27 00     Types: Cigarettes     Start date:      Quit date:      Years since quittin 4   • Smokeless tobacco: Never   • Tobacco comments:     Quit 20 years ago   Vaping Use   • Vaping Use: Former   Substance Use Topics   • Alcohol use: Never   • Drug use: No       Review of Systems   Constitutional: Negative for chills and fever  HENT: Negative for ear pain and sore throat  Eyes: Negative for pain and visual disturbance  Respiratory: Negative for cough and shortness of breath  Cardiovascular: Negative for chest pain and palpitations  Gastrointestinal: Negative for abdominal pain and vomiting  Genitourinary: Negative for dysuria and hematuria  Musculoskeletal: Positive for arthralgias and gait problem  Negative for back pain and joint swelling  Skin: Negative for color change and rash  Neurological: Negative for seizures and syncope  All other systems reviewed and are negative  Physical Exam  Physical Exam  Vitals and nursing note reviewed  Constitutional:       General: She is not in acute distress  Appearance: Normal appearance  She is well-developed  She is obese  HENT:      Head: Normocephalic and atraumatic  Right Ear: External ear normal       Left Ear: External ear normal       Nose: Nose normal    Eyes:      Extraocular Movements: Extraocular movements intact  Conjunctiva/sclera: Conjunctivae normal       Pupils: Pupils are equal, round, and reactive to light  Cardiovascular:      Rate and Rhythm: Normal rate and regular rhythm  Pulses: Normal pulses  Heart sounds: Normal heart sounds  No murmur heard  Pulmonary:      Effort: Pulmonary effort is normal  No respiratory distress  Breath sounds: Normal breath sounds  Abdominal:      General: Abdomen is flat  Palpations: Abdomen is soft  Tenderness: There is no abdominal tenderness  Musculoskeletal:         General: No swelling  Cervical back: Neck supple  Skin:     General: Skin is warm and dry  Capillary Refill: Capillary refill takes less than 2 seconds  Comments: Bilateral lower extremity has skin changes consistent with chronic stasis  Also warm to touch  But this changes are chronic  Neurological:      General: No focal deficit present  Mental Status: She is alert and oriented to person, place, and time  Psychiatric:         Mood and Affect: Mood normal          Thought Content:  Thought content normal          Judgment: Judgment normal          Vital Signs  ED Triage Vitals [06/19/23 1711]   Temperature Pulse Respirations Blood Pressure SpO2   98 2 °F (36 8 °C) 98 18 122/56 90 %      Temp Source Heart Rate Source Patient Position - Orthostatic VS BP Location FiO2 (%)   Tympanic Monitor Sitting Left arm --      Pain Score       --           Vitals:    06/19/23 1711 06/19/23 1830   BP: 122/56 119/58   Pulse: 98 94   Patient Position - Orthostatic VS: Sitting          Visual Acuity      ED Medications  Medications   HYDROmorphone (DILAUDID) injection 2 mg (2 mg Intravenous Given 6/19/23 1815)   dexamethasone (PF) (DECADRON) injection 8 mg (8 mg Intravenous Given 6/19/23 1815)   diphenhydrAMINE (BENADRYL) injection 25 mg (25 mg Intravenous Given 6/19/23 1828)       Diagnostic Studies  Results Reviewed     Procedure Component Value Units Date/Time    Basic metabolic panel [[id number]]  (Abnormal) Collected: 06/19/23 1813    Lab Status: Final result Specimen: Blood from Arm, Right Updated: 06/19/23 2001     Sodium 126 mmol/L      Potassium 3 9 mmol/L      Chloride 92 mmol/L      CO2 27 mmol/L      ANION GAP 7 mmol/L      BUN 14 mg/dL      Creatinine 0 84 mg/dL      Glucose 367 mg/dL      Calcium 9 0 mg/dL      eGFR 66 ml/min/1 73sq m     Narrative:      [place] guidelines for Chronic Kidney Disease (CKD):   •  Stage 1 with normal or high GFR (GFR > 90 mL/min/1 73 square meters)  •  Stage 2 Mild CKD (GFR = 60-89 mL/min/1 73 square meters)  •  Stage 3A Moderate CKD (GFR = 45-59 mL/min/1 73 square meters)  •  Stage 3B Moderate CKD (GFR = 30-44 mL/min/1 73 square meters)  •  Stage 4 Severe CKD (GFR = 15-29 mL/min/1 73 square meters)  •  Stage 5 End Stage CKD (GFR <15 mL/min/1 73 square meters)  Note: GFR calculation is accurate only with a steady state creatinine    CBC and differential [[id number]]  (Abnormal) Collected: 06/19/23 1813    Lab Status: Final result Specimen: Blood from Arm, Right Updated: 06/19/23 1817     WBC 10 24 Thousand/uL      RBC 4 09 Million/uL      Hemoglobin 11 3 g/dL      Hematocrit 35 7 %      MCV 87 fL      MCH 27 6 pg      MCHC 31 7 g/dL      RDW 14 6 %      MPV 9 9 fL      Platelets 317 Thousands/uL      nRBC 0 /100 WBCs      Neutrophils Relative 61 %      Immat GRANS % 0 %      Lymphocytes Relative 22 %      Monocytes Relative 11 %      Eosinophils Relative 5 %      Basophils Relative 1 %      Neutrophils Absolute 6 27 Thousands/µL      Immature Grans Absolute 0 02 Thousand/uL      Lymphocytes Absolute 2 24 Thousands/µL      Monocytes Absolute 1 13 Thousand/µL      Eosinophils Absolute 0 51 Thousand/µL      Basophils Absolute 0 07 Thousands/µL                  No orders to display              Procedures  Procedures         ED Course  ED Course as of 06/19/23 2004 Mon Jun 19, 2023   1821 WBC(!): 10 24   1821 Hemoglobin(!): 11 3   1854 WBC(!): 10 24   1854 Hemoglobin(!): 11 3   1854 HCT: 35 7                                             Medical Decision Making  Normal white count    Arthralgia: chronic illness or injury  Chronic pain: chronic illness or injury  Amount and/or Complexity of Data Reviewed  Labs: ordered  Decision-making details documented in ED Course    Discussion of management or test interpretation with external provider(s): Improved after medications    Risk  Prescription drug management  Disposition  Final diagnoses:   Arthralgia   Chronic pain     Time reflects when diagnosis was documented in both MDM as applicable and the Disposition within this note     Time User Action Codes Description Comment    6/19/2023  8:01 PM Calvin Orozco [M25 50] Arthralgia     6/19/2023  8:01 PM Calvin Orozco [G89 29] Chronic pain       ED Disposition     ED Disposition   Discharge    Condition   Stable    Date/Time   Mon Jun 19, 2023  8:01 PM    [address] [place] [address] discharge to home/self care  Follow-up Information     Follow up With Specialties Details Why Korina Mariano MD Internal Medicine In 3 days If symptoms worsen [place] [address]  2nd [address] [facility] [address]  [phone]            Patient's Medications   Discharge Prescriptions    PREDNISONE 50 MG TABLET    Take 1 tablet (50 mg total) by mouth daily       Start Date: 6/19/2023 End Date: --       Order Dose: 50 mg       Quantity: 5 tablet    Refills: 0       No discharge procedures on file      PDMP Review       Value Time User    PDMP Reviewed  Yes 5/23/2022  2:07 AM Nahum Gunter PA-C          ED Provider  Electronically Signed by           Ayesha Dance, MD  06/19/23 2004

## 2023-06-21 ENCOUNTER — VBI (OUTPATIENT)
Dept: ADMINISTRATIVE | Facility: OTHER | Age: 78
End: 2023-06-21

## 2023-06-21 NOTE — TELEPHONE ENCOUNTER
ED Visit Information     Ed visit date: 6/20/23  Diagnosis Description: arthralgia  In Network? Yes [place]  Discharge status: Home  Discharged with meds ? No  Number of ED visits to date: 3  ED Severity:3     Outreach Information    Outreach successful: Yes 1  Date letter mailed:n/a  Date Finalized:6/21/23    Care Coordination    Follow up appointment with pcp: no spoke to patient's son who declined scheduling at this time  he stated he will be calling PCP for an appointment  Transportation issues ?  No    Value Base Outreach    Emergent necessity warranted by diagnosis:  Yes  [facility] PCP:  Yes  Transportation:  Friend/Family Transport  Called PCP first?:  No  Feels able to call PCP for urgent problems ?:  Yes  Understands what emergencies can be handled by PCP ?:  Yes  Ever any problems getting appointment with PCP for minor emergency/urgency problems?:  No  Practice Contacted Patient ?:  No  Pt had ED follow up with pcp/staff ?:  No    Seen for follow-up out of network ?:  No  Urgent care Education?:  Yes

## 2023-06-27 ENCOUNTER — TELEPHONE (OUTPATIENT)
Age: 78
End: 2023-06-27

## 2023-06-28 ENCOUNTER — TELEMEDICINE (OUTPATIENT)
Age: 78
End: 2023-06-28
Payer: MEDICARE

## 2023-06-28 VITALS — DIASTOLIC BLOOD PRESSURE: 59 MMHG | HEART RATE: 85 BPM | SYSTOLIC BLOOD PRESSURE: 94 MMHG

## 2023-06-28 DIAGNOSIS — F51.01 PRIMARY INSOMNIA: ICD-10-CM

## 2023-06-28 DIAGNOSIS — R26.2 AMBULATORY DYSFUNCTION: ICD-10-CM

## 2023-06-28 DIAGNOSIS — G89.4 CHRONIC PAIN DISORDER: Primary | ICD-10-CM

## 2023-06-28 DIAGNOSIS — Z87.898 HISTORY OF CONFUSION: ICD-10-CM

## 2023-06-28 DIAGNOSIS — E11.22 TYPE 2 DIABETES MELLITUS WITH STAGE 3 CHRONIC KIDNEY DISEASE, WITH LONG-TERM CURRENT USE OF INSULIN, UNSPECIFIED WHETHER STAGE 3A OR 3B CKD (HCC): ICD-10-CM

## 2023-06-28 DIAGNOSIS — R68.89 OTHER GENERAL SYMPTOMS AND SIGNS: ICD-10-CM

## 2023-06-28 DIAGNOSIS — Z79.4 TYPE 2 DIABETES MELLITUS WITH STAGE 3 CHRONIC KIDNEY DISEASE, WITH LONG-TERM CURRENT USE OF INSULIN, UNSPECIFIED WHETHER STAGE 3A OR 3B CKD (HCC): ICD-10-CM

## 2023-06-28 DIAGNOSIS — F33.41 RECURRENT MAJOR DEPRESSIVE DISORDER, IN PARTIAL REMISSION (HCC): ICD-10-CM

## 2023-06-28 DIAGNOSIS — N18.30 TYPE 2 DIABETES MELLITUS WITH STAGE 3 CHRONIC KIDNEY DISEASE, WITH LONG-TERM CURRENT USE OF INSULIN, UNSPECIFIED WHETHER STAGE 3A OR 3B CKD (HCC): ICD-10-CM

## 2023-06-28 PROCEDURE — 99214 OFFICE O/P EST MOD 30 MIN: CPT

## 2023-06-28 RX ORDER — VENLAFAXINE HYDROCHLORIDE 37.5 MG/1
37.5 CAPSULE, EXTENDED RELEASE ORAL
Qty: 90 CAPSULE | Refills: 3 | Status: CANCELLED | OUTPATIENT
Start: 2023-06-28

## 2023-06-28 RX ORDER — PROCHLORPERAZINE 25 MG/1
SUPPOSITORY RECTAL
Qty: 9 EACH | Refills: 3 | Status: SHIPPED | OUTPATIENT
Start: 2023-06-28

## 2023-06-28 RX ORDER — PROCHLORPERAZINE 25 MG/1
SUPPOSITORY RECTAL
Qty: 1 EACH | Refills: 3 | Status: SHIPPED | OUTPATIENT
Start: 2023-06-28

## 2023-06-28 RX ORDER — PROCHLORPERAZINE 25 MG/1
SUPPOSITORY RECTAL
Qty: 1 EACH | Refills: 0 | Status: SHIPPED | OUTPATIENT
Start: 2023-06-28

## 2023-06-28 NOTE — PROGRESS NOTES
Virtual Regular Visit    Verification of patient location:    Patient is located at Home in the following state in which I hold an active license [place]      Assessment/Plan:    Problem List Items Addressed This Visit        Endocrine    Type 2 diabetes mellitus with stage 3 chronic kidney disease, with long-term current use of insulin [facility])  Patient presents with her son for this visit via video  Blood sugar generally in the 100s per son, however patient forgetful and sometimes does not check her blood sugar  Requesting continuous monitoring at this time, order placed  Relevant Medications    Continuous Blood Gluc  (Dexcom G6 ) SERGEI    Continuous Blood Gluc Transmit (Dexcom G6 Transmitter) MISC    Continuous Blood Gluc Sensor (Dexcom G6 Sensor) MISC       Other    Ambulatory dysfunction  Patient is bedbound, uses walker and wheelchair, has decreased endurance due to the shortness of breath cannot stand for long period of time  Does exercise at home  Recurrent major depressive disorder, in partial remission [facility])  Son states that patient has been more depressed lately over the last 4 weeks and also confused and also having hallucinations  PHQ-9 21 this visit, patient does state that sometimes she wishes she was not here upon further questioning patient states she has no suicidal ideation or thoughts at this time  EncourageD patient to talk to family if she has those thoughts, son present in conversation  Patient on Remeron  Concerns on adjusting medications at this time is patient resolving confusion and hallucinations  Declines therapy at this time  Instructed son to monitor condition and to notify office if symptoms worsen  Primary insomnia  States has trouble sleeping sometimes, stable at this time  Chronic pain disorder - Primary  Patient was seen in the hospital about 2 weeks ago for pain       Other Visit Diagnoses     History of confusion      Son states patient has had episodes of confusion, hallucinations  Denies any urinary problems, also symptoms noted to have started about a month ago  Patient was in the emergency room about 2 weeks ago blood work did not show any signs of infection  Will order a UA to rule out UTI  Patient not confused during this visit, as per physical exam   MMSE 19 out of 30  Patient is forgetful ? Dementia  Referral to neurology placed  Relevant Orders    Urine culture    Urinalysis with microscopic    CBC and Platelet    Basic metabolic panel    Ambulatory Referral to Neurology    Procalcitonin      Other general symptoms and signs      Due to confusion, will order urine culture  Relevant Orders    Urine culture               Reason for visit is to discuss recent hospitalization and change in mood  Chief Complaint   Patient presents with   • Virtual Regular Visit        Encounter provider Raina Severs, CRNP    Provider located at [address] [place] [address] 9 [id number] [facility] [id number] [phone]      Recent Visits  Date Type Provider Dept   06/28/23 Telemedicine Raina Severs, CRNP [facility]   06/27/23 Telephone Sarai Serrano MD [facility] [place]   Showing recent visits within past 7 days and meeting all other requirements  Future Appointments  No visits were found meeting these conditions  Showing future appointments within next 150 days and meeting all other requirements       The patient was identified by name and date of birth  Debra Hurt was informed that this is a telemedicine visit and that the visit is being conducted through the [employer]  She agrees to proceed     My office door was closed  No one else was in the room  She acknowledged consent and understanding of privacy and security of the video platform   The patient has agreed to participate and understands they can discontinue the visit at any time     Patient is aware this is a billable service  Subjective  Abbey Eaton is a 66 y o  female presents on video visit with her son, who is holding the camera and giving history  Son states would like to discuss patient's change in mood and some confusion  Also needs continuous glucose monitoring  Son states pt has been showing signs of confusion, and some hallucinations which started about a month ago  Son states sometimes she will be talking to children 1 other than hallucinating  Also mentions patient has been becoming more forgetful  Patient was in the hospital for chronic pain, no confusion noted, per chart review      Past Medical History:   Diagnosis Date   • AMS (altered mental status) 5/24/2022   • Asthma    • Chest pain on breathing     last assessed 2/5/15   • Chronic diastolic CHF (congestive heart failure) (HCC)    • Chronic respiratory failure with hypoxia (HCC)    • Diabetes mellitus (HCC)    • Exertional chest pain 7/14/2021   • HTN (hypertension)    • Hyperlipidemia    • Insomnia    • Obesity    • Preop cardiovascular exam 2/2/2018   • Pulmonary fibrosis (HCC)    • Rheumatoid arthritis (HCC)    • SIRS (systemic inflammatory response syndrome) ([facility] [address] ) 5/22/2022   • Volume overload 12/10/2021       Past Surgical History:   Procedure Laterality Date   • HAND SURGERY         Current Outpatient Medications   Medication Sig Dispense Refill   • Continuous Blood Gluc  (Dexcom G6 ) SERGEI 1 DEXCOM G6  FOR CONTINUOUS GLUCOSE MONITORING 1 each 0   • Continuous Blood Gluc Sensor (Dexcom G6 Sensor) MISC 3 PACK SENSOR FOR CONTINUOUS GLUCOSE MONITORING 9 each 3   • Continuous Blood Gluc Transmit (Dexcom G6 Transmitter) MISC 1 TRANSMITTED EVERY 3 MONTHS FOR CONTINUOUS GLUCOSE MONITORING 1 each 3   • albuterol (2 5 mg/3 mL) 0 083 % nebulizer solution Take 3 mL (2 5 mg total) by nebulization every 6 (six) hours as needed for wheezing or shortness of breath 375 mL 2   • "albuterol (PROVENTIL HFA,VENTOLIN HFA) 90 mcg/act inhaler Inhale 1 puff every 6 (six) hours as needed for wheezing or shortness of breath 18 Inhaler 3   • B-D TB SYRINGE 1CC/27GX1/2\" 27G X 1/2\" 1 ML MISC by Other route 2 (two) times a day 100 each 3   • Blood Glucose Monitoring Suppl (OneTouch Verio) w/Device KIT Use 3 (three) times a day 1 kit 0   • celecoxib (CeleBREX) 200 mg capsule      • Cimzia Starter Kit 6 X 200 MG/ML KIT      • Diclofenac Sodium (VOLTAREN) 1 % Apply 2 g topically in the morning and 2 g at noon and 2 g in the evening and 2 g before bedtime  50 g 0   • fluticasone-umeclidinium-vilanterol (Trelegy Ellipta) 100-62 5-25 mcg/actuation inhaler Inhale 1 puff daily Rinse mouth after use   60 blister 5   • fosinopril (MONOPRIL) 10 mg tablet TAKE 1 TABLET BY MOUTH EVERY DAY 90 tablet 2   • insulin degludec Milbert Flow FlexTouch) 100 units/mL injection pen Inject 40 Units under the skin daily 36 mL 3   • Insulin Pen Needle (B-D UF III MINI PEN NEEDLES) 31G X 5 MM MISC Inject under the skin 2 (two) times a day Use as directed 200 each 3   • metFORMIN (GLUCOPHAGE) 1000 MG tablet TAKE 1 TABLET BY MOUTH TWICE A  tablet 3   • methocarbamol (ROBAXIN) 500 mg tablet Take 1 tablet (500 mg total) by mouth 2 (two) times a day as needed for muscle spasms (side pain) 28 tablet 0   • mirtazapine (REMERON) 15 mg tablet Take 1 tablet (15 mg total) by mouth daily at bedtime 90 tablet 3   • nystatin (MYCOSTATIN) powder Apply topically 4 (four) times a day groin 60 g 4   • OneTouch Ultra test strip PATIENT TO TEST ONCE A  strip 3   • oxyCODONE (ROXICODONE) 10 MG TABS      • predniSONE 10 mg tablet Take 20 mg by mouth daily     • predniSONE 50 mg tablet Take 1 tablet (50 mg total) by mouth daily 5 tablet 0   • Senexon-S 8 6-50 MG per tablet Take 1 tablet by mouth daily     • torsemide (DEMADEX) 20 mg tablet TAKE 2 TABLETS BY MOUTH 2 (TWO) TIMES A  tablet 3     No current facility-administered medications " for this visit  Allergies   Allergen Reactions   • Gabapentin    • Vancomycin        Review of Systems   Constitutional: Negative for chills and fever  HENT: Negative for ear pain and sore throat  Eyes: Negative for pain and visual disturbance  Respiratory: Negative for cough and shortness of breath  Cardiovascular: Negative for chest pain and palpitations  Gastrointestinal: Negative for abdominal pain and vomiting  Genitourinary: Negative for dysuria and hematuria  Musculoskeletal: Positive for arthralgias, back pain, gait problem and myalgias  Skin: Negative for color change and rash  Neurological: Negative for seizures and syncope  Psychiatric/Behavioral: Positive for confusion and hallucinations  All other systems reviewed and are negative  Video Exam    Vitals:    06/28/23 1218   BP: 94/59   Pulse: 85     Checked during visit  BP slightly low during this visit, states usually okay discussed with son signs of hypotension  Instructed to check BP again if it continues to be low to notify office  Physical Exam  HENT:      Head: Normocephalic        Nose: Nose normal    Psychiatric:         Mood and Affect: Mood normal          Behavior: Behavior normal           Visit Time  Total Visit Duration: 40min

## 2023-06-30 ENCOUNTER — TELEPHONE (OUTPATIENT)
Age: 78
End: 2023-06-30

## 2023-07-05 ENCOUNTER — TELEPHONE (OUTPATIENT)
Dept: LAB | Facility: HOSPITAL | Age: 78
End: 2023-07-05

## 2023-07-06 ENCOUNTER — TELEPHONE (OUTPATIENT)
Age: 78
End: 2023-07-06

## 2023-07-06 NOTE — TELEPHONE ENCOUNTER
Son states he has been calling several times. Concerned with the lack of follow through and follow up with my concerns about his mothers script for Dexcom    Has this been authorized? ? Please call him back.    [phone]

## 2023-07-13 ENCOUNTER — APPOINTMENT (OUTPATIENT)
Age: 78
End: 2023-07-13
Payer: MEDICARE

## 2023-07-13 DIAGNOSIS — R68.89 OTHER GENERAL SYMPTOMS AND SIGNS: ICD-10-CM

## 2023-07-13 DIAGNOSIS — Z87.898 HISTORY OF CONFUSION: ICD-10-CM

## 2023-07-13 LAB
ANION GAP SERPL CALCULATED.3IONS-SCNC: 4 MMOL/L
BUN SERPL-MCNC: 31 MG/DL (ref 5–25)
CALCIUM SERPL-MCNC: 10 MG/DL (ref 8.4–10.2)
CHLORIDE SERPL-SCNC: 97 MMOL/L (ref 96–108)
CO2 SERPL-SCNC: 38 MMOL/L (ref 21–32)
CREAT SERPL-MCNC: 1.22 MG/DL (ref 0.6–1.3)
ERYTHROCYTE [DISTWIDTH] IN BLOOD BY AUTOMATED COUNT: 15 % (ref 11.6–15.1)
GFR SERPL CREATININE-BSD FRML MDRD: 42 ML/MIN/1.73SQ M
GLUCOSE P FAST SERPL-MCNC: 62 MG/DL (ref 65–99)
HCT VFR BLD AUTO: 40.5 % (ref 34.8–46.1)
HGB BLD-MCNC: 12.5 G/DL (ref 11.5–15.4)
MCH RBC QN AUTO: 27.9 PG (ref 26.8–34.3)
MCHC RBC AUTO-ENTMCNC: 30.9 G/DL (ref 31.4–37.4)
MCV RBC AUTO: 90 FL (ref 82–98)
PLATELET # BLD AUTO: 228 THOUSANDS/UL (ref 149–390)
PMV BLD AUTO: 11.4 FL (ref 8.9–12.7)
POTASSIUM SERPL-SCNC: 4.7 MMOL/L (ref 3.5–5.3)
PROCALCITONIN SERPL-MCNC: <0.05 NG/ML
RBC # BLD AUTO: 4.48 MILLION/UL (ref 3.81–5.12)
SODIUM SERPL-SCNC: 139 MMOL/L (ref 135–147)
WBC # BLD AUTO: 13.12 THOUSAND/UL (ref 4.31–10.16)

## 2023-07-13 PROCEDURE — 84145 PROCALCITONIN (PCT): CPT

## 2023-07-13 PROCEDURE — 87186 SC STD MICRODIL/AGAR DIL: CPT

## 2023-07-13 PROCEDURE — 85027 COMPLETE CBC AUTOMATED: CPT

## 2023-07-13 PROCEDURE — 36415 COLL VENOUS BLD VENIPUNCTURE: CPT

## 2023-07-13 PROCEDURE — 80048 BASIC METABOLIC PNL TOTAL CA: CPT

## 2023-07-13 PROCEDURE — 87077 CULTURE AEROBIC IDENTIFY: CPT

## 2023-07-13 PROCEDURE — 87086 URINE CULTURE/COLONY COUNT: CPT

## 2023-07-14 ENCOUNTER — TELEPHONE (OUTPATIENT)
Age: 78
End: 2023-07-14

## 2023-07-14 LAB
BACTERIA UR QL AUTO: ABNORMAL /HPF
BILIRUB UR QL STRIP: NEGATIVE
CLARITY UR: ABNORMAL
COLOR UR: YELLOW
GLUCOSE UR STRIP-MCNC: NEGATIVE MG/DL
HGB UR QL STRIP.AUTO: NEGATIVE
KETONES UR STRIP-MCNC: NEGATIVE MG/DL
LEUKOCYTE ESTERASE UR QL STRIP: ABNORMAL
NITRITE UR QL STRIP: NEGATIVE
NON-SQ EPI CELLS URNS QL MICRO: ABNORMAL /HPF
PH UR STRIP.AUTO: 6.5 [PH]
PROT UR STRIP-MCNC: ABNORMAL MG/DL
RBC #/AREA URNS AUTO: ABNORMAL /HPF
SP GR UR STRIP.AUTO: 1.01 (ref 1–1.03)
UROBILINOGEN UR STRIP-ACNC: <2 MG/DL
WBC #/AREA URNS AUTO: ABNORMAL /HPF

## 2023-07-14 NOTE — TELEPHONE ENCOUNTER
Labs were ordered by Sabiha Chun was calling about the results- labs were done yesterday     Please give the son a call when the labs are reviewed

## 2023-07-14 NOTE — TELEPHONE ENCOUNTER
calling regarding two things. The first concern is received  lab work yesterday. The results have come up with abnormal results which require immediate attention. She is not able to walk.   waiting for a wheelchair and the documentations were faxed to us on July 11  what we need is a D, P, G which is a detailed product description form to be signed off and faxed back over in order to get her power wheelchair.

## 2023-07-15 DIAGNOSIS — N18.30 TYPE 2 DIABETES MELLITUS WITH STAGE 3 CHRONIC KIDNEY DISEASE, WITH LONG-TERM CURRENT USE OF INSULIN (HCC): ICD-10-CM

## 2023-07-15 DIAGNOSIS — I10 ESSENTIAL HYPERTENSION: ICD-10-CM

## 2023-07-15 DIAGNOSIS — Z79.4 TYPE 2 DIABETES MELLITUS WITH STAGE 3 CHRONIC KIDNEY DISEASE, WITH LONG-TERM CURRENT USE OF INSULIN (HCC): ICD-10-CM

## 2023-07-15 DIAGNOSIS — E11.22 TYPE 2 DIABETES MELLITUS WITH STAGE 3 CHRONIC KIDNEY DISEASE, WITH LONG-TERM CURRENT USE OF INSULIN (HCC): ICD-10-CM

## 2023-07-15 DIAGNOSIS — N39.0 URINARY TRACT INFECTION WITHOUT HEMATURIA, SITE UNSPECIFIED: Primary | ICD-10-CM

## 2023-07-15 LAB — BACTERIA UR CULT: ABNORMAL

## 2023-07-15 RX ORDER — SULFAMETHOXAZOLE AND TRIMETHOPRIM 800; 160 MG/1; MG/1
1 TABLET ORAL 2 TIMES DAILY
Qty: 14 TABLET | Refills: 0 | Status: SHIPPED | OUTPATIENT
Start: 2023-07-15 | End: 2023-07-22

## 2023-07-15 NOTE — TELEPHONE ENCOUNTER
I have addressed labs, however, I have no idea what the wheelchair forms are about or where they are.

## 2023-07-16 DIAGNOSIS — Z79.4 TYPE 2 DIABETES MELLITUS WITH STAGE 3 CHRONIC KIDNEY DISEASE, WITH LONG-TERM CURRENT USE OF INSULIN (HCC): ICD-10-CM

## 2023-07-16 DIAGNOSIS — E11.22 TYPE 2 DIABETES MELLITUS WITH STAGE 3 CHRONIC KIDNEY DISEASE, WITH LONG-TERM CURRENT USE OF INSULIN (HCC): ICD-10-CM

## 2023-07-16 DIAGNOSIS — N18.30 TYPE 2 DIABETES MELLITUS WITH STAGE 3 CHRONIC KIDNEY DISEASE, WITH LONG-TERM CURRENT USE OF INSULIN (HCC): ICD-10-CM

## 2023-07-16 RX ORDER — TORSEMIDE 20 MG/1
TABLET ORAL
Qty: 360 TABLET | Refills: 3 | Status: SHIPPED | OUTPATIENT
Start: 2023-07-16

## 2023-07-16 RX ORDER — INSULIN DEGLUDEC INJECTION 100 U/ML
45 INJECTION, SOLUTION SUBCUTANEOUS DAILY
Qty: 45 ML | Refills: 0 | Status: SHIPPED | OUTPATIENT
Start: 2023-07-16 | End: 2023-10-24

## 2023-07-17 ENCOUNTER — TELEPHONE (OUTPATIENT)
Age: 78
End: 2023-07-17

## 2023-07-17 NOTE — TELEPHONE ENCOUNTER
----- Message from [facility] [employer] sent at 7/16/2023  1:59 PM EDT -----  Please call patient's son, let him know that due to the elevated blood sugars that he reported to increase Tresiba to 45 units. Blood sugar levels are still elevated to notify the office. Also have them schedule an appointment in about 3 months for follow-up.

## 2023-07-17 NOTE — TELEPHONE ENCOUNTER
Son  notified , wheel chair paper found in dr. Chris mallory and dr. Anshul Berkowitz signed and was faxed, called son [email or web address]

## 2023-08-11 ENCOUNTER — TELEPHONE (OUTPATIENT)
Age: 78
End: 2023-08-11

## 2023-08-11 NOTE — TELEPHONE ENCOUNTER
Son is looking for new order from [facility] for power wheelchair. Corinne Nicolas said they faxed it on 7/26. Please call with status.

## 2023-08-14 NOTE — TELEPHONE ENCOUNTER
Looked at dr. Juan Carlos Murillo note from 5/23/2023 see amendments and faxed this to [phone] son notified

## 2023-08-14 NOTE — TELEPHONE ENCOUNTER
Found fax in dr. Miriam mallory denial from [employer], stating needs necessary amendments to your face to face examination to reflect these denials

## 2023-09-22 ENCOUNTER — TELEPHONE (OUTPATIENT)
Age: 78
End: 2023-09-22

## 2023-09-22 DIAGNOSIS — N18.30 TYPE 2 DIABETES MELLITUS WITH STAGE 3 CHRONIC KIDNEY DISEASE, WITH LONG-TERM CURRENT USE OF INSULIN (HCC): ICD-10-CM

## 2023-09-22 DIAGNOSIS — Z79.4 TYPE 2 DIABETES MELLITUS WITH STAGE 3 CHRONIC KIDNEY DISEASE, WITH LONG-TERM CURRENT USE OF INSULIN (HCC): ICD-10-CM

## 2023-09-22 DIAGNOSIS — E11.22 TYPE 2 DIABETES MELLITUS WITH STAGE 3 CHRONIC KIDNEY DISEASE, WITH LONG-TERM CURRENT USE OF INSULIN (HCC): ICD-10-CM

## 2023-09-22 RX ORDER — FLURBIPROFEN SODIUM 0.3 MG/ML
SOLUTION/ DROPS OPHTHALMIC
Qty: 200 EACH | Refills: 3 | Status: SHIPPED | OUTPATIENT
Start: 2023-09-22

## 2023-09-22 NOTE — TELEPHONE ENCOUNTER
Patient's son called regarding her legs and arms being swollen. Spoke with Dr. Jeronimo Olmos and since she is over 72years old, she said she would need to be evaluated in the emergency room. Son stated he would take her but insisted to be scheduled next week as a back up.

## 2023-10-04 ENCOUNTER — APPOINTMENT (EMERGENCY)
Dept: VASCULAR ULTRASOUND | Facility: HOSPITAL | Age: 78
DRG: 291 | End: 2023-10-04
Payer: MEDICARE

## 2023-10-04 ENCOUNTER — HOSPITAL ENCOUNTER (INPATIENT)
Facility: HOSPITAL | Age: 78
LOS: 5 days | Discharge: HOME HEALTH | DRG: 291 | End: 2023-10-09
Attending: EMERGENCY MEDICINE | Admitting: INTERNAL MEDICINE
Payer: MEDICARE

## 2023-10-04 ENCOUNTER — APPOINTMENT (EMERGENCY)
Dept: CT IMAGING | Facility: HOSPITAL | Age: 78
DRG: 291 | End: 2023-10-04
Payer: MEDICARE

## 2023-10-04 ENCOUNTER — APPOINTMENT (EMERGENCY)
Dept: RADIOLOGY | Facility: HOSPITAL | Age: 78
DRG: 291 | End: 2023-10-04
Payer: MEDICARE

## 2023-10-04 DIAGNOSIS — I10 ESSENTIAL HYPERTENSION: ICD-10-CM

## 2023-10-04 DIAGNOSIS — I50.32 CHRONIC DIASTOLIC CONGESTIVE HEART FAILURE (HCC): ICD-10-CM

## 2023-10-04 DIAGNOSIS — N18.30 TYPE 2 DIABETES MELLITUS WITH STAGE 3 CHRONIC KIDNEY DISEASE, WITH LONG-TERM CURRENT USE OF INSULIN (HCC): ICD-10-CM

## 2023-10-04 DIAGNOSIS — N17.9 AKI (ACUTE KIDNEY INJURY) (HCC): ICD-10-CM

## 2023-10-04 DIAGNOSIS — Z79.4 TYPE 2 DIABETES MELLITUS WITH STAGE 3 CHRONIC KIDNEY DISEASE, WITH LONG-TERM CURRENT USE OF INSULIN (HCC): ICD-10-CM

## 2023-10-04 DIAGNOSIS — R60.9 PERIPHERAL EDEMA: Primary | ICD-10-CM

## 2023-10-04 DIAGNOSIS — E11.22 TYPE 2 DIABETES MELLITUS WITH STAGE 3 CHRONIC KIDNEY DISEASE, WITH LONG-TERM CURRENT USE OF INSULIN (HCC): ICD-10-CM

## 2023-10-04 LAB
2HR DELTA HS TROPONIN: 1 NG/L
4HR DELTA HS TROPONIN: 1 NG/L
ALBUMIN SERPL BCP-MCNC: 3.1 G/DL (ref 3.5–5)
ALP SERPL-CCNC: 89 U/L (ref 34–104)
ALT SERPL W P-5'-P-CCNC: 10 U/L (ref 7–52)
ANION GAP SERPL CALCULATED.3IONS-SCNC: 9 MMOL/L
AST SERPL W P-5'-P-CCNC: 23 U/L (ref 13–39)
BASOPHILS # BLD AUTO: 0.06 THOUSANDS/ÂΜL (ref 0–0.1)
BASOPHILS NFR BLD AUTO: 1 % (ref 0–1)
BILIRUB SERPL-MCNC: 0.55 MG/DL (ref 0.2–1)
BNP SERPL-MCNC: 107 PG/ML (ref 0–100)
BUN SERPL-MCNC: 14 MG/DL (ref 5–25)
CALCIUM ALBUM COR SERPL-MCNC: 10.1 MG/DL (ref 8.3–10.1)
CALCIUM SERPL-MCNC: 9.4 MG/DL (ref 8.4–10.2)
CARDIAC TROPONIN I PNL SERPL HS: 5 NG/L
CARDIAC TROPONIN I PNL SERPL HS: 6 NG/L
CARDIAC TROPONIN I PNL SERPL HS: 6 NG/L
CHLORIDE SERPL-SCNC: 97 MMOL/L (ref 96–108)
CO2 SERPL-SCNC: 24 MMOL/L (ref 21–32)
CREAT SERPL-MCNC: 0.92 MG/DL (ref 0.6–1.3)
D DIMER PPP FEU-MCNC: 14.56 UG/ML FEU
EOSINOPHIL # BLD AUTO: 0.41 THOUSAND/ÂΜL (ref 0–0.61)
EOSINOPHIL NFR BLD AUTO: 4 % (ref 0–6)
ERYTHROCYTE [DISTWIDTH] IN BLOOD BY AUTOMATED COUNT: 14.6 % (ref 11.6–15.1)
GFR SERPL CREATININE-BSD FRML MDRD: 59 ML/MIN/1.73SQ M
GLUCOSE SERPL-MCNC: 296 MG/DL (ref 65–140)
HCT VFR BLD AUTO: 35.7 % (ref 34.8–46.1)
HGB BLD-MCNC: 11.2 G/DL (ref 11.5–15.4)
IMM GRANULOCYTES # BLD AUTO: 0.07 THOUSAND/UL (ref 0–0.2)
IMM GRANULOCYTES NFR BLD AUTO: 1 % (ref 0–2)
LYMPHOCYTES # BLD AUTO: 2.3 THOUSANDS/ÂΜL (ref 0.6–4.47)
LYMPHOCYTES NFR BLD AUTO: 21 % (ref 14–44)
MCH RBC QN AUTO: 28.6 PG (ref 26.8–34.3)
MCHC RBC AUTO-ENTMCNC: 31.4 G/DL (ref 31.4–37.4)
MCV RBC AUTO: 91 FL (ref 82–98)
MONOCYTES # BLD AUTO: 1.41 THOUSAND/ÂΜL (ref 0.17–1.22)
MONOCYTES NFR BLD AUTO: 13 % (ref 4–12)
NEUTROPHILS # BLD AUTO: 6.6 THOUSANDS/ÂΜL (ref 1.85–7.62)
NEUTS SEG NFR BLD AUTO: 60 % (ref 43–75)
NRBC BLD AUTO-RTO: 0 /100 WBCS
PLATELET # BLD AUTO: 447 THOUSANDS/UL (ref 149–390)
PMV BLD AUTO: 9.3 FL (ref 8.9–12.7)
POTASSIUM SERPL-SCNC: 4.1 MMOL/L (ref 3.5–5.3)
PROT SERPL-MCNC: 7.5 G/DL (ref 6.4–8.4)
RBC # BLD AUTO: 3.91 MILLION/UL (ref 3.81–5.12)
SODIUM SERPL-SCNC: 130 MMOL/L (ref 135–147)
WBC # BLD AUTO: 10.85 THOUSAND/UL (ref 4.31–10.16)

## 2023-10-04 PROCEDURE — 74174 CTA ABD&PLVS W/CONTRAST: CPT

## 2023-10-04 PROCEDURE — 96375 TX/PRO/DX INJ NEW DRUG ADDON: CPT

## 2023-10-04 PROCEDURE — 84484 ASSAY OF TROPONIN QUANT: CPT | Performed by: EMERGENCY MEDICINE

## 2023-10-04 PROCEDURE — 99285 EMERGENCY DEPT VISIT HI MDM: CPT

## 2023-10-04 PROCEDURE — 93971 EXTREMITY STUDY: CPT

## 2023-10-04 PROCEDURE — 80053 COMPREHEN METABOLIC PANEL: CPT | Performed by: EMERGENCY MEDICINE

## 2023-10-04 PROCEDURE — 71045 X-RAY EXAM CHEST 1 VIEW: CPT

## 2023-10-04 PROCEDURE — 96376 TX/PRO/DX INJ SAME DRUG ADON: CPT

## 2023-10-04 PROCEDURE — 93005 ELECTROCARDIOGRAM TRACING: CPT

## 2023-10-04 PROCEDURE — 36415 COLL VENOUS BLD VENIPUNCTURE: CPT | Performed by: EMERGENCY MEDICINE

## 2023-10-04 PROCEDURE — 83880 ASSAY OF NATRIURETIC PEPTIDE: CPT | Performed by: EMERGENCY MEDICINE

## 2023-10-04 PROCEDURE — 85379 FIBRIN DEGRADATION QUANT: CPT | Performed by: EMERGENCY MEDICINE

## 2023-10-04 PROCEDURE — 85025 COMPLETE CBC W/AUTO DIFF WBC: CPT | Performed by: EMERGENCY MEDICINE

## 2023-10-04 PROCEDURE — 96374 THER/PROPH/DIAG INJ IV PUSH: CPT

## 2023-10-04 PROCEDURE — 71275 CT ANGIOGRAPHY CHEST: CPT

## 2023-10-04 PROCEDURE — 84145 PROCALCITONIN (PCT): CPT | Performed by: PHYSICIAN ASSISTANT

## 2023-10-04 RX ORDER — FENTANYL CITRATE 50 UG/ML
50 INJECTION, SOLUTION INTRAMUSCULAR; INTRAVENOUS ONCE
Status: COMPLETED | OUTPATIENT
Start: 2023-10-04 | End: 2023-10-04

## 2023-10-04 RX ORDER — FENTANYL CITRATE 50 UG/ML
75 INJECTION, SOLUTION INTRAMUSCULAR; INTRAVENOUS ONCE
Status: COMPLETED | OUTPATIENT
Start: 2023-10-04 | End: 2023-10-04

## 2023-10-04 RX ORDER — FUROSEMIDE 10 MG/ML
40 INJECTION INTRAMUSCULAR; INTRAVENOUS ONCE
Status: COMPLETED | OUTPATIENT
Start: 2023-10-04 | End: 2023-10-04

## 2023-10-04 RX ADMIN — IOHEXOL 100 ML: 350 INJECTION, SOLUTION INTRAVENOUS at 21:48

## 2023-10-04 RX ADMIN — FUROSEMIDE 40 MG: 10 INJECTION, SOLUTION INTRAMUSCULAR; INTRAVENOUS at 19:25

## 2023-10-04 RX ADMIN — FENTANYL CITRATE 75 MCG: 50 INJECTION INTRAMUSCULAR; INTRAVENOUS at 18:19

## 2023-10-04 RX ADMIN — FENTANYL CITRATE 50 MCG: 50 INJECTION INTRAMUSCULAR; INTRAVENOUS at 22:06

## 2023-10-05 PROBLEM — I50.33 ACUTE ON CHRONIC DIASTOLIC (CONGESTIVE) HEART FAILURE (HCC): Status: ACTIVE | Noted: 2023-10-05

## 2023-10-05 LAB
ATRIAL RATE: 99 BPM
GLUCOSE SERPL-MCNC: 137 MG/DL (ref 65–140)
GLUCOSE SERPL-MCNC: 138 MG/DL (ref 65–140)
GLUCOSE SERPL-MCNC: 146 MG/DL (ref 65–140)
GLUCOSE SERPL-MCNC: 202 MG/DL (ref 65–140)
GLUCOSE SERPL-MCNC: 228 MG/DL (ref 65–140)
LACTATE SERPL-SCNC: 1.4 MMOL/L (ref 0.5–2)
P AXIS: 66 DEGREES
PR INTERVAL: 166 MS
PROCALCITONIN SERPL-MCNC: 0.08 NG/ML
QRS AXIS: 26 DEGREES
QRSD INTERVAL: 76 MS
QT INTERVAL: 346 MS
QTC INTERVAL: 444 MS
T WAVE AXIS: 36 DEGREES
VENTRICULAR RATE: 99 BPM

## 2023-10-05 PROCEDURE — 94664 DEMO&/EVAL PT USE INHALER: CPT

## 2023-10-05 PROCEDURE — 87040 BLOOD CULTURE FOR BACTERIA: CPT | Performed by: PHYSICIAN ASSISTANT

## 2023-10-05 PROCEDURE — 83605 ASSAY OF LACTIC ACID: CPT | Performed by: PHYSICIAN ASSISTANT

## 2023-10-05 PROCEDURE — 93010 ELECTROCARDIOGRAM REPORT: CPT | Performed by: INTERNAL MEDICINE

## 2023-10-05 PROCEDURE — 82948 REAGENT STRIP/BLOOD GLUCOSE: CPT

## 2023-10-05 PROCEDURE — 36415 COLL VENOUS BLD VENIPUNCTURE: CPT | Performed by: PHYSICIAN ASSISTANT

## 2023-10-05 PROCEDURE — 99285 EMERGENCY DEPT VISIT HI MDM: CPT | Performed by: EMERGENCY MEDICINE

## 2023-10-05 PROCEDURE — 99223 1ST HOSP IP/OBS HIGH 75: CPT | Performed by: INTERNAL MEDICINE

## 2023-10-05 PROCEDURE — 99223 1ST HOSP IP/OBS HIGH 75: CPT | Performed by: STUDENT IN AN ORGANIZED HEALTH CARE EDUCATION/TRAINING PROGRAM

## 2023-10-05 PROCEDURE — 93971 EXTREMITY STUDY: CPT | Performed by: SURGERY

## 2023-10-05 RX ORDER — NYSTATIN 100000 [USP'U]/G
POWDER TOPICAL 3 TIMES DAILY PRN
Status: DISCONTINUED | OUTPATIENT
Start: 2023-10-05 | End: 2023-10-09 | Stop reason: HOSPADM

## 2023-10-05 RX ORDER — INSULIN LISPRO 100 [IU]/ML
1-6 INJECTION, SOLUTION INTRAVENOUS; SUBCUTANEOUS
Status: DISCONTINUED | OUTPATIENT
Start: 2023-10-05 | End: 2023-10-09 | Stop reason: HOSPADM

## 2023-10-05 RX ORDER — MAGNESIUM HYDROXIDE/ALUMINUM HYDROXICE/SIMETHICONE 120; 1200; 1200 MG/30ML; MG/30ML; MG/30ML
30 SUSPENSION ORAL EVERY 6 HOURS PRN
Status: DISCONTINUED | OUTPATIENT
Start: 2023-10-05 | End: 2023-10-09 | Stop reason: HOSPADM

## 2023-10-05 RX ORDER — HYDROMORPHONE HYDROCHLORIDE 4 MG/1
4 TABLET ORAL EVERY 6 HOURS PRN
Status: DISCONTINUED | OUTPATIENT
Start: 2023-10-05 | End: 2023-10-09 | Stop reason: HOSPADM

## 2023-10-05 RX ORDER — HEPARIN SODIUM 5000 [USP'U]/ML
5000 INJECTION, SOLUTION INTRAVENOUS; SUBCUTANEOUS EVERY 8 HOURS SCHEDULED
Status: DISCONTINUED | OUTPATIENT
Start: 2023-10-05 | End: 2023-10-09 | Stop reason: HOSPADM

## 2023-10-05 RX ORDER — METHOCARBAMOL 500 MG/1
500 TABLET, FILM COATED ORAL 2 TIMES DAILY PRN
Status: DISCONTINUED | OUTPATIENT
Start: 2023-10-05 | End: 2023-10-09 | Stop reason: HOSPADM

## 2023-10-05 RX ORDER — HYDROMORPHONE HYDROCHLORIDE 4 MG/1
TABLET ORAL
COMMUNITY
Start: 2023-08-27

## 2023-10-05 RX ORDER — ACETAMINOPHEN 325 MG/1
650 TABLET ORAL EVERY 6 HOURS PRN
Status: DISCONTINUED | OUTPATIENT
Start: 2023-10-05 | End: 2023-10-07

## 2023-10-05 RX ORDER — LISINOPRIL 10 MG/1
10 TABLET ORAL DAILY
Status: DISCONTINUED | OUTPATIENT
Start: 2023-10-05 | End: 2023-10-07

## 2023-10-05 RX ORDER — MIRTAZAPINE 15 MG/1
15 TABLET, FILM COATED ORAL
Status: DISCONTINUED | OUTPATIENT
Start: 2023-10-05 | End: 2023-10-09 | Stop reason: HOSPADM

## 2023-10-05 RX ORDER — AMOXICILLIN 250 MG
1 CAPSULE ORAL DAILY
Status: DISCONTINUED | OUTPATIENT
Start: 2023-10-05 | End: 2023-10-09 | Stop reason: HOSPADM

## 2023-10-05 RX ORDER — FLUTICASONE FUROATE AND VILANTEROL 100; 25 UG/1; UG/1
1 POWDER RESPIRATORY (INHALATION) DAILY
Status: DISCONTINUED | OUTPATIENT
Start: 2023-10-05 | End: 2023-10-09 | Stop reason: HOSPADM

## 2023-10-05 RX ORDER — ALBUTEROL SULFATE 90 UG/1
1 AEROSOL, METERED RESPIRATORY (INHALATION) EVERY 6 HOURS PRN
Status: DISCONTINUED | OUTPATIENT
Start: 2023-10-05 | End: 2023-10-09 | Stop reason: HOSPADM

## 2023-10-05 RX ORDER — FUROSEMIDE 10 MG/ML
80 INJECTION INTRAMUSCULAR; INTRAVENOUS
Status: DISCONTINUED | OUTPATIENT
Start: 2023-10-05 | End: 2023-10-06

## 2023-10-05 RX ORDER — ALBUTEROL SULFATE 2.5 MG/3ML
2.5 SOLUTION RESPIRATORY (INHALATION) EVERY 6 HOURS PRN
Status: DISCONTINUED | OUTPATIENT
Start: 2023-10-05 | End: 2023-10-09 | Stop reason: HOSPADM

## 2023-10-05 RX ORDER — INSULIN GLARGINE 100 [IU]/ML
40 INJECTION, SOLUTION SUBCUTANEOUS
Status: DISCONTINUED | OUTPATIENT
Start: 2023-10-05 | End: 2023-10-06

## 2023-10-05 RX ORDER — FUROSEMIDE 10 MG/ML
100 INJECTION INTRAMUSCULAR; INTRAVENOUS
Status: DISCONTINUED | OUTPATIENT
Start: 2023-10-05 | End: 2023-10-05

## 2023-10-05 RX ADMIN — MIRTAZAPINE 15 MG: 15 TABLET, FILM COATED ORAL at 21:47

## 2023-10-05 RX ADMIN — HYDROMORPHONE HYDROCHLORIDE 4 MG: 4 TABLET ORAL at 07:41

## 2023-10-05 RX ADMIN — DICLOFENAC SODIUM 2 G: 10 GEL TOPICAL at 08:07

## 2023-10-05 RX ADMIN — DICLOFENAC SODIUM 2 G: 10 GEL TOPICAL at 21:46

## 2023-10-05 RX ADMIN — HEPARIN SODIUM 5000 UNITS: 5000 INJECTION INTRAVENOUS; SUBCUTANEOUS at 15:18

## 2023-10-05 RX ADMIN — MORPHINE SULFATE 2 MG: 2 INJECTION, SOLUTION INTRAMUSCULAR; INTRAVENOUS at 12:39

## 2023-10-05 RX ADMIN — FUROSEMIDE 100 MG: 10 INJECTION, SOLUTION INTRAMUSCULAR; INTRAVENOUS at 05:29

## 2023-10-05 RX ADMIN — SENNOSIDES AND DOCUSATE SODIUM 1 TABLET: 50; 8.6 TABLET ORAL at 09:14

## 2023-10-05 RX ADMIN — FUROSEMIDE 80 MG: 10 INJECTION, SOLUTION INTRAMUSCULAR; INTRAVENOUS at 15:18

## 2023-10-05 RX ADMIN — ACETAMINOPHEN 650 MG: 325 TABLET, FILM COATED ORAL at 05:29

## 2023-10-05 RX ADMIN — FLUTICASONE FUROATE AND VILANTEROL TRIFENATATE 1 PUFF: 100; 25 POWDER RESPIRATORY (INHALATION) at 11:01

## 2023-10-05 RX ADMIN — HEPARIN SODIUM 5000 UNITS: 5000 INJECTION INTRAVENOUS; SUBCUTANEOUS at 05:29

## 2023-10-05 RX ADMIN — UMECLIDINIUM 1 PUFF: 62.5 AEROSOL, POWDER ORAL at 11:01

## 2023-10-05 RX ADMIN — HYDROMORPHONE HYDROCHLORIDE 4 MG: 4 TABLET ORAL at 01:21

## 2023-10-05 RX ADMIN — METHOCARBAMOL 500 MG: 500 TABLET ORAL at 20:43

## 2023-10-05 RX ADMIN — INSULIN LISPRO 2 UNITS: 100 INJECTION, SOLUTION INTRAVENOUS; SUBCUTANEOUS at 01:25

## 2023-10-05 RX ADMIN — DICLOFENAC SODIUM 2 G: 10 GEL TOPICAL at 11:31

## 2023-10-05 RX ADMIN — INSULIN GLARGINE 40 UNITS: 100 INJECTION, SOLUTION SUBCUTANEOUS at 11:31

## 2023-10-05 RX ADMIN — HYDROMORPHONE HYDROCHLORIDE 4 MG: 4 TABLET ORAL at 19:45

## 2023-10-05 RX ADMIN — MIRTAZAPINE 15 MG: 15 TABLET, FILM COATED ORAL at 01:21

## 2023-10-05 RX ADMIN — HEPARIN SODIUM 5000 UNITS: 5000 INJECTION INTRAVENOUS; SUBCUTANEOUS at 21:46

## 2023-10-05 NOTE — ASSESSMENT & PLAN NOTE
· Meeting criteria due to tachycardia and tachypnea in the setting of vascular congestion, no clear infectious source at this time  · We will check lactic acid, procalcitonin, blood culture x2  · We will hold antibiotics and IV fluids for now

## 2023-10-05 NOTE — RESPIRATORY THERAPY NOTE
RT Protocol Note  Corinne Chandler 66 y.o. female MRN: [id number]  Unit/Bed#: ED 14 Encounter: [id number]    Assessment    Principal Problem:    Chronic diastolic congestive heart failure (HCC)  Active Problems:    Type 2 diabetes mellitus with stage 3 chronic kidney disease, with long-term current use of insulin (HCC)    Chronic respiratory failure with hypoxia (HCC)    SIRS (systemic inflammatory response syndrome) (HCC)      Home Pulmonary Medications:  Neb MDI O2 3 LPM  Home Devices/Therapy: Home O2    Past Medical History:   Diagnosis Date    AMS (altered mental status) 5/24/2022    Asthma     Chest pain on breathing     last assessed 2/5/15    Chronic diastolic CHF (congestive heart failure) (HCC)     Chronic respiratory failure with hypoxia (HCC)     Diabetes mellitus (HCC)     Exertional chest pain 7/14/2021    HTN (hypertension)     Hyperlipidemia     Insomnia     Obesity     Preop cardiovascular exam 2/2/2018    Pulmonary fibrosis (HCC)     Rheumatoid arthritis (HCC)     SIRS (systemic inflammatory response syndrome) ([address]) 5/22/2022    Volume overload 12/10/2021     Subjective         Objective    Physical Exam:   Assessment Type: Assess only  General Appearance: Awake  Respiratory Pattern: Normal  Chest Assessment: Chest expansion symmetrical  Bilateral Breath Sounds: Diminished  O2 Device: NC    Vitals:  Blood pressure 113/57, pulse 95, temperature 97.6 °F (36.4 °C), resp. rate 20, SpO2 100 %.           O2 Device: NC     Plan    Respiratory Plan: Home Bronchodilator Patient pathway        Resp Comments: Pt uses MDI, NEb and home O2 3 LPM, continue treatments as ordered

## 2023-10-05 NOTE — ASSESSMENT & PLAN NOTE
Lab Results   Component Value Date    HGBA1C 11.1 (H) 05/06/2023       No results for input(s): "POCGLU" in the last 72 hours.     Blood Sugar Average: Last 72 hrs:  · Blood glucose checks 4 times daily, hypoglycemia protocol  · Continue home Lantus 40 units in the afternoon  · ssi

## 2023-10-05 NOTE — PLAN OF CARE

## 2023-10-05 NOTE — ASSESSMENT & PLAN NOTE
Wt Readings from Last 3 Encounters:   06/19/23 86.6 kg (191 lb)   05/23/23 97.2 kg (214 lb 3.2 oz)   02/22/23 89.8 kg (198 lb)     · Worsening bilateral lower extremity edema and edema in right upper extremity  · BNP slightly elevated at 107  · CTA chest negative for PE or dissection, chest x-ray revealing pulmonary vascular congestion  · VAS Doppler duplex of right upper extremity negative for DVT  · Echocardiogram from April 2023 revealing 60 to 65% ejection fraction with grade 1 diastolic dysfunction  · Appreciate cardiology consult  · Intake and output monitoring, daily weights, low-sodium diet with 1800 mL fluid restriction  · Hold home torsemide 40 mg twice daily  · Switch to IV Lasix 100 mg 3 times daily

## 2023-10-05 NOTE — ASSESSMENT & PLAN NOTE
Wt Readings from Last 3 Encounters:   06/19/23 86.6 kg (191 lb)   05/23/23 97.2 kg (214 lb 3.2 oz)   02/22/23 89.8 kg (198 lb)     · Worsening bilateral lower extremity edema and edema in right upper extremity  · BNP slightly elevated at 107  · CTA chest negative for PE or dissection, chest x-ray revealing pulmonary vascular congestion  · VAS Doppler duplex of right upper extremity negative for DVT  · Echocardiogram from April 2023 revealing 60 to 65% ejection fraction with grade 1 diastolic dysfunction  · Intake and output monitoring, daily weights, low-sodium diet with 1800 mL fluid restriction  · She was initially started on IV Lasix 100 mg 3 times daily -- had a increase in Cr so diuresis held   · Cardiology following

## 2023-10-05 NOTE — ED PROVIDER NOTES
History  Chief Complaint   Patient presents with   • Leg Swelling     Pt reports having severe arm and leg swelling and pain x 2 days. 42-year-old female [facility] emergency department evaluation chest pain and leg swelling. Patient has substernal chest pain without radiation as well as bilateral leg swelling and now some right arm swelling as well. Does have shortness of breath. Has history of diastolic heart failure and takes diuretic which she states she has been compliant with. Prior to Admission Medications   Prescriptions Last Dose Informant Patient Reported? Taking? B-D TB SYRINGE 1CC/27GX1/2" 27G X 1/2" 1 ML MISC  Self No No   Sig: by Other route 2 (two) times a day   B-D UF III MINI PEN NEEDLES 31G X 5 MM MISC   No No   Sig: USE TO INJECT TWICE A DAY AS DIRECTED   Blood Glucose Monitoring Suppl (OneTouch Verio) w/Device KIT   No No   Sig: Use 3 (three) times a day   Cimzia Starter Kit 6 X 200 MG/ML KIT  Self Yes No   Continuous Blood Gluc  (Dexcom G6 ) SERGEI   No No   Si DEXCOM G6  FOR CONTINUOUS GLUCOSE MONITORING   Continuous Blood Gluc Sensor (Dexcom G6 Sensor) MISC   No No   Sig: 3 PACK SENSOR FOR CONTINUOUS GLUCOSE MONITORING   Continuous Blood Gluc Transmit (Dexcom G6 Transmitter) MISC   No No   Si TRANSMITTED EVERY 3 MONTHS FOR CONTINUOUS GLUCOSE MONITORING   Diclofenac Sodium (VOLTAREN) 1 %   No No   Sig: Apply 2 g topically in the morning and 2 g at noon and 2 g in the evening and 2 g before bedtime.    HYDROmorphone (DILAUDID) 4 mg tablet   Yes No   Sig: TAKE 1 TABLET BY MOUTH EVERY 8 HOURS AS NEEDED FOR 30 DAYS   OneTouch Ultra test strip   No No   Sig: PATIENT TO TEST ONCE A DAY   Senexon-S 8.6-50 MG per tablet   Yes No   Sig: Take 1 tablet by mouth daily   albuterol (2.5 mg/3 mL) 0.083 % nebulizer solution   No No   Sig: Take 3 mL (2.5 mg total) by nebulization every 6 (six) hours as needed for wheezing or shortness of breath   albuterol (PROVENTIL HFA,VENTOLIN HFA) 90 mcg/act inhaler  Self No No   Sig: Inhale 1 puff every 6 (six) hours as needed for wheezing or shortness of breath   celecoxib (CeleBREX) 200 mg capsule   Yes No   fluticasone-umeclidinium-vilanterol (Trelegy Ellipta) 100-62.5-25 mcg/actuation inhaler   No No   Sig: Inhale 1 puff daily Rinse mouth after use.    fosinopril (MONOPRIL) 10 mg tablet   No No   Sig: TAKE 1 TABLET BY MOUTH EVERY DAY   insulin degludec Patrecia [place] FlexTouch) 100 units/mL injection pen   No No   Sig: Inject 45 Units under the skin daily   Patient taking differently: Inject 40 Units under the skin daily   metFORMIN (GLUCOPHAGE) 1000 MG tablet   No No   Sig: TAKE 1 TABLET BY MOUTH TWICE A DAY   methocarbamol (ROBAXIN) 500 mg tablet  Self No No   Sig: Take 1 tablet (500 mg total) by mouth 2 (two) times a day as needed for muscle spasms (side pain)   mirtazapine (REMERON) 15 mg tablet   No No   Sig: Take 1 tablet (15 mg total) by mouth daily at bedtime   nystatin (MYCOSTATIN) powder   No No   Sig: Apply topically 4 (four) times a day groin   torsemide (DEMADEX) 20 mg tablet   No No   Sig: TAKE 2 TABLETS BY MOUTH TWICE A DAY      Facility-Administered Medications: None       Past Medical History:   Diagnosis Date   • AMS (altered mental status) 5/24/2022   • Asthma    • Chest pain on breathing     last assessed 2/5/15   • Chronic diastolic CHF (congestive heart failure) (HCC)    • Chronic respiratory failure with hypoxia (HCC)    • Diabetes mellitus (HCC)    • Exertional chest pain 7/14/2021   • HTN (hypertension)    • Hyperlipidemia    • Insomnia    • Obesity    • Preop cardiovascular exam 2/2/2018   • Pulmonary fibrosis (HCC)    • Rheumatoid arthritis (HCC)    • SIRS (systemic inflammatory response syndrome) ([address]) 5/22/2022   • Volume overload 12/10/2021       Past Surgical History:   Procedure Laterality Date   • HAND SURGERY         Family History   Problem Relation Age of Onset   • Rheum arthritis Mother    • Hypertension Mother      I have reviewed and agree with the history as documented. E-Cigarette/Vaping   • E-Cigarette Use Former User      E-Cigarette/Vaping Substances   • Nicotine No    • THC No    • CBD No    • Flavoring No    • Other No    • Unknown No      Social History     Tobacco Use   • Smoking status: Former     Packs/day: 1.50     Years: 18.00     Total pack years: 27.00     Types: Cigarettes     Start date:      Quit date:      Years since quittin.7   • Smokeless tobacco: Never   • Tobacco comments:     Quit 20 years ago   Vaping Use   • Vaping Use: Former   Substance Use Topics   • Alcohol use: Never   • Drug use: No       Review of Systems   Constitutional: Negative for appetite change, chills, fatigue and fever. HENT: Negative for sneezing and sore throat. Eyes: Negative for visual disturbance. Respiratory: Positive for shortness of breath. Negative for cough, choking, chest tightness and wheezing. Cardiovascular: Positive for chest pain and leg swelling. Negative for palpitations. Gastrointestinal: Negative for abdominal pain, constipation, diarrhea, nausea and vomiting. Genitourinary: Negative for difficulty urinating and dysuria. Neurological: Negative for dizziness, weakness, light-headedness, numbness and headaches. All other systems reviewed and are negative. Physical Exam  Physical Exam  Vitals and nursing note reviewed. Constitutional:       General: She is not in acute distress. Appearance: She is well-developed. She is not diaphoretic. HENT:      Head: Normocephalic and atraumatic. Eyes:      Pupils: Pupils are equal, round, and reactive to light. Neck:      Vascular: No JVD. Trachea: No tracheal deviation. Cardiovascular:      Rate and Rhythm: Normal rate and regular rhythm. Heart sounds: Normal heart sounds. No murmur heard. No friction rub. No gallop. Pulmonary:      Effort: Pulmonary effort is normal. No respiratory distress. Breath sounds: Normal breath sounds. No wheezing or rales. Abdominal:      General: Bowel sounds are normal. There is no distension. Palpations: Abdomen is soft. Tenderness: There is no abdominal tenderness. There is no guarding or rebound. Skin:     General: Skin is warm and dry. Coloration: Skin is not pale. Comments: Patient has bilateral lower extremity edema as well as some right upper extremity edema. Neurological:      Mental Status: She is alert and oriented to person, place, and time. Cranial Nerves: No cranial nerve deficit. Motor: No abnormal muscle tone.    Psychiatric:         Behavior: Behavior normal.         Vital Signs  ED Triage Vitals   Temperature Pulse Respirations Blood Pressure SpO2   10/04/23 1747 10/04/23 1747 10/04/23 1747 10/04/23 1747 10/04/23 1747   97.6 °F (36.4 °C) (!) 109 18 (!) 148/107 92 %      Temp src Heart Rate Source Patient Position - Orthostatic VS BP Location FiO2 (%)   -- 10/04/23 1845 10/04/23 1900 10/04/23 1900 --    Monitor Lying Right arm       Pain Score       10/04/23 1819       8           Vitals:    10/04/23 2300 10/05/23 0100 10/05/23 0200 10/05/23 0300   BP: 115/51 92/56 96/55 139/65   Pulse: 94 98 91 102   Patient Position - Orthostatic VS: Lying  Lying Lying         Visual Acuity      ED Medications  Medications   albuterol inhalation solution 2.5 mg (has no administration in time range)   albuterol (PROVENTIL HFA,VENTOLIN HFA) inhaler 1 puff (has no administration in time range)   Diclofenac Sodium (VOLTAREN) 1 % topical gel 2 g (2 g Topical Not Given 10/5/23 0122)   Fluticasone Furoate-Vilanterol 100-25 mcg/actuation 1 puff (has no administration in time range)     And   umeclidinium 62.5 mcg/actuation inhaler AEPB 1 puff (has no administration in time range)   lisinopril (ZESTRIL) tablet 10 mg (has no administration in time range)   insulin glargine (LANTUS) subcutaneous injection 40 Units 0.4 mL (has no administration in time range)   methocarbamol (ROBAXIN) tablet 500 mg (has no administration in time range)   mirtazapine (REMERON) tablet 15 mg (15 mg Oral Given 10/5/23 0121)   nystatin (MYCOSTATIN) powder (has no administration in time range)   senna-docusate sodium (SENOKOT S) 8.6-50 mg per tablet 1 tablet (has no administration in time range)   HYDROmorphone (DILAUDID) tablet 4 mg (4 mg Oral Given 10/5/23 0121)   acetaminophen (TYLENOL) tablet 650 mg (has no administration in time range)   aluminum-magnesium hydroxide-simethicone (MAALOX) oral suspension 30 mL (has no administration in time range)   furosemide (LASIX) injection 100 mg (has no administration in time range)   heparin (porcine) subcutaneous injection 5,000 Units (has no administration in time range)   insulin lispro (HumaLOG) 100 units/mL subcutaneous injection 1-6 Units (has no administration in time range)   insulin lispro (HumaLOG) 100 units/mL subcutaneous injection 1-6 Units (2 Units Subcutaneous Given 10/5/23 0125)   fentanyl citrate (PF) 100 MCG/2ML 75 mcg (75 mcg Intravenous Given 10/4/23 1819)   furosemide (LASIX) injection 40 mg (40 mg Intravenous Given 10/4/23 1925)   iohexol (OMNIPAQUE) 350 MG/ML injection (MULTI-DOSE) 100 mL (100 mL Intravenous Given 10/4/23 2148)   fentanyl citrate (PF) 100 MCG/2ML 50 mcg (50 mcg Intravenous Given 10/4/23 2206)       Diagnostic Studies  Results Reviewed     Procedure Component Value Units Date/Time    Lactic acid, plasma (w/reflex if result > 2.0) [[id number]]  (Normal) Collected: 10/05/23 0127    Lab Status: Final result Specimen: Blood from Hand, Left Updated: 10/05/23 0153     LACTIC ACID 1.4 mmol/L     Narrative:      Result may be elevated if tourniquet was used during collection. Blood culture [[id number]] Collected: 10/05/23 0015    Lab Status: In process Specimen: Blood from Arm, Left Updated: 10/05/23 0133    Blood culture [[id number]] Collected: 10/05/23 0127    Lab Status:  In process Specimen: Blood from Hand, Left Updated: 10/05/23 0133    Fingerstick Glucose (POCT) [[id number]]  (Abnormal) Collected: 10/05/23 0125    Lab Status: Final result Updated: 10/05/23 0130     POC Glucose 228 mg/dl     Procalcitonin [[id number]]  (Normal) Collected: 10/04/23 1821    Lab Status: Final result Specimen: Blood from Arm, Left Updated: 10/05/23 0129     Procalcitonin 0.08 ng/ml     HS Troponin I 4hr [[id number]]  (Normal) Collected: 10/04/23 2206    Lab Status: Final result Specimen: Blood from Arm, Left Updated: 10/04/23 2258     hs TnI 4hr 6 ng/L      Delta 4hr hsTnI 1 ng/L     HS Troponin I 2hr [[id number]]  (Normal) Collected: 10/04/23 2014    Lab Status: Final result Specimen: Blood from Arm, Left Updated: 10/04/23 2102     hs TnI 2hr 6 ng/L      Delta 2hr hsTnI 1 ng/L     D-dimer, quantitative [[id number]]  (Abnormal) Collected: 10/04/23 1821    Lab Status: Final result Specimen: Blood from Arm, Left Updated: 10/04/23 2043     D-Dimer, Quant 14.56 ug/ml FEU     Narrative: In the evaluation for possible pulmonary embolism, in the appropriate (Well's Score of 4 or less) patient, the age adjusted d-dimer cutoff for this patient can be calculated as:    Age x 0.01 (in ug/mL) for Age-adjusted D-dimer exclusion threshold for a patient over 50 years.     B-Type Natriuretic Peptide(BNP) [[id number]]  (Abnormal) Collected: 10/04/23 1821    Lab Status: Final result Specimen: Blood from Arm, Left Updated: 10/04/23 1901      pg/mL     HS Troponin 0hr (reflex protocol) [[id number]]  (Normal) Collected: 10/04/23 1821    Lab Status: Final result Specimen: Blood from Arm, Left Updated: 10/04/23 1852     hs TnI 0hr 5 ng/L     Comprehensive metabolic panel [[id number]]  (Abnormal) Collected: 10/04/23 1821    Lab Status: Final result Specimen: Blood from Arm, Left Updated: 10/04/23 1844     Sodium 130 mmol/L      Potassium 4.1 mmol/L      Chloride 97 mmol/L      CO2 24 mmol/L      ANION GAP 9 mmol/L      BUN 14 mg/dL      Creatinine 0.92 mg/dL      Glucose 296 mg/dL      Calcium 9.4 mg/dL      Corrected Calcium 10.1 mg/dL      AST 23 U/L      ALT 10 U/L      Alkaline Phosphatase 89 U/L      Total Protein 7.5 g/dL      Albumin 3.1 g/dL      Total Bilirubin 0.55 mg/dL      eGFR 59 ml/min/1.73sq m     Narrative:      [facility] guidelines for Chronic Kidney Disease (CKD):   •  Stage 1 with normal or high GFR (GFR > 90 mL/min/1.73 square meters)  •  Stage 2 Mild CKD (GFR = 60-89 mL/min/1.73 square meters)  •  Stage 3A Moderate CKD (GFR = 45-59 mL/min/1.73 square meters)  •  Stage 3B Moderate CKD (GFR = 30-44 mL/min/1.73 square meters)  •  Stage 4 Severe CKD (GFR = 15-29 mL/min/1.73 square meters)  •  Stage 5 End Stage CKD (GFR <15 mL/min/1.73 square meters)  Note: GFR calculation is accurate only with a steady state creatinine    CBC and differential [[id number]]  (Abnormal) Collected: 10/04/23 1821    Lab Status: Final result Specimen: Blood from Arm, Left Updated: 10/04/23 1827     WBC 10.85 Thousand/uL      RBC 3.91 Million/uL      Hemoglobin 11.2 g/dL      Hematocrit 35.7 %      MCV 91 fL      MCH 28.6 pg      MCHC 31.4 g/dL      RDW 14.6 %      MPV 9.3 fL      Platelets 848 Thousands/uL      nRBC 0 /100 WBCs      Neutrophils Relative 60 %      Immat GRANS % 1 %      Lymphocytes Relative 21 %      Monocytes Relative 13 %      Eosinophils Relative 4 %      Basophils Relative 1 %      Neutrophils Absolute 6.60 Thousands/µL      Immature Grans Absolute 0.07 Thousand/uL      Lymphocytes Absolute 2.30 Thousands/µL      Monocytes Absolute 1.41 Thousand/µL      Eosinophils Absolute 0.41 Thousand/µL      Basophils Absolute 0.06 Thousands/µL                  XR chest 1 view portable    (Results Pending)   VAS upper limb venous duplex scan, unilateral/limited    (Results Pending)   CTA dissection protocol chest/abdomen/pelvis    (Results Pending)              Procedures  Procedures         ED Course Medical Decision Making  35-year-old female with peripheral edema, will check cardiac labs,, ultrasound, D-dimer, likely CTA and reassess. Amount and/or Complexity of Data Reviewed  Labs: ordered. Radiology: ordered. Risk  Prescription drug management. Decision regarding hospitalization. Disposition  Final diagnoses:   Peripheral edema     Time reflects when diagnosis was documented in both MDM as applicable and the Disposition within this note     Time User Action Codes Description Comment    10/4/2023 11:43 PM Nato Abdi Add [R60.9] Peripheral edema     10/5/2023 12:35 AM Precious Lundborg Add [X16.62] Chronic diastolic congestive heart failure [facility])       ED Disposition     ED Disposition   Admit    Condition   Stable    Date/Time   Wed Oct 4, 2023 11:42 PM    Comment   Case was discussed with MARIBEL and the patient's admission status was agreed to be Admission Status: inpatient status to the service of Dr. Sherrie Tucker. Follow-up Information    None         Patient's Medications   Discharge Prescriptions    No medications on file       No discharge procedures on file.     PDMP Review       Value Time User    PDMP Reviewed  Yes 10/5/2023 12:28 AM Jermaine Moreno PA-C          ED Provider  Electronically Signed by           Gui Dean MD  10/05/23 5118

## 2023-10-05 NOTE — H&P
[address]  H&P  Name: Dari Sahu 66 y.o. female I MRN: [id number]  Unit/Bed#: ED 15 I Date of Admission: 10/4/2023   Date of Service: 10/5/2023 I Hospital Day: 1      Assessment/Plan   * Chronic diastolic congestive heart failure (HCC)  Assessment & Plan  Wt Readings from Last 3 Encounters:   06/19/23 86.6 kg (191 lb)   05/23/23 97.2 kg (214 lb 3.2 oz)   02/22/23 89.8 kg (198 lb)     · Worsening bilateral lower extremity edema and edema in right upper extremity  · BNP slightly elevated at 107  · CTA chest negative for PE or dissection, chest x-ray revealing pulmonary vascular congestion  · VAS Doppler duplex of right upper extremity negative for DVT  · Echocardiogram from April 2023 revealing 60 to 65% ejection fraction with grade 1 diastolic dysfunction  · Appreciate cardiology consult  · Intake and output monitoring, daily weights, low-sodium diet with 1800 mL fluid restriction  · Hold home torsemide 40 mg twice daily  · Switch to IV Lasix 100 mg 3 times daily        SIRS (systemic inflammatory response syndrome) ([address])  Assessment & Plan  · Meeting criteria due to tachycardia and tachypnea in the setting of vascular congestion, no clear infectious source at this time  · We will check lactic acid, procalcitonin, blood culture x2  · We will hold antibiotics and IV fluids for now    Chronic respiratory failure with hypoxia (HCC)  Assessment & Plan  · Currently saturating mid 90s on baseline 3 L nasal cannula    Type 2 diabetes mellitus with stage 3 chronic kidney disease, with long-term current use of insulin ([facility])  Assessment & Plan  Lab Results   Component Value Date    HGBA1C 11.1 (H) 05/06/2023       No results for input(s): "POCGLU" in the last 72 hours.     Blood Sugar Average: Last 72 hrs:  · Blood glucose checks 4 times daily, hypoglycemia protocol  · Continue home Lantus 40 units in the afternoon  · ssi         VTE Pharmacologic Prophylaxis: VTE Score: 5 High Risk (Score >/= 5) - Pharmacological DVT Prophylaxis Ordered: heparin. Sequential Compression Devices Ordered. Code Status: Level 3 - DNAR and DNI per pt  Discussion with family: pt. Anticipated Length of Stay: Patient will be admitted on an inpatient basis with an anticipated length of stay of greater than 2 midnights secondary to see above. Total Time Spent on Date of Encounter in care of patient: 75 mins. This time was spent on one or more of the following: performing physical exam; counseling and coordination of care; obtaining or reviewing history; documenting in the medical record; reviewing/ordering tests, medications or procedures; communicating with other healthcare professionals and discussing with patient's family/caregivers. Chief Complaint:    Chief Complaint   Patient presents with   • Leg Swelling     Pt reports having severe arm and leg swelling and pain x 2 days. History of Present Illness:  Sonya Patel is a 66 y.o. female with a PMH of CHF, venous stasis, CKD, chronic pain disorder, diabetes mellitus type 2, hypertension who presents with complaint of sudden onset worsening bilateral lower extremity edema with associated severe pain and right upper extremity edema that started about 2 weeks ago, but has been getting much worse over the last couple of days. She reports the pain in her legs has been constant and is requesting more pain medication. She also feels she has been more short of breath than baseline. Denies dizziness, chest pain, abdominal pain. Reports she has been taking her torsemide as prescribed    Review of Systems:  Review of Systems   Constitutional: Negative for activity change. Respiratory: Positive for shortness of breath. Cardiovascular: Positive for leg swelling. Negative for chest pain. Gastrointestinal: Negative for abdominal pain. Musculoskeletal: Positive for myalgias. Neurological: Negative for dizziness.        Past Medical and Surgical History: Past Medical History:   Diagnosis Date   • AMS (altered mental status) 5/24/2022   • Asthma    • Chest pain on breathing     last assessed 2/5/15   • Chronic diastolic CHF (congestive heart failure) (HCC)    • Chronic respiratory failure with hypoxia (HCC)    • Diabetes mellitus (HCC)    • Exertional chest pain 7/14/2021   • HTN (hypertension)    • Hyperlipidemia    • Insomnia    • Obesity    • Preop cardiovascular exam 2/2/2018   • Pulmonary fibrosis (HCC)    • Rheumatoid arthritis (HCC)    • SIRS (systemic inflammatory response syndrome) ([address]) 5/22/2022   • Volume overload 12/10/2021       Past Surgical History:   Procedure Laterality Date   • HAND SURGERY         Meds/Allergies:  Prior to Admission medications    Medication Sig Start Date End Date Taking?  Authorizing Provider   albuterol (2.5 mg/3 mL) 0.083 % nebulizer solution Take 3 mL (2.5 mg total) by nebulization every 6 (six) hours as needed for wheezing or shortness of breath 1/24/23   Mary Kay Caceres MD   albuterol (PROVENTIL HFA,VENTOLIN HFA) 90 mcg/act inhaler Inhale 1 puff every 6 (six) hours as needed for wheezing or shortness of breath 2/26/21   Loida Gross PA-C   B-D TB SYRINGE 1CC/27GX1/2" 27G X 1/2" 1 ML MISC by Other route 2 (two) times a day 11/14/19   Ismael Jacobs MD   B-D UF III MINI PEN NEEDLES 31G X 5 MM MISC USE TO INJECT TWICE A DAY AS DIRECTED 9/22/23   Ismael Jacobs MD   Blood Glucose Monitoring Suppl (OneTouch Verio) w/Device KIT Use 3 (three) times a day 4/13/23   Ismael Jacobs MD   celecoxib (CeleBREX) 200 mg capsule  5/24/22   Historical Provider, MD   Cimzia Starter Kit 6 X 200 MG/ML KIT  7/15/21   Historical Provider, MD   Continuous Blood Gluc  (Dexcom G6 ) SERGEI 1 DEXCOM G6  FOR CONTINUOUS GLUCOSE MONITORING 6/28/23   RYAN Cueva   Continuous Blood Gluc Sensor (Dexcom G6 Sensor) MISC 3 PACK SENSOR FOR CONTINUOUS GLUCOSE MONITORING 6/28/23   RYAN Sidhu Continuous Blood Gluc Transmit (Dexcom G6 Transmitter) MISC 1 TRANSMITTED EVERY 3 MONTHS FOR CONTINUOUS GLUCOSE MONITORING 6/28/23   RYAN Cueva   Diclofenac Sodium (VOLTAREN) 1 % Apply 2 g topically in the morning and 2 g at noon and 2 g in the evening and 2 g before bedtime.  5/24/22   Rolando OrRYAN   fluticasone-umeclidinium-vilanterol (Trelegy Ellipta) 163-42.6-70 mcg/actuation inhaler Inhale 1 puff daily Rinse mouth after use. 1/24/23 2/23/23  Amando Munoz MD   fosinopril (MONOPRIL) 10 mg tablet TAKE 1 TABLET BY MOUTH EVERY DAY 5/15/23   Lore Marlow MD   HYDROmorphone (DILAUDID) 4 mg tablet TAKE 1 TABLET BY MOUTH EVERY 8 HOURS AS NEEDED FOR 30 DAYS 8/27/23   Historical Provider, MD   insulin degludec Deyvi Garcia FlexTouch) 100 units/mL injection pen Inject 45 Units under the skin daily  Patient taking differently: Inject 40 Units under the skin daily 7/16/23 10/24/23  RYAN Cueva   metFORMIN (GLUCOPHAGE) 1000 MG tablet TAKE 1 TABLET BY MOUTH TWICE A DAY 5/13/23   Lore Marlow MD   methocarbamol (ROBAXIN) 500 mg tablet Take 1 tablet (500 mg total) by mouth 2 (two) times a day as needed for muscle spasms (side pain) 5/21/21   Julieta Daniel MD   mirtazapine (REMERON) 15 mg tablet Take 1 tablet (15 mg total) by mouth daily at bedtime 5/23/23   Lore Marlow MD   nystatin (MYCOSTATIN) powder Apply topically 4 (four) times a day groin 5/23/23   Lore Marlow MD   OneTouch Ultra test strip PATIENT TO TEST ONCE A DAY 5/25/23   Lore Marlow MD   Senexon-S 8.6-50 MG per tablet Take 1 tablet by mouth daily 5/7/23   Historical Provider, MD   torsemide (DEMADEX) 20 mg tablet TAKE 2 TABLETS BY MOUTH TWICE A DAY 7/16/23   Lore Marlow MD   oxyCODONE (ROXICODONE) 10 MG TABS  5/22/23 10/5/23  Historical Provider, MD   predniSONE 10 mg tablet Take 20 mg by mouth daily 1/31/23 10/5/23  Historical Provider, MD   predniSONE 50 mg tablet Take 1 tablet (50 mg total) by mouth daily 6/19/23 10/5/23  Calvin Bonilla MD     I have reviewed her medications per review of chart and PDMP. Allergies: Allergies   Allergen Reactions   • Gabapentin    • Vancomycin        Social History:  Marital Status: /Civil Union     Patient Pre-hospital Living Situation: Home  Patient Pre-hospital Level of Mobility: walks  Patient Pre-hospital Diet Restrictions: diabetic, cardiac  Substance Use History:   Social History     Substance and Sexual Activity   Alcohol Use Never     Social History     Tobacco Use   Smoking Status Former   • Packs/day: 1.50   • Years: 18.00   • Total pack years: 27.00   • Types: Cigarettes   • Start date:    • Quit date:    • Years since quittin.7   Smokeless Tobacco Never   Tobacco Comments    Quit 20 years ago     Social History     Substance and Sexual Activity   Drug Use No       Family History:  Family History   Problem Relation Age of Onset   • Rheum arthritis Mother    • Hypertension Mother        Physical Exam:     Vitals:   Blood Pressure: 115/51 (10/04/23 2300)  Pulse: 94 (10/04/23 2300)  Temperature: 97.6 °F (36.4 °C) (10/04/23 1747)  Respirations: 22 (10/04/23 2300)  SpO2: 98 % (10/04/23 2300)    Physical Exam  Vitals and nursing note reviewed. Constitutional:       General: She is not in acute distress. Appearance: She is obese. She is not diaphoretic. HENT:      Head: Normocephalic. Cardiovascular:      Rate and Rhythm: Normal rate and regular rhythm. Pulmonary:      Effort: Pulmonary effort is normal. No respiratory distress. Breath sounds: No stridor. No wheezing, rhonchi or rales. Abdominal:      General: Abdomen is flat. Bowel sounds are normal.      Palpations: Abdomen is soft. Tenderness: There is no abdominal tenderness. Musculoskeletal:         General: Tenderness present. Right lower leg: Edema present. Left lower leg: Edema present.       Comments: Tenderness palpation over bilateral lower extremities. No tenderness to palpation over bilateral upper extremities   Skin:     General: Skin is warm and dry. Comments: Chronic discoloration noted bilateral lower extremities, history of venous stasis, no significant warmth to touch    Neurological:      General: No focal deficit present. Mental Status: She is alert and oriented to person, place, and time. Psychiatric:         Mood and Affect: Mood normal.         Behavior: Behavior normal.         Thought Content: Thought content normal.          Additional Data:     Lab Results:  Results from last 7 days   Lab Units 10/04/23  1821   WBC Thousand/uL 10.85*   HEMOGLOBIN g/dL 11.2*   HEMATOCRIT % 35.7   PLATELETS Thousands/uL 447*   NEUTROS PCT % 60   LYMPHS PCT % 21   MONOS PCT % 13*   EOS PCT % 4     Results from last 7 days   Lab Units 10/04/23  1821   SODIUM mmol/L 130*   POTASSIUM mmol/L 4.1   CHLORIDE mmol/L 97   CO2 mmol/L 24   BUN mg/dL 14   CREATININE mg/dL 0.92   ANION GAP mmol/L 9   CALCIUM mg/dL 9.4   ALBUMIN g/dL 3.1*   TOTAL BILIRUBIN mg/dL 0.55   ALK PHOS U/L 89   ALT U/L 10   AST U/L 23   GLUCOSE RANDOM mg/dL 296*                       Lines/Drains:  Invasive Devices     Peripheral Intravenous Line  Duration           Peripheral IV 10/04/23 Left Antecubital <1 day                    Imaging: Reviewed radiology reports from this admission including: ultrasound(s) and Personally reviewed the following imaging: chest xray reviewed CTA chest results with ED provider  XR chest 1 view portable    (Results Pending)   VAS upper limb venous duplex scan, unilateral/limited    (Results Pending)   CTA dissection protocol chest/abdomen/pelvis    (Results Pending)       EKG and Other Studies Reviewed on Admission:   · EKG: Normal sinus rhythm. ** Please Note: This note has been constructed using a voice recognition system.  **

## 2023-10-05 NOTE — ASSESSMENT & PLAN NOTE
Lab Results   Component Value Date    HGBA1C 11.1 (H) 05/06/2023       Recent Labs     10/05/23  0125 10/05/23  0743   POCGLU 228* 138       Blood Sugar Average: Last 72 hrs:  · (P) 183Blood glucose checks 4 times daily, hypoglycemia protocol  · Continue 30 units of home Lantus 40 units in the afternoon  · ssi

## 2023-10-05 NOTE — CONSULTS
Consultation - Cardiology   Marylene Sayer 66 y.o. female MRN: [id number]  Unit/Bed#: ED 18 Encounter: [id number]  10/05/23  11:21 AM    Assessment/ Plan:  1. Acute on chronic HFpEF, appears pretty euvolemic at this time. Decrease Lasix from 100 3 times daily to 80 twice daily. Continue with daily weights. Salt restriction. Strict I's and O's. Net -1.6 L. Last echo done April 2023 [facility] showed EF 60 to 65%, G1 DD, mild TR, mildly dilated left atrium. Continue lisinopril, Lasix. 2.  Chronic pain syndrome, patient requiring multiple pain creams as well as pain medications. Management per Slim    3. Hyponatremia sodium today 130, continue to monitor. Management per Slim    4. Hypertension, stable. continue lasix and lisiniopril    5. Diabetes, mgmt per SLIM    6. Leukocytosis, WBC 10.8K, mgmt per SLIM    7. Anemia, Hgb 11.2, mgmt per SLIM      History of Present Illness   Physician Requesting Consult: Harvey Roth MD    Reason for Consult / Principal Problem: chf    HPI: Marylene Sayer is a 66y.o. year old female who presents with severe arm and leg swelling, pain for the last couple of days. Patient notes chest pain, leg swelling, shortness of breath. Patient has history of chronic diastolic heart failure and takes diuretics regularly. Patient states she also has chronic arthritis and is really been bothering her the last couple days. Upon arrival to her room patient is moaning in pain and is tender to touch. Patient states her legs are quite painful and she required multiple doses of fentanyl overnight. Patient also states her hands are quite painful and swollen. Patient states shortness of breath but states it is slightly better since being here. Patient has been diuresing and is put out 1.6 L since admission. Patient appears pretty comfortable lying in bed. Patient denies any chest pain at this time.     Past medical history: Chronic diastolic heart failure, venous stasis, CKD chronic chronic pain syndrome, diabetes, hypertension, arthritis. Consults    EKG: Normal sinus rhythm, low voltage QRS, poor R wave progression      Review of Systems   Respiratory: Positive for shortness of breath. Cardiovascular: Positive for chest pain and leg swelling. Musculoskeletal: Positive for arthralgias and myalgias. Neurological: Negative. Hematological: Negative. Psychiatric/Behavioral: Negative. All other systems reviewed and are negative.       Historical Information   Past Medical History:   Diagnosis Date   • AMS (altered mental status) 2022   • Asthma    • Chest pain on breathing     last assessed 2/5/15   • Chronic diastolic CHF (congestive heart failure) (HCC)    • Chronic respiratory failure with hypoxia (HCC)    • Diabetes mellitus (HCC)    • Exertional chest pain 2021   • HTN (hypertension)    • Hyperlipidemia    • Insomnia    • Obesity    • Preop cardiovascular exam 2018   • Pulmonary fibrosis (HCC)    • Rheumatoid arthritis (HCC)    • SIRS (systemic inflammatory response syndrome) ([address]) 2022   • Volume overload 12/10/2021     Past Surgical History:   Procedure Laterality Date   • HAND SURGERY       Social History     Substance and Sexual Activity   Alcohol Use Never     Social History     Substance and Sexual Activity   Drug Use No     Social History     Tobacco Use   Smoking Status Former   • Packs/day: 1.50   • Years: 18.00   • Total pack years: 27.00   • Types: Cigarettes   • Start date:    • Quit date:    • Years since quittin.7   Smokeless Tobacco Never   Tobacco Comments    Quit 20 years ago       Family History:   Family History   Problem Relation Age of Onset   • Rheum arthritis Mother    • Hypertension Mother        Meds/Allergies   all current active meds have been reviewed and current meds:   Current Facility-Administered Medications   Medication Dose Route Frequency   • acetaminophen (TYLENOL) tablet 650 mg  650 mg Oral Q6H PRN   • albuterol (PROVENTIL HFA,VENTOLIN HFA) inhaler 1 puff  1 puff Inhalation Q6H PRN   • albuterol inhalation solution 2.5 mg  2.5 mg Nebulization Q6H PRN   • aluminum-magnesium hydroxide-simethicone (MAALOX) oral suspension 30 mL  30 mL Oral Q6H PRN   • Diclofenac Sodium (VOLTAREN) 1 % topical gel 2 g  2 g Topical 4x Daily   • Fluticasone Furoate-Vilanterol 100-25 mcg/actuation 1 puff  1 puff Inhalation Daily    And   • umeclidinium 62.5 mcg/actuation inhaler AEPB 1 puff  1 puff Inhalation Daily   • furosemide (LASIX) injection 80 mg  80 mg Intravenous BID (diuretic)   • heparin (porcine) subcutaneous injection 5,000 Units  5,000 Units Subcutaneous Q8H [address]   • HYDROmorphone (DILAUDID) tablet 4 mg  4 mg Oral Q6H PRN   • insulin glargine (LANTUS) subcutaneous injection 40 Units 0.4 mL  40 Units Subcutaneous Daily With Lunch   • insulin lispro (HumaLOG) 100 units/mL subcutaneous injection 1-6 Units  1-6 Units Subcutaneous TID AC   • insulin lispro (HumaLOG) 100 units/mL subcutaneous injection 1-6 Units  1-6 Units Subcutaneous HS   • lisinopril (ZESTRIL) tablet 10 mg  10 mg Oral Daily   • methocarbamol (ROBAXIN) tablet 500 mg  500 mg Oral BID PRN   • mirtazapine (REMERON) tablet 15 mg  15 mg Oral HS   • nystatin (MYCOSTATIN) powder   Topical TID PRN   • senna-docusate sodium (SENOKOT S) 8.6-50 mg per tablet 1 tablet  1 tablet Oral Daily     Allergies   Allergen Reactions   • Gabapentin    • Vancomycin        Objective   Vitals: Blood pressure 109/55, pulse 99, temperature 97.6 °F (36.4 °C), resp. rate 22, height 5' (1.524 m), SpO2 100 %. , Body mass index is 37.3 kg/m².,   Orthostatic Blood Pressures    Flowsheet Row Most Recent Value   Blood Pressure 109/55 filed at 10/05/2023 0756   Patient Position - Orthostatic VS Lying filed at 10/05/2023 3287          Systolic (34CQU), HVU:901 , Min:92 , TYT:834     Diastolic (98NED), CDO:60, Min:51, Max:107        Intake/Output Summary (Last 24 hours) at 10/5/2023 1121  Last data filed at 10/5/2023 9870  Gross per 24 hour   Intake --   Output 1600 ml   Net -1600 ml       Invasive Devices     Peripheral Intravenous Line  Duration           Peripheral IV 10/04/23 Left Antecubital <1 day          Drain  Duration           External Urinary Catheter <1 day                    Physical Exam:  GEN: Alert and oriented. In mild distress bc of pain. Ill appearing and well nourished. HEENT: Sclera anicteric, conjunctivae pink, mucous membranes moist. Oropharynx clear. NECK: Supple, no carotid bruits, no significant JVD. Trachea midline, no thyromegaly. HEART: Regular rhythm, normal S1 and S2, no murmurs, clicks, gallops or rubs. PMI nondisplaced, no thrills. LUNGS: decreased breath sounds bilaterally; no wheezes, rales, or rhonchi. No increased work of breathing or signs of respiratory distress. ABDOMEN: Soft, nontender, nondistended, normoactive bowel sounds. EXTREMITIES: +tenderness to palpation, +erythema, chronic venous skin changes noted, no edema. Skin warm and well perfused, no clubbing, cyanosis. NEURO: No focal findings. Normal speech. Mood and affect normal.   SKIN: Normal without suspicious lesions on exposed skin.       Lab Results:     Troponins:   Results from last 7 days   Lab Units 10/04/23  2206 10/04/23  2014 10/04/23  1821   HS TNI 0HR ng/L  --   --  5   HS TNI 2HR ng/L  --  6  --    HS TNI 4HR ng/L 6  --   --    HSTNI D4 ng/L 1  --   --        CBC with diff:   Results from last 7 days   Lab Units 10/04/23  1821   WBC Thousand/uL 10.85*   HEMOGLOBIN g/dL 11.2*   HEMATOCRIT % 35.7   MCV fL 91   PLATELETS Thousands/uL 447*   RBC Million/uL 3.91   MCH pg 28.6   MCHC g/dL 31.4   RDW % 14.6   MPV fL 9.3   NRBC AUTO /100 WBCs 0         CMP:   Results from last 7 days   Lab Units 10/04/23  1821   POTASSIUM mmol/L 4.1   CHLORIDE mmol/L 97   CO2 mmol/L 24   BUN mg/dL 14   CREATININE mg/dL 0.92   CALCIUM mg/dL 9.4   AST U/L 23   ALT U/L 10   ALK PHOS U/L 89   EGFR ml/min/1.73sq m 61

## 2023-10-06 LAB
ANION GAP SERPL CALCULATED.3IONS-SCNC: 8 MMOL/L
BASOPHILS # BLD AUTO: 0.03 THOUSANDS/ÂΜL (ref 0–0.1)
BASOPHILS NFR BLD AUTO: 0 % (ref 0–1)
BUN SERPL-MCNC: 16 MG/DL (ref 5–25)
CALCIUM SERPL-MCNC: 9.1 MG/DL (ref 8.4–10.2)
CHLORIDE SERPL-SCNC: 98 MMOL/L (ref 96–108)
CO2 SERPL-SCNC: 28 MMOL/L (ref 21–32)
CREAT SERPL-MCNC: 1.38 MG/DL (ref 0.6–1.3)
EOSINOPHIL # BLD AUTO: 1.32 THOUSAND/ÂΜL (ref 0–0.61)
EOSINOPHIL NFR BLD AUTO: 7 % (ref 0–6)
ERYTHROCYTE [DISTWIDTH] IN BLOOD BY AUTOMATED COUNT: 14.4 % (ref 11.6–15.1)
GFR SERPL CREATININE-BSD FRML MDRD: 36 ML/MIN/1.73SQ M
GLUCOSE SERPL-MCNC: 101 MG/DL (ref 65–140)
GLUCOSE SERPL-MCNC: 117 MG/DL (ref 65–140)
GLUCOSE SERPL-MCNC: 184 MG/DL (ref 65–140)
GLUCOSE SERPL-MCNC: 254 MG/DL (ref 65–140)
GLUCOSE SERPL-MCNC: 55 MG/DL (ref 65–140)
GLUCOSE SERPL-MCNC: 59 MG/DL (ref 65–140)
HCT VFR BLD AUTO: 35.8 % (ref 34.8–46.1)
HGB BLD-MCNC: 11.4 G/DL (ref 11.5–15.4)
IMM GRANULOCYTES # BLD AUTO: 0.13 THOUSAND/UL (ref 0–0.2)
IMM GRANULOCYTES NFR BLD AUTO: 1 % (ref 0–2)
LYMPHOCYTES # BLD AUTO: 2.95 THOUSANDS/ÂΜL (ref 0.6–4.47)
LYMPHOCYTES NFR BLD AUTO: 16 % (ref 14–44)
MAGNESIUM SERPL-MCNC: 1.5 MG/DL (ref 1.9–2.7)
MCH RBC QN AUTO: 28.9 PG (ref 26.8–34.3)
MCHC RBC AUTO-ENTMCNC: 31.8 G/DL (ref 31.4–37.4)
MCV RBC AUTO: 91 FL (ref 82–98)
MONOCYTES # BLD AUTO: 1.2 THOUSAND/ÂΜL (ref 0.17–1.22)
MONOCYTES NFR BLD AUTO: 7 % (ref 4–12)
NEUTROPHILS # BLD AUTO: 12.73 THOUSANDS/ÂΜL (ref 1.85–7.62)
NEUTS SEG NFR BLD AUTO: 69 % (ref 43–75)
NRBC BLD AUTO-RTO: 0 /100 WBCS
PLATELET # BLD AUTO: 500 THOUSANDS/UL (ref 149–390)
PMV BLD AUTO: 9.2 FL (ref 8.9–12.7)
POTASSIUM SERPL-SCNC: 3.3 MMOL/L (ref 3.5–5.3)
RBC # BLD AUTO: 3.94 MILLION/UL (ref 3.81–5.12)
SODIUM SERPL-SCNC: 134 MMOL/L (ref 135–147)
WBC # BLD AUTO: 18.36 THOUSAND/UL (ref 4.31–10.16)

## 2023-10-06 PROCEDURE — 99232 SBSQ HOSP IP/OBS MODERATE 35: CPT | Performed by: INTERNAL MEDICINE

## 2023-10-06 PROCEDURE — 82948 REAGENT STRIP/BLOOD GLUCOSE: CPT

## 2023-10-06 PROCEDURE — 83735 ASSAY OF MAGNESIUM: CPT | Performed by: STUDENT IN AN ORGANIZED HEALTH CARE EDUCATION/TRAINING PROGRAM

## 2023-10-06 PROCEDURE — 80048 BASIC METABOLIC PNL TOTAL CA: CPT | Performed by: STUDENT IN AN ORGANIZED HEALTH CARE EDUCATION/TRAINING PROGRAM

## 2023-10-06 PROCEDURE — 85025 COMPLETE CBC W/AUTO DIFF WBC: CPT | Performed by: STUDENT IN AN ORGANIZED HEALTH CARE EDUCATION/TRAINING PROGRAM

## 2023-10-06 PROCEDURE — 99232 SBSQ HOSP IP/OBS MODERATE 35: CPT | Performed by: STUDENT IN AN ORGANIZED HEALTH CARE EDUCATION/TRAINING PROGRAM

## 2023-10-06 RX ORDER — LANOLIN ALCOHOL/MO/W.PET/CERES
3 CREAM (GRAM) TOPICAL
Status: DISCONTINUED | OUTPATIENT
Start: 2023-10-06 | End: 2023-10-09 | Stop reason: HOSPADM

## 2023-10-06 RX ORDER — INSULIN GLARGINE 100 [IU]/ML
30 INJECTION, SOLUTION SUBCUTANEOUS
Status: DISCONTINUED | OUTPATIENT
Start: 2023-10-06 | End: 2023-10-07

## 2023-10-06 RX ORDER — POTASSIUM CHLORIDE 20 MEQ/1
40 TABLET, EXTENDED RELEASE ORAL ONCE
Status: COMPLETED | OUTPATIENT
Start: 2023-10-06 | End: 2023-10-06

## 2023-10-06 RX ADMIN — FLUTICASONE FUROATE AND VILANTEROL TRIFENATATE 1 PUFF: 100; 25 POWDER RESPIRATORY (INHALATION) at 10:01

## 2023-10-06 RX ADMIN — HEPARIN SODIUM 5000 UNITS: 5000 INJECTION INTRAVENOUS; SUBCUTANEOUS at 13:00

## 2023-10-06 RX ADMIN — HYDROMORPHONE HYDROCHLORIDE 4 MG: 4 TABLET ORAL at 12:59

## 2023-10-06 RX ADMIN — DICLOFENAC SODIUM 2 G: 10 GEL TOPICAL at 17:59

## 2023-10-06 RX ADMIN — HEPARIN SODIUM 5000 UNITS: 5000 INJECTION INTRAVENOUS; SUBCUTANEOUS at 05:23

## 2023-10-06 RX ADMIN — UMECLIDINIUM 1 PUFF: 62.5 AEROSOL, POWDER ORAL at 10:01

## 2023-10-06 RX ADMIN — INSULIN LISPRO 3 UNITS: 100 INJECTION, SOLUTION INTRAVENOUS; SUBCUTANEOUS at 21:11

## 2023-10-06 RX ADMIN — ACETAMINOPHEN 650 MG: 325 TABLET, FILM COATED ORAL at 05:21

## 2023-10-06 RX ADMIN — ACETAMINOPHEN 650 MG: 325 TABLET, FILM COATED ORAL at 14:41

## 2023-10-06 RX ADMIN — LISINOPRIL 10 MG: 10 TABLET ORAL at 10:01

## 2023-10-06 RX ADMIN — POTASSIUM CHLORIDE 40 MEQ: 1500 TABLET, EXTENDED RELEASE ORAL at 10:01

## 2023-10-06 RX ADMIN — INSULIN GLARGINE 30 UNITS: 100 INJECTION, SOLUTION SUBCUTANEOUS at 12:53

## 2023-10-06 RX ADMIN — DICLOFENAC SODIUM 2 G: 10 GEL TOPICAL at 21:11

## 2023-10-06 RX ADMIN — DICLOFENAC SODIUM 2 G: 10 GEL TOPICAL at 10:01

## 2023-10-06 RX ADMIN — METHOCARBAMOL 500 MG: 500 TABLET ORAL at 14:40

## 2023-10-06 RX ADMIN — MIRTAZAPINE 15 MG: 15 TABLET, FILM COATED ORAL at 21:10

## 2023-10-06 RX ADMIN — METHOCARBAMOL 500 MG: 500 TABLET ORAL at 05:24

## 2023-10-06 RX ADMIN — HEPARIN SODIUM 5000 UNITS: 5000 INJECTION INTRAVENOUS; SUBCUTANEOUS at 21:10

## 2023-10-06 RX ADMIN — Medication 3 MG: at 23:46

## 2023-10-06 RX ADMIN — FUROSEMIDE 80 MG: 10 INJECTION, SOLUTION INTRAMUSCULAR; INTRAVENOUS at 10:01

## 2023-10-06 RX ADMIN — INSULIN LISPRO 1 UNITS: 100 INJECTION, SOLUTION INTRAVENOUS; SUBCUTANEOUS at 17:59

## 2023-10-06 RX ADMIN — HYDROMORPHONE HYDROCHLORIDE 4 MG: 4 TABLET ORAL at 21:10

## 2023-10-06 RX ADMIN — DICLOFENAC SODIUM 2 G: 10 GEL TOPICAL at 12:54

## 2023-10-06 RX ADMIN — SENNOSIDES AND DOCUSATE SODIUM 1 TABLET: 50; 8.6 TABLET ORAL at 10:01

## 2023-10-06 RX ADMIN — SODIUM CHLORIDE, SODIUM LACTATE, POTASSIUM CHLORIDE, AND CALCIUM CHLORIDE 500 ML: .6; .31; .03; .02 INJECTION, SOLUTION INTRAVENOUS at 15:55

## 2023-10-06 NOTE — RESPIRATORY THERAPY NOTE
RT Protocol Note  Noemi Malone 66 y.o. female MRN: [id number]  Unit/Bed#: -01 Encounter: [id number]    Assessment    Principal Problem:    Acute on chronic diastolic (congestive) heart failure (HCC)  Active Problems:    Type 2 diabetes mellitus with stage 3 chronic kidney disease, with long-term current use of insulin (HCC)    Chronic respiratory failure with hypoxia (HCC)    SIRS (systemic inflammatory response syndrome) (HCC)  Physical Exam:   Assessment Type: (P) Assess only  General Appearance: (P) Awake, Alert  Respiratory Pattern: (P) Normal  Chest Assessment: (P) Chest expansion symmetrical  Bilateral Breath Sounds: (P) Diminished  O2 Device: (P) nc    Vitals:  Blood pressure 119/57, pulse (P) 82, temperature (!) 97 °F (36.1 °C), resp. rate (P) 17, height 5' (1.524 m), weight 91.3 kg (201 lb 4.5 oz), SpO2 (P) 98 %. O2 Device: (P) nc     Plan    Respiratory Plan: (P) Home Bronchodilator Patient pathway        Resp Comments: (P) will cont w txs as PRN        10/06/23 1103   Respiratory Protocol   Protocol Initiated? No   Protocol Selection Respiratory   Language Barrier? No   Medical & Social History Reviewed? Yes   Diagnostic Studies Reviewed? Yes   Physical Assessment Performed? Yes   Home Devices/Therapy Home O2   Respiratory Plan Home Bronchodilator Patient pathway   Respiratory Assessment   Assessment Type Assess only   General Appearance Awake; Alert   Respiratory Pattern Normal   Chest Assessment Chest expansion symmetrical   Bilateral Breath Sounds Diminished   Resp Comments will cont w txs as PRN   O2 Device nc   Additional Assessments   Pulse 82   Respirations 17   SpO2 98 %

## 2023-10-06 NOTE — PROGRESS NOTES
Cardiology Progress Note - Mya Viera 66 y.o. female MRN: [id number]    Unit/Bed#: -01 Encounter: [id number]      Assessment/Plan:  1. Acute on chronic HFpEF  • Patient appears euvolemic  • Creatinine elevated today, hold diuretics today. • Net -3 L since admission, weight 91.3 kg    2. Interstitial lung disease  • On oxygen, management per primary team    3. Chronic pain syndrome  • Management per primary team    4. Hyponatremia  •  today, management per primary team    5. Hypertension  • Normotensive, continue lisinopril 10 mg daily    6. DM type II  · A1c 11.1    7. Leukocytosis  · WBC 18 today    8. Anemia       Subjective:   Patient seen and examined. No significant events overnight. Patient notes that she is still having difficulty breathing and notes pain in her leg. Objective:     Vitals: Blood pressure 119/57, pulse 82, temperature (!) 97 °F (36.1 °C), resp. rate 17, height 5' (1.524 m), weight 91.3 kg (201 lb 4.5 oz), SpO2 96 %. , Body mass index is 39.31 kg/m².,   Orthostatic Blood Pressures    Flowsheet Row Most Recent Value   Blood Pressure 119/57 filed at 10/06/2023 0731   Patient Position - Orthostatic VS Lying filed at 10/05/2023 0756            Intake/Output Summary (Last 24 hours) at 10/6/2023 1425  Last data filed at 10/6/2023 1300  Gross per 24 hour   Intake 720 ml   Output 2300 ml   Net -1580 ml         Physical Exam:  Physical Exam  Vitals and nursing note reviewed. Constitutional:       General: She is not in acute distress. Appearance: She is well-developed. She is ill-appearing (chronic). HENT:      Head: Normocephalic and atraumatic. Eyes:      Conjunctiva/sclera: Conjunctivae normal.   Neck:      Vascular: No carotid bruit. Cardiovascular:      Rate and Rhythm: Normal rate and regular rhythm. Heart sounds: Normal heart sounds. No murmur heard. Pulmonary:      Effort: Pulmonary effort is normal. No respiratory distress.       Breath sounds: Normal breath sounds. Comments: On O2  Abdominal:      Palpations: Abdomen is soft. Tenderness: There is no abdominal tenderness. Musculoskeletal:         General: No swelling. Cervical back: Neck supple. Right lower leg: No edema. Left lower leg: No edema. Skin:     General: Skin is warm and dry. Capillary Refill: Capillary refill takes less than 2 seconds. Neurological:      Mental Status: She is alert and oriented to person, place, and time.    Psychiatric:         Mood and Affect: Mood normal.              Medications:      Current Facility-Administered Medications:   •  acetaminophen (TYLENOL) tablet 650 mg, 650 mg, Oral, Q6H PRN, Kait Viveros PA-C, 650 mg at 10/06/23 1822  •  albuterol (PROVENTIL HFA,VENTOLIN HFA) inhaler 1 puff, 1 puff, Inhalation, Q6H PRN, Kait Viveros PA-C  •  albuterol inhalation solution 2.5 mg, 2.5 mg, Nebulization, Q6H PRN, Kait Viveros PA-C  •  aluminum-magnesium hydroxide-simethicone (MAALOX) oral suspension 30 mL, 30 mL, Oral, Q6H PRN, Kait Viveros PA-C  •  Diclofenac Sodium (VOLTAREN) 1 % topical gel 2 g, 2 g, Topical, 4x Daily, Eladia Beard PA-C, 2 g at 10/06/23 1254  •  Fluticasone Furoate-Vilanterol 100-25 mcg/actuation 1 puff, 1 puff, Inhalation, Daily, 1 puff at 10/06/23 1001 **AND** umeclidinium 62.5 mcg/actuation inhaler AEPB 1 puff, 1 puff, Inhalation, Daily, Eladia Beard PA-C, 1 puff at 10/06/23 1001  •  heparin (porcine) subcutaneous injection 5,000 Units, 5,000 Units, Subcutaneous, Q8H [address], Eladia Beard PA-C, 5,000 Units at 10/06/23 1300  •  HYDROmorphone (DILAUDID) tablet 4 mg, 4 mg, Oral, Q6H PRN, Kait Viveros PA-C, 4 mg at 10/06/23 1259  •  insulin glargine (LANTUS) subcutaneous injection 30 Units 0.3 mL, 30 Units, Subcutaneous, Daily With Lunch, Suzy Mondragon MD, 30 Units at 10/06/23 1253  •  insulin lispro (HumaLOG) 100 units/mL subcutaneous injection 1-6 Units, 1-6 Units, Subcutaneous, TID AC, Kait Viveros, NEREYDA  •  insulin lispro (HumaLOG) 100 units/mL subcutaneous injection 1-6 Units, 1-6 Units, Subcutaneous, HS, Verito Alford PA-C, 2 Units at 10/05/23 0125  •  lisinopril (ZESTRIL) tablet 10 mg, 10 mg, Oral, Daily, Eladia Beard PA-C, 10 mg at 10/06/23 1001  •  methocarbamol (ROBAXIN) tablet 500 mg, 500 mg, Oral, BID PRN, Verito Alford PA-C, 500 mg at 10/06/23 4754  •  mirtazapine (REMERON) tablet 15 mg, 15 mg, Oral, HS, Eladia Beard PA-C, 15 mg at 10/05/23 2147  •  morphine injection 2 mg, 2 mg, Intravenous, Q4H PRN, Lawrence Potts MD, 2 mg at 10/05/23 1239  •  nystatin (MYCOSTATIN) powder, , Topical, TID PRN, Verito Alford PA-C  •  senna-docusate sodium (SENOKOT S) 8.6-50 mg per tablet 1 tablet, 1 tablet, Oral, Daily, Verito Alford PA-C, 1 tablet at 10/06/23 1001     Labs & Results:     Results from last 7 days   Lab Units 10/04/23  2206 10/04/23  2014 10/04/23  1821   HS TNI 0HR ng/L  --   --  5   HS TNI 2HR ng/L  --  6  --    HSTNI D2 ng/L  --  1  --    HS TNI 4HR ng/L 6  --   --    HSTNI D4 ng/L 1  --   --      Results from last 7 days   Lab Units 10/06/23  0544 10/04/23  1821   WBC Thousand/uL 18.36* 10.85*   HEMOGLOBIN g/dL 11.4* 11.2*   HEMATOCRIT % 35.8 35.7   PLATELETS Thousands/uL 500* 447*         Results from last 7 days   Lab Units 10/06/23  0544 10/04/23  1821   POTASSIUM mmol/L 3.3* 4.1   CHLORIDE mmol/L 98 97   CO2 mmol/L 28 24   BUN mg/dL 16 14   CREATININE mg/dL 1.38* 0.92   CALCIUM mg/dL 9.1 9.4   ALK PHOS U/L  --  89   ALT U/L  --  10   AST U/L  --  23         Results from last 7 days   Lab Units 10/06/23  0544   MAGNESIUM mg/dL 1.5*       Vitals: Blood pressure 119/57, pulse 82, temperature (!) 97 °F (36.1 °C), resp. rate 17, height 5' (1.524 m), weight 91.3 kg (201 lb 4.5 oz), SpO2 96 %. , Body mass index is 39.31 kg/m².,   Orthostatic Blood Pressures    Flowsheet Row Most Recent Value   Blood Pressure 119/57 filed at 10/06/2023 8173   Patient Position - Orthostatic VS Lying filed at 10/05/2023 9734          Systolic (80KMX), LVB:172 , Min:119 , SNW:589     Diastolic (93JRV), SSB:57, Min:50, Max:57        Intake/Output Summary (Last 24 hours) at 10/6/2023 1425  Last data filed at 10/6/2023 1300  Gross per 24 hour   Intake 720 ml   Output 2300 ml   Net -1580 ml       Invasive Devices     Peripheral Intravenous Line  Duration           Peripheral IV 10/04/23 Left Hand 2 days    Peripheral IV 10/04/23 Left Antecubital 1 day          Drain  Duration           External Urinary Catheter 1 day                    BP Readings from Last 3 Encounters:   10/06/23 119/57   06/28/23 94/59   06/19/23 119/58      Wt Readings from Last 3 Encounters:   10/06/23 91.3 kg (201 lb 4.5 oz)   06/19/23 86.6 kg (191 lb)   05/23/23 97.2 kg (214 lb 3.2 oz)

## 2023-10-06 NOTE — DISCHARGE INSTR - AVS FIRST PAGE
- Please obtain repeat lab work (BMP) in 1 week and follow up results with PCP. This is to look for normalization of your kidney function.   - Hold celecoxib and fosinopril as you had increased creatinine. Depending on repeat lab work you primary care doctor may decide to restart these medications. - Decrease Levemir to 30 units daily as blood sugars were lower in the hospital. Keep track of sugars at home and if high, please call your PCP. - Follow up with PCP within 7-10 days for maintenance of chronic medical issues.    - Follow up with outpatient Rheumatology for ongoing treatment of RA

## 2023-10-06 NOTE — PROGRESS NOTES
[address]  Progress Note  Name: Jaz Cohn  MRN: [id number]  Unit/Bed#: -08 I Date of Admission: 10/4/2023   Date of Service: 10/6/2023 I Hospital Day: 2    Assessment/Plan   * Acute on chronic diastolic (congestive) heart failure (HCC)  Assessment & Plan  Wt Readings from Last 3 Encounters:   06/19/23 86.6 kg (191 lb)   05/23/23 97.2 kg (214 lb 3.2 oz)   02/22/23 89.8 kg (198 lb)     · Worsening bilateral lower extremity edema and edema in right upper extremity  · BNP slightly elevated at 107  · CTA chest negative for PE or dissection, chest x-ray revealing pulmonary vascular congestion  · VAS Doppler duplex of right upper extremity negative for DVT  · Echocardiogram from April 2023 revealing 60 to 65% ejection fraction with grade 1 diastolic dysfunction  · Intake and output monitoring, daily weights, low-sodium diet with 1800 mL fluid restriction  · She was initially started on IV Lasix 100 mg 3 times daily -- had a increase in Cr so diuresis held   · Cardiology following         SIRS (systemic inflammatory response syndrome) ([address])  Assessment & Plan  · Meeting criteria due to tachycardia and tachypnea in the setting of vascular congestion, no clear infectious source at this time  · We will check lactic acid, procalcitonin, blood culture x2  · We will hold antibiotics and IV fluids for now    Chronic respiratory failure with hypoxia (HCC)  Assessment & Plan  · Currently saturating well on baseline 2 L nasal cannula ( baseline 3 L)     Type 2 diabetes mellitus with stage 3 chronic kidney disease, with long-term current use of insulin [facility])  Assessment & Plan  Lab Results   Component Value Date    HGBA1C 11.1 (H) 05/06/2023       Recent Labs     10/05/23  0125 10/05/23  0743   POCGLU 228* 138       Blood Sugar Average: Last 72 hrs:  · (P) 183Blood glucose checks 4 times daily, hypoglycemia protocol  · Continue 30 units of home Lantus 40 units in the afternoon  · ssi    Chronic diastolic congestive heart failure (HCC)-resolved as of 10/5/2023  Assessment & Plan                   VTE Pharmacologic Prophylaxis: VTE Score: 5 Moderate Risk (Score 3-4) - Pharmacological DVT Prophylaxis Ordered: heparin. Patient Centered Rounds: I performed bedside rounds with nursing staff today. Discussions with Specialists or Other Care Team Provider: cardiology     Education and Discussions with Family / Patient: Updated  (son) at bedside. Total Time Spent on Date of Encounter in care of patient: 35 mins. This time was spent on one or more of the following: performing physical exam; counseling and coordination of care; obtaining or reviewing history; documenting in the medical record; reviewing/ordering tests, medications or procedures; communicating with other healthcare professionals and discussing with patient's family/caregivers. Current Length of Stay: 2 day(s)  Current Patient Status: Inpatient   Certification Statement: The patient will continue to require additional inpatient hospital stay due to monitoring renal function   Discharge Plan: Anticipate discharge tomorrow to home with home services. Code Status: Level 3 - DNAR and DNI    Subjective:   Still complaining of generalized body pain. Worse in her lower ext. Appears to be chronic. No SOB. Son is present at bedside. Objective:     Vitals:   Temp (24hrs), Av.3 °F (36.3 °C), Min:96.6 °F (35.9 °C), Max:98.4 °F (36.9 °C)    Temp:  [96.6 °F (35.9 °C)-98.4 °F (36.9 °C)] 96.6 °F (35.9 °C)  HR:  [71-93] 71  Resp:  [16-19] 17  BP: ()/(45-58) 75/45  SpO2:  [96 %-100 %] 100 %  Body mass index is 39.31 kg/m². Input and Output Summary (last 24 hours):      Intake/Output Summary (Last 24 hours) at 10/6/2023 1620  Last data filed at 10/6/2023 1300  Gross per 24 hour   Intake 720 ml   Output 2300 ml   Net -1580 ml       Physical Exam:   Physical Exam   General: NAD, appears mildly uncomfortable   HEENT: Normal conjunctiva, EOMI  Heart: Regular rate and rhythm, no murmurs  Lungs: Clear lungs, nonlabored respirations, no wheezing  Abdomen: Nontender, nondistended  Extremities: No pitting edema in bilateral lower extremities  Neuro: Alert and oriented x3, no focal deficits  Psych: Cooperative/calm      Additional Data:     Labs:  Results from last 7 days   Lab Units 10/06/23  0544   WBC Thousand/uL 18.36*   HEMOGLOBIN g/dL 11.4*   HEMATOCRIT % 35.8   PLATELETS Thousands/uL 500*   NEUTROS PCT % 69   LYMPHS PCT % 16   MONOS PCT % 7   EOS PCT % 7*     Results from last 7 days   Lab Units 10/06/23  0544 10/04/23  1821   SODIUM mmol/L 134* 130*   POTASSIUM mmol/L 3.3* 4.1   CHLORIDE mmol/L 98 97   CO2 mmol/L 28 24   BUN mg/dL 16 14   CREATININE mg/dL 1.38* 0.92   ANION GAP mmol/L 8 9   CALCIUM mg/dL 9.1 9.4   ALBUMIN g/dL  --  3.1*   TOTAL BILIRUBIN mg/dL  --  0.55   ALK PHOS U/L  --  89   ALT U/L  --  10   AST U/L  --  23   GLUCOSE RANDOM mg/dL 55* 296*         Results from last 7 days   Lab Units 10/06/23  1542 10/06/23  1107 10/06/23  0650 10/06/23  0624 10/05/23  2113 10/05/23  1542 10/05/23  1100 10/05/23  0743 10/05/23  0125   POC GLUCOSE mg/dl 184* 117 101 59* 146* 202* 137 138 228*         Results from last 7 days   Lab Units 10/05/23  0127 10/04/23  1821   LACTIC ACID mmol/L 1.4  --    PROCALCITONIN ng/ml  --  0.08       Lines/Drains:  Invasive Devices     Peripheral Intravenous Line  Duration           Peripheral IV 10/04/23 Left Hand 2 days    Peripheral IV 10/04/23 Left Antecubital 1 day          Drain  Duration           External Urinary Catheter 1 day                      Imaging: Reviewed radiology reports from this admission including: chest xray    Recent Cultures (last 7 days):   Results from last 7 days   Lab Units 10/05/23  0127 10/05/23  0015   BLOOD CULTURE  No Growth at 24 hrs. No Growth at 24 hrs.        Last 24 Hours Medication List:   Current Facility-Administered Medications Medication Dose Route Frequency Provider Last Rate   • acetaminophen  650 mg Oral Q6H PRN Khushbu Larson PA-C     • albuterol  1 puff Inhalation Q6H PRN Khushbu Larson PA-C     • albuterol  2.5 mg Nebulization Q6H PRN Khushbu Larson PA-C     • aluminum-magnesium hydroxide-simethicone  30 mL Oral Q6H PRN Khushbu Larson PA-C     • Diclofenac Sodium  2 g Topical 4x Daily Khushbu Larson PA-C     • Fluticasone Furoate-Vilanterol  1 puff Inhalation Daily Khushbu Larson PA-C      And   • umeclidinium  1 puff Inhalation Daily Khushbu Larson PA-C     • heparin (porcine)  5,000 Units Subcutaneous Q8H [address] Khushbu Larson PA-C     • HYDROmorphone  4 mg Oral Q6H PRN Khushbu Larson PA-C     • insulin glargine  30 Units Subcutaneous Daily With Lunch Maryjo Tinoco MD     • insulin lispro  1-6 Units Subcutaneous TID AC Eladia Beard PA-C     • insulin lispro  1-6 Units Subcutaneous HS Eladia Beard PA-C     • lactated ringers  500 mL Intravenous Once Maryjo Tinoco  mL (10/06/23 7868)   • lisinopril  10 mg Oral Daily Khushbu Larson PA-C     • methocarbamol  500 mg Oral BID PRN Khushbu Larson PA-C     • mirtazapine  15 mg Oral HS Khushbu Larson PA-C     • morphine injection  2 mg Intravenous Q4H PRN Maryjo Tinoco MD     • nystatin   Topical TID PRN Khushbu Larson PA-C     • senna-docusate sodium  1 tablet Oral Daily Khushbu Larson PA-C          Today, Patient Was Seen By: Mrayjo Tinoco MD    **Please Note: This note may have been constructed using a voice recognition system. **

## 2023-10-06 NOTE — CASE MANAGEMENT
Case Management Assessment & Discharge Planning Note    Patient name Paramjit Layton  Location [address] [facility] [place] 424/-20 MRN [id number]  : 1945 Date 10/6/2023       Current Admission Date: 10/4/2023  Current Admission Diagnosis:Acute on chronic diastolic (congestive) heart failure [facility])   Patient Active Problem List    Diagnosis Date Noted   • Acute on chronic diastolic (congestive) heart failure ([address]) 10/05/2023   • Pulmonary fibrosis ([address]) 2023   • Edema of lower extremity 2023   • Chronic kidney disease, stage 2 (mild) 2023   • Chronic pain disorder 2023   • CHF (congestive heart failure) ([address]) 2023   • Age-related osteoporosis without current pathological fracture 2022   • Hypokalemia 2022   • SIRS (systemic inflammatory response syndrome) ([address]) 2022   • Continuous opioid dependence ([address]) 2022   • Elevated d-dimer 2021   • 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 ([address]) 2021   • Fungal infection of skin 2020   • Chronic prescription opiate use 2019   • Shortness of breath    • Hyponatremia 2019   • Venous stasis dermatitis of both lower extremities 2019   • Chronic respiratory failure with hypoxia ([address]) 2019   • Immunosuppressed status ([address]) 2019   • Interstitial lung disease ([address]) 2019   • Obesity (BMI 30-39.9) 2018   • Varicose veins of both lower extremities 2018   • Morbid obesity ([address])    • Moderate persistent asthma 11/10/2017   • Vitamin D deficiency 2017   • Mixed hyperlipidemia 2016   • Bilateral lower extremity edema 2016   • Ambulatory dysfunction 2016   • Recurrent major depressive disorder, in partial remission ([address]) 2016   • Gastroesophageal reflux disease without esophagitis 2016   • Urinary incontinence 2016   • Psoriasis vulgaris 2014   • Essential hypertension 05/27/2014   • Type 2 diabetes mellitus with stage 3 chronic kidney disease, with long-term current use of insulin ([address]) 04/30/2014   • Primary insomnia 04/30/2014   • Rheumatoid arthritis involving multiple sites with positive rheumatoid factor ([address]) 04/30/2014      LOS (days): 2  Geometric Mean LOS (GMLOS) (days): 3.90  Days to GMLOS:2.4     OBJECTIVE:    Risk of Unplanned Readmission Score: 16.5      Current admission status: Inpatient     Preferred Pharmacy:   [address] [facility], [facility]  [address] [facility] [address] [facility]  Phone: [phone] Fax: [phone]    [facility]/pharmacy #[id number] [id number]  Phone: [phone] Fax: [phone]    Primary Care Provider: Laura Rashid MD    Primary Insurance: MEDICARE  Secondary Insurance: BLUE CROSS    ASSESSMENT:  [address] [place] [address] 151 - Son   Primary Phone: [phone] (Mobile)               Advance Directives  Does patient have a [address]?: Yes  Does patient have Advance Directives?: Yes  Advance Directives: Power of  for health care, [facility] Directives  Primary Contact: Kaushikcinthya Drew    Readmission Root Cause  30 Day Readmission: No    Patient Information  Admitted from[de-identified] Home  Mental Status: Other (Comment) (Resting)  During Assessment patient was accompanied by: Blaise Chandlermisha Piña)  Assessment information provided by[de-identified] Son  Primary Caregiver: Family  Caregiver's Name[de-identified] Kaushik Drew - Son  Caregiver's Relationship to Patient[de-identified] Family Member  Caregiver's Telephone Number[de-identified] [phone]  [place] of Residence: [address] [place] [address] do you live in?: [facility] entry access options.  Select all that apply.: No steps to enter home  Type of Current Residence: [facility]  In the last 12 months, was there a time when you were not able to pay the mortgage or rent on time?: No  In the last 12 months, how many places have you lived?: 1  In the last 12 months, was there a time when you did not have a steady place to sleep or slept in a shelter (including now)?: No  Homeless/housing insecurity resource given?: N/A  Living Arrangements: Lives w/ Son  Is patient a ?: No    Activities of Daily Living Prior to Admission  Functional Status: Assistance  Completes ADLs independently?: No  Level of ADL dependence: Assistance  Ambulates independently?: No  Level of ambulatory dependence: Assistance  Does patient use assisted devices?: Yes  Assisted Devices (DME) used: Straight Priscilla Gills, Wheelchair  Does patient currently own DME?: Yes  What DME does the patient currently own?: Straight Priscilla Gills, Wheelchair  Does patient have a history of Outpatient Therapy (PT/OT)?: No  Does the patient have a history of Short-Term Rehab?: No  Does patient have a history of HHC?: Yes  Does patient currently have [facility]?: Yes (Aide only, no medical or PT services.)    Current Home Health Care  Type of Current Home Care Services:  Attendant, [address] [place] [address] Provider[de-identified] PCP    Patient Information Continued  Income Source: SSI/SSD  Does patient have prescription coverage?: Yes  Within the past 12 months, you worried that your food would run out before you got the money to buy more.: Never true  Within the past 12 months, the food you bought just didn't last and you didn't have money to get more.: Never true  Food insecurity resource given?: N/A  Does patient receive dialysis treatments?: No  Does patient have a history of substance abuse?: No  Does patient have a history of Mental Health Diagnosis?: No    Means of Transportation  Means of Transport to Appts[de-identified] Family transport  In the past 12 months, has lack of transportation kept you from medical appointments or from getting medications?: No  In the past 12 months, has lack of transportation kept you from meetings, work, or from getting things needed for daily living?: No  Was application for public transport provided?: N/A    DISCHARGE DETAILS:    Discharge planning discussed with[de-identified] Son Myra Li of Choice: Yes  Comments - Freedom of Choice: CM discussed d/c planning and freedom of choice if services are needed upon discharge. CM contacted family/caregiver?: Yes  Were Treatment Team discharge recommendations reviewed with patient/caregiver?: Yes  Did patient/caregiver verbalize understanding of patient care needs?: Yes  Were patient/caregiver advised of the risks associated with not following Treatment Team discharge recommendations?: Yes    Contacts  Patient Contacts: Dario Chahal - Son  Relationship to Patient[de-identified] Family  Contact Method: In Person  Reason/Outcome: Continuity of Care, Discharge [facility]         Is the patient interested in [facility] AT [facility] at discharge?: Yes  [facility] requested[de-identified] [address], Nursing, Occupational Therapy, Physical [facility] Provider[de-identified] PCP  Home Health Services Needed[de-identified] Evaluate Functional Status and Safety, Gait/ADL Training, Strengthening/Theraputic Exercises to Improve Function    DME Referral Provided  Referral made for DME?: No    Other Referral/Resources/Interventions Provided:  Interventions: [facility]    Would you like to participate in our [address] service program?  : No - Declined    Treatment Team Recommendation: Home with [facility]  Discharge Destination Plan[de-identified] Home with [address] at Discharge : Family     IMM Given to[de-identified] Patient  Family notified[de-identified] Dario Chahal  Additional Comments: CM  reviewed IMM with patient and son, gave patient a copy and placed a copy in MR.

## 2023-10-06 NOTE — PLAN OF CARE
Problem: Potential for Falls  Goal: Patient will remain free of falls  Description: INTERVENTIONS:  - Educate patient/family on patient safety including physical limitations  - Instruct patient to call for assistance with activity   - Consult OT/PT to assist with strengthening/mobility   - Keep Call bell within reach  - Keep bed low and locked with side rails adjusted as appropriate  - Keep care items and personal belongings within reach  - Initiate and maintain comfort rounds  - Make Fall Risk Sign visible to staff  - Offer Toileting every  Hours, in advance of need  - Initiate/Maintain alarm  - Obtain necessary fall risk management equipment:   - Apply yellow socks and bracelet for high fall risk patients  - Consider moving patient to room near nurses station  Outcome: Progressing     Problem: MOBILITY - ADULT  Goal: Maintain or return to baseline ADL function  Description: INTERVENTIONS:  -  Assess patient's ability to carry out ADLs; assess patient's baseline for ADL function and identify physical deficits which impact ability to perform ADLs (bathing, care of mouth/teeth, toileting, grooming, dressing, etc.)  - Assess/evaluate cause of self-care deficits   - Assess range of motion  - Assess patient's mobility; develop plan if impaired  - Assess patient's need for assistive devices and provide as appropriate  - Encourage maximum independence but intervene and supervise when necessary  - Involve family in performance of ADLs  - Assess for home care needs following discharge   - Consider OT consult to assist with ADL evaluation and planning for discharge  - Provide patient education as appropriate  Outcome: Progressing  Goal: Maintains/Returns to pre admission functional level  Description: INTERVENTIONS:  - Perform BMAT or MOVE assessment daily.   - Set and communicate daily mobility goal to care team and patient/family/caregiver.    - Collaborate with rehabilitation services on mobility goals if consulted  - Perform Range of Motion  times a day. - Reposition patient every  hours.   - Dangle patient  times a day  - Stand patient times a day  - Ambulate patient  times a day  - Out of bed to chair  times a day   - Out of bed for meals  times a day  - Out of bed for toileting  - Record patient progress and toleration of activity level   Outcome: Progressing     Problem: PAIN - ADULT  Goal: Verbalizes/displays adequate comfort level or baseline comfort level  Description: Interventions:  - Encourage patient to monitor pain and request assistance  - Assess pain using appropriate pain scale  - Administer analgesics based on type and severity of pain and evaluate response  - Implement non-pharmacological measures as appropriate and evaluate response  - Consider cultural and social influences on pain and pain management  - Notify physician/advanced practitioner if interventions unsuccessful or patient reports new pain  Outcome: Progressing     Problem: INFECTION - ADULT  Goal: Absence or prevention of progression during hospitalization  Description: INTERVENTIONS:  - Assess and monitor for signs and symptoms of infection  - Monitor lab/diagnostic results  - Monitor all insertion sites, i.e. indwelling lines, tubes, and drains  - Monitor endotracheal if appropriate and nasal secretions for changes in amount and color  - Witter appropriate cooling/warming therapies per order  - Administer medications as ordered  - Instruct and encourage patient and family to use good hand hygiene technique  - Identify and instruct in appropriate isolation precautions for identified infection/condition  Outcome: Progressing     Problem: SAFETY ADULT  Goal: Patient will remain free of falls  Description: INTERVENTIONS:  - Educate patient/family on patient safety including physical limitations  - Instruct patient to call for assistance with activity   - Consult OT/PT to assist with strengthening/mobility   - Keep Call bell within reach  - Keep bed low and locked with side rails adjusted as appropriate  - Keep care items and personal belongings within reach  - Initiate and maintain comfort rounds  - Make Fall Risk Sign visible to staff  - Offer Toileting every  Hours, in advance of need  - Initiate/Maintain alarm  - Obtain necessary fall risk management equipment:   - Apply yellow socks and bracelet for high fall risk patients  - Consider moving patient to room near nurses station  Outcome: Progressing  Goal: Maintain or return to baseline ADL function  Description: INTERVENTIONS:  -  Assess patient's ability to carry out ADLs; assess patient's baseline for ADL function and identify physical deficits which impact ability to perform ADLs (bathing, care of mouth/teeth, toileting, grooming, dressing, etc.)  - Assess/evaluate cause of self-care deficits   - Assess range of motion  - Assess patient's mobility; develop plan if impaired  - Assess patient's need for assistive devices and provide as appropriate  - Encourage maximum independence but intervene and supervise when necessary  - Involve family in performance of ADLs  - Assess for home care needs following discharge   - Consider OT consult to assist with ADL evaluation and planning for discharge  - Provide patient education as appropriate  Outcome: Progressing  Goal: Maintains/Returns to pre admission functional level  Description: INTERVENTIONS:  - Perform BMAT or MOVE assessment daily.   - Set and communicate daily mobility goal to care team and patient/family/caregiver. - Collaborate with rehabilitation services on mobility goals if consulted  - Perform Range of Motion  times a day. - Reposition patient every  hours.   - Dangle patient  times a day  - Stand patient  times a day  - Ambulate patient  times a day  - Out of bed to chair  times a day   - Out of bed for meals times a day  - Out of bed for toileting  - Record patient progress and toleration of activity level   Outcome: Progressing     Problem: DISCHARGE PLANNING  Goal: Discharge to home or other facility with appropriate resources  Description: INTERVENTIONS:  - Identify barriers to discharge w/patient and caregiver  - Arrange for needed discharge resources and transportation as appropriate  - Identify discharge learning needs (meds, wound care, etc.)  - Arrange for interpretive services to assist at discharge as needed  - Refer to Case Management Department for coordinating discharge planning if the patient needs post-hospital services based on physician/advanced practitioner order or complex needs related to functional status, cognitive ability, or social support system  Outcome: Progressing     Problem: Knowledge Deficit  Goal: Patient/family/caregiver demonstrates understanding of disease process, treatment plan, medications, and discharge instructions  Description: Complete learning assessment and assess knowledge base.   Interventions:  - Provide teaching at level of understanding  - Provide teaching via preferred learning methods  Outcome: Progressing

## 2023-10-06 NOTE — CASE MANAGEMENT
Case Management Discharge Planning Note    Patient name Florentin Has  Location [address] [facility] [id number] MRN [id number]  : 1945 Date 10/6/2023       Current Admission Date: 10/4/2023  Current Admission Diagnosis:Acute on chronic diastolic (congestive) heart failure [facility])   Patient Active Problem List    Diagnosis Date Noted   • Acute on chronic diastolic (congestive) heart failure ([address]) 10/05/2023   • Pulmonary fibrosis ([address]) 2023   • Edema of lower extremity 2023   • Chronic kidney disease, stage 2 (mild) 2023   • Chronic pain disorder 2023   • CHF (congestive heart failure) ([address]) 2023   • Age-related osteoporosis without current pathological fracture 2022   • Hypokalemia 2022   • SIRS (systemic inflammatory response syndrome) ([address]) 2022   • Continuous opioid dependence ([address]) 2022   • Elevated d-dimer 2021   • 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 ([address]) 2021   • Fungal infection of skin 2020   • Chronic prescription opiate use 2019   • Shortness of breath    • Hyponatremia 2019   • Venous stasis dermatitis of both lower extremities 2019   • Chronic respiratory failure with hypoxia ([address]) 2019   • Immunosuppressed status ([address]) 2019   • Interstitial lung disease ([address]) 2019   • Obesity (BMI 30-39.9) 2018   • Varicose veins of both lower extremities 2018   • Morbid obesity ([address])    • Moderate persistent asthma 11/10/2017   • Vitamin D deficiency 2017   • Mixed hyperlipidemia 2016   • Bilateral lower extremity edema 2016   • Ambulatory dysfunction 2016   • Recurrent major depressive disorder, in partial remission ([address]) 2016   • Gastroesophageal reflux disease without esophagitis 2016   • Urinary incontinence 2016   • Psoriasis vulgaris 2014   • Essential hypertension 2014   • Type 2 diabetes mellitus with stage 3 chronic kidney disease, with long-term current use of insulin ([address]) 04/30/2014   • Primary insomnia 04/30/2014   • Rheumatoid arthritis involving multiple sites with positive rheumatoid factor ([address]) 04/30/2014      LOS (days): 2  Geometric Mean LOS (GMLOS) (days): 3.90  Days to GMLOS:2.4     OBJECTIVE:  Risk of Unplanned Readmission Score: 16.5         Current admission status: Inpatient   Preferred Pharmacy:   [id number], [address] [facility]  [address]  Phone: [phone] Fax: [phone]    [id number]- [place],  [address] - [address] [address] [address] [address]  Phone: [phone] Fax: [phone]    Primary Care Provider: Lore Marlow MD    Primary Insurance: MEDICARE  Secondary Insurance: BLUE CROSS    DISCHARGE DETAILS:    Discharge planning discussed with[de-identified] Blaise Blum Pipe of Choice: Yes  Comments - Freedom of Choice: CM discussed d/c planning and freedom of choice if services are needed upon discharge. CM contacted family/caregiver?: Yes  Were Treatment Team discharge recommendations reviewed with patient/caregiver?: Yes  Did patient/caregiver verbalize understanding of patient care needs?: Yes  Were patient/caregiver advised of the risks associated with not following Treatment Team discharge recommendations?: Yes    Contacts  Patient Contacts: Stephanie Malone - Son  Relationship to Patient[de-identified] Family  Contact Method:  In Person  Reason/Outcome: Continuity of Care, Discharge [facility]         Is the patient interested in [facility] [address] at discharge?: Yes  [facility] requested[de-identified] [address] [place] [address], Nursing, Occupational Therapy, Physical [facility] Provider[de-identified] PCP  Home Health Services Needed[de-identified] Evaluate Functional Status and Safety, Gait/ADL Training, Strengthening/Theraputic Exercises to Improve Function    DME Referral Provided  Referral made for DME?: No    Other Referral/Resources/Interventions Provided:  Interventions: [facility]    Would you like to participate in our [address] service program?  : No - Declined    Treatment Team Recommendation: Home with [facility]  Discharge Destination Plan[de-identified] Home with [address] [facility] at Discharge : Family      IMM Given to[de-identified] Patient  Family notified[de-identified] Surya Shepherd  Additional Comments: CM  reviewed IMM with patient and son, gave patient a copy and placed a copy in MR. [place] referral for [facility] sent via [employer].

## 2023-10-06 NOTE — DISCHARGE INSTRUCTIONS
Low-Sodium Diet   WHAT YOU NEED TO KNOW:   A low-sodium diet limits foods that are high in sodium (salt). You will need to follow a low-sodium diet if you have high blood pressure, kidney disease, or heart failure. You may also need to follow this diet if you have a condition that is causing your body to retain (hold) extra fluid. You may need to limit the amount of sodium you eat in a day to 1,500 to 2,000 mg. Ask your healthcare provider how much sodium you can have each day. DISCHARGE INSTRUCTIONS:   How to use food labels to choose foods that are low in sodium:  Read food labels to find the amount of sodium they contain. The amount of sodium is listed in milligrams (mg). The % Daily Value (DV) column tells you how much of your daily needs are met by 1 serving of the food for each nutrient listed. Choose foods that have less than 5% of the DV of sodium. These foods are considered low in sodium. Foods that have 20% or more of the DV of sodium are considered high in sodium. Some food labels may also list any of the following terms that tell you about the sodium content in the food:  Sodium-free:  Less than 5 mg in each serving    Very low sodium:  35 mg of sodium or less in each serving    Low sodium:  140 mg of sodium or less in each serving    Reduced sodium: At least 25% less sodium in each serving than the regular type    Light in sodium:  50% less sodium in each serving    Unsalted or no added salt:  No extra salt is added during processing (the food may still contain sodium)       Foods to avoid:  Salty foods are high in sodium.  You should avoid the following:  Processed foods:      Mixes for cornbread, biscuits, cake, and pudding     Instant foods, such as potatoes, cereals, noodles, and rice     Packaged foods, such as bread stuffing, rice and pasta mixes, snack dip mixes, and macaroni and cheese     Canned foods, such as canned vegetables, soups, broths, sauces, and vegetable or tomato juice    Snack foods, such as salted chips, popcorn, pretzels, pork rinds, salted crackers, and salted nuts    Frozen foods, such as dinners, entrees, vegetables with sauces, and breaded meats    Sauerkraut, pickled vegetables, and other foods prepared in brine    Meats and cheeses:      Smoked or cured meat, such as corned beef, quezada, ham, hot dogs, and sausage    Canned meats or spreads, such as potted meats, sardines, anchovies, and imitation seafood    Deli or lunch meats, such as bologna, ham, turkey, and roast beef    Processed cheese, such as American cheese and cheese spreads    Condiments, sauces, and seasonings:      Salt (¼ teaspoon of salt contains 575 mg of sodium)    Seasonings made with salt, such as garlic salt, celery salt, onion salt, and seasoned salt    Regular soy sauce, barbecue sauce, teriyaki sauce, steak sauce, Worcestershire sauce, and most flavored vinegars    Canned gravy and mixes     Regular condiments, such as mustard, ketchup, and salad dressings    Pickles and olives    Meat tenderizers and monosodium glutamate (MSG)    Foods to include:  Read the food label to find the amount of sodium in each serving. Bread and cereal:  Try to choose breads with less than 80 mg of sodium per serving. Bread, roll, sundeep, tortilla, or unsalted crackers. Ready-to-eat cereals with less than 5% DV of sodium (examples include shredded wheat and puffed rice)    Pasta    Vegetables and fruits:      Unsalted fresh, frozen, or canned vegetables    Fresh, frozen, or canned fruits    Fruit juice    Dairy:  One serving has about 150 mg of sodium. Milk, all types    Yogurt    Hard cheese, such as cheddar, Swiss, [employer], or [employer] and other protein foods:  Some raw meats may have added sodium.      Plain meats, fish, and poultry     Egg    Other foods:      Homemade pudding    Unsalted nuts, popcorn, or pretzels    Unsalted butter or margarine    Ways to get less sodium:  If you are used to the flavor of salt, it will take time to get used to low-sodium foods. Your healthcare provider or dietitian can help you create a plan for lowering sodium. The plan will be specific to your needs and your family's needs. You may focus on 1 or 2 changes each week, such as the following: Add spices and herbs to foods instead of salt during cooking. Use salt-free seasonings to add flavor to foods. Examples include onion powder, garlic powder, basil, jarrell powder, paprika, and parsley. Try lemon or lime juice or vinegar to give foods a tart flavor. Use hot peppers, pepper, or cayenne pepper to add a spicy flavor. Do not keep a salt shaker at your kitchen table. This may help keep you from adding salt to food at the table. A teaspoon of salt has 2,300 mg of sodium. It may take time to get used to enjoying the natural flavor of food instead of adding salt. Talk to your healthcare provider before you use salt substitutes. Some salt substitutes have a high amount of a chemical called potassium chloride. This form of potassium needs to be avoided if you have kidney disease. Choose low-sodium foods at restaurants. Meals from restaurants are often high in sodium. Some restaurants have nutrition information on the menu that tells you the amount of sodium in their foods. If possible, ask for your food to be prepared with less, or no salt. Shop for unsalted or low-sodium foods and snacks at the grocery store. Examples include unsalted or low-sodium broths, soups, and canned vegetables. Choose fresh or frozen vegetables instead. Choose unsalted nuts or seeds or fresh fruits or vegetables as snacks. Read food labels and choose salt-free, very low-sodium, or low-sodium foods. You can also rinse canned vegetables under running water to remove extra sodium before you cook them.     © Copyright Kisha Romero 2023 Information is for End User's use only and may not be sold, redistributed or otherwise used for commercial purposes. The above information is an  only. It is not intended as medical advice for individual conditions or treatments. Talk to your doctor, nurse or pharmacist before following any medical regimen to see if it is safe and effective for you.

## 2023-10-06 NOTE — NURSING NOTE
Routine glucose finger stick done @ 6:24am resulted to  59 mg/dl. 240 ml orange juice given, rechecked @ 6:50am resulted to 101 mg/dl.

## 2023-10-07 LAB
AMORPH URATE CRY URNS QL MICRO: ABNORMAL
ANION GAP SERPL CALCULATED.3IONS-SCNC: 8 MMOL/L
BACTERIA UR QL AUTO: ABNORMAL /HPF
BASOPHILS # BLD AUTO: 0.04 THOUSANDS/ÂΜL (ref 0–0.1)
BASOPHILS NFR BLD AUTO: 0 % (ref 0–1)
BILIRUB UR QL STRIP: NEGATIVE
BUN SERPL-MCNC: 25 MG/DL (ref 5–25)
CALCIUM SERPL-MCNC: 9.1 MG/DL (ref 8.4–10.2)
CHLORIDE SERPL-SCNC: 99 MMOL/L (ref 96–108)
CLARITY UR: ABNORMAL
CO2 SERPL-SCNC: 26 MMOL/L (ref 21–32)
COLOR UR: YELLOW
CREAT SERPL-MCNC: 1.78 MG/DL (ref 0.6–1.3)
CREAT UR-MCNC: 78.2 MG/DL
EOSINOPHIL # BLD AUTO: 1.29 THOUSAND/ÂΜL (ref 0–0.61)
EOSINOPHIL NFR BLD AUTO: 8 % (ref 0–6)
ERYTHROCYTE [DISTWIDTH] IN BLOOD BY AUTOMATED COUNT: 14.6 % (ref 11.6–15.1)
GFR SERPL CREATININE-BSD FRML MDRD: 26 ML/MIN/1.73SQ M
GLUCOSE SERPL-MCNC: 191 MG/DL (ref 65–140)
GLUCOSE SERPL-MCNC: 200 MG/DL (ref 65–140)
GLUCOSE SERPL-MCNC: 282 MG/DL (ref 65–140)
GLUCOSE SERPL-MCNC: 62 MG/DL (ref 65–140)
GLUCOSE SERPL-MCNC: 70 MG/DL (ref 65–140)
GLUCOSE UR STRIP-MCNC: NEGATIVE MG/DL
HCT VFR BLD AUTO: 35.8 % (ref 34.8–46.1)
HGB BLD-MCNC: 11.1 G/DL (ref 11.5–15.4)
HGB UR QL STRIP.AUTO: ABNORMAL
HYALINE CASTS #/AREA URNS LPF: ABNORMAL /LPF
IMM GRANULOCYTES # BLD AUTO: 0.13 THOUSAND/UL (ref 0–0.2)
IMM GRANULOCYTES NFR BLD AUTO: 1 % (ref 0–2)
KETONES UR STRIP-MCNC: NEGATIVE MG/DL
LEUKOCYTE ESTERASE UR QL STRIP: ABNORMAL
LYMPHOCYTES # BLD AUTO: 2.6 THOUSANDS/ÂΜL (ref 0.6–4.47)
LYMPHOCYTES NFR BLD AUTO: 15 % (ref 14–44)
MAGNESIUM SERPL-MCNC: 1.6 MG/DL (ref 1.9–2.7)
MCH RBC QN AUTO: 28.4 PG (ref 26.8–34.3)
MCHC RBC AUTO-ENTMCNC: 31 G/DL (ref 31.4–37.4)
MCV RBC AUTO: 92 FL (ref 82–98)
MONOCYTES # BLD AUTO: 1.12 THOUSAND/ÂΜL (ref 0.17–1.22)
MONOCYTES NFR BLD AUTO: 7 % (ref 4–12)
NEUTROPHILS # BLD AUTO: 12.06 THOUSANDS/ÂΜL (ref 1.85–7.62)
NEUTS SEG NFR BLD AUTO: 69 % (ref 43–75)
NITRITE UR QL STRIP: POSITIVE
NON-SQ EPI CELLS URNS QL MICRO: ABNORMAL /HPF
NRBC BLD AUTO-RTO: 0 /100 WBCS
PH UR STRIP.AUTO: 5 [PH]
PLATELET # BLD AUTO: 504 THOUSANDS/UL (ref 149–390)
PMV BLD AUTO: 9.8 FL (ref 8.9–12.7)
POTASSIUM SERPL-SCNC: 3.9 MMOL/L (ref 3.5–5.3)
PROCALCITONIN SERPL-MCNC: 0.24 NG/ML
PROT UR STRIP-MCNC: ABNORMAL MG/DL
RBC # BLD AUTO: 3.91 MILLION/UL (ref 3.81–5.12)
RBC #/AREA URNS AUTO: ABNORMAL /HPF
SODIUM 24H UR-SCNC: 36 MOL/L
SODIUM SERPL-SCNC: 133 MMOL/L (ref 135–147)
SP GR UR STRIP.AUTO: 1.01 (ref 1–1.03)
UROBILINOGEN UR STRIP-ACNC: <2 MG/DL
UUN 24H UR-MCNC: 191 MG/DL
WBC # BLD AUTO: 17.24 THOUSAND/UL (ref 4.31–10.16)
WBC #/AREA URNS AUTO: ABNORMAL /HPF

## 2023-10-07 PROCEDURE — 80048 BASIC METABOLIC PNL TOTAL CA: CPT | Performed by: STUDENT IN AN ORGANIZED HEALTH CARE EDUCATION/TRAINING PROGRAM

## 2023-10-07 PROCEDURE — 82570 ASSAY OF URINE CREATININE: CPT | Performed by: STUDENT IN AN ORGANIZED HEALTH CARE EDUCATION/TRAINING PROGRAM

## 2023-10-07 PROCEDURE — 81001 URINALYSIS AUTO W/SCOPE: CPT | Performed by: STUDENT IN AN ORGANIZED HEALTH CARE EDUCATION/TRAINING PROGRAM

## 2023-10-07 PROCEDURE — 84540 ASSAY OF URINE/UREA-N: CPT | Performed by: STUDENT IN AN ORGANIZED HEALTH CARE EDUCATION/TRAINING PROGRAM

## 2023-10-07 PROCEDURE — 99233 SBSQ HOSP IP/OBS HIGH 50: CPT | Performed by: STUDENT IN AN ORGANIZED HEALTH CARE EDUCATION/TRAINING PROGRAM

## 2023-10-07 PROCEDURE — 94760 N-INVAS EAR/PLS OXIMETRY 1: CPT

## 2023-10-07 PROCEDURE — 84300 ASSAY OF URINE SODIUM: CPT | Performed by: STUDENT IN AN ORGANIZED HEALTH CARE EDUCATION/TRAINING PROGRAM

## 2023-10-07 PROCEDURE — 84145 PROCALCITONIN (PCT): CPT | Performed by: STUDENT IN AN ORGANIZED HEALTH CARE EDUCATION/TRAINING PROGRAM

## 2023-10-07 PROCEDURE — 94664 DEMO&/EVAL PT USE INHALER: CPT

## 2023-10-07 PROCEDURE — 82948 REAGENT STRIP/BLOOD GLUCOSE: CPT

## 2023-10-07 PROCEDURE — 85025 COMPLETE CBC W/AUTO DIFF WBC: CPT | Performed by: STUDENT IN AN ORGANIZED HEALTH CARE EDUCATION/TRAINING PROGRAM

## 2023-10-07 PROCEDURE — 83735 ASSAY OF MAGNESIUM: CPT | Performed by: STUDENT IN AN ORGANIZED HEALTH CARE EDUCATION/TRAINING PROGRAM

## 2023-10-07 PROCEDURE — 99232 SBSQ HOSP IP/OBS MODERATE 35: CPT | Performed by: INTERNAL MEDICINE

## 2023-10-07 RX ORDER — ACETAMINOPHEN 325 MG/1
975 TABLET ORAL EVERY 8 HOURS SCHEDULED
Status: DISCONTINUED | OUTPATIENT
Start: 2023-10-07 | End: 2023-10-09 | Stop reason: HOSPADM

## 2023-10-07 RX ORDER — INSULIN GLARGINE 100 [IU]/ML
20 INJECTION, SOLUTION SUBCUTANEOUS
Status: DISCONTINUED | OUTPATIENT
Start: 2023-10-07 | End: 2023-10-09 | Stop reason: HOSPADM

## 2023-10-07 RX ADMIN — INSULIN GLARGINE 20 UNITS: 100 INJECTION, SOLUTION SUBCUTANEOUS at 12:24

## 2023-10-07 RX ADMIN — HYDROMORPHONE HYDROCHLORIDE 4 MG: 4 TABLET ORAL at 21:52

## 2023-10-07 RX ADMIN — INSULIN LISPRO 4 UNITS: 100 INJECTION, SOLUTION INTRAVENOUS; SUBCUTANEOUS at 22:14

## 2023-10-07 RX ADMIN — DICLOFENAC SODIUM 2 G: 10 GEL TOPICAL at 21:54

## 2023-10-07 RX ADMIN — UMECLIDINIUM 1 PUFF: 62.5 AEROSOL, POWDER ORAL at 08:18

## 2023-10-07 RX ADMIN — NYSTATIN: 100000 POWDER TOPICAL at 18:07

## 2023-10-07 RX ADMIN — DICLOFENAC SODIUM 2 G: 10 GEL TOPICAL at 12:18

## 2023-10-07 RX ADMIN — SENNOSIDES AND DOCUSATE SODIUM 1 TABLET: 50; 8.6 TABLET ORAL at 08:16

## 2023-10-07 RX ADMIN — METHOCARBAMOL 500 MG: 500 TABLET ORAL at 21:52

## 2023-10-07 RX ADMIN — SODIUM CHLORIDE, SODIUM LACTATE, POTASSIUM CHLORIDE, AND CALCIUM CHLORIDE 500 ML: .6; .31; .03; .02 INJECTION, SOLUTION INTRAVENOUS at 10:01

## 2023-10-07 RX ADMIN — HEPARIN SODIUM 5000 UNITS: 5000 INJECTION INTRAVENOUS; SUBCUTANEOUS at 13:07

## 2023-10-07 RX ADMIN — MORPHINE SULFATE 2 MG: 2 INJECTION, SOLUTION INTRAMUSCULAR; INTRAVENOUS at 08:12

## 2023-10-07 RX ADMIN — DICLOFENAC SODIUM 2 G: 10 GEL TOPICAL at 08:18

## 2023-10-07 RX ADMIN — Medication 3 MG: at 21:52

## 2023-10-07 RX ADMIN — HYDROMORPHONE HYDROCHLORIDE 4 MG: 4 TABLET ORAL at 13:07

## 2023-10-07 RX ADMIN — HYDROMORPHONE HYDROCHLORIDE 4 MG: 4 TABLET ORAL at 03:25

## 2023-10-07 RX ADMIN — INSULIN LISPRO 2 UNITS: 100 INJECTION, SOLUTION INTRAVENOUS; SUBCUTANEOUS at 12:17

## 2023-10-07 RX ADMIN — FLUTICASONE FUROATE AND VILANTEROL TRIFENATATE 1 PUFF: 100; 25 POWDER RESPIRATORY (INHALATION) at 08:18

## 2023-10-07 RX ADMIN — MORPHINE SULFATE 2 MG: 2 INJECTION, SOLUTION INTRAMUSCULAR; INTRAVENOUS at 18:07

## 2023-10-07 RX ADMIN — DICLOFENAC SODIUM 2 G: 10 GEL TOPICAL at 17:52

## 2023-10-07 RX ADMIN — INSULIN LISPRO 2 UNITS: 100 INJECTION, SOLUTION INTRAVENOUS; SUBCUTANEOUS at 16:49

## 2023-10-07 RX ADMIN — HEPARIN SODIUM 5000 UNITS: 5000 INJECTION INTRAVENOUS; SUBCUTANEOUS at 21:52

## 2023-10-07 RX ADMIN — ACETAMINOPHEN 975 MG: 325 TABLET, FILM COATED ORAL at 21:53

## 2023-10-07 RX ADMIN — GLYCERIN 2 DROP: .002; .002; .01 SOLUTION/ DROPS OPHTHALMIC at 22:12

## 2023-10-07 RX ADMIN — MIRTAZAPINE 15 MG: 15 TABLET, FILM COATED ORAL at 21:52

## 2023-10-07 NOTE — ASSESSMENT & PLAN NOTE
Wt Readings from Last 3 Encounters:   10/07/23 91.2 kg (201 lb 1 oz)   06/19/23 86.6 kg (191 lb)   05/23/23 97.2 kg (214 lb 3.2 oz)     · On admission she had worsening bilateral lower extremity edema and edema in right upper extremity  · RUE doppler negative for DVT   · BNP slightly elevated at 107  · CTA chest negative for PE or dissection, chest x-ray revealing pulmonary vascular congestion  · Echocardiogram from April 2023 revealing 60 to 65% ejection fraction with grade 1 diastolic dysfunction  · She was initially started on IV Lasix 100 mg 3 times daily on admission per CHF powerplan -- had a increase in Cr so diuresis held   · Cardiology following   · Hold diuresis she appears euvolemic to dry on exam (giving 500 cc fluids 2/2 CLAUDIA)

## 2023-10-07 NOTE — PLAN OF CARE

## 2023-10-07 NOTE — ASSESSMENT & PLAN NOTE
Cr 0.9>1.7   Received IV lasix for possible CHF exacerbation on admission per the powerplan   Likely overdiuresed   Gave 500 cc IVF on 10/6 with Cr trending up still 1.3>1.7   · Hold diuretics   · Giving additional 500 cc IVF today   · AM BMP - if cr still uptrending will consult Nephrology   · Urine lytes for FEurea calculation and UA ordered

## 2023-10-07 NOTE — PROGRESS NOTES
General Cardiology   Progress Note   Koffi Gilmore 66 y.o. female MRN: [id number]  Unit/Bed#: -01 Encounter: [id number]      SUBJECTIVE:   No significant events overnight. Patient denies any chest pain or shortness of breath. Blood pressures are soft. Creatinine is still elevated. Patient feels weak. REVIEW OF SYSTEMS:  Constitutional:  Denies fever or chills   Eyes:  Denies change in visual acuity   HENT:  Denies nasal congestion or sore throat   Respiratory:  shortness of breath   Cardiovascular:  Denies chest pain or edema   GI:  Denies abdominal pain, nausea, vomiting, bloody stools or diarrhea   :  Denies dysuria, frequency, difficulty in micturition and nocturia  Musculoskeletal:  Denies back pain or joint pain   Neurologic:  Denies headache, focal weakness or sensory changes   Endocrine:  Denies polyuria or polydipsia   Lymphatic:  Denies swollen glands   Psychiatric:  Denies depression or anxiety     OBJECTIVE:   Vitals:  Vitals:    10/07/23 0722   BP: 92/55   Pulse: 94   Resp: 18   Temp: (!) 97.1 °F (36.2 °C)   SpO2: 98%     Body mass index is 39.27 kg/m². Systolic (53RWN), JGY:43 , Min:75 , ZZO:540     Diastolic (69ZDX), VPR:06, Min:45, Max:84      Intake/Output Summary (Last 24 hours) at 10/7/2023 1144  Last data filed at 10/6/2023 2313  Gross per 24 hour   Intake 1460 ml   Output 600 ml   Net 860 ml     Weight (last 2 days)       Date/Time Weight    10/07/23 0600 91.2 (201.06)    10/06/23 0550 91.3 (201.28)                PHYSICAL EXAMS:  General:  Patient is not in acute distress, laying in the bed comfortably, awake, alert responding to commands. Head: Normocephalic, Atraumatic. HEENT:  Both pupils normal-size atraumatic, normocephalic, nonicteric  Neck:  JVP not raised. Trachea central  Respiratory:  Bronchovascular breathing all over the chest without any accompaniment  Cardiovascular: Regular rate and rhythm no S3  GI:  Abdomen soft nontender.  Liver and spleen normal size  Lymphatic:  No cervical or inguinal lymphadenopathy  Neurologic:  Patient is awake alert, responding to command, well-oriented to time and place and person moving   Extremities trace edema with venous insufficiency.     LABORATORY RESULTS:        CBC with diff:   Results from last 7 days   Lab Units 10/07/23  0437 10/06/23  0544 10/04/23  1821   WBC Thousand/uL 17.24* 18.36* 10.85*   HEMOGLOBIN g/dL 11.1* 11.4* 11.2*   HEMATOCRIT % 35.8 35.8 35.7   MCV fL 92 91 91   PLATELETS Thousands/uL 504* 500* 447*   RBC Million/uL 3.91 3.94 3.91   MCH pg 28.4 28.9 28.6   MCHC g/dL 31.0* 31.8 31.4   RDW % 14.6 14.4 14.6   MPV fL 9.8 9.2 9.3   NRBC AUTO /100 WBCs 0 0 0       CMP:  Results from last 7 days   Lab Units 10/07/23  0437 10/06/23  0544 10/04/23  1821   POTASSIUM mmol/L 3.9 3.3* 4.1   CHLORIDE mmol/L 99 98 97   CO2 mmol/L 26 28 24   BUN mg/dL 25 16 14   CREATININE mg/dL 1.78* 1.38* 0.92   CALCIUM mg/dL 9.1 9.1 9.4   AST U/L  --   --  23   ALT U/L  --   --  10   ALK PHOS U/L  --   --  89   EGFR ml/min/1.73sq m 26 36 59       BMP:  Results from last 7 days   Lab Units 10/07/23  0437 10/06/23  0544 10/04/23  1821   POTASSIUM mmol/L 3.9 3.3* 4.1   CHLORIDE mmol/L 99 98 97   CO2 mmol/L 26 28 24   BUN mg/dL 25 16 14   CREATININE mg/dL 1.78* 1.38* 0.92   CALCIUM mg/dL 9.1 9.1 9.4            Results from last 7 days   Lab Units 10/07/23  0437 10/06/23  0544   MAGNESIUM mg/dL 1.6* 1.5*                   Lipid Profile:   Lab Results   Component Value Date    CHOL 230 01/05/2016    CHOL 149 09/15/2015     Lab Results   Component Value Date    HDL 89 02/22/2023    HDL 64 07/14/2021    HDL 80 02/26/2021     Lab Results   Component Value Date    LDLCALC 104 (H) 02/22/2023    LDLCALC 134 (H) 07/14/2021    LDLCALC 103 (H) 02/26/2021     Lab Results   Component Value Date    TRIG 182 (H) 02/22/2023    TRIG 182 (H) 07/14/2021    TRIG 120 02/26/2021       Cardiac testing:  Results for orders placed during the hospital encounter of 21    Echo complete with contrast if indicated    Narrative  Geraldine Velez  [place], [place] [address]  [phone]    Transthoracic Echocardiogram  2D, M-mode, Doppler, and Color Doppler    Study date:  2021    Patient: Evelio Guzmán  MR number: [id number]  Account number: [de-identified]  : 1945  Age: 68 years  Gender: Female  Status: Inpatient  Location: Bedside  Height: 60 in  Weight: 202 lb  BP: 158/ 86 mmHg    Indications: Assess left ventricular function. Diagnoses: I50.9 - Heart failure, unspecified    Sonographer:  Regina Mayen RDCS  Referring Physician:  Gabby Prado MD  Group:  [facility]  Interpreting Physician:  Bri Martin MD    SUMMARY    LEFT VENTRICLE:  Systolic function was normal. Ejection fraction was estimated to be 60 %. There were no regional wall motion abnormalities. Doppler parameters were consistent with abnormal left ventricular relaxation (grade 1 diastolic dysfunction). RIGHT VENTRICLE:  The size was normal.  Systolic function was normal.    LEFT ATRIUM:  The atrium was mildly dilated. RIGHT ATRIUM:  The atrium was mildly dilated. TRICUSPID VALVE:  There was mild regurgitation. Pulmonary artery systolic pressure was within the normal range. AORTA:  The root exhibited normal size and mild fibrocalcific change. HISTORY: PRIOR HISTORY: edema, CHF, hypertension, DM, CP, hyperlipidemia, SOB    PROCEDURE: The procedure was performed at the bedside. This was a routine study. The transthoracic approach was used. The study included complete 2D imaging, M-mode, complete spectral Doppler, and color Doppler. The heart rate was 68 bpm,  at the start of the study. Images were obtained from the parasternal, apical, subcostal, and suprasternal notch acoustic windows. Image quality was adequate.     LEFT VENTRICLE: Size was normal. Systolic function was normal. Ejection fraction was estimated to be 60 %. There were no regional wall motion abnormalities. Wall thickness was normal. No evidence of apical thrombus. DOPPLER: Doppler  parameters were consistent with abnormal left ventricular relaxation (grade 1 diastolic dysfunction). RIGHT VENTRICLE: The size was normal. Systolic function was normal. Wall thickness was normal.    LEFT ATRIUM: The atrium was mildly dilated. RIGHT ATRIUM: The atrium was mildly dilated. MITRAL VALVE: Valve structure was normal. There was normal leaflet separation. DOPPLER: The transmitral velocity was within the normal range. There was no evidence for stenosis. There was no significant regurgitation. AORTIC VALVE: The valve was trileaflet. Leaflets exhibited normal thickness and normal cuspal separation. DOPPLER: Transaortic velocity was within the normal range. There was no evidence for stenosis. There was no significant  regurgitation. TRICUSPID VALVE: The valve structure was normal. There was normal leaflet separation. DOPPLER: The transtricuspid velocity was within the normal range. There was no evidence for stenosis. There was mild regurgitation. Pulmonary artery  systolic pressure was within the normal range. PULMONIC VALVE: Leaflets exhibited normal thickness, no calcification, and normal cuspal separation. DOPPLER: The transpulmonic velocity was within the normal range. There was no significant regurgitation. PERICARDIUM: There was no pericardial effusion. The pericardium was normal in appearance. AORTA: The root exhibited normal size and mild fibrocalcific change. SYSTEMIC VEINS: IVC: The inferior vena cava was normal in size.     SYSTEM MEASUREMENT TABLES    2D  %FS: 30.8 %  Ao Diam: 2.9 cm  Ao asc: 2.8 cm  EDV(Teich): 87.8 ml  EF(Teich): 58.7 %  ESV(Teich): 36.3 ml  IVSd: 0.9 cm  LA Area: 22.8 cm2  LA Diam: 3.8 cm  LVEDV MOD A4C: 99.9 ml  LVEF MOD A4C: 74.6 %  LVESV MOD A4C: 25.3 ml  LVIDd: 4.4 cm  LVIDs: 3 cm  LVLd A4C: 7.4 cm  LVLs A4C: 5.6 cm  LVPWd: 1.1 cm  RA Area: 19.6 cm2  RVIDd: 3.4 cm  SV MOD A4C: 74.6 ml  SV(Teich): 51.5 ml    CW  TR Vmax: 2.7 m/s  TR maxP.2 mmHg    MM  TAPSE: 2.2 cm    PW  E' Sept: 0.1 m/s  E/E' Sept: 15.5  MV A Oumar: 1.2 m/s  MV Dec [place]: 3.8 m/s2  MV DecT: 240 ms  MV E Oumar: 0.9 m/s  MV E/A Ratio: 0.7  MV PHT: 69.6 ms  MVA By PHT: 3.2 cm2    [facility] Accredited Echocardiography Laboratory    Prepared and electronically signed by    Amanda Monet MD  Signed 15-Jul-2021 08:38:07    No results found for this or any previous visit. No results found for this or any previous visit. No valid procedures specified. No results found for this or any previous visit.       Meds/Allergies   all current active meds have been reviewed  Medications Prior to Admission   Medication    albuterol (2.5 mg/3 mL) 0.083 % nebulizer solution    albuterol (PROVENTIL HFA,VENTOLIN HFA) 90 mcg/act inhaler    B-D TB SYRINGE 1CC/27GX1/2" 27G X 1/2" 1 ML MISC    B-D UF III MINI PEN NEEDLES 31G X 5 MM MISC    Blood Glucose Monitoring Suppl (OneTouch Verio) w/Device KIT    celecoxib (CeleBREX) 200 mg capsule    Cimzia Starter Kit 6 X 200 MG/ML KIT    Continuous Blood Gluc  (Dexcom G6 ) SERGEI    Continuous Blood Gluc Sensor (Dexcom G6 Sensor) MISC    Continuous Blood Gluc Transmit (Dexcom G6 Transmitter) MISC    Diclofenac Sodium (VOLTAREN) 1 %    fluticasone-umeclidinium-vilanterol (Trelegy Ellipta) 100-62.5-25 mcg/actuation inhaler    fosinopril (MONOPRIL) 10 mg tablet    HYDROmorphone (DILAUDID) 4 mg tablet    insulin degludec Nonah Jose FlexTouch) 100 units/mL injection pen    metFORMIN (GLUCOPHAGE) 1000 MG tablet    methocarbamol (ROBAXIN) 500 mg tablet    mirtazapine (REMERON) 15 mg tablet    nystatin (MYCOSTATIN) powder    OneTouch Ultra test strip    Senexon-S 8.6-50 MG per tablet    torsemide (DEMADEX) 20 mg tablet          ASSESSMENT & PLAN   Principal Problem:    CLAUDIA (acute kidney injury) [facility])  Active Problems:    Type 2 diabetes mellitus with stage 3 chronic kidney disease, with long-term current use of insulin (HCC)    Chronic respiratory failure with hypoxia (HCC)    SIRS (systemic inflammatory response syndrome) (HCC)    Acute on chronic diastolic (congestive) heart failure ([address])    This patient has acute on chronic diastolic heart failure and was diuresed. Patient presently has CLAUDIA. Diuretics are on hold. Patient appears clinically euvolemic. Agree with discontinuation of ACE inhibitors. Continue to hold diuretics. Continue to monitor renal parameters. Medications were reviewed. Discussed with patient as well as hospitalist service. If patient does have persistent elevation of creatinine, patient may need nephrology evaluation. Veronica Reyna MD  10/7/2023,11:44 AM    Portions of the record may have been created with voice recognition software. Occasional wrong word or "sound a like" substitutions may have occurred due to the inherent limitations of voice recognition software. Read the chart carefully and recognize, using context, where substitutions have occurred.

## 2023-10-07 NOTE — ASSESSMENT & PLAN NOTE
· Met criteria due to tachycardia and tachypnea in the setting of vascular congestion, no clear infectious source at this time

## 2023-10-07 NOTE — PLAN OF CARE

## 2023-10-07 NOTE — ASSESSMENT & PLAN NOTE
Lab Results   Component Value Date    HGBA1C 11.1 (H) 05/06/2023       Recent Labs     10/06/23  1542 10/06/23  2100 10/07/23  0634 10/07/23  1046   POCGLU 184* 254* 70 191*       Blood Sugar Average: Last 72 hrs:  · (P) 152. 25Blood glucose checks 4 times daily, hypoglycemia protocol  · Continue 20 units of home Lantus 40 units in the afternoon - decreased due to am hypoglycemia   · ssi

## 2023-10-08 LAB
ALBUMIN SERPL BCP-MCNC: 2.6 G/DL (ref 3.5–5)
ALP SERPL-CCNC: 97 U/L (ref 34–104)
ALT SERPL W P-5'-P-CCNC: 8 U/L (ref 7–52)
ANION GAP SERPL CALCULATED.3IONS-SCNC: 7 MMOL/L
AST SERPL W P-5'-P-CCNC: 15 U/L (ref 13–39)
BASOPHILS # BLD AUTO: 0.05 THOUSANDS/ÂΜL (ref 0–0.1)
BASOPHILS NFR BLD AUTO: 0 % (ref 0–1)
BILIRUB SERPL-MCNC: 0.22 MG/DL (ref 0.2–1)
BUN SERPL-MCNC: 27 MG/DL (ref 5–25)
CALCIUM ALBUM COR SERPL-MCNC: 10 MG/DL (ref 8.3–10.1)
CALCIUM SERPL-MCNC: 8.9 MG/DL (ref 8.4–10.2)
CHLORIDE SERPL-SCNC: 102 MMOL/L (ref 96–108)
CO2 SERPL-SCNC: 26 MMOL/L (ref 21–32)
CREAT SERPL-MCNC: 1.5 MG/DL (ref 0.6–1.3)
CRP SERPL QL: 178.2 MG/L
EOSINOPHIL # BLD AUTO: 1.03 THOUSAND/ÂΜL (ref 0–0.61)
EOSINOPHIL NFR BLD AUTO: 7 % (ref 0–6)
ERYTHROCYTE [DISTWIDTH] IN BLOOD BY AUTOMATED COUNT: 14.7 % (ref 11.6–15.1)
ERYTHROCYTE [SEDIMENTATION RATE] IN BLOOD: 99 MM/HOUR (ref 0–29)
FLUAV RNA RESP QL NAA+PROBE: NEGATIVE
FLUBV RNA RESP QL NAA+PROBE: NEGATIVE
GFR SERPL CREATININE-BSD FRML MDRD: 33 ML/MIN/1.73SQ M
GLUCOSE SERPL-MCNC: 155 MG/DL (ref 65–140)
GLUCOSE SERPL-MCNC: 203 MG/DL (ref 65–140)
GLUCOSE SERPL-MCNC: 238 MG/DL (ref 65–140)
GLUCOSE SERPL-MCNC: 241 MG/DL (ref 65–140)
GLUCOSE SERPL-MCNC: 244 MG/DL (ref 65–140)
HCT VFR BLD AUTO: 31.8 % (ref 34.8–46.1)
HGB BLD-MCNC: 9.9 G/DL (ref 11.5–15.4)
IMM GRANULOCYTES # BLD AUTO: 0.2 THOUSAND/UL (ref 0–0.2)
IMM GRANULOCYTES NFR BLD AUTO: 1 % (ref 0–2)
LYMPHOCYTES # BLD AUTO: 2.38 THOUSANDS/ÂΜL (ref 0.6–4.47)
LYMPHOCYTES NFR BLD AUTO: 17 % (ref 14–44)
MAGNESIUM SERPL-MCNC: 1.7 MG/DL (ref 1.9–2.7)
MCH RBC QN AUTO: 28.8 PG (ref 26.8–34.3)
MCHC RBC AUTO-ENTMCNC: 31.1 G/DL (ref 31.4–37.4)
MCV RBC AUTO: 92 FL (ref 82–98)
MONOCYTES # BLD AUTO: 1.23 THOUSAND/ÂΜL (ref 0.17–1.22)
MONOCYTES NFR BLD AUTO: 9 % (ref 4–12)
NEUTROPHILS # BLD AUTO: 9.44 THOUSANDS/ÂΜL (ref 1.85–7.62)
NEUTS SEG NFR BLD AUTO: 66 % (ref 43–75)
NRBC BLD AUTO-RTO: 0 /100 WBCS
PLATELET # BLD AUTO: 444 THOUSANDS/UL (ref 149–390)
PMV BLD AUTO: 9.8 FL (ref 8.9–12.7)
POTASSIUM SERPL-SCNC: 4.3 MMOL/L (ref 3.5–5.3)
PROCALCITONIN SERPL-MCNC: 0.23 NG/ML
PROT SERPL-MCNC: 6.2 G/DL (ref 6.4–8.4)
RBC # BLD AUTO: 3.44 MILLION/UL (ref 3.81–5.12)
RSV RNA RESP QL NAA+PROBE: NEGATIVE
SARS-COV-2 RNA RESP QL NAA+PROBE: NEGATIVE
SODIUM SERPL-SCNC: 135 MMOL/L (ref 135–147)
WBC # BLD AUTO: 14.33 THOUSAND/UL (ref 4.31–10.16)

## 2023-10-08 PROCEDURE — 86140 C-REACTIVE PROTEIN: CPT | Performed by: STUDENT IN AN ORGANIZED HEALTH CARE EDUCATION/TRAINING PROGRAM

## 2023-10-08 PROCEDURE — 85652 RBC SED RATE AUTOMATED: CPT | Performed by: STUDENT IN AN ORGANIZED HEALTH CARE EDUCATION/TRAINING PROGRAM

## 2023-10-08 PROCEDURE — 83735 ASSAY OF MAGNESIUM: CPT | Performed by: STUDENT IN AN ORGANIZED HEALTH CARE EDUCATION/TRAINING PROGRAM

## 2023-10-08 PROCEDURE — 84145 PROCALCITONIN (PCT): CPT | Performed by: STUDENT IN AN ORGANIZED HEALTH CARE EDUCATION/TRAINING PROGRAM

## 2023-10-08 PROCEDURE — 82948 REAGENT STRIP/BLOOD GLUCOSE: CPT

## 2023-10-08 PROCEDURE — 0241U HB NFCT DS VIR RESP RNA 4 TRGT: CPT | Performed by: STUDENT IN AN ORGANIZED HEALTH CARE EDUCATION/TRAINING PROGRAM

## 2023-10-08 PROCEDURE — 99233 SBSQ HOSP IP/OBS HIGH 50: CPT | Performed by: STUDENT IN AN ORGANIZED HEALTH CARE EDUCATION/TRAINING PROGRAM

## 2023-10-08 PROCEDURE — 99232 SBSQ HOSP IP/OBS MODERATE 35: CPT | Performed by: INTERNAL MEDICINE

## 2023-10-08 PROCEDURE — 80053 COMPREHEN METABOLIC PANEL: CPT | Performed by: STUDENT IN AN ORGANIZED HEALTH CARE EDUCATION/TRAINING PROGRAM

## 2023-10-08 PROCEDURE — 85025 COMPLETE CBC W/AUTO DIFF WBC: CPT | Performed by: STUDENT IN AN ORGANIZED HEALTH CARE EDUCATION/TRAINING PROGRAM

## 2023-10-08 RX ORDER — DIPHENHYDRAMINE HCL 25 MG
25 TABLET ORAL EVERY 12 HOURS PRN
Status: DISCONTINUED | OUTPATIENT
Start: 2023-10-08 | End: 2023-10-09 | Stop reason: HOSPADM

## 2023-10-08 RX ADMIN — HEPARIN SODIUM 5000 UNITS: 5000 INJECTION INTRAVENOUS; SUBCUTANEOUS at 22:33

## 2023-10-08 RX ADMIN — GLYCERIN 2 DROP: .002; .002; .01 SOLUTION/ DROPS OPHTHALMIC at 06:02

## 2023-10-08 RX ADMIN — DICLOFENAC SODIUM 2 G: 10 GEL TOPICAL at 22:47

## 2023-10-08 RX ADMIN — INSULIN LISPRO 3 UNITS: 100 INJECTION, SOLUTION INTRAVENOUS; SUBCUTANEOUS at 08:02

## 2023-10-08 RX ADMIN — DICLOFENAC SODIUM 2 G: 10 GEL TOPICAL at 09:34

## 2023-10-08 RX ADMIN — HYDROMORPHONE HYDROCHLORIDE 4 MG: 4 TABLET ORAL at 22:33

## 2023-10-08 RX ADMIN — DICLOFENAC SODIUM 2 G: 10 GEL TOPICAL at 17:39

## 2023-10-08 RX ADMIN — UMECLIDINIUM 1 PUFF: 62.5 AEROSOL, POWDER ORAL at 09:34

## 2023-10-08 RX ADMIN — ACETAMINOPHEN 975 MG: 325 TABLET, FILM COATED ORAL at 22:33

## 2023-10-08 RX ADMIN — SODIUM CHLORIDE, SODIUM LACTATE, POTASSIUM CHLORIDE, AND CALCIUM CHLORIDE 500 ML: .6; .31; .03; .02 INJECTION, SOLUTION INTRAVENOUS at 09:32

## 2023-10-08 RX ADMIN — DICLOFENAC SODIUM 2 G: 10 GEL TOPICAL at 12:02

## 2023-10-08 RX ADMIN — GLYCERIN 2 DROP: .002; .002; .01 SOLUTION/ DROPS OPHTHALMIC at 22:33

## 2023-10-08 RX ADMIN — METHOCARBAMOL 500 MG: 500 TABLET ORAL at 22:33

## 2023-10-08 RX ADMIN — HEPARIN SODIUM 5000 UNITS: 5000 INJECTION INTRAVENOUS; SUBCUTANEOUS at 05:56

## 2023-10-08 RX ADMIN — ACETAMINOPHEN 975 MG: 325 TABLET, FILM COATED ORAL at 05:56

## 2023-10-08 RX ADMIN — MORPHINE SULFATE 2 MG: 2 INJECTION, SOLUTION INTRAMUSCULAR; INTRAVENOUS at 01:09

## 2023-10-08 RX ADMIN — DIPHENHYDRAMINE HYDROCHLORIDE 25 MG: 25 TABLET ORAL at 14:46

## 2023-10-08 RX ADMIN — SENNOSIDES AND DOCUSATE SODIUM 1 TABLET: 50; 8.6 TABLET ORAL at 09:33

## 2023-10-08 RX ADMIN — HEPARIN SODIUM 5000 UNITS: 5000 INJECTION INTRAVENOUS; SUBCUTANEOUS at 14:05

## 2023-10-08 RX ADMIN — Medication 3 MG: at 22:33

## 2023-10-08 RX ADMIN — HYDROMORPHONE HYDROCHLORIDE 4 MG: 4 TABLET ORAL at 05:56

## 2023-10-08 RX ADMIN — INSULIN GLARGINE 20 UNITS: 100 INJECTION, SOLUTION SUBCUTANEOUS at 12:02

## 2023-10-08 RX ADMIN — INSULIN LISPRO 1 UNITS: 100 INJECTION, SOLUTION INTRAVENOUS; SUBCUTANEOUS at 10:58

## 2023-10-08 RX ADMIN — INSULIN LISPRO 2 UNITS: 100 INJECTION, SOLUTION INTRAVENOUS; SUBCUTANEOUS at 22:33

## 2023-10-08 RX ADMIN — MIRTAZAPINE 15 MG: 15 TABLET, FILM COATED ORAL at 22:33

## 2023-10-08 RX ADMIN — HYDROMORPHONE HYDROCHLORIDE 4 MG: 4 TABLET ORAL at 11:59

## 2023-10-08 RX ADMIN — ACETAMINOPHEN 975 MG: 325 TABLET, FILM COATED ORAL at 14:05

## 2023-10-08 RX ADMIN — FLUTICASONE FUROATE AND VILANTEROL TRIFENATATE 1 PUFF: 100; 25 POWDER RESPIRATORY (INHALATION) at 09:34

## 2023-10-08 RX ADMIN — INSULIN LISPRO 3 UNITS: 100 INJECTION, SOLUTION INTRAVENOUS; SUBCUTANEOUS at 16:49

## 2023-10-08 NOTE — ASSESSMENT & PLAN NOTE
Lab Results   Component Value Date    HGBA1C 11.1 (H) 05/06/2023       Recent Labs     10/07/23  1601 10/07/23  2016 10/08/23  0546 10/08/23  1057   POCGLU 200* 282* 238* 155*       Blood Sugar Average: Last 72 hrs:  · (P) 168.875Blood glucose checks 4 times daily, hypoglycemia protocol  · Continue 20 units of home Lantus 40 units in the afternoon - decreased due to am hypoglycemia   · ssi

## 2023-10-08 NOTE — ASSESSMENT & PLAN NOTE
Cr 0.9>1.7 >1.5   Received IV lasix for possible CHF exacerbation on admission per the powerplan   Likely overdiuresed   Gave IVF on 10/6 with Cr trending up still 1.3>1. 7   With additional IVF Cr downtrending from 1.78>1.50   FEurea - 17.4% concerning for pre-renal CLAUDIA   · Hold diuretics   · Giving additional 500 cc IVF today   · Monitor BMP

## 2023-10-08 NOTE — PROGRESS NOTES
[address]  Progress Note  Name: Italo Moore  MRN: [id number]  Unit/Bed#: -17 I Date of Admission: 10/4/2023   Date of Service: 10/8/2023 I Hospital Day: 4    Assessment/Plan   Acute on chronic diastolic (congestive) heart failure (HCC)  Assessment & Plan  Wt Readings from Last 3 Encounters:   10/07/23 91.2 kg (201 lb 1 oz)   06/19/23 86.6 kg (191 lb)   05/23/23 97.2 kg (214 lb 3.2 oz)     · On admission she had worsening bilateral lower extremity edema and edema in right upper extremity  · RUE doppler negative for DVT   · BNP slightly elevated at 107  · CTA chest negative for PE or dissection, chest x-ray revealing pulmonary vascular congestion  · Echocardiogram from April 2023 revealing 60 to 65% ejection fraction with grade 1 diastolic dysfunction  · She was initially started on IV Lasix 100 mg 3 times daily on admission per CHF powerplan -- had a increase in Cr so diuresis held   · Cardiology following   · Hold diuresis she appears euvolemic to dry on exam (giving additional  500 cc fluids 2/2 CLAUDIA)         * CLAUDIA (acute kidney injury) ([address])  Assessment & Plan  Cr 0.9>1.7 >1.5   Received IV lasix for possible CHF exacerbation on admission per the powerplan   Likely overdiuresed   Gave IVF on 10/6 with Cr trending up still 1.3>1. 7   With additional IVF Cr downtrending from 1.78>1.50   FEurea - 17.4% concerning for pre-renal CLAUDIA   · Hold diuretics   · Giving additional 500 cc IVF today   · Monitor BMP     SIRS (systemic inflammatory response syndrome) ([facility])  Assessment & Plan  · Met criteria due to tachycardia and tachypnea in the setting of vascular congestion, no clear infectious source at this time  · Procal has been consistently low     Chronic respiratory failure with hypoxia ([facility])  Assessment & Plan  · Currently saturating well on baseline 2 L nasal cannula ( baseline 3 L)     Type 2 diabetes mellitus with stage 3 chronic kidney disease, with long-term current use of insulin [facility])  Assessment & Plan  Lab Results   Component Value Date    HGBA1C 11.1 (H) 2023       Recent Labs     10/07/23  1601 10/07/23  2016 10/08/23  0546 10/08/23  1057   POCGLU 200* 282* 238* 155*       Blood Sugar Average: Last 72 hrs:  · (P) 168.875Blood glucose checks 4 times daily, hypoglycemia protocol  · Continue 20 units of home Lantus 40 units in the afternoon - decreased due to am hypoglycemia   · ssi               VTE Pharmacologic Prophylaxis: VTE Score: 5 Moderate Risk (Score 3-4) - Pharmacological DVT Prophylaxis Ordered: heparin. Patient Centered Rounds: I performed bedside rounds with nursing staff today. Discussions with Specialists or Other Care Team Provider: cardio    Education and Discussions with Family / Patient: Updated  (son) via phone. Total Time Spent on Date of Encounter in care of patient: 35 mins. This time was spent on one or more of the following: performing physical exam; counseling and coordination of care; obtaining or reviewing history; documenting in the medical record; reviewing/ordering tests, medications or procedures; communicating with other healthcare professionals and discussing with patient's family/caregivers. Current Length of Stay: 4 day(s)  Current Patient Status: Inpatient   Certification Statement: The patient will continue to require additional inpatient hospital stay due to monitoring of kidney function  Discharge Plan: Anticipate discharge tomorrow to home with home services. Code Status: Level 3 - DNAR and DNI    Subjective:   Still having pain in bilateral lower extremities and back which is baseline for her. Denied any chest pain, SOB.      Objective:     Vitals:   Temp (24hrs), Av.1 °F (36.7 °C), Min:97.8 °F (36.6 °C), Max:98.5 °F (36.9 °C)    Temp:  [97.8 °F (36.6 °C)-98.5 °F (36.9 °C)] 97.8 °F (36.6 °C)  HR:  [87-91] 87  Resp:  [18-19] 19  BP: ()/(45-48) 92/47  SpO2:  [92 %-98 %] 96 %  Body mass index is 39.27 kg/m². Input and Output Summary (last 24 hours): Intake/Output Summary (Last 24 hours) at 10/8/2023 1511  Last data filed at 10/8/2023 0601  Gross per 24 hour   Intake 240 ml   Output 600 ml   Net -360 ml       Physical Exam:   Physical Exam   General: NAD  HEENT: Normal conjunctiva, EOMI  Heart: Regular rate and rhythm, no murmurs  Lungs: Clear lungs, nonlabored respirations, no wheezing  Abdomen: Nontender, nondistended  Extremities: No pitting edema, Bilateral lower extremity erythema, no obvious wounds   Neuro: Alert and oriented x3, no focal deficits  Psych: Cooperative/calm      Additional Data:     Labs:  Results from last 7 days   Lab Units 10/08/23  0553   WBC Thousand/uL 14.33*   HEMOGLOBIN g/dL 9.9*   HEMATOCRIT % 31.8*   PLATELETS Thousands/uL 444*   NEUTROS PCT % 66   LYMPHS PCT % 17   MONOS PCT % 9   EOS PCT % 7*     Results from last 7 days   Lab Units 10/08/23  0553   SODIUM mmol/L 135   POTASSIUM mmol/L 4.3   CHLORIDE mmol/L 102   CO2 mmol/L 26   BUN mg/dL 27*   CREATININE mg/dL 1.50*   ANION GAP mmol/L 7   CALCIUM mg/dL 8.9   ALBUMIN g/dL 2.6*   TOTAL BILIRUBIN mg/dL 0.22   ALK PHOS U/L 97   ALT U/L 8   AST U/L 15   GLUCOSE RANDOM mg/dL 241*         Results from last 7 days   Lab Units 10/08/23  1057 10/08/23  0546 10/07/23  2016 10/07/23  1601 10/07/23  1046 10/07/23  0634 10/06/23  2100 10/06/23  1542 10/06/23  1107 10/06/23  0650 10/06/23  0624 10/05/23  2113   POC GLUCOSE mg/dl 155* 238* 282* 200* 191* 70 254* 184* 117 101 59* 146*         Results from last 7 days   Lab Units 10/08/23  0553 10/07/23  0437 10/05/23  0127 10/04/23  1821   LACTIC ACID mmol/L  --   --  1.4  --    PROCALCITONIN ng/ml 0.23 0.24  --  0.08       Lines/Drains:  Invasive Devices     Peripheral Intravenous Line  Duration           Peripheral IV 10/08/23 Dorsal (posterior); Left Hand <1 day          Drain  Duration           External Urinary Catheter <1 day                      Imaging: Reviewed radiology reports from this admission including: chest CT scan and abdominal/pelvic CT    Recent Cultures (last 7 days):   Results from last 7 days   Lab Units 10/05/23  0127 10/05/23  0015   BLOOD CULTURE  No Growth at 72 hrs. No Growth at 72 hrs. Last 24 Hours Medication List:   Current Facility-Administered Medications   Medication Dose Route Frequency Provider Last Rate   • acetaminophen  975 mg Oral Q8H [address] Tamir L NEREYDA Zamora     • albuterol  1 puff Inhalation Q6H PRN Crow Tirado PA-C     • albuterol  2.5 mg Nebulization Q6H PRN Crow Tirado PA-C     • aluminum-magnesium hydroxide-simethicone  30 mL Oral Q6H PRN Crow Tirado PA-C     • Diclofenac Sodium  2 g Topical 4x Daily Crow Tirado PA-C     • diphenhydrAMINE  25 mg Oral Q12H PRN Paty Curran MD     • Fluticasone Furoate-Vilanterol  1 puff Inhalation Daily Crow Tirado PA-C      And   • umeclidinium  1 puff Inhalation Daily Crow Tirado PA-C     • glycerin-hypromellose-  2 drop Both Eyes Q4H PRN Paty Curran MD     • heparin (porcine)  5,000 Units Subcutaneous Q8H [address] Crow Tirado PA-C     • HYDROmorphone  4 mg Oral Q6H PRN Crow Tirado PA-C     • insulin glargine  20 Units Subcutaneous Daily With Lunch Paty Curran MD     • insulin lispro  1-6 Units Subcutaneous TID AC Crow Tirado PA-C     • insulin lispro  1-6 Units Subcutaneous HS Crow Tirdao PA-C     • melatonin  3 mg Oral HS Madelyn Lewis PA-C     • methocarbamol  500 mg Oral BID PRN Crow Tirado PA-C     • mirtazapine  15 mg Oral HS Crow Tirado PA-C     • morphine injection  2 mg Intravenous Q4H PRN Paty Curran MD     • nystatin   Topical TID PRN Crow Tirado PA-C     • senna-docusate sodium  1 tablet Oral Daily Crow Tirado PA-C          Today, Patient Was Seen By: Paty Curran MD    **Please Note: This note may have been constructed using a voice recognition system. **

## 2023-10-08 NOTE — ASSESSMENT & PLAN NOTE
· Met criteria due to tachycardia and tachypnea in the setting of vascular congestion, no clear infectious source at this time  · Procal has been consistently low fall precautions

## 2023-10-08 NOTE — PROGRESS NOTES
General Cardiology   Progress Note   Krysta Mike 66 y.o. female MRN: [id number]  Unit/Bed#: -01 Encounter: [id number]      SUBJECTIVE:   No significant events overnight. Patient denies any chest pain or shortness of breath presently. Diuretics are on hold. REVIEW OF SYSTEMS:  Constitutional:  Denies fever or chills   Eyes:  Denies change in visual acuity   HENT:  Denies nasal congestion or sore throat   Respiratory:  Denies cough or shortness of breath   Cardiovascular:  Denies chest pain or edema   GI:  Denies abdominal pain, nausea, vomiting, bloody stools or diarrhea   :  Denies dysuria, frequency, difficulty in micturition and nocturia  Musculoskeletal:  Denies back pain or joint pain   Neurologic:  Denies headache, focal weakness or sensory changes   Endocrine:  Denies polyuria or polydipsia   Lymphatic:  Denies swollen glands   Psychiatric:  Denies depression or anxiety     OBJECTIVE:   Vitals:  Vitals:    10/08/23 1449   BP: (!) 92/47   Pulse: 87   Resp:    Temp: 97.8 °F (36.6 °C)   SpO2: 96%     Body mass index is 39.27 kg/m². Systolic (25HGH), AOW:606 , Min:104 , TTB:749     Diastolic (88AYJ), HZP:11, Min:45, Max:55      Intake/Output Summary (Last 24 hours) at 10/8/2023 1257  Last data filed at 10/8/2023 0601  Gross per 24 hour   Intake 240 ml   Output 600 ml   Net -360 ml     Weight (last 2 days)       Date/Time Weight    10/07/23 0600 91.2 (201.06)    10/06/23 0550 91.3 (201.28)                PHYSICAL EXAMS:  General:  Patient is not in acute distress, laying in the bed comfortably, awake, alert responding to commands. Head: Normocephalic, Atraumatic. HEENT:  Both pupils normal-size atraumatic, normocephalic, nonicteric  Neck:  JVP not raised. Trachea central  Respiratory:  Bronchovascular breathing all over the chest without any accompaniment  Cardiovascular: Regular rate and rhythm no S3 no murmurs. GI:  Abdomen soft nontender.  Liver and spleen normal size  Lymphatic:  No cervical or inguinal lymphadenopathy  Neurologic:  Patient is awake alert, responding to command, well-oriented to time and place and person moving   Extremities trace edema with chronic venous insufficiency.     LABORATORY RESULTS:        CBC with diff:   Results from last 7 days   Lab Units 10/08/23  0553 10/07/23  0437 10/06/23  0544   WBC Thousand/uL 14.33* 17.24* 18.36*   HEMOGLOBIN g/dL 9.9* 11.1* 11.4*   HEMATOCRIT % 31.8* 35.8 35.8   MCV fL 92 92 91   PLATELETS Thousands/uL 444* 504* 500*   RBC Million/uL 3.44* 3.91 3.94   MCH pg 28.8 28.4 28.9   MCHC g/dL 31.1* 31.0* 31.8   RDW % 14.7 14.6 14.4   MPV fL 9.8 9.8 9.2   NRBC AUTO /100 WBCs 0 0 0       CMP:  Results from last 7 days   Lab Units 10/08/23  0553 10/07/23  0437 10/06/23  0544 10/04/23  1821   POTASSIUM mmol/L 4.3 3.9 3.3* 4.1   CHLORIDE mmol/L 102 99 98 97   CO2 mmol/L 26 26 28 24   BUN mg/dL 27* 25 16 14   CREATININE mg/dL 1.50* 1.78* 1.38* 0.92   CALCIUM mg/dL 8.9 9.1 9.1 9.4   AST U/L 15  --   --  23   ALT U/L 8  --   --  10   ALK PHOS U/L 97  --   --  89   EGFR ml/min/1.73sq m 33 26 36 59       BMP:  Results from last 7 days   Lab Units 10/08/23  0553 10/07/23  0437 10/06/23  0544   POTASSIUM mmol/L 4.3 3.9 3.3*   CHLORIDE mmol/L 102 99 98   CO2 mmol/L 26 26 28   BUN mg/dL 27* 25 16   CREATININE mg/dL 1.50* 1.78* 1.38*   CALCIUM mg/dL 8.9 9.1 9.1            Results from last 7 days   Lab Units 10/08/23  0553 10/07/23  0437 10/06/23  0544   MAGNESIUM mg/dL 1.7* 1.6* 1.5*                   Lipid Profile:   Lab Results   Component Value Date    CHOL 230 01/05/2016    CHOL 149 09/15/2015     Lab Results   Component Value Date    HDL 89 02/22/2023    HDL 64 07/14/2021    HDL 80 02/26/2021     Lab Results   Component Value Date    LDLCALC 104 (H) 02/22/2023    LDLCALC 134 (H) 07/14/2021    LDLCALC 103 (H) 02/26/2021     Lab Results   Component Value Date    TRIG 182 (H) 02/22/2023    TRIG 182 (H) 07/14/2021    TRIG 120 02/26/2021       Cardiac testing:  Results for orders placed during the hospital encounter of 21    Echo complete with contrast if indicated    Narrative  [address]   [place], [place] [address] [phone]    Transthoracic Echocardiogram  2D, M-mode, Doppler, and Color Doppler    Study date:  2021    Patient: Mira Todd  MR number: [id number]  Account number: [de-identified]  : 1945  Age: 68 years  Gender: Female  Status: Inpatient  Location: Bedside  Height: 60 in  Weight: 202 lb  BP: 158/ 86 mmHg    Indications: Assess left ventricular function. Diagnoses: I50.9 - Heart failure, unspecified    Sonographer:  Deyanira Elizalde RDCS  Referring Physician:  Kaylah Piña MD  Group:  [facility]  Interpreting Physician:  Christianne Felty, MD    SUMMARY    LEFT VENTRICLE:  Systolic function was normal. Ejection fraction was estimated to be 60 %. There were no regional wall motion abnormalities. Doppler parameters were consistent with abnormal left ventricular relaxation (grade 1 diastolic dysfunction). RIGHT VENTRICLE:  The size was normal.  Systolic function was normal.    LEFT ATRIUM:  The atrium was mildly dilated. RIGHT ATRIUM:  The atrium was mildly dilated. TRICUSPID VALVE:  There was mild regurgitation. Pulmonary artery systolic pressure was within the normal range. AORTA:  The root exhibited normal size and mild fibrocalcific change. HISTORY: PRIOR HISTORY: edema, CHF, hypertension, DM, CP, hyperlipidemia, SOB    PROCEDURE: The procedure was performed at the bedside. This was a routine study. The transthoracic approach was used. The study included complete 2D imaging, M-mode, complete spectral Doppler, and color Doppler. The heart rate was 68 bpm,  at the start of the study. Images were obtained from the parasternal, apical, subcostal, and suprasternal notch acoustic windows. Image quality was adequate.     LEFT VENTRICLE: Size was normal. Systolic function was normal. Ejection fraction was estimated to be 60 %. There were no regional wall motion abnormalities. Wall thickness was normal. No evidence of apical thrombus. DOPPLER: Doppler  parameters were consistent with abnormal left ventricular relaxation (grade 1 diastolic dysfunction). RIGHT VENTRICLE: The size was normal. Systolic function was normal. Wall thickness was normal.    LEFT ATRIUM: The atrium was mildly dilated. RIGHT ATRIUM: The atrium was mildly dilated. MITRAL VALVE: Valve structure was normal. There was normal leaflet separation. DOPPLER: The transmitral velocity was within the normal range. There was no evidence for stenosis. There was no significant regurgitation. AORTIC VALVE: The valve was trileaflet. Leaflets exhibited normal thickness and normal cuspal separation. DOPPLER: Transaortic velocity was within the normal range. There was no evidence for stenosis. There was no significant  regurgitation. TRICUSPID VALVE: The valve structure was normal. There was normal leaflet separation. DOPPLER: The transtricuspid velocity was within the normal range. There was no evidence for stenosis. There was mild regurgitation. Pulmonary artery  systolic pressure was within the normal range. PULMONIC VALVE: Leaflets exhibited normal thickness, no calcification, and normal cuspal separation. DOPPLER: The transpulmonic velocity was within the normal range. There was no significant regurgitation. PERICARDIUM: There was no pericardial effusion. The pericardium was normal in appearance. AORTA: The root exhibited normal size and mild fibrocalcific change. SYSTEMIC VEINS: IVC: The inferior vena cava was normal in size.     SYSTEM MEASUREMENT TABLES    2D  %FS: 30.8 %  Ao Diam: 2.9 cm  Ao asc: 2.8 cm  EDV(Teich): 87.8 ml  EF(Teich): 58.7 %  ESV(Teich): 36.3 ml  IVSd: 0.9 cm  LA Area: 22.8 cm2  LA Diam: 3.8 cm  LVEDV MOD A4C: 99.9 ml  LVEF MOD A4C: 74.6 %  LVESV MOD A4C: 25.3 ml  LVIDd: 4.4 cm  LVIDs: 3 cm  LVLd A4C: 7.4 cm  LVLs A4C: 5.6 cm  LVPWd: 1.1 cm  RA Area: 19.6 cm2  RVIDd: 3.4 cm  SV MOD A4C: 74.6 ml  SV(Teich): 51.5 ml    CW  TR Vmax: 2.7 m/s  TR maxP.2 mmHg    MM  TAPSE: 2.2 cm    PW  E' Sept: 0.1 m/s  E/E' Sept: 15.5  MV A Oumar: 1.2 m/s  MV Dec [facility]: 3.8 m/s2  MV DecT: 240 ms  MV E Oumar: 0.9 m/s  MV E/A Ratio: 0.7  MV PHT: 69.6 ms  MVA By PHT: 3.2 cm2    [facility] Accredited Echocardiography Laboratory    Prepared and electronically signed by    Victorina English MD  Signed 15-Jul-2021 08:38:07    No results found for this or any previous visit. No results found for this or any previous visit. No valid procedures specified. No results found for this or any previous visit.       Meds/Allergies   all current active meds have been reviewed  Medications Prior to Admission   Medication    celecoxib (CeleBREX) 200 mg capsule    albuterol (2.5 mg/3 mL) 0.083 % nebulizer solution    albuterol (PROVENTIL HFA,VENTOLIN HFA) 90 mcg/act inhaler    B-D TB SYRINGE 1CC/27GX1/2" 27G X 1/2" 1 ML MISC    B-D UF III MINI PEN NEEDLES 31G X 5 MM MISC    Blood Glucose Monitoring Suppl (OneTouch Verio) w/Device KIT    Cimzia Starter Kit 6 X 200 MG/ML KIT    Continuous Blood Gluc  (Dexcom G6 ) SERGEI    Continuous Blood Gluc Sensor (Dexcom G6 Sensor) MISC    Continuous Blood Gluc Transmit (Dexcom G6 Transmitter) MISC    Diclofenac Sodium (VOLTAREN) 1 %    fluticasone-umeclidinium-vilanterol (Trelegy Ellipta) 100-62.5-25 mcg/actuation inhaler    fosinopril (MONOPRIL) 10 mg tablet    HYDROmorphone (DILAUDID) 4 mg tablet    insulin degludec [place] Janie FlexTouch) 100 units/mL injection pen    metFORMIN (GLUCOPHAGE) 1000 MG tablet    methocarbamol (ROBAXIN) 500 mg tablet    mirtazapine (REMERON) 15 mg tablet    nystatin (MYCOSTATIN) powder    OneTouch Ultra test strip    Senexon-S 8.6-50 MG per tablet    torsemide (DEMADEX) 20 mg tablet          ASSESSMENT & PLAN   Principal Problem:    CLAUDIA (acute kidney injury) ([address])  Active Problems:    Type 2 diabetes mellitus with stage 3 chronic kidney disease, with long-term current use of insulin (HCC)    Chronic respiratory failure with hypoxia (HCC)    SIRS (systemic inflammatory response syndrome) (HCC)    Acute on chronic diastolic (congestive) heart failure ([address])    This patient had acute on chronic diastolic heart failure. Patient was diuresed. Patient had CLAUDIA. Diuretics are on hold. Patient getting gently hydrated. ACE inhibitors were discontinued. Creatinine has improved slightly. Agree with continue to hold diuretics. Recheck renal parameters. Medications reviewed. Discussed with patient as well as hospitalist service. Max Li MD  10/8/2023,12:57 PM    Portions of the record may have been created with voice recognition software. Occasional wrong word or "sound a like" substitutions may have occurred due to the inherent limitations of voice recognition software. Read the chart carefully and recognize, using context, where substitutions have occurred.

## 2023-10-09 VITALS
OXYGEN SATURATION: 97 % | RESPIRATION RATE: 20 BRPM | HEIGHT: 60 IN | TEMPERATURE: 97.6 F | BODY MASS INDEX: 39.47 KG/M2 | SYSTOLIC BLOOD PRESSURE: 134 MMHG | DIASTOLIC BLOOD PRESSURE: 105 MMHG | HEART RATE: 73 BPM | WEIGHT: 201.06 LBS

## 2023-10-09 LAB
ANION GAP SERPL CALCULATED.3IONS-SCNC: 5 MMOL/L
BASOPHILS # BLD AUTO: 0.09 THOUSANDS/ÂΜL (ref 0–0.1)
BASOPHILS NFR BLD AUTO: 1 % (ref 0–1)
BUN SERPL-MCNC: 31 MG/DL (ref 5–25)
CALCIUM SERPL-MCNC: 8.8 MG/DL (ref 8.4–10.2)
CHLORIDE SERPL-SCNC: 101 MMOL/L (ref 96–108)
CO2 SERPL-SCNC: 28 MMOL/L (ref 21–32)
CREAT SERPL-MCNC: 1.37 MG/DL (ref 0.6–1.3)
EOSINOPHIL # BLD AUTO: 0.79 THOUSAND/ÂΜL (ref 0–0.61)
EOSINOPHIL NFR BLD AUTO: 6 % (ref 0–6)
ERYTHROCYTE [DISTWIDTH] IN BLOOD BY AUTOMATED COUNT: 14.6 % (ref 11.6–15.1)
GFR SERPL CREATININE-BSD FRML MDRD: 36 ML/MIN/1.73SQ M
GLUCOSE SERPL-MCNC: 240 MG/DL (ref 65–140)
GLUCOSE SERPL-MCNC: 247 MG/DL (ref 65–140)
GLUCOSE SERPL-MCNC: 259 MG/DL (ref 65–140)
GLUCOSE SERPL-MCNC: 266 MG/DL (ref 65–140)
HCT VFR BLD AUTO: 33.3 % (ref 34.8–46.1)
HGB BLD-MCNC: 10.4 G/DL (ref 11.5–15.4)
IMM GRANULOCYTES # BLD AUTO: 0.32 THOUSAND/UL (ref 0–0.2)
IMM GRANULOCYTES NFR BLD AUTO: 2 % (ref 0–2)
LYMPHOCYTES # BLD AUTO: 2.74 THOUSANDS/ÂΜL (ref 0.6–4.47)
LYMPHOCYTES NFR BLD AUTO: 20 % (ref 14–44)
MAGNESIUM SERPL-MCNC: 1.8 MG/DL (ref 1.9–2.7)
MCH RBC QN AUTO: 28.9 PG (ref 26.8–34.3)
MCHC RBC AUTO-ENTMCNC: 31.2 G/DL (ref 31.4–37.4)
MCV RBC AUTO: 93 FL (ref 82–98)
MONOCYTES # BLD AUTO: 1.09 THOUSAND/ÂΜL (ref 0.17–1.22)
MONOCYTES NFR BLD AUTO: 8 % (ref 4–12)
NEUTROPHILS # BLD AUTO: 8.63 THOUSANDS/ÂΜL (ref 1.85–7.62)
NEUTS SEG NFR BLD AUTO: 63 % (ref 43–75)
NRBC BLD AUTO-RTO: 0 /100 WBCS
PLATELET # BLD AUTO: 428 THOUSANDS/UL (ref 149–390)
PMV BLD AUTO: 9.7 FL (ref 8.9–12.7)
POTASSIUM SERPL-SCNC: 4.8 MMOL/L (ref 3.5–5.3)
RBC # BLD AUTO: 3.6 MILLION/UL (ref 3.81–5.12)
SODIUM SERPL-SCNC: 134 MMOL/L (ref 135–147)
WBC # BLD AUTO: 13.66 THOUSAND/UL (ref 4.31–10.16)

## 2023-10-09 PROCEDURE — 85025 COMPLETE CBC W/AUTO DIFF WBC: CPT | Performed by: STUDENT IN AN ORGANIZED HEALTH CARE EDUCATION/TRAINING PROGRAM

## 2023-10-09 PROCEDURE — 99239 HOSP IP/OBS DSCHRG MGMT >30: CPT | Performed by: STUDENT IN AN ORGANIZED HEALTH CARE EDUCATION/TRAINING PROGRAM

## 2023-10-09 PROCEDURE — 82948 REAGENT STRIP/BLOOD GLUCOSE: CPT

## 2023-10-09 PROCEDURE — 83735 ASSAY OF MAGNESIUM: CPT | Performed by: STUDENT IN AN ORGANIZED HEALTH CARE EDUCATION/TRAINING PROGRAM

## 2023-10-09 PROCEDURE — 80048 BASIC METABOLIC PNL TOTAL CA: CPT | Performed by: STUDENT IN AN ORGANIZED HEALTH CARE EDUCATION/TRAINING PROGRAM

## 2023-10-09 RX ORDER — INSULIN DEGLUDEC INJECTION 100 U/ML
30 INJECTION, SOLUTION SUBCUTANEOUS DAILY
Qty: 45 ML | Refills: 0
Start: 2023-10-09 | End: 2023-10-18 | Stop reason: SDUPTHER

## 2023-10-09 RX ORDER — TORSEMIDE 20 MG/1
20 TABLET ORAL DAILY
Qty: 360 TABLET | Refills: 0
Start: 2023-10-09

## 2023-10-09 RX ADMIN — FLUTICASONE FUROATE AND VILANTEROL TRIFENATATE 1 PUFF: 100; 25 POWDER RESPIRATORY (INHALATION) at 10:25

## 2023-10-09 RX ADMIN — DIPHENHYDRAMINE HYDROCHLORIDE 25 MG: 25 TABLET ORAL at 01:05

## 2023-10-09 RX ADMIN — ACETAMINOPHEN 975 MG: 325 TABLET, FILM COATED ORAL at 05:32

## 2023-10-09 RX ADMIN — HEPARIN SODIUM 5000 UNITS: 5000 INJECTION INTRAVENOUS; SUBCUTANEOUS at 13:38

## 2023-10-09 RX ADMIN — DICLOFENAC SODIUM 2 G: 10 GEL TOPICAL at 13:38

## 2023-10-09 RX ADMIN — INSULIN GLARGINE 20 UNITS: 100 INJECTION, SOLUTION SUBCUTANEOUS at 13:38

## 2023-10-09 RX ADMIN — HYDROMORPHONE HYDROCHLORIDE 4 MG: 4 TABLET ORAL at 05:32

## 2023-10-09 RX ADMIN — DICLOFENAC SODIUM 2 G: 10 GEL TOPICAL at 10:26

## 2023-10-09 RX ADMIN — MORPHINE SULFATE 2 MG: 2 INJECTION, SOLUTION INTRAMUSCULAR; INTRAVENOUS at 01:05

## 2023-10-09 RX ADMIN — INSULIN LISPRO 3 UNITS: 100 INJECTION, SOLUTION INTRAVENOUS; SUBCUTANEOUS at 10:23

## 2023-10-09 RX ADMIN — HYDROMORPHONE HYDROCHLORIDE 4 MG: 4 TABLET ORAL at 13:40

## 2023-10-09 RX ADMIN — HEPARIN SODIUM 5000 UNITS: 5000 INJECTION INTRAVENOUS; SUBCUTANEOUS at 05:32

## 2023-10-09 RX ADMIN — UMECLIDINIUM 1 PUFF: 62.5 AEROSOL, POWDER ORAL at 10:24

## 2023-10-09 RX ADMIN — ACETAMINOPHEN 975 MG: 325 TABLET, FILM COATED ORAL at 13:38

## 2023-10-09 NOTE — ASSESSMENT & PLAN NOTE
· Met criteria due to tachycardia and tachypnea in the setting of vascular congestion, no clear infectious source at this time  · Procal has been consistently low

## 2023-10-09 NOTE — NURSING NOTE
Patient left at this time with two sons after being assisted into vehicle. No needs at time of discharge. AVS reviewed. IV and Masimo removed. All belongings sent with patient.

## 2023-10-09 NOTE — ASSESSMENT & PLAN NOTE
Lab Results   Component Value Date    HGBA1C 11.1 (H) 05/06/2023       Recent Labs     10/08/23  2059 10/09/23  0534 10/09/23  1016 10/09/23  1239   POCGLU 203* 259* 266* 247*       Blood Sugar Average: Last 72 hrs:  · (P) 191.875Blood glucose checks 4 times daily, hypoglycemia protocol  · Continued 20 units of home Lantus 40 units in the afternoon - decreased due to am hypoglycemia   · Recommended that with recovering kidney function she can resume 30 units of lantus at home   · Will keep track of sugars at least 2-3x daily

## 2023-10-09 NOTE — PLAN OF CARE
Problem: Potential for Falls  Goal: Patient will remain free of falls  Description: INTERVENTIONS:  - Educate patient/family on patient safety including physical limitations  - Instruct patient to call for assistance with activity   - Consult OT/PT to assist with strengthening/mobility   - Keep Call bell within reach  - Keep bed low and locked with side rails adjusted as appropriate  - Keep care items and personal belongings within reach  - Initiate and maintain comfort rounds  - Make Fall Risk Sign visible to staff  - Offer Toileting every 2 Hours, in advance of need  - Initiate/Maintain bed alarm  - Obtain necessary fall risk management equipment:   - Apply yellow socks and bracelet for high fall risk patients  - Consider moving patient to room near nurses station  10/9/2023 1530 by Mami Canales RN  Outcome: Adequate for Discharge  10/9/2023 1530 by Mami Canales RN  Outcome: Progressing  10/9/2023 1528 by Mami Canales RN  Outcome: Progressing     Problem: MOBILITY - ADULT  Goal: Maintain or return to baseline ADL function  Description: INTERVENTIONS:  -  Assess patient's ability to carry out ADLs; assess patient's baseline for ADL function and identify physical deficits which impact ability to perform ADLs (bathing, care of mouth/teeth, toileting, grooming, dressing, etc.)  - Assess/evaluate cause of self-care deficits   - Assess range of motion  - Assess patient's mobility; develop plan if impaired  - Assess patient's need for assistive devices and provide as appropriate  - Encourage maximum independence but intervene and supervise when necessary  - Involve family in performance of ADLs  - Assess for home care needs following discharge   - Consider OT consult to assist with ADL evaluation and planning for discharge  - Provide patient education as appropriate  10/9/2023 1530 by Mami Canales RN  Outcome: Adequate for Discharge  10/9/2023 1530 by Mami Canales RN  Outcome: Progressing  10/9/2023 1528 by Lucy Danes, RN  Outcome: Progressing  Goal: Maintains/Returns to pre admission functional level  Description: INTERVENTIONS:  - Perform BMAT or MOVE assessment daily.   - Set and communicate daily mobility goal to care team and patient/family/caregiver. - Collaborate with rehabilitation services on mobility goals if consulted  - Perform Range of Motion 2 times a day. - Reposition patient every 2 hours.   - Dangle patient 2 times a day  - Stand patient 2 times a day  - Ambulate patient 2 times a day  - Out of bed to chair 2 times a day   - Out of bed for meals 2 times a day  - Out of bed for toileting  - Record patient progress and toleration of activity level   10/9/2023 1530 by Lucy Zambrano RN  Outcome: Adequate for Discharge  10/9/2023 1530 by Lucy Zambrano RN  Outcome: Progressing  10/9/2023 1528 by Lucy Zambrano RN  Outcome: Progressing     Problem: PAIN - ADULT  Goal: Verbalizes/displays adequate comfort level or baseline comfort level  Description: Interventions:  - Encourage patient to monitor pain and request assistance  - Assess pain using appropriate pain scale  - Administer analgesics based on type and severity of pain and evaluate response  - Implement non-pharmacological measures as appropriate and evaluate response  - Consider cultural and social influences on pain and pain management  - Notify physician/advanced practitioner if interventions unsuccessful or patient reports new pain  10/9/2023 1530 by Lucy Zambrano RN  Outcome: Adequate for Discharge  10/9/2023 1530 by Lucy Zambrano RN  Outcome: Progressing  10/9/2023 1528 by Lucy Zambrano RN  Outcome: Progressing     Problem: INFECTION - ADULT  Goal: Absence or prevention of progression during hospitalization  Description: INTERVENTIONS:  - Assess and monitor for signs and symptoms of infection  - Monitor lab/diagnostic results  - Monitor all insertion sites, i.e. indwelling lines, tubes, and drains  - Monitor endotracheal if appropriate and nasal secretions for changes in amount and color  - Dietrich appropriate cooling/warming therapies per order  - Administer medications as ordered  - Instruct and encourage patient and family to use good hand hygiene technique  - Identify and instruct in appropriate isolation precautions for identified infection/condition  10/9/2023 1530 by Pardeep Sánchez RN  Outcome: Adequate for Discharge  10/9/2023 1530 by Pardeep Sánchez RN  Outcome: Progressing  10/9/2023 1528 by Pardeep Sánchez RN  Outcome: Progressing     Problem: SAFETY ADULT  Goal: Patient will remain free of falls  Description: INTERVENTIONS:  - Educate patient/family on patient safety including physical limitations  - Instruct patient to call for assistance with activity   - Consult OT/PT to assist with strengthening/mobility   - Keep Call bell within reach  - Keep bed low and locked with side rails adjusted as appropriate  - Keep care items and personal belongings within reach  - Initiate and maintain comfort rounds  - Make Fall Risk Sign visible to staff  - Offer Toileting every 2 Hours, in advance of need  - Initiate/Maintain bed alarm  - Obtain necessary fall risk management equipment:   - Apply yellow socks and bracelet for high fall risk patients  - Consider moving patient to room near nurses station  10/9/2023 1530 by Pardeep Sánchez RN  Outcome: Adequate for Discharge  10/9/2023 1530 by Pardeep Sánchez RN  Outcome: Progressing  10/9/2023 1528 by Pardeep Sánchez RN  Outcome: Progressing  Goal: Maintain or return to baseline ADL function  Description: INTERVENTIONS:  -  Assess patient's ability to carry out ADLs; assess patient's baseline for ADL function and identify physical deficits which impact ability to perform ADLs (bathing, care of mouth/teeth, toileting, grooming, dressing, etc.)  - Assess/evaluate cause of self-care deficits   - Assess range of motion  - Assess patient's mobility; develop plan if impaired  - Assess patient's need for assistive devices and provide as appropriate  - Encourage maximum independence but intervene and supervise when necessary  - Involve family in performance of ADLs  - Assess for home care needs following discharge   - Consider OT consult to assist with ADL evaluation and planning for discharge  - Provide patient education as appropriate  10/9/2023 1530 by Alicia Javed RN  Outcome: Adequate for Discharge  10/9/2023 1530 by Alicia Javed RN  Outcome: Progressing  10/9/2023 1528 by Alicia Javed RN  Outcome: Progressing  Goal: Maintains/Returns to pre admission functional level  Description: INTERVENTIONS:  - Perform BMAT or MOVE assessment daily.   - Set and communicate daily mobility goal to care team and patient/family/caregiver. - Collaborate with rehabilitation services on mobility goals if consulted  - Perform Range of Motion 2 times a day. - Reposition patient every 2 hours.   - Dangle patient 2 times a day  - Stand patient 2 times a day  - Ambulate patient 2 times a day  - Out of bed to chair 2 times a day   - Out of bed for meals 2 times a day  - Out of bed for toileting  - Record patient progress and toleration of activity level   10/9/2023 1530 by Alicia Javed RN  Outcome: Adequate for Discharge  10/9/2023 1530 by Alicia Javed RN  Outcome: Progressing  10/9/2023 1528 by Alicia Javed RN  Outcome: Progressing     Problem: DISCHARGE PLANNING  Goal: Discharge to home or other facility with appropriate resources  Description: INTERVENTIONS:  - Identify barriers to discharge w/patient and caregiver  - Arrange for needed discharge resources and transportation as appropriate  - Identify discharge learning needs (meds, wound care, etc.)  - Arrange for interpretive services to assist at discharge as needed  - Refer to Case Management Department for coordinating discharge planning if the patient needs post-hospital services based on physician/advanced practitioner order or complex needs related to functional status, cognitive ability, or social support system  10/9/2023 1530 by Tiffany Wesley RN  Outcome: Adequate for Discharge  10/9/2023 1530 by Tiffany Wesley RN  Outcome: Progressing  10/9/2023 1528 by Tiffany Wesley RN  Outcome: Progressing     Problem: Knowledge Deficit  Goal: Patient/family/caregiver demonstrates understanding of disease process, treatment plan, medications, and discharge instructions  Description: Complete learning assessment and assess knowledge base.   Interventions:  - Provide teaching at level of understanding  - Provide teaching via preferred learning methods  10/9/2023 1530 by Tiffany Wesley RN  Outcome: Adequate for Discharge  10/9/2023 1530 by Tiffany Wesley RN  Outcome: Progressing  10/9/2023 1528 by Tiffany Wesley RN  Outcome: Progressing     Problem: Prexisting or High Potential for Compromised Skin Integrity  Goal: Skin integrity is maintained or improved  Description: INTERVENTIONS:  - Identify patients at risk for skin breakdown  - Assess and monitor skin integrity  - Assess and monitor nutrition and hydration status  - Monitor labs   - Assess for incontinence   - Turn and reposition patient  - Assist with mobility/ambulation  - Relieve pressure over bony prominences  - Avoid friction and shearing  - Provide appropriate hygiene as needed including keeping skin clean and dry  - Evaluate need for skin moisturizer/barrier cream  - Collaborate with interdisciplinary team   - Patient/family teaching  - Consider wound care consult   10/9/2023 1530 by Tiffany Wesley RN  Outcome: Adequate for Discharge  10/9/2023 1530 by Tiffany Wesley RN  Outcome: Progressing  10/9/2023 1528 by Tiffany Wesley RN  Outcome: Progressing     Problem: GASTROINTESTINAL - ADULT  Goal: Maintains adequate nutritional intake  Description: INTERVENTIONS:  - Monitor percentage of each meal consumed  - Identify factors contributing to decreased intake, treat as appropriate  - Assist with meals as needed  - Monitor I&O, weight, and lab values if indicated  - Obtain nutrition services referral as needed  10/9/2023 1530 by Waqas Bright RN  Outcome: Adequate for Discharge  10/9/2023 1530 by Waqas Bright RN  Outcome: Progressing  10/9/2023 1528 by Waqas Bright RN  Outcome: Progressing     Problem: METABOLIC, FLUID AND ELECTROLYTES - ADULT  Goal: Electrolytes maintained within normal limits  Description: INTERVENTIONS:  - Monitor labs and assess patient for signs and symptoms of electrolyte imbalances  - Administer electrolyte replacement as ordered  - Monitor response to electrolyte replacements, including repeat lab results as appropriate  - Instruct patient on fluid and nutrition as appropriate  10/9/2023 1530 by Waqas Bright RN  Outcome: Adequate for Discharge  10/9/2023 1530 by Waqas Bright RN  Outcome: Progressing  10/9/2023 1528 by Waqas Bright RN  Outcome: Progressing  Goal: Fluid balance maintained  Description: INTERVENTIONS:  - Monitor labs   - Monitor I/O and WT  - Instruct patient on fluid and nutrition as appropriate  - Assess for signs & symptoms of volume excess or deficit  10/9/2023 1530 by Waqas Bright RN  Outcome: Adequate for Discharge  10/9/2023 1530 by Waqas Bright RN  Outcome: Progressing  10/9/2023 1528 by Waqas Bright RN  Outcome: Progressing  Goal: Glucose maintained within target range  Description: INTERVENTIONS:  - Monitor Blood Glucose as ordered  - Assess for signs and symptoms of hyperglycemia and hypoglycemia  - Administer ordered medications to maintain glucose within target range  - Assess nutritional intake and initiate nutrition service referral as needed  10/9/2023 1530 by Waqas Bright RN  Outcome: Adequate for Discharge  10/9/2023 1530 by Waqas Bright RN  Outcome: Progressing  10/9/2023 1528 by Waqas Bright RN  Outcome: Progressing     Problem: HEMATOLOGIC - ADULT  Goal: Maintains hematologic stability  Description: INTERVENTIONS  - Assess for signs and symptoms of bleeding or hemorrhage  - Monitor labs  - Administer supportive blood products/factors as ordered and appropriate  10/9/2023 1530 by Jim Muñoz RN  Outcome: Adequate for Discharge  10/9/2023 1530 by Jim Muñoz RN  Outcome: Progressing  10/9/2023 1528 by Jim Muñoz RN  Outcome: Progressing

## 2023-10-09 NOTE — CASE MANAGEMENT
Case Management Discharge Planning Note    Patient name Chris Olvera  Location [address] [facility] 424/-[id number] MRN [id number]  : 1945 Date 10/9/2023       Current Admission Date: 10/4/2023  Current Admission Diagnosis:CLAUDIA (acute kidney injury) [facility])   Patient Active Problem List    Diagnosis Date Noted   • Acute on chronic diastolic (congestive) heart failure ([address]) 10/05/2023   • Pulmonary fibrosis ([address]) 2023   • Edema of lower extremity 2023   • Chronic kidney disease, stage 2 (mild) 2023   • Chronic pain disorder 2023   • CHF (congestive heart failure) ([address]) 2023   • Age-related osteoporosis without current pathological fracture 2022   • Hypokalemia 2022   • SIRS (systemic inflammatory response syndrome) ([address]) 2022   • Continuous opioid dependence ([address]) 2022   • Elevated d-dimer 2021   • 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 ([address]) 2021   • Fungal infection of skin 2020   • CLAUDIA (acute kidney injury) ([address]) 2019   • Chronic prescription opiate use 2019   • Shortness of breath    • Hyponatremia 2019   • Venous stasis dermatitis of both lower extremities 2019   • Chronic respiratory failure with hypoxia ([address]) 2019   • Immunosuppressed status ([address]) 2019   • Interstitial lung disease ([address]) 2019   • Obesity (BMI 30-39.9) 2018   • Varicose veins of both lower extremities 2018   • Morbid obesity ([address])    • Moderate persistent asthma 11/10/2017   • Vitamin D deficiency 2017   • Mixed hyperlipidemia 2016   • Bilateral lower extremity edema 2016   • Ambulatory dysfunction 2016   • Recurrent major depressive disorder, in partial remission ([address]) 2016   • Gastroesophageal reflux disease without esophagitis 2016   • Urinary incontinence 2016   • Psoriasis vulgaris 2014   • Essential hypertension 05/27/2014   • Type 2 diabetes mellitus with stage 3 chronic kidney disease, with long-term current use of insulin ([address]) 04/30/2014   • Primary insomnia 04/30/2014   • Rheumatoid arthritis involving multiple sites with positive rheumatoid factor ([address]) 04/30/2014      LOS (days): 5  Geometric Mean LOS (GMLOS) (days): 3.90  Days to GMLOS:-0.6     OBJECTIVE:  Risk of Unplanned Readmission Score: 19.73      Current admission status: Inpatient   Preferred Pharmacy:   [facility], [address] [address]  Phone: [phone] Fax: [phone]    [facility]/pharmacy #[id number]- [place] [address]  Phone: [phone] Fax: [phone]    Primary Care Provider: Baron Brian MD    Primary Insurance: MEDICARE  Secondary Insurance: BLUE CROSS    DISCHARGE DETAILS:     IMM Given to[de-identified] Patient  Family notified[de-identified] Donny Rossi  Additional Comments: CM  reviewed IMM with patient and son, gave patient a copy and placed a copy in MR.

## 2023-10-09 NOTE — DISCHARGE SUMMARY
[address]  Discharge- Florentin Has 1945, 66 y.o. female MRN: [id number]  Unit/Bed#: -01 Encounter: [id number]  Primary Care Provider: Francine Tran MD   Date and time admitted to hospital: 10/4/2023  5:49 PM    Acute on chronic diastolic (congestive) heart failure (HCC)  Assessment & Plan  Wt Readings from Last 3 Encounters:   10/07/23 91.2 kg (201 lb 1 oz)   06/19/23 86.6 kg (191 lb)   05/23/23 97.2 kg (214 lb 3.2 oz)     · On admission she had worsening bilateral lower extremity edema and edema in right upper extremity  · RUE doppler negative for DVT   · BNP slightly elevated at 107  · CTA chest negative for PE or dissection, chest x-ray revealing pulmonary vascular congestion  · Echocardiogram from April 2023 revealing 60 to 65% ejection fraction with grade 1 diastolic dysfunction  · She was initially started on IV Lasix 100 mg 3 times daily on admission per CHF powerplan -- had a increase in Cr so diuresis held   · Cardiology following    · Hold diuresis she appears euvolemic to dry on exam (gave additional  500 cc fluids 2/2 CLAUDIA with improvement of Cr)         * CLAUDIA (acute kidney injury) ([address])  Assessment & Plan  Cr 0.9>1.7 >1.5 > 1.3   Received IV lasix for possible CHF exacerbation on admission per the powerplan   Likely overdiuresed   With additional IVF Cr downtrending from 1.78>1.50 > 1.3   FEurea - 17.4% concerning for pre-renal CLAUDIA   · Resume half dose of diuretic on discharge   · Hold ACE inhibitor on discharge         SIRS (systemic inflammatory response syndrome) (HCC)  Assessment & Plan  · Met criteria due to tachycardia and tachypnea in the setting of vascular congestion, no clear infectious source at this time  · Procal has been consistently low     Chronic respiratory failure with hypoxia (HCC)  Assessment & Plan  · Currently saturating well on baseline 2 L nasal cannula ( baseline 3 L)     Type 2 diabetes mellitus with stage 3 chronic kidney disease, with long-term current use of insulin [facility])  Assessment & Plan  Lab Results   Component Value Date    HGBA1C 11.1 (H) 05/06/2023       Recent Labs     10/08/23  2059 10/09/23  0534 10/09/23  1016 10/09/23  1239   POCGLU 203* 259* 266* 247*       Blood Sugar Average: Last 72 hrs:  · (P) 191.875Blood glucose checks 4 times daily, hypoglycemia protocol  · Continued 20 units of home Lantus 40 units in the afternoon - decreased due to am hypoglycemia   · Recommended that with recovering kidney function she can resume 30 units of lantus at home   · Will keep track of sugars at least 2-3x daily         Medical Problems     Resolved Problems  Date Reviewed: 10/5/2023          Resolved    Chronic diastolic congestive heart failure ([address]) 10/5/2023     Resolved by  Bright Alejandra MD        Discharging Physician / Practitioner: Bright Alejandra MD  PCP: Daisy Edwards MD  Admission Date:   Admission Orders (From admission, onward)     Ordered        10/04/23 2343  INPATIENT ADMISSION  Once                      Discharge Date: 10/09/23    Consultations During Hospital Stay:  · cardiology    Procedures Performed:   · none    Significant Findings / Test Results:   · none    Incidental Findings:   · none     Test Results Pending at Discharge (will require follow up):   · none     Outpatient Tests Requested:  · BMP in 1 week    Complications: CLAUDIA    Reason for Admission: possible fluid overload    Hospital Course:   [address] Kirkland is a 66 y.o. female patient who originally presented to the hospital on 10/4/2023 due to possible fluid overload. She was started on high dose lasix via CHF powerplan. She had an CLAUDIA the following day after which diuretics were held. Overall she appeared euvolemic to dry and fluids were given with improvement of her Cr back to 1.3 (baseline 1.1). She will follow up with outpatient Rheumatology for her chronic severe RA. She had no signs/symptoms of infection here, pro-sulaiman was low. She did not require abx.  She will follow up with PCP in one week with a repeat BMP to check that her kidney function has recovered. All instructions reviewed with patient's son. Please see above list of diagnoses and related plan for additional information. Condition at Discharge: fair    Discharge Day Visit / Exam:   Subjective:  No new symptoms. Actually feels pretty good today. No chest pain or sob. Vitals: Blood Pressure: (!) 134/105 (10/09/23 0811)  Pulse: 73 (10/09/23 0811)  Temperature: 97.6 °F (36.4 °C) (10/09/23 0811)  Temp Source: Oral (10/08/23 2233)  Respirations: 20 (10/09/23 0811)  Height: 5' (152.4 cm) (10/05/23 0756)  Weight - Scale: 91.2 kg (201 lb 1 oz) (10/07/23 0600)  SpO2: 97 % (10/09/23 0811)  Exam:   Physical Exam   General: NAD on 2L NC   HEENT: Normal conjunctiva, EOMI  Neck: No palpable lymphadenopathy, no JVD  Heart: Regular rate and rhythm, no murmurs  Lungs: Clear lungs, nonlabored respirations, no wheezing  Abdomen: Nontender, nondistended  Extremities: No pitting edema in bilateral lower extremities  Neuro: Alert and oriented x3, no focal deficits  Psych: Cooperative/calm      Discussion with Family: Updated  (son) via phone. Discharge instructions/Information to patient and family:   See after visit summary for information provided to patient and family. Provisions for Follow-Up Care:  See after visit summary for information related to follow-up care and any pertinent home health orders. Disposition:   Home with [facility]NA Services (Reminder: Complete face to face encounter)    Planned Readmission: none     Discharge Statement:  I spent 35 minutes discharging the patient. This time was spent on the day of discharge. I had direct contact with the patient on the day of discharge.  Greater than 50% of the total time was spent examining patient, answering all patient questions, arranging and discussing plan of care with patient as well as directly providing post-discharge instructions. Additional time then spent on discharge activities. Discharge Medications:  See after visit summary for reconciled discharge medications provided to patient and/or family.       **Please Note: This note may have been constructed using a voice recognition system**

## 2023-10-09 NOTE — ASSESSMENT & PLAN NOTE
Wt Readings from Last 3 Encounters:   10/07/23 91.2 kg (201 lb 1 oz)   06/19/23 86.6 kg (191 lb)   05/23/23 97.2 kg (214 lb 3.2 oz)     · On admission she had worsening bilateral lower extremity edema and edema in right upper extremity  · RUE doppler negative for DVT   · BNP slightly elevated at 107  · CTA chest negative for PE or dissection, chest x-ray revealing pulmonary vascular congestion  · Echocardiogram from April 2023 revealing 60 to 65% ejection fraction with grade 1 diastolic dysfunction  · She was initially started on IV Lasix 100 mg 3 times daily on admission per CHF powerplan -- had a increase in Cr so diuresis held   · Cardiology following    · Hold diuresis she appears euvolemic to dry on exam (gave additional  500 cc fluids 2/2 CLAUDIA with improvement of Cr)

## 2023-10-09 NOTE — ASSESSMENT & PLAN NOTE
Cr 0.9>1.7 >1.5 > 1.3   Received IV lasix for possible CHF exacerbation on admission per the powerplan   Likely overdiuresed   With additional IVF Cr downtrending from 1.78>1.50 > 1.3   FEurea - 17.4% concerning for pre-renal CLAUDIA   · Resume half dose of diuretic on discharge   · Hold ACE inhibitor on discharge

## 2023-10-09 NOTE — PLAN OF CARE
Problem: Potential for Falls  Goal: Patient will remain free of falls  Description: INTERVENTIONS:  - Educate patient/family on patient safety including physical limitations  - Instruct patient to call for assistance with activity   - Consult OT/PT to assist with strengthening/mobility   - Keep Call bell within reach  - Keep bed low and locked with side rails adjusted as appropriate  - Keep care items and personal belongings within reach  - Initiate and maintain comfort rounds  - Make Fall Risk Sign visible to staff  - Offer Toileting every 2 Hours, in advance of need  - Initiate/Maintain bed alarm  - Obtain necessary fall risk management equipment:   - Apply yellow socks and bracelet for high fall risk patients  - Consider moving patient to room near nurses station  Outcome: Progressing     Problem: MOBILITY - ADULT  Goal: Maintain or return to baseline ADL function  Description: INTERVENTIONS:  -  Assess patient's ability to carry out ADLs; assess patient's baseline for ADL function and identify physical deficits which impact ability to perform ADLs (bathing, care of mouth/teeth, toileting, grooming, dressing, etc.)  - Assess/evaluate cause of self-care deficits   - Assess range of motion  - Assess patient's mobility; develop plan if impaired  - Assess patient's need for assistive devices and provide as appropriate  - Encourage maximum independence but intervene and supervise when necessary  - Involve family in performance of ADLs  - Assess for home care needs following discharge   - Consider OT consult to assist with ADL evaluation and planning for discharge  - Provide patient education as appropriate  Outcome: Progressing  Goal: Maintains/Returns to pre admission functional level  Description: INTERVENTIONS:  - Perform BMAT or MOVE assessment daily.   - Set and communicate daily mobility goal to care team and patient/family/caregiver.    - Collaborate with rehabilitation services on mobility goals if consulted  - Perform Range of Motion 2 times a day. - Reposition patient every 2 hours.   - Dangle patient 2 times a day  - Stand patient 2 times a day  - Ambulate patient 2 times a day  - Out of bed to chair 2 times a day   - Out of bed for meals 2 times a day  - Out of bed for toileting  - Record patient progress and toleration of activity level   Outcome: Progressing     Problem: PAIN - ADULT  Goal: Verbalizes/displays adequate comfort level or baseline comfort level  Description: Interventions:  - Encourage patient to monitor pain and request assistance  - Assess pain using appropriate pain scale  - Administer analgesics based on type and severity of pain and evaluate response  - Implement non-pharmacological measures as appropriate and evaluate response  - Consider cultural and social influences on pain and pain management  - Notify physician/advanced practitioner if interventions unsuccessful or patient reports new pain  Outcome: Progressing     Problem: INFECTION - ADULT  Goal: Absence or prevention of progression during hospitalization  Description: INTERVENTIONS:  - Assess and monitor for signs and symptoms of infection  - Monitor lab/diagnostic results  - Monitor all insertion sites, i.e. indwelling lines, tubes, and drains  - Monitor endotracheal if appropriate and nasal secretions for changes in amount and color  - [place] appropriate cooling/warming therapies per order  - Administer medications as ordered  - Instruct and encourage patient and family to use good hand hygiene technique  - Identify and instruct in appropriate isolation precautions for identified infection/condition  Outcome: Progressing     Problem: SAFETY ADULT  Goal: Patient will remain free of falls  Description: INTERVENTIONS:  - Educate patient/family on patient safety including physical limitations  - Instruct patient to call for assistance with activity   - Consult OT/PT to assist with strengthening/mobility   - Keep Call bell within reach  - Keep bed low and locked with side rails adjusted as appropriate  - Keep care items and personal belongings within reach  - Initiate and maintain comfort rounds  - Make Fall Risk Sign visible to staff  - Offer Toileting every 2 Hours, in advance of need  - Initiate/Maintain bed alarm  - Obtain necessary fall risk management equipment:   - Apply yellow socks and bracelet for high fall risk patients  - Consider moving patient to room near nurses station  Outcome: Progressing  Goal: Maintain or return to baseline ADL function  Description: INTERVENTIONS:  -  Assess patient's ability to carry out ADLs; assess patient's baseline for ADL function and identify physical deficits which impact ability to perform ADLs (bathing, care of mouth/teeth, toileting, grooming, dressing, etc.)  - Assess/evaluate cause of self-care deficits   - Assess range of motion  - Assess patient's mobility; develop plan if impaired  - Assess patient's need for assistive devices and provide as appropriate  - Encourage maximum independence but intervene and supervise when necessary  - Involve family in performance of ADLs  - Assess for home care needs following discharge   - Consider OT consult to assist with ADL evaluation and planning for discharge  - Provide patient education as appropriate  Outcome: Progressing  Goal: Maintains/Returns to pre admission functional level  Description: INTERVENTIONS:  - Perform BMAT or MOVE assessment daily.   - Set and communicate daily mobility goal to care team and patient/family/caregiver. - Collaborate with rehabilitation services on mobility goals if consulted  - Perform Range of Motion 2 times a day. - Reposition patient every 2 hours.   - Dangle patient 2 times a day  - Stand patient 2 times a day  - Ambulate patient 2 times a day  - Out of bed to chair 2 times a day   - Out of bed for meals 2 times a day  - Out of bed for toileting  - Record patient progress and toleration of activity level   Outcome: Progressing     Problem: DISCHARGE PLANNING  Goal: Discharge to home or other facility with appropriate resources  Description: INTERVENTIONS:  - Identify barriers to discharge w/patient and caregiver  - Arrange for needed discharge resources and transportation as appropriate  - Identify discharge learning needs (meds, wound care, etc.)  - Arrange for interpretive services to assist at discharge as needed  - Refer to Case Management Department for coordinating discharge planning if the patient needs post-hospital services based on physician/advanced practitioner order or complex needs related to functional status, cognitive ability, or social support system  Outcome: Progressing     Problem: Knowledge Deficit  Goal: Patient/family/caregiver demonstrates understanding of disease process, treatment plan, medications, and discharge instructions  Description: Complete learning assessment and assess knowledge base.   Interventions:  - Provide teaching at level of understanding  - Provide teaching via preferred learning methods  Outcome: Progressing     Problem: Prexisting or High Potential for Compromised Skin Integrity  Goal: Skin integrity is maintained or improved  Description: INTERVENTIONS:  - Identify patients at risk for skin breakdown  - Assess and monitor skin integrity  - Assess and monitor nutrition and hydration status  - Monitor labs   - Assess for incontinence   - Turn and reposition patient  - Assist with mobility/ambulation  - Relieve pressure over bony prominences  - Avoid friction and shearing  - Provide appropriate hygiene as needed including keeping skin clean and dry  - Evaluate need for skin moisturizer/barrier cream  - Collaborate with interdisciplinary team   - Patient/family teaching  - Consider wound care consult   Outcome: Progressing     Problem: GASTROINTESTINAL - ADULT  Goal: Maintains adequate nutritional intake  Description: INTERVENTIONS:  - Monitor percentage of each meal consumed  - Identify factors contributing to decreased intake, treat as appropriate  - Assist with meals as needed  - Monitor I&O, weight, and lab values if indicated  - Obtain nutrition services referral as needed  Outcome: Progressing     Problem: METABOLIC, FLUID AND ELECTROLYTES - ADULT  Goal: Electrolytes maintained within normal limits  Description: INTERVENTIONS:  - Monitor labs and assess patient for signs and symptoms of electrolyte imbalances  - Administer electrolyte replacement as ordered  - Monitor response to electrolyte replacements, including repeat lab results as appropriate  - Instruct patient on fluid and nutrition as appropriate  Outcome: Progressing  Goal: Fluid balance maintained  Description: INTERVENTIONS:  - Monitor labs   - Monitor I/O and WT  - Instruct patient on fluid and nutrition as appropriate  - Assess for signs & symptoms of volume excess or deficit  Outcome: Progressing  Goal: Glucose maintained within target range  Description: INTERVENTIONS:  - Monitor Blood Glucose as ordered  - Assess for signs and symptoms of hyperglycemia and hypoglycemia  - Administer ordered medications to maintain glucose within target range  - Assess nutritional intake and initiate nutrition service referral as needed  Outcome: Progressing          Problem: HEMATOLOGIC - ADULT  Goal: Maintains hematologic stability  Description: INTERVENTIONS  - Assess for signs and symptoms of bleeding or hemorrhage  - Monitor labs  - Administer supportive blood products/factors as ordered and appropriate  Outcome: Progressing

## 2023-10-10 ENCOUNTER — TELEPHONE (OUTPATIENT)
Age: 78
End: 2023-10-10

## 2023-10-10 ENCOUNTER — TRANSITIONAL CARE MANAGEMENT (OUTPATIENT)
Age: 78
End: 2023-10-10

## 2023-10-10 LAB
BACTERIA BLD CULT: NORMAL
BACTERIA BLD CULT: NORMAL

## 2023-10-10 NOTE — TELEPHONE ENCOUNTER
Pt's son called back stating pt unable to ambulate and is requesting Virtual visit today.   Scheduled for 3:40

## 2023-10-12 ENCOUNTER — TELEPHONE (OUTPATIENT)
Age: 78
End: 2023-10-12

## 2023-10-12 NOTE — TELEPHONE ENCOUNTER
Called spoke to Vineet Jimenez as Juan Padilla was on the road and was notified and stated they will speak to son

## 2023-10-12 NOTE — TELEPHONE ENCOUNTER
VISITING  NURSE  CALLED    WANTS  TO  KNOW IF  THE  PATIENT   CAN   TRY   FREE STYLE  GERA   SHE  THINKS  IT  WILL  BE  MORE   USER   FRIENDLY    PT   HAS   AN   INHALER    BUT  SHE   IS  NOT  TAKING   IT   JUST  WANTED   [address] [place] [address] 951   TO  KNOW    PT   WAS  JUST  DISCHARGE  FROM  [facility]   10/9

## 2023-10-18 ENCOUNTER — TELEPHONE (OUTPATIENT)
Age: 78
End: 2023-10-18

## 2023-10-18 DIAGNOSIS — Z79.4 TYPE 2 DIABETES MELLITUS WITH STAGE 3 CHRONIC KIDNEY DISEASE, WITH LONG-TERM CURRENT USE OF INSULIN (HCC): ICD-10-CM

## 2023-10-18 DIAGNOSIS — N18.30 TYPE 2 DIABETES MELLITUS WITH STAGE 3 CHRONIC KIDNEY DISEASE, WITH LONG-TERM CURRENT USE OF INSULIN (HCC): ICD-10-CM

## 2023-10-18 DIAGNOSIS — E11.22 TYPE 2 DIABETES MELLITUS WITH STAGE 3 CHRONIC KIDNEY DISEASE, WITH LONG-TERM CURRENT USE OF INSULIN (HCC): ICD-10-CM

## 2023-10-18 RX ORDER — INSULIN DEGLUDEC INJECTION 100 U/ML
35 INJECTION, SOLUTION SUBCUTANEOUS DAILY
Qty: 45 ML | Refills: 0 | Status: SHIPPED | OUTPATIENT
Start: 2023-10-18 | End: 2024-01-26

## 2023-10-18 NOTE — TELEPHONE ENCOUNTER
Aidan Velasco  Lab results Leilani Snyder from [facility]:  Sodium 134  Glucose 269  Egfrcr 55  Chloride 98

## 2023-10-18 NOTE — TELEPHONE ENCOUNTER
Matt--OT--HELLEN from [facility] # [phone]    calling to request an order for a humidifier attachment for patients oxygen concentrator. She got the concentrator from [facility], so that would be where the order would be sent.

## 2023-10-19 ENCOUNTER — TELEPHONE (OUTPATIENT)
Age: 78
End: 2023-10-19

## 2023-10-19 NOTE — TELEPHONE ENCOUNTER
Shanika MACEDO from [address] [place] [address]--calling in regards to a previous request that had been placed for [facility]. Per the Son, she previously had the Dexcom which is not working out.  was following up to see if the [place] Jemma was still in the system and if it was approved by insurance, I have personally seen and examined the patient. I have collaborated with and supervised the

## 2023-10-20 ENCOUNTER — TELEPHONE (OUTPATIENT)
Age: 78
End: 2023-10-20

## 2023-10-20 DIAGNOSIS — E11.22 TYPE 2 DIABETES MELLITUS WITH STAGE 3 CHRONIC KIDNEY DISEASE, WITH LONG-TERM CURRENT USE OF INSULIN, UNSPECIFIED WHETHER STAGE 3A OR 3B CKD (HCC): Primary | ICD-10-CM

## 2023-10-20 DIAGNOSIS — Z79.4 TYPE 2 DIABETES MELLITUS WITH STAGE 3 CHRONIC KIDNEY DISEASE, WITH LONG-TERM CURRENT USE OF INSULIN, UNSPECIFIED WHETHER STAGE 3A OR 3B CKD (HCC): Primary | ICD-10-CM

## 2023-10-20 DIAGNOSIS — N18.30 TYPE 2 DIABETES MELLITUS WITH STAGE 3 CHRONIC KIDNEY DISEASE, WITH LONG-TERM CURRENT USE OF INSULIN, UNSPECIFIED WHETHER STAGE 3A OR 3B CKD (HCC): Primary | ICD-10-CM

## 2023-10-21 LAB
DME PARACHUTE DELIVERY DATE ACTUAL: NORMAL
DME PARACHUTE DELIVERY DATE REQUESTED: NORMAL
DME PARACHUTE ITEM DESCRIPTION: NORMAL
DME PARACHUTE ORDER STATUS: NORMAL
DME PARACHUTE SUPPLIER NAME: NORMAL
DME PARACHUTE SUPPLIER PHONE: NORMAL

## 2023-10-23 ENCOUNTER — TELEPHONE (OUTPATIENT)
Age: 78
End: 2023-10-23

## 2023-10-23 NOTE — TELEPHONE ENCOUNTER
Pt is requesting an medicinal card, pt has a  home nurse coming in and suggest the pt to contact her PCP. Please Daniel Joe [phone].

## 2023-10-23 NOTE — TELEPHONE ENCOUNTER
S/w Fred Linares advised as of now or practice does not write for medicinal cards. Pt wants to use for creams or lotions. Advised to call dispensary for online or local recommendations to receive card. Fred Linares agrees with plan.

## 2023-10-30 ENCOUNTER — TELEPHONE (OUTPATIENT)
Age: 78
End: 2023-10-30

## 2023-10-30 NOTE — TELEPHONE ENCOUNTER
Matt an occupational therapist from [address] [place] [address]   calling to request an order for a gel pressure relief cushion. If you have any questions, cb # [phone].

## 2023-10-31 ENCOUNTER — OFFICE VISIT (OUTPATIENT)
Age: 78
End: 2023-10-31
Payer: MEDICARE

## 2023-10-31 VITALS
RESPIRATION RATE: 18 BRPM | TEMPERATURE: 98.1 F | HEIGHT: 60 IN | HEART RATE: 97 BPM | SYSTOLIC BLOOD PRESSURE: 114 MMHG | BODY MASS INDEX: 39.27 KG/M2 | DIASTOLIC BLOOD PRESSURE: 62 MMHG | OXYGEN SATURATION: 92 %

## 2023-10-31 DIAGNOSIS — G89.4 CHRONIC PAIN DISORDER: ICD-10-CM

## 2023-10-31 DIAGNOSIS — I50.9 CHRONIC CONGESTIVE HEART FAILURE, UNSPECIFIED HEART FAILURE TYPE (HCC): ICD-10-CM

## 2023-10-31 DIAGNOSIS — I10 ESSENTIAL HYPERTENSION: ICD-10-CM

## 2023-10-31 DIAGNOSIS — Z79.4 TYPE 2 DIABETES MELLITUS WITH STAGE 3 CHRONIC KIDNEY DISEASE, WITH LONG-TERM CURRENT USE OF INSULIN, UNSPECIFIED WHETHER STAGE 3A OR 3B CKD (HCC): ICD-10-CM

## 2023-10-31 DIAGNOSIS — R60.0 BILATERAL LOWER EXTREMITY EDEMA: ICD-10-CM

## 2023-10-31 DIAGNOSIS — N18.30 TYPE 2 DIABETES MELLITUS WITH STAGE 3 CHRONIC KIDNEY DISEASE, WITH LONG-TERM CURRENT USE OF INSULIN, UNSPECIFIED WHETHER STAGE 3A OR 3B CKD (HCC): Primary | ICD-10-CM

## 2023-10-31 DIAGNOSIS — J96.11 CHRONIC RESPIRATORY FAILURE WITH HYPOXIA (HCC): ICD-10-CM

## 2023-10-31 DIAGNOSIS — E11.22 TYPE 2 DIABETES MELLITUS WITH STAGE 3 CHRONIC KIDNEY DISEASE, WITH LONG-TERM CURRENT USE OF INSULIN, UNSPECIFIED WHETHER STAGE 3A OR 3B CKD (HCC): ICD-10-CM

## 2023-10-31 DIAGNOSIS — L89.311 PRESSURE INJURY OF RIGHT BUTTOCK, STAGE 1: ICD-10-CM

## 2023-10-31 DIAGNOSIS — B36.9 FUNGAL INFECTION OF SKIN: ICD-10-CM

## 2023-10-31 DIAGNOSIS — Z79.4 TYPE 2 DIABETES MELLITUS WITH STAGE 3 CHRONIC KIDNEY DISEASE, WITH LONG-TERM CURRENT USE OF INSULIN, UNSPECIFIED WHETHER STAGE 3A OR 3B CKD (HCC): Primary | ICD-10-CM

## 2023-10-31 DIAGNOSIS — N18.30 TYPE 2 DIABETES MELLITUS WITH STAGE 3 CHRONIC KIDNEY DISEASE, WITH LONG-TERM CURRENT USE OF INSULIN, UNSPECIFIED WHETHER STAGE 3A OR 3B CKD (HCC): ICD-10-CM

## 2023-10-31 DIAGNOSIS — E11.22 TYPE 2 DIABETES MELLITUS WITH STAGE 3 CHRONIC KIDNEY DISEASE, WITH LONG-TERM CURRENT USE OF INSULIN, UNSPECIFIED WHETHER STAGE 3A OR 3B CKD (HCC): Primary | ICD-10-CM

## 2023-10-31 DIAGNOSIS — M05.79 RHEUMATOID ARTHRITIS INVOLVING MULTIPLE SITES WITH POSITIVE RHEUMATOID FACTOR (HCC): ICD-10-CM

## 2023-10-31 DIAGNOSIS — E78.2 MIXED HYPERLIPIDEMIA: ICD-10-CM

## 2023-10-31 DIAGNOSIS — J45.40 MODERATE PERSISTENT ASTHMA WITHOUT COMPLICATION: ICD-10-CM

## 2023-10-31 PROBLEM — M25.529 PAIN IN ELBOW: Status: ACTIVE | Noted: 2017-06-01

## 2023-10-31 PROBLEM — M79.643 HAND PAIN: Status: ACTIVE | Noted: 2023-10-31

## 2023-10-31 LAB — SL AMB POCT HEMOGLOBIN AIC: 8.7 (ref ?–6.5)

## 2023-10-31 PROCEDURE — 99214 OFFICE O/P EST MOD 30 MIN: CPT | Performed by: INTERNAL MEDICINE

## 2023-10-31 PROCEDURE — 83036 HEMOGLOBIN GLYCOSYLATED A1C: CPT | Performed by: INTERNAL MEDICINE

## 2023-10-31 RX ORDER — NYSTATIN 100000 [USP'U]/G
POWDER TOPICAL 2 TIMES DAILY
Qty: 60 G | Refills: 6 | Status: SHIPPED | OUTPATIENT
Start: 2023-10-31

## 2023-10-31 RX ORDER — DULOXETIN HYDROCHLORIDE 30 MG/1
30 CAPSULE, DELAYED RELEASE ORAL DAILY
COMMUNITY
Start: 2023-10-24

## 2023-10-31 NOTE — PROGRESS NOTES
INTERNAL MEDICINE OFFICE VISIT  [facility]  Rosie Berry  Kana Hortencia Barlow, [address] [facility]  Tel: [phone]      NAME: Chris Olvera  AGE: 66 y.o. SEX: female  : 1945   MRN: [id number]    DATE: 10/31/2023  TIME: 5:06 PM      Assessment and Plan:  1. Type 2 diabetes mellitus with stage 3 chronic kidney disease, with long-term current use of insulin, unspecified whether stage 3a or 3b CKD ([facility])  Not very well controlled with a A1c of 8.9. Spoke with the son about it and he says that she misses her insulin dosages. He will make sure that he will give her insulin daily himself. I will recheck labs in 4 months    - POCT hemoglobin A1c  - Hemoglobin A1C; Future  - Comprehensive metabolic panel; Future  - CBC and differential; Future  - TSH, 3rd generation with Free T4 reflex; Future  - Lipid Panel with Direct LDL reflex; Future  - Microalbumin, Random Urine (W/Creatinine) ([facility] ONLY); Future  - Microalbumin, Random Urine (W/Creatinine) ([facility] ONLY)    2. Essential hypertension  Continue present medications    3. Mixed hyperlipidemia  Continue a low-fat diet     4. Bilateral lower extremity edema  Continue torsemide    5. Chronic congestive heart failure, unspecified heart failure type [facility])  Follow-up with cardiology    6. Chronic pain disorder  Continue Dilaudid as per the pain management doctor    7. Chronic respiratory failure with hypoxia (HCC)  Continue inhalers    8. Moderate persistent asthma without complication  Follow-up with pulmonology    9. Rheumatoid arthritis involving multiple sites with positive rheumatoid factor ([facility])  Patient has severe rheumatoid arthritis with multiple joint involvements. She is almost crippled due to the rheumatoid arthritis and the severe pain it is causing her. She is on Dilaudid as per the pain management doctor but it is not enough to control her pain.   It is impossible for her to safely use a cane or walker as she is not able to do it due to the severe arthritis and pain. Also her upper extremities have deformities due to the rheumatoid arthritis and the extreme pain and deformity will not allow her to self propel an optimally configured manual wheelchair in the home. She has limited range of motion of her fingers due to the deformity. For the above reasons, she needs a power wheelchair. 10. Fungal infection of skin    - nystatin (MYCOSTATIN) powder; Apply topically 2 (two) times a day groin  Dispense: 60 g; Refill: 6    11. Pressure injury of right buttock, stage 1  A gel pressure relief cushion was ordered as she is developing areas of pressure ulcers in her bottom. - Counseling Documentation: patient was counseled regarding: diagnostic results  - Medication Side Effects: Adverse side effects of medications were reviewed with the patient/guardian today. Return for follow up visit in 4 months or earlier, if needed. Chief Complaint:  Chief Complaint   Patient presents with    Follow-up         History of Present Illness:   Patient has been an extreme pain due to the deteriorating symptoms of rheumatoid arthritis despite taking Dilaudid every 4 hours. She has not been giving herself the insulin every day and due to that her type 2 diabetes is uncontrolled with a A1c of 8.9. Blood pressure and cholesterol are stable  She is developing pressure ulcers on her buttocks and sacral area.   She also has severe fungal rash under her skin folds for which nystatin powder was given        Active Problem List:  Patient Active Problem List   Diagnosis    Bilateral lower extremity edema    Mixed hyperlipidemia    Essential hypertension    Morbid obesity ([address])    Ambulatory dysfunction    Recurrent major depressive disorder, in partial remission ([address])    Type 2 diabetes mellitus with stage 3 chronic kidney disease, with long-term current use of insulin ([facility])    Gastroesophageal reflux disease without esophagitis    Primary insomnia    Moderate persistent asthma    Psoriasis vulgaris    Rheumatoid arthritis involving multiple sites with positive rheumatoid factor (HCC)    Urinary incontinence    Vitamin D deficiency    Varicose veins of both lower extremities    Obesity (BMI 30-39. 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)    Elevated d-dimer    Continuous opioid dependence (HCC)    SIRS (systemic inflammatory response syndrome) (HCC)    Hypokalemia    Age-related osteoporosis without current pathological fracture    Chronic pain disorder    CHF (congestive heart failure) (HCC)    Pulmonary fibrosis (HCC)    Edema of lower extremity    Chronic kidney disease, stage 2 (mild)    Acute on chronic diastolic (congestive) heart failure (HCC)    Hand pain    Pain in elbow    Shoulder pain    Pressure injury of right buttock, stage 1         Past Medical History:  Past Medical History:   Diagnosis Date    AMS (altered mental status) 5/24/2022    Asthma     Chest pain on breathing     last assessed 2/5/15    Chronic diastolic CHF (congestive heart failure) (HCC)     Chronic respiratory failure with hypoxia (HCC)     Diabetes mellitus ([address])     Exertional chest pain 7/14/2021    HTN (hypertension)     Hyperlipidemia     Insomnia     Obesity     Preop cardiovascular exam 2/2/2018    Pulmonary fibrosis ([address])     Rheumatoid arthritis ([address])     SIRS (systemic inflammatory response syndrome) ([address]) 5/22/2022    Volume overload 12/10/2021         Past Surgical History:  Past Surgical History:   Procedure Laterality Date    HAND SURGERY           Family History:  Family History Problem Relation Age of Onset    Rheum arthritis Mother     Hypertension Mother          Social History:  Social History     Socioeconomic History    Marital status: /Civil Union     Spouse name: None    Number of children: None    Years of education: None    Highest education level: None   Occupational History    Occupation: retired   Tobacco Use    Smoking status: Former     Packs/day: 1.50     Years: 18.00     Total pack years: 27.00     Types: Cigarettes     Start date:      Quit date:      Years since quittin.8    Smokeless tobacco: Never    Tobacco comments:     Quit 20 years ago   Vaping Use    Vaping Use: Former   Substance and Sexual Activity    Alcohol use: Never    Drug use: No    Sexual activity: Never   Other Topics Concern    None   Social History Narrative    No advance directives on file     Social Determinants of Health     Financial Resource Strain: Low Risk  (2023)    Overall Financial Resource Strain (CARDIA)     Difficulty of Paying Living Expenses: Not hard at all   Food Insecurity: No Food Insecurity (10/6/2023)    Hunger Vital Sign     Worried About Running Out of Food in the Last Year: Never true     [address] in the Last Year: Never true   Transportation Needs: No Transportation Needs (10/6/2023)    PRAPARE - Transportation     Lack of Transportation (Medical): No     Lack of Transportation (Non-Medical): No   Physical Activity: Inactive (10/8/2019)    Exercise Vital Sign     Days of Exercise per Week: 0 days     Minutes of Exercise per Session: 0 min   Stress: No Stress Concern Present (10/8/2019)    [facility]     Feeling of Stress :  Only a little   Social Connections: Not on file   Intimate Partner Violence: Not on file   Housing Stability: Low Risk  (10/6/2023)    Housing Stability Vital Sign     Unable to Pay for Housing in the Last Year: No     Number of Places Lived in the Last Year: 1 Unstable Housing in the Last Year: No         Allergies: Allergies   Allergen Reactions    Gabapentin     Vancomycin          Medications:    Current Outpatient Medications:     albuterol (2.5 mg/3 mL) 0.083 % nebulizer solution, Take 3 mL (2.5 mg total) by nebulization every 6 (six) hours as needed for wheezing or shortness of breath, Disp: 375 mL, Rfl: 2    albuterol (PROVENTIL HFA,VENTOLIN HFA) 90 mcg/act inhaler, Inhale 1 puff every 6 (six) hours as needed for wheezing or shortness of breath, Disp: 18 Inhaler, Rfl: 3    B-D TB SYRINGE 1CC/27GX1/2" 27G X 1/2" 1 ML MISC, by Other route 2 (two) times a day, Disp: 100 each, Rfl: 3    B-D UF III MINI PEN NEEDLES 31G X 5 MM MISC, USE TO INJECT TWICE A DAY AS DIRECTED, Disp: 200 each, Rfl: 3    Blood Glucose Monitoring Suppl (OneTouch Verio) w/Device KIT, Use 3 (three) times a day, Disp: 1 kit, Rfl: 0    Cimzia Starter Kit 6 X 200 MG/ML KIT, , Disp: , Rfl:     Continuous Blood Gluc  (Dexcom G6 ) SERGEI, 1 DEXCOM G6  FOR CONTINUOUS GLUCOSE MONITORING, Disp: 1 each, Rfl: 0    Continuous Blood Gluc  (FreeStyle Kyler 2 [place]) SERGEI, Check blood sugars multiple times per day, Disp: 1 each, Rfl: 0    Continuous Blood Gluc Sensor (FreeStyle Kyler 2 Sensor) MISC, Check blood sugars multiple times per day, Disp: 6 each, Rfl: 3    Continuous Blood Gluc Transmit (Dexcom G6 Transmitter) MISC, 1 TRANSMITTED EVERY 3 MONTHS FOR CONTINUOUS GLUCOSE MONITORING, Disp: 1 each, Rfl: 3    Diclofenac Sodium (VOLTAREN) 1 %, Apply 2 g topically in the morning and 2 g at noon and 2 g in the evening and 2 g before bedtime. , Disp: 50 g, Rfl: 0    DULoxetine (CYMBALTA) 30 mg delayed release capsule, Take 30 mg by mouth daily, Disp: , Rfl:     insulin degludec Candy Do FlexTouch) 100 units/mL injection pen, Inject 35 Units under the skin daily, Disp: 45 mL, Rfl: 0    metFORMIN (GLUCOPHAGE) 1000 MG tablet, TAKE 1 TABLET BY MOUTH TWICE A DAY, Disp: 180 tablet, Rfl: 3    methocarbamol (ROBAXIN) 500 mg tablet, Take 1 tablet (500 mg total) by mouth 2 (two) times a day as needed for muscle spasms (side pain), Disp: 28 tablet, Rfl: 0    mirtazapine (REMERON) 15 mg tablet, Take 1 tablet (15 mg total) by mouth daily at bedtime, Disp: 90 tablet, Rfl: 3    nystatin (MYCOSTATIN) powder, Apply topically 2 (two) times a day groin, Disp: 60 g, Rfl: 6    OneTouch Ultra test strip, PATIENT TO TEST ONCE A DAY, Disp: 100 strip, Rfl: 3    Senexon-S 8.6-50 MG per tablet, Take 1 tablet by mouth daily, Disp: , Rfl:     torsemide (DEMADEX) 20 mg tablet, Take 1 tablet (20 mg total) by mouth daily, Disp: 360 tablet, Rfl: 0    fluticasone-umeclidinium-vilanterol (Trelegy Ellipta) 100-62.5-25 mcg/actuation inhaler, Inhale 1 puff daily Rinse mouth after use., Disp: 60 blister, Rfl: 5      The following portions of the patient's history were reviewed and updated as appropriate: past medical history, past surgical history, family history, social history, allergies, current medications and active problem list.      Review of Systems:  Constitutional: Denies fever, chills, weight gain, weight loss, fatigue  Eyes: Denies eye redness, eye discharge, double vision, change in visual acuity  ENT: Denies hearing loss, tinnitus, sneezing, nasal congestion, nasal discharge, sore throat   Respiratory: Denies cough, expectoration, hemoptysis, shortness of breath, wheezing  Cardiovascular: Denies chest pain, palpitations, lower extremity swelling, orthopnea, PND  Gastrointestinal: Denies abdominal pain, heartburn, nausea, vomiting, hematemesis, diarrhea, bloody stools  Genito-Urinary: Denies dysuria, frequency, difficulty in micturition, nocturia, incontinence  Musculoskeletal: Complains of back pain, multiple joint pain, muscle pain  Neurologic: Denies confusion, lightheadedness, syncope, headache, focal weakness, sensory changes, seizures  Endocrine: Denies polyuria, polydipsia, temperature intolerance  Allergy and Immunology: Denies hives, insect bite sensitivity  Hematological and Lymphatic: Denies bleeding problems, swollen glands   Psychological: Denies depression, suicidal ideation, anxiety, panic, mood swings  Dermatological: Denies pruritus, rash, skin lesion changes      Vitals:  Vitals:    10/31/23 1531   BP: 114/62   Pulse: 97   Resp: 18   Temp: 98.1 °F (36.7 °C)   SpO2: 92%       Body mass index is 39.27 kg/m². Weight (last 2 days)       None              Physical Examination:  General: Patient is  in acute distress due to the pain. Awake, alert, responding to commands. No weight gain or loss  Head: Normocephalic. Atraumatic  Eyes: Conjunctiva and lids with no swelling, erythema or discharge. Both pupils normal sized, round and reactive to light. Sclera nonicteric  ENT: External examination of nose and ear normal. Otoscopic examination shows translucent tympanic membranes with patent canals without erythema. Oropharynx moist with no erythema, edema, exudate or lesions  Neck: Supple. JVP not raised. Trachea midline. No masses. No thyromegaly  Lungs: No signs of increased work of breathing or respiratory distress. Bilateral bronchovascular breath sounds with no crackles or rhonchi  Chest wall: No tenderness  Cardiovascular: Normal PMI. No thrills. Regular rate and rhythm. S1 and S2 normal. No murmur, rub or gallop  Gastrointestinal: Abdomen soft, nontender. No guarding or rigidity. Liver and spleen not palpable. Bowel sounds present  Neurologic: Cranial nerves II-XII intact.  Cortical functions normal. Motor system - Reflexes 2+ and symmetrical. Sensations normal  Musculoskeletal: Has multiple joint tenderness  Integumentary: Skin normal with no rash or lesions  Lymphatic: No palpable lymph nodes in neck, axilla or groin  Extremities: No clubbing, cyanosis, edema or varicosities  Psychological: Judgement and insight normal. Mood and affect normal      Laboratory Results:  CBC with diff:   Lab Results   Component Value Date    WBC 13.66 (H) 10/09/2023    WBC 7.94 01/05/2016    RBC 3.60 (L) 10/09/2023    RBC 4.39 01/05/2016    HGB 10.4 (L) 10/09/2023    HGB 12.9 01/05/2016    HCT 33.3 (L) 10/09/2023    HCT 40.9 01/05/2016    MCV 93 10/09/2023    MCV 93 01/05/2016    MCH 28.9 10/09/2023    MCH 29.4 01/05/2016    RDW 14.6 10/09/2023    RDW 15.4 (H) 01/05/2016     (H) 10/09/2023     01/05/2016       CMP:  Lab Results   Component Value Date    CREATININE 1.37 (H) 10/09/2023    CREATININE 0.74 01/05/2016    BUN 31 (H) 10/09/2023    BUN 18 01/05/2016     (L) 01/05/2016    K 4.8 10/09/2023    K 4.5 01/05/2016     10/09/2023     01/05/2016    CO2 28 10/09/2023    CO2 26 01/05/2016    GLUCOSE 159 (H) 01/05/2016    PROT 7.3 01/05/2016    ALKPHOS 97 10/08/2023    ALKPHOS 151 (H) 01/05/2016    ALT 8 10/08/2023    ALT 25 01/05/2016    AST 15 10/08/2023    AST 14 01/05/2016    BILIDIR 0.31 (H) 05/23/2022       Lab Results   Component Value Date    HGBA1C 8.7 (A) 10/31/2023    HGBA1C 11.1 (H) 05/06/2023    MG 1.8 (L) 10/09/2023    PHOS 2.7 12/08/2019       Lab Results   Component Value Date    TROPONINI <0.02 07/15/2021    TROPONINI <0.02 07/15/2021    TROPONINI <0.02 07/14/2021    CKMB <1.0 05/22/2022    CKTOTAL 159 05/22/2022       Lipid Profile:   Lab Results   Component Value Date    CHOL 230 01/05/2016    CHOL 149 09/15/2015     Lab Results   Component Value Date    HDL 89 02/22/2023    HDL 64 07/14/2021     Lab Results   Component Value Date    LDLCALC 104 (H) 02/22/2023    LDLCALC 134 (H) 07/14/2021     Lab Results   Component Value Date    TRIG 182 (H) 02/22/2023    TRIG 182 (H) 07/14/2021       Imaging Results:  VAS upper limb venous duplex scan, unilateral/limited     [facility] REPORT  CLINICAL:  Indications:  Patient presents with chronic right upper extremity edema and pain for several  months.   Operative History:  no cardiovascular surgeries  Risk Factors  The patient has history of Obesity, HTN, Diabetes (IDDM), Hyperlipidemia and  CKD. FINDINGS:     Right                      Thrombus    Cephalic Upper Arm Distal  Chronic     Ceph AC                    Chronic              CONCLUSION:     Impression  RIGHT UPPER LIMB:  No evidence of acute or chronic deep vein thrombosis. Evidence of chronic non occlusive superficial thrombophlebitis noted in the  cephalic vein in the antecubital space and into the distal upper arm. Doppler evaluation shows a normal response to augmentation maneuvers. LEFT UPPER LIMB LIMITED:  Evaluation shows no evidence of thrombus in the internal jugular vein,  subclavian vein, and the innominate vein. Technical findings were given to [employer] on the floor. SIGNATURE:  Electronically Signed by: Soraida Lindsey on 2023-10-05 11:28:52 AM  XR chest 1 view portable  Narrative: CHEST    INDICATION:   sob. COMPARISON: CXR 5/24/2022 and chest CT  10/04/2023. EXAM PERFORMED/VIEWS:  XR CHEST PORTABLE. FINDINGS:    Mild cardiomegaly. Mild pulmonary venous congestion. No effusion or pneumothorax. Upper abdomen normal. Bones normal for age. Tiny calcified loose body left glenohumeral joint. Impression: Mild pulmonary venous congestion. Workstation performed: [id number]  CTA dissection protocol chest/abdomen/pelvis  Narrative: CTA - CHEST, ABDOMEN AND PELVIS - WITHOUT AND WITH IV CONTRAST    INDICATION:   chest pain, sob, elevated d dimer. COMPARISON: CT chest abdomen and pelvis 5/22/2022    TECHNIQUE: CT examination of the chest, abdomen and pelvis was performed both prior to and after the administration of intravenous contrast.   The noncontrast portion of this examination was performed utilizing low radiation dose technique. Thin   section angiographic arterial phase post contrast technique was used in order to evaluate for aortic dissection. 3D reformatted images and volume rendering were performed on an independent workstation. Multiplanar 2D reformatted images were created   from the source data. Radiation dose length product (DLP) for this visit:  1991 mGy-cm . This examination, like all CT scans performed in the [facility], was performed utilizing techniques to minimize radiation dose exposure, including the use of iterative   reconstruction and automated exposure control. IV Contrast:  100 mL of iohexol (OMNIPAQUE) 350  Enteric Contrast:  Enteric contrast was not administered. FINDINGS:    AORTA:  There is no aortic dissection or intramural hematoma. There is no aortic aneurysm. Atherosclerotic calcification of the aorta and its major branches in the chest abdomen and pelvis without occlusion or significant stenosis. Normal variant direct aortic arch origin of the left vertebral artery. Mild stenosis at the origin of the left   renal artery. Chronic prominent superior indentation of the proximal celiac artery. This can be seen as a normal variant or in the setting of median arcuate ligament syndrome. CHEST    LUNGS: No infiltrate. Upper lobe predominant emphysematous changes. Bilateral multi lobar peripheral and basilar predominant reticular scarring with scattered areas of honeycombing. No pulmonary mass. Central airways are clear. PLEURA:  Unremarkable. HEART/PULMONARY ARTERIAL TREE: Heart is enlarged. No pericardial effusion. Coronary artery calcification. Within the limitations of this examination there is no evidence of pulmonary embolus. MEDIASTINUM AND ISRRAEL: Small mediastinal and right hilar calcified granulomata. No enlarged lymph nodes. Tiny sliding hiatal hernia. CHEST WALL AND LOWER NECK: Bilateral axillary and subpectoral subcentimeter short axis lymph nodes not significantly changed since May 2022 allowing for difference in technique and arm positioning. ABDOMEN    LIVER/BILIARY TREE: Punctate left hepatic calcified granuloma. Liver otherwise unremarkable.  No biliary dilation. GALLBLADDER: There are gallstone(s) within the gallbladder, without pericholecystic inflammatory changes. SPLEEN: Punctate splenic calcified granulomata. Spleen otherwise unremarkable. PANCREAS: Diffuse fatty infiltration of the pancreas. ADRENAL GLANDS:  Unremarkable. KIDNEYS/URETERS: 1.2 cm left renal lower pole simple cyst. Punctate right renal hypodensity too small to characterize statistically a simple cyst. Mild right pelvicaliectasis and segmental prominence of the ureter likely transient distention. No urinary   tract calculi. No perinephric collection. STOMACH AND BOWEL: Sigmoid diverticulosis without diverticulitis. APPENDIX:  No findings to suggest appendicitis. ABDOMINOPELVIC CAVITY:  No ascites or free intraperitoneal air. No lymphadenopathy. PELVIS    REPRODUCTIVE ORGANS:  Unremarkable for patient's age. URINARY BLADDER:  Unremarkable. ABDOMINAL WALL/INGUINAL REGIONS: Small fat-containing umbilical hernia. OSSEOUS STRUCTURES: No acute fracture or osseous destructive lesion identified. Stable moderate chronic compression fracture of T4 degenerative changes of the spine, pubic symphysis, and multiple joints. Impression: CTA chest:    No aortic dissection or aneurysm. No aortic or major branch vessel occlusion or significant stenosis. No evidence of acute thoracic process. 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 Additional chronic findings and negatives as above. CT abdomen and pelvis:    No aortic dissection or aneurysm. No aortic or major branch vessel occlusion or significant stenosis. No evidence of acute abdominopelvic process. Cholelithiasis. Sigmoid diverticulosis. Additional chronic findings and negatives as above.     Findings are consistent with the preliminary report from [facility] which was provided shortly after completion of the exam.    Workstation performed: [id number]       Health Maintenance:  Health Maintenance   Topic Date Due    Breast Cancer Screening: Mammogram  Never done    COVID-19 Vaccine (4 - Booster for Moderna series) 04/17/2022    DXA SCAN  06/01/2022    BMI: Followup Plan  01/25/2023    Diabetic Foot Exam  01/25/2023    DM Eye Exam  01/25/2023    Influenza Vaccine (1) 09/01/2023    Depression Remission PHQ  12/28/2023    Fall Risk  02/22/2024    Urinary Incontinence Screening  02/22/2024    Medicare Annual Wellness Visit (AWV)  02/22/2024    HEMOGLOBIN A1C  04/30/2024    BMI: Adult  10/07/2024    Hepatitis C Screening  Completed    Osteoporosis Screening  Completed    Pneumococcal Vaccine: 65+ Years  Completed    HIB Vaccine  Aged Out    IPV Vaccine  Aged Out    Hepatitis A Vaccine  Aged Out    Meningococcal ACWY Vaccine  Aged Out    HPV Vaccine  Aged Out    Colorectal Cancer Screening  Discontinued     Immunization History   Administered Date(s) Administered    COVID-19 MODERNA VACC 0.5 ML IM 06/20/2021, 07/18/2021    INFLUENZA 09/01/2015, 12/20/2016, 11/10/2017, 02/20/2022    Influenza Split High Dose Preservative Free IM 12/20/2016, 11/10/2017    Influenza, high dose seasonal 0.7 mL 10/17/2018, 09/29/2019, 11/17/2020    Influenza, seasonal, injectable 10/01/2014, 09/01/2015    Pneumococcal Conjugate 13-Valent 07/03/2019    Pneumococcal Polysaccharide PPV23 04/21/2008, 12/20/2016    Tdap 1945    Zoster Vaccine Recombinant 01/28/2020, 07/21/2020         Maricarmen Gonzalez MD  10/31/2023,5:06 PM

## 2023-11-02 ENCOUNTER — TELEPHONE (OUTPATIENT)
Age: 78
End: 2023-11-02

## 2023-11-02 DIAGNOSIS — L29.9 ITCHING: Primary | ICD-10-CM

## 2023-11-02 RX ORDER — CLOBETASOL PROPIONATE 0.5 MG/G
CREAM TOPICAL 2 TIMES DAILY
Qty: 60 G | Refills: 3 | Status: SHIPPED | OUTPATIENT
Start: 2023-11-02

## 2023-11-02 NOTE — TELEPHONE ENCOUNTER
VISITING  NURSE   CALLED  ABOUT   GETTING   RX   FOR   HER   ITCH EY   SKIN    WILL  BE   FAXING  AN  ORDER    OVER   FOR  DR [address]    TO   ORDER   SOME   CREAM

## 2023-11-02 NOTE — TELEPHONE ENCOUNTER
Faxed confirmed today to fax number given for gel cushion, Dr. Luda Johnson please let me know about ointment so I can call visiting nurse.

## 2023-11-02 NOTE — TELEPHONE ENCOUNTER
Coleman Esquivel is leaving a Fax#: [phone] for the dr to use to Fax an order for a cushion to [employer]

## 2023-11-02 NOTE — TELEPHONE ENCOUNTER
I faxed it over to [facility], Left voice mssg for bryce at [facility], I faxed to [employer] [facility] in the mean time so there will be no delay for the patient.

## 2023-11-02 NOTE — TELEPHONE ENCOUNTER
Patient's son LVM -- Dr. Ole Mcmullen prescribed a freestyle [place] and unfortunately the [facility] cannot refill the prescription because they're saying that Dr Ole Mcmullen needs to send in some notes stating that she requires herself to be injected every day and her glucose needs to be monitored daily, several times a day.      [facility] needs documentation on this

## 2023-11-07 ENCOUNTER — TELEPHONE (OUTPATIENT)
Age: 78
End: 2023-11-07

## 2023-11-07 NOTE — TELEPHONE ENCOUNTER
Librado North from [facility] states Chela is having weeping of her legs, this has been going on since last week. Librado North thinks she would be a good candidate for unna boots 2x a week. .  Pt has dry plaques now they are breaking down and getting very wet, unna boots would help this.    Call back Librado North [phone]

## 2023-11-14 ENCOUNTER — TELEPHONE (OUTPATIENT)
Age: 78
End: 2023-11-14

## 2023-11-14 DIAGNOSIS — L08.9 WOUND INFECTION: Primary | ICD-10-CM

## 2023-11-14 DIAGNOSIS — T14.8XXA WOUND INFECTION: Primary | ICD-10-CM

## 2023-11-14 RX ORDER — SULFAMETHOXAZOLE AND TRIMETHOPRIM 800; 160 MG/1; MG/1
1 TABLET ORAL 2 TIMES DAILY
Qty: 20 TABLET | Refills: 0 | Status: ON HOLD | OUTPATIENT
Start: 2023-11-14 | End: 2023-11-24

## 2023-11-14 NOTE — TELEPHONE ENCOUNTER
Mac teixeira is working, but there is a green drainage and has a fowl order. The skin looks good but not the drainage. Pt will check again on Friday with the pt, does [place] want to start an antibiotic.   Question Call Edmundo Fisher [phone]

## 2023-11-24 ENCOUNTER — TELEPHONE (OUTPATIENT)
Age: 78
End: 2023-11-24

## 2023-11-24 NOTE — TELEPHONE ENCOUNTER
Called spoke to lauren at [phone] and she will have [place] call us as we need to know what type of bed and what is needed with it.

## 2023-11-24 NOTE — TELEPHONE ENCOUNTER
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 I'm following up at this time at the Son's is requesting a hospital bed for Chela due to her inability to walk ambulate distances not showering, and she has a lot of pain. She has all the oxygen and she's dependent on it, so to improve her breaking down due to her inability or unwillingness to move, they were hoping to get a hospital bed for that. If you please give me not one.  See if I can coordinate any information you might need so that this order for her. [facility] phone [phone]

## 2023-11-24 NOTE — TELEPHONE ENCOUNTER
Left message    Marty Machado. 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 I got a call from my office. I thought maybe you might be looking for me. I do apologize if I didn't leave my number. My number is [phone] . I was calling regarding that hospital bed for Chela to see if Doctor Luh Nash would write the order and you can give me a call back at my number to discuss [phone]. Thank you.

## 2023-11-25 ENCOUNTER — APPOINTMENT (EMERGENCY)
Dept: CT IMAGING | Facility: HOSPITAL | Age: 78
DRG: 637 | End: 2023-11-25
Payer: MEDICARE

## 2023-11-25 ENCOUNTER — APPOINTMENT (EMERGENCY)
Dept: RADIOLOGY | Facility: HOSPITAL | Age: 78
DRG: 637 | End: 2023-11-25
Payer: MEDICARE

## 2023-11-25 ENCOUNTER — HOSPITAL ENCOUNTER (INPATIENT)
Facility: HOSPITAL | Age: 78
LOS: 5 days | Discharge: HOME HEALTH | DRG: 637 | End: 2023-11-30
Attending: EMERGENCY MEDICINE | Admitting: INTERNAL MEDICINE
Payer: MEDICARE

## 2023-11-25 DIAGNOSIS — I50.9 CHRONIC CONGESTIVE HEART FAILURE, UNSPECIFIED HEART FAILURE TYPE (HCC): ICD-10-CM

## 2023-11-25 DIAGNOSIS — M05.79 RHEUMATOID ARTHRITIS INVOLVING MULTIPLE SITES WITH POSITIVE RHEUMATOID FACTOR (HCC): ICD-10-CM

## 2023-11-25 DIAGNOSIS — F33.41 RECURRENT MAJOR DEPRESSIVE DISORDER, IN PARTIAL REMISSION (HCC): ICD-10-CM

## 2023-11-25 DIAGNOSIS — Z79.4 TYPE 2 DIABETES MELLITUS WITH STAGE 3 CHRONIC KIDNEY DISEASE, WITH LONG-TERM CURRENT USE OF INSULIN (HCC): ICD-10-CM

## 2023-11-25 DIAGNOSIS — R23.9 UNSPECIFIED SKIN CHANGES: ICD-10-CM

## 2023-11-25 DIAGNOSIS — Z79.891 CHRONIC PRESCRIPTION OPIATE USE: Chronic | ICD-10-CM

## 2023-11-25 DIAGNOSIS — E66.9 OBESITY (BMI 30-39.9): ICD-10-CM

## 2023-11-25 DIAGNOSIS — L89.311 PRESSURE INJURY OF RIGHT BUTTOCK, STAGE 1: ICD-10-CM

## 2023-11-25 DIAGNOSIS — E16.2 HYPOGLYCEMIA: Primary | ICD-10-CM

## 2023-11-25 DIAGNOSIS — N18.30 TYPE 2 DIABETES MELLITUS WITH STAGE 3 CHRONIC KIDNEY DISEASE, WITH LONG-TERM CURRENT USE OF INSULIN (HCC): ICD-10-CM

## 2023-11-25 DIAGNOSIS — N17.9 ACUTE KIDNEY INJURY (HCC): ICD-10-CM

## 2023-11-25 DIAGNOSIS — E11.22 TYPE 2 DIABETES MELLITUS WITH STAGE 3 CHRONIC KIDNEY DISEASE, WITH LONG-TERM CURRENT USE OF INSULIN (HCC): ICD-10-CM

## 2023-11-25 DIAGNOSIS — E66.01 MORBID OBESITY (HCC): ICD-10-CM

## 2023-11-25 DIAGNOSIS — T14.8XXA WOUND INFECTION: ICD-10-CM

## 2023-11-25 DIAGNOSIS — R65.10 SIRS (SYSTEMIC INFLAMMATORY RESPONSE SYNDROME) (HCC): ICD-10-CM

## 2023-11-25 DIAGNOSIS — L08.9 WOUND INFECTION: ICD-10-CM

## 2023-11-25 PROBLEM — G93.41 ACUTE METABOLIC ENCEPHALOPATHY: Status: ACTIVE | Noted: 2023-11-25

## 2023-11-25 PROBLEM — N18.9 ACUTE KIDNEY INJURY SUPERIMPOSED ON CKD: Status: ACTIVE | Noted: 2019-12-07

## 2023-11-25 PROBLEM — E87.20 LACTIC ACIDOSIS: Status: ACTIVE | Noted: 2023-11-25

## 2023-11-25 PROBLEM — R69: Status: ACTIVE | Noted: 2023-11-25

## 2023-11-25 LAB
2HR DELTA HS TROPONIN: -13 NG/L
4HR DELTA HS TROPONIN: -9 NG/L
ALBUMIN SERPL BCP-MCNC: 2.9 G/DL (ref 3.5–5)
ALP SERPL-CCNC: 102 U/L (ref 34–104)
ALT SERPL W P-5'-P-CCNC: 8 U/L (ref 7–52)
ANION GAP SERPL CALCULATED.3IONS-SCNC: 13 MMOL/L
APTT PPP: 46 SECONDS (ref 23–37)
AST SERPL W P-5'-P-CCNC: 40 U/L (ref 13–39)
ATRIAL RATE: 81 BPM
BACTERIA UR QL AUTO: ABNORMAL /HPF
BASOPHILS # BLD AUTO: 0.09 THOUSANDS/ÂΜL (ref 0–0.1)
BASOPHILS NFR BLD AUTO: 1 % (ref 0–1)
BILIRUB SERPL-MCNC: 0.35 MG/DL (ref 0.2–1)
BILIRUB UR QL STRIP: NEGATIVE
BNP SERPL-MCNC: 115 PG/ML (ref 0–100)
BUN SERPL-MCNC: 46 MG/DL (ref 5–25)
CALCIUM ALBUM COR SERPL-MCNC: 10 MG/DL (ref 8.3–10.1)
CALCIUM SERPL-MCNC: 9.1 MG/DL (ref 8.4–10.2)
CARDIAC TROPONIN I PNL SERPL HS: 16 NG/L
CARDIAC TROPONIN I PNL SERPL HS: 3 NG/L
CARDIAC TROPONIN I PNL SERPL HS: 7 NG/L
CHLORIDE SERPL-SCNC: 95 MMOL/L (ref 96–108)
CLARITY UR: CLEAR
CO2 SERPL-SCNC: 21 MMOL/L (ref 21–32)
COLOR UR: COLORLESS
CREAT SERPL-MCNC: 1.92 MG/DL (ref 0.6–1.3)
EOSINOPHIL # BLD AUTO: 0.2 THOUSAND/ÂΜL (ref 0–0.61)
EOSINOPHIL NFR BLD AUTO: 2 % (ref 0–6)
ERYTHROCYTE [DISTWIDTH] IN BLOOD BY AUTOMATED COUNT: 14.6 % (ref 11.6–15.1)
FLUAV RNA RESP QL NAA+PROBE: NEGATIVE
FLUBV RNA RESP QL NAA+PROBE: NEGATIVE
GFR SERPL CREATININE-BSD FRML MDRD: 24 ML/MIN/1.73SQ M
GLUCOSE SERPL-MCNC: 104 MG/DL (ref 65–140)
GLUCOSE SERPL-MCNC: 107 MG/DL (ref 65–140)
GLUCOSE SERPL-MCNC: 111 MG/DL (ref 65–140)
GLUCOSE SERPL-MCNC: 118 MG/DL (ref 65–140)
GLUCOSE SERPL-MCNC: 142 MG/DL (ref 65–140)
GLUCOSE SERPL-MCNC: 145 MG/DL (ref 65–140)
GLUCOSE SERPL-MCNC: 145 MG/DL (ref 65–140)
GLUCOSE SERPL-MCNC: 151 MG/DL (ref 65–140)
GLUCOSE SERPL-MCNC: 34 MG/DL (ref 65–140)
GLUCOSE SERPL-MCNC: 48 MG/DL (ref 65–140)
GLUCOSE SERPL-MCNC: 57 MG/DL (ref 65–140)
GLUCOSE SERPL-MCNC: 59 MG/DL (ref 65–140)
GLUCOSE SERPL-MCNC: 65 MG/DL (ref 65–140)
GLUCOSE SERPL-MCNC: 88 MG/DL (ref 65–140)
GLUCOSE SERPL-MCNC: 90 MG/DL (ref 65–140)
GLUCOSE SERPL-MCNC: 92 MG/DL (ref 65–140)
GLUCOSE UR STRIP-MCNC: NEGATIVE MG/DL
HCT VFR BLD AUTO: 41.4 % (ref 34.8–46.1)
HGB BLD-MCNC: 13.4 G/DL (ref 11.5–15.4)
HGB UR QL STRIP.AUTO: NEGATIVE
IMM GRANULOCYTES # BLD AUTO: 0.1 THOUSAND/UL (ref 0–0.2)
IMM GRANULOCYTES NFR BLD AUTO: 1 % (ref 0–2)
INR PPP: 1.21 (ref 0.84–1.19)
KETONES UR STRIP-MCNC: NEGATIVE MG/DL
LACTATE SERPL-SCNC: 2.3 MMOL/L (ref 0.5–2)
LACTATE SERPL-SCNC: 2.6 MMOL/L (ref 0.5–2)
LACTATE SERPL-SCNC: 3 MMOL/L (ref 0.5–2)
LEUKOCYTE ESTERASE UR QL STRIP: ABNORMAL
LYMPHOCYTES # BLD AUTO: 1.6 THOUSANDS/ÂΜL (ref 0.6–4.47)
LYMPHOCYTES NFR BLD AUTO: 12 % (ref 14–44)
MCH RBC QN AUTO: 28.3 PG (ref 26.8–34.3)
MCHC RBC AUTO-ENTMCNC: 32.4 G/DL (ref 31.4–37.4)
MCV RBC AUTO: 87 FL (ref 82–98)
MONOCYTES # BLD AUTO: 0.96 THOUSAND/ÂΜL (ref 0.17–1.22)
MONOCYTES NFR BLD AUTO: 7 % (ref 4–12)
NEUTROPHILS # BLD AUTO: 10.37 THOUSANDS/ÂΜL (ref 1.85–7.62)
NEUTS SEG NFR BLD AUTO: 77 % (ref 43–75)
NITRITE UR QL STRIP: NEGATIVE
NON-SQ EPI CELLS URNS QL MICRO: ABNORMAL /HPF
NRBC BLD AUTO-RTO: 0 /100 WBCS
P AXIS: 64 DEGREES
PH UR STRIP.AUTO: 5.5 [PH]
PLATELET # BLD AUTO: 534 THOUSANDS/UL (ref 149–390)
PMV BLD AUTO: 8.9 FL (ref 8.9–12.7)
POTASSIUM SERPL-SCNC: 3.8 MMOL/L (ref 3.5–5.3)
PR INTERVAL: 156 MS
PROCALCITONIN SERPL-MCNC: 0.28 NG/ML
PROT SERPL-MCNC: 8.4 G/DL (ref 6.4–8.4)
PROT UR STRIP-MCNC: NEGATIVE MG/DL
PROTHROMBIN TIME: 16 SECONDS (ref 11.6–14.5)
QRS AXIS: -47 DEGREES
QRSD INTERVAL: 80 MS
QT INTERVAL: 420 MS
QTC INTERVAL: 487 MS
RBC # BLD AUTO: 4.74 MILLION/UL (ref 3.81–5.12)
RBC #/AREA URNS AUTO: ABNORMAL /HPF
RSV RNA RESP QL NAA+PROBE: NEGATIVE
SARS-COV-2 RNA RESP QL NAA+PROBE: NEGATIVE
SODIUM SERPL-SCNC: 129 MMOL/L (ref 135–147)
SP GR UR STRIP.AUTO: 1.01 (ref 1–1.03)
T WAVE AXIS: 24 DEGREES
UROBILINOGEN UR STRIP-ACNC: <2 MG/DL
VENTRICULAR RATE: 81 BPM
WBC # BLD AUTO: 13.32 THOUSAND/UL (ref 4.31–10.16)
WBC #/AREA URNS AUTO: ABNORMAL /HPF

## 2023-11-25 PROCEDURE — 85730 THROMBOPLASTIN TIME PARTIAL: CPT

## 2023-11-25 PROCEDURE — 96367 TX/PROPH/DG ADDL SEQ IV INF: CPT

## 2023-11-25 PROCEDURE — 85610 PROTHROMBIN TIME: CPT

## 2023-11-25 PROCEDURE — 96375 TX/PRO/DX INJ NEW DRUG ADDON: CPT

## 2023-11-25 PROCEDURE — 81001 URINALYSIS AUTO W/SCOPE: CPT

## 2023-11-25 PROCEDURE — 74176 CT ABD & PELVIS W/O CONTRAST: CPT

## 2023-11-25 PROCEDURE — 85025 COMPLETE CBC W/AUTO DIFF WBC: CPT

## 2023-11-25 PROCEDURE — 87077 CULTURE AEROBIC IDENTIFY: CPT | Performed by: PHYSICIAN ASSISTANT

## 2023-11-25 PROCEDURE — 82948 REAGENT STRIP/BLOOD GLUCOSE: CPT

## 2023-11-25 PROCEDURE — 0241U HB NFCT DS VIR RESP RNA 4 TRGT: CPT

## 2023-11-25 PROCEDURE — 83605 ASSAY OF LACTIC ACID: CPT | Performed by: INTERNAL MEDICINE

## 2023-11-25 PROCEDURE — 84145 PROCALCITONIN (PCT): CPT

## 2023-11-25 PROCEDURE — 87086 URINE CULTURE/COLONY COUNT: CPT | Performed by: PHYSICIAN ASSISTANT

## 2023-11-25 PROCEDURE — 83605 ASSAY OF LACTIC ACID: CPT

## 2023-11-25 PROCEDURE — 99285 EMERGENCY DEPT VISIT HI MDM: CPT

## 2023-11-25 PROCEDURE — 84484 ASSAY OF TROPONIN QUANT: CPT

## 2023-11-25 PROCEDURE — 93005 ELECTROCARDIOGRAM TRACING: CPT

## 2023-11-25 PROCEDURE — 99223 1ST HOSP IP/OBS HIGH 75: CPT | Performed by: INTERNAL MEDICINE

## 2023-11-25 PROCEDURE — G1004 CDSM NDSC: HCPCS

## 2023-11-25 PROCEDURE — 36415 COLL VENOUS BLD VENIPUNCTURE: CPT

## 2023-11-25 PROCEDURE — 96365 THER/PROPH/DIAG IV INF INIT: CPT

## 2023-11-25 PROCEDURE — 87040 BLOOD CULTURE FOR BACTERIA: CPT

## 2023-11-25 PROCEDURE — 70450 CT HEAD/BRAIN W/O DYE: CPT

## 2023-11-25 PROCEDURE — 71045 X-RAY EXAM CHEST 1 VIEW: CPT

## 2023-11-25 PROCEDURE — 83880 ASSAY OF NATRIURETIC PEPTIDE: CPT

## 2023-11-25 PROCEDURE — 87186 SC STD MICRODIL/AGAR DIL: CPT | Performed by: PHYSICIAN ASSISTANT

## 2023-11-25 PROCEDURE — 93010 ELECTROCARDIOGRAM REPORT: CPT | Performed by: INTERNAL MEDICINE

## 2023-11-25 PROCEDURE — 80053 COMPREHEN METABOLIC PANEL: CPT

## 2023-11-25 RX ORDER — HEPARIN SODIUM 5000 [USP'U]/ML
5000 INJECTION, SOLUTION INTRAVENOUS; SUBCUTANEOUS EVERY 8 HOURS SCHEDULED
Status: DISCONTINUED | OUTPATIENT
Start: 2023-11-25 | End: 2023-11-30 | Stop reason: HOSPADM

## 2023-11-25 RX ORDER — DEXTROSE MONOHYDRATE 25 G/50ML
50 INJECTION, SOLUTION INTRAVENOUS ONCE
Status: COMPLETED | OUTPATIENT
Start: 2023-11-25 | End: 2023-11-25

## 2023-11-25 RX ORDER — ALBUTEROL SULFATE 2.5 MG/3ML
2.5 SOLUTION RESPIRATORY (INHALATION) EVERY 6 HOURS PRN
Status: CANCELLED | OUTPATIENT
Start: 2023-11-25

## 2023-11-25 RX ORDER — DEXTROSE AND SODIUM CHLORIDE 5; .45 G/100ML; G/100ML
100 INJECTION, SOLUTION INTRAVENOUS CONTINUOUS
Status: DISCONTINUED | OUTPATIENT
Start: 2023-11-25 | End: 2023-11-27

## 2023-11-25 RX ORDER — ALBUTEROL SULFATE 90 UG/1
1 AEROSOL, METERED RESPIRATORY (INHALATION) EVERY 6 HOURS PRN
Status: CANCELLED | OUTPATIENT
Start: 2023-11-25

## 2023-11-25 RX ORDER — ACETAMINOPHEN 325 MG/1
650 TABLET ORAL EVERY 6 HOURS PRN
Status: DISCONTINUED | OUTPATIENT
Start: 2023-11-25 | End: 2023-11-30 | Stop reason: HOSPADM

## 2023-11-25 RX ORDER — DULOXETIN HYDROCHLORIDE 30 MG/1
30 CAPSULE, DELAYED RELEASE ORAL DAILY
Status: DISCONTINUED | OUTPATIENT
Start: 2023-11-25 | End: 2023-11-30 | Stop reason: HOSPADM

## 2023-11-25 RX ORDER — CEFTRIAXONE 2 G/50ML
2000 INJECTION, SOLUTION INTRAVENOUS ONCE
Status: COMPLETED | OUTPATIENT
Start: 2023-11-25 | End: 2023-11-25

## 2023-11-25 RX ORDER — HYDROMORPHONE HYDROCHLORIDE 2 MG/1
4 TABLET ORAL EVERY 8 HOURS PRN
Status: DISCONTINUED | OUTPATIENT
Start: 2023-11-25 | End: 2023-11-26

## 2023-11-25 RX ORDER — HYDROMORPHONE HYDROCHLORIDE 4 MG/1
4 TABLET ORAL ONCE
Status: COMPLETED | OUTPATIENT
Start: 2023-11-25 | End: 2023-11-25

## 2023-11-25 RX ORDER — NYSTATIN 100000 [USP'U]/G
POWDER TOPICAL 2 TIMES DAILY
Status: DISCONTINUED | OUTPATIENT
Start: 2023-11-25 | End: 2023-11-30 | Stop reason: HOSPADM

## 2023-11-25 RX ORDER — HYDROMORPHONE HYDROCHLORIDE 4 MG/1
4 TABLET ORAL EVERY 8 HOURS PRN
Status: ON HOLD | COMMUNITY
End: 2023-11-30 | Stop reason: SDUPTHER

## 2023-11-25 RX ORDER — FLUTICASONE FUROATE AND VILANTEROL 100; 25 UG/1; UG/1
1 POWDER RESPIRATORY (INHALATION) DAILY
Status: DISCONTINUED | OUTPATIENT
Start: 2023-11-25 | End: 2023-11-30 | Stop reason: HOSPADM

## 2023-11-25 RX ORDER — ONDANSETRON 2 MG/ML
4 INJECTION INTRAMUSCULAR; INTRAVENOUS EVERY 6 HOURS PRN
Status: DISCONTINUED | OUTPATIENT
Start: 2023-11-25 | End: 2023-11-30 | Stop reason: HOSPADM

## 2023-11-25 RX ORDER — METHOCARBAMOL 500 MG/1
500 TABLET, FILM COATED ORAL 2 TIMES DAILY PRN
Status: DISCONTINUED | OUTPATIENT
Start: 2023-11-25 | End: 2023-11-30 | Stop reason: HOSPADM

## 2023-11-25 RX ORDER — LISINOPRIL 5 MG/1
5 TABLET ORAL DAILY
COMMUNITY
End: 2023-11-30

## 2023-11-25 RX ORDER — MIRTAZAPINE 15 MG/1
15 TABLET, FILM COATED ORAL
Status: DISCONTINUED | OUTPATIENT
Start: 2023-11-25 | End: 2023-11-30 | Stop reason: HOSPADM

## 2023-11-25 RX ADMIN — UMECLIDINIUM 1 PUFF: 62.5 AEROSOL, POWDER ORAL at 13:37

## 2023-11-25 RX ADMIN — MIRTAZAPINE 15 MG: 15 TABLET, FILM COATED ORAL at 21:52

## 2023-11-25 RX ADMIN — CEFTRIAXONE 2000 MG: 2 INJECTION, SOLUTION INTRAVENOUS at 07:41

## 2023-11-25 RX ADMIN — HYDROMORPHONE HYDROCHLORIDE 4 MG: 2 TABLET ORAL at 23:38

## 2023-11-25 RX ADMIN — GLYCERIN 1 DROP: .002; .002; .01 SOLUTION/ DROPS OPHTHALMIC at 13:37

## 2023-11-25 RX ADMIN — FLUTICASONE FUROATE AND VILANTEROL TRIFENATATE 1 PUFF: 100; 25 POWDER RESPIRATORY (INHALATION) at 13:37

## 2023-11-25 RX ADMIN — HYDROMORPHONE HYDROCHLORIDE 4 MG: 4 TABLET ORAL at 07:26

## 2023-11-25 RX ADMIN — DEXTROSE MONOHYDRATE 50 ML: 25 INJECTION, SOLUTION INTRAVENOUS at 08:04

## 2023-11-25 RX ADMIN — NYSTATIN: 100000 POWDER TOPICAL at 13:11

## 2023-11-25 RX ADMIN — HYDROMORPHONE HYDROCHLORIDE 4 MG: 2 TABLET ORAL at 15:03

## 2023-11-25 RX ADMIN — HEPARIN SODIUM 5000 UNITS: 5000 INJECTION INTRAVENOUS; SUBCUTANEOUS at 13:11

## 2023-11-25 RX ADMIN — HEPARIN SODIUM 5000 UNITS: 5000 INJECTION INTRAVENOUS; SUBCUTANEOUS at 21:52

## 2023-11-25 RX ADMIN — SODIUM CHLORIDE 500 ML: 0.9 INJECTION, SOLUTION INTRAVENOUS at 10:01

## 2023-11-25 RX ADMIN — GLYCERIN 1 DROP: .002; .002; .01 SOLUTION/ DROPS OPHTHALMIC at 21:52

## 2023-11-25 RX ADMIN — DEXTROSE AND SODIUM CHLORIDE 75 ML/HR: 5; .45 INJECTION, SOLUTION INTRAVENOUS at 13:11

## 2023-11-25 RX ADMIN — DEXTROSE AND SODIUM CHLORIDE 100 ML/HR: 5; .45 INJECTION, SOLUTION INTRAVENOUS at 23:33

## 2023-11-25 RX ADMIN — NYSTATIN: 100000 POWDER TOPICAL at 18:21

## 2023-11-25 RX ADMIN — SODIUM CHLORIDE, SODIUM LACTATE, POTASSIUM CHLORIDE, AND CALCIUM CHLORIDE 1000 ML: .6; .31; .03; .02 INJECTION, SOLUTION INTRAVENOUS at 06:17

## 2023-11-25 RX ADMIN — DULOXETINE HYDROCHLORIDE 30 MG: 30 CAPSULE, DELAYED RELEASE ORAL at 13:11

## 2023-11-25 NOTE — ASSESSMENT & PLAN NOTE
Wt Readings from Last 3 Encounters:   11/25/23 85 kg (187 lb 6.3 oz)   10/07/23 91.2 kg (201 lb 1 oz)   06/19/23 86.6 kg (191 lb)     She appears overall euvolemic to dry currently  I will hold her diuretics in the setting of kidney failure, monitor volume status closely

## 2023-11-25 NOTE — ED NOTES
Patient provided with orange juice in attempt to increase glucose level, no signs of distress noted, patient alert and oriented, family at bedside, will recheck glucose level in 15 minutes      Denilson Vela RN  11/25/23 4250

## 2023-11-25 NOTE — ED PROVIDER NOTES
History  Chief Complaint   Patient presents with    Hypoglycemia - Symptomatic     Brought in by ambulance, pt had hypoglycemic episode at home, became AMS, family called 911, EMS administered dextrose 25 g IVP through 18G IV line to left forearm for initial FS 44. Pt is A&Ox3 on arrival, FS 90 at time of triage     Patient is a 70-year-old female who presents to the emergency room via EMS for hypoglycemia. Patient's family at bedside. Per family, reports that at around 3:15 AM this morning they noticed patient was delirious and had an altered mental status. They checked her blood sugar and it was 79. They called EMS. EMS gave dextrose en route. Per family, reports that she has been taking antibiotics since yesterday for an infection in her legs. Unsure what antibiotics. Reports new bilateral eye redness and purulent discharge. Reports they have a home nurse who is aware of a current bedsore and rashes in the patient's skin folds. On arrival, blood sugar 90. Per family, reports that she still seems altered and not herself. Denies any other symptoms at this time. Denies fevers, chills, headache, visual disturbance, chest pain, palpitations, cough, shortness of breath, abdominal pain, nausea, vomiting. Patient has not taken anything for symptom relief. Per family, report that she takes 4 mg of hydromorphone every 8 hours for pain. PDMP checked. Patient takes long-acting insulin, she typically takes in the morning. Last dose 10 AM yesterday. Hypoglycemia - Symptomatic  Associated symptoms: no dizziness, no seizures, no shortness of breath, no vomiting and no weakness        Prior to Admission Medications   Prescriptions Last Dose Informant Patient Reported? Taking?    B-D TB SYRINGE 1CC/27GX1/2" 27G X 1/2" 1 ML MISC  Self No No   Sig: by Other route 2 (two) times a day   B-D UF III MINI PEN NEEDLES 31G X 5 MM MISC   No No   Sig: USE TO INJECT TWICE A DAY AS DIRECTED   Blood Glucose Monitoring Suppl (OneTouch Verio) w/Device KIT   No No   Sig: Use 3 (three) times a day   Cimzia Starter Kit 6 X 200 MG/ML KIT  Self Yes No   Continuous Blood Gluc  (Dexcom G6 ) SERGEI   No No   Si DEXCOM G6  FOR CONTINUOUS GLUCOSE MONITORING   Continuous Blood Gluc  (FreeStyle [place] 2 [place]) SERGEI   No No   Sig: CHECK BLOOD SUGARS MULTIPLE TIMES PER DAY   Continuous Blood Gluc Sensor (FreeStyle Kyler 2 Sensor) MISC   No No   Sig: Check blood sugars multiple times per day   Continuous Blood Gluc Transmit (Dexcom G6 Transmitter) MISC   No No   Si TRANSMITTED EVERY 3 MONTHS FOR CONTINUOUS GLUCOSE MONITORING   DULoxetine (CYMBALTA) 30 mg delayed release capsule 2023  Yes Yes   Sig: Take 30 mg by mouth daily   Diclofenac Sodium (VOLTAREN) 1 % Past Week  No Yes   Sig: Apply 2 g topically in the morning and 2 g at noon and 2 g in the evening and 2 g before bedtime. HYDROmorphone (DILAUDID) 4 mg tablet 2023  Yes Yes   Sig: Take 4 mg by mouth every 8 (eight) hours as needed for moderate pain   OneTouch Ultra test strip   No No   Sig: PATIENT TO TEST ONCE A DAY   Senexon-S 8.6-50 MG per tablet Not Taking  Yes No   Sig: Take 1 tablet by mouth daily   Patient not taking: Reported on 2023   albuterol (2.5 mg/3 mL) 0.083 % nebulizer solution Past Month  No Yes   Sig: Take 3 mL (2.5 mg total) by nebulization every 6 (six) hours as needed for wheezing or shortness of breath   albuterol (PROVENTIL HFA,VENTOLIN HFA) 90 mcg/act inhaler Past Month Self No Yes   Sig: Inhale 1 puff every 6 (six) hours as needed for wheezing or shortness of breath   clobetasol (TEMOVATE) 0.05 % cream Unknown  No No   Sig: Apply topically 2 (two) times a day   fluticasone-umeclidinium-vilanterol (Trelegy Ellipta) 100-62.5-25 mcg/actuation inhaler   No No   Sig: Inhale 1 puff daily Rinse mouth after use.    insulin degludec [place] FlexTouch) 100 units/mL injection pen   No No   Sig: Inject 35 Units under the skin daily   lisinopril (ZESTRIL) 5 mg tablet 11/24/2023  Yes Yes   Sig: Take 5 mg by mouth daily   metFORMIN (GLUCOPHAGE) 1000 MG tablet 11/24/2023  No Yes   Sig: TAKE 1 TABLET BY MOUTH TWICE A DAY   methocarbamol (ROBAXIN) 500 mg tablet Not Taking Self No No   Sig: Take 1 tablet (500 mg total) by mouth 2 (two) times a day as needed for muscle spasms (side pain)   Patient not taking: Reported on 11/25/2023   mirtazapine (REMERON) 15 mg tablet 11/24/2023  No Yes   Sig: Take 1 tablet (15 mg total) by mouth daily at bedtime   nystatin (MYCOSTATIN) powder 11/24/2023  No Yes   Sig: Apply topically 2 (two) times a day groin   sulfamethoxazole-trimethoprim (BACTRIM DS) 800-160 mg per tablet   No No   Sig: Take 1 tablet by mouth 2 (two) times a day for 10 days   torsemide (DEMADEX) 20 mg tablet 11/24/2023  No Yes   Sig: Take 1 tablet (20 mg total) by mouth daily      Facility-Administered Medications: None       Past Medical History:   Diagnosis Date    AMS (altered mental status) 5/24/2022    Asthma     Chest pain on breathing     last assessed 2/5/15    Chronic diastolic CHF (congestive heart failure) (HCC)     Chronic respiratory failure with hypoxia (HCC)     Diabetes mellitus ([address])     Exertional chest pain 7/14/2021    HTN (hypertension)     Hyperlipidemia     Insomnia     Obesity     Preop cardiovascular exam 2/2/2018    Pulmonary fibrosis (HCC)     Rheumatoid arthritis ([address])     SIRS (systemic inflammatory response syndrome) ([address]) 5/22/2022    Volume overload 12/10/2021       Past Surgical History:   Procedure Laterality Date    HAND SURGERY         Family History   Problem Relation Age of Onset    Rheum arthritis Mother     Hypertension Mother      I have reviewed and agree with the history as documented.     E-Cigarette/Vaping    E-Cigarette Use Former User      E-Cigarette/Vaping Substances    Nicotine No     THC No     CBD No     Flavoring No     Other No     Unknown No      Social History     Tobacco Use Smoking status: Former     Packs/day: 1.50     Years: 18.00     Total pack years: 27.00     Types: Cigarettes     Start date:      Quit date:      Years since quittin.9    Smokeless tobacco: Never    Tobacco comments:     Quit 20 years ago   Vaping Use    Vaping Use: Former   Substance Use Topics    Alcohol use: Never    Drug use: No       Review of Systems   Constitutional:  Negative for chills and fever. HENT:  Negative for congestion, ear pain, sore throat, trouble swallowing and voice change. Eyes:  Positive for discharge (Bilateral purulent discharge) and redness (Bilateral). Negative for photophobia and visual disturbance. Respiratory:  Negative for cough and shortness of breath. Cardiovascular:  Negative for chest pain and palpitations. Gastrointestinal:  Negative for abdominal pain, nausea and vomiting. Genitourinary:  Negative for dysuria, flank pain and hematuria. Musculoskeletal:  Negative for arthralgias and back pain. Skin:  Positive for rash (Known rash in the skin folds.) and wound. Negative for color change. Neurological:  Negative for dizziness, seizures, syncope, weakness, light-headedness and headaches. Psychiatric/Behavioral:  Positive for confusion. All other systems reviewed and are negative. Physical Exam  Physical Exam  Vitals and nursing note reviewed. Constitutional:       General: She is not in acute distress. Appearance: She is well-developed. HENT:      Head: Normocephalic and atraumatic. Eyes:      General:         Right eye: Discharge present. Left eye: Discharge present. Conjunctiva/sclera: Conjunctivae normal.   Cardiovascular:      Rate and Rhythm: Normal rate and regular rhythm. Heart sounds: No murmur heard. Pulmonary:      Effort: Pulmonary effort is normal. No respiratory distress. Breath sounds: Rhonchi (Bilateral) present. Abdominal:      Palpations: Abdomen is soft. Tenderness:  There is abdominal tenderness (Diffuse). Musculoskeletal:         General: No swelling. Cervical back: Neck supple. No tenderness. Right lower leg: Edema (Baseline) present. Left lower leg: Edema (Baseline) present. Legs:    Skin:     General: Skin is warm and dry. Capillary Refill: Capillary refill takes less than 2 seconds. Findings: Rash (Under skin folds, followed by visiting nurse.) present. Neurological:      Mental Status: She is alert.    Psychiatric:         Mood and Affect: Mood normal.         Vital Signs  ED Triage Vitals [11/25/23 0513]   Temperature Pulse Respirations Blood Pressure SpO2   97.5 °F (36.4 °C) 78 18 (!) 125/46 98 %      Temp Source Heart Rate Source Patient Position - Orthostatic VS BP Location FiO2 (%)   Oral Monitor Standing for 3 minutes - Orthostatic VS Right arm --      Pain Score       8           Vitals:    11/25/23 0930 11/25/23 1024 11/25/23 1502 11/25/23 2247   BP: 94/56 118/81 (!) 93/42 95/55   Pulse: 73 90 82 83   Patient Position - Orthostatic VS:             Visual Acuity      ED Medications  Medications   nystatin (MYCOSTATIN) powder ( Topical Given 11/25/23 1821)   mirtazapine (REMERON) tablet 15 mg (15 mg Oral Given 11/25/23 2152)   methocarbamol (ROBAXIN) tablet 500 mg (has no administration in time range)   Fluticasone Furoate-Vilanterol 100-25 mcg/actuation 1 puff (1 puff Inhalation Given 11/25/23 1337)     And   umeclidinium 62.5 mcg/actuation inhaler AEPB 1 puff (1 puff Inhalation Given 11/25/23 1337)   DULoxetine (CYMBALTA) delayed release capsule 30 mg (30 mg Oral Given 11/25/23 1311)   Diclofenac Sodium (VOLTAREN) 1 % topical gel 2 g (2 g Topical Not Given 11/25/23 2152)   acetaminophen (TYLENOL) tablet 650 mg (has no administration in time range)   ondansetron (ZOFRAN) injection 4 mg (has no administration in time range)   heparin (porcine) subcutaneous injection 5,000 Units (5,000 Units Subcutaneous Given 11/25/23 2152)   dextrose 5 % and sodium chloride 0.45 % infusion (100 mL/hr Intravenous New Bag 11/25/23 2333)   glycerin-hypromellose- (ARTIFICIAL TEARS) ophthalmic solution 1 drop (1 drop Both Eyes Given 11/25/23 2152)   HYDROmorphone (DILAUDID) tablet 4 mg (4 mg Oral Given 11/25/23 2338)   lactated ringers bolus 1,000 mL (0 mL Intravenous Stopped 11/25/23 0741)   HYDROmorphone (DILAUDID) tablet 4 mg (4 mg Oral Given 11/25/23 0726)   cefTRIAXone (ROCEPHIN) IVPB (premix in dextrose) 2,000 mg 50 mL (0 mg Intravenous Stopped 11/25/23 0804)   dextrose 50 % IV solution 50 mL (50 mL Intravenous Given 11/25/23 0804)   sodium chloride 0.9 % bolus 500 mL (500 mL Intravenous New Bag 11/25/23 1001)   dextrose 50 % IV solution **ADS Override Pull** (50 mL Intravenous Given 11/26/23 0013)       Diagnostic Studies  Results Reviewed       Procedure Component Value Units Date/Time    HS Troponin I 4hr [[id number]]  (Normal) Collected: 11/25/23 1340    Lab Status: Final result Specimen: Blood from Arm, Right Updated: 11/25/23 1410     hs TnI 4hr 7 ng/L      Delta 4hr hsTnI -9 ng/L     Blood culture #1 [[id number]] Collected: 11/25/23 0630    Lab Status: Preliminary result Specimen: Blood from Arm, Left Updated: 11/25/23 1301     Blood Culture Received in Microbiology Lab. Culture in Progress. Blood culture #2 [[id number]] Collected: 11/25/23 0618    Lab Status: Preliminary result Specimen: Blood from Arm, Right Updated: 11/25/23 1301     Blood Culture Received in Microbiology Lab. Culture in Progress. Fingerstick Glucose (POCT) [[id number]]  (Normal) Collected: 11/25/23 1017    Lab Status: Final result Updated: 11/25/23 1030     POC Glucose 111 mg/dl     Lactic acid 2 Hours [[id number]]  (Abnormal) Collected: 11/25/23 0937    Lab Status: Final result Specimen: Blood from Arm, Right Updated: 11/25/23 1008     LACTIC ACID 3.0 mmol/L     Narrative:      Result may be elevated if tourniquet was used during collection.     Fingerstick Glucose (POCT) [[id number]]  (Abnormal) Collected: 11/25/23 0932    Lab Status: Final result Updated: 11/25/23 0933     POC Glucose 145 mg/dl     Fingerstick Glucose (POCT) [[id number]]  (Abnormal) Collected: 11/25/23 0917    Lab Status: Final result Updated: 11/25/23 0918     POC Glucose 142 mg/dl     HS Troponin I 2hr [[id number]]  (Normal) Collected: 11/25/23 0833    Lab Status: Final result Specimen: Blood from Arm, Left Updated: 11/25/23 0903     hs TnI 2hr 3 ng/L      Delta 2hr hsTnI -13 ng/L     Fingerstick Glucose (POCT) [[id number]]  (Abnormal) Collected: 11/25/23 0900    Lab Status: Final result Updated: 11/25/23 0901     POC Glucose 145 mg/dl     Fingerstick Glucose (POCT) [[id number]]  (Abnormal) Collected: 11/25/23 0843    Lab Status: Final result Updated: 11/25/23 0845     POC Glucose 151 mg/dl     Fingerstick Glucose (POCT) [[id number]]  (Normal) Collected: 11/25/23 0819    Lab Status: Final result Updated: 11/25/23 0821     POC Glucose 88 mg/dl     Fingerstick Glucose (POCT) [[id number]]  (Abnormal) Collected: 11/25/23 0801    Lab Status: Final result Updated: 11/25/23 0802     POC Glucose 34 mg/dl     Urine culture [[id number]] Collected: 11/25/23 0723    Lab Status:  In process Specimen: Urine, Clean Catch Updated: 11/25/23 0802    Urine Microscopic [[id number]]  (Abnormal) Collected: 11/25/23 0723    Lab Status: Final result Specimen: Urine, Clean Catch Updated: 11/25/23 0749     RBC, UA 1-2 /hpf      WBC, UA 1-2 /hpf      Epithelial Cells Occasional /hpf      Bacteria, UA Innumerable /hpf     UA w Reflex to Microscopic w Reflex to Culture [[id number]]  (Abnormal) Collected: 11/25/23 0723    Lab Status: Final result Specimen: Urine, Clean Catch Updated: 11/25/23 0740     Color, UA Colorless     Clarity, UA Clear     Specific Gravity, UA 1.006     pH, UA 5.5     Leukocytes, UA Large     Nitrite, UA Negative     Protein, UA Negative mg/dl      Glucose, UA Negative mg/dl      Ketones, UA Negative mg/dl      Urobilinogen, UA <2.0 mg/dl      Bilirubin, UA Negative     Occult Blood, UA Negative    FLU/RSV/COVID - if FLU/RSV clinically relevant [[id number]]  (Normal) Collected: 11/25/23 0618    Lab Status: Final result Specimen: Nares from Nose Updated: 11/25/23 0717     SARS-CoV-2 Negative     INFLUENZA A PCR Negative     INFLUENZA B PCR Negative     RSV PCR Negative    Narrative:      FOR PEDIATRIC PATIENTS - copy/paste COVID Guidelines URL to browser: [email or web address]    SARS-CoV-2 assay is a Nucleic Acid Amplification assay intended for the  qualitative detection of nucleic acid from SARS-CoV-2 in nasopharyngeal  swabs. Results are for the presumptive identification of SARS-CoV-2 RNA. Positive results are indicative of infection with SARS-CoV-2, the virus  causing COVID-19, but do not rule out bacterial infection or co-infection  with other viruses. Laboratories within the [facility] and its  territories are required to report all positive results to the appropriate  public health authorities. Negative results do not preclude SARS-CoV-2  infection and should not be used as the sole basis for treatment or other  patient management decisions. Negative results must be combined with  clinical observations, patient history, and epidemiological information. This test has not been FDA cleared or approved. This test has been authorized by FDA under an Emergency Use Authorization  (EUA). This test is only authorized for the duration of time the  declaration that circumstances exist justifying the authorization of the  emergency use of an in vitro diagnostic tests for detection of SARS-CoV-2  virus and/or diagnosis of COVID-19 infection under section 564(b)(1) of  the Act, 21 U. S.C. 666JBJ-5(B)(4), unless the authorization is terminated  or revoked sooner. The test has been validated but independent review by FDA  and CLIA is pending.     Test performed using [employer]: This RT-PCR assay targets N2,  a region unique to SARS-CoV-2. A conserved region in the E-gene was chosen  for pan-Sarbecovirus detection which includes SARS-CoV-2. According to CMS-2020-01-R, this platform meets the definition of high-throughput technology.     Comprehensive metabolic panel [[id number]]  (Abnormal) Collected: 11/25/23 0654    Lab Status: Final result Specimen: Blood from Arm, Right Updated: 11/25/23 0710     Sodium 129 mmol/L      Potassium 3.8 mmol/L      Chloride 95 mmol/L      CO2 21 mmol/L      ANION GAP 13 mmol/L      BUN 46 mg/dL      Creatinine 1.92 mg/dL      Glucose 48 mg/dL      Calcium 9.1 mg/dL      Corrected Calcium 10.0 mg/dL      AST 40 U/L      ALT 8 U/L      Alkaline Phosphatase 102 U/L      Total Protein 8.4 g/dL      Albumin 2.9 g/dL      Total Bilirubin 0.35 mg/dL      eGFR 24 ml/min/1.73sq m     Narrative:      [place] guidelines for Chronic Kidney Disease (CKD):     Stage 1 with normal or high GFR (GFR > 90 mL/min/1.73 square meters)    Stage 2 Mild CKD (GFR = 60-89 mL/min/1.73 square meters)    Stage 3A Moderate CKD (GFR = 45-59 mL/min/1.73 square meters)    Stage 3B Moderate CKD (GFR = 30-44 mL/min/1.73 square meters)    Stage 4 Severe CKD (GFR = 15-29 mL/min/1.73 square meters)    Stage 5 End Stage CKD (GFR <15 mL/min/1.73 square meters)  Note: GFR calculation is accurate only with a steady state creatinine    Procalcitonin [[id number]]  (Abnormal) Collected: 11/25/23 0618    Lab Status: Final result Specimen: Blood from Arm, Right Updated: 11/25/23 0658     Procalcitonin 0.28 ng/ml     B-Type Natriuretic Peptide(BNP) [[id number]]  (Abnormal) Collected: 11/25/23 0618    Lab Status: Final result Specimen: Blood from Arm, Right Updated: 11/25/23 0657      pg/mL     Lactic acid [[id number]]  (Abnormal) Collected: 11/25/23 0618    Lab Status: Final result Specimen: Blood from Arm, Right Updated: 11/25/23 0655     LACTIC ACID 2.3 mmol/L Narrative:      Result may be elevated if tourniquet was used during collection. HS Troponin 0hr (reflex protocol) [[id number]]  (Normal) Collected: 11/25/23 0618    Lab Status: Final result Specimen: Blood from Arm, Right Updated: 11/25/23 0655     hs TnI 0hr 16 ng/L     Protime-INR [[id number]]  (Abnormal) Collected: 11/25/23 0618    Lab Status: Final result Specimen: Blood from Arm, Right Updated: 11/25/23 0648     Protime 16.0 seconds      INR 1.21    APTT [[id number]]  (Abnormal) Collected: 11/25/23 0618    Lab Status: Final result Specimen: Blood from Arm, Right Updated: 11/25/23 0648     PTT 46 seconds     CBC and differential [[id number]]  (Abnormal) Collected: 11/25/23 0618    Lab Status: Final result Specimen: Blood from Arm, Right Updated: 11/25/23 0629     WBC 13.32 Thousand/uL      RBC 4.74 Million/uL      Hemoglobin 13.4 g/dL      Hematocrit 41.4 %      MCV 87 fL      MCH 28.3 pg      MCHC 32.4 g/dL      RDW 14.6 %      MPV 8.9 fL      Platelets 331 Thousands/uL      nRBC 0 /100 WBCs      Neutrophils Relative 77 %      Immat GRANS % 1 %      Lymphocytes Relative 12 %      Monocytes Relative 7 %      Eosinophils Relative 2 %      Basophils Relative 1 %      Neutrophils Absolute 10.37 Thousands/µL      Immature Grans Absolute 0.10 Thousand/uL      Lymphocytes Absolute 1.60 Thousands/µL      Monocytes Absolute 0.96 Thousand/µL      Eosinophils Absolute 0.20 Thousand/µL      Basophils Absolute 0.09 Thousands/µL     Fingerstick Glucose (POCT) [[id number]]  (Normal) Collected: 11/25/23 0509    Lab Status: Final result Updated: 11/25/23 0510     POC Glucose 90 mg/dl                    CT head without contrast   Final Result by Bushra Allred MD (11/25 0825)      No acute intracranial abnormality. Atrophy and chronic microvascular ischemic changes.                   Workstation performed: [id number]         CT abdomen pelvis wo contrast   Final Result by Bushra Allred MD (18/65 3602)      No acute CT findings in the abdomen or pelvis. Chronic interstitial changes/fibrosis in the visualized lower lobes. Persistent minimal right-sided hydroureteronephrosis without an obstructing ureteral calculus seen. Slight fullness of the left renal collecting system without chintan hydronephrosis or hydroureter. Tiny hiatal hernia. Sigmoid diverticulosis without acute diverticulitis. No bowel obstruction. Normal appendix. Additional findings as above. Workstation performed: [id number]         XR chest portable    (Results Pending)              Procedures  ECG 12 Lead Documentation Only    Date/Time: 11/25/2023 6:33 AM    Performed by: Mike Briceno PA-C  Authorized by: Mike Briceno PA-C    Indications / Diagnosis:  Hypoglycemia, sepsis work up  ECG reviewed by me, the ED Provider: yes    Patient location:  ED  Previous ECG:     Previous ECG:  Compared to current    Comparison ECG info:  Left anterior fascicular block now present  Interpretation:     Interpretation: normal    Rate:     ECG rate:  81    ECG rate assessment: normal    Rhythm:     Rhythm: sinus rhythm    ST segments:     ST segments:  Normal  T waves:     T waves: normal    Comments:      Left anterior fascicular block           ED Course  ED Course as of 11/26/23 0534   Sat Nov 25, 2023   0701 LACTIC ACID(!!): 2.3   0702 Sign out to [facility] [place]             HEART Risk Score      Chiquis Cr Most Recent Value   Heart Score Risk Calculator    History 0 Filed at: 11/25/2023 0706   ECG 0 Filed at: 11/25/2023 7280   Age 2 Filed at: 11/25/2023 5946   Risk Factors 2 Filed at: 11/25/2023 0706   Troponin 1 Filed at: 11/25/2023 5710   HEART Score 5 Filed at: 11/25/2023 [facility]  Patient is a 75-year-old female who presents to the emergency room for hypoglycemia. Family reports associated delirium and altered mental status.   Reports new bilateral eye redness and pain and discharge. Also, rashes in the skin folds. Patient currently on long-acting insulin. On exam, patient has bilateral eye discharge. Bilateral rhonchi. Diffuse abdominal tenderness. Right and left lower leg edema. Patient is also noted to have rash under the skin folds. Per family, reports that she has a home care nurse. Started workup in the emergency room. I gave signout to [facility] [place], recommend admission for further workup of this patient. .    Amount and/or Complexity of Data Reviewed  Labs: ordered. Decision-making details documented in ED Course. Radiology: ordered. Risk  Prescription drug management. Decision regarding hospitalization. Disposition  Final diagnoses:   Hypoglycemia   Acute kidney injury ([address])   SIRS (systemic inflammatory response syndrome) ([address])     Time reflects when diagnosis was documented in both MDM as applicable and the Disposition within this note       Time User Action Codes Description Comment    11/25/2023  8:46 AM Jayla Rosas Rd [E16.2] Hypoglycemia     11/25/2023  8:46 AM Earline Rosas Add [N17.9] Acute kidney injury ([address])     11/25/2023  8:46 AM Earline Rosas Add [R65.10] SIRS (systemic inflammatory response syndrome) ([address])     11/25/2023 12:16 PM Shanika Young Add [R23.9] Unspecified skin changes           ED Disposition       ED Disposition   Admit    Condition   Stable    Date/Time   Sat Nov 25, 2023 0914    Comment   Case was discussed with Dr. Анна Cohen and the patient's admission status was agreed to be Admission Status: inpatient status to the service of Dr. Анна Cohen .                Follow-up Information    None         Current Discharge Medication List        CONTINUE these medications which have NOT CHANGED    Details   albuterol (2.5 mg/3 mL) 0.083 % nebulizer solution Take 3 mL (2.5 mg total) by nebulization every 6 (six) hours as needed for wheezing or shortness of breath  Qty: 375 mL, Refills: 2    Comments: DX Code Needed  REFILL REQUES. Associated Diagnoses: Moderate persistent asthma without complication; Interstitial lung disease ([facility])      albuterol (PROVENTIL HFA,VENTOLIN HFA) 90 mcg/act inhaler Inhale 1 puff every 6 (six) hours as needed for wheezing or shortness of breath  Qty: 18 Inhaler, Refills: 3    Comments: Substitution to a formulary equivalent within the same pharmaceutical class is authorized. Associated Diagnoses: Moderate persistent asthma without complication      Diclofenac Sodium (VOLTAREN) 1 % Apply 2 g topically in the morning and 2 g at noon and 2 g in the evening and 2 g before bedtime.   Qty: 50 g, Refills: 0    Associated Diagnoses: Rheumatoid arthritis involving multiple sites with positive rheumatoid factor ([facility])      DULoxetine (CYMBALTA) 30 mg delayed release capsule Take 30 mg by mouth daily      HYDROmorphone (DILAUDID) 4 mg tablet Take 4 mg by mouth every 8 (eight) hours as needed for moderate pain      lisinopril (ZESTRIL) 5 mg tablet Take 5 mg by mouth daily      metFORMIN (GLUCOPHAGE) 1000 MG tablet TAKE 1 TABLET BY MOUTH TWICE A DAY  Qty: 180 tablet, Refills: 3    Associated Diagnoses: Type 2 diabetes mellitus with stage 3 chronic kidney disease, with long-term current use of insulin ([facility])      mirtazapine (REMERON) 15 mg tablet Take 1 tablet (15 mg total) by mouth daily at bedtime  Qty: 90 tablet, Refills: 3    Associated Diagnoses: Recurrent major depressive disorder, in partial remission ([facility])      nystatin (MYCOSTATIN) powder Apply topically 2 (two) times a day groin  Qty: 60 g, Refills: 6    Associated Diagnoses: Fungal infection of skin      torsemide (DEMADEX) 20 mg tablet Take 1 tablet (20 mg total) by mouth daily  Qty: 360 tablet, Refills: 0    Associated Diagnoses: Essential hypertension      B-D TB SYRINGE 1CC/27GX1/2" 27G X 1/2" 1 ML MISC by Other route 2 (two) times a day  Qty: 100 each, Refills: 3    Associated Diagnoses: Type 2 diabetes mellitus with stage 3 chronic kidney disease, with long-term current use of insulin ([facility])      B-D UF III MINI PEN NEEDLES 31G X 5 MM MISC USE TO INJECT TWICE A DAY AS DIRECTED  Qty: 200 each, Refills: 3    Associated Diagnoses: Type 2 diabetes mellitus with stage 3 chronic kidney disease, with long-term current use of insulin ([facility])      Blood Glucose Monitoring Suppl (OneTouch Verio) w/Device KIT Use 3 (three) times a day  Qty: 1 kit, Refills: 0    Associated Diagnoses: Type 2 diabetes mellitus with stage 3 chronic kidney disease, with long-term current use of insulin ([facility])      Cimzia Starter Kit 6 X 200 MG/ML KIT       clobetasol (TEMOVATE) 0.05 % cream Apply topically 2 (two) times a day  Qty: 60 g, Refills: 3    Associated Diagnoses: Itching      !! Continuous Blood Gluc  (Dexcom G6 ) SERGEI 1 DEXCOM G6  FOR CONTINUOUS GLUCOSE MONITORING  Qty: 1 each, Refills: 0    Associated Diagnoses: Type 2 diabetes mellitus with stage 3 chronic kidney disease, with long-term current use of insulin, unspecified whether stage 3a or 3b CKD ([address])      ! !  Continuous Blood Gluc  (FreeStyle [place]) SERGEI CHECK BLOOD SUGARS MULTIPLE TIMES PER DAY  Qty: 1 each, Refills: 0    Associated Diagnoses: Type 2 diabetes mellitus with stage 3 chronic kidney disease, with long-term current use of insulin, unspecified whether stage 3a or 3b CKD ([facility])      Continuous Blood Gluc Sensor (FreeStyle [place] 2 Sensor) MISC Check blood sugars multiple times per day  Qty: 6 each, Refills: 3    Associated Diagnoses: Type 2 diabetes mellitus with stage 3 chronic kidney disease, with long-term current use of insulin, unspecified whether stage 3a or 3b CKD ([facility])      Continuous Blood Gluc Transmit (Dexcom G6 Transmitter) MISC 1 TRANSMITTED EVERY 3 MONTHS FOR CONTINUOUS GLUCOSE MONITORING  Qty: 1 each, Refills: 3    Associated Diagnoses: Type 2 diabetes mellitus with stage 3 chronic kidney disease, with long-term current use of insulin, unspecified whether stage 3a or 3b CKD ([facility])      fluticasone-umeclidinium-vilanterol (Trelegy Ellipta) 100-62.5-25 mcg/actuation inhaler Inhale 1 puff daily Rinse mouth after use. Qty: 60 blister, Refills: 5    Associated Diagnoses: ILD (interstitial lung disease) ([address]); Moderate persistent asthma without complication      insulin degludec Dc [place] FlexTouch) 100 units/mL injection pen Inject 35 Units under the skin daily  Qty: 45 mL, Refills: 0    Associated Diagnoses: Type 2 diabetes mellitus with stage 3 chronic kidney disease, with long-term current use of insulin ([facility])      methocarbamol (ROBAXIN) 500 mg tablet Take 1 tablet (500 mg total) by mouth 2 (two) times a day as needed for muscle spasms (side pain)  Qty: 28 tablet, Refills: 0    Associated Diagnoses: Fall from bed, initial encounter; Closed traumatic nondisplaced fracture of one rib of left side, initial encounter      OneTouch Ultra test strip PATIENT TO TEST ONCE A DAY  Qty: 100 strip, Refills: 3    Associated Diagnoses: Type 2 diabetes mellitus with stage 3 chronic kidney disease, with long-term current use of insulin ([facility])      Senexon-S 8.6-50 MG per tablet Take 1 tablet by mouth daily       ! ! - Potential duplicate medications found. Please discuss with provider. STOP taking these medications       sulfamethoxazole-trimethoprim (BACTRIM DS) 800-160 mg per tablet Comments:   Reason for Stopping:               No discharge procedures on file.     PDMP Review         Value Time User    PDMP Reviewed  Yes 11/25/2023  6:49 AM Terrance Frederick PA-C            ED Provider  Electronically Signed by             Terrance Frederick PA-C  11/26/23 8489

## 2023-11-25 NOTE — ASSESSMENT & PLAN NOTE
Noted with confusion on arrival, likely metabolic encephalopathy in the setting of hypoglycemia  Monitor closely  Frequent reorientation  Correct underlying causes

## 2023-11-25 NOTE — ASSESSMENT & PLAN NOTE
Does have bilateral lower extremity chronic skin changes with scaling, edema, erythema. Son mentions that changes are somewhat chronic  Mildly elevated procalcitonin but in the setting of renal failure. At this point in time I am not completely convinced of a cellulitis clinically. Monitor closely aspect of lower extremities, temperature curve, if worsening white blood cell count, if worsening then can consider antibiotic therapy but at this time I would monitor closely off of it. Extremity elevation. Wound care.

## 2023-11-25 NOTE — H&P
[address]  H&P  Name: Alexsandra Galindo 66 y.o. female I MRN: [id number]  Unit/Bed#: -01 I Date of Admission: 11/25/2023   Date of Service: 11/25/2023 I Hospital Day: 0      Assessment/Plan   Hypoglycemia  Assessment & Plan  Likely due to insulin stacking in the setting of kidney failure  Hold further insulin for now  Hypoglycemia protocol    Acute kidney injury superimposed on CKD   Assessment & Plan  Lab Results   Component Value Date    EGFR 24 11/25/2023    EGFR 36 10/09/2023    EGFR 33 10/08/2023    CREATININE 1.92 (H) 11/25/2023    CREATININE 1.37 (H) 10/09/2023    CREATININE 1.50 (H) 10/08/2023     Presented with a creatinine of 1.9 which is above baseline typically between 1.2 and 1.5. Suspect secondary to some degree of mild volume depletion and also recently started on Bactrim  At this point in time we will hold any further Bactrim, hold her diuretics as well, gentle IV fluids  Monitor kidney function  Avoid nephrotoxins    * Acute metabolic encephalopathy  Assessment & Plan  Noted with confusion on arrival, likely metabolic encephalopathy in the setting of hypoglycemia  Monitor closely  Frequent reorientation  Correct underlying causes    Unspecified skin changes  Assessment & Plan  Does have bilateral lower extremity chronic skin changes with scaling, edema, erythema. Son mentions that changes are somewhat chronic  Mildly elevated procalcitonin but in the setting of renal failure. At this point in time I am not completely convinced of a cellulitis clinically. Monitor closely aspect of lower extremities, temperature curve, if worsening white blood cell count, if worsening then can consider antibiotic therapy but at this time I would monitor closely off of it. Extremity elevation. Wound care. Lactic acidosis  Assessment & Plan  Noted for lactic acid of 2.0 uptrending to 3. Otherwise good capillary refill time, good blood pressure.   Potentially type B lactic acidosis but will continue to monitor closely, continue IV fluids. CHF (congestive heart failure) ([address])  Assessment & Plan  Wt Readings from Last 3 Encounters:   11/25/23 85 kg (187 lb 6.3 oz)   10/07/23 91.2 kg (201 lb 1 oz)   06/19/23 86.6 kg (191 lb)     She appears overall euvolemic to dry currently  I will hold her diuretics in the setting of kidney failure, monitor volume status closely          Type 2 diabetes mellitus with stage 3 chronic kidney disease, with long-term current use of insulin [facility])  Assessment & Plan  Lab Results   Component Value Date    HGBA1C 8.7 (A) 10/31/2023       Recent Labs     11/25/23  0917 11/25/23  0932 11/25/23  1017 11/25/23  1131   POCGLU 142* 145* 111 118       Blood Sugar Average: Last 72 hrs:  (P) [id number]    Given hypoglycemia on arrival we will hold insulin for now. She may need a lower dose moving forward depending on her kidney function down the line. Hypoglycemia protocol. Essential hypertension  Assessment & Plan  Monitor blood pressure  Torsemide currently on hold given CLAUDIA         Opiate dependence  Assessment & Plan  Apparently chronically on hydromorphone 4mg PO TID PRN for pain as per DMP  Continue but monitor closely for sedation    VTE Prophylaxis: Heparin  / sequential compression device   Code Status: DNR/DNI  POLST: POLST form is not discussed and not completed at this time. Discussion with family:   I did talk to the son over the phone    Anticipated Length of Stay:  Patient will be admitted on an Inpatient basis with an anticipated length of stay of  at least 2 midnights. Justification for Hospital Stay: Acute kidney injury, symptomatic hypoglycemia, acute metabolic encephalopathy    Total Time for Visit, including Counseling / Coordination of Care: 70 minutes. Greater than 50% of this total time spent on direct patient counseling and coordination of care.     Chief Complaint:   Confusion    History of Present Illness:    Chela ELIAS Cristie Carrel is a 66 y.o. female who presents with confusion and disorientation. The patient does have a history of diabetes chronically on insulin, CKD baseline creatinine ranging from 1.2-1.5, chronic bilateral lower extremity changes, intertrigo in the abdominal folds, she presented from home  Primarily due to confusion noted by family members. She was reportedly hypoglycemic with blood glucose in the mid 40s. Of note, patient was apparently recently started on sulfamethoxazole trimethoprim for potential lower extremity cellulitis by outpatient providers. The patient did take this medicine as prescribed. Otherwise no mention to any focal neurological changes, seizure-like activity, sphincter issues. Currently after interventions in the emergency department patient is slightly more awake and oriented. Head CT has been negative for any acute abnormalities. Review of Systems:    Review of Systems   Neurological:         Disorientation   All other systems reviewed and are negative. Past Medical and Surgical History:     Past Medical History:   Diagnosis Date    AMS (altered mental status) 5/24/2022    Asthma     Chest pain on breathing     last assessed 2/5/15    Chronic diastolic CHF (congestive heart failure) (HCC)     Chronic respiratory failure with hypoxia ([facility])     Diabetes mellitus ([facility]CC)     Exertional chest pain 7/14/2021    HTN (hypertension)     Hyperlipidemia     Insomnia     Obesity     Preop cardiovascular exam 2/2/2018    Pulmonary fibrosis (HCC)     Rheumatoid arthritis ([address])     SIRS (systemic inflammatory response syndrome) ([address]) 5/22/2022    Volume overload 12/10/2021       Past Surgical History:   Procedure Laterality Date    HAND SURGERY         Meds/Allergies:    Prior to Admission medications    Medication Sig Start Date End Date Taking?  Authorizing Provider   albuterol (2.5 mg/3 mL) 0.083 % nebulizer solution Take 3 mL (2.5 mg total) by nebulization every 6 (six) hours as needed for wheezing or shortness of breath 1/24/23  Yes Horacio Corral MD   albuterol (PROVENTIL HFA,VENTOLIN HFA) 90 mcg/act inhaler Inhale 1 puff every 6 (six) hours as needed for wheezing or shortness of breath 2/26/21  Yes Franky Nevarez PA-C   Diclofenac Sodium (VOLTAREN) 1 % Apply 2 g topically in the morning and 2 g at noon and 2 g in the evening and 2 g before bedtime.  5/24/22  Yes RYAN Quinones   DULoxetine (CYMBALTA) 30 mg delayed release capsule Take 30 mg by mouth daily 10/24/23  Yes Historical Provider, MD   metFORMIN (GLUCOPHAGE) 1000 MG tablet TAKE 1 TABLET BY MOUTH TWICE A DAY 5/13/23  Yes Gregory Martinez MD   mirtazapine (REMERON) 15 mg tablet Take 1 tablet (15 mg total) by mouth daily at bedtime 5/23/23  Yes Gregory Martinez MD   nystatin (MYCOSTATIN) powder Apply topically 2 (two) times a day groin 10/31/23  Yes Gregory Martinez MD   torsemide (DEMADEX) 20 mg tablet Take 1 tablet (20 mg total) by mouth daily 10/9/23  Yes MD RAPHAEL Pizarro TB SYRINGE 1CC/27GX1/2" 27G X 1/2" 1 ML MISC by Other route 2 (two) times a day 11/14/19   MD RAPHAEL Maier UF III MINI PEN NEEDLES 31G X 5 MM MISC USE TO INJECT TWICE A DAY AS DIRECTED 9/22/23   Gregory Martinez MD   Blood Glucose Monitoring Suppl (OneTouch Verio) w/Device KIT Use 3 (three) times a day 4/13/23   Gregory Martinez MD   Cimzia Starter Kit 6 X 200 MG/ML KIT  7/15/21   Historical Provider, MD   clobetasol (TEMOVATE) 0.05 % cream Apply topically 2 (two) times a day 11/2/23   Gregory Martinez MD   Continuous Blood Gluc  (Dexcom G6 ) SERGEI 1 DEXCOM G6  FOR CONTINUOUS GLUCOSE MONITORING 6/28/23   RYAN Cueva   Continuous Blood Gluc  (FreeStyle [place] 2 [place]) SERGEI CHECK BLOOD SUGARS MULTIPLE TIMES PER DAY 11/1/23   Gregory Martinez MD   Continuous Blood Gluc Sensor (FreeStyle [place] 2 Sensor) MISC Check blood sugars multiple times per day 10/20/23   Gregory Martinez MD   Continuous Blood Gluc Transmit (Dexcom G6 Transmitter) MISC 1 TRANSMITTED EVERY 3 MONTHS FOR CONTINUOUS GLUCOSE MONITORING 23   RYAN Cueva   fluticasone-umeclidinium-vilanterol (Trelegy Ellipta) 703-52.6-24 mcg/actuation inhaler Inhale 1 puff daily Rinse mouth after use. 23  Amando Munoz MD   insulin degludec [place] FlexTouch) 100 units/mL injection pen Inject 35 Units under the skin daily 10/18/23 1/26/24  Lore Marlow MD   methocarbamol (ROBAXIN) 500 mg tablet Take 1 tablet (500 mg total) by mouth 2 (two) times a day as needed for muscle spasms (side pain)  Patient not taking: Reported on 2023   Veronique Montoya MD   OneTouch Ultra test strip PATIENT TO TEST ONCE A DAY 23   Lore Marlow MD   Senexon-S 8.6-50 MG per tablet Take 1 tablet by mouth daily  Patient not taking: Reported on 2023   Historical Provider, MD   sulfamethoxazole-trimethoprim (BACTRIM DS) 800-160 mg per tablet Take 1 tablet by mouth 2 (two) times a day for 10 days 23  Lore Marlow MD     I have reviewed home medications with patient personally. Allergies:    Allergies   Allergen Reactions    Gabapentin     Vancomycin        Social History:     Marital Status: /Civil Union   Substance Use History:   Social History     Substance and Sexual Activity   Alcohol Use Never     Social History     Tobacco Use   Smoking Status Former    Packs/day: 1.50    Years: 18.00    Total pack years: 27.00    Types: Cigarettes    Start date:     Quit date:     Years since quittin.9   Smokeless Tobacco Never   Tobacco Comments    Quit 20 years ago     Social History     Substance and Sexual Activity   Drug Use No       Family History:    non-contributory    Physical Exam:     Vitals:   Blood Pressure: 118/81 (23 1024)  Pulse: 90 (23 1024)  Temperature: 98.1 °F (36.7 °C) (23 1024)  Temp Source: Oral (23 0513)  Respirations: 18 (23 1024)  Height: 5' (152.4 cm) (11/25/23 1022)  Weight - Scale: 85 kg (187 lb 6.3 oz) (11/25/23 0513)  SpO2: 98 % (11/25/23 1024)    Physical Exam  Vitals and nursing note reviewed. Constitutional:       Appearance: Normal appearance. She is obese. HENT:      Head: Normocephalic and atraumatic. Right Ear: External ear normal.      Left Ear: External ear normal.      Nose: Nose normal. No congestion. Mouth/Throat:      Mouth: Mucous membranes are moist.      Pharynx: Oropharynx is clear. No oropharyngeal exudate or posterior oropharyngeal erythema. Eyes:      General: No scleral icterus. Right eye: No discharge. Left eye: No discharge. Extraocular Movements: Extraocular movements intact. Conjunctiva/sclera: Conjunctivae normal.      Pupils: Pupils are equal, round, and reactive to light. Cardiovascular:      Rate and Rhythm: Normal rate and regular rhythm. Pulses: Normal pulses. Heart sounds: Normal heart sounds. No murmur heard. No friction rub. No gallop. Pulmonary:      Effort: Pulmonary effort is normal. No respiratory distress. Breath sounds: Normal breath sounds. No stridor. No wheezing, rhonchi or rales. Chest:      Chest wall: No tenderness. Abdominal:      General: Abdomen is flat. Bowel sounds are normal. There is no distension. Palpations: Abdomen is soft. There is no mass. Tenderness: There is no abdominal tenderness. There is no guarding or rebound. Musculoskeletal:         General: No swelling, tenderness, deformity or signs of injury. Normal range of motion. Cervical back: Normal range of motion and neck supple. No rigidity. No muscular tenderness. Skin:     General: Skin is warm and dry. Capillary Refill: Capillary refill takes less than 2 seconds. Coloration: Skin is not jaundiced or pale. Findings: No bruising, erythema, lesion or rash.       Comments: Chronic skin changes in bilateral lower extremities with scaling, swelling, no significant discharge noted, the changes are somewhat symmetric in nature. Neurological:      General: No focal deficit present. Mental Status: She is alert. Mental status is at baseline. She is disoriented. Cranial Nerves: No cranial nerve deficit. Sensory: No sensory deficit. Motor: No weakness. Coordination: Coordination normal.   Psychiatric:         Mood and Affect: Mood normal.         Behavior: Behavior normal.         Thought Content: Thought content normal.         Judgment: Judgment normal.         \  Additional Data:     Lab Results: I have personally reviewed pertinent reports. Results from last 7 days   Lab Units 11/25/23  0618   WBC Thousand/uL 13.32*   HEMOGLOBIN g/dL 13.4   HEMATOCRIT % 41.4   PLATELETS Thousands/uL 534*   NEUTROS PCT % 77*   LYMPHS PCT % 12*   MONOS PCT % 7   EOS PCT % 2     Results from last 7 days   Lab Units 11/25/23  0654   SODIUM mmol/L 129*   POTASSIUM mmol/L 3.8   CHLORIDE mmol/L 95*   CO2 mmol/L 21   BUN mg/dL 46*   CREATININE mg/dL 1.92*   ANION GAP mmol/L 13   CALCIUM mg/dL 9.1   ALBUMIN g/dL 2.9*   TOTAL BILIRUBIN mg/dL 0.35   ALK PHOS U/L 102   ALT U/L 8   AST U/L 40*   GLUCOSE RANDOM mg/dL 48*     Results from last 7 days   Lab Units 11/25/23  0618   INR  1.21*     Results from last 7 days   Lab Units 11/25/23  1131 11/25/23  1017 11/25/23  0932 11/25/23  0917 11/25/23  0900 11/25/23  0843 11/25/23  0819 11/25/23  0801 11/25/23  0509   POC GLUCOSE mg/dl 118 111 145* 142* 145* 151* 88 34* 90         Results from last 7 days   Lab Units 11/25/23  0937 11/25/23  0618   LACTIC ACID mmol/L 3.0* 2.3*   PROCALCITONIN ng/ml  --  0.28*       Imaging: I have personally reviewed pertinent reports. CT head without contrast   Final Result by Brenda Boyd MD (11/25 0825)      No acute intracranial abnormality. Atrophy and chronic microvascular ischemic changes.                   Workstation performed: [id number]         CT abdomen pelvis wo contrast   Final Result by Jennifer Calle MD (11/25 5205)      No acute CT findings in the abdomen or pelvis. Chronic interstitial changes/fibrosis in the visualized lower lobes. Persistent minimal right-sided hydroureteronephrosis without an obstructing ureteral calculus seen. Slight fullness of the left renal collecting system without chintan hydronephrosis or hydroureter. Tiny hiatal hernia. Sigmoid diverticulosis without acute diverticulitis. No bowel obstruction. Normal appendix. Additional findings as above. Workstation performed: [id number]         XR chest portable    (Results Pending)         [employer] Reviewed: Yes     ** Please Note: This note has been constructed using a voice recognition system.  **

## 2023-11-25 NOTE — ASSESSMENT & PLAN NOTE
Lab Results   Component Value Date    HGBA1C 8.7 (A) 10/31/2023       Recent Labs     11/25/23  0917 11/25/23  0932 11/25/23  1017 11/25/23  1131   POCGLU 142* 145* 111 118       Blood Sugar Average: Last 72 hrs:  (P) [id number]    Given hypoglycemia on arrival we will hold insulin for now. She may need a lower dose moving forward depending on her kidney function down the line. Hypoglycemia protocol.

## 2023-11-25 NOTE — ASSESSMENT & PLAN NOTE
Lab Results   Component Value Date    EGFR 24 11/25/2023    EGFR 36 10/09/2023    EGFR 33 10/08/2023    CREATININE 1.92 (H) 11/25/2023    CREATININE 1.37 (H) 10/09/2023    CREATININE 1.50 (H) 10/08/2023     Presented with a creatinine of 1.9 which is above baseline typically between 1.2 and 1.5.   Suspect secondary to some degree of mild volume depletion and also recently started on Bactrim  At this point in time we will hold any further Bactrim, hold her diuretics as well, gentle IV fluids  Monitor kidney function  Avoid nephrotoxins

## 2023-11-25 NOTE — ASSESSMENT & PLAN NOTE
Noted for lactic acid of 2.0 uptrending to 3. Otherwise good capillary refill time, good blood pressure. Potentially type B lactic acidosis but will continue to monitor closely, continue IV fluids.

## 2023-11-25 NOTE — ASSESSMENT & PLAN NOTE
Apparently chronically on hydromorphone 4mg PO TID for pain as per DMP  Continue but monitor closely for sedation

## 2023-11-25 NOTE — ASSESSMENT & PLAN NOTE
Likely due to insulin stacking in the setting of kidney failure  Hold further insulin for now  Hypoglycemia protocol

## 2023-11-26 LAB
ANION GAP SERPL CALCULATED.3IONS-SCNC: 6 MMOL/L
BASOPHILS # BLD AUTO: 0.07 THOUSANDS/ÂΜL (ref 0–0.1)
BASOPHILS NFR BLD AUTO: 1 % (ref 0–1)
BUN SERPL-MCNC: 27 MG/DL (ref 5–25)
CALCIUM SERPL-MCNC: 8.6 MG/DL (ref 8.4–10.2)
CHLORIDE SERPL-SCNC: 101 MMOL/L (ref 96–108)
CO2 SERPL-SCNC: 26 MMOL/L (ref 21–32)
CREAT SERPL-MCNC: 1.22 MG/DL (ref 0.6–1.3)
EOSINOPHIL # BLD AUTO: 0.83 THOUSAND/ÂΜL (ref 0–0.61)
EOSINOPHIL NFR BLD AUTO: 8 % (ref 0–6)
ERYTHROCYTE [DISTWIDTH] IN BLOOD BY AUTOMATED COUNT: 14.6 % (ref 11.6–15.1)
GFR SERPL CREATININE-BSD FRML MDRD: 42 ML/MIN/1.73SQ M
GLUCOSE SERPL-MCNC: 101 MG/DL (ref 65–140)
GLUCOSE SERPL-MCNC: 106 MG/DL (ref 65–140)
GLUCOSE SERPL-MCNC: 132 MG/DL (ref 65–140)
GLUCOSE SERPL-MCNC: 145 MG/DL (ref 65–140)
GLUCOSE SERPL-MCNC: 178 MG/DL (ref 65–140)
GLUCOSE SERPL-MCNC: 201 MG/DL (ref 65–140)
GLUCOSE SERPL-MCNC: 83 MG/DL (ref 65–140)
GLUCOSE SERPL-MCNC: 86 MG/DL (ref 65–140)
HCT VFR BLD AUTO: 38.1 % (ref 34.8–46.1)
HGB BLD-MCNC: 12.4 G/DL (ref 11.5–15.4)
IMM GRANULOCYTES # BLD AUTO: 0.06 THOUSAND/UL (ref 0–0.2)
IMM GRANULOCYTES NFR BLD AUTO: 1 % (ref 0–2)
LYMPHOCYTES # BLD AUTO: 2.07 THOUSANDS/ÂΜL (ref 0.6–4.47)
LYMPHOCYTES NFR BLD AUTO: 20 % (ref 14–44)
MCH RBC QN AUTO: 28.4 PG (ref 26.8–34.3)
MCHC RBC AUTO-ENTMCNC: 32.5 G/DL (ref 31.4–37.4)
MCV RBC AUTO: 87 FL (ref 82–98)
MONOCYTES # BLD AUTO: 0.99 THOUSAND/ÂΜL (ref 0.17–1.22)
MONOCYTES NFR BLD AUTO: 10 % (ref 4–12)
NEUTROPHILS # BLD AUTO: 6.11 THOUSANDS/ÂΜL (ref 1.85–7.62)
NEUTS SEG NFR BLD AUTO: 60 % (ref 43–75)
NRBC BLD AUTO-RTO: 0 /100 WBCS
PLATELET # BLD AUTO: 503 THOUSANDS/UL (ref 149–390)
PMV BLD AUTO: 8.8 FL (ref 8.9–12.7)
POTASSIUM SERPL-SCNC: 4.3 MMOL/L (ref 3.5–5.3)
RBC # BLD AUTO: 4.36 MILLION/UL (ref 3.81–5.12)
SODIUM SERPL-SCNC: 133 MMOL/L (ref 135–147)
WBC # BLD AUTO: 10.13 THOUSAND/UL (ref 4.31–10.16)

## 2023-11-26 PROCEDURE — 85025 COMPLETE CBC W/AUTO DIFF WBC: CPT | Performed by: INTERNAL MEDICINE

## 2023-11-26 PROCEDURE — 99233 SBSQ HOSP IP/OBS HIGH 50: CPT | Performed by: INTERNAL MEDICINE

## 2023-11-26 PROCEDURE — 80048 BASIC METABOLIC PNL TOTAL CA: CPT | Performed by: INTERNAL MEDICINE

## 2023-11-26 PROCEDURE — 82948 REAGENT STRIP/BLOOD GLUCOSE: CPT

## 2023-11-26 RX ORDER — HYDROMORPHONE HYDROCHLORIDE 2 MG/1
4 TABLET ORAL 3 TIMES DAILY
Status: DISCONTINUED | OUTPATIENT
Start: 2023-11-26 | End: 2023-11-30 | Stop reason: HOSPADM

## 2023-11-26 RX ORDER — HYDROMORPHONE HYDROCHLORIDE 2 MG/1
4 TABLET ORAL 3 TIMES DAILY
Status: DISCONTINUED | OUTPATIENT
Start: 2023-11-26 | End: 2023-11-26

## 2023-11-26 RX ORDER — DEXTROSE MONOHYDRATE 25 G/50ML
INJECTION, SOLUTION INTRAVENOUS
Status: COMPLETED
Start: 2023-11-26 | End: 2023-11-26

## 2023-11-26 RX ADMIN — DEXTROSE MONOHYDRATE 50 ML: 25 INJECTION, SOLUTION INTRAVENOUS at 00:13

## 2023-11-26 RX ADMIN — HEPARIN SODIUM 5000 UNITS: 5000 INJECTION INTRAVENOUS; SUBCUTANEOUS at 14:10

## 2023-11-26 RX ADMIN — NYSTATIN 1 APPLICATION: 100000 POWDER TOPICAL at 18:38

## 2023-11-26 RX ADMIN — HYDROMORPHONE HYDROCHLORIDE 4 MG: 2 TABLET ORAL at 23:11

## 2023-11-26 RX ADMIN — HEPARIN SODIUM 5000 UNITS: 5000 INJECTION INTRAVENOUS; SUBCUTANEOUS at 05:55

## 2023-11-26 RX ADMIN — UMECLIDINIUM 1 PUFF: 62.5 AEROSOL, POWDER ORAL at 08:54

## 2023-11-26 RX ADMIN — GLYCERIN 1 DROP: .002; .002; .01 SOLUTION/ DROPS OPHTHALMIC at 16:42

## 2023-11-26 RX ADMIN — NYSTATIN: 100000 POWDER TOPICAL at 08:53

## 2023-11-26 RX ADMIN — DEXTROSE AND SODIUM CHLORIDE 100 ML/HR: 5; .45 INJECTION, SOLUTION INTRAVENOUS at 09:36

## 2023-11-26 RX ADMIN — HEPARIN SODIUM 5000 UNITS: 5000 INJECTION INTRAVENOUS; SUBCUTANEOUS at 23:11

## 2023-11-26 RX ADMIN — Medication 2 G: at 14:10

## 2023-11-26 RX ADMIN — FLUTICASONE FUROATE AND VILANTEROL TRIFENATATE 1 PUFF: 100; 25 POWDER RESPIRATORY (INHALATION) at 08:54

## 2023-11-26 RX ADMIN — GLYCERIN 1 DROP: .002; .002; .01 SOLUTION/ DROPS OPHTHALMIC at 08:54

## 2023-11-26 RX ADMIN — DULOXETINE HYDROCHLORIDE 30 MG: 30 CAPSULE, DELAYED RELEASE ORAL at 08:54

## 2023-11-26 RX ADMIN — DEXTROSE AND SODIUM CHLORIDE 100 ML/HR: 5; .45 INJECTION, SOLUTION INTRAVENOUS at 20:04

## 2023-11-26 RX ADMIN — Medication 2 G: at 18:38

## 2023-11-26 RX ADMIN — MIRTAZAPINE 15 MG: 15 TABLET, FILM COATED ORAL at 23:12

## 2023-11-26 RX ADMIN — Medication 2 G: at 08:53

## 2023-11-26 RX ADMIN — GLYCERIN 1 DROP: .002; .002; .01 SOLUTION/ DROPS OPHTHALMIC at 23:12

## 2023-11-26 RX ADMIN — HYDROMORPHONE HYDROCHLORIDE 4 MG: 2 TABLET ORAL at 14:18

## 2023-11-26 NOTE — ASSESSMENT & PLAN NOTE
Does have bilateral lower extremity chronic skin changes with scaling, edema, erythema. Son mentions that changes are somewhat chronic  Mildly elevated procalcitonin but in the setting of renal failure. So far I am not completely convinced of a cellulitis clinically -the patient does have chronic skin changes  Monitor closely aspect of lower extremities, temperature curve, white blood cell count  Continue Extremity elevation. Wound care.

## 2023-11-26 NOTE — PLAN OF CARE
Problem: Prexisting or High Potential for Compromised Skin Integrity  Goal: Skin integrity is maintained or improved  Description: INTERVENTIONS:  - Identify patients at risk for skin breakdown  - Assess and monitor skin integrity  - Assess and monitor nutrition and hydration status  - Monitor labs   - Assess for incontinence   - Turn and reposition patient  - Assist with mobility/ambulation  - Relieve pressure over bony prominences  - Avoid friction and shearing  - Provide appropriate hygiene as needed including keeping skin clean and dry  - Evaluate need for skin moisturizer/barrier cream  - Collaborate with interdisciplinary team   - Patient/family teaching  - Consider wound care consult   Outcome: Progressing     Problem: PAIN - ADULT  Goal: Verbalizes/displays adequate comfort level or baseline comfort level  Description: Interventions:  - Encourage patient to monitor pain and request assistance  - Assess pain using appropriate pain scale  - Administer analgesics based on type and severity of pain and evaluate response  - Implement non-pharmacological measures as appropriate and evaluate response  - Consider cultural and social influences on pain and pain management  - Notify physician/advanced practitioner if interventions unsuccessful or patient reports new pain  Outcome: Progressing     Problem: INFECTION - ADULT  Goal: Absence of fever/infection during neutropenic period  Description: INTERVENTIONS:  - Monitor WBC    Outcome: Progressing     Problem: SAFETY ADULT  Goal: Patient will remain free of falls  Description: INTERVENTIONS:  - Educate patient/family on patient safety including physical limitations  - Instruct patient to call for assistance with activity   - Consult OT/PT to assist with strengthening/mobility   - Keep Call bell within reach  - Keep bed low and locked with side rails adjusted as appropriate  - Keep care items and personal belongings within reach  - Initiate and maintain comfort rounds  - Make Fall Risk Sign visible to staff  - Offer Toileting every 2 Hours, in advance of need  - Initiate/Maintain bed alarm  - Obtain necessary fall risk management equipment: yellow socks  - Apply yellow socks and bracelet for high fall risk patients  - Consider moving patient to room near nurses station  Outcome: Progressing     Problem: DISCHARGE PLANNING  Goal: Discharge to home or other facility with appropriate resources  Description: INTERVENTIONS:  - Identify barriers to discharge w/patient and caregiver  - Arrange for needed discharge resources and transportation as appropriate  - Identify discharge learning needs (meds, wound care, etc.)  - Arrange for interpretive services to assist at discharge as needed  - Refer to Case Management Department for coordinating discharge planning if the patient needs post-hospital services based on physician/advanced practitioner order or complex needs related to functional status, cognitive ability, or social support system  Outcome: Progressing     Problem: Knowledge Deficit  Goal: Patient/family/caregiver demonstrates understanding of disease process, treatment plan, medications, and discharge instructions  Description: Complete learning assessment and assess knowledge base.   Interventions:  - Provide teaching at level of understanding  - Provide teaching via preferred learning methods  Outcome: Progressing

## 2023-11-26 NOTE — ASSESSMENT & PLAN NOTE
Noted with confusion on arrival, likely metabolic encephalopathy in the setting of hypoglycemia  Monitor closely  Frequent reorientation  Correct underlying causes  Appears to be slowly improving

## 2023-11-26 NOTE — CASE MANAGEMENT
Case Management Assessment & Discharge Planning Note    Patient name Johnathan Dawn  Location [address] [facility] [place] 429/-52 MRN [id number]  : 1945 Date 2023       Current Admission Date: 2023  Current Admission Diagnosis:Acute metabolic encephalopathy   Patient Active Problem List    Diagnosis Date Noted    Acute metabolic encephalopathy     Hypoglycemia 2023    Iatrogenic disease 2023    Unspecified skin changes 2023    Lactic acidosis 2023    Wound infection 2023    Hand pain 10/31/2023    Pressure injury of right buttock, stage 1 10/31/2023    Acute on chronic diastolic (congestive) heart failure ([address]) 10/05/2023    Pulmonary fibrosis ([address]) 2023    Edema of lower extremity 2023    Chronic kidney disease, stage 2 (mild) 2023    Chronic pain disorder 2023    CHF (congestive heart failure) ([address]) 2023    Age-related osteoporosis without current pathological fracture 2022    Hypokalemia 2022    SIRS (systemic inflammatory response syndrome) ([address]) 2022    Opiate dependence ([address]) 2022    Elevated d-dimer 2021    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 ([address]) 2021    Fungal infection of skin 2020    Acute kidney injury superimposed on CKD  2019    Chronic prescription opiate use 2019    Shortness of breath     Hyponatremia 2019    Venous stasis dermatitis of both lower extremities 2019    Chronic respiratory failure with hypoxia ([address]) 2019    Immunosuppressed status ([address]) 2019    Interstitial lung disease ([address]) 2019    Obesity (BMI 30-39.9) 2018    Varicose veins of both lower extremities 2018    Morbid obesity ([address])     Moderate persistent asthma 11/10/2017    Vitamin D deficiency 2017    Pain in elbow 2017    Shoulder pain 2016    Mixed hyperlipidemia 2016 Bilateral lower extremity edema 03/03/2016    Ambulatory dysfunction 02/02/2016    Recurrent major depressive disorder, in partial remission ([address]) 02/02/2016    Gastroesophageal reflux disease without esophagitis 02/02/2016    Urinary incontinence 02/02/2016    Psoriasis vulgaris 12/31/2014    Essential hypertension 05/27/2014    Type 2 diabetes mellitus with stage 3 chronic kidney disease, with long-term current use of insulin ([address]) 04/30/2014    Primary insomnia 04/30/2014    Rheumatoid arthritis involving multiple sites with positive rheumatoid factor ([address]) 04/30/2014      LOS (days): 1  Geometric Mean LOS (GMLOS) (days):   Days to GMLOS:     OBJECTIVE:    Risk of Unplanned Readmission Score: 28.93         Current admission status: Inpatient       Preferred Pharmacy:   [facility], [address] [address]  Phone: [phone] Fax: [phone]    [facility]/pharmacy #[id number]- [place] [address]  Phone: [phone] Fax: [phone]    Primary Care Provider: Delroy Galeas MD    Primary Insurance: MEDICARE  Secondary Insurance: BLUE CROSS    ASSESSMENT:  [address] - Son   Primary Phone: [phone] (Mobile)                 Advance Directives  Does patient have a [address]?: Yes  Does patient have Advance Directives?: Yes  Advance Directives: Living will, Power of  for health care  Primary Contact: milka Jasmine         Readmission Root Cause  30 Day Readmission: No    Patient Information  Admitted from[de-identified] Home  Mental Status: Confused  During Assessment patient was accompanied by: Son (Treasure Holter and Stephen Jasmine)  Primary Caregiver: Family  Caregiver's Name[de-identified] Cherelle Long Relationship to Patient[de-identified] Family Member  Caregiver's Telephone Number[de-identified] [phone]  Support Systems: Son, Private Caregivers, Family members  [place] of Residence: [address] [facility] do you live in?: [address] [facility] entry access options. Select all that apply.: Ramp  Type of Current Residence: Other (Comment) ("mother/daughter" home. Son Jose Bhatt lives on one side of the home and she lives on the other side. It is connected together)  Living Arrangements: Lives w/ Son  Is patient a ?: No    Activities of Daily Living Prior to Admission  Functional Status: Total dependent  Completes ADLs independently?: No  Level of ADL dependence: Total Dependent (pt is bedridden)  Ambulates independently?: No  Level of ambulatory dependence: Total Dependent  Does patient use assisted devices?: Yes  Assisted Devices (DME) used: Bedside Commode, Wheelchair, Walker, Shower Chair, Portable Oxygen tanks, Home Oxygen concentrator, [id number] [employer]  DME Company Name (respiratory supplies):  [employer]  O2 Rate(s): 2  Does patient currently own DME?: Yes  What DME does the patient currently own?: Bedside Commode, Joseph lift, Walker, Wheelchair, Shower Chair  Does patient have a history of Outpatient Therapy (PT/OT)?: No  Does the patient have a history of Short-Term Rehab?: No  Does patient have a history of HHC?: Yes  Does patient currently have [facility]?: Yes ([facility])    Current Home Health Care  Type of Current Home Care Services: Home PT, Nurse visit  [address][de-identified] Other (please enter name in comment) (Traditional)  [facility] Provider[de-identified] PCP    Patient Information Continued  Income Source: Unemployed  Does patient have prescription coverage?: Yes  Does patient receive dialysis treatments?: No  Does patient have a history of substance abuse?: No  Does patient have a history of Mental Health Diagnosis?: No    PHQ 2/9 Screening   Reviewed PHQ 2/9 Depression Screening Score?: No    Means of Transportation  Means of Transport to Appts[de-identified] Family transport      Housing Stability: Low Risk  (11/26/2023)    Housing Stability Vital Sign Unable to Pay for Housing in the Last Year: No     Number of Places Lived in the Last Year: 1     Unstable Housing in the Last Year: No   Food Insecurity: No Food Insecurity (11/26/2023)    Hunger Vital Sign     Worried About Running Out of Food in the Last Year: Never true     Ran Out of Food in the Last Year: Never true   Transportation Needs: No Transportation Needs (11/26/2023)    PRAPARE - Transportation     Lack of Transportation (Medical): No     Lack of Transportation (Non-Medical): No   Utilities: Not At Risk (11/26/2023)    [facility] Utilities     Threatened with loss of utilities: No       DISCHARGE DETAILS:    Discharge planning discussed with[de-identified] milka Jaramillo of Choice: Yes  Comments - Freedom of Choice: CM discussed PT's recommendation of STR, but son Joi Randle adamantly refuses this option. Wants to take his mother home and resume services with [facility] and her private pay aides. He private pays for an aide from  10:00-15:00 5 days per wk. He is requesting a hospital bed because his mother is currently just sleeping in a recliner. CM contacted family/caregiver?: Yes  Were Treatment Team discharge recommendations reviewed with patient/caregiver?: Yes  Did patient/caregiver verbalize understanding of patient care needs?: Yes  Were patient/caregiver advised of the risks associated with not following Treatment Team discharge recommendations?: Yes    Contacts  Patient Contacts: Joi Randle  Relationship to Patient[de-identified] Family  Contact Method:  In Person  Reason/Outcome: Continuity of Care, Discharge [address]         Is the patient interested in [facility] at discharge?: Yes  [facility] requested[de-identified] Nursing, Physical [address] Name[de-identified] [facility] External Referral Reason (only applicable if external HHA name selected): Patient has established relationship with provider  [address] Provider[de-identified] PCP  Home Health Services Needed[de-identified] Evaluate Functional Status and Safety, Gait/ADL Training, Strengthening/Theraputic Exercises to Improve Function, Oxygen Via Nasal Cannula  Oxygen LPM Ordered (if applicable based on home health services needed):: 2 LPM  Homebound Criteria Met[de-identified] Requires the Assistance of Another Person for Safe Ambulation or to Leave the Home, Uses an Assist Device (i.e. cane, walker, etc)    DME Referral Provided  Referral made for DME?: Yes  DME referral completed for the following items[de-identified] Hospital Bed  DME Supplier Name[de-identified] [facility]    Other Referral/Resources/Interventions Provided:  Interventions: HHC, DME  Referral Comments: Pt to be discharged home and resume services with [facility] and private pay aides    Would you like to participate in our [address] service program?  : No - Declined    Treatment Team Recommendation: Home with [address] [facility]  Discharge Destination Plan[de-identified] Home with [address] [facility]

## 2023-11-26 NOTE — PLAN OF CARE
Problem: Prexisting or High Potential for Compromised Skin Integrity  Goal: Skin integrity is maintained or improved  Description: INTERVENTIONS:  - Identify patients at risk for skin breakdown  - Assess and monitor skin integrity  - Assess and monitor nutrition and hydration status  - Monitor labs   - Assess for incontinence   - Turn and reposition patient  - Assist with mobility/ambulation  - Relieve pressure over bony prominences  - Avoid friction and shearing  - Provide appropriate hygiene as needed including keeping skin clean and dry  - Evaluate need for skin moisturizer/barrier cream  - Collaborate with interdisciplinary team   - Patient/family teaching  - Consider wound care consult   Outcome: Progressing     Problem: PAIN - ADULT  Goal: Verbalizes/displays adequate comfort level or baseline comfort level  Description: Interventions:  - Encourage patient to monitor pain and request assistance  - Assess pain using appropriate pain scale  - Administer analgesics based on type and severity of pain and evaluate response  - Implement non-pharmacological measures as appropriate and evaluate response  - Consider cultural and social influences on pain and pain management  - Notify physician/advanced practitioner if interventions unsuccessful or patient reports new pain  Outcome: Progressing     Problem: INFECTION - ADULT  Goal: Absence of fever/infection during neutropenic period  Description: INTERVENTIONS:  - Monitor WBC    Outcome: Progressing     Problem: SAFETY ADULT  Goal: Maintain or return to baseline ADL function  Description: INTERVENTIONS:  -  Assess patient's ability to carry out ADLs; assess patient's baseline for ADL function and identify physical deficits which impact ability to perform ADLs (bathing, care of mouth/teeth, toileting, grooming, dressing, etc.)  - Assess/evaluate cause of self-care deficits   - Assess range of motion  - Assess patient's mobility; develop plan if impaired  - Assess patient's need for assistive devices and provide as appropriate  - Encourage maximum independence but intervene and supervise when necessary  - Involve family in performance of ADLs  - Assess for home care needs following discharge   - Consider OT consult to assist with ADL evaluation and planning for discharge  - Provide patient education as appropriate  Outcome: Progressing

## 2023-11-26 NOTE — PROGRESS NOTES
[address]  Progress Note  Name: Italo Moore  MRN: [id number]  Unit/Bed#: -30 I Date of Admission: 11/25/2023   Date of Service: 11/26/2023 I Hospital Day: 1    Assessment/Plan   Hypoglycemia  Assessment & Plan  Likely due to insulin stacking in the setting of kidney failure  Hold further insulin for now  Hypoglycemia protocol    Acute kidney injury superimposed on CKD   Assessment & Plan  Lab Results   Component Value Date    EGFR 42 11/26/2023    EGFR 24 11/25/2023    EGFR 36 10/09/2023    CREATININE 1.22 11/26/2023    CREATININE 1.92 (H) 11/25/2023    CREATININE 1.37 (H) 10/09/2023     Presented with a creatinine of 1.9 which is above baseline typically between 1.2 and 1.5. Suspect secondary to some degree of mild volume depletion and also recently started on Bactrim  At this point in time we will hold any further Bactrim, hold her diuretics as well  Continue IVF today continue to monitor volume status  Monitor kidney function  Avoid nephrotoxins    * Acute metabolic encephalopathy  Assessment & Plan  Noted with confusion on arrival, likely metabolic encephalopathy in the setting of hypoglycemia  Monitor closely  Frequent reorientation  Correct underlying causes  Appears to be slowly improving    Unspecified skin changes  Assessment & Plan  Does have bilateral lower extremity chronic skin changes with scaling, edema, erythema. Son mentions that changes are somewhat chronic  Mildly elevated procalcitonin but in the setting of renal failure. So far I am not completely convinced of a cellulitis clinically -the patient does have chronic skin changes  Monitor closely aspect of lower extremities, temperature curve, white blood cell count  Continue Extremity elevation. Wound care.     Opiate dependence (HCC)  Assessment & Plan  Apparently chronically on hydromorphone 4mg PO TID for pain as per DMP  Continue but monitor closely for sedation    Lactic acidosis  Assessment & Plan  Noted for lactic acid of 2.0 uptrending to 3. Otherwise good capillary refill time, good blood pressure. Potentially type B lactic acidosis but will continue to monitor closely, continue IV fluids. CHF (congestive heart failure) ([address])  Assessment & Plan  Wt Readings from Last 3 Encounters:   11/25/23 85 kg (187 lb 6.3 oz)   10/07/23 91.2 kg (201 lb 1 oz)   06/19/23 86.6 kg (191 lb)     She appears overall euvolemic to dry currently  I will hold her diuretics in the setting of kidney failure, monitor volume status closely          Rheumatoid arthritis involving multiple sites with positive rheumatoid factor (HCC)  Assessment & Plan  Stable    Type 2 diabetes mellitus with stage 3 chronic kidney disease, with long-term current use of insulin [facility])  Assessment & Plan  Lab Results   Component Value Date    HGBA1C 8.7 (A) 10/31/2023       Recent Labs     11/26/23  0039 11/26/23  0609 11/26/23  0940 11/26/23  1123   POCGLU 201* 106 83 101         Blood Sugar Average: Last 72 hrs:  (P) 149.0509034394816612    Given hypoglycemia on arrival we will hold insulin for now. She may need a lower dose moving forward depending on her kidney function down the line. Hypoglycemia protocol. Essential hypertension  Assessment & Plan  Monitor blood pressure  Torsemide currently on hold given CLAUDIA           VTE Pharmacologic Prophylaxis:   Pharmacologic: Heparin  Mechanical VTE Prophylaxis in Place: Yes    Patient Centered Rounds: I have performed bedside rounds with nursing staff today. Discussions with Specialists or Other Care Team Provider: Discussed with care management team    Education and Discussions with Family / Patient: Discussed with son at the bedside    Time Spent for Care: 45 minutes. More than 50% of total time spent on counseling and coordination of care as described above.     Current Length of Stay: 1 day(s)    Current Patient Status: Inpatient   Certification Statement: The patient will continue to require additional inpatient hospital stay due to need for IV therapy    Discharge Plan: Once stable, 24 to 48 hours    Code Status: Level 3 - DNAR and DNI      Subjective:     Patient related this morning. Yesterday still had some hypoglycemia. Today she is a little more oriented according to the son although to me she is still appears to be somewhat drowsy. Denies any nausea or vomiting. Objective:     Vitals:   Temp (24hrs), Av.5 °F (36.4 °C), Min:97.3 °F (36.3 °C), Max:97.7 °F (36.5 °C)    Temp:  [97.3 °F (36.3 °C)-97.7 °F (36.5 °C)] 97.3 °F (36.3 °C)  HR:  [79-83] 79  Resp:  [16-18] 18  BP: ()/(42-60) 103/60  SpO2:  [92 %-99 %] 97 %  Body mass index is 36.6 kg/m². Input and Output Summary (last 24 hours): Intake/Output Summary (Last 24 hours) at 2023 1200  Last data filed at 2023 0936  Gross per 24 hour   Intake 2137.08 ml   Output 1880 ml   Net 257.08 ml       Physical Exam:     Physical Exam  Vitals and nursing note reviewed. Constitutional:       Appearance: Normal appearance. She is normal weight. Comments: Female in bed, awake   HENT:      Head: Normocephalic and atraumatic. Right Ear: External ear normal.      Left Ear: External ear normal.      Nose: Nose normal. No congestion. Mouth/Throat:      Mouth: Mucous membranes are moist.      Pharynx: Oropharynx is clear. No oropharyngeal exudate or posterior oropharyngeal erythema. Eyes:      General: No scleral icterus. Right eye: No discharge. Left eye: No discharge. Extraocular Movements: Extraocular movements intact. Conjunctiva/sclera: Conjunctivae normal.      Pupils: Pupils are equal, round, and reactive to light. Cardiovascular:      Rate and Rhythm: Normal rate and regular rhythm. Pulses: Normal pulses. Heart sounds: Normal heart sounds. No murmur heard. No friction rub. No gallop.    Pulmonary:      Effort: Pulmonary effort is normal. No respiratory distress. Breath sounds: Normal breath sounds. No stridor. No wheezing, rhonchi or rales. Chest:      Chest wall: No tenderness. Abdominal:      General: Abdomen is flat. Bowel sounds are normal. There is no distension. Palpations: Abdomen is soft. There is no mass. Tenderness: There is no abdominal tenderness. There is no guarding or rebound. Musculoskeletal:         General: No swelling, tenderness, deformity or signs of injury. Normal range of motion. Cervical back: Normal range of motion and neck supple. No rigidity. No muscular tenderness. Skin:     General: Skin is warm and dry. Capillary Refill: Capillary refill takes less than 2 seconds. Coloration: Skin is not jaundiced or pale. Findings: Erythema and lesion present. No bruising or rash. Comments: Chronic lesions in bilateral lower extremities with scaling and some erythema   Neurological:      General: No focal deficit present. Mental Status: She is alert. Mental status is at baseline. She is disoriented. Cranial Nerves: No cranial nerve deficit. Sensory: No sensory deficit. Motor: No weakness. Coordination: Coordination normal.   Psychiatric:         Mood and Affect: Mood normal.         Behavior: Behavior normal.         Thought Content:  Thought content normal.         Judgment: Judgment normal.         Additional Data:     Labs:    Results from last 7 days   Lab Units 11/26/23  0956   WBC Thousand/uL 10.13   HEMOGLOBIN g/dL 12.4   HEMATOCRIT % 38.1   PLATELETS Thousands/uL 503*   NEUTROS PCT % 60   LYMPHS PCT % 20   MONOS PCT % 10   EOS PCT % 8*     Results from last 7 days   Lab Units 11/26/23  0956 11/25/23  0654   SODIUM mmol/L 133* 129*   POTASSIUM mmol/L 4.3 3.8   CHLORIDE mmol/L 101 95*   CO2 mmol/L 26 21   BUN mg/dL 27* 46*   CREATININE mg/dL 1.22 1.92*   ANION GAP mmol/L 6 13   CALCIUM mg/dL 8.6 9.1   ALBUMIN g/dL  --  2.9*   TOTAL BILIRUBIN mg/dL  --  0.35   ALK PHOS U/L --  102   ALT U/L  --  8   AST U/L  --  40*   GLUCOSE RANDOM mg/dL 86 48*     Results from last 7 days   Lab Units 11/25/23  0618   INR  1.21*     Results from last 7 days   Lab Units 11/26/23  1123 11/26/23  0940 11/26/23  0609 11/26/23  0039 11/25/23  2347 11/25/23  2329 11/25/23  2312 11/25/23  1957 11/25/23  1750 11/25/23  1439 11/25/23  1131 11/25/23  1017   POC GLUCOSE mg/dl 101 83 106 201* 65 59* 57* 104 92 107 118 111         Results from last 7 days   Lab Units 11/25/23  1340 11/25/23  0937 11/25/23  0618   LACTIC ACID mmol/L 2.6* 3.0* 2.3*   PROCALCITONIN ng/ml  --   --  0.28*           * I Have Reviewed All Lab Data Listed Above. * Additional Pertinent Lab Tests Reviewed: [address] Admission Reviewed      Recent Cultures (last 7 days):     Results from last 7 days   Lab Units 11/25/23  0723 11/25/23  0621 11/25/23  0618   BLOOD CULTURE   --  Received in Microbiology Lab. Culture in Progress. Received in Microbiology Lab. Culture in Progress.    URINE CULTURE  60,000-69,000 cfu/ml Non lactose fermenting gram negative altagracia*  --   --        Last 24 Hours Medication List:   Current Facility-Administered Medications   Medication Dose Route Frequency Provider Last Rate    acetaminophen  650 mg Oral Q6H PRN Aman Pelletier MD      dextrose 5 % and sodium chloride 0.45 %  100 mL/hr Intravenous Continuous Aman Pelletier  mL/hr (11/26/23 0936)    Diclofenac Sodium  2 g Topical 4x Daily Aman Pelletier MD      DULoxetine  30 mg Oral Daily Aman Pelletier MD      Fluticasone Furoate-Vilanterol  1 puff Inhalation Daily Aman Pelletier MD      And    umeclidinium  1 puff Inhalation Daily Aman Pelletier MD      glycerin-hypromellose-  1 drop Both Eyes TID Aman Pelletier MD      heparin (porcine)  5,000 Units Subcutaneous [facility] Aman Pelletier MD      HYDROmorphone  4 mg Oral TID Aman Pelletier MD methocarbamol  500 mg Oral BID PRN Ana Mayer MD      mirtazapine  15 mg Oral HS Ana Mayer MD      nystatin   Topical BID Ana Mayer MD      ondansetron  4 mg Intravenous Q6H PRN Ana Mayer MD          Today, Patient Was Seen By: Ana Mayer MD    ** Please Note: Dictation voice to text software may have been used in the creation of this document.  **

## 2023-11-26 NOTE — NURSING NOTE
Pt had BG of 57 at 2300. Provider notified. Pt alert and oriented, but refusing to drink full orange juice or eat deejay crackers. Pt drowsy and BG still 59 after 15 min. Followed hypoglycemic protocol per provider. Administered dextrose 50% IV solution 50 mL.  15 minutes after.

## 2023-11-26 NOTE — ASSESSMENT & PLAN NOTE
Lab Results   Component Value Date    HGBA1C 8.7 (A) 10/31/2023       Recent Labs     11/26/23  0039 11/26/23  0609 11/26/23  0940 11/26/23  1123   POCGLU 201* 106 83 101         Blood Sugar Average: Last 72 hrs:  (P) 322.9622930465014492    Given hypoglycemia on arrival we will hold insulin for now. She may need a lower dose moving forward depending on her kidney function down the line. Hypoglycemia protocol.

## 2023-11-27 ENCOUNTER — PATIENT MESSAGE (OUTPATIENT)
Age: 78
End: 2023-11-27

## 2023-11-27 PROBLEM — H10.9 CONJUNCTIVITIS: Status: ACTIVE | Noted: 2023-11-27

## 2023-11-27 LAB
ANION GAP SERPL CALCULATED.3IONS-SCNC: 5 MMOL/L
BASOPHILS # BLD AUTO: 0.06 THOUSANDS/ÂΜL (ref 0–0.1)
BASOPHILS NFR BLD AUTO: 1 % (ref 0–1)
BUN SERPL-MCNC: 19 MG/DL (ref 5–25)
CALCIUM SERPL-MCNC: 8.1 MG/DL (ref 8.4–10.2)
CHLORIDE SERPL-SCNC: 102 MMOL/L (ref 96–108)
CO2 SERPL-SCNC: 26 MMOL/L (ref 21–32)
CREAT SERPL-MCNC: 0.86 MG/DL (ref 0.6–1.3)
DME PARACHUTE DELIVERY DATE REQUESTED: NORMAL
DME PARACHUTE ITEM DESCRIPTION: NORMAL
DME PARACHUTE ORDER STATUS: NORMAL
DME PARACHUTE SUPPLIER NAME: NORMAL
DME PARACHUTE SUPPLIER PHONE: NORMAL
EOSINOPHIL # BLD AUTO: 0.72 THOUSAND/ÂΜL (ref 0–0.61)
EOSINOPHIL NFR BLD AUTO: 8 % (ref 0–6)
ERYTHROCYTE [DISTWIDTH] IN BLOOD BY AUTOMATED COUNT: 14.9 % (ref 11.6–15.1)
GFR SERPL CREATININE-BSD FRML MDRD: 64 ML/MIN/1.73SQ M
GLUCOSE SERPL-MCNC: 104 MG/DL (ref 65–140)
GLUCOSE SERPL-MCNC: 132 MG/DL (ref 65–140)
GLUCOSE SERPL-MCNC: 134 MG/DL (ref 65–140)
GLUCOSE SERPL-MCNC: 166 MG/DL (ref 65–140)
GLUCOSE SERPL-MCNC: 94 MG/DL (ref 65–140)
HCT VFR BLD AUTO: 35 % (ref 34.8–46.1)
HGB BLD-MCNC: 11.2 G/DL (ref 11.5–15.4)
IMM GRANULOCYTES # BLD AUTO: 0.06 THOUSAND/UL (ref 0–0.2)
IMM GRANULOCYTES NFR BLD AUTO: 1 % (ref 0–2)
LYMPHOCYTES # BLD AUTO: 2.07 THOUSANDS/ÂΜL (ref 0.6–4.47)
LYMPHOCYTES NFR BLD AUTO: 23 % (ref 14–44)
MCH RBC QN AUTO: 28 PG (ref 26.8–34.3)
MCHC RBC AUTO-ENTMCNC: 32 G/DL (ref 31.4–37.4)
MCV RBC AUTO: 88 FL (ref 82–98)
MONOCYTES # BLD AUTO: 0.79 THOUSAND/ÂΜL (ref 0.17–1.22)
MONOCYTES NFR BLD AUTO: 9 % (ref 4–12)
NEUTROPHILS # BLD AUTO: 5.39 THOUSANDS/ÂΜL (ref 1.85–7.62)
NEUTS SEG NFR BLD AUTO: 58 % (ref 43–75)
NRBC BLD AUTO-RTO: 0 /100 WBCS
PLATELET # BLD AUTO: 464 THOUSANDS/UL (ref 149–390)
PMV BLD AUTO: 8.8 FL (ref 8.9–12.7)
POTASSIUM SERPL-SCNC: 4.2 MMOL/L (ref 3.5–5.3)
RBC # BLD AUTO: 4 MILLION/UL (ref 3.81–5.12)
SODIUM SERPL-SCNC: 133 MMOL/L (ref 135–147)
WBC # BLD AUTO: 9.09 THOUSAND/UL (ref 4.31–10.16)

## 2023-11-27 PROCEDURE — 82948 REAGENT STRIP/BLOOD GLUCOSE: CPT

## 2023-11-27 PROCEDURE — 99233 SBSQ HOSP IP/OBS HIGH 50: CPT | Performed by: INTERNAL MEDICINE

## 2023-11-27 PROCEDURE — 85025 COMPLETE CBC W/AUTO DIFF WBC: CPT | Performed by: INTERNAL MEDICINE

## 2023-11-27 PROCEDURE — 80048 BASIC METABOLIC PNL TOTAL CA: CPT | Performed by: INTERNAL MEDICINE

## 2023-11-27 RX ORDER — HYDROMORPHONE HCL/PF 1 MG/ML
0.5 SYRINGE (ML) INJECTION EVERY 4 HOURS PRN
Status: DISCONTINUED | OUTPATIENT
Start: 2023-11-27 | End: 2023-11-30 | Stop reason: HOSPADM

## 2023-11-27 RX ORDER — NEOMYCIN SULFATE, POLYMYXIN B SULFATE AND BACITRACIN ZINC 3.5; 10000; 4 MG/G; [USP'U]/G; [USP'U]/G
0.5 OINTMENT OPHTHALMIC
Status: DISCONTINUED | OUTPATIENT
Start: 2023-11-27 | End: 2023-11-27 | Stop reason: RX

## 2023-11-27 RX ADMIN — Medication 2 G: at 14:51

## 2023-11-27 RX ADMIN — BACITRACIN ZINC AND POLYMYXIN B SULFATE: 500; 10000 OINTMENT OPHTHALMIC at 21:23

## 2023-11-27 RX ADMIN — MIRTAZAPINE 15 MG: 15 TABLET, FILM COATED ORAL at 21:20

## 2023-11-27 RX ADMIN — Medication 2 G: at 08:35

## 2023-11-27 RX ADMIN — BACITRACIN ZINC AND POLYMYXIN B SULFATE: 500; 10000 OINTMENT OPHTHALMIC at 14:51

## 2023-11-27 RX ADMIN — DEXTROSE AND SODIUM CHLORIDE 100 ML/HR: 5; .45 INJECTION, SOLUTION INTRAVENOUS at 04:59

## 2023-11-27 RX ADMIN — UMECLIDINIUM 1 PUFF: 62.5 AEROSOL, POWDER ORAL at 08:35

## 2023-11-27 RX ADMIN — FLUTICASONE FUROATE AND VILANTEROL TRIFENATATE 1 PUFF: 100; 25 POWDER RESPIRATORY (INHALATION) at 08:35

## 2023-11-27 RX ADMIN — HYDROMORPHONE HYDROCHLORIDE 4 MG: 2 TABLET ORAL at 21:20

## 2023-11-27 RX ADMIN — BACITRACIN ZINC AND POLYMYXIN B SULFATE: 500; 10000 OINTMENT OPHTHALMIC at 17:59

## 2023-11-27 RX ADMIN — HYDROMORPHONE HYDROCHLORIDE 4 MG: 2 TABLET ORAL at 16:32

## 2023-11-27 RX ADMIN — HYDROMORPHONE HYDROCHLORIDE 4 MG: 2 TABLET ORAL at 08:30

## 2023-11-27 RX ADMIN — GLYCERIN 1 DROP: .002; .002; .01 SOLUTION/ DROPS OPHTHALMIC at 16:45

## 2023-11-27 RX ADMIN — DULOXETINE HYDROCHLORIDE 30 MG: 30 CAPSULE, DELAYED RELEASE ORAL at 08:30

## 2023-11-27 RX ADMIN — HEPARIN SODIUM 5000 UNITS: 5000 INJECTION INTRAVENOUS; SUBCUTANEOUS at 14:50

## 2023-11-27 RX ADMIN — NYSTATIN: 100000 POWDER TOPICAL at 18:00

## 2023-11-27 RX ADMIN — HEPARIN SODIUM 5000 UNITS: 5000 INJECTION INTRAVENOUS; SUBCUTANEOUS at 21:20

## 2023-11-27 RX ADMIN — NYSTATIN: 100000 POWDER TOPICAL at 08:35

## 2023-11-27 RX ADMIN — GLYCERIN 1 DROP: .002; .002; .01 SOLUTION/ DROPS OPHTHALMIC at 08:35

## 2023-11-27 RX ADMIN — Medication 2 G: at 21:24

## 2023-11-27 RX ADMIN — GLYCERIN 1 DROP: .002; .002; .01 SOLUTION/ DROPS OPHTHALMIC at 21:23

## 2023-11-27 RX ADMIN — HYDROMORPHONE HYDROCHLORIDE 0.5 MG: 1 INJECTION, SOLUTION INTRAMUSCULAR; INTRAVENOUS; SUBCUTANEOUS at 14:23

## 2023-11-27 RX ADMIN — HEPARIN SODIUM 5000 UNITS: 5000 INJECTION INTRAVENOUS; SUBCUTANEOUS at 05:02

## 2023-11-27 NOTE — WOUND OSTOMY CARE
Progress Note - Wound   Marylene Sayer 66 y.o. female MRN: [id number]  Unit/Bed#: -01 Encounter: [id number]      Assessment:   Patient admitted dye to acute metabolic encephalopathy. History of CHF, diabetes, HTN, HLD, RA, CKD. Wound care consulted for multiple wounds. Patient agreeable to assessment, alert and oriented x3, incontinent of bowel and bladder, assist of 2 to turn for assessment, is an assist with care, order placed for P-500 low air loss mattress, heels elevated off of mattress. Primary RN at bedside for assessment. 1. MASD mid sacrum- Wound is linear in shape, partial thickness, 100% blanchable pink tissue, with scant amount of serous drainage noted. Angy-wound is dry, intact, blanchable pink and hyperpigmented skin. 2. Pressure injury right posterior/medial thigh, unstageable-POA- Suspect etiology is mixture of pressure, moisture and friction. Wounds are scattered, irregular in shape, true depth unknown, approx. 60% yellow adhered slough and 40% pink tissue, with scant amount of serosanguineous drainage noted. Angy-wounds are dry, intact, blanchable hyperpigmented skin. 3. B/L breast MASD/ITD- Wounds pictured and measured separately. Wounds are linear/irregular in shape, approx. 50% yellow adhered tissue and 50% pink tissue, with small amount of serosanguineous drainage noted. Angy-wounds are dry, intact, blanchable, with mild rash noted. 4. B/L abdominal pannus- Wounds pictured and measured separately. Wounds are oval in shape, approx. 60% yellow adhered tissue and 40% pink tissue, with small amount of serosanguineous and yellow drainage noted. Angy-wounds are dry, intact, blanchable, with mild rash noted. 5. B/L anterior and posterior tibial- Wound located circumferentially, left and right leg wounds pictured and measured separately. Wounds are scattered, irregular in shape, full-thickness, approx.  30% yellow adhered tissue and 70% pink/beefy red tissue, with small to moderate amount of serosanguineous and sanguineous drainage noted. Maribel-wounds are dry, intact, scaly skin, hyperpigmented/hemosiderin staining. 6. Bilateral elbows, hips, buttock, and heels- skin is dry, intact, blanchable    Educated patient on importance of frequent offloading of pressure via turning, repositioning, and weight-shifting. Verbalized understanding of plan of care. No induration, fluctuance, odor, warmth, or purulence noted to the above noted wounds. New dressings applied. Patient tolerated fairly well, reports severe pain to the lower extremity wounds. Primary nurse aware of plan of care. See flow sheets for more detailed assessment findings. Will follow along. Skin care plans:  1-Cleanse sacrum, buttock, and posterior thighs with soap and water, pat dry. Apply Triad paste to open wound beds and then apply thin layer of Calazime to maribel-wounds TID and PRN with maribel-care  2-Hydraguard to bilateral heels BID and PRN  3-Elevate heels to offload pressure  4-Ehob cushion when out of bed. 5-Turn/repoisiton q2h for pressure re-distribution on skin. 6-Moisturize skin daily with skin nourishing cream  7-Cleanse b/l abdominal pannus and beneath breast with soap and water, and pat completely dry. Cut strips of InterDry and place the fabric flat in the skin folds, leaving 2 inches outside of the skin fold. Take InterDry out and cleanse skin with soap and water daily, pat dry and place InterDry back in place. Replace every 3 days or sooner if soiled. Do not place any creams or powders on the skin that InterDry is being placed. 8-B/L lower extremity- Cleanse wounds with NSS, pat dry. Apply Adaptic over wound bed and then Maxorb Alginate. Change daily and as needed for soilage/displacement. 9-P-500 low air loss mattress      Wound 11/25/23 Breast Right (Active)   Wound Image   11/27/23 1141   Wound Description Yellow;Pink 11/27/23 1141   Maribel-wound Assessment Dry; Intact; Rash 11/27/23 1141   Wound Length (cm) 0.5 cm 11/27/23 1141   Wound Width (cm) 4.2 cm 11/27/23 1141   Wound Depth (cm) 0.1 cm 11/27/23 1141   Wound Surface Area (cm^2) 2.1 cm^2 11/27/23 1141   Wound Volume (cm^3) 0.21 cm^3 11/27/23 1141   Calculated Wound Volume (cm^3) 0.21 cm^3 11/27/23 1141   Tunneling 0 cm 11/27/23 1141   Undermining 0 11/27/23 1141   Drainage Amount Small 11/27/23 1141   Drainage Description Serosanguineous 11/27/23 1141   Non-staged Wound Description Partial thickness 11/27/23 1141   Treatments Cleansed;Site care 11/27/23 1141   Dressing Other (Comment); Silver dressing 11/27/23 1141   Wound packed? No 11/27/23 1141   Dressing Changed Changed 11/27/23 1141   Patient Tolerance Tolerated well 11/27/23 1141   Dressing Status Dry; Intact 11/27/23 1141       Wound 11/25/23 MASD Sacrum (Active)   Wound Image   11/27/23 1148   Wound Description [place] 11/27/23 1148   Angy-wound Assessment Dry; Intact; Hyperpigmented 11/27/23 1148   Wound Length (cm) 4 cm 11/27/23 1148   Wound Width (cm) 0.2 cm 11/27/23 1148   Wound Depth (cm) 0.1 cm 11/27/23 1148   Wound Surface Area (cm^2) 0.8 cm^2 11/27/23 1148   Wound Volume (cm^3) 0.08 cm^3 11/27/23 1148   Calculated Wound Volume (cm^3) 0.08 cm^3 11/27/23 1148   Tunneling 0 cm 11/27/23 1148   Undermining 0 11/27/23 1148   Drainage Amount Scant 11/27/23 1148   Drainage Description Serous 11/27/23 1148   Non-staged Wound Description Partial thickness 11/27/23 1148   Treatments Cleansed;Site care 11/27/23 1148   Dressing Protective barrier 11/27/23 1148   Wound packed? No 11/27/23 1148   Dressing Changed New 11/27/23 1148   Patient Tolerance Tolerated well 11/27/23 1148   Dressing Status Dry; Intact; Clean 11/25/23 1124       Wound 11/25/23 Breast Left (Active)   Wound Image   11/27/23 1140   Wound Description Yellow;[place] 11/27/23 1140   Angy-wound Assessment Dry; Intact; Rash 11/27/23 1140   Wound Length (cm) 0.5 cm 11/27/23 1140   Wound Width (cm) 5.2 cm 11/27/23 1140   Wound Depth (cm) 0.1 cm 11/27/23 1140   Wound Surface Area (cm^2) 2.6 cm^2 11/27/23 1140   Wound Volume (cm^3) 0.26 cm^3 11/27/23 1140   Calculated Wound Volume (cm^3) 0.26 cm^3 11/27/23 1140   Tunneling 0 cm 11/27/23 1140   Undermining 0 11/27/23 1140   Drainage Amount Small 11/27/23 1140   Drainage Description Serosanguineous 11/27/23 1140   Non-staged Wound Description Partial thickness 11/27/23 1140   Treatments Cleansed;Site care 11/27/23 1140   Dressing Silver dressing; Other (Comment) 11/27/23 1140   Wound packed? No 11/27/23 1140   Dressing Changed Changed 11/27/23 1140   Patient Tolerance Tolerated well 11/27/23 1140   Dressing Status Clean;Dry; Intact 11/27/23 1140       Wound 11/25/23 Tibial Right; Anterior;Posterior (Active)   Wound Image    11/27/23 1201   Wound Description Beefy red;Yellow 11/27/23 1201   Angy-wound Assessment Dry; Intact;Scaly; Yellow-brown 11/27/23 1201   Wound Length (cm) 10 cm 11/27/23 1201   Wound Width (cm) 24 cm 11/27/23 1201   Wound Depth (cm) 0.2 cm 11/27/23 1201   Wound Surface Area (cm^2) 240 cm^2 11/27/23 1201   Wound Volume (cm^3) 48 cm^3 11/27/23 1201   Calculated Wound Volume (cm^3) 48 cm^3 11/27/23 1201   Tunneling 0 cm 11/27/23 1201   Undermining 0 11/27/23 1201   Drainage Amount Small 11/27/23 1201   Drainage Description Serosanguineous 11/27/23 1201   Non-staged Wound Description Not applicable 37/60/11 0688   Treatments Cleansed;Irrigation with NSS;Site care 11/27/23 1201   Dressing Calcium Alginate;ABD;Dry dressing 11/27/23 1201   Wound packed? No 11/27/23 1201   Dressing Changed Changed 11/27/23 1201   Patient Tolerance Tolerated poorly 11/27/23 1201   Dressing Status Clean;Dry; Intact 11/27/23 1201       Wound 11/25/23 Tibial Left; Anterior;Posterior (Active)   Wound Image    11/27/23 1202   Wound Description Beefy red;Pink;Yellow 11/27/23 1202   Angy-wound Assessment Dry; Intact; Hyperpigmented;Yellow-brown;Scaly 11/27/23 1202   Wound Length (cm) 12 cm 11/27/23 1202   Wound Width (cm) 24 cm 11/27/23 1202   Wound Depth (cm) 0.2 cm 11/27/23 1202   Wound Surface Area (cm^2) 288 cm^2 11/27/23 1202   Wound Volume (cm^3) 57.6 cm^3 11/27/23 1202   Calculated Wound Volume (cm^3) 57.6 cm^3 11/27/23 1202   Tunneling 0 cm 11/27/23 1202   Undermining 0 11/27/23 1202   Drainage Amount Small 11/27/23 1202   Drainage Description Serosanguineous 11/27/23 1202   Non-staged Wound Description Not applicable 97/90/74 7406   Treatments Cleansed;Irrigation with NSS;Site care 11/27/23 1202   Dressing Calcium Alginate;ABD;Dry dressing 11/27/23 1202   Wound packed? No 11/27/23 1202   Dressing Changed Changed 11/27/23 1202   Patient Tolerance Tolerated poorly 11/27/23 1202   Dressing Status Clean;Dry; Intact 11/27/23 1202       Wound 11/25/23 Abdomen Right;Distal (Active)   Wound Image   11/27/23 1143   Wound Description Yellow;Pink 11/27/23 1143   Angy-wound Assessment Dry; Intact; Rash 11/27/23 1143   Wound Length (cm) 0 cm 11/27/23 1143   Wound Width (cm) 0.5 cm 11/27/23 1143   Wound Depth (cm) 1.2 cm 11/27/23 1143   Wound Surface Area (cm^2) 0 cm^2 11/27/23 1143   Wound Volume (cm^3) 0 cm^3 11/27/23 1143   Calculated Wound Volume (cm^3) 0 cm^3 11/27/23 1143   Tunneling 0 cm 11/27/23 1143   Undermining 0 11/27/23 1143   Drainage Amount Small 11/27/23 1143   Drainage Description Serosanguineous; Yellow 11/27/23 1143   Non-staged Wound Description Not applicable 44/08/84 6938   Treatments Cleansed;Site care 11/27/23 1143   Dressing Silver dressing; Other (Comment) 11/27/23 1143   Dressing Changed Changed 11/27/23 1143   Patient Tolerance Tolerated well 11/27/23 1143   Dressing Status Clean;Dry; Intact 11/27/23 1143       Wound 11/25/23 Abdomen Left;Distal (Active)   Wound Image   11/27/23 1143   Wound Description Yellow;Pink 11/27/23 1143   Angy-wound Assessment Dry; Intact; Rash 11/27/23 1143   Wound Length (cm) 0.4 cm 11/27/23 1143   Wound Width (cm) 1 cm 11/27/23 1143   Wound Depth (cm) 0.1 cm 11/27/23 1143   Wound Surface Area (cm^2) 0.4 cm^2 11/27/23 1143   Wound Volume (cm^3) 0.04 cm^3 11/27/23 1143   Calculated Wound Volume (cm^3) 0.04 cm^3 11/27/23 1143   Tunneling 0 cm 11/27/23 1143   Undermining 0 11/27/23 1143   Drainage Amount Small 11/27/23 1143   Drainage Description Serosanguineous; Yellow 11/27/23 1143   Non-staged Wound Description Not applicable 77/94/49 1284   Treatments Cleansed;Site care 11/27/23 1143   Dressing Silver dressing; Other (Comment) 11/27/23 1143   Wound packed? No 11/27/23 1143   Dressing Changed Changed 11/27/23 1143   Patient Tolerance Tolerated well 11/27/23 1143   Dressing Status Clean;Dry; Intact 11/27/23 1143       Wound 11/27/23 Thigh Posterior;Proximal;Right (Active)   Wound Image   11/27/23 1147   Wound Description Yellow;Pink 11/27/23 1147   Pressure Injury Stage U 11/27/23 1147   Angy-wound Assessment Dry; Intact; Hyperpigmented 11/27/23 1147   Wound Length (cm) 8 cm 11/27/23 1147   Wound Width (cm) 7.5 cm 11/27/23 1147   Wound Depth (cm) 0.2 cm 11/27/23 1147   Wound Surface Area (cm^2) 60 cm^2 11/27/23 1147   Wound Volume (cm^3) 12 cm^3 11/27/23 1147   Calculated Wound Volume (cm^3) 12 cm^3 11/27/23 1147   Tunneling 0 cm 11/27/23 1147   Undermining 0 11/27/23 1147   Drainage Amount Scant 11/27/23 1147   Drainage Description Serosanguineous 11/27/23 1147   Non-staged Wound Description Not applicable 44/76/67 8432   Treatments Cleansed;Site care 11/27/23 1147   Dressing Protective barrier 11/27/23 1147   Wound packed?  No 11/27/23 1147   Dressing Changed New 11/27/23 1147   Patient Tolerance Tolerated well 11/27/23 1147       Call or tigertext with any questions  Wound Care will continue to follow    Elena MASONN RN [facility]  Wound and Ostomy care

## 2023-11-27 NOTE — CASE MANAGEMENT
Case Management Discharge Planning Note    Patient name Johnathan Dawn  Location [address] [facility] [place] 429/-06 MRN [id number]  : 1945 Date 2023       Current Admission Date: 2023  Current Admission Diagnosis:Acute metabolic encephalopathy   Patient Active Problem List    Diagnosis Date Noted    Conjunctivitis     Acute metabolic encephalopathy     Hypoglycemia 2023    Iatrogenic disease 2023    Unspecified skin changes 2023    Hyperlactatemia 2023    Wound infection 2023    Hand pain 10/31/2023    Pressure injury of right buttock, stage 1 10/31/2023    Acute on chronic diastolic (congestive) heart failure ([address]) 10/05/2023    Pulmonary fibrosis ([address]) 2023    Edema of lower extremity 2023    Chronic kidney disease, stage 2 (mild) 2023    Chronic pain disorder 2023    CHF (congestive heart failure) ([address]) 2023    Age-related osteoporosis without current pathological fracture 2022    Hypokalemia 2022    SIRS (systemic inflammatory response syndrome) ([address]) 2022    Opiate dependence ([address]) 2022    Elevated d-dimer 2021    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 ([address]) 2021    Fungal infection of skin 2020    Acute kidney injury superimposed on CKD  2019    Chronic prescription opiate use 2019    Shortness of breath     Hyponatremia 2019    Venous stasis dermatitis of both lower extremities 2019    Chronic respiratory failure with hypoxia ([address]) 2019    Immunosuppressed status ([address]) 2019    Interstitial lung disease ([address]) 2019    Obesity (BMI 30-39.9) 2018    Varicose veins of both lower extremities 2018    Morbid obesity ([address])     Moderate persistent asthma 11/10/2017    Vitamin D deficiency 2017    Pain in elbow 2017    Shoulder pain 2016    Mixed hyperlipidemia 07/27/2016    Bilateral lower extremity edema 03/03/2016    Ambulatory dysfunction 02/02/2016    Recurrent major depressive disorder, in partial remission ([address]) 02/02/2016    Gastroesophageal reflux disease without esophagitis 02/02/2016    Urinary incontinence 02/02/2016    Psoriasis vulgaris 12/31/2014    Essential hypertension 05/27/2014    Type 2 diabetes mellitus with stage 3 chronic kidney disease, with long-term current use of insulin ([address]) 04/30/2014    Primary insomnia 04/30/2014    Rheumatoid arthritis involving multiple sites with positive rheumatoid factor ([address]) 04/30/2014      LOS (days): 2  Geometric Mean LOS (GMLOS) (days): 3.40  Days to GMLOS:1.1     OBJECTIVE:  Risk of Unplanned Readmission Score: 31.88         Current admission status: Inpatient   Preferred Pharmacy:   [id number], [address] [place] [address]  Phone: [phone] Fax: [phone]    [id number] [address] [address]  Phone: [phone] Fax: [phone]    Primary Care Provider: Lilo Fragoso MD    Primary Insurance: MEDICARE  Secondary Insurance: BLUE CROSS    DISCHARGE DETAILS:  CM discussed PT's recommendation of STR, but son Daniel Pfeiffer adamantly refuses this option. Wants to take his mother home and resume services with [employer] and her private pay aides. He private pays for an aide from 10:00-15:00 5 days per wk. He is requesting a hospital bed because his mother is currently just sleeping in a recliner. Bed order placed in [place].

## 2023-11-27 NOTE — ASSESSMENT & PLAN NOTE
Lab Results   Component Value Date    EGFR 64 11/27/2023    EGFR 42 11/26/2023    EGFR 24 11/25/2023    CREATININE 0.86 11/27/2023    CREATININE 1.22 11/26/2023    CREATININE 1.92 (H) 11/25/2023     Presented with a creatinine of 1.9 which is above baseline typically between 1.2 and 1.5.   Suspect secondary to some degree of mild volume depletion and also recently started on Bactrim  At this point in time we will hold any further Bactrim, hold her diuretics as well  She was initially on IV fluids but now we are monitoring off of it since she appears to be improving  Monitor kidney function  Avoid nephrotoxins

## 2023-11-27 NOTE — ASSESSMENT & PLAN NOTE
Noted for lactic acid of 2.0 uptrending to 3. Otherwise good capillary refill time, good blood pressure. Potentially type B lactic acidosis but will continue to monitor closely, continue IV fluids. Patient is otherwise systemically well. Continue to monitor clinically.

## 2023-11-27 NOTE — TELEPHONE ENCOUNTER
Called karely back and stated she needs a full electric bed  with 1/2 rails and gel mattress, looks like she is in hospital

## 2023-11-27 NOTE — ASSESSMENT & PLAN NOTE
Hyponatremia, POA, in setting of volume depletion evidenced by Na 129> 133>133 requiring NSS bolus, IVF and monitoring of BMP.

## 2023-11-27 NOTE — ASSESSMENT & PLAN NOTE
Wt Readings from Last 3 Encounters:   11/25/23 85 kg (187 lb 6.3 oz)   10/07/23 91.2 kg (201 lb 1 oz)   06/19/23 86.6 kg (191 lb)     She appears overall euvolemic to dry currently  I will hold her diuretics in the setting of kidney failure, monitor volume status closely  Continue to monitor

## 2023-11-27 NOTE — ASSESSMENT & PLAN NOTE
Does have bilateral lower extremity chronic skin changes with scaling, edema, erythema. Son mentions that changes are somewhat chronic  Mildly elevated procalcitonin but in the setting of renal failure. Clinically I doubt any cellulitis at this point, patient is nontoxic and hemodynamically stable. The skin changes are likely chronic.

## 2023-11-27 NOTE — PROGRESS NOTES
[address]  Progress Note  Name: Ema Presley  MRN: [id number]  Unit/Bed#: -82 I Date of Admission: 11/25/2023   Date of Service: 11/27/2023 I Hospital Day: 2    Assessment/Plan   Hypoglycemia  Assessment & Plan  Likely due to insulin stacking in the setting of kidney failure  Hold further insulin for now  Hypoglycemia protocol    Acute kidney injury superimposed on CKD   Assessment & Plan  Lab Results   Component Value Date    EGFR 64 11/27/2023    EGFR 42 11/26/2023    EGFR 24 11/25/2023    CREATININE 0.86 11/27/2023    CREATININE 1.22 11/26/2023    CREATININE 1.92 (H) 11/25/2023     Presented with a creatinine of 1.9 which is above baseline typically between 1.2 and 1.5. Suspect secondary to some degree of mild volume depletion and also recently started on Bactrim  At this point in time we will hold any further Bactrim, hold her diuretics as well  She was initially on IV fluids but now we are monitoring off of it since she appears to be improving  Monitor kidney function  Avoid nephrotoxins    * Acute metabolic encephalopathy  Assessment & Plan  Noted with confusion on arrival, likely metabolic encephalopathy in the setting of hypoglycemia  Monitor closely  Frequent reorientation  Correct underlying causes  Continues to improve day by day although still fluctuates    Unspecified skin changes  Assessment & Plan  Does have bilateral lower extremity chronic skin changes with scaling, edema, erythema. Son mentions that changes are somewhat chronic  Mildly elevated procalcitonin but in the setting of renal failure. Clinically I doubt any cellulitis at this point, patient is nontoxic and hemodynamically stable. The skin changes are likely chronic.     Opiate dependence ([address])  Assessment & Plan  Apparently chronically on hydromorphone 4mg PO TID for pain as per DMP  Continue but monitor closely for sedation    Hyperlactatemia  Assessment & Plan  Noted for lactic acid of 2.0 uptrending to 3. Otherwise good capillary refill time, good blood pressure. Potentially type B lactic acidosis but will continue to monitor closely, continue IV fluids. Patient is otherwise systemically well. Continue to monitor clinically. CHF (congestive heart failure) ([address])  Assessment & Plan  Wt Readings from Last 3 Encounters:   11/25/23 85 kg (187 lb 6.3 oz)   10/07/23 91.2 kg (201 lb 1 oz)   06/19/23 86.6 kg (191 lb)     She appears overall euvolemic to dry currently  I will hold her diuretics in the setting of kidney failure, monitor volume status closely  Continue to monitor          Hyponatremia  Assessment & Plan  Hyponatremia, POA, in setting of volume depletion evidenced by Na 129> 133>133 requiring NSS bolus, IVF and monitoring of BMP. Rheumatoid arthritis involving multiple sites with positive rheumatoid factor (HCC)  Assessment & Plan  Stable    Type 2 diabetes mellitus with stage 3 chronic kidney disease, with long-term current use of insulin [facility])  Assessment & Plan  Lab Results   Component Value Date    HGBA1C 8.7 (A) 10/31/2023       Recent Labs     11/26/23  1839 11/26/23  2133 11/27/23  0624 11/27/23  1123   POCGLU 178* 145* 94 134         Blood Sugar Average: Last 72 hrs:  (P) 111.75    Given hypoglycemia on arrival we will hold insulin for now. She may need a lower dose moving forward depending on her kidney function down the line. Hypoglycemia protocol. Essential hypertension  Assessment & Plan  Monitor blood pressure  Torsemide currently on hold given CLAUDIA           Conjunctivitis  Assessment & Plan  Start topical antibiotics    VTE Pharmacologic Prophylaxis:   Pharmacologic: Heparin  Mechanical VTE Prophylaxis in Place: Yes    Patient Centered Rounds: I have performed bedside rounds with nursing staff today.     Discussions with Specialists or Other Care Team Provider: Discussed with care management team    Education and Discussions with Family / Patient:   Discussed with son over the phone    Time Spent for Care: 45 minutes. More than 50% of total time spent on counseling and coordination of care as described above. Current Length of Stay: 2 day(s)    Current Patient Status: Inpatient   Certification Statement: The patient will continue to require additional inpatient hospital stay due to need for improvement    Discharge Plan: 24-48h    Code Status: Level 3 - DNAR and DNI      Subjective:     Patient denies any new symptoms, does mention chronic pain in extremities    Objective:     Vitals:   Temp (24hrs), Av.1 °F (36.7 °C), Min:97.3 °F (36.3 °C), Max:98.4 °F (36.9 °C)    Temp:  [97.3 °F (36.3 °C)-98.4 °F (36.9 °C)] 97.3 °F (36.3 °C)  HR:  [68-89] 79  Resp:  [16-20] 20  BP: (105-122)/(43-55) 105/55  SpO2:  [77 %-99 %] 99 %  Body mass index is 36.6 kg/m². Input and Output Summary (last 24 hours): Intake/Output Summary (Last 24 hours) at 2023 1141  Last data filed at 2023 0900  Gross per 24 hour   Intake 1981.67 ml   Output 1500 ml   Net 481.67 ml       Physical Exam:     Physical Exam  Vitals and nursing note reviewed. Constitutional:       Appearance: Normal appearance. She is obese. Comments: Female in bed, awake   HENT:      Head: Normocephalic and atraumatic. Right Ear: External ear normal.      Left Ear: External ear normal.      Nose: Nose normal. No congestion. Mouth/Throat:      Mouth: Mucous membranes are moist.      Pharynx: Oropharynx is clear. No oropharyngeal exudate or posterior oropharyngeal erythema. Eyes:      General: No scleral icterus. Right eye: No discharge. Left eye: No discharge. Extraocular Movements: Extraocular movements intact. Conjunctiva/sclera: Conjunctivae normal.      Pupils: Pupils are equal, round, and reactive to light. Cardiovascular:      Rate and Rhythm: Normal rate and regular rhythm. Pulses: Normal pulses. Heart sounds: Normal heart sounds. No murmur heard. No friction rub. No gallop. Pulmonary:      Effort: Pulmonary effort is normal. No respiratory distress. Breath sounds: Normal breath sounds. No stridor. No wheezing, rhonchi or rales. Chest:      Chest wall: No tenderness. Abdominal:      General: Abdomen is flat. Bowel sounds are normal. There is no distension. Palpations: Abdomen is soft. There is no mass. Tenderness: There is no abdominal tenderness. There is no guarding or rebound. Musculoskeletal:         General: No swelling, tenderness, deformity or signs of injury. Normal range of motion. Cervical back: Normal range of motion and neck supple. No rigidity. No muscular tenderness. Skin:     General: Skin is warm and dry. Capillary Refill: Capillary refill takes less than 2 seconds. Coloration: Skin is not jaundiced or pale. Findings: No bruising, erythema, lesion or rash. Comments: Chronic skin abrasions in lower extremities but no cellulitis   Neurological:      General: No focal deficit present. Mental Status: She is alert and oriented to person, place, and time. Mental status is at baseline. Cranial Nerves: No cranial nerve deficit. Sensory: No sensory deficit. Motor: No weakness. Coordination: Coordination normal.   Psychiatric:         Mood and Affect: Mood normal.         Behavior: Behavior normal.         Thought Content:  Thought content normal.         Judgment: Judgment normal.           Additional Data:     Labs:    Results from last 7 days   Lab Units 11/27/23  0453   WBC Thousand/uL 9.09   HEMOGLOBIN g/dL 11.2*   HEMATOCRIT % 35.0   PLATELETS Thousands/uL 464*   NEUTROS PCT % 58   LYMPHS PCT % 23   MONOS PCT % 9   EOS PCT % 8*     Results from last 7 days   Lab Units 11/27/23  0453 11/26/23  0956 11/25/23  0654   SODIUM mmol/L 133*   < > 129*   POTASSIUM mmol/L 4.2   < > 3.8   CHLORIDE mmol/L 102   < > 95*   CO2 mmol/L 26   < > 21   BUN mg/dL 19   < > 46*   CREATININE mg/dL 0.86   < > 1.92*   ANION GAP mmol/L 5   < > 13   CALCIUM mg/dL 8.1*   < > 9.1   ALBUMIN g/dL  --   --  2.9*   TOTAL BILIRUBIN mg/dL  --   --  0.35   ALK PHOS U/L  --   --  102   ALT U/L  --   --  8   AST U/L  --   --  40*   GLUCOSE RANDOM mg/dL 104   < > 48*    < > = values in this interval not displayed. Results from last 7 days   Lab Units 11/25/23  0618   INR  1.21*     Results from last 7 days   Lab Units 11/27/23  1123 11/27/23  0624 11/26/23  2133 11/26/23  1839 11/26/23  1514 11/26/23  1123 11/26/23  0940 11/26/23  0609 11/26/23  0039 11/25/23  2347 11/25/23  2329 11/25/23  2312   POC GLUCOSE mg/dl 134 94 145* 178* 132 101 83 106 201* 65 59* 57*         Results from last 7 days   Lab Units 11/25/23  1340 11/25/23  0937 11/25/23  0618   LACTIC ACID mmol/L 2.6* 3.0* 2.3*   PROCALCITONIN ng/ml  --   --  0.28*           * I Have Reviewed All Lab Data Listed Above. * Additional Pertinent Lab Tests Reviewed: [facility] Admission Reviewed      Recent Cultures (last 7 days):     Results from last 7 days   Lab Units 11/25/23  0723 11/25/23  0621 11/25/23  0618   BLOOD CULTURE   --  No Growth at 24 hrs. No Growth at 24 hrs.    URINE CULTURE  60,000-69,000 cfu/ml Klebsiella variicola*  40,000-49,000 cfu/ml Aerococcus urinae*  --   --        Last 24 Hours Medication List:   Current Facility-Administered Medications   Medication Dose Route Frequency Provider Last Rate    acetaminophen  650 mg Oral Q6H PRN Sherrie Callaway MD      Diclofenac Sodium  2 g Topical 4x Daily Sherrie Callaway MD      DULoxetine  30 mg Oral Daily Sherrie Callaway MD      Fluticasone Furoate-Vilanterol  1 puff Inhalation Daily Sherrie Callaway MD      And    umeclidinium  1 puff Inhalation Daily Sherrie Callaway MD      glycerin-hypromellose-  1 drop Both Eyes TID Sherrie Callaway MD      heparin (porcine)  5,000 Units Subcutaneous [facility] Sherrie Callaway MD HYDROmorphone  4 mg Oral TID Sher Mcelroy MD      methocarbamol  500 mg Oral BID PRN Sher Mcelroy MD      mirtazapine  15 mg Oral HS Sher Mcelroy MD      nystatin   Topical BID Sher Mcelroy MD      ondansetron  4 mg Intravenous Q6H PRN Sher Mcelroy MD          Today, Patient Was Seen By: Sher Mcelroy MD    ** Please Note: Dictation voice to text software may have been used in the creation of this document.  **

## 2023-11-27 NOTE — PLAN OF CARE
Problem: Prexisting or High Potential for Compromised Skin Integrity  Goal: Skin integrity is maintained or improved  Description: INTERVENTIONS:  - Identify patients at risk for skin breakdown  - Assess and monitor skin integrity  - Assess and monitor nutrition and hydration status  - Monitor labs   - Assess for incontinence   - Turn and reposition patient  - Assist with mobility/ambulation  - Relieve pressure over bony prominences  - Avoid friction and shearing  - Provide appropriate hygiene as needed including keeping skin clean and dry  - Evaluate need for skin moisturizer/barrier cream  - Collaborate with interdisciplinary team   - Patient/family teaching  - Consider wound care consult   Outcome: Progressing     Problem: PAIN - ADULT  Goal: Verbalizes/displays adequate comfort level or baseline comfort level  Description: Interventions:  - Encourage patient to monitor pain and request assistance  - Assess pain using appropriate pain scale  - Administer analgesics based on type and severity of pain and evaluate response  - Implement non-pharmacological measures as appropriate and evaluate response  - Consider cultural and social influences on pain and pain management  - Notify physician/advanced practitioner if interventions unsuccessful or patient reports new pain  Outcome: Progressing     Problem: INFECTION - ADULT  Goal: Absence or prevention of progression during hospitalization  Description: INTERVENTIONS:  - Assess and monitor for signs and symptoms of infection  - Monitor lab/diagnostic results  - Monitor all insertion sites, i.e. indwelling lines, tubes, and drains  - Monitor endotracheal if appropriate and nasal secretions for changes in amount and color  - [place] appropriate cooling/warming therapies per order  - Administer medications as ordered  - Instruct and encourage patient and family to use good hand hygiene technique  - Identify and instruct in appropriate isolation precautions for identified infection/condition  Outcome: Progressing  Goal: Absence of fever/infection during neutropenic period  Description: INTERVENTIONS:  - Monitor WBC    Outcome: Progressing     Problem: SAFETY ADULT  Goal: Patient will remain free of falls  Description: INTERVENTIONS:  - Educate patient/family on patient safety including physical limitations  - Instruct patient to call for assistance with activity   - Consult OT/PT to assist with strengthening/mobility   - Keep Call bell within reach  - Keep bed low and locked with side rails adjusted as appropriate  - Keep care items and personal belongings within reach  - Initiate and maintain comfort rounds  - Make Fall Risk Sign visible to staff  - Offer Toileting every  Hours, in advance of need  - Initiate/Maintain alarm  - Obtain necessary fall risk management equipment:   - Apply yellow socks and bracelet for high fall risk patients  - Consider moving patient to room near nurses station  Outcome: Progressing  Goal: Maintain or return to baseline ADL function  Description: INTERVENTIONS:  -  Assess patient's ability to carry out ADLs; assess patient's baseline for ADL function and identify physical deficits which impact ability to perform ADLs (bathing, care of mouth/teeth, toileting, grooming, dressing, etc.)  - Assess/evaluate cause of self-care deficits   - Assess range of motion  - Assess patient's mobility; develop plan if impaired  - Assess patient's need for assistive devices and provide as appropriate  - Encourage maximum independence but intervene and supervise when necessary  - Involve family in performance of ADLs  - Assess for home care needs following discharge   - Consider OT consult to assist with ADL evaluation and planning for discharge  - Provide patient education as appropriate  Outcome: Progressing  Goal: Maintains/Returns to pre admission functional level  Description: INTERVENTIONS:  - Perform AM-PAC 6 Click Basic Mobility/ Daily Activity assessment daily.  - Set and communicate daily mobility goal to care team and patient/family/caregiver. - Collaborate with rehabilitation services on mobility goals if consulted  - Perform Range of Motion  times a day. - Reposition patient every  hours. - Dangle patient  times a day  - Stand patient  times a day  - Ambulate patient  times a day  - Out of bed to chair  times a day   - Out of bed for meals  times a day  - Out of bed for toileting  - Record patient progress and toleration of activity level   Outcome: Progressing     Problem: DISCHARGE PLANNING  Goal: Discharge to home or other facility with appropriate resources  Description: INTERVENTIONS:  - Identify barriers to discharge w/patient and caregiver  - Arrange for needed discharge resources and transportation as appropriate  - Identify discharge learning needs (meds, wound care, etc.)  - Arrange for interpretive services to assist at discharge as needed  - Refer to Case Management Department for coordinating discharge planning if the patient needs post-hospital services based on physician/advanced practitioner order or complex needs related to functional status, cognitive ability, or social support system  Outcome: Progressing     Problem: Knowledge Deficit  Goal: Patient/family/caregiver demonstrates understanding of disease process, treatment plan, medications, and discharge instructions  Description: Complete learning assessment and assess knowledge base. Interventions:  - Provide teaching at level of understanding  - Provide teaching via preferred learning methods  Outcome: Progressing     Problem: Nutrition/Hydration-ADULT  Goal: Nutrient/Hydration intake appropriate for improving, restoring or maintaining nutritional needs  Description: Monitor and assess patient's nutrition/hydration status for malnutrition. Collaborate with interdisciplinary team and initiate plan and interventions as ordered.   Monitor patient's weight and dietary intake as ordered or per policy. Utilize nutrition screening tool and intervene as necessary. Determine patient's food preferences and provide high-protein, high-caloric foods as appropriate.      INTERVENTIONS:  - Monitor oral intake, urinary output, labs, and treatment plans  - Assess nutrition and hydration status and recommend course of action  - Evaluate amount of meals eaten  - Assist patient with eating if necessary   - Allow adequate time for meals  - Recommend/ encourage appropriate diets, oral nutritional supplements, and vitamin/mineral supplements  - Order, calculate, and assess calorie counts as needed  - Recommend, monitor, and adjust tube feedings and TPN/PPN based on assessed needs  - Assess need for intravenous fluids  - Provide specific nutrition/hydration education as appropriate  - Include patient/family/caregiver in decisions related to nutrition  Outcome: Progressing

## 2023-11-27 NOTE — ASSESSMENT & PLAN NOTE
Lab Results   Component Value Date    HGBA1C 8.7 (A) 10/31/2023       Recent Labs     11/26/23  1839 11/26/23  2133 11/27/23  0624 11/27/23  1123   POCGLU 178* 145* 94 134         Blood Sugar Average: Last 72 hrs:  (P) 111.75    Given hypoglycemia on arrival we will hold insulin for now. She may need a lower dose moving forward depending on her kidney function down the line. Hypoglycemia protocol.

## 2023-11-27 NOTE — PLAN OF CARE
Problem: Prexisting or High Potential for Compromised Skin Integrity  Goal: Skin integrity is maintained or improved  Description: INTERVENTIONS:  - Identify patients at risk for skin breakdown  - Assess and monitor skin integrity  - Assess and monitor nutrition and hydration status  - Monitor labs   - Assess for incontinence   - Turn and reposition patient  - Assist with mobility/ambulation  - Relieve pressure over bony prominences  - Avoid friction and shearing  - Provide appropriate hygiene as needed including keeping skin clean and dry  - Evaluate need for skin moisturizer/barrier cream  - Collaborate with interdisciplinary team   - Patient/family teaching  - Consider wound care consult   11/27/2023 1349 by James Cannon RN  Outcome: Progressing  11/27/2023 1348 by James Cannon RN  Outcome: Progressing     Problem: PAIN - ADULT  Goal: Verbalizes/displays adequate comfort level or baseline comfort level  Description: Interventions:  - Encourage patient to monitor pain and request assistance  - Assess pain using appropriate pain scale  - Administer analgesics based on type and severity of pain and evaluate response  - Implement non-pharmacological measures as appropriate and evaluate response  - Consider cultural and social influences on pain and pain management  - Notify physician/advanced practitioner if interventions unsuccessful or patient reports new pain  11/27/2023 1349 by James Cannon RN  Outcome: Progressing  11/27/2023 1348 by James Cannon RN  Outcome: Progressing     Problem: INFECTION - ADULT  Goal: Absence or prevention of progression during hospitalization  Description: INTERVENTIONS:  - Assess and monitor for signs and symptoms of infection  - Monitor lab/diagnostic results  - Monitor all insertion sites, i.e. indwelling lines, tubes, and drains  - Monitor endotracheal if appropriate and nasal secretions for changes in amount and color  - Perry appropriate cooling/warming therapies per order  - Administer medications as ordered  - Instruct and encourage patient and family to use good hand hygiene technique  - Identify and instruct in appropriate isolation precautions for identified infection/condition  11/27/2023 1349 by Ramona Cardoso RN  Outcome: Progressing  11/27/2023 1348 by Ramona Cardoso RN  Outcome: Progressing  Goal: Absence of fever/infection during neutropenic period  Description: INTERVENTIONS:  - Monitor WBC    11/27/2023 1349 by Ramona Cardoso RN  Outcome: Progressing  11/27/2023 1348 by Ramona Cardoso RN  Outcome: Progressing     Problem: SAFETY ADULT  Goal: Patient will remain free of falls  Description: INTERVENTIONS:  - Educate patient/family on patient safety including physical limitations  - Instruct patient to call for assistance with activity   - Consult OT/PT to assist with strengthening/mobility   - Keep Call bell within reach  - Keep bed low and locked with side rails adjusted as appropriate  - Keep care items and personal belongings within reach  - Initiate and maintain comfort rounds  - Make Fall Risk Sign visible to staff  - Offer Toileting every  Hours, in advance of need  - Initiate/Maintain alarm  - Obtain necessary fall risk management equipment:   - Apply yellow socks and bracelet for high fall risk patients  - Consider moving patient to room near nurses station  11/27/2023 1349 by Ramona Cardoso RN  Outcome: Progressing  11/27/2023 1348 by Ramona Cardoso RN  Outcome: Progressing  Goal: Maintain or return to baseline ADL function  Description: INTERVENTIONS:  -  Assess patient's ability to carry out ADLs; assess patient's baseline for ADL function and identify physical deficits which impact ability to perform ADLs (bathing, care of mouth/teeth, toileting, grooming, dressing, etc.)  - Assess/evaluate cause of self-care deficits   - Assess range of motion  - Assess patient's mobility; develop plan if impaired  - Assess patient's need for assistive devices and provide as appropriate  - Encourage maximum independence but intervene and supervise when necessary  - Involve family in performance of ADLs  - Assess for home care needs following discharge   - Consider OT consult to assist with ADL evaluation and planning for discharge  - Provide patient education as appropriate  11/27/2023 1349 by Zuleima Barcenas RN  Outcome: Progressing  11/27/2023 1348 by Zuleima Barcenas RN  Outcome: Progressing  Goal: Maintains/Returns to pre admission functional level  Description: INTERVENTIONS:  - Perform AM-PAC 6 Click Basic Mobility/ Daily Activity assessment daily.  - Set and communicate daily mobility goal to care team and patient/family/caregiver. - Collaborate with rehabilitation services on mobility goals if consulted  - Perform Range of Motion  times a day. - Reposition patient every  hours.   - Dangle patient  times a day  - Stand patient  times a day  - Ambulate patient  times a day  - Out of bed to chair  times a day   - Out of bed for meals  times a day  - Out of bed for toileting  - Record patient progress and toleration of activity level   11/27/2023 1349 by Zuleima Barcenas RN  Outcome: Progressing  11/27/2023 1348 by Zuleima Barcenas RN  Outcome: Progressing     Problem: DISCHARGE PLANNING  Goal: Discharge to home or other facility with appropriate resources  Description: INTERVENTIONS:  - Identify barriers to discharge w/patient and caregiver  - Arrange for needed discharge resources and transportation as appropriate  - Identify discharge learning needs (meds, wound care, etc.)  - Arrange for interpretive services to assist at discharge as needed  - Refer to Case Management Department for coordinating discharge planning if the patient needs post-hospital services based on physician/advanced practitioner order or complex needs related to functional status, cognitive ability, or social support system  11/27/2023 1349 by Zuleima Barcenas RN  Outcome: Progressing  11/27/2023 1348 by Shayy Sparks RN  Outcome: Progressing     Problem: Knowledge Deficit  Goal: Patient/family/caregiver demonstrates understanding of disease process, treatment plan, medications, and discharge instructions  Description: Complete learning assessment and assess knowledge base. Interventions:  - Provide teaching at level of understanding  - Provide teaching via preferred learning methods  11/27/2023 1349 by Shayy Sparks RN  Outcome: Progressing  11/27/2023 1348 by Shayy Sparks RN  Outcome: Progressing     Problem: Nutrition/Hydration-ADULT  Goal: Nutrient/Hydration intake appropriate for improving, restoring or maintaining nutritional needs  Description: Monitor and assess patient's nutrition/hydration status for malnutrition. Collaborate with interdisciplinary team and initiate plan and interventions as ordered. Monitor patient's weight and dietary intake as ordered or per policy. Utilize nutrition screening tool and intervene as necessary. Determine patient's food preferences and provide high-protein, high-caloric foods as appropriate.      INTERVENTIONS:  - Monitor oral intake, urinary output, labs, and treatment plans  - Assess nutrition and hydration status and recommend course of action  - Evaluate amount of meals eaten  - Assist patient with eating if necessary   - Allow adequate time for meals  - Recommend/ encourage appropriate diets, oral nutritional supplements, and vitamin/mineral supplements  - Order, calculate, and assess calorie counts as needed  - Recommend, monitor, and adjust tube feedings and TPN/PPN based on assessed needs  - Assess need for intravenous fluids  - Provide specific nutrition/hydration education as appropriate  - Include patient/family/caregiver in decisions related to nutrition  11/27/2023 1349 by Shayy Sparks RN  Outcome: Progressing  11/27/2023 1348 by Shayy Sparks RN  Outcome: Progressing

## 2023-11-27 NOTE — ASSESSMENT & PLAN NOTE
Noted with confusion on arrival, likely metabolic encephalopathy in the setting of hypoglycemia  Monitor closely  Frequent reorientation  Correct underlying causes  Continues to improve day by day although still fluctuates

## 2023-11-28 LAB
ANION GAP SERPL CALCULATED.3IONS-SCNC: 4 MMOL/L
BACTERIA UR CULT: ABNORMAL
BASOPHILS # BLD AUTO: 0.07 THOUSANDS/ÂΜL (ref 0–0.1)
BASOPHILS NFR BLD AUTO: 1 % (ref 0–1)
BUN SERPL-MCNC: 15 MG/DL (ref 5–25)
CALCIUM SERPL-MCNC: 8.3 MG/DL (ref 8.4–10.2)
CHLORIDE SERPL-SCNC: 103 MMOL/L (ref 96–108)
CO2 SERPL-SCNC: 27 MMOL/L (ref 21–32)
CREAT SERPL-MCNC: 0.79 MG/DL (ref 0.6–1.3)
EOSINOPHIL # BLD AUTO: 0.82 THOUSAND/ÂΜL (ref 0–0.61)
EOSINOPHIL NFR BLD AUTO: 8 % (ref 0–6)
ERYTHROCYTE [DISTWIDTH] IN BLOOD BY AUTOMATED COUNT: 14.7 % (ref 11.6–15.1)
GFR SERPL CREATININE-BSD FRML MDRD: 71 ML/MIN/1.73SQ M
GLUCOSE SERPL-MCNC: 111 MG/DL (ref 65–140)
GLUCOSE SERPL-MCNC: 126 MG/DL (ref 65–140)
GLUCOSE SERPL-MCNC: 171 MG/DL (ref 65–140)
GLUCOSE SERPL-MCNC: 67 MG/DL (ref 65–140)
GLUCOSE SERPL-MCNC: 70 MG/DL (ref 65–140)
HCT VFR BLD AUTO: 35.7 % (ref 34.8–46.1)
HGB BLD-MCNC: 11.3 G/DL (ref 11.5–15.4)
IMM GRANULOCYTES # BLD AUTO: 0.04 THOUSAND/UL (ref 0–0.2)
IMM GRANULOCYTES NFR BLD AUTO: 0 % (ref 0–2)
LYMPHOCYTES # BLD AUTO: 2.16 THOUSANDS/ÂΜL (ref 0.6–4.47)
LYMPHOCYTES NFR BLD AUTO: 21 % (ref 14–44)
MCH RBC QN AUTO: 28.6 PG (ref 26.8–34.3)
MCHC RBC AUTO-ENTMCNC: 31.7 G/DL (ref 31.4–37.4)
MCV RBC AUTO: 90 FL (ref 82–98)
MONOCYTES # BLD AUTO: 1.1 THOUSAND/ÂΜL (ref 0.17–1.22)
MONOCYTES NFR BLD AUTO: 11 % (ref 4–12)
NEUTROPHILS # BLD AUTO: 5.91 THOUSANDS/ÂΜL (ref 1.85–7.62)
NEUTS SEG NFR BLD AUTO: 59 % (ref 43–75)
NRBC BLD AUTO-RTO: 0 /100 WBCS
PLATELET # BLD AUTO: 464 THOUSANDS/UL (ref 149–390)
PMV BLD AUTO: 8.8 FL (ref 8.9–12.7)
POTASSIUM SERPL-SCNC: 4.7 MMOL/L (ref 3.5–5.3)
RBC # BLD AUTO: 3.95 MILLION/UL (ref 3.81–5.12)
SODIUM SERPL-SCNC: 134 MMOL/L (ref 135–147)
WBC # BLD AUTO: 10.1 THOUSAND/UL (ref 4.31–10.16)

## 2023-11-28 PROCEDURE — 82948 REAGENT STRIP/BLOOD GLUCOSE: CPT

## 2023-11-28 PROCEDURE — 99233 SBSQ HOSP IP/OBS HIGH 50: CPT | Performed by: STUDENT IN AN ORGANIZED HEALTH CARE EDUCATION/TRAINING PROGRAM

## 2023-11-28 PROCEDURE — 80048 BASIC METABOLIC PNL TOTAL CA: CPT | Performed by: INTERNAL MEDICINE

## 2023-11-28 PROCEDURE — 97167 OT EVAL HIGH COMPLEX 60 MIN: CPT

## 2023-11-28 PROCEDURE — 85025 COMPLETE CBC W/AUTO DIFF WBC: CPT | Performed by: INTERNAL MEDICINE

## 2023-11-28 PROCEDURE — 97163 PT EVAL HIGH COMPLEX 45 MIN: CPT

## 2023-11-28 RX ORDER — LISINOPRIL 5 MG/1
5 TABLET ORAL DAILY
Status: DISCONTINUED | OUTPATIENT
Start: 2023-11-29 | End: 2023-11-28

## 2023-11-28 RX ORDER — TORSEMIDE 10 MG/1
10 TABLET ORAL DAILY
Status: DISCONTINUED | OUTPATIENT
Start: 2023-11-29 | End: 2023-11-30 | Stop reason: HOSPADM

## 2023-11-28 RX ORDER — TORSEMIDE 20 MG/1
20 TABLET ORAL DAILY
Status: DISCONTINUED | OUTPATIENT
Start: 2023-11-29 | End: 2023-11-28

## 2023-11-28 RX ADMIN — NYSTATIN: 100000 POWDER TOPICAL at 09:25

## 2023-11-28 RX ADMIN — BACITRACIN ZINC AND POLYMYXIN B SULFATE: 500; 10000 OINTMENT OPHTHALMIC at 09:26

## 2023-11-28 RX ADMIN — HEPARIN SODIUM 5000 UNITS: 5000 INJECTION INTRAVENOUS; SUBCUTANEOUS at 16:07

## 2023-11-28 RX ADMIN — BACITRACIN ZINC AND POLYMYXIN B SULFATE: 500; 10000 OINTMENT OPHTHALMIC at 22:48

## 2023-11-28 RX ADMIN — Medication 2 G: at 18:56

## 2023-11-28 RX ADMIN — HYDROMORPHONE HYDROCHLORIDE 4 MG: 2 TABLET ORAL at 09:26

## 2023-11-28 RX ADMIN — GLYCERIN 1 DROP: .002; .002; .01 SOLUTION/ DROPS OPHTHALMIC at 22:48

## 2023-11-28 RX ADMIN — Medication 2 G: at 09:26

## 2023-11-28 RX ADMIN — HYDROMORPHONE HYDROCHLORIDE 4 MG: 2 TABLET ORAL at 22:05

## 2023-11-28 RX ADMIN — HEPARIN SODIUM 5000 UNITS: 5000 INJECTION INTRAVENOUS; SUBCUTANEOUS at 22:05

## 2023-11-28 RX ADMIN — UMECLIDINIUM 1 PUFF: 62.5 AEROSOL, POWDER ORAL at 09:26

## 2023-11-28 RX ADMIN — HEPARIN SODIUM 5000 UNITS: 5000 INJECTION INTRAVENOUS; SUBCUTANEOUS at 06:22

## 2023-11-28 RX ADMIN — BACITRACIN ZINC AND POLYMYXIN B SULFATE: 500; 10000 OINTMENT OPHTHALMIC at 18:56

## 2023-11-28 RX ADMIN — MIRTAZAPINE 15 MG: 15 TABLET, FILM COATED ORAL at 22:05

## 2023-11-28 RX ADMIN — GLYCERIN 1 DROP: .002; .002; .01 SOLUTION/ DROPS OPHTHALMIC at 09:26

## 2023-11-28 RX ADMIN — NYSTATIN: 100000 POWDER TOPICAL at 18:56

## 2023-11-28 RX ADMIN — GLYCERIN 1 DROP: .002; .002; .01 SOLUTION/ DROPS OPHTHALMIC at 16:08

## 2023-11-28 RX ADMIN — FLUTICASONE FUROATE AND VILANTEROL TRIFENATATE 1 PUFF: 100; 25 POWDER RESPIRATORY (INHALATION) at 09:26

## 2023-11-28 RX ADMIN — DULOXETINE HYDROCHLORIDE 30 MG: 30 CAPSULE, DELAYED RELEASE ORAL at 09:26

## 2023-11-28 RX ADMIN — HYDROMORPHONE HYDROCHLORIDE 4 MG: 2 TABLET ORAL at 16:07

## 2023-11-28 RX ADMIN — Medication 2 G: at 22:48

## 2023-11-28 NOTE — CASE MANAGEMENT
Case Management Discharge Planning Note    Patient name Leanna Osullivan  Location [address] [facility] [place] 429/-[id number] MRN [id number]  : 1945 Date 2023       Current Admission Date: 2023  Current Admission Diagnosis:Acute metabolic encephalopathy   Patient Active Problem List    Diagnosis Date Noted    Conjunctivitis 4526    Acute metabolic encephalopathy     Hypoglycemia 2023    Iatrogenic disease 2023    Unspecified skin changes 2023    Hyperlactatemia 2023    Wound infection 2023    Hand pain 10/31/2023    Pressure injury of right buttock, stage 1 10/31/2023    Acute on chronic diastolic (congestive) heart failure ([address]) 10/05/2023    Pulmonary fibrosis ([address]) 2023    Edema of lower extremity 2023    Chronic kidney disease, stage 2 (mild) 2023    Chronic pain disorder 2023    CHF (congestive heart failure) ([address]) 2023    Age-related osteoporosis without current pathological fracture 2022    Hypokalemia 2022    SIRS (systemic inflammatory response syndrome) ([address]) 2022    Opiate dependence ([address]) 2022    Elevated d-dimer 2021    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 ([address]) 2021    Fungal infection of skin 2020    Acute kidney injury superimposed on CKD  2019    Chronic prescription opiate use 2019    Shortness of breath     Hyponatremia 2019    Venous stasis dermatitis of both lower extremities 2019    Chronic respiratory failure with hypoxia ([address]) 2019    Immunosuppressed status ([address]) 2019    Interstitial lung disease ([address]) 2019    Obesity (BMI 30-39.9) 2018    Varicose veins of both lower extremities 2018    Morbid obesity ([address])     Moderate persistent asthma 11/10/2017    Vitamin D deficiency 2017    Pain in elbow 2017    Shoulder pain 2016    Mixed hyperlipidemia 07/27/2016    Bilateral lower extremity edema 03/03/2016    Ambulatory dysfunction 02/02/2016    Recurrent major depressive disorder, in partial remission ([address]) 02/02/2016    Gastroesophageal reflux disease without esophagitis 02/02/2016    Urinary incontinence 02/02/2016    Psoriasis vulgaris 12/31/2014    Essential hypertension 05/27/2014    Type 2 diabetes mellitus with stage 3 chronic kidney disease, with long-term current use of insulin ([address]) 04/30/2014    Primary insomnia 04/30/2014    Rheumatoid arthritis involving multiple sites with positive rheumatoid factor ([address]) 04/30/2014      LOS (days): 3  Geometric Mean LOS (GMLOS) (days): 3.40  Days to GMLOS:0.3     OBJECTIVE:  Risk of Unplanned Readmission Score: 29.38         Current admission status: Inpatient   Preferred Pharmacy:   [facility] [facility], [address] [address]  Phone: [phone] Fax: [phone]    [facility] [address] [place] [address]  Phone: [phone] Fax: [phone]    Primary Care Provider: Sanjay Davenport MD    Primary Insurance: MEDICARE  Secondary Insurance: BLUE CROSS    DISCHARGE DETAILS:     IMM Given (Date):: 11/28/23  IMM Given to[de-identified] Patient  Family notified[de-identified] Son Shai Sultana  Additional Comments: CM contacted son regarding IMM to give verbal notification. CM left copy of form at bedside with patient and copy placed in MR. Anticipated d/c 24 hrs. Patient to return home with [facility] and private pay aides at the request of son Tarik Copeland. Hospital bed order placed in [facility].

## 2023-11-28 NOTE — OCCUPATIONAL THERAPY NOTE
Occupational Therapy Evaluation     Patient Name: Leanna Osullivan  RGAJD'W Date: 11/28/2023  Problem List  Principal Problem:    Acute metabolic encephalopathy  Active Problems:    Essential hypertension    Type 2 diabetes mellitus with stage 3 chronic kidney disease, with long-term current use of insulin (HCC)    Rheumatoid arthritis involving multiple sites with positive rheumatoid factor (HCC)    Hyponatremia    Acute kidney injury superimposed on CKD     Opiate dependence (HCC)    CHF (congestive heart failure) ([address])    Hypoglycemia    Unspecified skin changes    Hyperlactatemia    Conjunctivitis    Past Medical History  Past Medical History:   Diagnosis Date    AMS (altered mental status) 5/24/2022    Asthma     Chest pain on breathing     last assessed 2/5/15    Chronic diastolic CHF (congestive heart failure) (HCC)     Chronic respiratory failure with hypoxia (HCC)     Diabetes mellitus ([address])     Exertional chest pain 7/14/2021    HTN (hypertension)     Hyperlipidemia     Insomnia     Obesity     Preop cardiovascular exam 2/2/2018    Pulmonary fibrosis (HCC)     Rheumatoid arthritis (HCC)     SIRS (systemic inflammatory response syndrome) ([address]) 5/22/2022    Volume overload 12/10/2021     Past Surgical History  Past Surgical History:   Procedure Laterality Date    HAND SURGERY           11/28/23 1145   OT Last Visit   OT Visit Date 11/28/23   Note Type   Note type Evaluation   Pain Assessment   Pain Assessment Tool 0-10   Pain Score 9   Pain Location/Orientation Orientation: Bilateral;Location: Leg   Restrictions/Precautions   Weight Bearing Precautions Per Order No   Home Living   Type of Home House   Home Layout Performs ADLs on one level; Able to live on main level with bedroom/bathroom; Ramped entrance   Bathroom Shower/Tub   (pt sponge bathes)   [facility]  (pt reports w/c is too small.  Home O2 3L)   Prior Function   Level of [place] Needs assistance with ADLs; Needs assistance with functional mobility; Needs assistance with IADLS  (pt's son assists to stand and pivot. Pt reports she has been non ambulatory for a few months)   Lives With Son   Receives Help From Family  (another son assists)   IADLs Family/Friend/Other provides transportation; Family/Friend/Other provides meals; Family/Friend/Other provides medication management   Falls in the last 6 months 0   General   Family/Caregiver Present No   Subjective   Subjective "My legs hurt."   ADL   Where Assessed Other (Comment)  (Assist levels for some self care tasks are based on functional assessment of performance skills and deficits observed during session.)   Eating Assistance 4  Minimal Assistance   Eating Deficit Setup;Supervision/safety; Increased time to complete   Grooming Assistance 4  Minimal Assistance   Grooming Deficit Setup;Supervision/safety; Increased time to complete  (while supine in bed)   UB Bathing Assistance 3  Moderate Assistance   UB Bathing Deficit Setup;Supervision/safety; Increased time to complete   LB Bathing Assistance 1  Total Assistance   UB Dressing Assistance 2  Maximal Assistance   UB Dressing Deficit Setup;Supervision/safety; Increased time to complete   LB Dressing Assistance 1  Total Assistance   Toileting Assistance  1  Total Assistance   Bed Mobility   Rolling L 2  Maximal assistance   Additional items Assist x 2;HOB elevated; Bedrails; Increased time required;Verbal cues;LE management   Supine to Sit   (pt declined to attempt 2/2 pain level)   Additional Comments further bed mobility deferred 2/2 pt's pain level. Pt educated on importance of bed mobility, weight shifting, and in general maintaining enough strenght to perform as much AROM of  extremities and trunk as possible for her comfort.  Pt did perform some BUE ROM while supine   Transfers   Sit to Stand   (Pt not appropriate at this time)   Functional Mobility   Functional Mobility 2  Maximal assistance Additional Comments assist x2   Activity Tolerance   Activity Tolerance Patient limited by pain; Patient limited by fatigue   Medical Staff Made Aware Vipin Jacob PT   Nurse Made Aware Peg PT   RUE Assessment   RUE Assessment   (limited shoulder ROM, generalized deconditioning noted throughout, grossly 3/5)   LUE Assessment   LUE Assessment   (limited shoulder ROM, generalized deconditioning noted throughout, grossly 3/5)   Hand Function   Gross Motor Coordination Functional   Fine Motor Coordination Functional   Psychosocial   Psychosocial (WDL) WDL   Cognition   Overall Cognitive Status WFL   Arousal/Participation Alert; Cooperative   Attention Within functional limits   Orientation Level Oriented X4   Memory Within functional limits   Following Commands Follows all commands and directions without difficulty   Assessment   Limitation Decreased ADL status; Decreased UE ROM; Decreased UE strength;Decreased endurance;Decreased self-care trans   Assessment Pt is a 66 y.o. female seen for OT evaluation s/p admit to [facility] on 05/64[facility]27 w/ Acute metabolic encephalopathy. Comorbidities affecting pt's functional performance at time of assessment include: DM, HTN, obesity, previous surgery, CHF, and RA, CKD, ILD . Personal factors affecting pt at time of IE include:difficulty performing ADLS, difficulty performing IADLS , limited insight into deficits, decreased initiation and engagement , and health management . Prior to admission, pt was needing assistance with ADLs and functional mobility. Pt reports she has not been ambulatory for the past few months. Upon evaluation: Pt requires Maximal Assistance x2 for bed mobility, and Maximal Assistance to total assistance for some basic ADLs 2* the following deficits impacting occupational performance: decreased ROM, decreased strength, decreased balance, decreased tolerance, and increased pain.  Pt to benefit from continued skilled OT tx while in the hospital to address deficits as defined above and maximize level of functional independence w ADL's and functional mobility. Occupational Performance areas to address include: grooming, bathing/shower, toilet hygiene, dressing, and functional mobility. From OT standpoint, recommendation at time of d/c would be Level 2 Moderate Resource Intensity. Goals   Patient Goals to go home   Plan   Treatment Interventions Functional transfer training;UE strengthening/ROM; Endurance training;Patient/family training   Goal Expiration Date 12/06/23   OT Treatment Day 0   OT Frequency 1-2x/wk   Discharge Recommendation   Rehab Resource Intensity Level, OT II (Moderate Resource Intensity)   AM-PAC Daily Activity Inpatient   Lower Body Dressing 1   Bathing 1   Toileting 1   Upper Body Dressing 2   Grooming 2   Eating 3   Daily Activity Raw Score 10   Turning Head Towards Sound 4   Follow Simple Instructions 4   Low Function Daily Activity Raw Score 18   Low Function Daily Activity Standardized Score  30.17   AM-PAC Applied Cognition Inpatient   Following a Speech/Presentation 4   Understanding Ordinary Conversation 4   Taking Medications 2   Remembering Where Things Are Placed or Put Away 3   Remembering List of 4-5 Errands 2   Taking Care of Complicated Tasks 2   Applied Cognition Raw Score 17   Applied Cognition Standardized Score 36.52       Goals: to be met by 12/12/23    Patient will perform functional bed mobility with Moderate Assistance x1, with use of hospital bed features  Patient will perform functional transfers with Moderate Assistance x1 in preparation for ADL tasks, with good safety awareness  Patient will perform UB dressing task with Moderate Assistance with set up  Patient will increase BUE strength by 1 MM grade in preparation to increase ability to participate in ADL tasks  Patient will improve activity tolerance by participating in 20 minutes of session at a time in preparation for participation in ADL tasks  Patient will identify 3 potential fall hazards and identify compensatory techniques to decrease fall risk in the home environment         [place] No, OTR/L

## 2023-11-28 NOTE — PLAN OF CARE
Problem: OCCUPATIONAL THERAPY ADULT  Goal: Performs self-care activities at highest level of function for planned discharge setting. See evaluation for individualized goals. Description: Treatment Interventions: Functional transfer training, UE strengthening/ROM, Endurance training, Patient/family training          See flowsheet documentation for full assessment, interventions and recommendations. Outcome: Progressing  Note: Limitation: Decreased ADL status, Decreased UE ROM, Decreased UE strength, Decreased endurance, Decreased self-care trans     Assessment: Pt is a 66 y.o. female seen for OT evaluation s/p admit to [facility] on 48/77/4805 w/ Acute metabolic encephalopathy. Comorbidities affecting pt's functional performance at time of assessment include: DM, HTN, obesity, previous surgery, CHF, and RA, CKD, ILD . Personal factors affecting pt at time of IE include:difficulty performing ADLS, difficulty performing IADLS , limited insight into deficits, decreased initiation and engagement , and health management . Prior to admission, pt was needing assistance with ADLs and functional mobility. Pt reports she has not been ambulatory for the past few months. Upon evaluation: Pt requires Maximal Assistance x2 for bed mobility, and Maximal Assistance to total assistance for some basic ADLs 2* the following deficits impacting occupational performance: decreased ROM, decreased strength, decreased balance, decreased tolerance, and increased pain. Pt to benefit from continued skilled OT tx while in the hospital to address deficits as defined above and maximize level of functional independence w ADL's and functional mobility. Occupational Performance areas to address include: grooming, bathing/shower, toilet hygiene, dressing, and functional mobility. From OT standpoint, recommendation at time of d/c would be Level 2 Moderate Resource Intensity.      Rehab Resource Intensity Level, OT: II (Moderate Resource Intensity) [place] Min, OTR/L

## 2023-11-28 NOTE — PHYSICAL THERAPY NOTE
Physical Therapy Evaluation     Patient's Name: Olivia Shelley    Admitting Diagnosis  Hypoglycemia [E16.2]  SIRS (systemic inflammatory response syndrome) ([address]) [R65.10]  Acute kidney injury ([address]) [N17.9]    Problem List  Patient Active Problem List   Diagnosis    Bilateral lower extremity edema    Mixed hyperlipidemia    Essential hypertension    Morbid obesity ([address])    Ambulatory dysfunction    Recurrent major depressive disorder, in partial remission ([address])    Type 2 diabetes mellitus with stage 3 chronic kidney disease, with long-term current use of insulin (HCC)    Gastroesophageal reflux disease without esophagitis    Primary insomnia    Moderate persistent asthma    Psoriasis vulgaris    Rheumatoid arthritis involving multiple sites with positive rheumatoid factor (HCC)    Urinary incontinence    Vitamin D deficiency    Varicose veins of both lower extremities    Obesity (BMI 30-39. 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)    Elevated d-dimer    Opiate dependence (HCC)    SIRS (systemic inflammatory response syndrome) (HCC)    Hypokalemia    Age-related osteoporosis without current pathological fracture    Chronic pain disorder    CHF (congestive heart failure) (HCC)    Pulmonary fibrosis (HCC)    Edema of lower extremity    Chronic kidney disease, stage 2 (mild)    Acute on chronic diastolic (congestive) heart failure (HCC)    Hand pain    Pain in elbow    Shoulder pain    Pressure injury of right buttock, stage 1    Wound infection    Acute metabolic encephalopathy    Hypoglycemia    Iatrogenic disease    Unspecified skin changes    Hyperlactatemia    Conjunctivitis       Past Medical History  Past Medical History:   Diagnosis Date    AMS (altered mental status) 5/24/2022    Asthma     Chest pain on breathing     last assessed 2/5/15    Chronic diastolic CHF (congestive heart failure) (HCC)     Chronic respiratory failure with hypoxia (HCC)     Diabetes mellitus ([address])     Exertional chest pain 7/14/2021    HTN (hypertension)     Hyperlipidemia     Insomnia     Obesity     Preop cardiovascular exam 2/2/2018    Pulmonary fibrosis (HCC)     Rheumatoid arthritis ([address])     SIRS (systemic inflammatory response syndrome) ([address]) 5/22/2022    Volume overload 12/10/2021       Past Surgical History  Past Surgical History:   Procedure Laterality Date    HAND SURGERY          11/28/23 1201   PT Last Visit   PT Visit Date 11/28/23   Note Type   Note type Evaluation   Pain Assessment   Pain Assessment Tool 0-10   Pain Score 9   Pain Location/Orientation Orientation: Bilateral;Location: Leg   Restrictions/Precautions   Weight Bearing Precautions Per Order No   Home Living   Type of Home House   Home Layout Performs ADLs on one level; Able to live on main level with bedroom/bathroom; Ramped entrance   Bathroom Shower/Tub   (sponge bathes with assist)   [facility]  (pt reports W/C is to small, Home O2 3L via [place])   Additional Comments Pt. has been primarily bed bound since last admission; per pt. son transfers her with sit pivot transfer to /from commode. Pt is dependent for mobility OOB at this time vs Max A x1 and will benefit from henri lift vs hospital bed /air matress upon D/C if she returns home. She is dependent for all care. PT focus will currently be LE strength, rom, bed mobility   Prior Function   Level of [place] Needs assistance with ADLs; Needs assistance with functional mobility; Needs assistance with IADLS  (pt's son assists to stand and pivot)   Lives With Maye Dea Help From Family  (another son assists)   IADLs Family/Friend/Other provides transportation; Family/Friend/Other provides meals; Family/Friend/Other provides medication management   Falls in the last 6 months 0   Cognition   Overall Cognitive Status WFL   Arousal/Participation Alert   Orientation Level Oriented X4   Memory Within functional limits   Following Commands Follows all commands and directions without difficulty   Subjective   Subjective I am ok. Having pain in my legs   RLE Assessment   RLE Assessment X   Strength RLE   RLE Overall Strength 2-/5   LLE Assessment   LLE Assessment X   Strength LLE   LLE Overall Strength 2-/5   Bed Mobility   Rolling L 2  Maximal assistance   Additional items Assist x 1; Increased time required;Verbal cues;LE management   Supine to Sit   (not appropriate d/t pain level)   Additional Comments Due to pt. level of pain did not attempt roll in opposite direction. Pt. currently has good hygiene and wedge is placed. Did educate on importance of mobility and anticipatory pain is sometimes worse than actual pain. Pt. was able to complete AAROM hip flexion/knee extension x 5 reps B/L without difficulty. Did verbally yell with rolling. Transfers   Sit to Stand   (not appropriate)   Endurance Deficit   Endurance Deficit Yes   Endurance Deficit Description pain, fatigue   Activity Tolerance   Activity Tolerance Patient limited by pain; Patient limited by fatigue   Nurse Made Aware RN ok to see   Assessment   Prognosis Good   Problem List Decreased strength;Decreased endurance; Impaired balance;Decreased mobility;Pain;Obesity; Decreased skin integrity   Assessment Pt is 66 y.o. female seen for PT evaluation s/p [facility] w/ Acute metabolic encephalopathy. PT consulted to assess pt's functional mobility and d/c needs. Order placed for PT eval and tx, w/  activity  order.  Comorbidities affecting pt's physical performance at time of assessment include: HTN, T2 DM, RA, CLAUDIA, opiate dependence, CHF, metabolic encephalopathy, wounds, obesity. PTA, pt was  able to assist with rolling in bed, transfer to from chair with sit pivot transfer with her son doing "most of the work"; per notes pt. Is dependent for OOB mobility . Personal factors affecting pt at time of IE include: lives in first floor set up of [address] story house and ramped entrance with son as caregiver . Please find objective findings from PT assessment regarding body systems outlined above with impairments and limitations including weakness, decreased ROM, pain, decreased functional mobility tolerance, and decreased skin integrity. The following objective measures performed on IE also reveal limitations: AM-PAC 6-Clicks: 8/81. Pt's clinical presentation is currently unstable/unpredictable seen in pt's presentation. Pt to benefit from continued PT tx to address deficits as defined above and maximize level of functional independent mobility and consistency. From PT/mobility standpoint, recommendation at time of d/c would be Moderate Resource Potential pending progress in order to facilitate return to PLOF. If pt. D/c's home her caregiver and pt. May benefit from hospital bed with appropriate mattress, W/C that fits patient and henri lift. Barriers to Discharge Decreased caregiver support; Other (Comment)  (dependent)   Goals   Patient Goals to go home   STG Expiration Date 12/12/23   Short Term Goal #1 1. Pt will complete rolling to L side with Mod A x1. 2. Pt. Will complete rolling to R side with Mod A x1. Pt. Will complete sit to/from supine transfer with Mod A x1. Pt. Will participate in LE exercise to increase strength by 1/2 grade and improve rom. PT to see for seated balance goals. PT Treatment Day 0   Plan   Treatment/Interventions LE strengthening/ROM; Therapeutic exercise; Endurance training;Bed mobility; Equipment eval/education;Patient/family training;Spoke to nursing;OT;Functional transfer training   PT Frequency 2-3x/wk   Discharge Recommendation   Rehab Resource Intensity Level, PT II (Moderate Resource Intensity)   AM-PAC Basic Mobility Inpatient   Turning in Flat Bed Without Bedrails 2   Lying on Back to Sitting on Edge of Flat Bed Without Bedrails 1   Moving Bed to Chair 1   Standing Up From Chair Using Arms 1   Walk in Room 1   Climb 3-5 Stairs With Railing 1   Basic Mobility Inpatient Raw Score 7   Turning Head Towards Sound 4   Follow Simple Instructions 3   Low Function Basic Mobility Raw Score  14   Low Function Basic Mobility Standardized Score  22.01   Highest Level Of Mobility   JH-HLM Goal 2: Bed activities/Dependent transfer   JH-HLM Achieved 2: Bed activities/Dependent transfer         Jay Barfield, PT

## 2023-11-28 NOTE — PLAN OF CARE
Problem: Prexisting or High Potential for Compromised Skin Integrity  Goal: Skin integrity is maintained or improved  Description: INTERVENTIONS:  - Identify patients at risk for skin breakdown  - Assess and monitor skin integrity  - Assess and monitor nutrition and hydration status  - Monitor labs   - Assess for incontinence   - Turn and reposition patient  - Assist with mobility/ambulation  - Relieve pressure over bony prominences  - Avoid friction and shearing  - Provide appropriate hygiene as needed including keeping skin clean and dry  - Evaluate need for skin moisturizer/barrier cream  - Collaborate with interdisciplinary team   - Patient/family teaching  - Consider wound care consult   Outcome: Progressing     Problem: PAIN - ADULT  Goal: Verbalizes/displays adequate comfort level or baseline comfort level  Description: Interventions:  - Encourage patient to monitor pain and request assistance  - Assess pain using appropriate pain scale  - Administer analgesics based on type and severity of pain and evaluate response  - Implement non-pharmacological measures as appropriate and evaluate response  - Consider cultural and social influences on pain and pain management  - Notify physician/advanced practitioner if interventions unsuccessful or patient reports new pain  Outcome: Progressing     Problem: INFECTION - ADULT  Goal: Absence or prevention of progression during hospitalization  Description: INTERVENTIONS:  - Assess and monitor for signs and symptoms of infection  - Monitor lab/diagnostic results  - Monitor all insertion sites, i.e. indwelling lines, tubes, and drains  - Monitor endotracheal if appropriate and nasal secretions for changes in amount and color  - [place] appropriate cooling/warming therapies per order  - Administer medications as ordered  - Instruct and encourage patient and family to use good hand hygiene technique  - Identify and instruct in appropriate isolation precautions for identified infection/condition  Outcome: Progressing  Goal: Absence of fever/infection during neutropenic period  Description: INTERVENTIONS:  - Monitor WBC    Outcome: Progressing     Problem: SAFETY ADULT  Goal: Patient will remain free of falls  Description: INTERVENTIONS:  - Educate patient/family on patient safety including physical limitations  - Instruct patient to call for assistance with activity   - Consult OT/PT to assist with strengthening/mobility   - Keep Call bell within reach  - Keep bed low and locked with side rails adjusted as appropriate  - Keep care items and personal belongings within reach  - Initiate and maintain comfort rounds  - Make Fall Risk Sign visible to staff  - Offer Toileting every 2 Hours, in advance of need  - Initiate/Maintain bed alarm  - Obtain necessary fall risk management equipment:   - Apply yellow socks and bracelet for high fall risk patients  - Consider moving patient to room near nurses station  Outcome: Progressing  Goal: Maintain or return to baseline ADL function  Description: INTERVENTIONS:  -  Assess patient's ability to carry out ADLs; assess patient's baseline for ADL function and identify physical deficits which impact ability to perform ADLs (bathing, care of mouth/teeth, toileting, grooming, dressing, etc.)  - Assess/evaluate cause of self-care deficits   - Assess range of motion  - Assess patient's mobility; develop plan if impaired  - Assess patient's need for assistive devices and provide as appropriate  - Encourage maximum independence but intervene and supervise when necessary  - Involve family in performance of ADLs  - Assess for home care needs following discharge   - Consider OT consult to assist with ADL evaluation and planning for discharge  - Provide patient education as appropriate  Outcome: Progressing  Goal: Maintains/Returns to pre admission functional level  Description: INTERVENTIONS:  - Perform AM-PAC 6 Click Basic Mobility/ Daily Activity assessment daily.  - Set and communicate daily mobility goal to care team and patient/family/caregiver. - Collaborate with rehabilitation services on mobility goals if consulted  - Perform Range of Motion 2 times a day. - Reposition patient every 2 hours. - Dangle patient 2 times a day  - Stand patient 2 times a day  - Ambulate patient 2 times a day  - Out of bed to chair 2 times a day   - Out of bed for meals 2 times a day  - Out of bed for toileting  - Record patient progress and toleration of activity level   Outcome: Progressing     Problem: DISCHARGE PLANNING  Goal: Discharge to home or other facility with appropriate resources  Description: INTERVENTIONS:  - Identify barriers to discharge w/patient and caregiver  - Arrange for needed discharge resources and transportation as appropriate  - Identify discharge learning needs (meds, wound care, etc.)  - Arrange for interpretive services to assist at discharge as needed  - Refer to Case Management Department for coordinating discharge planning if the patient needs post-hospital services based on physician/advanced practitioner order or complex needs related to functional status, cognitive ability, or social support system  Outcome: Progressing     Problem: Knowledge Deficit  Goal: Patient/family/caregiver demonstrates understanding of disease process, treatment plan, medications, and discharge instructions  Description: Complete learning assessment and assess knowledge base. Interventions:  - Provide teaching at level of understanding  - Provide teaching via preferred learning methods  Outcome: Progressing     Problem: Nutrition/Hydration-ADULT  Goal: Nutrient/Hydration intake appropriate for improving, restoring or maintaining nutritional needs  Description: Monitor and assess patient's nutrition/hydration status for malnutrition. Collaborate with interdisciplinary team and initiate plan and interventions as ordered.   Monitor patient's weight and dietary intake as ordered or per policy. Utilize nutrition screening tool and intervene as necessary. Determine patient's food preferences and provide high-protein, high-caloric foods as appropriate.      INTERVENTIONS:  - Monitor oral intake, urinary output, labs, and treatment plans  - Assess nutrition and hydration status and recommend course of action  - Evaluate amount of meals eaten  - Assist patient with eating if necessary   - Allow adequate time for meals  - Recommend/ encourage appropriate diets, oral nutritional supplements, and vitamin/mineral supplements  - Order, calculate, and assess calorie counts as needed  - Recommend, monitor, and adjust tube feedings and TPN/PPN based on assessed needs  - Assess need for intravenous fluids  - Provide specific nutrition/hydration education as appropriate  - Include patient/family/caregiver in decisions related to nutrition  Outcome: Progressing

## 2023-11-28 NOTE — PLAN OF CARE
Problem: PHYSICAL THERAPY ADULT  Goal: Performs mobility at highest level of function for planned discharge setting. See evaluation for individualized goals. Description: Treatment/Interventions: LE strengthening/ROM, Therapeutic exercise, Endurance training, Bed mobility, Equipment eval/education, Patient/family training, Spoke to nursing, OT, Functional transfer training          See flowsheet documentation for full assessment, interventions and recommendations. Outcome: Progressing  Note: Prognosis: Good  Problem List: Decreased strength, Decreased endurance, Impaired balance, Decreased mobility, Pain, Obesity, Decreased skin integrity  Assessment: Pt is 66 y.o. female seen for PT evaluation s/p admit to [facility] on 90/53/0362 w/ Acute metabolic encephalopathy. PT consulted to assess pt's functional mobility and d/c needs. Order placed for PT eval and tx, w/  activity  order. Comorbidities affecting pt's physical performance at time of assessment include: HTN, T2 DM, RA, CLAUDIA, opiate dependence, CHF, metabolic encephalopathy, wounds, obesity. PTA, pt was  able to assist with rolling in bed, transfer to from chair with sit pivot transfer with her son doing "most of the work"; per notes pt. Is dependent for OOB mobility . Personal factors affecting pt at time of IE include: lives in first floor set up of [address] story house and ramped entrance with son as caregiver . Please find objective findings from PT assessment regarding body systems outlined above with impairments and limitations including weakness, decreased ROM, pain, decreased functional mobility tolerance, and decreased skin integrity. The following objective measures performed on IE also reveal limitations: AM-PAC 6-Clicks: 4/58. Pt's clinical presentation is currently unstable/unpredictable seen in pt's presentation.  Pt to benefit from continued PT tx to address deficits as defined above and maximize level of functional independent mobility and consistency. From PT/mobility standpoint, recommendation at time of d/c would be Moderate Resource Potential pending progress in order to facilitate return to PLOF. If pt. D/c's home her caregiver and pt. May benefit from hospital bed with appropriate mattress, W/C that fits patient and henri lift. Barriers to Discharge: Decreased caregiver support, Other (Comment) (dependent)     Rehab Resource Intensity Level, PT: II (Moderate Resource Intensity)    See flowsheet documentation for full assessment.

## 2023-11-28 NOTE — PLAN OF CARE
Problem: INFECTION - ADULT  Goal: Absence or prevention of progression during hospitalization  Description: INTERVENTIONS:  - Assess and monitor for signs and symptoms of infection  - Monitor lab/diagnostic results  - Monitor all insertion sites, i.e. indwelling lines, tubes, and drains  - Monitor endotracheal if appropriate and nasal secretions for changes in amount and color  - [place] appropriate cooling/warming therapies per order  - Administer medications as ordered  - Instruct and encourage patient and family to use good hand hygiene technique  - Identify and instruct in appropriate isolation precautions for identified infection/condition  Outcome: Progressing  Goal: Absence of fever/infection during neutropenic period  Description: INTERVENTIONS:  - Monitor WBC    Outcome: Progressing     Problem: PAIN - ADULT  Goal: Verbalizes/displays adequate comfort level or baseline comfort level  Description: Interventions:  - Encourage patient to monitor pain and request assistance  - Assess pain using appropriate pain scale  - Administer analgesics based on type and severity of pain and evaluate response  - Implement non-pharmacological measures as appropriate and evaluate response  - Consider cultural and social influences on pain and pain management  - Notify physician/advanced practitioner if interventions unsuccessful or patient reports new pain  Outcome: Progressing     Problem: Prexisting or High Potential for Compromised Skin Integrity  Goal: Skin integrity is maintained or improved  Description: INTERVENTIONS:  - Identify patients at risk for skin breakdown  - Assess and monitor skin integrity  - Assess and monitor nutrition and hydration status  - Monitor labs   - Assess for incontinence   - Turn and reposition patient  - Assist with mobility/ambulation  - Relieve pressure over bony prominences  - Avoid friction and shearing  - Provide appropriate hygiene as needed including keeping skin clean and dry  - Evaluate need for skin moisturizer/barrier cream  - Collaborate with interdisciplinary team   - Patient/family teaching  - Consider wound care consult   Outcome: Progressing

## 2023-11-28 NOTE — DISCHARGE INSTR - OTHER ORDERS
Skin care plans:  1-Cleanse sacrum, buttock, and posterior thighs with soap and water, pat dry. Apply Triad paste to open wound beds and then apply thin layer of Calazime to maribel-wounds TID and PRN with maribel-care  2-Hydraguard to bilateral heels BID and PRN  3-Elevate heels to offload pressure  4-Ehob cushion when out of bed. 5-Turn/repoisiton q2h for pressure re-distribution on skin. 6-Moisturize skin daily with skin nourishing cream  7-Cleanse b/l abdominal pannus and beneath breast with soap and water, and pat completely dry. Cut strips of InterDry and place the fabric flat in the skin folds, leaving 2 inches outside of the skin fold. Take InterDry out and cleanse skin with soap and water daily, pat dry and place InterDry back in place. Replace every 3 days or sooner if soiled. Do not place any creams or powders on the skin that InterDry is being placed. 8-B/L lower extremity- Cleanse wounds with NSS, pat dry. Apply Adaptic over wound bed and then Maxorb Alginate. Change daily and as needed for soilage/displacement.   9-P-500 low air loss mattress Myself

## 2023-11-29 LAB
ANION GAP SERPL CALCULATED.3IONS-SCNC: 1 MMOL/L
BUN SERPL-MCNC: 11 MG/DL (ref 5–25)
CALCIUM SERPL-MCNC: 8.6 MG/DL (ref 8.4–10.2)
CHLORIDE SERPL-SCNC: 104 MMOL/L (ref 96–108)
CO2 SERPL-SCNC: 31 MMOL/L (ref 21–32)
CREAT SERPL-MCNC: 0.65 MG/DL (ref 0.6–1.3)
ERYTHROCYTE [DISTWIDTH] IN BLOOD BY AUTOMATED COUNT: 14.9 % (ref 11.6–15.1)
GFR SERPL CREATININE-BSD FRML MDRD: 85 ML/MIN/1.73SQ M
GLUCOSE SERPL-MCNC: 101 MG/DL (ref 65–140)
GLUCOSE SERPL-MCNC: 116 MG/DL (ref 65–140)
GLUCOSE SERPL-MCNC: 137 MG/DL (ref 65–140)
GLUCOSE SERPL-MCNC: 154 MG/DL (ref 65–140)
GLUCOSE SERPL-MCNC: 185 MG/DL (ref 65–140)
GLUCOSE SERPL-MCNC: 198 MG/DL (ref 65–140)
HCT VFR BLD AUTO: 36.4 % (ref 34.8–46.1)
HGB BLD-MCNC: 11.4 G/DL (ref 11.5–15.4)
LACTATE SERPL-SCNC: 0.8 MMOL/L (ref 0.5–2)
MCH RBC QN AUTO: 28.1 PG (ref 26.8–34.3)
MCHC RBC AUTO-ENTMCNC: 31.3 G/DL (ref 31.4–37.4)
MCV RBC AUTO: 90 FL (ref 82–98)
PLATELET # BLD AUTO: 429 THOUSANDS/UL (ref 149–390)
PMV BLD AUTO: 8.8 FL (ref 8.9–12.7)
POTASSIUM SERPL-SCNC: 4 MMOL/L (ref 3.5–5.3)
PROCALCITONIN SERPL-MCNC: 0.08 NG/ML
RBC # BLD AUTO: 4.06 MILLION/UL (ref 3.81–5.12)
SODIUM SERPL-SCNC: 136 MMOL/L (ref 135–147)
WBC # BLD AUTO: 8.93 THOUSAND/UL (ref 4.31–10.16)

## 2023-11-29 PROCEDURE — 80048 BASIC METABOLIC PNL TOTAL CA: CPT | Performed by: STUDENT IN AN ORGANIZED HEALTH CARE EDUCATION/TRAINING PROGRAM

## 2023-11-29 PROCEDURE — 99232 SBSQ HOSP IP/OBS MODERATE 35: CPT | Performed by: STUDENT IN AN ORGANIZED HEALTH CARE EDUCATION/TRAINING PROGRAM

## 2023-11-29 PROCEDURE — 82948 REAGENT STRIP/BLOOD GLUCOSE: CPT

## 2023-11-29 PROCEDURE — 84145 PROCALCITONIN (PCT): CPT | Performed by: STUDENT IN AN ORGANIZED HEALTH CARE EDUCATION/TRAINING PROGRAM

## 2023-11-29 PROCEDURE — 83605 ASSAY OF LACTIC ACID: CPT | Performed by: STUDENT IN AN ORGANIZED HEALTH CARE EDUCATION/TRAINING PROGRAM

## 2023-11-29 PROCEDURE — 85027 COMPLETE CBC AUTOMATED: CPT | Performed by: STUDENT IN AN ORGANIZED HEALTH CARE EDUCATION/TRAINING PROGRAM

## 2023-11-29 RX ADMIN — GLYCERIN 1 DROP: .002; .002; .01 SOLUTION/ DROPS OPHTHALMIC at 17:59

## 2023-11-29 RX ADMIN — HEPARIN SODIUM 5000 UNITS: 5000 INJECTION INTRAVENOUS; SUBCUTANEOUS at 22:28

## 2023-11-29 RX ADMIN — BACITRACIN ZINC AND POLYMYXIN B SULFATE: 500; 10000 OINTMENT OPHTHALMIC at 17:59

## 2023-11-29 RX ADMIN — BACITRACIN ZINC AND POLYMYXIN B SULFATE: 500; 10000 OINTMENT OPHTHALMIC at 22:30

## 2023-11-29 RX ADMIN — BACITRACIN ZINC AND POLYMYXIN B SULFATE: 500; 10000 OINTMENT OPHTHALMIC at 12:48

## 2023-11-29 RX ADMIN — TORSEMIDE 10 MG: 10 TABLET ORAL at 09:57

## 2023-11-29 RX ADMIN — HYDROMORPHONE HYDROCHLORIDE 4 MG: 2 TABLET ORAL at 22:27

## 2023-11-29 RX ADMIN — HYDROMORPHONE HYDROCHLORIDE 4 MG: 2 TABLET ORAL at 09:56

## 2023-11-29 RX ADMIN — Medication 2 G: at 09:57

## 2023-11-29 RX ADMIN — BACITRACIN ZINC AND POLYMYXIN B SULFATE: 500; 10000 OINTMENT OPHTHALMIC at 09:57

## 2023-11-29 RX ADMIN — DULOXETINE HYDROCHLORIDE 30 MG: 30 CAPSULE, DELAYED RELEASE ORAL at 09:56

## 2023-11-29 RX ADMIN — FLUTICASONE FUROATE AND VILANTEROL TRIFENATATE 1 PUFF: 100; 25 POWDER RESPIRATORY (INHALATION) at 09:57

## 2023-11-29 RX ADMIN — UMECLIDINIUM 1 PUFF: 62.5 AEROSOL, POWDER ORAL at 09:57

## 2023-11-29 RX ADMIN — NYSTATIN: 100000 POWDER TOPICAL at 17:59

## 2023-11-29 RX ADMIN — HEPARIN SODIUM 5000 UNITS: 5000 INJECTION INTRAVENOUS; SUBCUTANEOUS at 06:22

## 2023-11-29 RX ADMIN — MIRTAZAPINE 15 MG: 15 TABLET, FILM COATED ORAL at 22:27

## 2023-11-29 RX ADMIN — GLYCERIN 1 DROP: .002; .002; .01 SOLUTION/ DROPS OPHTHALMIC at 09:57

## 2023-11-29 RX ADMIN — HYDROMORPHONE HYDROCHLORIDE 4 MG: 2 TABLET ORAL at 17:58

## 2023-11-29 RX ADMIN — HEPARIN SODIUM 5000 UNITS: 5000 INJECTION INTRAVENOUS; SUBCUTANEOUS at 14:39

## 2023-11-29 RX ADMIN — NYSTATIN: 100000 POWDER TOPICAL at 09:57

## 2023-11-29 RX ADMIN — GLYCERIN 1 DROP: .002; .002; .01 SOLUTION/ DROPS OPHTHALMIC at 22:32

## 2023-11-29 NOTE — ASSESSMENT & PLAN NOTE
Lab Results   Component Value Date    EGFR 71 11/28/2023    EGFR 64 11/27/2023    EGFR 42 11/26/2023    CREATININE 0.79 11/28/2023    CREATININE 0.86 11/27/2023    CREATININE 1.22 11/26/2023     Presented with a creatinine of 1.9 which is above baseline typically between 1.2 and 1.5.   Suspect secondary to some degree of mild volume depletion and also recently started on Bactrim  At this point in time we will hold any further Bactrim, hold her diuretics as well  She was initially on IV fluids but now we are monitoring off of it since she appears to be improving  Monitor kidney function  Avoid nephrotoxins

## 2023-11-29 NOTE — ASSESSMENT & PLAN NOTE
Lab Results   Component Value Date    HGBA1C 8.7 (A) 10/31/2023       Recent Labs     11/29/23  0000 11/29/23  0724 11/29/23  1054 11/29/23  1608   POCGLU 137 116 185* 154*         Blood Sugar Average: Last 72 hrs:  (P) [id number]    Given hypoglycemia on arrival we will hold insulin for now. She may need a lower dose moving forward depending on her kidney function down the line. Hypoglycemia protocol.

## 2023-11-29 NOTE — ASSESSMENT & PLAN NOTE
Wt Readings from Last 3 Encounters:   11/25/23 85 kg (187 lb 6.3 oz)   10/07/23 91.2 kg (201 lb 1 oz)   06/19/23 86.6 kg (191 lb)     She appears overall euvolemic to dry currently  Patient is on torsemide 20 mg daily. Torsemide and lisinopril was held due to CLAUDIA on presentation. CLAUDIA has now resolved. Continue to hold lisinopril since blood pressure soft.   Resume half dose of torsemide 10 mg starting tomorrow AM.  Continue to monitor

## 2023-11-29 NOTE — ASSESSMENT & PLAN NOTE
Lab Results   Component Value Date    EGFR 85 11/29/2023    EGFR 71 11/28/2023    EGFR 64 11/27/2023    CREATININE 0.65 11/29/2023    CREATININE 0.79 11/28/2023    CREATININE 0.86 11/27/2023     Presented with a creatinine of 1.9 which is above baseline typically between 1.2 and 1.5.   Suspect secondary to some degree of mild volume depletion and also recently started on Bactrim  At this point in time we will hold any further Bactrim, hold her diuretics as well  She was initially on IV fluids but now we are monitoring off of it since she appears to be improving  Monitor kidney function  Avoid nephrotoxins

## 2023-11-29 NOTE — PLAN OF CARE
Problem: Prexisting or High Potential for Compromised Skin Integrity  Goal: Skin integrity is maintained or improved  Description: INTERVENTIONS:  - Identify patients at risk for skin breakdown  - Assess and monitor skin integrity  - Assess and monitor nutrition and hydration status  - Monitor labs   - Assess for incontinence   - Turn and reposition patient  - Assist with mobility/ambulation  - Relieve pressure over bony prominences  - Avoid friction and shearing  - Provide appropriate hygiene as needed including keeping skin clean and dry  - Evaluate need for skin moisturizer/barrier cream  - Collaborate with interdisciplinary team   - Patient/family teaching  - Consider wound care consult   Outcome: Progressing     Problem: PAIN - ADULT  Goal: Verbalizes/displays adequate comfort level or baseline comfort level  Description: Interventions:  - Encourage patient to monitor pain and request assistance  - Assess pain using appropriate pain scale  - Administer analgesics based on type and severity of pain and evaluate response  - Implement non-pharmacological measures as appropriate and evaluate response  - Consider cultural and social influences on pain and pain management  - Notify physician/advanced practitioner if interventions unsuccessful or patient reports new pain  Outcome: Progressing     Problem: INFECTION - ADULT  Goal: Absence or prevention of progression during hospitalization  Description: INTERVENTIONS:  - Assess and monitor for signs and symptoms of infection  - Monitor lab/diagnostic results  - Monitor all insertion sites, i.e. indwelling lines, tubes, and drains  - Monitor endotracheal if appropriate and nasal secretions for changes in amount and color  - [place] appropriate cooling/warming therapies per order  - Administer medications as ordered  - Instruct and encourage patient and family to use good hand hygiene technique  - Identify and instruct in appropriate isolation precautions for identified infection/condition  Outcome: Progressing  Goal: Absence of fever/infection during neutropenic period  Description: INTERVENTIONS:  - Monitor WBC    Outcome: Progressing     Problem: SAFETY ADULT  Goal: Patient will remain free of falls  Description: INTERVENTIONS:  - Educate patient/family on patient safety including physical limitations  - Instruct patient to call for assistance with activity   - Consult OT/PT to assist with strengthening/mobility   - Keep Call bell within reach  - Keep bed low and locked with side rails adjusted as appropriate  - Keep care items and personal belongings within reach  - Initiate and maintain comfort rounds  - Make Fall Risk Sign visible to staff  - Offer Toileting every 2 Hours, in advance of need  - Initiate/Maintain alarm  - Obtain necessary fall risk management equipment  - Apply yellow socks and bracelet for high fall risk patients  - Consider moving patient to room near nurses station  Outcome: Progressing  Goal: Maintain or return to baseline ADL function  Description: INTERVENTIONS:  -  Assess patient's ability to carry out ADLs; assess patient's baseline for ADL function and identify physical deficits which impact ability to perform ADLs (bathing, care of mouth/teeth, toileting, grooming, dressing, etc.)  - Assess/evaluate cause of self-care deficits   - Assess range of motion  - Assess patient's mobility; develop plan if impaired  - Assess patient's need for assistive devices and provide as appropriate  - Encourage maximum independence but intervene and supervise when necessary  - Involve family in performance of ADLs  - Assess for home care needs following discharge   - Consider OT consult to assist with ADL evaluation and planning for discharge  - Provide patient education as appropriate  Outcome: Progressing  Goal: Maintains/Returns to pre admission functional level  Description: INTERVENTIONS:  - Perform AM-PAC 6 Click Basic Mobility/ Daily Activity assessment daily.  - Set and communicate daily mobility goal to care team and patient/family/caregiver. - Collaborate with rehabilitation services on mobility goals if consulted  - Perform Range of Motion 3 times a day. - Reposition patient every 2 hours. - Dangle patient 3 times a day  - Stand patient 3 times a day  - Ambulate patient 3 times a day  - Out of bed to chair 3 times a day   - Out of bed for meals 3 times a day  - Out of bed for toileting  - Record patient progress and toleration of activity level   Outcome: Progressing     Problem: DISCHARGE PLANNING  Goal: Discharge to home or other facility with appropriate resources  Description: INTERVENTIONS:  - Identify barriers to discharge w/patient and caregiver  - Arrange for needed discharge resources and transportation as appropriate  - Identify discharge learning needs (meds, wound care, etc.)  - Arrange for interpretive services to assist at discharge as needed  - Refer to Case Management Department for coordinating discharge planning if the patient needs post-hospital services based on physician/advanced practitioner order or complex needs related to functional status, cognitive ability, or social support system  Outcome: Progressing     Problem: Knowledge Deficit  Goal: Patient/family/caregiver demonstrates understanding of disease process, treatment plan, medications, and discharge instructions  Description: Complete learning assessment and assess knowledge base. Interventions:  - Provide teaching at level of understanding  - Provide teaching via preferred learning methods  Outcome: Progressing     Problem: Nutrition/Hydration-ADULT  Goal: Nutrient/Hydration intake appropriate for improving, restoring or maintaining nutritional needs  Description: Monitor and assess patient's nutrition/hydration status for malnutrition. Collaborate with interdisciplinary team and initiate plan and interventions as ordered.   Monitor patient's weight and dietary intake as ordered or per policy. Utilize nutrition screening tool and intervene as necessary. Determine patient's food preferences and provide high-protein, high-caloric foods as appropriate.      INTERVENTIONS:  - Monitor oral intake, urinary output, labs, and treatment plans  - Assess nutrition and hydration status and recommend course of action  - Evaluate amount of meals eaten  - Assist patient with eating if necessary   - Allow adequate time for meals  - Recommend/ encourage appropriate diets, oral nutritional supplements, and vitamin/mineral supplements  - Order, calculate, and assess calorie counts as needed  - Recommend, monitor, and adjust tube feedings and TPN/PPN based on assessed needs  - Assess need for intravenous fluids  - Provide specific nutrition/hydration education as appropriate  - Include patient/family/caregiver in decisions related to nutrition  Outcome: Progressing     Problem: SKIN/TISSUE INTEGRITY - ADULT  Goal: Skin Integrity remains intact(Skin Breakdown Prevention)  Description: Assess:  -Perform Gus assessment   -Clean and moisturize skin   -Inspect skin when repositioning, toileting, and assisting with ADLS  -Assess under medical devices such   -Assess extremities for adequate circulation and sensation     Bed Management:  -Have minimal linens on bed & keep smooth, unwrinkled  -Change linens as needed when moist or perspiring  -Avoid sitting or lying in one position for more than 2 hours while in bed  -Keep HOB at 45 degrees     Toileting:  -Offer bedside commode  -Assess for incontinence   -Use incontinent care products after each incontinent episode     Activity:  -Mobilize patient 3 times a day  -Encourage activity and walks on unit  -Encourage or provide ROM exercises   -Turn and reposition patient every 2 Hours  -Use appropriate equipment to lift or move patient in bed  -Instruct/ Assist with weight shifting every 15 minutes when out of bed in chair  -Consider limitation of chair time 2 hour intervals    Skin Care:  -Avoid use of baby powder, tape, friction and shearing, hot water or constrictive clothing  -Relieve pressure over bony prominences   -Do not massage red bony areas    Next Steps:  -Teach patient strategies to minimize risks  -Consider consults to  interdisciplinary teams   Outcome: Progressing  Goal: Incision(s), wounds(s) or drain site(s) healing without S/S of infection  Description: INTERVENTIONS  - Assess and document dressing, incision, wound bed, drain sites and surrounding tissue  - Provide patient and family education  - Perform skin care/dressing changes   Outcome: Progressing  Goal: Pressure injury heals and does not worsen  Description: Interventions:  - Implement low air loss mattress or specialty surface (Criteria met)  - Apply silicone foam dressing  - Instruct/assist with weight shifting every 15 minutes when in chair   - Limit chair time to 2 hour intervals  - Use special pressure reducing interventions such when in chair   - Apply fecal or urinary incontinence containment device   - Perform passive or active ROM   - Turn and reposition patient & offload bony prominences every 2 hours   - Utilize friction reducing device or surface for transfers   - Consider consults to  interdisciplinary teams   - Use incontinent care products after each incontinent episode   - Consider nutrition services referral as needed  Outcome: Progressing

## 2023-11-29 NOTE — ASSESSMENT & PLAN NOTE
Hyponatremia, POA, in setting of volume depletion evidenced by Na 129> 133>133 requiring NSS bolus,  Currently sodium 134, monitoring off IV fluids.

## 2023-11-29 NOTE — PROGRESS NOTES
[address]  Progress Note  Name: Ailyn Diaz  MRN: [id number]  Unit/Bed#: -99 I Date of Admission: 11/25/2023   Date of Service: 11/29/2023 I Hospital Day: 4    Assessment/Plan   * Acute metabolic encephalopathy  Assessment & Plan  Noted with confusion on arrival, likely metabolic encephalopathy in the setting of hypoglycemia  Monitor closely  Frequent reorientation  Correct underlying causes  Continues to improve day by day although still fluctuates    Conjunctivitis  Assessment & Plan  Noted with some conjunctivitis  Start topical antibiotics    Hyperlactatemia  Assessment & Plan  Noted for lactic acid of 2.0 uptrending to 3. Otherwise good capillary refill time, good blood pressure. Potentially type B lactic acidosis but will continue to monitor closely, continue IV fluids. Patient is otherwise systemically well. Continue to monitor clinically. Unspecified skin changes  Assessment & Plan  Does have bilateral lower extremity chronic skin changes with scaling, edema, erythema. Son mentions that changes are somewhat chronic  Mildly elevated procalcitonin but in the setting of renal failure. Clinically I doubt any cellulitis at this point, patient is nontoxic and hemodynamically stable. The skin changes are likely chronic. Hypoglycemia  Assessment & Plan  Likely due to insulin stacking in the setting of kidney failure  Hold further insulin for now  Hypoglycemia protocol    CHF (congestive heart failure) ([facility])  Assessment & Plan  Wt Readings from Last 3 Encounters:   11/25/23 85 kg (187 lb 6.3 oz)   10/07/23 91.2 kg (201 lb 1 oz)   06/19/23 86.6 kg (191 lb)     She appears overall euvolemic to dry currently  Patient is on torsemide 20 mg daily. Torsemide and lisinopril was held due to CLAUDIA on presentation. CLAUDIA has now resolved. Continue to hold lisinopril since blood pressure soft.   Resume half dose of torsemide 10 mg starting tomorrow AM.  Continue to monitor          Opiate dependence ([address])  Assessment & Plan  Apparently chronically on hydromorphone 4mg PO TID for pain as per DMP  Continue but monitor closely for sedation    Acute kidney injury superimposed on CKD   Assessment & Plan  Lab Results   Component Value Date    EGFR 85 11/29/2023    EGFR 71 11/28/2023    EGFR 64 11/27/2023    CREATININE 0.65 11/29/2023    CREATININE 0.79 11/28/2023    CREATININE 0.86 11/27/2023     Presented with a creatinine of 1.9 which is above baseline typically between 1.2 and 1.5. Suspect secondary to some degree of mild volume depletion and also recently started on Bactrim  At this point in time we will hold any further Bactrim, hold her diuretics as well  She was initially on IV fluids but now we are monitoring off of it since she appears to be improving  Monitor kidney function  Avoid nephrotoxins    Hyponatremia  Assessment & Plan  Hyponatremia, POA, in setting of volume depletion evidenced by Na 129> 133>133 requiring NSS bolus,  Currently sodium 134, monitoring off IV fluids. Rheumatoid arthritis involving multiple sites with positive rheumatoid factor (HCC)  Assessment & Plan  Stable    Type 2 diabetes mellitus with stage 3 chronic kidney disease, with long-term current use of insulin [facility])  Assessment & Plan  Lab Results   Component Value Date    HGBA1C 8.7 (A) 10/31/2023       Recent Labs     11/29/23  0000 11/29/23  0724 11/29/23  1054 11/29/23  1608   POCGLU 137 116 185* 154*         Blood Sugar Average: Last 72 hrs:  (P) [id number]    Given hypoglycemia on arrival we will hold insulin for now. She may need a lower dose moving forward depending on her kidney function down the line. Hypoglycemia protocol.     Essential hypertension  Assessment & Plan  Monitor blood pressure  Torsemide currently on hold given CLAUDIA               VTE Pharmacologic Prophylaxis: VTE Score: 3 Moderate Risk (Score 3-4) - Pharmacological DVT Prophylaxis Ordered: heparin. Mobility:   Basic Mobility Inpatient Raw Score: 6  -HLM Goal: 2: Bed activities/Dependent transfer  -HLM Achieved: 2: Bed activities/Dependent transfer      Patient Centered Rounds: I performed bedside rounds with nursing staff today. Education and Discussions with Family / Patient: Updated  (son) via phone. Total Time Spent on Date of Encounter in care of patient: 25 mins. This time was spent on one or more of the following: performing physical exam; counseling and coordination of care; obtaining or reviewing history; documenting in the medical record; reviewing/ordering tests, medications or procedures; communicating with other healthcare professionals and discussing with patient's family/caregivers. Current Length of Stay: 4 day(s)  Current Patient Status: Inpatient   Certification Statement: The patient will continue to require additional inpatient hospital stay due to ongoing discussion with family member regarding safe dispo planning. Discharge Plan: Anticipate discharge in 24-48 hrs to discharge location to be determined pending rehab evaluations. Code Status: Level 3 - DNAR and DNI    Subjective:   Seen during AM rounds. Patient appears comfortable nondistressed. Appears deconditioned. Patient denies any new complaints. No other events reported. Objective:     Vitals:   Temp (24hrs), Av.5 °F (36.4 °C), Min:96.7 °F (35.9 °C), Max:98.3 °F (36.8 °C)    Temp:  [96.7 °F (35.9 °C)-98.3 °F (36.8 °C)] 98.3 °F (36.8 °C)  HR:  [73-90] 73  Resp:  [16-19] 19  BP: (108-153)/(54-78) 108/54  SpO2:  [94 %-96 %] 95 %  Body mass index is 36.6 kg/m². Input and Output Summary (last 24 hours): Intake/Output Summary (Last 24 hours) at 2023 1730  Last data filed at 2023 0908  Gross per 24 hour   Intake 240 ml   Output --   Net 240 ml       Physical Exam:   Physical Exam  Constitutional:       General: She is not in acute distress.      Appearance: Normal appearance. She is not ill-appearing, toxic-appearing or diaphoretic. HENT:      Head: Normocephalic and atraumatic. Eyes:      Pupils: Pupils are equal, round, and reactive to light. Cardiovascular:      Rate and Rhythm: Normal rate. Pulses: Normal pulses. Pulmonary:      Effort: Pulmonary effort is normal. No respiratory distress. Breath sounds: Normal breath sounds. No wheezing. Comments: Stable at her baseline. Abdominal:      General: Bowel sounds are normal. There is no distension. Palpations: Abdomen is soft. Tenderness: There is no abdominal tenderness. Musculoskeletal:      Cervical back: No rigidity. Comments: Bilateral extremity noted with chronic dry skin changes, wounds noted with dry, clean, intact dressing. Neurological:      Mental Status: She is alert and oriented to person, place, and time. Mental status is at baseline. Psychiatric:         Mood and Affect: Mood normal.         Behavior: Behavior normal.          Additional Data:     Labs:  Results from last 7 days   Lab Units 11/29/23 0443 11/28/23  0539   WBC Thousand/uL 8.93 10.10   HEMOGLOBIN g/dL 11.4* 11.3*   HEMATOCRIT % 36.4 35.7   PLATELETS Thousands/uL 429* 464*   NEUTROS PCT %  --  59   LYMPHS PCT %  --  21   MONOS PCT %  --  11   EOS PCT %  --  8*     Results from last 7 days   Lab Units 11/29/23  0443 11/26/23  0956 11/25/23  0654   SODIUM mmol/L 136   < > 129*   POTASSIUM mmol/L 4.0   < > 3.8   CHLORIDE mmol/L 104   < > 95*   CO2 mmol/L 31   < > 21   BUN mg/dL 11   < > 46*   CREATININE mg/dL 0.65   < > 1.92*   ANION GAP mmol/L 1   < > 13   CALCIUM mg/dL 8.6   < > 9.1   ALBUMIN g/dL  --   --  2.9*   TOTAL BILIRUBIN mg/dL  --   --  0.35   ALK PHOS U/L  --   --  102   ALT U/L  --   --  8   AST U/L  --   --  40*   GLUCOSE RANDOM mg/dL 101   < > 48*    < > = values in this interval not displayed.      Results from last 7 days   Lab Units 11/25/23  0618   INR  1.21*     Results from last 7 days Lab Units 11/29/23  1608 11/29/23  1054 11/29/23  0724 11/29/23  0000 11/28/23  1646 11/28/23  1051 11/28/23  0550 11/28/23  0034 11/27/23  1815 11/27/23  1605 11/27/23  1123 11/27/23  0624   POC GLUCOSE mg/dl 154* 185* 116 137 126 171* 67 111 166* 132 134 94         Results from last 7 days   Lab Units 11/29/23  0443 11/25/23  1340 11/25/23  0937 11/25/23  0618   LACTIC ACID mmol/L 0.8 2.6* 3.0* 2.3*   PROCALCITONIN ng/ml 0.08  --   --  0.28*       Lines/Drains:  Invasive Devices       Peripheral Intravenous Line  Duration             Peripheral IV 11/25/23 Proximal;Right;Ventral (anterior) Forearm 4 days                        Recent Cultures (last 7 days):   Results from last 7 days   Lab Units 11/25/23  0723 11/25/23  0621 11/25/23  0618   BLOOD CULTURE   --  No Growth After 4 Days. No Growth After 4 Days.    URINE CULTURE  60,000-69,000 cfu/ml Klebsiella variicola*  60,000-69,000 cfu/ml Klebsiella variicola*  40,000-49,000 cfu/ml Aerococcus urinae*  --   --        Last 24 Hours Medication List:   Current Facility-Administered Medications   Medication Dose Route Frequency Provider Last Rate    acetaminophen  650 mg Oral Q6H PRN Perez Wellington MD      bacitracin-polymyxin b   Both Eyes 4x Daily Perez Wellington MD      Diclofenac Sodium  2 g Topical 4x Daily Perez Wellington MD      DULoxetine  30 mg Oral Daily Perez Wellington MD      Fluticasone Furoate-Vilanterol  1 puff Inhalation Daily Perez Wellington MD      And    umeclidinium  1 puff Inhalation Daily Perez Wellington MD      glycerin-hypromellose-  1 drop Both Eyes TID Perez Wellington MD      heparin (porcine)  5,000 Units Subcutaneous [facility] Perez Wellington MD      HYDROmorphone  0.5 mg Intravenous Q4H PRN Perez Wellington MD      HYDROmorphone  4 mg Oral TID Perez Wellington MD      methocarbamol  500 mg Oral BID PRN Perez Wellington MD mirtazapine  15 mg Oral HS Say Mcelroy MD      nystatin   Topical BID Say Mcelroy MD      ondansetron  4 mg Intravenous Q6H PRN Say Mcelroy MD      torsemide  10 mg Oral Daily Agustina Ahumada MD          Today, Patient Was Seen By: Agustina Ahumada MD    **Please Note: This note may have been constructed using a voice recognition system. **

## 2023-11-29 NOTE — PROGRESS NOTES
[address]  Progress Note  Name: Deven Sales  MRN: [id number]  Unit/Bed#: -66 I Date of Admission: 11/25/2023   Date of Service: 11/28/2023 I Hospital Day: 3    Assessment/Plan   * Acute metabolic encephalopathy  Assessment & Plan  Noted with confusion on arrival, likely metabolic encephalopathy in the setting of hypoglycemia  Monitor closely  Frequent reorientation  Correct underlying causes  Continues to improve day by day although still fluctuates    Conjunctivitis  Assessment & Plan  Noted with some conjunctivitis  Start topical antibiotics    Hyperlactatemia  Assessment & Plan  Noted for lactic acid of 2.0 uptrending to 3. Otherwise good capillary refill time, good blood pressure. Potentially type B lactic acidosis but will continue to monitor closely, continue IV fluids. Patient is otherwise systemically well. Continue to monitor clinically. Unspecified skin changes  Assessment & Plan  Does have bilateral lower extremity chronic skin changes with scaling, edema, erythema. Son mentions that changes are somewhat chronic  Mildly elevated procalcitonin but in the setting of renal failure. Clinically I doubt any cellulitis at this point, patient is nontoxic and hemodynamically stable. The skin changes are likely chronic. Hypoglycemia  Assessment & Plan  Likely due to insulin stacking in the setting of kidney failure  Hold further insulin for now  Hypoglycemia protocol    CHF (congestive heart failure) ([facility])  Assessment & Plan  Wt Readings from Last 3 Encounters:   11/25/23 85 kg (187 lb 6.3 oz)   10/07/23 91.2 kg (201 lb 1 oz)   06/19/23 86.6 kg (191 lb)     She appears overall euvolemic to dry currently  Patient is on torsemide 20 mg daily. Torsemide and lisinopril was held due to CLAUDIA on presentation. CLAUDIA has now resolved. Continue to hold lisinopril since blood pressure soft.   Resume half dose of torsemide 10 mg starting tomorrow AM.  Continue to monitor          Opiate dependence ([address])  Assessment & Plan  Apparently chronically on hydromorphone 4mg PO TID for pain as per DMP  Continue but monitor closely for sedation    Acute kidney injury superimposed on CKD   Assessment & Plan  Lab Results   Component Value Date    EGFR 71 11/28/2023    EGFR 64 11/27/2023    EGFR 42 11/26/2023    CREATININE 0.79 11/28/2023    CREATININE 0.86 11/27/2023    CREATININE 1.22 11/26/2023     Presented with a creatinine of 1.9 which is above baseline typically between 1.2 and 1.5. Suspect secondary to some degree of mild volume depletion and also recently started on Bactrim  At this point in time we will hold any further Bactrim, hold her diuretics as well  She was initially on IV fluids but now we are monitoring off of it since she appears to be improving  Monitor kidney function  Avoid nephrotoxins    Hyponatremia  Assessment & Plan  Hyponatremia, POA, in setting of volume depletion evidenced by Na 129> 133>133 requiring NSS bolus,  Currently sodium 134, monitoring off IV fluids. Rheumatoid arthritis involving multiple sites with positive rheumatoid factor (HCC)  Assessment & Plan  Stable    Type 2 diabetes mellitus with stage 3 chronic kidney disease, with long-term current use of insulin [facility])  Assessment & Plan  Lab Results   Component Value Date    HGBA1C 8.7 (A) 10/31/2023       Recent Labs     11/28/23  0034 11/28/23  0550 11/28/23  1051 11/28/23  1646   POCGLU 111 67 171* 126         Blood Sugar Average: Last 72 hrs:  (P) [id number]    Given hypoglycemia on arrival we will hold insulin for now. She may need a lower dose moving forward depending on her kidney function down the line. Hypoglycemia protocol.     Essential hypertension  Assessment & Plan  Monitor blood pressure  Torsemide currently on hold given CLAUDIA               VTE Pharmacologic Prophylaxis: VTE Score: 3 Moderate Risk (Score 3-4) - Pharmacological DVT Prophylaxis Ordered: heparin. Mobility:   Basic Mobility Inpatient Raw Score: 7  -[facility] Goal: 2: Bed activities/Dependent transfer  -[facility] Achieved: 1: Laying in bed      Patient Centered Rounds: I performed bedside rounds with nursing staff today. Total Time Spent on Date of Encounter in care of patient: 25 mins. This time was spent on one or more of the following: performing physical exam; counseling and coordination of care; obtaining or reviewing history; documenting in the medical record; reviewing/ordering tests, medications or procedures; communicating with other healthcare professionals and discussing with patient's family/caregivers. Current Length of Stay: 3 day(s)  Current Patient Status: Inpatient   Certification Statement: The patient will continue to require additional inpatient hospital stay due to Monitoring off abx  Discharge Plan: Anticipate discharge tomorrow to discharge location to be determined pending rehab evaluations. Code Status: Level 3 - DNAR and DNI    Subjective:   Seen during AM rounds. Patient appears comfortable nondistressed. Patient does report being home O2 dependent 3 L at baseline. She is in good spirit. Reports appetite has been improving. Denies any other new complaints. Hamilton Dejesus to go home. Objective:     Vitals:   Temp (24hrs), Av.4 °F (36.3 °C), Min:97.3 °F (36.3 °C), Max:97.5 °F (36.4 °C)    Temp:  [97.3 °F (36.3 °C)-97.5 °F (36.4 °C)] 97.5 °F (36.4 °C)  HR:  [86-90] 86  Resp:  [18] 18  BP: (107-119)/(47-56) 119/56  SpO2:  [96 %-97 %] 96 %  Body mass index is 36.6 kg/m². Input and Output Summary (last 24 hours): Intake/Output Summary (Last 24 hours) at 2023  Last data filed at 2023 1004  Gross per 24 hour   Intake 240 ml   Output --   Net 240 ml       Physical Exam:   Physical Exam  Constitutional:       General: She is not in acute distress. Appearance: Normal appearance. She is not ill-appearing, toxic-appearing or diaphoretic.    HENT: Head: Normocephalic and atraumatic. Eyes:      Pupils: Pupils are equal, round, and reactive to light. Cardiovascular:      Rate and Rhythm: Normal rate. Pulses: Normal pulses. Pulmonary:      Effort: Pulmonary effort is normal. No respiratory distress. Breath sounds: Normal breath sounds. No wheezing. Comments: Stable at her baseline. Abdominal:      General: Bowel sounds are normal. There is no distension. Palpations: Abdomen is soft. Tenderness: There is no abdominal tenderness. Musculoskeletal:      Cervical back: No rigidity. Comments: Bilateral extremity noted with chronic dry skin changes, wounds noted with dry, clean, intact dressing. Neurological:      Mental Status: She is alert and oriented to person, place, and time. Mental status is at baseline. Psychiatric:         Mood and Affect: Mood normal.         Behavior: Behavior normal.          Additional Data:     Labs:  Results from last 7 days   Lab Units 11/28/23  0539   WBC Thousand/uL 10.10   HEMOGLOBIN g/dL 11.3*   HEMATOCRIT % 35.7   PLATELETS Thousands/uL 464*   NEUTROS PCT % 59   LYMPHS PCT % 21   MONOS PCT % 11   EOS PCT % 8*     Results from last 7 days   Lab Units 11/28/23  0505 11/26/23  0956 11/25/23  0654   SODIUM mmol/L 134*   < > 129*   POTASSIUM mmol/L 4.7   < > 3.8   CHLORIDE mmol/L 103   < > 95*   CO2 mmol/L 27   < > 21   BUN mg/dL 15   < > 46*   CREATININE mg/dL 0.79   < > 1.92*   ANION GAP mmol/L 4   < > 13   CALCIUM mg/dL 8.3*   < > 9.1   ALBUMIN g/dL  --   --  2.9*   TOTAL BILIRUBIN mg/dL  --   --  0.35   ALK PHOS U/L  --   --  102   ALT U/L  --   --  8   AST U/L  --   --  40*   GLUCOSE RANDOM mg/dL 70   < > 48*    < > = values in this interval not displayed.      Results from last 7 days   Lab Units 11/25/23  0618   INR  1.21*     Results from last 7 days   Lab Units 11/28/23  1646 11/28/23  1051 11/28/23  0550 11/28/23  0034 11/27/23  1815 11/27/23  1605 11/27/23  1123 11/27/23  3952 11/26/23  2133 11/26/23  1839 11/26/23  1514 11/26/23  1123   POC GLUCOSE mg/dl 126 171* 67 111 166* 132 134 94 145* 178* 132 101         Results from last 7 days   Lab Units 11/25/23  1340 11/25/23  0937 11/25/23  0618   LACTIC ACID mmol/L 2.6* 3.0* 2.3*   PROCALCITONIN ng/ml  --   --  0.28*       Lines/Drains:  Invasive Devices       Peripheral Intravenous Line  Duration             Peripheral IV 11/25/23 Proximal;Right;Ventral (anterior) Forearm 3 days                        Recent Cultures (last 7 days):   Results from last 7 days   Lab Units 11/25/23  0723 11/25/23  0621 11/25/23  0618   BLOOD CULTURE   --  No Growth at 72 hrs. No Growth at 72 hrs.    URINE CULTURE  60,000-69,000 cfu/ml Klebsiella variicola*  60,000-69,000 cfu/ml Klebsiella variicola*  40,000-49,000 cfu/ml Aerococcus urinae*  --   --        Last 24 Hours Medication List:   Current Facility-Administered Medications   Medication Dose Route Frequency Provider Last Rate    acetaminophen  650 mg Oral Q6H PRN Hakeem Griggs MD      bacitracin-polymyxin b   Both Eyes 4x Daily Hakeem Griggs MD      Diclofenac Sodium  2 g Topical 4x Daily Hakeem Griggs MD      DULoxetine  30 mg Oral Daily Hakeem Griggs MD      Fluticasone Furoate-Vilanterol  1 puff Inhalation Daily Hakeem Griggs MD      And    umeclidinium  1 puff Inhalation Daily Hakeem Griggs MD      glycerin-hypromellose-  1 drop Both Eyes TID Hakeem Griggs MD      heparin (porcine)  5,000 Units Subcutaneous [facility] Hakeem Griggs MD      HYDROmorphone  0.5 mg Intravenous Q4H PRN Hakeem Griggs MD      HYDROmorphone  4 mg Oral TID Hakeem Griggs MD      methocarbamol  500 mg Oral BID PRN Hakeem Griggs MD      mirtazapine  15 mg Oral HS Hakeem Griggs MD      nystatin   Topical BID Hakeem Griggs MD      ondansetron  4 mg Intravenous Q6H PRN Maria Del Carmen crow MD Blas Preciado ON 11/29/2023] torsemide  10 mg Oral Daily Pawel Fragoso MD          Today, Patient Was Seen By: Pawel Fragoso MD    **Please Note: This note may have been constructed using a voice recognition system. **

## 2023-11-29 NOTE — ASSESSMENT & PLAN NOTE
Lab Results   Component Value Date    HGBA1C 8.7 (A) 10/31/2023       Recent Labs     11/28/23  0034 11/28/23  0550 11/28/23  1051 11/28/23  1646   POCGLU 111 67 171* 126         Blood Sugar Average: Last 72 hrs:  (P) 388.2777953265682257    Given hypoglycemia on arrival we will hold insulin for now. She may need a lower dose moving forward depending on her kidney function down the line. Hypoglycemia protocol.

## 2023-11-30 VITALS
BODY MASS INDEX: 36.79 KG/M2 | HEIGHT: 60 IN | DIASTOLIC BLOOD PRESSURE: 56 MMHG | TEMPERATURE: 98.7 F | HEART RATE: 99 BPM | SYSTOLIC BLOOD PRESSURE: 130 MMHG | RESPIRATION RATE: 19 BRPM | WEIGHT: 187.39 LBS | OXYGEN SATURATION: 96 %

## 2023-11-30 LAB
BACTERIA BLD CULT: NORMAL
BACTERIA BLD CULT: NORMAL
DME PARACHUTE DELIVERY DATE ACTUAL: NORMAL
DME PARACHUTE DELIVERY DATE EXPECTED: NORMAL
DME PARACHUTE DELIVERY DATE REQUESTED: NORMAL
DME PARACHUTE ITEM DESCRIPTION: NORMAL
DME PARACHUTE ORDER STATUS: NORMAL
DME PARACHUTE SUPPLIER NAME: NORMAL
DME PARACHUTE SUPPLIER PHONE: NORMAL
GLUCOSE SERPL-MCNC: 121 MG/DL (ref 65–140)
GLUCOSE SERPL-MCNC: 141 MG/DL (ref 65–140)
GLUCOSE SERPL-MCNC: 142 MG/DL (ref 65–140)
GLUCOSE SERPL-MCNC: 195 MG/DL (ref 65–140)

## 2023-11-30 PROCEDURE — 99239 HOSP IP/OBS DSCHRG MGMT >30: CPT | Performed by: STUDENT IN AN ORGANIZED HEALTH CARE EDUCATION/TRAINING PROGRAM

## 2023-11-30 PROCEDURE — 82948 REAGENT STRIP/BLOOD GLUCOSE: CPT

## 2023-11-30 RX ORDER — DULOXETIN HYDROCHLORIDE 30 MG/1
30 CAPSULE, DELAYED RELEASE ORAL DAILY
Qty: 30 CAPSULE | Refills: 0 | Status: SHIPPED | OUTPATIENT
Start: 2023-11-30

## 2023-11-30 RX ORDER — HYDROMORPHONE HYDROCHLORIDE 4 MG/1
4 TABLET ORAL EVERY 8 HOURS PRN
Qty: 15 TABLET | Refills: 0 | Status: SHIPPED | OUTPATIENT
Start: 2023-11-30

## 2023-11-30 RX ADMIN — ACETAMINOPHEN 650 MG: 325 TABLET, FILM COATED ORAL at 20:29

## 2023-11-30 RX ADMIN — BACITRACIN ZINC AND POLYMYXIN B SULFATE: 500; 10000 OINTMENT OPHTHALMIC at 17:10

## 2023-11-30 RX ADMIN — HYDROMORPHONE HYDROCHLORIDE 4 MG: 2 TABLET ORAL at 21:13

## 2023-11-30 RX ADMIN — HEPARIN SODIUM 5000 UNITS: 5000 INJECTION INTRAVENOUS; SUBCUTANEOUS at 06:35

## 2023-11-30 RX ADMIN — HYDROMORPHONE HYDROCHLORIDE 4 MG: 2 TABLET ORAL at 09:14

## 2023-11-30 RX ADMIN — DULOXETINE HYDROCHLORIDE 30 MG: 30 CAPSULE, DELAYED RELEASE ORAL at 09:15

## 2023-11-30 RX ADMIN — HYDROMORPHONE HYDROCHLORIDE 4 MG: 2 TABLET ORAL at 17:09

## 2023-11-30 RX ADMIN — GLYCERIN 1 DROP: .002; .002; .01 SOLUTION/ DROPS OPHTHALMIC at 09:16

## 2023-11-30 RX ADMIN — BACITRACIN ZINC AND POLYMYXIN B SULFATE: 500; 10000 OINTMENT OPHTHALMIC at 09:16

## 2023-11-30 RX ADMIN — UMECLIDINIUM 1 PUFF: 62.5 AEROSOL, POWDER ORAL at 09:15

## 2023-11-30 RX ADMIN — GLYCERIN 1 DROP: .002; .002; .01 SOLUTION/ DROPS OPHTHALMIC at 17:10

## 2023-11-30 RX ADMIN — BACITRACIN ZINC AND POLYMYXIN B SULFATE: 500; 10000 OINTMENT OPHTHALMIC at 12:03

## 2023-11-30 RX ADMIN — NYSTATIN: 100000 POWDER TOPICAL at 09:15

## 2023-11-30 RX ADMIN — FLUTICASONE FUROATE AND VILANTEROL TRIFENATATE 1 PUFF: 100; 25 POWDER RESPIRATORY (INHALATION) at 09:16

## 2023-11-30 NOTE — DISCHARGE SUMMARY
[address]  Discharge- Rubque Rho 1945, 66 y.o. female MRN: [id number]  Unit/Bed#: -01 Encounter: [id number]  Primary Care Provider: Tracy Cox MD   Date and time admitted to hospital: 11/25/2023  5:00 AM    * Acute metabolic encephalopathy  Assessment & Plan  Noted with confusion on arrival, likely metabolic encephalopathy in the setting of hypoglycemia  CT head report noted negative for acute finding, noted with atrophy and chronic microvascular ischemic changes. CT abdomen/pelvis report noted with chronic interstitial changes in the visualized lower lobes of lungs, persistent minimal right-sided hydronephrosis without obstructing calculus, tiny hiatal hernia, sigmoid diverticulosis without diverticulitis, no bowel obstruction noted. UA negative for UTI.  2 out of 2 blood culture without growth. Now resolved. Symptoms suspected secondary to hypoglycemia. Now resolved currently she is stable at her baseline as per family. PT/OT recommended postacute rehab however son prefers her to come home and declined to go to rehab. Currently she is hemodynamically stable for discharge. Conjunctivitis  Assessment & Plan  Noted with some conjunctivitis  Currently better. Recommend outpatient follow-up with PCP    Hyperlactatemia  Assessment & Plan  Now resolved    Unspecified skin changes  Assessment & Plan  Does have bilateral lower extremity chronic skin changes with scaling, edema, erythema. Son mentions that changes are somewhat chronic  Mildly elevated procalcitonin but in the setting of renal failure. No obvious signs of active cellulitis noted. Recommended continue outpatient follow-up with podiatry/wound care on outpatient basis. Hypoglycemia  Assessment & Plan  Likely due to insulin stacking in the setting of kidney failure  Hold further insulin for now.   Decrease metformin to 500 mg twice daily  Recommend close follow-up with primary care provider with Accu-Cheks log. CHF (congestive heart failure) (HCC)  Assessment & Plan  Wt Readings from Last 3 Encounters:   11/25/23 85 kg (187 lb 6.3 oz)   10/07/23 91.2 kg (201 lb 1 oz)   06/19/23 86.6 kg (191 lb)     She appears overall euvolemic to dry currently  Patient is on torsemide 20 mg daily. Torsemide and lisinopril was held due to CLAUDIA on presentation. CLAUDIA has now resolved. Recommend to continue to hold home dose of lisinopril and torsemide for now and to follow-up with PCP due to low blood pressure and clinically does not appear overtly volume overloaded. Opiate dependence ([address])  Assessment & Plan  Apparently chronically on hydromorphone 4mg PO TID for pain as per PDMP  As per PDMP last prescription for 30-day supply that was filled on 10/24/2023. Will send in prescription for 5-day supply and recommended to follow-up with PCP for continuation of refill. Acute kidney injury superimposed on CKD   Assessment & Plan  Lab Results   Component Value Date    EGFR 85 11/29/2023    EGFR 71 11/28/2023    EGFR 64 11/27/2023    CREATININE 0.65 11/29/2023    CREATININE 0.79 11/28/2023    CREATININE 0.86 11/27/2023     Presented with a creatinine of 1.9 which is above baseline typically between 1.2 and 1.5. Suspect secondary to some degree of mild volume depletion and also recently started on Bactrim  At this point in time we will hold any further Bactrim, hold her diuretics as well  CLAUDIA has now resolved. Recommend to continue to hold lisinopril and torsemide on discharge until seen by PCP. Hyponatremia  Assessment & Plan  Hyponatremia has now resolved. Normal currently 136. Natalie Angles     Rheumatoid arthritis involving multiple sites with positive rheumatoid factor (HCC)  Assessment & Plan  Stable    Type 2 diabetes mellitus with stage 3 chronic kidney disease, with long-term current use of insulin [facility])  Assessment & Plan  Lab Results   Component Value Date    HGBA1C 8.7 (A) 10/31/2023       Recent Labs     11/30/23  0037 11/30/23  0616 11/30/23  1140 11/30/23  1549   POCGLU 141* 121 142* 195*         Blood Sugar Average: Last 72 hrs:  (P) 792.1782663524816680    Patient was hospitalized due to hypoglycemia. Currently controlled. Recommended to decrease metformin to 500 mg daily. Recommended to continue to hold insulin for now until seen by PCP for follow-up. Essential hypertension  Assessment & Plan  Blood pressure currently controlled off of antihypertensive agents. Recommend continue to hold lisinopril and torsemide on discharge and to follow-up with PCP. Discharging Physician / Practitioner: Tania Connor MD  PCP: Deborah Abdullahi MD  Admission Date:   Admission Orders (From admission, onward)       Ordered        11/25/23 0914  INPATIENT ADMISSION  Once                          Discharge Date: 11/30/23    Medical Problems       Resolved Problems  Date Reviewed: 11/30/2023   None           Reason for Admission: Acute metabolic encephalopathy secondary to hypoglycemia likely due to CLAUDIA on CKD since on Insulin and metformin. Hospital Course:     [address] Isael is a 66 y.o. female patient with past medical history of diabetes, morbid obesity, CKD, chronic opioid use, chronic bilateral extremity edema with chronic skin changes, CHF, who originally presented to the hospital on 11/25/2023 due to confusion noted by family member as well as reportedly noted with hypoglycemic with the 45s. Patient was recently started on Bactrim for presumed lower extremity cellulitis by outpatient provider. Noted with CLAUDIA on CKD on presentation as well. Workup grossly unremarkable for any acute finding. No signs of active infection noted. Patient was treated by discontinuation of insulin, holding furosemide, lisinopril, IV hydration, monitoring of fingerstick glucose as well as PT OT evaluation. Her symptoms have not resolved and currently patient is stable. Remained stable off antibiotics.   CLAUDIA now resolved. Currently patient has been tolerating p.o. intake well and Accu-Cheks noted problems with vision acceptable range. Recommended to continue metformin 500 mg twice daily, continue to hold lisinopril, torsemide, insulin at the time of discharge until follow-up with PCP. PT recommendation discussed with son over the phone. Son prefers to take patient home with VNA services and declined her to go to inpatient rehab which is reasonable given patient overall conditions with cognitive decline. Currently she is hemodynamically stable for discharge home with written services and family support. Recommended close outpatient follow-up with PCP. Refer to earlier notes for further clarification. Please see above list of diagnoses and related plan for additional information. Condition at Discharge: fair     Discharge Day Visit / Exam:     Subjective: Seen during a.m. rounds. Patient appears comfortable not in distress. Currently she denies chest pain, dyspnea, fever, chills, nausea, vomiting, any other new complaints. No other events reported. Son concerned about right upper extremity erythema however on exam noted with very faint erythema without tenderness or any other signs of active infection likely due to irritation from laying on the side. Patient's daughter present at the bedside at the time of above discussion and son was also on the phone at the same time. Vitals: Blood Pressure: 130/56 (11/30/23 1551)  Pulse: 99 (11/30/23 1551)  Temperature: 98.7 °F (37.1 °C) (11/30/23 1551)  Temp Source: Axillary (11/29/23 1511)  Respirations: 19 (11/30/23 1551)  Height: 5' (152.4 cm) (11/25/23 1022)  Weight - Scale: 85 kg (187 lb 6.3 oz) (11/25/23 1022)  SpO2: 96 % (11/30/23 1551)  Exam:   Physical Exam  Constitutional:       General: She is not in acute distress. Appearance: Normal appearance. She is not ill-appearing, toxic-appearing or diaphoretic.    HENT:      Head: Normocephalic and atraumatic. Eyes:      Pupils: Pupils are equal, round, and reactive to light. Cardiovascular:      Rate and Rhythm: Normal rate. Pulses: Normal pulses. Pulmonary:      Effort: Pulmonary effort is normal. No respiratory distress. Breath sounds: Normal breath sounds. No wheezing. Comments: Stable at her baseline. Abdominal:      General: Bowel sounds are normal. There is no distension. Palpations: Abdomen is soft. Tenderness: There is no abdominal tenderness. Musculoskeletal:      Cervical back: No rigidity. Comments: Bilateral extremity noted with chronic dry skin changes, wounds noted with dry, clean, intact dressing. Neurological:      Mental Status: She is alert and oriented to person, place, and time. Mental status is at baseline. Psychiatric:         Mood and Affect: Mood normal.         Behavior: Behavior normal.         Discussion with Family: Spoke with daughter at the bedside, spoke with son over the phone. Discharge instructions/Information to patient and family:   See after visit summary for information provided to patient and family. Provisions for Follow-Up Care:  See after visit summary for information related to follow-up care and any pertinent home health orders. Disposition:     Home with VNA Services (Reminder: Complete face to face encounter)  Son declined rehab/snf  Requesting to take her home. Planned Readmission:      Discharge Statement:  I spent 35 minutes discharging the patient. This time was spent on the day of discharge. I had direct contact with the patient on the day of discharge. Greater than 50% of the total time was spent examining patient, answering all patient questions, arranging and discussing plan of care with patient as well as directly providing post-discharge instructions. Additional time then spent on discharge activities.     Discharge Medications:  See after visit summary for reconciled discharge medications provided to patient and family.       ** Please Note: This note has been constructed using a voice recognition system **

## 2023-11-30 NOTE — ASSESSMENT & PLAN NOTE
Lab Results   Component Value Date    HGBA1C 8.7 (A) 10/31/2023       Recent Labs     11/30/23  0037 11/30/23  0616 11/30/23  1140 11/30/23  1549   POCGLU 141* 121 142* 195*         Blood Sugar Average: Last 72 hrs:  (P) 591.3367634540492562    Given hypoglycemia on arrival we will hold insulin for now. She may need a lower dose moving forward depending on her kidney function down the line. Hypoglycemia protocol.

## 2023-11-30 NOTE — PLAN OF CARE
Problem: Prexisting or High Potential for Compromised Skin Integrity  Goal: Skin integrity is maintained or improved  Description: INTERVENTIONS:  - Identify patients at risk for skin breakdown  - Assess and monitor skin integrity  - Assess and monitor nutrition and hydration status  - Monitor labs   - Assess for incontinence   - Turn and reposition patient  - Assist with mobility/ambulation  - Relieve pressure over bony prominences  - Avoid friction and shearing  - Provide appropriate hygiene as needed including keeping skin clean and dry  - Evaluate need for skin moisturizer/barrier cream  - Collaborate with interdisciplinary team   - Patient/family teaching  - Consider wound care consult   Outcome: Progressing     Problem: PAIN - ADULT  Goal: Verbalizes/displays adequate comfort level or baseline comfort level  Description: Interventions:  - Encourage patient to monitor pain and request assistance  - Assess pain using appropriate pain scale  - Administer analgesics based on type and severity of pain and evaluate response  - Implement non-pharmacological measures as appropriate and evaluate response  - Consider cultural and social influences on pain and pain management  - Notify physician/advanced practitioner if interventions unsuccessful or patient reports new pain  Outcome: Progressing     Problem: INFECTION - ADULT  Goal: Absence or prevention of progression during hospitalization  Description: INTERVENTIONS:  - Assess and monitor for signs and symptoms of infection  - Monitor lab/diagnostic results  - Monitor all insertion sites, i.e. indwelling lines, tubes, and drains  - Monitor endotracheal if appropriate and nasal secretions for changes in amount and color  - Bracey appropriate cooling/warming therapies per order  - Administer medications as ordered  - Instruct and encourage patient and family to use good hand hygiene technique  - Identify and instruct in appropriate isolation precautions for identified infection/condition  Outcome: Progressing  Goal: Absence of fever/infection during neutropenic period  Description: INTERVENTIONS:  - Monitor WBC    Outcome: Progressing

## 2023-11-30 NOTE — PLAN OF CARE
Problem: Prexisting or High Potential for Compromised Skin Integrity  Goal: Skin integrity is maintained or improved  Description: INTERVENTIONS:  - Identify patients at risk for skin breakdown  - Assess and monitor skin integrity  - Assess and monitor nutrition and hydration status  - Monitor labs   - Assess for incontinence   - Turn and reposition patient  - Assist with mobility/ambulation  - Relieve pressure over bony prominences  - Avoid friction and shearing  - Provide appropriate hygiene as needed including keeping skin clean and dry  - Evaluate need for skin moisturizer/barrier cream  - Collaborate with interdisciplinary team   - Patient/family teaching  - Consider wound care consult   Outcome: Adequate for Discharge     Problem: PAIN - ADULT  Goal: Verbalizes/displays adequate comfort level or baseline comfort level  Description: Interventions:  - Encourage patient to monitor pain and request assistance  - Assess pain using appropriate pain scale  - Administer analgesics based on type and severity of pain and evaluate response  - Implement non-pharmacological measures as appropriate and evaluate response  - Consider cultural and social influences on pain and pain management  - Notify physician/advanced practitioner if interventions unsuccessful or patient reports new pain  Outcome: Adequate for Discharge     Problem: INFECTION - ADULT  Goal: Absence or prevention of progression during hospitalization  Description: INTERVENTIONS:  - Assess and monitor for signs and symptoms of infection  - Monitor lab/diagnostic results  - Monitor all insertion sites, i.e. indwelling lines, tubes, and drains  - Monitor endotracheal if appropriate and nasal secretions for changes in amount and color  - [place] appropriate cooling/warming therapies per order  - Administer medications as ordered  - Instruct and encourage patient and family to use good hand hygiene technique  - Identify and instruct in appropriate isolation precautions for identified infection/condition  Outcome: Adequate for Discharge  Goal: Absence of fever/infection during neutropenic period  Description: INTERVENTIONS:  - Monitor WBC    Outcome: Adequate for Discharge     Problem: SAFETY ADULT  Goal: Patient will remain free of falls  Description: INTERVENTIONS:  - Educate patient/family on patient safety including physical limitations  - Instruct patient to call for assistance with activity   - Consult OT/PT to assist with strengthening/mobility   - Keep Call bell within reach  - Keep bed low and locked with side rails adjusted as appropriate  - Keep care items and personal belongings within reach  - Initiate and maintain comfort rounds  - Make Fall Risk Sign visible to staff  - Offer Toileting every 2 Hours, in advance of need  - Initiate/Maintain alarm  - Obtain necessary fall risk management equipment:   - Apply yellow socks and bracelet for high fall risk patients  - Consider moving patient to room near nurses station  Outcome: Adequate for Discharge  Goal: Maintain or return to baseline ADL function  Description: INTERVENTIONS:  -  Assess patient's ability to carry out ADLs; assess patient's baseline for ADL function and identify physical deficits which impact ability to perform ADLs (bathing, care of mouth/teeth, toileting, grooming, dressing, etc.)  - Assess/evaluate cause of self-care deficits   - Assess range of motion  - Assess patient's mobility; develop plan if impaired  - Assess patient's need for assistive devices and provide as appropriate  - Encourage maximum independence but intervene and supervise when necessary  - Involve family in performance of ADLs  - Assess for home care needs following discharge   - Consider OT consult to assist with ADL evaluation and planning for discharge  - Provide patient education as appropriate  Outcome: Adequate for Discharge  Goal: Maintains/Returns to pre admission functional level  Description: INTERVENTIONS:  - Perform AM-PAC 6 Click Basic Mobility/ Daily Activity assessment daily.  - Set and communicate daily mobility goal to care team and patient/family/caregiver. - Collaborate with rehabilitation services on mobility goals if consulted  - Perform Range of Motion times a day. - Reposition patient every  hours. - Dangle patient  times a day  - Stand patient  times a day  - Ambulate patient  times a day  - Out of bed to chair  times a day   - Out of bed for meals  times a day  - Out of bed for toileting  - Record patient progress and toleration of activity level   Outcome: Adequate for Discharge     Problem: DISCHARGE PLANNING  Goal: Discharge to home or other facility with appropriate resources  Description: INTERVENTIONS:  - Identify barriers to discharge w/patient and caregiver  - Arrange for needed discharge resources and transportation as appropriate  - Identify discharge learning needs (meds, wound care, etc.)  - Arrange for interpretive services to assist at discharge as needed  - Refer to Case Management Department for coordinating discharge planning if the patient needs post-hospital services based on physician/advanced practitioner order or complex needs related to functional status, cognitive ability, or social support system  Outcome: Adequate for Discharge     Problem: Knowledge Deficit  Goal: Patient/family/caregiver demonstrates understanding of disease process, treatment plan, medications, and discharge instructions  Description: Complete learning assessment and assess knowledge base. Interventions:  - Provide teaching at level of understanding  - Provide teaching via preferred learning methods  Outcome: Adequate for Discharge     Problem: Nutrition/Hydration-ADULT  Goal: Nutrient/Hydration intake appropriate for improving, restoring or maintaining nutritional needs  Description: Monitor and assess patient's nutrition/hydration status for malnutrition.  Collaborate with interdisciplinary team and initiate plan and interventions as ordered. Monitor patient's weight and dietary intake as ordered or per policy. Utilize nutrition screening tool and intervene as necessary. Determine patient's food preferences and provide high-protein, high-caloric foods as appropriate.      INTERVENTIONS:  - Monitor oral intake, urinary output, labs, and treatment plans  - Assess nutrition and hydration status and recommend course of action  - Evaluate amount of meals eaten  - Assist patient with eating if necessary   - Allow adequate time for meals  - Recommend/ encourage appropriate diets, oral nutritional supplements, and vitamin/mineral supplements  - Order, calculate, and assess calorie counts as needed  - Recommend, monitor, and adjust tube feedings and TPN/PPN based on assessed needs  - Assess need for intravenous fluids  - Provide specific nutrition/hydration education as appropriate  - Include patient/family/caregiver in decisions related to nutrition  Outcome: Adequate for Discharge     Problem: SKIN/TISSUE INTEGRITY - ADULT  Goal: Skin Integrity remains intact(Skin Breakdown Prevention)  Description: Assess:  -Perform Gus assessment every   -Clean and moisturize skin every   -Inspect skin when repositioning, toileting, and assisting with ADLS  -Assess under medical devices such as  every   -Assess extremities for adequate circulation and sensation     Bed Management:  -Have minimal linens on bed & keep smooth, unwrinkled  -Change linens as needed when moist or perspiring  -Avoid sitting or lying in one position for more than  hours while in bed  -Keep HOB at degrees     Toileting:  -Offer bedside commode  -Assess for incontinence every   -Use incontinent care products after each incontinent episode such as    Activity:  -Mobilize patient  times a day  -Encourage activity and walks on unit  -Encourage or provide ROM exercises   -Turn and reposition patient every  Hours  -Use appropriate equipment to lift or move patient in bed  -Instruct/ Assist with weight shifting every  when out of bed in chair  -Consider limitation of chair time  hour intervals    Skin Care:  -Avoid use of baby powder, tape, friction and shearing, hot water or constrictive clothing  -Relieve pressure over bony prominences using   -Do not massage red bony areas    Next Steps:  -Teach patient strategies to minimize risks such as    -Consider consults to  interdisciplinary teams such as   Outcome: Adequate for Discharge  Goal: Incision(s), wounds(s) or drain site(s) healing without S/S of infection  Description: INTERVENTIONS  - Assess and document dressing, incision, wound bed, drain sites and surrounding tissue  - Provide patient and family education  - Perform skin care/dressing changes every   Outcome: Adequate for Discharge  Goal: Pressure injury heals and does not worsen  Description: Interventions:  - Implement low air loss mattress or specialty surface (Criteria met)  - Apply silicone foam dressing  - Instruct/assist with weight shifting every  minutes when in chair   - Limit chair time to  hour intervals  - Use special pressure reducing interventions such as  when in chair   - Apply fecal or urinary incontinence containment device   - Perform passive or active ROM every   - Turn and reposition patient & offload bony prominences every hours   - Utilize friction reducing device or surface for transfers   - Consider consults to  interdisciplinary teams such as   - Use incontinent care products after each incontinent episode such as  - Consider nutrition services referral as needed  Outcome: Adequate for Discharge

## 2023-11-30 NOTE — DISCHARGE INSTR - AVS FIRST PAGE
Recommended close follow up with primary care provider in  3-5 days. Recommended to take Metformin 500 mg twice daily and keep a log book for Finger stick glucose at home. Recommended to HOLD Insulin, Demadex and Lisinopril for now until out pt follow up with primary care provider.

## 2023-12-01 ENCOUNTER — TELEPHONE (OUTPATIENT)
Age: 78
End: 2023-12-01

## 2023-12-01 ENCOUNTER — TRANSITIONAL CARE MANAGEMENT (OUTPATIENT)
Age: 78
End: 2023-12-01
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
## 2023-12-01 DIAGNOSIS — S22.32XA CLOSED TRAUMATIC NONDISPLACED FRACTURE OF ONE RIB OF LEFT SIDE, INITIAL ENCOUNTER: ICD-10-CM

## 2023-12-01 DIAGNOSIS — I10 ESSENTIAL HYPERTENSION: Primary | ICD-10-CM

## 2023-12-01 DIAGNOSIS — N39.46 MIXED STRESS AND URGE URINARY INCONTINENCE: Primary | ICD-10-CM

## 2023-12-01 RX ORDER — METHOCARBAMOL 500 MG/1
500 TABLET, FILM COATED ORAL 2 TIMES DAILY PRN
Qty: 28 TABLET | Refills: 0 | Status: SHIPPED | OUTPATIENT
Start: 2023-12-01

## 2023-12-01 NOTE — TELEPHONE ENCOUNTER
Thy are looking for an rx for CHUCKS for the bed because she has accidents . Does that go to pharmacy ?

## 2023-12-01 NOTE — TELEPHONE ENCOUNTER
Patient son left a message that she is not able to come into office it would have to be a virtual at a time he could be there to help her. Lilia Jefferyas her son needs a call back asap due to all the changes with her medications from the hospital. She is not taking her insulin they have stopped all her meds.

## 2023-12-01 NOTE — TELEPHONE ENCOUNTER
Called spoke to son and stated they have her taking metformin bid, no insulin or her water pills, and they told them to use th robaxin for pain in legs but they do not have this med in the home , she was in [facility]-please advise

## 2023-12-04 ENCOUNTER — TELEPHONE (OUTPATIENT)
Age: 78
End: 2023-12-04

## 2023-12-04 NOTE — TELEPHONE ENCOUNTER
Voice mail message left     Hi, this is Tory Fee on [employer] with [employer]. I'm calling for Esau Chew, last name BIJAL's date of birth [birth year]. My phone number is [phone]. That's my cell phone. If you could give me a call back, I would like to discuss Chela's home from the hospital and the doctor wants some lab work on her, which I can do. I just need the verbal. I can't look into the system, I need the verbal and sorry, she has some wound care, which they are looking better on her legs. I was hoping to speak and see if we can get some Leward Spittle and solution ordered because we don't, I don't really want to try antibiotics with her again and just see what the doctor thinks. She's got some green drainage still to two of the wounds on her legs, but they do look much, much better. So if you could give me a call back and let me know if you have any questions and just make sure we keep Chela on track. All right. Thank you.

## 2023-12-06 ENCOUNTER — TELEPHONE (OUTPATIENT)
Age: 78
End: 2023-12-06

## 2023-12-06 DIAGNOSIS — E11.22 TYPE 2 DIABETES MELLITUS WITH STAGE 3 CHRONIC KIDNEY DISEASE, WITH LONG-TERM CURRENT USE OF INSULIN, UNSPECIFIED WHETHER STAGE 3A OR 3B CKD (HCC): ICD-10-CM

## 2023-12-06 DIAGNOSIS — Z79.4 TYPE 2 DIABETES MELLITUS WITH STAGE 3 CHRONIC KIDNEY DISEASE, WITH LONG-TERM CURRENT USE OF INSULIN, UNSPECIFIED WHETHER STAGE 3A OR 3B CKD (HCC): ICD-10-CM

## 2023-12-06 DIAGNOSIS — N18.30 TYPE 2 DIABETES MELLITUS WITH STAGE 3 CHRONIC KIDNEY DISEASE, WITH LONG-TERM CURRENT USE OF INSULIN, UNSPECIFIED WHETHER STAGE 3A OR 3B CKD (HCC): ICD-10-CM

## 2023-12-06 DIAGNOSIS — L89.311 PRESSURE INJURY OF RIGHT BUTTOCK, STAGE 1: Primary | ICD-10-CM

## 2023-12-06 RX ORDER — PROCHLORPERAZINE 25 MG/1
SUPPOSITORY RECTAL
Qty: 1 EACH | Refills: 0 | Status: SHIPPED | OUTPATIENT
Start: 2023-12-06

## 2023-12-06 RX ORDER — SODIUM HYPOCHLORITE 2.5 MG/ML
1 SOLUTION TOPICAL ONCE
Qty: 473 ML | Refills: 0 | Status: SHIPPED | OUTPATIENT
Start: 2023-12-06 | End: 2023-12-06

## 2023-12-06 RX ORDER — PROCHLORPERAZINE 25 MG/1
SUPPOSITORY RECTAL
Qty: 1 EACH | Refills: 0 | Status: SHIPPED | OUTPATIENT
Start: 2023-12-06 | End: 2023-12-06

## 2023-12-06 RX ORDER — PROCHLORPERAZINE 25 MG/1
SUPPOSITORY RECTAL
Qty: 1 EACH | Refills: 3 | Status: SHIPPED | OUTPATIENT
Start: 2023-12-06

## 2023-12-06 NOTE — TELEPHONE ENCOUNTER
Anna Stearns from [facility] . Requesting [employer] for wound treatment and sensor for her Glucose Monitoring.

## 2023-12-08 ENCOUNTER — TELEPHONE (OUTPATIENT)
Age: 78
End: 2023-12-08

## 2023-12-08 NOTE — TELEPHONE ENCOUNTER
Deandra calling from [facility]. Chela's sodium level was 134 today and the visiting nurse said that her urine was orange.  Please advise

## 2023-12-12 ENCOUNTER — TELEMEDICINE (OUTPATIENT)
Age: 78
End: 2023-12-12
Payer: MEDICARE

## 2023-12-12 DIAGNOSIS — I50.33 ACUTE ON CHRONIC DIASTOLIC (CONGESTIVE) HEART FAILURE (HCC): ICD-10-CM

## 2023-12-12 DIAGNOSIS — F33.41 RECURRENT MAJOR DEPRESSIVE DISORDER, IN PARTIAL REMISSION (HCC): ICD-10-CM

## 2023-12-12 DIAGNOSIS — Z79.4 TYPE 2 DIABETES MELLITUS WITH STAGE 3A CHRONIC KIDNEY DISEASE, WITH LONG-TERM CURRENT USE OF INSULIN (HCC): ICD-10-CM

## 2023-12-12 DIAGNOSIS — B36.9 FUNGAL INFECTION OF SKIN: ICD-10-CM

## 2023-12-12 DIAGNOSIS — J96.11 CHRONIC RESPIRATORY FAILURE WITH HYPOXIA (HCC): ICD-10-CM

## 2023-12-12 DIAGNOSIS — E11.22 TYPE 2 DIABETES MELLITUS WITH STAGE 3A CHRONIC KIDNEY DISEASE, WITH LONG-TERM CURRENT USE OF INSULIN (HCC): ICD-10-CM

## 2023-12-12 DIAGNOSIS — G93.41 ACUTE METABOLIC ENCEPHALOPATHY: Primary | ICD-10-CM

## 2023-12-12 DIAGNOSIS — N18.31 TYPE 2 DIABETES MELLITUS WITH STAGE 3A CHRONIC KIDNEY DISEASE, WITH LONG-TERM CURRENT USE OF INSULIN (HCC): ICD-10-CM

## 2023-12-12 DIAGNOSIS — G89.4 CHRONIC PAIN SYNDROME: ICD-10-CM

## 2023-12-12 PROCEDURE — 99495 TRANSJ CARE MGMT MOD F2F 14D: CPT | Performed by: INTERNAL MEDICINE

## 2023-12-12 RX ORDER — DULOXETIN HYDROCHLORIDE 30 MG/1
30 CAPSULE, DELAYED RELEASE ORAL DAILY
Qty: 90 CAPSULE | Refills: 2 | Status: SHIPPED | OUTPATIENT
Start: 2023-12-12

## 2023-12-12 RX ORDER — METHOCARBAMOL 500 MG/1
500 TABLET, FILM COATED ORAL 2 TIMES DAILY PRN
Qty: 30 TABLET | Refills: 5 | Status: SHIPPED | OUTPATIENT
Start: 2023-12-12

## 2023-12-12 RX ORDER — NYSTATIN 100000 [USP'U]/G
POWDER TOPICAL 2 TIMES DAILY
Qty: 60 G | Refills: 6 | Status: SHIPPED | OUTPATIENT
Start: 2023-12-12

## 2023-12-12 RX ORDER — SODIUM HYPOCHLORITE 2.5 MG/ML
1 SOLUTION TOPICAL DAILY
COMMUNITY
Start: 2023-12-06

## 2023-12-12 RX ORDER — PROCHLORPERAZINE 25 MG/1
SUPPOSITORY RECTAL
Qty: 9 EACH | Refills: 3 | Status: SHIPPED | OUTPATIENT
Start: 2023-12-12

## 2023-12-12 NOTE — PROGRESS NOTES
Virtual TCM Visit:    Verification of patient location:    Patient is located at Home in the following state in which I hold an active license [place]    Assessment/Plan:  Patient was told to discontinue the insulin and continue taking the metformin 500 mg twice a day as her blood glucose levels are stable on the above medication. Dexcom sensors were sent to the pharmacy. Her pain is well-controlled with the hydromorphone 1 and the Robaxin. She will continue the Robaxin 500 mg twice a day. She was started on the duloxetine 30 mg for the depression in addition to the mirtazapine and is doing better with that. Nystatin powder was also sent for the fungal infection in her body folds. Her recent labs were reviewed and her creatinine as well as the sodium levels were within normal limits. The son was at bedside and all the questions were answered.          Problem List Items Addressed This Visit       Recurrent major depressive disorder, in partial remission ([address])    Relevant Medications    DULoxetine (CYMBALTA) 30 mg delayed release capsule    Type 2 diabetes mellitus with stage 3 chronic kidney disease, with long-term current use of insulin (HCC)    Relevant Medications    Continuous Blood Gluc Sensor (Dexcom G6 Sensor) MISC    Chronic respiratory failure with hypoxia (HCC)    Fungal infection of skin    Relevant Medications    nystatin (MYCOSTATIN) powder    Acute on chronic diastolic (congestive) heart failure (HCC)    Acute metabolic encephalopathy - Primary     Other Visit Diagnoses       Chronic pain syndrome        Relevant Medications    methocarbamol (ROBAXIN) 500 mg tablet             Reason for visit is follow-up from the hospital, for TCM    Encounter provider Daisy Edwards MD     Provider located at [address] [address] [phone]    Recent Visits  Date Type Provider Dept   12/08/23 Telephone Tommie Jaimes [facility]   12/06/23 Telephone Daisy Edwards MD [facility]   Showing recent visits within past 7 days and meeting all other requirements  Today's Visits  Date Type Provider Dept   12/12/23 Matthew Flowers MD [facility]   Showing today's visits and meeting all other requirements  Future Appointments  No visits were found meeting these conditions. Showing future appointments within next 150 days and meeting all other requirements       After connecting through televideo, the patient was identified by name and date of birth. Mario Alberto Carrero was informed that this is a telemedicine visit and that the visit is being conducted through the [employer] [facility] [employer] platform. She agrees to proceed. .  My office door was closed. Other methods to assure confidentiality were taken . No one else was in the room. She acknowledged consent and understanding of privacy and security of the video platform. The patient has agreed to participate and understands they can discontinue the visit at any time. Patient is aware this is a billable service. Transitional Care Management Review:  Mario Alberto Carrero is a 66 y.o. female here for TCM follow up. During the TCM phone call patient stated:    TCM Call       Date and time call was made  12/1/2023 11:46 AM    Hospital care reviewed  Records reviewed    Patient was hospitialized at  [id number] [facility]    Date of Admission  11/25/23    Date of discharge  11/30/23    Diagnosis  Acute encephalopathy    Disposition  Home; Home health services    Were the patients medications reviewed and updated  No  instructed to bring updated med list to TCM appt. Current Symptoms  --  generalized pain, redness BLE    Fatigue severity  Moderate          TCM Call       Post hospital issues  None    Scheduled for follow up? Yes    Patients specialists  Other (comment);  Pulmonlolgist    Pulmonologist name  Isi Hollis PA-C    Pulmonologist contact # [phone]    Endocrinologist name  [phone]    Neurologist name  Marlee Ramsey MD    Neurologist contact #  [phone]    Other specialists names  [phone]    Referrals needed  Call [place] office of [phone] /     Did you obtain your prescribed medications  Yes  =    Do you need help managing your prescriptions or medications  No    Is transportation to your appointment needed  No    I have advised the patient to call PCP with any new or worsening symptoms  Roberto Vasques LPN    Are you recieving any outpatient services  Yes    What type of services  labs    Are you recieving home care services  Yes    Types of home care services  Nurse visit; Home PT    Interperter language line needed  No    Counseling  Family    Counseling topics  patient and family education; Importance of RX compliance          Subjective:     Patient ID: Rex Atkins is a 66 y.o. female. HPI  Patient went to the hospital with altered mental status and was found to have acute metabolic encephalopathy, acute congestive heart failure, acute on chronic respiratory failure and chronic pain. She was treated for the above as she also had hyponatremia and acute kidney injury. Now she is feeling much better. Her blood glucose levels are well-controlled and her pain is better controlled. The swelling in her lower extremities is much better now. Review of Systems   Constitutional:  Positive for activity change and fatigue. Negative for chills, diaphoresis and fever. HENT:  Negative for congestion, ear discharge, ear pain, hearing loss, postnasal drip, rhinorrhea, sinus pressure, sinus pain, sneezing, sore throat and voice change. Eyes:  Negative for pain, discharge, redness and visual disturbance. Respiratory:  Negative for cough, chest tightness, shortness of breath and wheezing. Cardiovascular:  Positive for leg swelling. Negative for chest pain and palpitations.    Gastrointestinal: Negative for abdominal distention, abdominal pain, blood in stool, constipation, diarrhea, nausea and vomiting. Endocrine: Negative for cold intolerance, heat intolerance, polydipsia, polyphagia and polyuria. Genitourinary:  Negative for dysuria, flank pain, frequency, hematuria and urgency. Musculoskeletal:  Positive for arthralgias and gait problem. Negative for back pain, joint swelling, myalgias, neck pain and neck stiffness. Skin:  Negative for rash. Neurological:  Negative for dizziness, tremors, syncope, facial asymmetry, speech difficulty, weakness, light-headedness, numbness and headaches. Hematological:  Does not bruise/bleed easily. Psychiatric/Behavioral:  Negative for behavioral problems, confusion and sleep disturbance. The patient is not nervous/anxious. Objective: There were no vitals filed for this visit. Physical Exam  Constitutional:       General: She is not in acute distress. Appearance: She is well-developed. She is ill-appearing. She is not diaphoretic. HENT:      Head: Normocephalic. Eyes:      General: No scleral icterus. Right eye: No discharge. Conjunctiva/sclera: Conjunctivae normal.   Neck:      Thyroid: No thyromegaly. Vascular: No JVD. Trachea: No tracheal deviation. Pulmonary:      Effort: Pulmonary effort is normal. No respiratory distress. Musculoskeletal:      Cervical back: Normal range of motion. Neurological:      Mental Status: She is alert and oriented to person, place, and time. Motor: No abnormal muscle tone. Psychiatric:         Judgment: Judgment normal.         Medications have been reviewed by provider in current encounter    I spent 20 minutes with the patient during this visit. Suze Tyler MD      VIRTUAL VISIT [facility] verbally agrees to participate in [facility].  Pt is aware that [facility] could be limited without vital signs or the ability to perform a full hands-on physical exam. [address] understands she or the provider may request at any time to terminate the video visit and request the patient to seek care or treatment in person.

## 2023-12-13 ENCOUNTER — TELEPHONE (OUTPATIENT)
Age: 78
End: 2023-12-13

## 2023-12-14 ENCOUNTER — VBI (OUTPATIENT)
Dept: ADMINISTRATIVE | Facility: OTHER | Age: 78
End: 2023-12-14

## 2023-12-19 ENCOUNTER — TELEPHONE (OUTPATIENT)
Age: 78
End: 2023-12-19

## 2023-12-19 NOTE — TELEPHONE ENCOUNTER
----- Message from Toshia Wood MD sent at 12/18/2023  5:48 PM EST -----  Urine culture was contaminated could not do the sensitivity

## 2023-12-20 ENCOUNTER — TELEPHONE (OUTPATIENT)
Age: 78
End: 2023-12-20

## 2023-12-20 DIAGNOSIS — R21 RASH: Primary | ICD-10-CM

## 2023-12-20 RX ORDER — LORATADINE 10 MG/1
10 TABLET ORAL DAILY
Qty: 30 TABLET | Refills: 1 | Status: SHIPPED | OUTPATIENT
Start: 2023-12-20

## 2023-12-20 NOTE — TELEPHONE ENCOUNTER
[facility] visit.  Pt has rash on her arms, thighs and neck. Can [place] prescribe something for this.  Call Kory at [phone]

## 2023-12-22 ENCOUNTER — TELEPHONE (OUTPATIENT)
Age: 78
End: 2023-12-22

## 2023-12-22 NOTE — TELEPHONE ENCOUNTER
Left message    Matt. I'm an occupational therapist from [facility].  I'm just calling to notify the doctor that as of yesterday, the 21st, I discharged Chela from occupational therapy having achieved maximum rehab potential. She will continue to work with our nurse for the remainder of care. Any questions, please give me a call [phone]

## 2023-12-26 ENCOUNTER — TELEPHONE (OUTPATIENT)
Age: 78
End: 2023-12-26

## 2023-12-26 NOTE — TELEPHONE ENCOUNTER
Pt's son Kory calling to request and appt for his mother for a rash that is getting worse he says    Advised Kory we are booked out at the 2 closest providers to her until early May    Advised Kory if her PCP can send an urgent/ASAP referral we can try to get her seen sooner    Pt was seen by PCP but thus far no referral was sent

## 2023-12-26 NOTE — TELEPHONE ENCOUNTER
Vm was left with our office today from [facility]     Or try this p# for [facility]: [phone]    Shanika saw the pt today for home visit  Pt has rash on arms, not improving, red inflamed, dry- itchy  Also on chest, upper chest, and shoulders    Pt takes Zyrtec  Can the dr order a cream or another medication that'll help .     Please give [facility] a call

## 2023-12-26 NOTE — TELEPHONE ENCOUNTER
Oni Wood,     I let them know that they need to go to derm for the rash. They called derm and said they do not have appointments until May and they need you to put in a Urgent referral so she can be seen sooner. Thank you so much

## 2023-12-28 DIAGNOSIS — R21 RASH: Primary | ICD-10-CM

## 2024-01-11 DIAGNOSIS — R21 RASH: ICD-10-CM

## 2024-01-11 RX ORDER — LORATADINE 10 MG/1
10 TABLET ORAL DAILY
Qty: 90 TABLET | Refills: 1 | Status: SHIPPED | OUTPATIENT
Start: 2024-01-11

## 2024-01-26 PROBLEM — H10.9 CONJUNCTIVITIS: Status: RESOLVED | Noted: 2023-11-27 | Resolved: 2024-01-26

## 2024-04-05 ENCOUNTER — RA CDI HCC (OUTPATIENT)
Dept: OTHER | Facility: HOSPITAL | Age: 79
End: 2024-04-05

## 2025-03-12 NOTE — PROGRESS NOTES
[address]  Progress Note  Name: Bartolo Iverson  MRN: [id number]  Unit/Bed#: -69 I Date of Admission: 10/4/2023   Date of Service: 10/7/2023 I Hospital Day: 3    Assessment/Plan   Acute on chronic diastolic (congestive) heart failure (HCC)  Assessment & Plan  Wt Readings from Last 3 Encounters:   10/07/23 91.2 kg (201 lb 1 oz)   06/19/23 86.6 kg (191 lb)   05/23/23 97.2 kg (214 lb 3.2 oz)     · On admission she had worsening bilateral lower extremity edema and edema in right upper extremity  · RUE doppler negative for DVT   · BNP slightly elevated at 107  · CTA chest negative for PE or dissection, chest x-ray revealing pulmonary vascular congestion  · Echocardiogram from April 2023 revealing 60 to 65% ejection fraction with grade 1 diastolic dysfunction  · She was initially started on IV Lasix 100 mg 3 times daily on admission per CHF powerplan -- had a increase in Cr so diuresis held   · Cardiology following   · Hold diuresis she appears euvolemic to dry on exam (giving 500 cc fluids 2/2 CLAUDIA)         * CLAUDIA (acute kidney injury) ([address])  Assessment & Plan  Cr 0.9>1.7   Received IV lasix for possible CHF exacerbation on admission per the powerplan   Likely overdiuresed   Gave 500 cc IVF on 10/6 with Cr trending up still 1.3>1.7   · Hold diuretics   · Giving additional 500 cc IVF today   · AM BMP - if cr still uptrending will consult Nephrology   · Urine lytes for FEurea calculation and UA ordered     SIRS (systemic inflammatory response syndrome) ([facility])  Assessment & Plan  · Met criteria due to tachycardia and tachypnea in the setting of vascular congestion, no clear infectious source at this time    Chronic respiratory failure with hypoxia ([facility])  Assessment & Plan  · Currently saturating well on baseline 2 L nasal cannula ( baseline 3 L)     Type 2 diabetes mellitus with stage 3 chronic kidney disease, with long-term current use of insulin [facility])  Assessment & Plan  Lab Results Geriatrics consulted during recent hospital stay        Component Value Date    HGBA1C 11.1 (H) 2023       Recent Labs     10/06/23  1542 10/06/23  2100 10/07/23  0634 10/07/23  1046   POCGLU 184* 254* 70 191*       Blood Sugar Average: Last 72 hrs:  · (P) 152. 25Blood glucose checks 4 times daily, hypoglycemia protocol  · Continue 20 units of home Lantus 40 units in the afternoon - decreased due to am hypoglycemia   · ssi             VTE Pharmacologic Prophylaxis: VTE Score: 5 Moderate Risk (Score 3-4) - Pharmacological DVT Prophylaxis Ordered: heparin. Patient Centered Rounds: I performed bedside rounds with nursing staff today. Discussions with Specialists or Other Care Team Provider: cardiology    Education and Discussions with Family / Patient: Updated  (son) via phone. Total Time Spent on Date of Encounter in care of patient: 40 mins. This time was spent on one or more of the following: performing physical exam; counseling and coordination of care; obtaining or reviewing history; documenting in the medical record; reviewing/ordering tests, medications or procedures; communicating with other healthcare professionals and discussing with patient's family/caregivers. Current Length of Stay: 3 day(s)  Current Patient Status: Inpatient   Certification Statement: The patient will continue to require additional inpatient hospital stay due to monitoring of kidney function  Discharge Plan: Anticipate discharge in 24-48 hrs to home with home services. Code Status: Level 3 - DNAR and DNI    Subjective:   Still having chronic pain issues bilateral upper and lower extremities and back. Not eating much because she does not like the hospital food. No chest pain.      Objective:     Vitals:   Temp (24hrs), Av.2 °F (36.2 °C), Min:96.6 °F (35.9 °C), Max:98.1 °F (36.7 °C)    Temp:  [96.6 °F (35.9 °C)-98.1 °F (36.7 °C)] 97.1 °F (36.2 °C)  HR:  [71-94] 94  Resp:  [18] 18  BP: ()/(45-84) 92/55  SpO2:  [94 %-100 %] 98 %  Body mass index is 39.27 kg/m². Input and Output Summary (last 24 hours): Intake/Output Summary (Last 24 hours) at 10/7/2023 1114  Last data filed at 10/6/2023 2313  Gross per 24 hour   Intake 1460 ml   Output 600 ml   Net 860 ml       Physical Exam:   Physical Exam   General: No acute distress, appears comfortable  HEENT: Normal conjunctiva, EOMI  Heart: Regular rate and rhythm, no murmurs  Lungs: Clear lungs, nonlabored respirations, no wheezing  Abdomen: Nontender, nondistended  Extremities: No pitting edema in bilateral lower extremities, TTP  Neuro: Alert and oriented x3, no focal deficits   Psych: Cooperative/calm      Additional Data:     Labs:  Results from last 7 days   Lab Units 10/07/23  0437   WBC Thousand/uL 17.24*   HEMOGLOBIN g/dL 11.1*   HEMATOCRIT % 35.8   PLATELETS Thousands/uL 504*   NEUTROS PCT % 69   LYMPHS PCT % 15   MONOS PCT % 7   EOS PCT % 8*     Results from last 7 days   Lab Units 10/07/23  0437 10/06/23  0544 10/04/23  1821   SODIUM mmol/L 133*   < > 130*   POTASSIUM mmol/L 3.9   < > 4.1   CHLORIDE mmol/L 99   < > 97   CO2 mmol/L 26   < > 24   BUN mg/dL 25   < > 14   CREATININE mg/dL 1.78*   < > 0.92   ANION GAP mmol/L 8   < > 9   CALCIUM mg/dL 9.1   < > 9.4   ALBUMIN g/dL  --   --  3.1*   TOTAL BILIRUBIN mg/dL  --   --  0.55   ALK PHOS U/L  --   --  89   ALT U/L  --   --  10   AST U/L  --   --  23   GLUCOSE RANDOM mg/dL 62*   < > 296*    < > = values in this interval not displayed.          Results from last 7 days   Lab Units 10/07/23  1046 10/07/23  0634 10/06/23  2100 10/06/23  1542 10/06/23  1107 10/06/23  0650 10/06/23  0624 10/05/23  2113 10/05/23  1542 10/05/23  1100 10/05/23  0743 10/05/23  0125   POC GLUCOSE mg/dl 191* 70 254* 184* 117 101 59* 146* 202* 137 138 228*         Results from last 7 days   Lab Units 10/07/23  0437 10/05/23  0127 10/04/23  1821   LACTIC ACID mmol/L  --  1.4  --    PROCALCITONIN ng/ml 0.24  --  0.08       Lines/Drains:  Invasive Devices     Peripheral Intravenous Line Duration           Peripheral IV 10/04/23 Left Hand 3 days    Peripheral IV 10/04/23 Left Antecubital 2 days          Drain  Duration           External Urinary Catheter 2 days                      Imaging: Reviewed radiology reports from this admission including: chest xray and chest CT scan    Recent Cultures (last 7 days):   Results from last 7 days   Lab Units 10/05/23  0127 10/05/23  0015   BLOOD CULTURE  No Growth at 48 hrs. No Growth at 48 hrs.        Last 24 Hours Medication List:   Current Facility-Administered Medications   Medication Dose Route Frequency Provider Last Rate   • acetaminophen  650 mg Oral Q6H PRN Epi Aguirre PA-C     • albuterol  1 puff Inhalation Q6H PRN Epi Aguirre PA-C     • albuterol  2.5 mg Nebulization Q6H PRN Epi Aguirre PA-C     • aluminum-magnesium hydroxide-simethicone  30 mL Oral Q6H PRN Epi Aguirre PA-C     • Diclofenac Sodium  2 g Topical 4x Daily Epi Aguirre PA-C     • Fluticasone Furoate-Vilanterol  1 puff Inhalation Daily Epi Aguirre PA-C      And   • umeclidinium  1 puff Inhalation Daily Epi Aguirre PA-C     • heparin (porcine)  5,000 Units Subcutaneous Q8H [facility] Epi Aguirre PA-C     • HYDROmorphone  4 mg Oral Q6H PRN Epi Aguirre PA-C     • insulin glargine  20 Units Subcutaneous Daily With Lunch Billey Lombard, MD     • insulin lispro  1-6 Units Subcutaneous TID AC Eladia Beard PA-C     • insulin lispro  1-6 Units Subcutaneous HS Eladia Beard PA-C     • lactated ringers  500 mL Intravenous Once Billey Lombard,  mL (10/07/23 1001)   • melatonin  3 mg Oral HS Madelyn Lewis PA-C     • methocarbamol  500 mg Oral BID PRN Epi Aguirre PA-C     • mirtazapine  15 mg Oral HS Epi Aguirre PA-C     • morphine injection  2 mg Intravenous Q4H PRN Billey Lombard, MD     • nystatin   Topical TID PRN Epi Aguirre PA-C     • senna-docusate sodium  1 tablet Oral Daily Epi Aguirre PA-C          Today, Patient Was Seen By: Billey Lombard, MD    **Please Note: This note may have been constructed using a voice recognition system. **